# Patient Record
Sex: FEMALE | Race: WHITE | NOT HISPANIC OR LATINO | Employment: OTHER | URBAN - METROPOLITAN AREA
[De-identification: names, ages, dates, MRNs, and addresses within clinical notes are randomized per-mention and may not be internally consistent; named-entity substitution may affect disease eponyms.]

---

## 2018-03-23 ENCOUNTER — TRANSCRIBE ORDERS (OUTPATIENT)
Dept: ADMINISTRATIVE | Facility: HOSPITAL | Age: 58
End: 2018-03-23

## 2018-03-23 DIAGNOSIS — G93.89 PNEUMOCEPHALUS: Primary | ICD-10-CM

## 2018-04-12 ENCOUNTER — HOSPITAL ENCOUNTER (OUTPATIENT)
Dept: MRI IMAGING | Facility: HOSPITAL | Age: 58
Discharge: HOME | End: 2018-04-12
Payer: COMMERCIAL

## 2018-04-12 DIAGNOSIS — G93.89 PNEUMOCEPHALUS: ICD-10-CM

## 2018-04-12 PROCEDURE — A9577 INJ MULTIHANCE: HCPCS | Performed by: RADIOLOGY

## 2018-04-12 PROCEDURE — 72157 MRI CHEST SPINE W/O & W/DYE: CPT

## 2018-04-12 RX ADMIN — GADOBENATE DIMEGLUMINE 13 ML: 529 INJECTION, SOLUTION INTRAVENOUS at 15:47

## 2018-05-08 ENCOUNTER — TRANSCRIBE ORDERS (OUTPATIENT)
Dept: ADMINISTRATIVE | Facility: HOSPITAL | Age: 58
End: 2018-05-08

## 2018-05-08 DIAGNOSIS — G93.89 PNEUMOCEPHALUS: ICD-10-CM

## 2018-05-08 DIAGNOSIS — G96.00 CSF LEAK: Primary | ICD-10-CM

## 2018-05-29 ENCOUNTER — HOSPITAL ENCOUNTER (OUTPATIENT)
Dept: MRI IMAGING | Facility: HOSPITAL | Age: 58
Discharge: HOME | End: 2018-05-29
Payer: COMMERCIAL

## 2018-05-29 DIAGNOSIS — G96.00 CSF LEAK: ICD-10-CM

## 2018-05-29 PROCEDURE — A9585 GADOBUTROL INJECTION: HCPCS | Performed by: RADIOLOGY

## 2018-05-29 PROCEDURE — 70553 MRI BRAIN STEM W/O & W/DYE: CPT

## 2018-05-29 RX ADMIN — GADOBUTROL 6 ML: 604.72 INJECTION INTRAVENOUS at 14:51

## 2019-02-06 ENCOUNTER — APPOINTMENT (OUTPATIENT)
Dept: PHYSICAL THERAPY | Facility: CLINIC | Age: 59
End: 2019-02-06
Payer: COMMERCIAL

## 2019-02-11 ENCOUNTER — APPOINTMENT (OUTPATIENT)
Dept: PHYSICAL THERAPY | Facility: CLINIC | Age: 59
End: 2019-02-11
Payer: COMMERCIAL

## 2019-02-19 ENCOUNTER — EVALUATION (OUTPATIENT)
Dept: PHYSICAL THERAPY | Facility: CLINIC | Age: 59
End: 2019-02-19
Payer: COMMERCIAL

## 2019-02-19 DIAGNOSIS — M54.2 CERVICAL PAIN (NECK): ICD-10-CM

## 2019-02-19 DIAGNOSIS — G96.00 CSF LEAK: Primary | ICD-10-CM

## 2019-02-19 PROCEDURE — 97163 PT EVAL HIGH COMPLEX 45 MIN: CPT | Performed by: PHYSICAL THERAPIST

## 2019-02-19 PROCEDURE — 97110 THERAPEUTIC EXERCISES: CPT | Performed by: PHYSICAL THERAPIST

## 2019-02-19 NOTE — PROGRESS NOTES
PT Evaluation     Today's date: 2019  Patient name: Kevin Murdock  : 1960  MRN: [id number]  Referring provider: Adonay Valero MD  Dx:   Encounter Diagnosis     ICD-10-CM    1  CSF leak G96 0    2  Cervical pain (neck) M54 2                   Assessment  Assessment details: Patient is a 66 y o  female presenting to outpatient PT with complaints of neck/thoracic pain, decreased balance, and decreased strength  Patient describes having surgery in 2018 to address her CSF leak that began after a fall in   She has had worsening residual symptoms of dizziness, balance, and migraines  Patient displays with decreased cervical ROM, decreased BUE/BLE strength, (+) impingement testing (R) shoulder, decreased balance, gait deficits, and functional restrictions  Pathanatomical signs and symptoms are consistent with decreased strength and endurance from CSF surgery and decreased activity  Skilled PT is required to increase ROM, strength, and balance to allow patient to return to desired level of function with all activities  No further referral appears necessary at this time based upon examination results  Thank you very much for your referral    Treatment and documentation performed by Sravanthi OVALLE under the direct supervision of Gael Foss PT, DPT  Impairments: abnormal gait, abnormal muscle tone, abnormal or restricted ROM, abnormal movement, activity intolerance, impaired balance, impaired physical strength, lacks appropriate home exercise program, pain with function, poor posture  and poor body mechanics    Symptom irritability: highUnderstanding of Dx/Px/POC: good   Prognosis: good    Goals  ST  Decrease pain to 6/10 max in 2 weeks  2  Increase Cervical flex, ext, and SB AROM: 10 degrees in 2 weeks  3  Increase BUE/BLE strength by 1/2 MMT grade in 2 weeks  4  Increase Romberg/Sharpened Romberg balance to 20 seconds in 2 weeks  5  I with HEP in 2 weeks    6  Improve FOTO score to 57 in 2 weeks  LT  Decrease pain to 2/10 max in 4 weeks  2  Increase Cervical flex, ext, SB AROM: 15 degrees in 4 weeks  3  Increase BUE/BLE strength to 4+/5 all planes in 4 weeks  4  Increase Romberg/Sharpened Romberg balance to 30 seconds in 4 weeks  5  I with HEP in 4 weeks  6  Improve FOTO score to 61 in 4 weeks  Plan  Patient would benefit from: skilled physical therapy  Planned modality interventions: cryotherapy and thermotherapy: hydrocollator packs  Planned therapy interventions: activity modification, joint mobilization, manual therapy, neuromuscular re-education, body mechanics training, patient education, postural training, strengthening, stretching, therapeutic activities, therapeutic exercise, functional ROM exercises, home exercise program, gait training, balance and motor coordination training  Frequency: 2x week  Duration in visits: 8  Duration in weeks: 4  Plan of Care beginning date: 2019  Plan of Care expiration date: 3/19/2019  Treatment plan discussed with: patient        Subjective Evaluation    History of Present Illness  Mechanism of injury: Patient had a fall with LOC in  causing CSF leak, chronic migraines, and neck pain  She has been out of work since 2016  With her CSF leak she describes symptoms of dizziness, decreased balance, and weakness from inactivity that has gotten worse over the past few months  She had surgery in 2018 and sees a concussion specialist at [facility] who referred her to PT  He has given her restrictions for bending over and lifting over 5-10lbs to stabilize pressure in her head            Recurrent probem    Quality of life: fair    Pain  Current pain ratin  At best pain ratin  At worst pain rating: 10  Location: neck, head, thoracic spine  Quality: dull ache, throbbing and radiating  Relieving factors: ice, medications, relaxation and heat  Aggravating factors: lifting, stair climbing and walking  Progression: worsening    Social Support  Stairs in house: yes   Lives in: [place] [facility]  Lives with: spouse    Employment status: not working  Hand dominance: right    Patient Goals  Patient goals for therapy: return to sport/leisure activities, increased strength, decreased pain, improved balance and increased motion  Patient goal: gardening, exercise, walk dogs        Objective     Postural Observations  Seated posture: fair  Standing posture: fair        Palpation     Additional Palpation Details  TTP C6-C7 SPs, cervical paraspinals, T3-T10 SPs  TTP (R) posterior GH joint line    Active Range of Motion   Cervical/Thoracic Spine       Cervical    Flexion: 40 degrees  with pain  Extension: 49 degrees      Left lateral flexion: 34 degrees      Right lateral flexion: 25 degrees      Left rotation: 67 degrees  Right rotation: 70 degrees             Strength/Myotome Testing     Left Shoulder     Planes of Motion   Flexion: 4   Extension: 4   Abduction: 4   External rotation at 0°: 4   Internal rotation at 0°: 4     Right Shoulder     Planes of Motion   Flexion: 4   Extension: 4+   Abduction: 4   External rotation at 0°: 4+   Internal rotation at 0°: 4     Left Elbow   Flexion: 4+  Extension: 4    Right Elbow   Flexion: 4+  Extension: 4-    Left Hip   Planes of Motion   Flexion: 4  Extension: 4  Abduction: 4+  Adduction: 4+    Right Hip   Planes of Motion   Flexion: 4-  Extension: 4  Abduction: 4+  Adduction: 4+    Left Knee   Flexion: 4  Extension: 4+    Right Knee   Flexion: 4+  Extension: 4+    Left Ankle/Foot   Dorsiflexion: 4+  Plantar flexion: 5    Right Ankle/Foot   Dorsiflexion: 4+  Plantar flexion: 5    Additional Strength Details  Hip ABD/ ADD MMT performed in hooklying  Hip Ext MMT performed in supine    Tests     Right Shoulder   Positive empty can and Hawkin's       Additional Tests Details  Pain and weakness noted    Ambulation     Observational Gait     Additional Observational Gait Details  NBOS, occasional swaying increasing at turns, slight improvement noted in stability with [facility]     Functional Assessment        Single Leg Stance   Left: 1 (max sway) seconds  Right: 2 (max sway) seconds    Comments  Romberg Balance: EO: 30 sec mod sway; EC: 3 sec max sway  Sharpened Romberg Balance: (R) Foot FWD 15 sec mod-max sway; (L) Foot FWD 10 sec mod-max sway             Daily Treatment Diary     DX: CSF leak, cervical pain  EPOC: 3/19/19  Precautions: No bending, no lifting 5-10lbs  CO-MORBIDITES: Back pain, headaches, visual impairment, chronic CSF leak  PERSONAL FACTORS: N/A    Manual           Cervical distraction, SO release          Thoracic PA mobs                                            Exercise Diary  HEP         Recumbent bike          UBE                    Cervical retractions supine          Cervical isometrics                    Scapular retractions 2/19         TB Rows          TB Sh' Ext          TB Triceps          TB (B) ER          DB biceps                    SLR 2/19         Clamshells 2/19         Bridges 2/19                   Standing hip abd          Standing hip flex          Standing hip ext          FWD wt shifts          LAT wt shifts          Mini squats          Romberg          Sharpened Romberg              Modalities

## 2019-02-19 NOTE — LETTER
2019    Apollo Chavez MD  [facility] [address] [facility]    Patient: Nora Skaggs   YOB: 1960   Date of Visit: 2019     Encounter Diagnosis     ICD-10-CM    1  CSF leak G96 0    2  Cervical pain (neck) M54 2        Dear Dr Jayden Puga:    Please review the attached Plan of Care from [facility] recent visit  Please verify that you agree therapy should continue by signing the attached document and sending it back to our office  If you have any questions or concerns, please don't hesitate to call  Sincerely,    [facility], PT      Referring Provider:      I certify that I have read the below Plan of Care and certify the need for these services furnished under this plan of treatment while under my care  Apollo Chavez MD  [address] [facility]: [phone]          PT Evaluation     Today's date: 2019  Patient name: Nora Skaggs  : 1960  MRN: [id number]  Referring provider: Xochilt Gaston MD  Dx:   Encounter Diagnosis     ICD-10-CM    1  CSF leak G96 0    2  Cervical pain (neck) M54 2                   Assessment  Assessment details: Patient is a 66 y o  female presenting to outpatient PT with complaints of neck/thoracic pain, decreased balance, and decreased strength  Patient describes having surgery in 2018 to address her CSF leak that began after a fall in   She has had worsening residual symptoms of dizziness, balance, and migraines  Patient displays with decreased cervical ROM, decreased BUE/BLE strength, (+) impingement testing (R) shoulder, decreased balance, gait deficits, and functional restrictions  Pathanatomical signs and symptoms are consistent with decreased strength and endurance from CSF surgery and decreased activity  Skilled PT is required to increase ROM, strength, and balance to allow patient to return to desired level of function with all activities   No further referral appears necessary at this time based upon examination results  Thank you very much for your referral    Treatment and documentation performed by Kye Pinto SPT under the direct supervision of Ila Rader PT, DPT  Impairments: abnormal gait, abnormal muscle tone, abnormal or restricted ROM, abnormal movement, activity intolerance, impaired balance, impaired physical strength, lacks appropriate home exercise program, pain with function, poor posture  and poor body mechanics    Symptom irritability: highUnderstanding of Dx/Px/POC: good   Prognosis: good    Goals  ST  Decrease pain to 6/10 max in 2 weeks  2  Increase Cervical flex, ext, and SB AROM: 10 degrees in 2 weeks  3  Increase BUE/BLE strength by 1/2 MMT grade in 2 weeks  4  Increase Romberg/Sharpened Romberg balance to 20 seconds in 2 weeks  5  I with HEP in 2 weeks  6  Improve FOTO score to 57 in 2 weeks  LT  Decrease pain to 2/10 max in 4 weeks  2  Increase Cervical flex, ext, SB AROM: 15 degrees in 4 weeks  3  Increase BUE/BLE strength to 4+/5 all planes in 4 weeks  4  Increase Romberg/Sharpened Romberg balance to 30 seconds in 4 weeks  5  I with HEP in 4 weeks  6  Improve FOTO score to 61 in 4 weeks      Plan  Patient would benefit from: skilled physical therapy  Planned modality interventions: cryotherapy and thermotherapy: hydrocollator packs  Planned therapy interventions: activity modification, joint mobilization, manual therapy, neuromuscular re-education, body mechanics training, patient education, postural training, strengthening, stretching, therapeutic activities, therapeutic exercise, functional ROM exercises, home exercise program, gait training, balance and motor coordination training  Frequency: 2x week  Duration in visits: 8  Duration in weeks: 4  Plan of Care beginning date: 2019  Plan of Care expiration date: 3/19/2019  Treatment plan discussed with: patient        Subjective Evaluation    History of Present Illness  Mechanism of injury: Patient had a fall with LOC in  causing CSF leak, chronic migraines, and neck pain  She has been out of work since 2016  With her CSF leak she describes symptoms of dizziness, decreased balance, and weakness from inactivity that has gotten worse over the past few months  She had surgery in 2018 and sees a concussion specialist at [facility] who referred her to PT  He has given her restrictions for bending over and lifting over 5-10lbs to stabilize pressure in her head            Recurrent probem    Quality of life: fair    Pain  Current pain ratin  At best pain ratin  At worst pain rating: 10  Location: neck, head, thoracic spine  Quality: dull ache, throbbing and radiating  Relieving factors: ice, medications, relaxation and heat  Aggravating factors: lifting, stair climbing and walking  Progression: worsening    Social Support  Stairs in house: yes   L[facility] in: [facility]  Lives with: spouse    Employment status: not working  Hand dominance: right    Patient Goals  Patient goals for therapy: return to sport/leisure activities, increased strength, decreased pain, improved balance and increased motion  Patient goal: gardening, exercise, walk dogs        Objective     Postural Observations  Seated posture: fair  Standing posture: fair        Palpation     Additional Palpation Details  TTP C6-C7 SPs, cervical paraspinals, T3-T10 SPs  TTP (R) posterior GH joint line    Active Range of Motion   Cervical/Thoracic Spine       Cervical    Flexion: 40 degrees  with pain  Extension: 49 degrees      Left lateral flexion: 34 degrees      Right lateral flexion: 25 degrees      Left rotation: 67 degrees  Right rotation: 70 degrees             Strength/Myotome Testing     Left Shoulder     Planes of Motion   Flexion: 4   Extension: 4   Abduction: 4   External rotation at 0°:  4   Internal rotation at 0°:  4     Right Shoulder     Planes of Motion Flexion: 4   Extension: 4+   Abduction: 4   External rotation at 0°:  4+   Internal rotation at 0°:  4     Left Elbow   Flexion: 4+  Extension: 4    Right Elbow   Flexion: 4+  Extension: 4-    Left Hip   Planes of Motion   Flexion: 4  Extension: 4  Abduction: 4+  Adduction: 4+    Right Hip   Planes of Motion   Flexion: 4-  Extension: 4  Abduction: 4+  Adduction: 4+    Left Knee   Flexion: 4  Extension: 4+    Right Knee   Flexion: 4+  Extension: 4+    Left Ankle/Foot   Dorsiflexion: 4+  Plantar flexion: 5    Right Ankle/Foot   Dorsiflexion: 4+  Plantar flexion: 5    Additional Strength Details  Hip ABD/ ADD MMT performed in hooklying  Hip Ext MMT performed in supine    Tests     Right Shoulder   Positive empty can and Hawkin's       Additional Tests Details  Pain and weakness noted    Ambulation     Observational Gait     Additional Observational Gait Details  NBOS, occasional swaying increasing at turns, slight improvement noted in stability with [facility]     Functional Assessment        Single Leg Stance   Left: 1 (max sway) seconds  Right: 2 (max sway) seconds    Comments  Romberg Balance: EO: 30 sec mod sway; EC: 3 sec max sway  Sharpened Romberg Balance: (R) Foot FWD 15 sec mod-max sway; (L) Foot FWD 10 sec mod-max sway             Daily Treatment Diary     DX: CSF leak, cervical pain  EPOC: 3/19/19  Precautions: No bending, no lifting 5-10lbs  CO-MORBIDITES: Back pain, headaches, visual impairment, chronic CSF leak  PERSONAL FACTORS: N/A    Manual           Cervical distraction, SO release          Thoracic PA mobs                                            Exercise Diary  HEP         Recumbent bike          UBE                    Cervical retractions supine          Cervical isometrics                    Scapular retractions 2/19         TB Rows          TB Sh' Ext          TB Triceps          TB (B) ER          DB biceps                    SLR 2/19         Clamshells 2/19         Bridges 2/19 Standing hip abd          Standing hip flex          Standing hip ext          FWD wt shifts          LAT wt shifts          Mini squats          Romberg          Sharpened Romberg              Modalities

## 2019-02-21 ENCOUNTER — APPOINTMENT (OUTPATIENT)
Dept: PHYSICAL THERAPY | Facility: CLINIC | Age: 59
End: 2019-02-21
Payer: COMMERCIAL

## 2019-02-21 ENCOUNTER — TRANSCRIBE ORDERS (OUTPATIENT)
Dept: PHYSICAL THERAPY | Facility: CLINIC | Age: 59
End: 2019-02-21

## 2019-02-21 DIAGNOSIS — G96.00 CSF LEAK: Primary | ICD-10-CM

## 2019-02-25 ENCOUNTER — OFFICE VISIT (OUTPATIENT)
Dept: PHYSICAL THERAPY | Facility: CLINIC | Age: 59
End: 2019-02-25
Payer: COMMERCIAL

## 2019-02-25 DIAGNOSIS — G96.00 CSF LEAK: Primary | ICD-10-CM

## 2019-02-25 DIAGNOSIS — M54.2 CERVICAL PAIN (NECK): ICD-10-CM

## 2019-02-25 PROCEDURE — 97140 MANUAL THERAPY 1/> REGIONS: CPT | Performed by: PHYSICAL THERAPIST

## 2019-02-25 PROCEDURE — 97110 THERAPEUTIC EXERCISES: CPT | Performed by: PHYSICAL THERAPIST

## 2019-02-25 NOTE — PROGRESS NOTES
Daily Note     Today's date: 2019  Patient name: Katiana Mcdowell  : 1960  MRN: [id number]  Referring provider: Lelia Skaggs MD  Dx:   Encounter Diagnosis     ICD-10-CM    1  CSF leak G96 0    2  Cervical pain (neck) M54 2                   Subjective: Patient reports being a bit sore after IE  On Wednesday night she started to feel increased radiating pain from her neck into her thoracic spine  She has not been able to do her HEP because of being in so much pain  Objective: See treatment diary below      Assessment: Patient tolerated treatment well with minimal c/o pain  Cervical distraction, suboccipital release, and thoracic mobilizations were performed at beginning of session with patient reports of pain relief  Strength training TE's were focused on scapular stabilization and BUE to continue progressing patient's sitting and standing tolerance with BUE movement  Recommend progressing strength training as tolerated working up to balance training when able  Plan: Continue with current POC  Progress shoulder and BLE strength as tolerated           DX: CSF leak, cervical pain  EPOC: 3/19/19  Precautions: No bending, no lifting 5-10lbs, Gr 1/2 Spine Mobs only  CO-MORBIDITES: Back pain, headaches, visual impairment, chronic CSF leak  PERSONAL FACTORS: N/A    Manual           Cervical distraction, SO release 5'         Thoracic PA mobs 3'   Gr 1/2                                           Exercise Diary  HEP         Recumbent bike          UBE  /2'                  Thoracic ext stretch w/ towel  2x10        Cervical retractions seated   10        Cervical retractions supine  10        Cervical isometrics                    Scapular retractions          TB Rows  OTB 15        TB Sh' Ext  OTB 15        TB Triceps  OTB 15        TB (B) ER          Standing Sh' flex  10        Standing Sh' scaption  10        Standing Sh' abd  10        DB biceps                    SLR  [place] 2/19         Aniyah 2/19                   Standing hip abd          Standing hip flex          Standing hip ext          FWD wt shifts          LAT wt shifts          Mini squats          Romberg          Sharpened Romberg              Modalities

## 2019-02-26 ENCOUNTER — APPOINTMENT (OUTPATIENT)
Dept: PHYSICAL THERAPY | Facility: CLINIC | Age: 59
End: 2019-02-26
Payer: COMMERCIAL

## 2019-02-28 ENCOUNTER — OFFICE VISIT (OUTPATIENT)
Dept: PHYSICAL THERAPY | Facility: CLINIC | Age: 59
End: 2019-02-28
Payer: COMMERCIAL

## 2019-02-28 DIAGNOSIS — G96.00 CSF LEAK: Primary | ICD-10-CM

## 2019-02-28 DIAGNOSIS — M54.2 CERVICAL PAIN (NECK): ICD-10-CM

## 2019-02-28 PROCEDURE — 97140 MANUAL THERAPY 1/> REGIONS: CPT | Performed by: PHYSICAL THERAPIST

## 2019-02-28 PROCEDURE — 97110 THERAPEUTIC EXERCISES: CPT | Performed by: PHYSICAL THERAPIST

## 2019-02-28 NOTE — PROGRESS NOTES
Daily Note     Today's date: 2019  Patient name: Thiago Davis  : 1960  MRN: [id number]  Referring provider: Cami Yancey MD  Dx:   Encounter Diagnosis     ICD-10-CM    1  CSF leak G96 0    2  Cervical pain (neck) M54 2                   Subjective: Patient reports feeling better with decreased pain after LV  She had to drive into the city by herself yesterday which caused her pain to increase again  She has not been able to perform her LE strengthening HEP due to increased pain from her doctor's visit  Objective: See treatment diary below      Assessment: Patient tolerated treatment well with minimal c/o pain  She continues to feel pain relief from cervical and thoracic manual therapy  Progressed well with UE strength training today as patient was able to tolerate more sets and resistance for multiple TE's  Recommend progressing into LE strength training as tolerated and patient feeling better  Plan: Continue with current POC  Progress shoulder and BLE strength as tolerated           DX: CSF leak, cervical pain  EPOC: 3/19/19  Precautions: No bending, no lifting 5-10lbs, Gr 1/2 Spine Mobs only  CO-MORBIDITES: Back pain, headaches, visual impairment, chronic CSF leak  PERSONAL FACTORS: N/A    Manual          Cervical distraction, SO release 5' 5'        Thoracic PA mobs 3'   Gr 1/2 3' Gr 1/2                                          Exercise Diary  HEP        Recumbent bike          UBE  2'/2' 2'/2'                 Thoracic ext stretch w/ towel  2x10 2x10       Cervical retractions seated   10 10       Cervical retractions supine  10        Cervical isometrics                    Scapular retractions          TB Rows  OTB 15 OTB 10x2       TB Sh' Ext  OTB 15 OTB 10x2        TB Triceps  OTB 15 OTB 10x2       TB (B) ER   OTB 10x2       Standing Sh' flex  10 1# 10x       Standing Sh' scaption  10 1# 10x       Standing Sh' abd  10 1# 10x       DB biceps   2# 10x2 SLR 2/19         Clamshells 2/19         Bridges 2/19                   Standing hip abd          Standing hip flex          Standing hip ext          FWD wt shifts          LAT wt shifts          Mini squats          Romberg          Sharpened Romberg              Modalities

## 2019-03-16 ENCOUNTER — OFFICE VISIT (OUTPATIENT)
Dept: URGENT CARE | Facility: CLINIC | Age: 59
End: 2019-03-16
Payer: COMMERCIAL

## 2019-03-16 VITALS
DIASTOLIC BLOOD PRESSURE: 88 MMHG | HEART RATE: 77 BPM | RESPIRATION RATE: 16 BRPM | TEMPERATURE: 98.7 F | WEIGHT: 160 LBS | SYSTOLIC BLOOD PRESSURE: 144 MMHG | OXYGEN SATURATION: 98 % | BODY MASS INDEX: 25.71 KG/M2 | HEIGHT: 66 IN

## 2019-03-16 DIAGNOSIS — B35.3 TINEA PEDIS OF RIGHT FOOT: Primary | ICD-10-CM

## 2019-03-16 PROCEDURE — 99283 EMERGENCY DEPT VISIT LOW MDM: CPT | Performed by: NURSE PRACTITIONER

## 2019-03-16 PROCEDURE — G0382 LEV 3 HOSP TYPE B ED VISIT: HCPCS | Performed by: NURSE PRACTITIONER

## 2019-03-16 RX ORDER — CLOTRIMAZOLE 1 %
CREAM (GRAM) TOPICAL 2 TIMES DAILY
Qty: 30 G | Refills: 0 | Status: SHIPPED | OUTPATIENT
Start: 2019-03-16 | End: 2020-06-16

## 2019-03-16 NOTE — PROGRESS NOTES
Assessment/Plan    Tinea pedis of right foot [B35 3]  1  Tinea pedis of right foot  clotrimazole (LOTRIMIN) 1 % cream         Subjective:     Patient ID: Nicolasa Rapp is a 66 y o  female  Reason For Visit / Chief Complaint  Chief Complaint   Patient presents with    Rash     Noticed dot beginning of week on right foot  Now a [place]  Itchy and burns slightly  This is a 53-year-old female patient who presents to the urgent care today  Patient noticed a circular rash on her foot approximately 1 week ago  Patient has been putting Neosporin on it and has not gotten any better  Patient denies fever, chills, chest pain or shortness of breath  She denies aggravating or alleviating factors  Patient denies recently being in a gym or hot tub  Her medical history has been reviewed  Her allergies have been reviewed  Past Medical History:   Diagnosis Date    Concussion        Past Surgical History:   Procedure Laterality Date    BREAST LUMPECTOMY      LAPAROSCOPY FOR ECTOPIC PREGNANCY      TONSILECTOMY AND ADNOIDECTOMY         No family history on file  Review of Systems   Constitutional: Negative for chills and fever  Respiratory: Negative for shortness of breath  Cardiovascular: Negative for chest pain  Skin: Positive for rash  Objective:    /88   Pulse 77   Temp 98 7 °F (37 1 °C)   Resp 16   Ht 5' 6" (1 676 m)   Wt 72 6 kg (160 lb)   SpO2 98%   BMI 25 82 kg/m²     Physical Exam   Constitutional: Vital signs are normal  She appears well-developed and well-nourished  Cardiovascular: Normal rate, regular rhythm, normal heart sounds and intact distal pulses  Exam reveals no friction rub  No murmur heard  Pulmonary/Chest: Effort normal and breath sounds normal  No stridor  No respiratory distress  She has no wheezes  She has no rales  She exhibits no tenderness  Skin: Skin is warm and dry  Capillary refill takes less than 2 seconds  Rash noted  Nursing note and vitals reviewed

## 2019-03-16 NOTE — PATIENT INSTRUCTIONS
Apply cream to foot twice a day for 2 weeks  You can put the cream on her foot for up to 2 weeks  Follow-up with your doctor for any concerns

## 2019-03-25 ENCOUNTER — CLINICAL SUPPORT (OUTPATIENT)
Dept: GASTROENTEROLOGY | Facility: CLINIC | Age: 59
End: 2019-03-25

## 2019-03-25 VITALS — WEIGHT: 150 LBS | BODY MASS INDEX: 24.11 KG/M2 | HEIGHT: 66 IN

## 2019-03-25 DIAGNOSIS — Z12.11 SCREENING FOR COLON CANCER: Primary | ICD-10-CM

## 2019-03-25 RX ORDER — NARATRIPTAN 2.5 MG/1
2.5 TABLET ORAL ONCE AS NEEDED
COMMUNITY
End: 2020-11-04

## 2019-03-25 RX ORDER — ONDANSETRON HYDROCHLORIDE 8 MG/1
8 TABLET, FILM COATED ORAL EVERY 8 HOURS PRN
COMMUNITY
End: 2022-06-30 | Stop reason: HOSPADM

## 2019-04-08 ENCOUNTER — TELEPHONE (OUTPATIENT)
Dept: GASTROENTEROLOGY | Facility: CLINIC | Age: 59
End: 2019-04-08

## 2019-09-02 ENCOUNTER — APPOINTMENT (EMERGENCY)
Dept: RADIOLOGY | Facility: HOSPITAL | Age: 59
End: 2019-09-02
Payer: COMMERCIAL

## 2019-09-02 ENCOUNTER — APPOINTMENT (EMERGENCY)
Dept: CT IMAGING | Facility: HOSPITAL | Age: 59
End: 2019-09-02
Payer: COMMERCIAL

## 2019-09-02 ENCOUNTER — HOSPITAL ENCOUNTER (EMERGENCY)
Facility: HOSPITAL | Age: 59
Discharge: HOME | End: 2019-09-02
Attending: EMERGENCY MEDICINE
Payer: COMMERCIAL

## 2019-09-02 VITALS
HEART RATE: 84 BPM | HEIGHT: 66 IN | DIASTOLIC BLOOD PRESSURE: 79 MMHG | WEIGHT: 150 LBS | OXYGEN SATURATION: 95 % | RESPIRATION RATE: 20 BRPM | BODY MASS INDEX: 24.11 KG/M2 | SYSTOLIC BLOOD PRESSURE: 142 MMHG | TEMPERATURE: 97.8 F

## 2019-09-02 DIAGNOSIS — S62.109A WRIST FRACTURE, CLOSED: ICD-10-CM
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
## 2019-09-02 DIAGNOSIS — S32.020A COMPRESSION FRACTURE OF L2 LUMBAR VERTEBRA, CLOSED, INITIAL ENCOUNTER (HCC): ICD-10-CM

## 2019-09-02 PROCEDURE — 72131 CT LUMBAR SPINE W/O DYE: CPT

## 2019-09-02 PROCEDURE — 29125 APPL SHORT ARM SPLINT STATIC: CPT | Performed by: EMERGENCY MEDICINE

## 2019-09-02 PROCEDURE — 73110 X-RAY EXAM OF WRIST: CPT

## 2019-09-02 PROCEDURE — 96372 THER/PROPH/DIAG INJ SC/IM: CPT

## 2019-09-02 PROCEDURE — 99284 EMERGENCY DEPT VISIT MOD MDM: CPT | Performed by: EMERGENCY MEDICINE

## 2019-09-02 PROCEDURE — 99284 EMERGENCY DEPT VISIT MOD MDM: CPT

## 2019-09-02 RX ORDER — AMITRIPTYLINE HYDROCHLORIDE 25 MG/1
25 TABLET, FILM COATED ORAL
COMMUNITY
End: 2020-02-06 | Stop reason: ALTCHOICE

## 2019-09-02 RX ORDER — HYDROCODONE BITARTRATE AND ACETAMINOPHEN 5; 325 MG/1; MG/1
1 TABLET ORAL EVERY 4 HOURS PRN
Qty: 16 TABLET | Refills: 0 | Status: SHIPPED | OUTPATIENT
Start: 2019-09-02 | End: 2019-09-12

## 2019-09-02 RX ORDER — KETOROLAC TROMETHAMINE 30 MG/ML
60 INJECTION, SOLUTION INTRAMUSCULAR; INTRAVENOUS ONCE
Status: COMPLETED | OUTPATIENT
Start: 2019-09-02 | End: 2019-09-02

## 2019-09-02 RX ORDER — GINSENG 100 MG
1 CAPSULE ORAL ONCE
Status: COMPLETED | OUTPATIENT
Start: 2019-09-02 | End: 2019-09-02

## 2019-09-02 RX ORDER — LORAZEPAM 1 MG/1
1 TABLET ORAL ONCE
Status: COMPLETED | OUTPATIENT
Start: 2019-09-02 | End: 2019-09-02

## 2019-09-02 RX ADMIN — KETOROLAC TROMETHAMINE 60 MG: 30 INJECTION, SOLUTION INTRAMUSCULAR at 16:47

## 2019-09-02 RX ADMIN — LORAZEPAM 1 MG: 1 TABLET ORAL at 16:48

## 2019-09-02 RX ADMIN — BACITRACIN ZINC 1 SMALL APPLICATION: 500 OINTMENT TOPICAL at 16:48
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
## 2019-09-05 ENCOUNTER — CONSULT (OUTPATIENT)
Dept: OBGYN CLINIC | Facility: CLINIC | Age: 59
End: 2019-09-05
Payer: COMMERCIAL

## 2019-09-05 VITALS
BODY MASS INDEX: 24.11 KG/M2 | HEIGHT: 66 IN | DIASTOLIC BLOOD PRESSURE: 84 MMHG | HEART RATE: 82 BPM | SYSTOLIC BLOOD PRESSURE: 140 MMHG | WEIGHT: 150 LBS

## 2019-09-05 DIAGNOSIS — S52.502A NONDISPLACED FRACTURE OF DISTAL END OF LEFT RADIUS: Primary | ICD-10-CM

## 2019-09-05 DIAGNOSIS — S32.020A CLOSED COMPRESSION FRACTURE OF SECOND LUMBAR VERTEBRA, INITIAL ENCOUNTER: ICD-10-CM

## 2019-09-05 PROCEDURE — 99203 OFFICE O/P NEW LOW 30 MIN: CPT | Performed by: PHYSICIAN ASSISTANT

## 2019-09-05 PROCEDURE — 25600 CLTX DST RDL FX/EPHYS SEP WO: CPT | Performed by: PHYSICIAN ASSISTANT

## 2019-09-05 RX ORDER — CYCLOBENZAPRINE HCL 10 MG
10 TABLET ORAL
Qty: 20 TABLET | Refills: 0 | Status: SHIPPED | OUTPATIENT
Start: 2019-09-05 | End: 2020-01-27 | Stop reason: SDUPTHER

## 2019-09-05 RX ORDER — HYDROCODONE BITARTRATE AND ACETAMINOPHEN 5; 325 MG/1; MG/1
1 TABLET ORAL EVERY 6 HOURS PRN
Qty: 30 TABLET | Refills: 0 | Status: SHIPPED | OUTPATIENT
Start: 2019-09-05 | End: 2020-02-06 | Stop reason: ALTCHOICE
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
## 2019-09-25 ENCOUNTER — OFFICE VISIT (OUTPATIENT)
Dept: OBGYN CLINIC | Facility: CLINIC | Age: 59
End: 2019-09-25
Payer: COMMERCIAL

## 2019-09-25 VITALS
BODY MASS INDEX: 24.11 KG/M2 | DIASTOLIC BLOOD PRESSURE: 78 MMHG | SYSTOLIC BLOOD PRESSURE: 120 MMHG | HEIGHT: 66 IN | WEIGHT: 150 LBS

## 2019-09-25 DIAGNOSIS — S52.502A NONDISPLACED FRACTURE OF DISTAL END OF LEFT RADIUS: Primary | ICD-10-CM

## 2019-09-25 PROCEDURE — 99024 POSTOP FOLLOW-UP VISIT: CPT | Performed by: PHYSICIAN ASSISTANT

## 2019-09-25 PROCEDURE — 29075 APPL CST ELBW FNGR SHORT ARM: CPT | Performed by: PHYSICIAN ASSISTANT
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
## 2019-09-25 NOTE — ASSESSMENT & PLAN NOTE
Findings consistent with left nondisplaced distal radius fracture  Short-arm cast was replaced today  Patient will follow-up as scheduled in 3 weeks, out of cast and x-rays  All questions were answered to patient's satisfaction  Plan discussed with Dr Harsha Carr

## 2019-10-17 ENCOUNTER — OFFICE VISIT (OUTPATIENT)
Dept: OBGYN CLINIC | Facility: CLINIC | Age: 59
End: 2019-10-17

## 2019-10-17 ENCOUNTER — OFFICE VISIT (OUTPATIENT)
Dept: OCCUPATIONAL THERAPY | Facility: CLINIC | Age: 59
End: 2019-10-17
Payer: COMMERCIAL

## 2019-10-17 ENCOUNTER — APPOINTMENT (OUTPATIENT)
Dept: RADIOLOGY | Facility: CLINIC | Age: 59
End: 2019-10-17
Payer: COMMERCIAL

## 2019-10-17 VITALS
DIASTOLIC BLOOD PRESSURE: 84 MMHG | WEIGHT: 162 LBS | SYSTOLIC BLOOD PRESSURE: 142 MMHG | HEIGHT: 66 IN | BODY MASS INDEX: 26.03 KG/M2 | HEART RATE: 80 BPM

## 2019-10-17 DIAGNOSIS — S52.502A NONDISPLACED FRACTURE OF DISTAL END OF LEFT RADIUS: Primary | ICD-10-CM

## 2019-10-17 DIAGNOSIS — S52.502A NONDISPLACED FRACTURE OF DISTAL END OF LEFT RADIUS: ICD-10-CM

## 2019-10-17 DIAGNOSIS — S32.020S CLOSED COMPRESSION FRACTURE OF SECOND LUMBAR VERTEBRA, SEQUELA: ICD-10-CM

## 2019-10-17 PROCEDURE — 72110 X-RAY EXAM L-2 SPINE 4/>VWS: CPT

## 2019-10-17 PROCEDURE — 97760 ORTHOTIC MGMT&TRAING 1ST ENC: CPT | Performed by: OCCUPATIONAL THERAPIST

## 2019-10-17 PROCEDURE — 73110 X-RAY EXAM OF WRIST: CPT

## 2019-10-17 PROCEDURE — 99024 POSTOP FOLLOW-UP VISIT: CPT | Performed by: ORTHOPAEDIC SURGERY

## 2019-10-17 NOTE — PROGRESS NOTES
Orthosis    Diagnosis:   1  Nondisplaced fracture of distal end of left radius       Indication: Fracture    Location: Left  wrist  Supplies: Custom Fit Orthotic and Skin coverage   Orthosis type: Wrist splint  Wearing Schedule: Remove for hygiene only and Remove for HEP  Describe Position: function    Precautions: Fracture    Patient or Caregiver expresses understanding of wearing Schedule and Precautions? Yes  Patient or Caregiver able to don/doff orthotic independently? Yes    Written orders provided to patient?  Yes  Orders Obtained: Written  Orders Obtained from: Dr Christine Cameron     Return for evaluation and treatment Yes
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
## 2019-10-23 ENCOUNTER — EVALUATION (OUTPATIENT)
Dept: OCCUPATIONAL THERAPY | Facility: CLINIC | Age: 59
End: 2019-10-23
Payer: COMMERCIAL

## 2019-10-23 DIAGNOSIS — S52.502A NONDISPLACED FRACTURE OF DISTAL END OF LEFT RADIUS: Primary | ICD-10-CM

## 2019-10-23 PROCEDURE — 97165 OT EVAL LOW COMPLEX 30 MIN: CPT | Performed by: OCCUPATIONAL THERAPIST

## 2019-10-23 PROCEDURE — 97140 MANUAL THERAPY 1/> REGIONS: CPT | Performed by: OCCUPATIONAL THERAPIST
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
## 2019-10-28 ENCOUNTER — OFFICE VISIT (OUTPATIENT)
Dept: OCCUPATIONAL THERAPY | Facility: CLINIC | Age: 59
End: 2019-10-28
Payer: COMMERCIAL

## 2019-10-28 DIAGNOSIS — S52.502A NONDISPLACED FRACTURE OF DISTAL END OF LEFT RADIUS: Primary | ICD-10-CM

## 2019-10-28 PROCEDURE — 97022 WHIRLPOOL THERAPY: CPT | Performed by: OCCUPATIONAL THERAPIST

## 2019-10-28 PROCEDURE — 97110 THERAPEUTIC EXERCISES: CPT | Performed by: OCCUPATIONAL THERAPIST

## 2019-10-28 PROCEDURE — 97140 MANUAL THERAPY 1/> REGIONS: CPT | Performed by: OCCUPATIONAL THERAPIST

## 2019-10-28 NOTE — PROGRESS NOTES
Daily Note     Today's date: 10/28/2019  Patient name: Josseline Falcon  : 1960  MRN: [id number]  Referring provider: Amanda Morgan PA-C  Dx:   Encounter Diagnosis     ICD-10-CM    1  Nondisplaced fracture of distal end of left radius S52 502A                   Subjective: The outside of my wrist is really sore  Objective: See treatment diary below      Assessment: Tolerated treatment well  Patient has moderate soreness ulnar side of wrist   Wrist ROM is improving  Plan: Continue per plan of care  Precautions: Fx 19      Manual  10/23 10/28           Graston L wrist 4m 4m           retrograde 2m 2m           Extrinsic stretch 2m 2m           Intrinsic stretch 2m 2m           Jt   Mob,  A/P glides  2m               Exercise Diary  10/23 10/28           Wrist PROM 1x10 1x10           P/S PROM 1x10 1x10           Wrist curls  #1 3x10           gripping  RPW 2x30           P/s therabar  Red 3x10           Clips w/ rotation  Red 3 sets                                                                                                                                                                                    HEP- wrist ROM, TGEs reviewed                Modalities  10/23 10/28           MHP L wrist 10m 10m

## 2019-10-30 ENCOUNTER — APPOINTMENT (OUTPATIENT)
Dept: OCCUPATIONAL THERAPY | Facility: CLINIC | Age: 59
End: 2019-10-30
Payer: COMMERCIAL

## 2019-11-01 ENCOUNTER — OFFICE VISIT (OUTPATIENT)
Dept: OCCUPATIONAL THERAPY | Facility: CLINIC | Age: 59
End: 2019-11-01
Payer: COMMERCIAL

## 2019-11-01 DIAGNOSIS — S52.502A NONDISPLACED FRACTURE OF DISTAL END OF LEFT RADIUS: Primary | ICD-10-CM

## 2019-11-01 PROCEDURE — 97110 THERAPEUTIC EXERCISES: CPT | Performed by: OCCUPATIONAL THERAPIST

## 2019-11-01 PROCEDURE — 97022 WHIRLPOOL THERAPY: CPT | Performed by: OCCUPATIONAL THERAPIST

## 2019-11-01 PROCEDURE — 97140 MANUAL THERAPY 1/> REGIONS: CPT | Performed by: OCCUPATIONAL THERAPIST
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
## 2019-11-04 ENCOUNTER — APPOINTMENT (OUTPATIENT)
Dept: OCCUPATIONAL THERAPY | Facility: CLINIC | Age: 59
End: 2019-11-04
Payer: COMMERCIAL

## 2019-11-04 ENCOUNTER — APPOINTMENT (OUTPATIENT)
Dept: PHYSICAL THERAPY | Facility: CLINIC | Age: 59
End: 2019-11-04
Payer: COMMERCIAL

## 2019-11-05 ENCOUNTER — TELEPHONE (OUTPATIENT)
Dept: OBGYN CLINIC | Facility: HOSPITAL | Age: 59
End: 2019-11-05

## 2019-11-06 ENCOUNTER — OFFICE VISIT (OUTPATIENT)
Dept: OBGYN CLINIC | Facility: CLINIC | Age: 59
End: 2019-11-06

## 2019-11-06 ENCOUNTER — APPOINTMENT (OUTPATIENT)
Dept: OCCUPATIONAL THERAPY | Facility: CLINIC | Age: 59
End: 2019-11-06
Payer: COMMERCIAL

## 2019-11-06 ENCOUNTER — APPOINTMENT (OUTPATIENT)
Dept: PHYSICAL THERAPY | Facility: CLINIC | Age: 59
End: 2019-11-06
Payer: COMMERCIAL

## 2019-11-06 VITALS
WEIGHT: 160 LBS | SYSTOLIC BLOOD PRESSURE: 138 MMHG | BODY MASS INDEX: 25.71 KG/M2 | DIASTOLIC BLOOD PRESSURE: 84 MMHG | HEIGHT: 66 IN | HEART RATE: 82 BPM

## 2019-11-06 DIAGNOSIS — S52.502A NONDISPLACED FRACTURE OF DISTAL END OF LEFT RADIUS: Primary | ICD-10-CM

## 2019-11-06 PROCEDURE — 99024 POSTOP FOLLOW-UP VISIT: CPT | Performed by: ORTHOPAEDIC SURGERY
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
## 2019-11-11 ENCOUNTER — OFFICE VISIT (OUTPATIENT)
Dept: OCCUPATIONAL THERAPY | Facility: CLINIC | Age: 59
End: 2019-11-11
Payer: COMMERCIAL

## 2019-11-11 ENCOUNTER — EVALUATION (OUTPATIENT)
Dept: PHYSICAL THERAPY | Facility: CLINIC | Age: 59
End: 2019-11-11
Payer: COMMERCIAL

## 2019-11-11 DIAGNOSIS — S32.020D COMPRESSION FRACTURE OF L2 VERTEBRA WITH ROUTINE HEALING, SUBSEQUENT ENCOUNTER: Primary | ICD-10-CM

## 2019-11-11 DIAGNOSIS — S52.502A NONDISPLACED FRACTURE OF DISTAL END OF LEFT RADIUS: Primary | ICD-10-CM

## 2019-11-11 PROCEDURE — 97110 THERAPEUTIC EXERCISES: CPT | Performed by: PHYSICAL THERAPIST

## 2019-11-11 PROCEDURE — 97110 THERAPEUTIC EXERCISES: CPT | Performed by: OCCUPATIONAL THERAPIST

## 2019-11-11 PROCEDURE — 97162 PT EVAL MOD COMPLEX 30 MIN: CPT | Performed by: PHYSICAL THERAPIST

## 2019-11-11 PROCEDURE — 97022 WHIRLPOOL THERAPY: CPT | Performed by: OCCUPATIONAL THERAPIST

## 2019-11-11 PROCEDURE — 97140 MANUAL THERAPY 1/> REGIONS: CPT | Performed by: OCCUPATIONAL THERAPIST

## 2019-11-11 NOTE — PROGRESS NOTES
PT Evaluation     Today's date: 2019  Patient name: Halley Osborne  : 1960  MRN: [id number]  Referring provider: Holger Haas MD  Dx:   Encounter Diagnosis     ICD-10-CM    1  Compression fracture of L2 vertebra with routine healing, subsequent encounter S32 020D         Assessment  Assessment details: Patient is a 66 y o  female presenting to outpatient physical therapy with chief complaints of lower back pain  No further referral appears necessary at this time based upon examination results  Patient describes falling in early September and injuring her L wrist and lower back  Patient displays with abnormal gait, lumbar ROM restrictions, (B) LE weakness, and primary movement diagnosis of lumbar hypomobility resulting in pathanatomical signs and symptoms consistent with L2 compression fracture and functional restrictions  Skilled PT is required to decrease pain and improve strength and ROM to allow patient to return to desired level of function with bending, squatting, and walking  Please contact me if you have any questions  Thank you for the opportunity to share in the care of this patient  Impairments: abnormal gait, abnormal muscle tone, abnormal or restricted ROM, abnormal movement, activity intolerance, impaired physical strength, lacks appropriate home exercise program, pain with function, poor posture  and poor body mechanics    Symptom irritability: highUnderstanding of Dx/Px/POC: good   Prognosis: fair  Prognosis details: Positive prognostic indicators include: motivated to improve  Negative prognostic indicators include multiple comorbidities  Goals  ST  Decrease pain to 6/10 max in 3 weeks  2  Increase Lumbar AROM: moderate to minimal restrictions all planes in 3 weeks  3  Increase (B) LE strength by 1/2 MMT grade in 3 weeks  4  I with HEP in 3 weeks  5  Improve FOTO score to 50 in 3 weeks  LT  Decrease pain to 3/10 max in 6 weeks    2  Increase Lumbar AROM: minimal restrictions all planes in 6 weeks  3  Increase (B) LE strength to 4-4+/5 all planes in 6 weeks  4  I with HEP in 6 weeks  5  Improve FOTO score to 57 in 6 weeks  Plan  Plan details: Prognosis above is given PT services 2x/week tapering to 1x/week over the next 6 weeks and home program adherence  Patient would benefit from: skilled physical therapy  Planned modality interventions: cryotherapy and thermotherapy: hydrocollator packs  Planned therapy interventions: activity modification, joint mobilization, manual therapy, neuromuscular re-education, patient education, postural training, strengthening, stretching, therapeutic exercise, therapeutic activities, functional ROM exercises, home exercise program, gait training and body mechanics training  Plan of Care beginning date: 2019  Plan of Care expiration date: 2019  Treatment plan discussed with: patient        Subjective Evaluation    History of Present Illness  Mechanism of injury: Patient reports in early September she fell and injured her L wrist and lower back  She went to the ER - diagnosed with L wrist fracture as well as L2 compression fracture  At her last visit with Dr General Cortes PT and OT was recommended for the wrist and lower back  Greatest concern: pain and falling  Quality of life: good    Pain  Current pain rating: 3  At best pain rating: 3  At worst pain ratin  Location: Lower Back   Quality: dull ache and sharp    Social Support  Steps to enter house: yes  Stairs in house: yes   Lives in: multiple-level home  Lives with: spouse    Employment status: not working    Diagnostic Tests  X-ray: abnormal  CT scan: abnormal  Patient Goals  Patient goal: Return to bending forward, squatting, walking         Objective     Concurrent Complaints  Positive for history of trauma  Negative for bladder dysfunction, bowel dysfunction, saddle (S4) numbness and history of cancer    Static Posture     Lumbar Spine   Flattened  Palpation     Additional Palpation Details  TTP at L1-L3    Active Range of Motion     Lumbar   Flexion:  with pain Restriction level: minimal  Extension:  with pain Restriction level: moderate  Left lateral flexion:  with pain Restriction level: minimal  Right lateral flexion:  with pain Restriction level: moderate  Left rotation:  with pain Restriction level: moderate  Right rotation:  with pain Restriction level: moderate    Strength/Myotome Testing     Left Hip   Planes of Motion   Flexion: 4    Right Hip   Planes of Motion   Flexion: 4    Left Knee   Flexion: 4+  Extension: 4+    Right Knee   Flexion: 4+  Extension: 4+    Left Ankle/Foot   Dorsiflexion: 4+  Plantar flexion: 4+  Great toe extension: 4+    Right Ankle/Foot   Dorsiflexion: 4+  Plantar flexion: 4+  Great toe extension: 4+    Tests     Lumbar     Left   Negative crossed SLR and passive SLR  Right   Negative crossed SLR and passive SLR  Additional Tests Details  Static Position Testing:   Hooklying position: Decreased pain  PPT with Hooklying position: Decreased pain      Ambulation     Observational Gait   Left stance time and right stance time within functional limits  Decreased walking speed, left step length and right step length     Left foot contact pattern: foot flat  Right foot contact pattern: foot flat  Left arm swing: decreased  Right arm swing: decreased  Base of support: increased        Daily Treatment Diary     DX: Chronic LBP  EPOC:  Precautions: Fall risk, no lifting greater than 20 pounds   CO-MORBIDITES: CSF Leak, (L) Wrist fracture  FUNCTIONAL GOALS: return to bending, squatting, and walking    Manual                                                                 Exercise Diary  HEP         Recumbent Bike                    Seated Flexion Stretch - gentle 11/11         Standing Lumbar extension stretch 11/11         Supine Lower Trunk Rotation 11/11                   Abdominal Bracing 11/11         LFS: Alt UE LFS: (B) UE          LFS: Alt LE          LFS: [place] UE/LE                    Std Hip Flex          Std Hip ABD          Std Hip Ext          HS Curls                    FWD Mini Lunge          LAT Mini Lunge                                  Modalities

## 2019-11-11 NOTE — PROGRESS NOTES
Daily Note     Today's date: 2019  Patient name: Josseline Falcon  : 1960  MRN: [id number]  Referring provider: Amanda Morgan PA-C  Dx:   Encounter Diagnosis     ICD-10-CM    1  Nondisplaced fracture of distal end of left radius S52 502A                   Subjective: The wrist swelled up last week  Objective: See treatment diary below      Assessment: Tolerated treatment well  Patient has moderate soreness ulnar side of wrist   Wrist ROM is improving  Trial of KT tape ulnar wrist and midcarpal for pain relief  Plan: Continue per plan of care  Precautions: Fx 19      Manual  10/23 10/28 11/1 11/11         Graston L wrist 4m 4m 4m 4m         retrograde 2m 2m 2m 2m         Extrinsic stretch 2m 2m 2m 2m         Intrinsic stretch 2m 2m 2m 2m         Jt   Mob,  A/P glides  2m 2m 2m             Exercise Diary  10/23 10/28 11/1 11/11         Wrist PROM 1x10 1x10 1x10 1x10         P/S PROM 1x10 1x10 1x10 1x10         Wrist curls  #1 3x10 #1 3x10 #2 3x10         gripping  RPW 2x30 GPW 2x30 GPW 2x30         P/s therabar  Red 3x10 Red 3x10          Clips w/ rotation  Red 3 sets Green 3sets                                                                                                                                                                                   HEP- wrist ROM, TGEs reviewed                Modalities  10/23 10/28 11/1 11/11         MHP L wrist 10m 10m           Fluido   10m 10m

## 2019-11-13 ENCOUNTER — OFFICE VISIT (OUTPATIENT)
Dept: OCCUPATIONAL THERAPY | Facility: CLINIC | Age: 59
End: 2019-11-13
Payer: COMMERCIAL

## 2019-11-13 ENCOUNTER — OFFICE VISIT (OUTPATIENT)
Dept: PHYSICAL THERAPY | Facility: CLINIC | Age: 59
End: 2019-11-13
Payer: COMMERCIAL

## 2019-11-13 DIAGNOSIS — S52.502A NONDISPLACED FRACTURE OF DISTAL END OF LEFT RADIUS: Primary | ICD-10-CM

## 2019-11-13 DIAGNOSIS — S32.020D COMPRESSION FRACTURE OF L2 VERTEBRA WITH ROUTINE HEALING, SUBSEQUENT ENCOUNTER: Primary | ICD-10-CM

## 2019-11-13 PROCEDURE — 97110 THERAPEUTIC EXERCISES: CPT

## 2019-11-13 PROCEDURE — 97140 MANUAL THERAPY 1/> REGIONS: CPT | Performed by: OCCUPATIONAL THERAPIST

## 2019-11-13 PROCEDURE — 97112 NEUROMUSCULAR REEDUCATION: CPT

## 2019-11-13 PROCEDURE — 97022 WHIRLPOOL THERAPY: CPT | Performed by: OCCUPATIONAL THERAPIST

## 2019-11-13 PROCEDURE — 97110 THERAPEUTIC EXERCISES: CPT | Performed by: OCCUPATIONAL THERAPIST

## 2019-11-13 NOTE — PROGRESS NOTES
Daily Note     Today's date: 2019  Patient name: Kanika Riggs  : 1960  MRN: [id number]  Referring provider: Elizabeth Rees MD  Dx:   Encounter Diagnosis     ICD-10-CM    1  Compression fracture of L2 vertebra with routine healing, subsequent encounter S32 020D        Subjective: Pt reports her low back felt fine directly after PT session, however sx's arose within a few hours requiring her to lie down and use a heating pad at home for sx relief  She has been trying to complete HEP  Objective: See treatment diary below      Assessment: Pt tolerated treatment fair  Pt very hesitant with upper and lower extremity movement  Introduced RB today, pt able tolerate 5 minutes with no resistance, complaints of low back tightness post exercise, resolved with rest  PT session focused on lumbar ROM and core strengthening  Exercise preformed at slower pace throughout session  Patient would benefit from continued PT, progress as able  Plan: Continue per plan of care      Pt 1:1 with PTA JW 9:15-10:00       Daily Treatment Diary     DX: Chronic LBP  EPOC:  Precautions: Fall risk, no lifting greater than 20 pounds   CO-MORBIDITES: CSF Leak, (L) Wrist fracture  FUNCTIONAL GOALS: return to bending, squatting, and walking    Manual                                                                 Exercise Diary  HEP         Recumbent Bike  5' 0R                  Seated Flexion Stretch - gentle  10"x10        Standing Lumbar extension stretch  10"x10        Supine Lower Trunk Rotation  10"x10                  Abdominal Bracing  5"  2x10        LFS: Alt UE  x10         LFS: (B) UE  x10        LFS: Alt LE  x10        LFS: Opp UE/LE  x10                   Std Hip Flex          Std Hip ABD          Std Hip Ext          HS Curls                    FWD Mini Lunge          LAT Mini Lunge                                  Modalities

## 2019-11-18 ENCOUNTER — OFFICE VISIT (OUTPATIENT)
Dept: PHYSICAL THERAPY | Facility: CLINIC | Age: 59
End: 2019-11-18
Payer: COMMERCIAL

## 2019-11-18 ENCOUNTER — OFFICE VISIT (OUTPATIENT)
Dept: OCCUPATIONAL THERAPY | Facility: CLINIC | Age: 59
End: 2019-11-18
Payer: COMMERCIAL

## 2019-11-18 DIAGNOSIS — S52.502A NONDISPLACED FRACTURE OF DISTAL END OF LEFT RADIUS: Primary | ICD-10-CM

## 2019-11-18 DIAGNOSIS — S32.020D COMPRESSION FRACTURE OF L2 VERTEBRA WITH ROUTINE HEALING, SUBSEQUENT ENCOUNTER: Primary | ICD-10-CM

## 2019-11-18 PROCEDURE — 97112 NEUROMUSCULAR REEDUCATION: CPT | Performed by: PHYSICAL THERAPIST

## 2019-11-18 PROCEDURE — 97110 THERAPEUTIC EXERCISES: CPT | Performed by: PHYSICAL THERAPIST

## 2019-11-18 PROCEDURE — 97110 THERAPEUTIC EXERCISES: CPT | Performed by: OCCUPATIONAL THERAPIST

## 2019-11-18 PROCEDURE — 97022 WHIRLPOOL THERAPY: CPT | Performed by: OCCUPATIONAL THERAPIST

## 2019-11-18 PROCEDURE — 97140 MANUAL THERAPY 1/> REGIONS: CPT | Performed by: OCCUPATIONAL THERAPIST

## 2019-11-18 NOTE — PROGRESS NOTES
Daily Note     Today's date: 2019  Patient name: Hadassah Merlin  : 1960  MRN: [id number]  Referring provider: Steven Hernandez PA-C  Dx:   Encounter Diagnosis     ICD-10-CM    1  Nondisplaced fracture of distal end of left radius S52 502A                   Subjective: I have pain on the back side of my wrist       Objective: See treatment diary below      Assessment: Tolerated treatment well  Patient has moderate soreness ulnar side of wrist   Wrist ROM is improving  Her pain is impeding her function   Has relief with KT taping  Plan: Continue per plan of care  Precautions: Fx 19      Manual  10/23 10/28 11/1 11/11 11/13 11/18       Graston L wrist 4m 4m 4m 4m 4m 4m       retrograde 2m 2m 2m 2m 2m 2m       Extrinsic stretch 2m 2m 2m 2m 2m 2m       Intrinsic stretch 2m 2m 2m 2m 2m 2m       Jt   Mob,  A/P glides  2m 2m 2m 2m 2m           Exercise Diary  10/23 10/28 11/1 11/11 11/13 11/18       Wrist PROM 1x10 1x10 1x10 1x10 1x10 1x10       P/S PROM 1x10 1x10 1x10 1x10 1x10 1x10       Wrist curls  #1 3x10 #1 3x10 #2 3x10 #2 3x10 #1 3x10       gripping  RPW 2x30 GPW 2x30 GPW 2x30 GPW 2x30 GPW 2x30       P/s therabar  Red 3x10 Red 3x10  Red   3 x10        Clips w/ rotation  Red 3 sets Green 3sets  Green 3 sets                                                                                                                                                                                 HEP- wrist ROM, TGEs reviewed                Modalities  10/23 10/28 11/1 11/11 11/13 11/18       MHP L wrist 10m 10m           Fluido   10m 10m 10m 10m       cP      6m

## 2019-11-18 NOTE — PROGRESS NOTES
Daily Note     Today's date: 2019  Patient name: Heidi Muñoz  : 1960  MRN: [id number]  Referring provider: Mariel Robles MD  Dx:   Encounter Diagnosis     ICD-10-CM    1  Compression fracture of L2 vertebra with routine healing, subsequent encounter S32 020D        Subjective: Pt reports she had some increased pain following her LV  She reports her discomfort lasted less than 24 hours  Today she is feeling minimal increase in pain  Objective: See treatment diary below      Assessment: Patient had good relief with flexion based stretching  She demonstrated good exercise form  Patient was fatigued with exercise but had no complaints of increased pain post treatment  Plan: Continue per plan of care    Monitor tolerance to progressions    Daily Treatment Diary     DX: Chronic LBP  EPOC: 19  Precautions: Fall risk, no lifting greater than 20 pounds   CO-MORBIDITES: CSF Leak, (L) Wrist fracture  FUNCTIONAL GOALS: return to bending, squatting, and walking    Manual                                                                 Exercise Diary  HEP        Recumbent Bike  5' 0R 5 min                 Seated Flexion Stretch - gentle  10"x10 10"x10  x2       Standing Lumbar extension stretch  10"x10 10x       Supine Lower Trunk Rotation  10"x10 10x                 Abdominal Bracing  5"  2x10 5"x10       LFS: Alt UE  x10  10x ea       LFS: (B) UE  x10 10x        LFS: Alt LE  x10 10x ea       LFS: Opp UE/LE  x10  10x ea                 Std Hip Flex   x10 ea       Std Hip ABD   x10 ea       Std Hip Ext          HS Curls                    FWD Mini Lunge          LAT Mini Lunge                                  Modalities

## 2019-11-20 ENCOUNTER — APPOINTMENT (OUTPATIENT)
Dept: PHYSICAL THERAPY | Facility: CLINIC | Age: 59
End: 2019-11-20
Payer: COMMERCIAL

## 2019-11-20 ENCOUNTER — APPOINTMENT (OUTPATIENT)
Dept: OCCUPATIONAL THERAPY | Facility: CLINIC | Age: 59
End: 2019-11-20
Payer: COMMERCIAL

## 2019-11-22 ENCOUNTER — APPOINTMENT (OUTPATIENT)
Dept: PHYSICAL THERAPY | Facility: CLINIC | Age: 59
End: 2019-11-22
Payer: COMMERCIAL

## 2019-11-22 ENCOUNTER — OFFICE VISIT (OUTPATIENT)
Dept: OCCUPATIONAL THERAPY | Facility: CLINIC | Age: 59
End: 2019-11-22
Payer: COMMERCIAL

## 2019-11-22 ENCOUNTER — OFFICE VISIT (OUTPATIENT)
Dept: OBGYN CLINIC | Facility: CLINIC | Age: 59
End: 2019-11-22
Payer: COMMERCIAL

## 2019-11-22 VITALS
DIASTOLIC BLOOD PRESSURE: 80 MMHG | WEIGHT: 160 LBS | SYSTOLIC BLOOD PRESSURE: 120 MMHG | BODY MASS INDEX: 25.71 KG/M2 | HEIGHT: 66 IN

## 2019-11-22 DIAGNOSIS — S52.502A NONDISPLACED FRACTURE OF DISTAL END OF LEFT RADIUS: Primary | ICD-10-CM

## 2019-11-22 DIAGNOSIS — M77.8 LEFT WRIST TENDONITIS: ICD-10-CM

## 2019-11-22 PROCEDURE — 99024 POSTOP FOLLOW-UP VISIT: CPT | Performed by: ORTHOPAEDIC SURGERY

## 2019-11-22 PROCEDURE — 20550 NJX 1 TENDON SHEATH/LIGAMENT: CPT | Performed by: PHYSICIAN ASSISTANT

## 2019-11-22 PROCEDURE — 97110 THERAPEUTIC EXERCISES: CPT | Performed by: OCCUPATIONAL THERAPIST

## 2019-11-22 PROCEDURE — 97022 WHIRLPOOL THERAPY: CPT | Performed by: OCCUPATIONAL THERAPIST

## 2019-11-22 PROCEDURE — 97140 MANUAL THERAPY 1/> REGIONS: CPT | Performed by: OCCUPATIONAL THERAPIST

## 2019-11-22 RX ORDER — BETAMETHASONE SODIUM PHOSPHATE AND BETAMETHASONE ACETATE 3; 3 MG/ML; MG/ML
3 INJECTION, SUSPENSION INTRA-ARTICULAR; INTRALESIONAL; INTRAMUSCULAR; SOFT TISSUE
Status: COMPLETED | OUTPATIENT
Start: 2019-11-22 | End: 2019-11-22

## 2019-11-22 RX ADMIN — BETAMETHASONE SODIUM PHOSPHATE AND BETAMETHASONE ACETATE 3 MG: 3; 3 INJECTION, SUSPENSION INTRA-ARTICULAR; INTRALESIONAL; INTRAMUSCULAR; SOFT TISSUE at 13:36

## 2019-11-22 NOTE — LETTER
2019    Hilda Agudelo PA-C  [address]  [address] [address]    Patient: Howard Siu   YOB: 1960   Date of Visit: 2019     Encounter Diagnosis     ICD-10-CM    1  Nondisplaced fracture of distal end of left radius S52 502A        Dear Dr Marsha Abraham:    Thank you for your recent referral of Howard Siu  Please review the attached evaluation summary from Linda's recent visit  Please verify that you agree with the plan of care by signing the attached order  If you have any questions or concerns, please do not hesitate to call  I sincerely appreciate the opportunity to share in the care of one of your patients and hope to have another opportunity to work with you in the near future  Sincerely,    Sari Griffin OT      Referring Provider:     I certify that I have read the below Plan of Care and certify the need for these services furnished under this plan of treatment while under my care  Hilda Agudelo PA-C  [address]  [address] [place] [address]        Daily Note / Mikey Mejia    Today's date: 2019  Patient name: Howard Siu  : 1960  MRN: [id number]  Referring provider: Buddy Abernathy PA-C  Dx:   Encounter Diagnosis     ICD-10-CM    1  Nondisplaced fracture of distal end of left radius S52 502A                   Subjective: I m doing well, I just have the pain that stops me when I move it  Objective: See treatment diary below      Assessment: Tolerated treatment well  Patient has moderate soreness ulnar side of wrist   Wrist ROM is improving  and strength is functional Her pain is impedes ADL performance    Has relief with KT taping        Active Range of Motion     Left Elbow   Forearm supination: 70 degrees with pain- () 90 degrees  Forearm pronation: 80 degrees with pain- () 90 degrees    Left Wrist   Wrist flexion: 30 degrees - () 30 degrees  Wrist extension: 55 degrees - (11/22) 70 degrees  Radial deviation: 10 degrees with pain  Ulnar deviation: 20 degrees with pain       strength- L 30lbs, R45lbs        Goals  STG( 8 visits)  1  Compliant with HEP/ween from splint- met  2  Decrease L wrist edema by 1 0cm-met  3  Increase L wrist ROM by 10 degrees- met  LTG( 16 visits)  1  Improve FOTO score to predicted outcome or greater- partially met  2  Increase L wrist ROM to [facility] for functional activities- progress  3  L  strength > 30lbs for lifting and carrying- progress    Plan: Continue per plan of care  F/U with MD later today  Precautions: Fx 9/2/19      Manual  10/23 10/28 11/1 11/11 11/13 11/18 11/22      Graston L wrist 4m 4m 4m 4m 4m 4m 4m      retrograde 2m 2m 2m 2m 2m 2m 2m      Extrinsic stretch 2m 2m 2m 2m 2m 2m 2m      Intrinsic stretch 2m 2m 2m 2m 2m 2m 2m      Jt   Mob,  A/P glides  2m 2m 2m 2m 2m 2m          Exercise Diary  10/23 10/28 11/1 11/11 11/13 11/18 11/22      Wrist PROM 1x10 1x10 1x10 1x10 1x10 1x10 1x10      P/S PROM 1x10 1x10 1x10 1x10 1x10 1x10 1x10      Wrist curls  #1 3x10 #1 3x10 #2 3x10 #2 3x10 #1 3x10 #2  3x10      gripping  RPW 2x30 GPW 2x30 GPW 2x30 GPW 2x30 GPW 2x30 GPW 2x30      P/s therabar  Red 3x10 Red 3x10  Red   3 x10  Red 3x10      Clips w/ rotation  Red 3 sets Green 3sets  Green 3 sets                                                                                                                                                                                 HEP- wrist ROM, TGEs reviewed                Modalities  10/23 10/28 11/1 11/11 11/13 11/18 11/22      MHP L wrist 10m 10m           Fluido   10m 10m 10m 10m 10m      cP      6m

## 2019-11-22 NOTE — PROGRESS NOTES
Daily Note / ReEval    Today's date: 2019  Patient name: Marcus Brown  : 1960  MRN: [id number]  Referring provider: Marcela Schaffer PA-C  Dx:   Encounter Diagnosis     ICD-10-CM    1  Nondisplaced fracture of distal end of left radius S52 502A                   Subjective: I m doing well, I just have the pain that stops me when I move it  Objective: See treatment diary below      Assessment: Tolerated treatment well  Patient has moderate soreness ulnar side of wrist   Wrist ROM is improving  and strength is functional Her pain is impedes ADL performance    Has relief with KT taping  Active Range of Motion     Left Elbow   Forearm supination: 70 degrees with pain- () 90 degrees  Forearm pronation: 80 degrees with pain- () 90 degrees    Left Wrist   Wrist flexion: 30 degrees - () 30 degrees  Wrist extension: 55 degrees - () 70 degrees  Radial deviation: 10 degrees with pain  Ulnar deviation: 20 degrees with pain       strength- L 30lbs, R45lbs        Goals  STG( 8 visits)  1  Compliant with HEP/ween from splint- met  2  Decrease L wrist edema by 1 0cm-met  3  Increase L wrist ROM by 10 degrees- met  LTG( 16 visits)  1  Improve FOTO score to predicted outcome or greater- partially met  2  Increase L wrist ROM to [facility] for functional activities- progress  3  L  strength > 30lbs for lifting and carrying- progress    Plan: Continue per plan of care  F/U with MD later today  Precautions: Fx 19      Manual  10/23 10/28 11/1 11/11 11/13 11/18 11/22      Graston L wrist 4m 4m 4m 4m 4m 4m 4m      retrograde 2m 2m 2m 2m 2m 2m 2m      Extrinsic stretch 2m 2m 2m 2m 2m 2m 2m      Intrinsic stretch 2m 2m 2m 2m 2m 2m 2m      Jt   Mob,  A/P glides  2m 2m 2m 2m 2m 2m          Exercise Diary  10/23 10/28 11/1 11/11 11/13 11/18 11/22      Wrist PROM 1x10 1x10 1x10 1x10 1x10 1x10 1x10      P/S PROM 1x10 1x10 1x10 1x10 1x10 1x10 1x10      Wrist curls  #1 3x10 #1 3x10 #2 3x10 #2 3x10 #1 3x10 #2  3x10      gripping  RPW 2x30 GPW 2x30 GPW 2x30 GPW 2x30 GPW 2x30 GPW 2x30      P/s therabar  Red 3x10 Red 3x10  Red   3 x10  Red 3x10      Clips w/ rotation  Red 3 sets Green 3sets  Green 3 sets                                                                                                                                                                                 HEP- wrist ROM, TGEs reviewed                Modalities  10/23 10/28 11/1 11/11 11/13 11/18 11/22      MHP L wrist 10m 10m           Fluido   10m 10m 10m 10m 10m      cP      6m
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
## 2019-11-25 ENCOUNTER — OFFICE VISIT (OUTPATIENT)
Dept: OCCUPATIONAL THERAPY | Facility: CLINIC | Age: 59
End: 2019-11-25
Payer: COMMERCIAL

## 2019-11-25 ENCOUNTER — OFFICE VISIT (OUTPATIENT)
Dept: PHYSICAL THERAPY | Facility: CLINIC | Age: 59
End: 2019-11-25
Payer: COMMERCIAL

## 2019-11-25 DIAGNOSIS — S32.020D COMPRESSION FRACTURE OF L2 VERTEBRA WITH ROUTINE HEALING, SUBSEQUENT ENCOUNTER: Primary | ICD-10-CM

## 2019-11-25 DIAGNOSIS — S52.502A NONDISPLACED FRACTURE OF DISTAL END OF LEFT RADIUS: Primary | ICD-10-CM

## 2019-11-25 PROCEDURE — 97110 THERAPEUTIC EXERCISES: CPT | Performed by: OCCUPATIONAL THERAPIST

## 2019-11-25 PROCEDURE — 97022 WHIRLPOOL THERAPY: CPT | Performed by: OCCUPATIONAL THERAPIST

## 2019-11-25 PROCEDURE — 97110 THERAPEUTIC EXERCISES: CPT | Performed by: PHYSICAL THERAPIST

## 2019-11-25 PROCEDURE — 97140 MANUAL THERAPY 1/> REGIONS: CPT | Performed by: OCCUPATIONAL THERAPIST

## 2019-11-25 PROCEDURE — 97112 NEUROMUSCULAR REEDUCATION: CPT | Performed by: PHYSICAL THERAPIST

## 2019-11-25 NOTE — PROGRESS NOTES
Daily Note     Today's date: 2019  Patient name: Bill Chu  : 1960  MRN: [id number]  Referring provider: Natalya Chavez PA-C  Dx:   Encounter Diagnosis     ICD-10-CM    1  Nondisplaced fracture of distal end of left radius S52 502A                   Subjective: I had a cortisone injection on Friday, I still have pain  Objective: See treatment diary below      Assessment: Tolerated treatment well  Patient continues with ulnar sided wrist pain, mild relief with cortisone injection  Plan: Continue per plan of care  Precautions: Fx 19      Manual  10/23 10/28 11/1 11/11 11/13 11/18 11/22 11/25     [place]aston L wrist 4m 4m 4m 4m 4m 4m 4m 4m     retrograde 2m 2m 2m 2m 2m 2m 2m 2m     Extrinsic stretch 2m 2m 2m 2m 2m 2m 2m 2m     Intrinsic stretch 2m 2m 2m 2m 2m 2m 2m 2m     Jt   Mob,  A/P glides  2m 2m 2m 2m 2m 2m 2m         Exercise Diary  10/23 10/28 11/1 11/11 11/13 11/18 11/22 11/25     Wrist PROM 1x10 1x10 1x10 1x10 1x10 1x10 1x10 1x10     P/S PROM 1x10 1x10 1x10 1x10 1x10 1x10 1x10 1x10     Wrist curls  #1 3x10 #1 3x10 #2 3x10 #2 3x10 #1 3x10 #2  3x10 #2 3x10     gripping  RPW 2x30 GPW 2x30 GPW 2x30 GPW 2x30 GPW 2x30 GPW 2x30 GPW 2x30     P/s therabar  Red 3x10 Red 3x10  Red   3 x10  Red 3x10 Red 3x10     Clips w/ rotation  Red 3 sets Green 3sets  Green 3 sets   Green 3 sets                                                                                                                                                                              HEP- wrist ROM, TGEs reviewed                Modalities  10/23 10/28 11/1 11/11 11/13 11/18 11/22 11/25     MHP L wrist 10m 10m           Fluido   10m 10m 10m 10m 10m 10m     cP      6m

## 2019-11-25 NOTE — PROGRESS NOTES
Daily Note     Today's date: 2019  Patient name: Red Ruiz  : 1960  MRN: [id number]  Referring provider: Rolo Rosen MD  Dx:   Encounter Diagnosis     ICD-10-CM    1  Compression fracture of L2 vertebra with routine healing, subsequent encounter S32 020D        Subjective: Patient reports good tolerance to her LV  She reports she has had several bad days with her head symptoms  Patient reports she has been able to perform her HEP some days  Objective: See treatment diary below      Assessment: Patient had good exercise recall with program - no cuing required for form  She reported fatigue and slight lower back soreness  She continues to make slow progress with endurance and functional improvement  Plan: Continue per plan of care    Monitor tolerance to treatment    Daily Treatment Diary     DX: Chronic LBP  EPOC: 19  Precautions: Fall risk, no lifting greater than 20 pounds   CO-MORBIDITES: CSF Leak, (L) Wrist fracture  FUNCTIONAL GOALS: return to bending, squatting, and walking    Manual                                                                 Exercise Diary  HEP       Recumbent Bike  5' 0R 5 min 5 min                Seated Flexion Stretch - gentle  10"x10 10"x10  x2 10"x10  x2      Standing Lumbar extension stretch  10"x10 10x 10x      Supine Lower Trunk Rotation  10"x10 10x 10x                 Abdominal Bracing  5"  2x10 5"x10 5"x10      LFS: Alt UE  x10  10x ea 10x ea      LFS: (B) UE  x10 10x  10x      LFS: Alt LE  x10 10x ea 10x ea      LFS: Opp UE/LE  x10  10x ea 10x ea                Std Hip Flex   x10 ea 10x ea      Std Hip ABD   x10 ea 10x ea      Std Hip Ext          HS Curls                    FWD Mini Lunge          LAT Mini Lunge                                  Modalities

## 2019-11-27 ENCOUNTER — OFFICE VISIT (OUTPATIENT)
Dept: PHYSICAL THERAPY | Facility: CLINIC | Age: 59
End: 2019-11-27
Payer: COMMERCIAL

## 2019-11-27 DIAGNOSIS — S32.020D COMPRESSION FRACTURE OF L2 VERTEBRA WITH ROUTINE HEALING, SUBSEQUENT ENCOUNTER: Primary | ICD-10-CM

## 2019-11-27 PROCEDURE — 97110 THERAPEUTIC EXERCISES: CPT

## 2019-11-27 PROCEDURE — 97112 NEUROMUSCULAR REEDUCATION: CPT

## 2019-11-27 NOTE — PROGRESS NOTES
Daily Note     Today's date: 2019  Patient name: Soren Starks  : 1960  MRN: [id number]  Referring provider: Constantine Cheema MD  Dx:   Encounter Diagnosis     ICD-10-CM    1  Compression fracture of L2 vertebra with routine healing, subsequent encounter S32 020D        Subjective: Patient reports good tolerance to her LV  She also reports she has been experiencing migraines and on bed rest the last few days but has still been working on her HEP  Objective: See treatment diary below      Assessment: Pt  No feeling well today- still has mild HA and dizziness residual from migraine the last few days  Session modified- deferred recumbent bike and performed at very slow pace  She had no increase in pain and noted seated lumbar flexion over pball helps to ease her tightness and discomfort in low back  All other TE's tolerated well  She is having study done at hospital in the next few days and will return to therapy when cleared  Plan: Continue per plan of care    Monitor tolerance to treatment    Daily Treatment Diary     DX: Chronic LBP  EPOC: 19  Precautions: Fall risk, no lifting greater than 20 pounds   CO-MORBIDITES: CSF Leak, (L) Wrist fracture  FUNCTIONAL GOALS: return to bending, squatting, and walking    Manual                                                                 Exercise Diary  HEP      Recumbent Bike  5' 0R 5 min 5 min deferred               Seated Flexion Stretch - gentle  10"x10 10"x10  x2 10"x10  x2 10"x10     Standing Lumbar extension stretch  10"x10 10x 10x 10x     Supine Lower Trunk Rotation  10"x10 10x 10x  10x ea               Abdominal Bracing  5"  2x10 5"x10 5"x10 5"x10     LFS: Alt UE  x10  10x ea 10x ea 10 ea     LFS: (B) UE  x10 10x  10x 10     LFS: Alt LE  x10 10x ea 10x ea 10 ea     LFS: Opp UE/LE  x10  10x ea 10x ea 10 ea               Std Hip Flex   x10 ea 10x ea 10 ea     Std Hip ABD   x10 ea 10x ea 10 ea     Std Hip Ext          HS Curls                    FWD Mini Lunge          LAT Mini Lunge                                  Modalities

## 2019-11-29 ENCOUNTER — APPOINTMENT (OUTPATIENT)
Dept: OCCUPATIONAL THERAPY | Facility: CLINIC | Age: 59
End: 2019-11-29
Payer: COMMERCIAL

## 2020-01-02 ENCOUNTER — APPOINTMENT (OUTPATIENT)
Dept: RADIOLOGY | Facility: CLINIC | Age: 60
End: 2020-01-02
Payer: COMMERCIAL

## 2020-01-02 ENCOUNTER — OFFICE VISIT (OUTPATIENT)
Dept: OBGYN CLINIC | Facility: CLINIC | Age: 60
End: 2020-01-02
Payer: COMMERCIAL

## 2020-01-02 VITALS
HEIGHT: 66 IN | HEART RATE: 80 BPM | DIASTOLIC BLOOD PRESSURE: 78 MMHG | WEIGHT: 162 LBS | BODY MASS INDEX: 26.03 KG/M2 | SYSTOLIC BLOOD PRESSURE: 120 MMHG

## 2020-01-02 DIAGNOSIS — M77.8 LEFT WRIST TENDONITIS: ICD-10-CM

## 2020-01-02 DIAGNOSIS — S52.502A NONDISPLACED FRACTURE OF DISTAL END OF LEFT RADIUS: Primary | ICD-10-CM

## 2020-01-02 DIAGNOSIS — S52.502A NONDISPLACED FRACTURE OF DISTAL END OF LEFT RADIUS: ICD-10-CM

## 2020-01-02 DIAGNOSIS — M54.16 RIGHT LUMBAR RADICULITIS: ICD-10-CM

## 2020-01-02 DIAGNOSIS — S32.020S CLOSED COMPRESSION FRACTURE OF SECOND LUMBAR VERTEBRA, SEQUELA: Primary | ICD-10-CM

## 2020-01-02 DIAGNOSIS — S32.020S CLOSED COMPRESSION FRACTURE OF SECOND LUMBAR VERTEBRA, SEQUELA: ICD-10-CM

## 2020-01-02 PROCEDURE — 72110 X-RAY EXAM L-2 SPINE 4/>VWS: CPT

## 2020-01-02 PROCEDURE — 99213 OFFICE O/P EST LOW 20 MIN: CPT | Performed by: PHYSICIAN ASSISTANT

## 2020-01-02 RX ORDER — KETOROLAC TROMETHAMINE 30 MG/ML
INJECTION, SOLUTION INTRAMUSCULAR; INTRAVENOUS
COMMUNITY
Start: 2019-12-06 | End: 2022-04-28 | Stop reason: CLARIF

## 2020-01-02 RX ORDER — NAPROXEN SODIUM 550 MG/1
TABLET ORAL
COMMUNITY
Start: 2019-12-06 | End: 2022-06-30 | Stop reason: HOSPADM

## 2020-01-02 RX ORDER — DIHYDROERGOTAMINE MESYLATE 1 MG/ML
INJECTION, SOLUTION INTRAMUSCULAR; INTRAVENOUS; SUBCUTANEOUS
COMMUNITY
Start: 2019-12-06 | End: 2022-04-28 | Stop reason: CLARIF

## 2020-01-02 RX ORDER — PROMETHAZINE HYDROCHLORIDE 25 MG/1
TABLET ORAL
COMMUNITY
Start: 2019-12-06 | End: 2022-04-28 | Stop reason: CLARIF
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
## 2020-01-03 ENCOUNTER — CONSULT (OUTPATIENT)
Dept: PAIN MEDICINE | Facility: CLINIC | Age: 60
End: 2020-01-03
Payer: COMMERCIAL

## 2020-01-03 VITALS
HEIGHT: 66 IN | SYSTOLIC BLOOD PRESSURE: 140 MMHG | HEART RATE: 78 BPM | BODY MASS INDEX: 26.36 KG/M2 | DIASTOLIC BLOOD PRESSURE: 90 MMHG | WEIGHT: 164 LBS

## 2020-01-03 DIAGNOSIS — M54.16 RIGHT LUMBAR RADICULITIS: ICD-10-CM

## 2020-01-03 PROCEDURE — 99244 OFF/OP CNSLTJ NEW/EST MOD 40: CPT | Performed by: ANESTHESIOLOGY

## 2020-01-03 RX ORDER — MEXILETINE HYDROCHLORIDE 150 MG/1
150 CAPSULE ORAL 3 TIMES DAILY
COMMUNITY
Start: 2019-12-06 | End: 2022-04-28 | Stop reason: CLARIF

## 2020-01-03 RX ORDER — PREDNISONE 10 MG/1
TABLET ORAL
Qty: 21 TABLET | Refills: 0 | Status: SHIPPED | OUTPATIENT
Start: 2020-01-03 | End: 2020-01-27 | Stop reason: SDUPTHER

## 2020-01-03 RX ORDER — AVOBENZONE, HOMOSALATE, OCTISALATE, OCTOCRYLENE 30; 100; 50; 25 MG/ML; MG/ML; MG/ML; MG/ML
1000 SPRAY TOPICAL 2 TIMES DAILY
COMMUNITY
Start: 2019-12-06 | End: 2022-04-28 | Stop reason: CLARIF

## 2020-01-03 RX ORDER — SYRINGE WITH NEEDLE, 1 ML 25GX5/8"
SYRINGE, EMPTY DISPOSABLE MISCELLANEOUS
COMMUNITY
Start: 2019-12-06 | End: 2022-04-28 | Stop reason: CLARIF
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
## 2020-01-09 ENCOUNTER — HOSPITAL ENCOUNTER (OUTPATIENT)
Dept: MRI IMAGING | Facility: HOSPITAL | Age: 60
Discharge: HOME | End: 2020-01-09
Attending: ANESTHESIOLOGY
Payer: COMMERCIAL

## 2020-01-09 ENCOUNTER — TELEPHONE (OUTPATIENT)
Dept: PAIN MEDICINE | Facility: CLINIC | Age: 60
End: 2020-01-09

## 2020-01-09 DIAGNOSIS — M54.16 RIGHT LUMBAR RADICULITIS: ICD-10-CM

## 2020-01-09 PROCEDURE — 72148 MRI LUMBAR SPINE W/O DYE: CPT

## 2020-01-09 NOTE — TELEPHONE ENCOUNTER
S/w Kevin Nascimento in radiology  Unfamiliar with [place]ton in radiology  Confirmed the mri report is complete   April Rater, will make Dr Merlin Saleem aware

## 2020-01-10 ENCOUNTER — EVALUATION (OUTPATIENT)
Dept: PHYSICAL THERAPY | Facility: CLINIC | Age: 60
End: 2020-01-10
Payer: COMMERCIAL

## 2020-01-10 ENCOUNTER — TELEPHONE (OUTPATIENT)
Dept: PAIN MEDICINE | Facility: CLINIC | Age: 60
End: 2020-01-10

## 2020-01-10 ENCOUNTER — EVALUATION (OUTPATIENT)
Dept: OCCUPATIONAL THERAPY | Facility: CLINIC | Age: 60
End: 2020-01-10
Payer: COMMERCIAL

## 2020-01-10 DIAGNOSIS — M25.531 RIGHT WRIST PAIN: Primary | ICD-10-CM

## 2020-01-10 DIAGNOSIS — M54.16 LUMBAR RADICULITIS: Primary | ICD-10-CM

## 2020-01-10 DIAGNOSIS — S32.020G CLOSED COMPRESSION FRACTURE OF L2 LUMBAR VERTEBRA WITH DELAYED HEALING, SUBSEQUENT ENCOUNTER: Primary | ICD-10-CM

## 2020-01-10 PROCEDURE — 97162 PT EVAL MOD COMPLEX 30 MIN: CPT | Performed by: PHYSICAL THERAPIST

## 2020-01-10 PROCEDURE — 97140 MANUAL THERAPY 1/> REGIONS: CPT | Performed by: OCCUPATIONAL THERAPIST

## 2020-01-10 PROCEDURE — 97110 THERAPEUTIC EXERCISES: CPT | Performed by: OCCUPATIONAL THERAPIST

## 2020-01-10 NOTE — PROGRESS NOTES
Daily Note     Today's date: 1/10/2020  Patient name: Jaguar Stevens  : 1960  MRN: [id number]  Referring provider: Moni Jordan PA-C  Dx:   Encounter Diagnosis     ICD-10-CM    1  Right wrist pain M25 531                   Subjective: I came back for you to see how my wrist is doing  Objective: See treatment diary below    Wrist AROM flex- 48degrees  Wrist AROM ext- 58 degrees  L  strength- 40lbs, (R- 55lbs)      Assessment: Tolerated treatment well  Patient returns after 1 month due to hospitalization for previous head injury  She is s/p cortisone injection due to ulnar sided wrist pain  Pain has subsided with some mild soreness at end range and with weightbearing  ROM and functional strength has improved  Plan: Continue per plan of care  to improve function  Precautions: Fx 19      Manual  10/23 10/28 11/1 11/11 11/13 11/18 11/22 11/25 1/10    [place] L wrist 4m 4m 4m 4m 4m 4m 4m 4m 4m    retrograde 2m 2m 2m 2m 2m 2m 2m 2m 2m    Extrinsic stretch 2m 2m 2m 2m 2m 2m 2m 2m 2m    Intrinsic stretch 2m 2m 2m 2m 2m 2m 2m 2m 2m    Jt   Mob,  A/P glides  2m 2m 2m 2m 2m 2m 2m 2m        Exercise Diary  10/23 10/28 11/1 11/11 11/13 11/18 11/22 11/25 1/10    Wrist PROM 1x10 1x10 1x10 1x10 1x10 1x10 1x10 1x10 1x10    P/S PROM 1x10 1x10 1x10 1x10 1x10 1x10 1x10 1x10 1x10    Wrist curls  #1 3x10 #1 3x10 #2 3x10 #2 3x10 #1 3x10 #2  3x10 #2 3x10 #2 3x10    gripping  RPW 2x30 GPW 2x30 GPW 2x30 GPW 2x30 GPW 2x30 GPW 2x30 GPW 2x30 GPW 2x30    P/s therabar  Red 3x10 Red 3x10  Red   3 x10  Red 3x10 Red 3x10 Red 3x10    Clips w/ rotation  Red 3 sets Green 3sets  Green 3 sets   Green 3 sets                                                                                                                                                                              HEP- wrist ROM, TGEs reviewed                Modalities  10/23 10/28 11/1 11/11 11/13 11/18 11/22 11/25 1/10    MHP L wrist 10m 10m Fluido   10m 10m 10m 10m 10m 10m 10m    cP      6m

## 2020-01-10 NOTE — TELEPHONE ENCOUNTER
Patient is requesting a call back from the DR only   She would as like to have are Mri recs faxed over to [facility]

## 2020-01-10 NOTE — TELEPHONE ENCOUNTER
Patient   [phone]  Dr Boss Phlegm    Patient is requesting that her MRI report be faxed over to [facility]  She said that she will call back on Monday

## 2020-01-10 NOTE — TELEPHONE ENCOUNTER
S/w pt, advised of above  Pt stated that she finished her oral steroids Questioned if she can resume nsaids at this time  States she also has an incredible headache because she has a csf leak as well  Advised pt, yes - ok to resume nsaids at this point  Pt questioned if she should continue with PT although she doubts that she will because she is so nervous now and it's very painful  Advised pt, make PT aware of above  Anticipate pt should hold PT until eval w/ Dr Marquis Cabezas  The writer will d/w Dr Jody Castañeda and cb to advise  Provided dr whitmore's contact info  Pt verbalized understanding and appreication

## 2020-01-10 NOTE — PROGRESS NOTES
PT Evaluation     Today's date: 1/10/2020  Patient name: Josh Barrett  : 1960  MRN: [id number]  Referring provider: Ed Adams DO  Dx:   Encounter Diagnosis     ICD-10-CM    1  Lumbar radiculitis M54 16                   Assessment  Assessment details: Patient is a 64 y o  female presenting to outpatient physical therapy with chief complaints of LBP with (R) radicular symptoms  No further referral appears necessary at this time based upon examination results as patient is already seeing Dr Daisy Llanos  Patient describes chronic pain for several months with recent increase in symptoms associated with decreased activity level  Patient displays with abnormal gait, abnormal ROM, no myotomal strength deficits, extension bias with mechanical testing   Primary movement diagnosis of lumbar derangement - extension bias resulting in pathanatomical signs and symptoms consistent with lumbar radiculitis resulting in limitations in her ability to walk longer distances        Please contact me if you have any questions  Thank you for the opportunity to share in the care of this patient       Impairments: abnormal gait, abnormal muscle tone, abnormal or restricted ROM, abnormal movement, activity intolerance, impaired physical strength, lacks appropriate home exercise program, pain with function, poor posture  and poor body mechanics    Symptom irritability: highUnderstanding of Dx/Px/POC: good   Prognosis: fair  Prognosis details: Positive prognostic indicators include: positive attitude toward recovery, positive response to mechanical assessment  Negative prognostic indicators include chronicity of symptoms, health complications  Goals  ST  Decrease pain to 5/10 max in 3 weeks  2  Increase Lumbar AROM: minimal restrictions all planes in 3 weeks  3  Increase (B) LE strength by 1/2 MMT grade in 3 weeks  4  I with HEP in 3 weeks  5  Improve FOTO score to __ in 3 weeks    6  Patient will be able to __ in 3 weeks   LT  Decrease pain to 3/10 max in 6 weeks  2  Increase Lumbar AROM: minimal to no restrictions all planes in 6 weeks  3  Increase (B) LE strength to 4+-5/5 all planes in 6 weeks  4  I with updated HEP in 6 weeks  5  Patient will be able to walk greater than 1 mile in 6 weeks  Plan  Plan details: Follow up with Dr Vianney Michaels - Start PT POC as directed by Dr Vianney Michaels   Prognosis above is given PT services 2x/week tapering to 1x/week over the next 6 weeks and home program adherence  Patient would benefit from: skilled physical therapy  Planned modality interventions: cryotherapy and thermotherapy: hydrocollator packs  Planned therapy interventions: activity modification, joint mobilization, manual therapy, neuromuscular re-education, patient education, postural training, strengthening, stretching, therapeutic exercise, therapeutic activities, functional ROM exercises, home exercise program, gait training and body mechanics training  Plan of Care beginning date: 1/10/2020  Plan of Care expiration date: 2020  Treatment plan discussed with: patient and PTA        Subjective Evaluation    History of Present Illness  Mechanism of injury: Patient reports in early September she fell and injured her L wrist and lower back  She went to the ER - diagnosed with L wrist fracture as well as L2 compression fracture  She received PT treatment with good improvement in ROM and overall activity level however her pain persisted  She was recently hospitalized for IV therapy for intracranial hypertension  She reports since being in the hospital her lumbar pain has increased  She saw Shiv Sharp - x-rays were performed  Patient was referred to Dr Vianney Michaels - MRI was ordered  Has not followed up with Dr Vianney Michaels to date       Greatest concern: pain and falling  Quality of life: good    Pain  Current pain ratin  At best pain rating: 3  At worst pain rating: 10  Location: Lumbar spine - (R) LE - hip to knee  Quality: burning, dull ache, needle-like, radiating and sharp    Social Support  Steps to enter house: yes  Stairs in house: yes   Lives in: multiple-level home  Lives with: spouse    Employment status: not working    Diagnostic Tests  X-ray: abnormal  MRI studies: abnormal  Treatments  Previous treatment: physical therapy  Patient Goals  Patient goal: Increase endurance, strength, balance - walk a mile without increasing back pain        Objective     Concurrent Complaints  Negative for bladder dysfunction, bowel dysfunction, saddle (S4) numbness and history of trauma    Static Posture   General Observations  Symmetrical weight bearing  Lumbar Spine   Flattened  Postural Observations  Seated posture: fair  Standing posture: fair        Palpation     Additional Palpation Details  TTP (R) Lumbar paraspinals     Active Range of Motion     Lumbar   Flexion:  with pain Restriction level: minimal  Extension:  Restriction level: minimal    Additional Active Range of Motion Details  (B) SG: moderate restriction     Strength/Myotome Testing     Left Hip   Planes of Motion   Flexion: 4+    Right Hip   Planes of Motion   Flexion: 4+    Left Knee   Flexion: 4+  Extension: 4+    Right Knee   Flexion: 4+  Extension: 4+    Left Ankle/Foot   Dorsiflexion: 4+  Plantar flexion: 4+  Great toe extension: 4+    Right Ankle/Foot   Dorsiflexion: 4+  Plantar flexion: 4+  Great toe extension: 4+    Tests     Additional Tests Details  Repeated Movement Testing:   Repeated Extension in Standing: Decreased, Better  Sustained Extension in Lying: Decreased, Better      Ambulation     Observational Gait   Left stance time and right stance time within functional limits  Decreased walking speed, left step length and right step length     Left foot contact pattern: foot flat  Right foot contact pattern: foot flat  Left arm swing: decreased  Right arm swing: decreased  Base of support: increased    Functional Assessment        Single Leg Stance Left: 3 (Hip strategy) seconds  Right: 5 (Hip strategy) seconds        Daily Treatment Diary     DX: lumbar radiculitis  EPOC: 2/21/20  Precautions: Fall risk, no lifting greater than 20 pounds   CO-MORBIDITES: CSF Leak, (L) Wrist fracture    Stage: acute on chronic   Primary Movement Diagnosis: lumbar derangement - extension bias  Symptom Irritability Level: high  Stability of Symptoms: stable  Main Functional Goal(s):  Increase endurance, strength, balance - walk a mile without increasing back pain  Current Activity Recommendations:  Greatest Concern: pain and falling  Educational Needs: activity modification and appropriate progressions    Manual                                                                 Exercise Diary  HEP         Prone on Elbows          Standing Lumbar Extension                    Treadmill                     Abdominal Bracing          LFS: Alt LE          LFS: [place] UE/LE                    SLR          S/L Hip ABD          Bridge                     Std Hip Flex          Std Hip ABD          Std Hip Ext          HS Curls                     FWD Mini Lunge                    Mini Squat              Modalities

## 2020-01-10 NOTE — TELEPHONE ENCOUNTER
----- Message from Citlali Villanueva DO sent at 1/9/2020  4:16 PM EST -----  MRI lumbar spine:   Acute compression fracture deformity at L2 with approximately 40-50% height loss and mild buckling of the posterior cortex without an associated hematoma, spinal canal or foraminal stenosis      Disc osteophyte complex at L5-S1, eccentric to the left,  resulting in mild left inferior foraminal encroachment  Findings are stable when compared to the radiographs of lumbar spine from January 2, 2020  At this point her pain is slated to her back I recommend an evaluation with Dr Sushant Alarcon  If she is interested I can put in order

## 2020-01-13 ENCOUNTER — TELEPHONE (OUTPATIENT)
Dept: PAIN MEDICINE | Facility: CLINIC | Age: 60
End: 2020-01-13

## 2020-01-13 NOTE — TELEPHONE ENCOUNTER
Patient has acute compression fracture of L2, [facility] will be able to see this as they use Epic  I was referring her to an orthopedic spine surgeon for consideration of vertebroplasty  If she needs more clarification again I can call her at end of the day after my hours

## 2020-01-13 NOTE — TELEPHONE ENCOUNTER
Patient did stop by the office to fill out her Release of Health Information and verbalized she still wants someone to call her explaining her MRI results  Patient was expressing her frustration and just wants answers      Patient can be reached at [phone] after 11:30am 1-13-20

## 2020-01-13 NOTE — TELEPHONE ENCOUNTER
Pt called and stated she was still confused about the results of her MRI and would like a call back to discuss  Pt would also like to have the report faxed to  [facility]; Dr Darren Corado  Fax:  [phone]      She would also like to swing by today to  a copy for herself            Pt can be reached @ [phone]

## 2020-01-13 NOTE — TELEPHONE ENCOUNTER
S/W pt  Advised pt of the same  Pt stated she has more questions she wants to ask SL and if SL can call her at the end of the day  She has questions about the vertebroplasty and the fracture

## 2020-01-13 NOTE — TELEPHONE ENCOUNTER
Please fax MRI report to:Pt would also like to have the report faxed to  [id number]; Dr Lucy Garcia

## 2020-01-13 NOTE — TELEPHONE ENCOUNTER
----- Message from Delfino Hicks DO sent at 1/13/2020  1:40 PM EST -----  Juany Rodriguez    I spoke with patient's reviewed MRI and treatment plan and recommendations and patient will follow-up at [facility] MRI will be faxed

## 2020-01-13 NOTE — TELEPHONE ENCOUNTER
Patient is requesting a return call from Dr Nawaf Estes  To explain her MRI       Patient does not feel that the clinical nurse she spoke with on Friday could explain correctly and is frustrated and has concerns  Per Mona Please reach out to patient to answer her concerns    Patient is down stairs with Cathleen Iron now       Please advise

## 2020-01-13 NOTE — TELEPHONE ENCOUNTER
Spoke with patient  She will be completing release at [facility] office it will be sent to [facility] office to expedite

## 2020-01-15 NOTE — TELEPHONE ENCOUNTER
Took phone call transferred from phone room  She stated the [facility] doctor will not do the procedure  Pt stated she wants a copy of the MRI results for herself also  Advised her will get the message to Jonny Singleton  Pt is asking who to see next and that see needs to find another doctor and see doesn't know how to explain it to them and she is in pain  Pt stated she drives 2 miles only b/c of her "brain injury" and she choose [facility] b/c she is in [address] [place] [address] for her brain injury  Please advise        Jonny Singleton- please see above

## 2020-01-17 ENCOUNTER — APPOINTMENT (OUTPATIENT)
Dept: OCCUPATIONAL THERAPY | Facility: CLINIC | Age: 60
End: 2020-01-17
Payer: COMMERCIAL

## 2020-01-20 ENCOUNTER — OFFICE VISIT (OUTPATIENT)
Dept: OBGYN CLINIC | Facility: HOSPITAL | Age: 60
End: 2020-01-20
Payer: COMMERCIAL

## 2020-01-20 VITALS
HEIGHT: 66 IN | WEIGHT: 164 LBS | HEART RATE: 76 BPM | DIASTOLIC BLOOD PRESSURE: 84 MMHG | BODY MASS INDEX: 26.36 KG/M2 | SYSTOLIC BLOOD PRESSURE: 132 MMHG

## 2020-01-20 DIAGNOSIS — M51.36 DDD (DEGENERATIVE DISC DISEASE), LUMBAR: Primary | ICD-10-CM

## 2020-01-20 DIAGNOSIS — S32.020D CLOSED COMPRESSION FRACTURE OF L2 LUMBAR VERTEBRA WITH ROUTINE HEALING, SUBSEQUENT ENCOUNTER: ICD-10-CM

## 2020-01-20 PROBLEM — M51.369 DDD (DEGENERATIVE DISC DISEASE), LUMBAR: Status: ACTIVE | Noted: 2020-01-20

## 2020-01-20 PROCEDURE — 99213 OFFICE O/P EST LOW 20 MIN: CPT | Performed by: ORTHOPAEDIC SURGERY

## 2020-01-20 NOTE — PROGRESS NOTES
59 y o female  Here for evaluation low back pain radiates down to her right anterior thigh region  This initially occurred due to a fall while adjusting her   Bicycle seat 4 months ago  She was given a spine brace and her symptoms did improve until recently when her symptoms began to worsen again  A course of prednisone resulted in some improvement of her pain  Pain is worse with any activity and is relieved with rest   She has a history CSF leaks have been treated at [facility]     Review of Systems  Review of systems negative unless otherwise specified in HPI    Past Medical History  Past Medical History:   Diagnosis Date    Concussion     CSF leak        Past Surgical History  Past Surgical History:   Procedure Laterality Date    BREAST LUMPECTOMY      LAPAROSCOPY FOR ECTOPIC PREGNANCY      TONSILECTOMY AND ADNOIDECTOMY         Current Medications  Current Outpatient Medications on File Prior to Visit   Medication Sig Dispense Refill    amitriptyline (ELAVIL) 25 mg tablet Take 25 mg by mouth daily at bedtime      amLODIPine (NORVASC) 10 mg tablet Take 10 mg by mouth daily       B-D 3CC LUER-CLARA SYR 25GX1" 25G X 1" 3 ML MISC use as directed WITH MEDICINE      clonazePAM (KlonoPIN) 0 5 mg tablet Take 0 5 mg by mouth daily before breakfast Indications: takes for dizziness   cyclobenzaprine (FLEXERIL) 10 mg tablet Take 1 tablet (10 mg total) by mouth daily at bedtime as needed for muscle spasms 20 tablet 0    dihydroergotamine (DHE) 1 mg/mL 1 mg IM prn severe headache  May repeat in 2 hours  Max 2 per day, Max 2 days per week      HYDROcodone-acetaminophen (NORCO) 5-325 mg per tablet Take 1 tablet by mouth every 6 (six) hours as needed for painMax Daily Amount: 4 tablets 30 tablet 0    ketorolac (TORADOL) 30 mg/mL injection 30 mg IM prn severe headache  Max 1 day per week      meclizine (ANTIVERT) 25 mg tablet Take 1 tablet by mouth 3 (three) times a day as needed for dizziness   10 tablet 0    mexiletine (MEXITIL) 150 mg capsule Take 150 mg by mouth 3 (three) times a day      naproxen sodium (ANAPROX) 550 mg tablet take 1 tablet by mouth twice a day if needed for headache up to three times a week      naratriptan (AMERGE) 2 5 MG tablet Take 2 5 mg by mouth once as needed for migraine 2 5 mg at onset of headache, may repeat in 4 hours if needed      ondansetron (ZOFRAN) 8 mg tablet Take 8 mg by mouth every 8 (eight) hours as needed for nausea or vomiting      ondansetron (ZOFRAN-ODT) 4 mg disintegrating tablet Take 1 tablet by mouth every 8 (eight) hours as needed for nausea or vomiting  6 tablet 0    predniSONE 10 mg tablet Take according to titration schedule 21 tablet 0    promethazine (PHENERGAN) 25 mg tablet take 1 tablet by mouth twice a day if needed for headache or nausea      RA VITAMIN D-3 25 MCG (1000 UT) tablet Take 1,000 Units by mouth 2 (two) times a day      Saline (AYR SALINE NASAL DROPS) 0 65 % (Soln) SOLN 1 drop into each nostril      theophylline (TIRSO-24) 100 MG 24 hr capsule Take 100 mg by mouth daily      traMADol (ULTRAM) 50 mg tablet Take 50 mg by mouth every 6 (six) hours as needed for moderate pain Indications: for headache, usually takes twice per day   clotrimazole (LOTRIMIN) 1 % cream Apply topically 2 (two) times a day for 14 days (Patient not taking: Reported on 3/25/2019) 30 g 0    polyethylene glycol (COLYTE) 4000 mL solution Take 4,000 mL by mouth once for 1 dose 4000 mL 0     No current facility-administered medications on file prior to visit          Recent Labs [facility])  0   Lab Value Date/Time    HCT 41 0 09/10/2016 1115    HCT 42 0 05/08/2015 1315    HGB 14 0 09/10/2016 1115    HGB 15 3 05/08/2015 1315    WBC 6 39 09/10/2016 1115    WBC 4 66 05/08/2015 1315    INR 0 96 09/10/2016 1115    GLUCOSE 94 05/08/2015 1315         Physical exam  · General: Awake, Alert, Oriented  · Eyes: Pupils equal, round and reactive to light  · Heart: regular rate and rhythm  · Lungs: No audible wheezing  · Abdomen: soft    Lumbar spine:   Nontender to percussion of the lumbar spine  Negative modified straight leg raise bilaterally   5/5 motor strength L5 through S1   Sensation is intact bilaterally  Legs are warm well perfused  Imaging   MRI of the lumbar spine reviewed personally today showing L2 compression fracture with increased   Signal on the STIR sequence  There is also lumbar DDD most significant at the L5-S1 level    Procedure   none    Assessment/Plan:   64 y  o female  With lumbar DDD L5-S1 as well as L2 compression fracture that is  Subacute in nature  Discussed treatment options with the patient including continued bracing for the compression fracture and facet injections and rhizotomies for L4-L5 and L5-S1 by pain management  Patient is agreeable to this and will follow up with pain management    Follow up in 4 weeks

## 2020-01-22 ENCOUNTER — TELEPHONE (OUTPATIENT)
Dept: OBGYN CLINIC | Facility: HOSPITAL | Age: 60
End: 2020-01-22

## 2020-01-22 NOTE — TELEPHONE ENCOUNTER
Dr Walters   #: [phone]         Patient is calling in requesting a call back  Patient stated dr referred patient back to [facility] for an injection  They gave her a follow up date 2/7 to meet with the nurse practitioner to determined if they are still going to be treating her or not  Patient is in a a lot of pain and very frustrated because she feels like she is getting bounced back and forth and is not getting anything resolved   Please advise, thank you

## 2020-01-23 ENCOUNTER — TELEPHONE (OUTPATIENT)
Dept: OBGYN CLINIC | Facility: HOSPITAL | Age: 60
End: 2020-01-23

## 2020-01-24 ENCOUNTER — APPOINTMENT (OUTPATIENT)
Dept: OCCUPATIONAL THERAPY | Facility: CLINIC | Age: 60
End: 2020-01-24
Payer: COMMERCIAL

## 2020-01-25 ENCOUNTER — TELEPHONE (OUTPATIENT)
Dept: OTHER | Facility: OTHER | Age: 60
End: 2020-01-25

## 2020-01-26 NOTE — TELEPHONE ENCOUNTER
PT CALLED BECAUSE THEY WERE LOOKING TO MAKE AN EMERGENCY APPT W/ THE OFFICE FOR Monday MORNING  PT LOOKING TO MAKE APPT BECAUSE THEY ARE EXPERIENCING BACK PAIN  PT WAS OFFERED THE ON-CALL MEDICAL EMERGENCY DR  BUT REFUSED  PT WAS ALSO GIVEN INFO ON THE CLOSET [facility]ARE NOW IN [place] FOR THE NEXT DAY  ENSURED PT DID NOT NEED AN AMBULANCE TO RESIDENCE, PT REFUSED

## 2020-01-27 ENCOUNTER — OFFICE VISIT (OUTPATIENT)
Dept: PAIN MEDICINE | Facility: CLINIC | Age: 60
End: 2020-01-27
Payer: COMMERCIAL

## 2020-01-27 VITALS
DIASTOLIC BLOOD PRESSURE: 82 MMHG | HEART RATE: 83 BPM | SYSTOLIC BLOOD PRESSURE: 138 MMHG | BODY MASS INDEX: 26.68 KG/M2 | WEIGHT: 166 LBS | HEIGHT: 66 IN

## 2020-01-27 DIAGNOSIS — M54.16 RIGHT LUMBAR RADICULITIS: ICD-10-CM

## 2020-01-27 DIAGNOSIS — G89.29 CHRONIC BILATERAL LOW BACK PAIN WITHOUT SCIATICA: Primary | ICD-10-CM

## 2020-01-27 DIAGNOSIS — M54.50 CHRONIC BILATERAL LOW BACK PAIN WITHOUT SCIATICA: Primary | ICD-10-CM

## 2020-01-27 DIAGNOSIS — M47.816 LUMBAR SPONDYLOSIS: ICD-10-CM

## 2020-01-27 DIAGNOSIS — M51.36 DDD (DEGENERATIVE DISC DISEASE), LUMBAR: ICD-10-CM

## 2020-01-27 DIAGNOSIS — S32.020A CLOSED COMPRESSION FRACTURE OF SECOND LUMBAR VERTEBRA, INITIAL ENCOUNTER: ICD-10-CM

## 2020-01-27 PROCEDURE — 99214 OFFICE O/P EST MOD 30 MIN: CPT | Performed by: NURSE PRACTITIONER

## 2020-01-27 RX ORDER — CYCLOBENZAPRINE HCL 10 MG
TABLET ORAL
Qty: 45 TABLET | Refills: 1 | Status: SHIPPED | OUTPATIENT
Start: 2020-01-27 | End: 2020-06-16

## 2020-01-27 RX ORDER — PREDNISONE 10 MG/1
TABLET ORAL
Qty: 36 TABLET | Refills: 0 | Status: SHIPPED | OUTPATIENT
Start: 2020-01-27 | End: 2020-06-16

## 2020-01-27 NOTE — TELEPHONE ENCOUNTER
Message first seen at this time  Reviewed w/Nick Rutherford   An existing pt of Dr Bety Peck, referred to his team

## 2020-01-27 NOTE — PROGRESS NOTES
Assessment:  1  Chronic bilateral low back pain without sciatica    2  DDD (degenerative disc disease), lumbar    3  Lumbar spondylosis    4  Right lumbar radiculitis    5  Closed compression fracture of second lumbar vertebra, [facility]        Plan:  While the patient was in the office today, I did have a thorough conversation with the patient regarding her chronic pain syndrome, symptoms, medication regimen  I did explain to the patient with her current symptoms and exam findings, I do agree with Dr Leroy Yuen that it does appear that the lumbar facet joints and the underlying spondylosis are more likely causing her pain then the L2 vertebral compression fracture as she has minimal to no pain over the compression fracture at self  I explained the patient that for now I agree it is in her best interest to probably hold off any physical therapy and continue to wear the LSO brace as needed for comfort  However, I did discuss with that at this point since the patient's low back pain persists despite time, relative rest, activity modification and therapy  Based on the patient's symptoms examination, I suspect that the pain is being generated by the lumbar facet joints  The facet joints are only one of many possible low-back pain generators  Unfortunately, studies have demonstrated that history and examination alone are unreliable  We will schedule the patient for diagnostic lumbar medial branch blockade using the double block paradigm  If the patient receives significant relief of appropriate duration with lidocaine 2%, we will confirm with Marcaine 0 75%  If the patient demonstrates appropriate response to medial branch blockade we will schedule for radiofrequency ablation to provide long-term relief  Complete risks and benefits including bleeding, infection, tissue reaction, nerve injury and allergic reaction were discussed  The approach was demonstrated using models and literature was provided   Verbal and written consent was obtained  However, since she is very uncomfortable today and I feel there is definitely significant inflammatory component in the meantime as we will not be able to schedule the 1st diagnostic block for at least another week or so, I do feel would be beneficial proceed with a titrating dose of oral prednisone  I discussed with the patient that at this point time since I feel that there is a significant inflammatory component to their pain symptoms, that they would benefit from a titrating dose of oral steroids over the next 7 days  I advised the patient that while on the steroids, they should not take any other oral NSAIDs except for acetaminophen or Tylenol until they have completed the steroid taper  I also advised them that once they have completed the steroid taper, they are to give our office a follow up phone call to let us know how they are doing and if there is any improvement  The patient was agreeable and verbalized an understanding  I also discussed with the patient that I feel there is definitely significant myofascial component and more to restart her on the cyclobenzaprine 10 mg b i d  P r n  For pain and spasms and advised her that she needs to take it relatively consistently for at least a week or 2 before she can make any judgment about whether not is helpful  I advised the patient that if they experience any side effects or issues with the changes in their medication regiment, they should give our office a call to discuss  I also advised the patient not to drive or operate machinery until they see how the changes in the medication regimen affects them  The patient was agreeable and verbalized an understanding  I discussed with the patient that at this point time she can followup with our office on an as-needed basis   I did review the patient that if her pain symptoms should change, worsen, and/or if she would experience any new symptoms as she would like to be evaluated for, she should give our office a call  The patient was agreeable and verbalized an understanding  History of Present Illness: The patient is a 64 y o  female last seen on 1/3/20 who presents for a follow up office visit in regards to chronic and worsening low back pain with occasional right lower extremity radicular symptoms secondary to lumbar degenerative disc disease, lumbar spondylosis, and a new clothes compression fracture of the 2nd vertebrae  The patient currently reports that since her last office visit she has continue with worsening bilateral low back pain with occasional right lower extremity radicular symptoms but that most of the biggest pain issue is in her lumbar spine  Since her last office visit she did have updated imaging which did show what appears to be a new L2 compression fracture, however, she did follow-up with Dr Bess Schultz, who at this point does not feel that her low back pain as a result of the compression fracture because she does not have any pain or discomfort over the L1-L2 area as and all of her pain is more in the lower part of the lumbar spine at L4-L5  She presents today for re-evaluation with the recommendation and considering medial branch blocks to see if she would be an eventual candidate for radiofrequency ablation procedure and see what else can be done as she is having significant pain and is very uncomfortable  She does report that the previous prednisone taper she had a few weeks ago from Dr Shelly Oliveros was the only thing in the long time that seem to provide any kind of relief, however, she feels she may have overdone it because of the relief and is wanting there is anything that can be done at this time as she continues with the back pain and spasms and is just so uncomfortable      I have personally reviewed and/or updated the patient's past medical history, past surgical history, family history, social history, current medications, allergies, and vital signs today  Review of Systems:    Review of Systems   Respiratory: Negative for shortness of breath  Cardiovascular: Negative for chest pain  Gastrointestinal: Negative for constipation, diarrhea, nausea and vomiting  Musculoskeletal: Positive for gait problem  Negative for arthralgias, joint swelling (joint stiffness) and myalgias  Skin: Negative for rash  Neurological: Positive for weakness  Negative for dizziness and seizures  All other systems reviewed and are negative  Past Medical History:   Diagnosis Date    Concussion     CSF leak        Past Surgical History:   Procedure Laterality Date    BREAST LUMPECTOMY      LAPAROSCOPY FOR ECTOPIC PREGNANCY      TONSILECTOMY AND ADNOIDECTOMY         Family History   Problem Relation Age of Onset    Colon cancer Father     Breast cancer Sister        Social History     Occupational History    Not on file   Tobacco Use    Smoking status: Current Every Day Smoker     Packs/day: 1 00     Types: Cigarettes    Smokeless tobacco: Never Used   Substance and Sexual Activity    Alcohol use: Yes     Comment: socially, had glass of wine today    Drug use: Yes     Types: Marijuana     Comment: CBD    Sexual activity: Not on file         Current Outpatient Medications:     amLODIPine (NORVASC) 10 mg tablet, Take 10 mg by mouth daily , Disp: , Rfl:     dihydroergotamine (DHE) 1 mg/mL, 1 mg IM prn severe headache  May repeat in 2 hours  Max 2 per day, Max 2 days per week, Disp: , Rfl:     ketorolac (TORADOL) 30 mg/mL injection, 30 mg IM prn severe headache   Max 1 day per week, Disp: , Rfl:     mexiletine (MEXITIL) 150 mg capsule, Take 150 mg by mouth 3 (three) times a day, Disp: , Rfl:     naproxen sodium (ANAPROX) 550 mg tablet, take 1 tablet by mouth twice a day if needed for headache up to three times a week, Disp: , Rfl:     ondansetron (ZOFRAN) 8 mg tablet, Take 8 mg by mouth every 8 (eight) hours as needed for nausea or vomiting, Disp: , Rfl:     ondansetron (ZOFRAN-ODT) 4 mg disintegrating tablet, Take 1 tablet by mouth every 8 (eight) hours as needed for nausea or vomiting , Disp: 6 tablet, Rfl: 0    promethazine (PHENERGAN) 25 mg tablet, take 1 tablet by mouth twice a day if needed for headache or nausea, Disp: , Rfl:     RA VITAMIN D-3 25 MCG (1000 UT) tablet, Take 1,000 Units by mouth 2 (two) times a day, Disp: , Rfl:     Saline (AYR SALINE NASAL DROPS) 0 65 % (Soln) SOLN, 1 drop into each nostril, Disp: , Rfl:     amitriptyline (ELAVIL) 25 mg tablet, Take 25 mg by mouth daily at bedtime, Disp: , Rfl:     B-D 3CC LUER-CLARA SYR 25GX1" 25G X 1" 3 ML MISC, use as directed WITH MEDICINE, Disp: , Rfl:     clonazePAM (KlonoPIN) 0 5 mg tablet, Take 0 5 mg by mouth daily before breakfast Indications: takes for dizziness  , Disp: , Rfl:     clotrimazole (LOTRIMIN) 1 % cream, Apply topically 2 (two) times a day for 14 days (Patient not taking: Reported on 3/25/2019), Disp: 30 g, Rfl: 0    cyclobenzaprine (FLEXERIL) 10 mg tablet, Take 1 PO BID PRN for spasms  , Disp: 45 tablet, Rfl: 1    HYDROcodone-acetaminophen (NORCO) 5-325 mg per tablet, Take 1 tablet by mouth every 6 (six) hours as needed for painMax Daily Amount: 4 tablets (Patient not taking: Reported on 1/27/2020), Disp: 30 tablet, Rfl: 0    meclizine (ANTIVERT) 25 mg tablet, Take 1 tablet by mouth 3 (three) times a day as needed for dizziness  (Patient not taking: Reported on 1/27/2020), Disp: 10 tablet, Rfl: 0    naratriptan (AMERGE) 2 5 MG tablet, Take 2 5 mg by mouth once as needed for migraine 2 5 mg at onset of headache, may repeat in 4 hours if needed, Disp: , Rfl:     polyethylene glycol (COLYTE) 4000 mL solution, Take 4,000 mL by mouth once for 1 dose, Disp: 4000 mL, Rfl: 0    predniSONE 10 mg tablet, Take 2 PO QID on the first day, then decrease by 1 pill daily until gone   Call with an update when finished , Disp: 36 tablet, Rfl: 0   theophylline (TIRSO-24) 100 MG 24 hr capsule, Take 100 mg by mouth daily, Disp: , Rfl:     traMADol (ULTRAM) 50 mg tablet, Take 50 mg by mouth every 6 (six) hours as needed for moderate pain Indications: for headache, usually takes twice per day , Disp: , Rfl:     Allergies   Allergen Reactions    Shellfish-Derived Products Anaphylaxis     No allergy per patient      Codeine Other (See Comments)     Can not take with current medications    Other reaction(s): Other (See Comments)  GI upset  GI Upset         Physical Exam:    /82 (BP Location: Left arm, Patient Position: Sitting, Cuff Size: Standard)   Pulse 83   Ht 5' 6" (1 676 m)   Wt 75 3 kg (166 lb)   BMI 26 79 kg/m²     Constitutional:normal, well developed, well nourished, alert, in no distress and non-toxic and no overt pain behavior  Eyes:anicteric  HEENT:grossly intact  Neck:supple, symmetric, trachea midline and no masses   Pulmonary:even and unlabored  Cardiovascular:No edema or pitting edema present  Skin:Normal without rashes or lesions and well hydrated  Psychiatric:Mood and affect appropriate  Neurologic:Cranial Nerves II-XII grossly intact  Musculoskeletal:The patient's gait is slightly antalgic, painful, but steady without the use of any assistive devices  Pain was noted over the bilateral L3 through L5 facet joint with moderate to significant pain with bilateral facet joint loading in those areas  Pain was greater with extension versus flexion of the lumbar sacral spine upon exam   Strength and reflexes of the bilateral lower extremities were within normal limits        Imaging  FL spine and pain procedure    (Results Pending)         Orders Placed This Encounter   Procedures    FL spine and pain procedure

## 2020-01-27 NOTE — TELEPHONE ENCOUNTER
Dr Walters aware of this and spoke with Maninder Martins  (manager) in regards to this  Patient needs to see pain jasmin and Chantal will try to get patient an appt sooner

## 2020-01-27 NOTE — PATIENT INSTRUCTIONS
Lumbar Radiofrequency Ablation   WHAT YOU NEED TO KNOW:   Lumbar radiofrequency ablation (RFA) is a procedure used to treat facet joint pain in your lower back  Facet joints are found at the back of each vertebra  A needle electrode is used to send electrical currents to the nerves in your facet joint  The electrical currents create heat that damages the nerve so it cannot send pain signals  DISCHARGE INSTRUCTIONS:   Seek care immediately if:   · You cannot move your leg  · You cannot control your urine or bowel movements  · You have severe pain in your lower back  Contact your healthcare provider if:   · You have leg weakness  · You develop new symptoms  · You have questions or concerns about your condition or care  Medicines:   · Pain medicine  may be given  Ask how to take this medicine safely  · Take your medicine as directed  Contact your healthcare provider if you think your medicine is not helping or if you have side effects  Tell him or her if you are allergic to any medicine  Keep a list of the medicines, vitamins, and herbs you take  Include the amounts, and when and why you take them  Bring the list or the pill bottles to follow-up visits  Carry your medicine list with you in case of an emergency  Follow up with your healthcare provider as directed:  Write down your questions so you remember to ask them during your visits  Activity:  Do not drive a car or operate machinery within 24 hours after your procedure  Ask your healthcare provider about any other activities you should avoid  © 2017 [address] [employer] Information is for End User's use only and may not be sold, redistributed or otherwise used for commercial purposes  All illustrations and images included in CareNotes® are the copyrighted property of [employer]  or Jakob Terrell  The above information is an  only   It is not intended as medical advice for individual conditions or treatments  Talk to your doctor, nurse or pharmacist before following any medical regimen to see if it is safe and effective for you  Prednisone (By mouth)   Prednisone (PRED-ni-sone)  Treats many diseases and conditions, especially problems related to inflammation  This medicine is a corticosteroid  Brand Name(s): Contrast Allergy PreMed Pack, Mattie, predniSONE Intensol   There may be other brand names for this medicine  When This Medicine Should Not Be Used: This medicine is not right for everyone  Do not use if you had an allergic reaction to prednisone or if you are pregnant  How to Use This Medicine:   Liquid, Tablet, Delayed Release Tablet  · Take your medicine as directed  Your dose may need to be changed several times to find what works best for you  · It is best to take this medicine with food or milk  · Swallow the delayed-release tablet whole  Do not crush, break, or chew it  · Measure the oral liquid medicine with a marked measuring spoon, oral syringe, or medicine cup  · Missed dose: Take a dose as soon as you remember  If it is almost time for your next dose, wait until then and take a regular dose  Do not take extra medicine to make up for a missed dose  · Store the medicine in a closed container at room temperature, away from heat, moisture, and direct light  Do not freeze the oral liquid  Drugs and Foods to Avoid:   Ask your doctor or pharmacist before using any other medicine, including over-the-counter medicines, vitamins, and herbal products    · Tell your doctor if you use any of the following:  ¨ Aminoglutethimide, amphotericin B, carbamazepine, cholestyramine, cyclosporine, digoxin, isoniazid, ketoconazole, phenobarbital, phenytoin, or rifampin  ¨ Blood thinner, such as warfarin  ¨ NSAID pain or arthritis medicine, such as aspirin, diclofenac, ibuprofen, naproxen, celecoxib  ¨ Diuretic (water pill)  ¨ Diabetes medicine  ¨ Macrolide antibiotic, such as azithromycin, clarithromycin, erythromycin  ¨ Estrogen, including birth control pills or hormone replacement therapy  · This medicine may interfere with vaccines  Ask your doctor before you get a flu shot or any other vaccines  Warnings While Using This Medicine:   · It is not safe to take this medicine during pregnancy  It could harm an unborn baby  Tell your doctor right away if you become pregnant  · Tell your doctor if you are breastfeeding or if you have kidney problems, heart failure, high blood pressure, a recent heart attack, diabetes, glaucoma, osteoporosis, or thyroid problems  Tell your doctor about any infection you have  Also tell your doctor if you have had mental or emotional problems (such as depression) or stomach or bowel problems (such as an ulcer or diverticulitis)  · This medicine may cause the following problems:  ¨ Mood or behavior changes  ¨ Higher blood pressure, retaining water, changes in salt or potassium levels in your body  ¨ Cataracts or glaucoma (with long-term use)  ¨ Weak bones or osteoporosis (with long-term use)  ¨ Slow growth in children (with long-term use)  ¨ Muscle problems (with high doses, especially if you have myasthenia gravis or similar nerve and muscle problems)  · Do not stop using this medicine suddenly  Your doctor will need to slowly decrease your dose before you stop it completely  · This medicine could cause you to get infections more easily  Tell your doctor right away if you are exposed to chicken pox, measles, or other serious infection  Tell your doctor if you had a serious infection in the past, such as tuberculosis or herpes  · Tell your doctor about any extra stress or anxiety in your life  Your dose might need to be changed for a short time  · Tell any doctor or dentist who treats you that you are using this medicine  This medicine may affect certain medical test results  · Keep all medicine out of the reach of children  Never share your medicine with anyone    Possible Side Effects While Using This Medicine:   Call your doctor right away if you notice any of these side effects:  · Allergic reaction: Itching or hives, swelling in your face or hands, swelling or tingling in your mouth or throat, chest tightness, trouble breathing  · Dark freckles, skin color changes, coldness, weakness, tiredness, nausea, vomiting, weight loss  · Depression, unusual thoughts, feelings, or behaviors, trouble sleeping  · Fever, chills, cough, sore throat, and body aches  · Muscle pain or weakness  · Rapid weight gain, swelling in your hands, ankles, or feet  · Severe stomach pain, nausea, vomiting, or red or black stools  · Skin changes or growths  · Trouble seeing, eye pain, headache  If you notice these less serious side effects, talk with your doctor:   · Increased appetite  · Round, puffy face  · Weight gain around your neck, upper back, breast, face, or waist  If you notice other side effects that you think are caused by this medicine, tell your doctor  Call your doctor for medical advice about side effects  You may report side effects to FDA at 6-363-FDA-2264  © 2017 [address] [employer] Information is for End User's use only and may not be sold, redistributed or otherwise used for commercial purposes  The above information is an  only  It is not intended as medical advice for individual conditions or treatments  Talk to your doctor, nurse or pharmacist before following any medical regimen to see if it is safe and effective for you

## 2020-01-30 ENCOUNTER — TRANSCRIBE ORDERS (OUTPATIENT)
Dept: ADMINISTRATIVE | Facility: HOSPITAL | Age: 60
End: 2020-01-30

## 2020-01-30 DIAGNOSIS — I73.9 PERIPHERAL VASCULAR DISEASE, UNSPECIFIED (HCC): Primary | ICD-10-CM

## 2020-01-31 ENCOUNTER — APPOINTMENT (OUTPATIENT)
Dept: OCCUPATIONAL THERAPY | Facility: CLINIC | Age: 60
End: 2020-01-31
Payer: COMMERCIAL

## 2020-02-06 ENCOUNTER — HOSPITAL ENCOUNTER (OUTPATIENT)
Dept: RADIOLOGY | Facility: CLINIC | Age: 60
Discharge: HOME | End: 2020-02-06
Admitting: ANESTHESIOLOGY
Payer: COMMERCIAL

## 2020-02-06 VITALS
TEMPERATURE: 98.3 F | DIASTOLIC BLOOD PRESSURE: 89 MMHG | OXYGEN SATURATION: 96 % | SYSTOLIC BLOOD PRESSURE: 138 MMHG | HEART RATE: 78 BPM | RESPIRATION RATE: 20 BRPM

## 2020-02-06 DIAGNOSIS — M47.816 LUMBAR SPONDYLOSIS: ICD-10-CM

## 2020-02-06 DIAGNOSIS — G89.29 CHRONIC BILATERAL LOW BACK PAIN WITHOUT SCIATICA: ICD-10-CM

## 2020-02-06 DIAGNOSIS — M54.50 CHRONIC BILATERAL LOW BACK PAIN WITHOUT SCIATICA: ICD-10-CM

## 2020-02-06 PROCEDURE — 64493 INJ PARAVERT F JNT L/S 1 LEV: CPT | Performed by: ANESTHESIOLOGY

## 2020-02-06 PROCEDURE — 64494 INJ PARAVERT F JNT L/S 2 LEV: CPT | Performed by: ANESTHESIOLOGY

## 2020-02-06 RX ORDER — BUPIVACAINE HCL/PF 2.5 MG/ML
10 VIAL (ML) INJECTION ONCE
Status: COMPLETED | OUTPATIENT
Start: 2020-02-06 | End: 2020-02-06

## 2020-02-06 RX ADMIN — BUPIVACAINE HYDROCHLORIDE 6 ML: 2.5 INJECTION, SOLUTION EPIDURAL; INFILTRATION; INTRACAUDAL at 10:40

## 2020-02-06 NOTE — DISCHARGE INSTRUCTIONS

## 2020-02-06 NOTE — H&P
History of Present Illness: The patient is a 64 y o  female who presents with complaints of low back pain  Patient Active Problem List   Diagnosis    Nondisplaced fracture of distal end of left radius    Closed compression fracture of L2 vertebra (HCC)    Left wrist tendonitis    Right lumbar radiculitis    DDD (degenerative disc disease), lumbar    Chronic bilateral low back pain without sciatica    Lumbar spondylosis       Past Medical History:   Diagnosis Date    Concussion     CSF leak        Past Surgical History:   Procedure Laterality Date    BREAST LUMPECTOMY      LAPAROSCOPY FOR ECTOPIC PREGNANCY      TONSILECTOMY AND ADNOIDECTOMY           Current Outpatient Medications:     amitriptyline (ELAVIL) 25 mg tablet, Take 25 mg by mouth daily at bedtime, Disp: , Rfl:     amLODIPine (NORVASC) 10 mg tablet, Take 10 mg by mouth daily , Disp: , Rfl:     B-D 3CC LUER-CLARA SYR 25GX1" 25G X 1" 3 ML MISC, use as directed WITH MEDICINE, Disp: , Rfl:     clonazePAM (KlonoPIN) 0 5 mg tablet, Take 0 5 mg by mouth daily before breakfast Indications: takes for dizziness  , Disp: , Rfl:     clotrimazole (LOTRIMIN) 1 % cream, Apply topically 2 (two) times a day for 14 days (Patient not taking: Reported on 3/25/2019), Disp: 30 g, Rfl: 0    cyclobenzaprine (FLEXERIL) 10 mg tablet, Take 1 PO BID PRN for spasms  , Disp: 45 tablet, Rfl: 1    dihydroergotamine (DHE) 1 mg/mL, 1 mg IM prn severe headache  May repeat in 2 hours  Max 2 per day, Max 2 days per week, Disp: , Rfl:     HYDROcodone-acetaminophen (NORCO) 5-325 mg per tablet, Take 1 tablet by mouth every 6 (six) hours as needed for painMax Daily Amount: 4 tablets (Patient not taking: Reported on 1/27/2020), Disp: 30 tablet, Rfl: 0    ketorolac (TORADOL) 30 mg/mL injection, 30 mg IM prn severe headache  Max 1 day per week, Disp: , Rfl:     meclizine (ANTIVERT) 25 mg tablet, Take 1 tablet by mouth 3 (three) times a day as needed for dizziness   (Patient not taking: Reported on 1/27/2020), Disp: 10 tablet, Rfl: 0    mexiletine (MEXITIL) 150 mg capsule, Take 150 mg by mouth 3 (three) times a day, Disp: , Rfl:     naproxen sodium (ANAPROX) 550 mg tablet, take 1 tablet by mouth twice a day if needed for headache up to three times a week, Disp: , Rfl:     naratriptan (AMERGE) 2 5 MG tablet, Take 2 5 mg by mouth once as needed for migraine 2 5 mg at onset of headache, may repeat in 4 hours if needed, Disp: , Rfl:     ondansetron (ZOFRAN) 8 mg tablet, Take 8 mg by mouth every 8 (eight) hours as needed for nausea or vomiting, Disp: , Rfl:     ondansetron (ZOFRAN-ODT) 4 mg disintegrating tablet, Take 1 tablet by mouth every 8 (eight) hours as needed for nausea or vomiting , Disp: 6 tablet, Rfl: 0    polyethylene glycol (COLYTE) 4000 mL solution, Take 4,000 mL by mouth once for 1 dose, Disp: 4000 mL, Rfl: 0    predniSONE 10 mg tablet, Take 2 PO QID on the first day, then decrease by 1 pill daily until gone   Call with an update when finished , Disp: 36 tablet, Rfl: 0    promethazine (PHENERGAN) 25 mg tablet, take 1 tablet by mouth twice a day if needed for headache or nausea, Disp: , Rfl:     RA VITAMIN D-3 25 MCG (1000 UT) tablet, Take 1,000 Units by mouth 2 (two) times a day, Disp: , Rfl:     Saline (AYR SALINE NASAL DROPS) 0 65 % (Soln) SOLN, 1 drop into each nostril, Disp: , Rfl:     theophylline (TIRSO-24) 100 MG 24 hr capsule, Take 100 mg by mouth daily, Disp: , Rfl:     traMADol (ULTRAM) 50 mg tablet, Take 50 mg by mouth every 6 (six) hours as needed for moderate pain Indications: for headache, usually takes twice per day , Disp: , Rfl:     Current Facility-Administered Medications:     bupivacaine (PF) (MARCAINE) 0 25 % injection 10 mL, 10 mL, Perineural, Once, Eliseo Jimenez, DO    Allergies   Allergen Reactions    Shellfish-Derived Products Anaphylaxis     No allergy per patient      Codeine Other (See Comments)     Can not take with current medications    Other reaction(s): Other (See Comments)  GI upset  GI Upset         Physical Exam:   General: Awake, Alert, Oriented x 3, Mood and affect appropriate  Respiratory: Respirations even and unlabored  Cardiovascular: Peripheral pulses intact; no edema  Musculoskeletal Exam:  Decreased range of motion lumbar spine    ASA Score: II    Patient/Chart Verification  Patient ID Verified: Verbal  ID Band Applied: No  Consents Confirmed: Procedural  H&P( within 30 days) Verified: To be obtained in the Pre-Procedure area  Interval H&P(within 24 hr) Complete (required for Outpatients and Surgery Admit only): To be obtained in the Pre-Procedure area  Allergies Reviewed: Yes  Anticoag/NSAID held?: NA  Currently on antibiotics?: No    Assessment:   1  Chronic bilateral low back pain without sciatica    2   Lumbar spondylosis        Plan: B/L L3-L5 MBB

## 2020-02-14 ENCOUNTER — HOSPITAL ENCOUNTER (OUTPATIENT)
Dept: NON INVASIVE DIAGNOSTICS | Facility: HOSPITAL | Age: 60
Discharge: HOME | End: 2020-02-14
Attending: PODIATRIST
Payer: COMMERCIAL

## 2020-02-14 DIAGNOSIS — I73.9 PERIPHERAL VASCULAR DISEASE, UNSPECIFIED (HCC): ICD-10-CM

## 2020-02-14 PROCEDURE — 93923 UPR/LXTR ART STDY 3+ LVLS: CPT

## 2020-02-14 PROCEDURE — 93922 UPR/L XTREMITY ART 2 LEVELS: CPT | Performed by: INTERNAL MEDICINE

## 2020-02-17 ENCOUNTER — HOSPITAL ENCOUNTER (OUTPATIENT)
Dept: RADIOLOGY | Facility: CLINIC | Age: 60
Discharge: HOME | End: 2020-02-17
Payer: COMMERCIAL

## 2020-02-17 VITALS
DIASTOLIC BLOOD PRESSURE: 89 MMHG | OXYGEN SATURATION: 95 % | RESPIRATION RATE: 18 BRPM | SYSTOLIC BLOOD PRESSURE: 152 MMHG | TEMPERATURE: 98.3 F | HEART RATE: 84 BPM

## 2020-02-17 DIAGNOSIS — M47.816 LUMBAR SPONDYLOSIS: ICD-10-CM

## 2020-02-17 DIAGNOSIS — M54.50 LOW BACK PAIN: ICD-10-CM

## 2020-02-17 PROCEDURE — 64494 INJ PARAVERT F JNT L/S 2 LEV: CPT | Performed by: ANESTHESIOLOGY

## 2020-02-17 PROCEDURE — 64493 INJ PARAVERT F JNT L/S 1 LEV: CPT | Performed by: ANESTHESIOLOGY

## 2020-02-17 RX ORDER — BUPIVACAINE HYDROCHLORIDE 7.5 MG/ML
10 INJECTION, SOLUTION EPIDURAL; RETROBULBAR ONCE
Status: COMPLETED | OUTPATIENT
Start: 2020-02-17 | End: 2020-02-17

## 2020-02-17 RX ADMIN — BUPIVACAINE HYDROCHLORIDE 6 ML: 7.5 INJECTION, SOLUTION EPIDURAL; RETROBULBAR at 11:23

## 2020-02-17 NOTE — DISCHARGE INSTRUCTIONS

## 2020-02-17 NOTE — H&P
History of Present Illness: The patient is a 64 y o  female who presents with complaints of low back pain  Patient Active Problem List   Diagnosis    Nondisplaced fracture of distal end of left radius    Closed compression fracture of L2 vertebra (HCC)    Left wrist tendonitis    Right lumbar radiculitis    DDD (degenerative disc disease), lumbar    Chronic bilateral low back pain without sciatica    Lumbar spondylosis       Past Medical History:   Diagnosis Date    Concussion     CSF leak        Past Surgical History:   Procedure Laterality Date    BREAST LUMPECTOMY      LAPAROSCOPY FOR ECTOPIC PREGNANCY      TONSILECTOMY AND ADNOIDECTOMY           Current Outpatient Medications:     amLODIPine (NORVASC) 10 mg tablet, Take 10 mg by mouth daily , Disp: , Rfl:     B-D 3CC LUER-CLARA SYR 25GX1" 25G X 1" 3 ML MISC, use as directed WITH MEDICINE, Disp: , Rfl:     clonazePAM (KlonoPIN) 0 5 mg tablet, Take 0 5 mg by mouth daily before breakfast Indications: takes for dizziness  , Disp: , Rfl:     clotrimazole (LOTRIMIN) 1 % cream, Apply topically 2 (two) times a day for 14 days (Patient not taking: Reported on 3/25/2019), Disp: 30 g, Rfl: 0    cyclobenzaprine (FLEXERIL) 10 mg tablet, Take 1 PO BID PRN for spasms  , Disp: 45 tablet, Rfl: 1    dihydroergotamine (DHE) 1 mg/mL, 1 mg IM prn severe headache  May repeat in 2 hours  Max 2 per day, Max 2 days per week, Disp: , Rfl:     ketorolac (TORADOL) 30 mg/mL injection, 30 mg IM prn severe headache  Max 1 day per week, Disp: , Rfl:     meclizine (ANTIVERT) 25 mg tablet, Take 1 tablet by mouth 3 (three) times a day as needed for dizziness   (Patient not taking: Reported on 1/27/2020), Disp: 10 tablet, Rfl: 0    mexiletine (MEXITIL) 150 mg capsule, Take 150 mg by mouth 3 (three) times a day, Disp: , Rfl:     naproxen sodium (ANAPROX) 550 mg tablet, take 1 tablet by mouth twice a day if needed for headache up to three times a week, Disp: , Rfl:    naratriptan (AMERGE) 2 5 MG tablet, Take 2 5 mg by mouth once as needed for migraine 2 5 mg at onset of headache, may repeat in 4 hours if needed, Disp: , Rfl:     ondansetron (ZOFRAN) 8 mg tablet, Take 8 mg by mouth every 8 (eight) hours as needed for nausea or vomiting, Disp: , Rfl:     ondansetron (ZOFRAN-ODT) 4 mg disintegrating tablet, Take 1 tablet by mouth every 8 (eight) hours as needed for nausea or vomiting , Disp: 6 tablet, Rfl: 0    predniSONE 10 mg tablet, Take 2 PO QID on the first day, then decrease by 1 pill daily until gone  Call with an update when finished  (Patient not taking: Reported on 2/6/2020), Disp: 36 tablet, Rfl: 0    promethazine (PHENERGAN) 25 mg tablet, take 1 tablet by mouth twice a day if needed for headache or nausea, Disp: , Rfl:     RA VITAMIN D-3 25 MCG (1000 UT) tablet, Take 1,000 Units by mouth 2 (two) times a day, Disp: , Rfl:     Saline (AYR SALINE NASAL DROPS) 0 65 % (Soln) SOLN, 1 drop into each nostril, Disp: , Rfl:     traMADol (ULTRAM) 50 mg tablet, Take 50 mg by mouth every 6 (six) hours as needed for moderate pain Indications: for headache, usually takes twice per day , Disp: , Rfl:     Current Facility-Administered Medications:     bupivacaine (PF) (MARCAINE) 0 75 % injection 10 mL, 10 mL, Other, Once, Eliseo Jimenez, DO    Allergies   Allergen Reactions    Shellfish-Derived Products Anaphylaxis     No allergy per patient      Codeine Other (See Comments)     Can not take with current medications    Other reaction(s): Other (See Comments)  GI upset  GI Upset         Physical Exam:   General: Awake, Alert, Oriented x 3, Mood and affect appropriate  Respiratory: Respirations even and unlabored  Cardiovascular: Peripheral pulses intact; no edema  Musculoskeletal Exam:  Pain with lumbar extension    ASA Score: II         Assessment:   1  Lumbar spondylosis    2   Low back pain        Plan: B/L L3,L4,L5 MBB #2

## 2020-02-20 ENCOUNTER — TELEPHONE (OUTPATIENT)
Dept: RADIOLOGY | Facility: CLINIC | Age: 60
End: 2020-02-20

## 2020-02-24 NOTE — TELEPHONE ENCOUNTER
Pt rtc and scheduled procedures Rt L3, L4, L5 RFA for 3/5/2020, LT side on 3/19/20, with a 4 wk f/u post on 4/16/20  Pt aware of all pre-procedural instructions

## 2020-03-05 ENCOUNTER — TELEPHONE (OUTPATIENT)
Dept: RADIOLOGY | Facility: CLINIC | Age: 60
End: 2020-03-05

## 2020-03-05 ENCOUNTER — HOSPITAL ENCOUNTER (OUTPATIENT)
Dept: RADIOLOGY | Facility: CLINIC | Age: 60
Discharge: HOME | End: 2020-03-05
Attending: ANESTHESIOLOGY
Payer: COMMERCIAL

## 2020-03-05 VITALS
DIASTOLIC BLOOD PRESSURE: 91 MMHG | SYSTOLIC BLOOD PRESSURE: 164 MMHG | HEART RATE: 88 BPM | TEMPERATURE: 98 F | OXYGEN SATURATION: 98 % | RESPIRATION RATE: 18 BRPM

## 2020-03-05 DIAGNOSIS — M47.816 LUMBAR SPONDYLOSIS: Primary | ICD-10-CM

## 2020-03-05 DIAGNOSIS — M54.50 LOW BACK PAIN: ICD-10-CM

## 2020-03-05 DIAGNOSIS — M47.816 LUMBAR SPONDYLOSIS: ICD-10-CM

## 2020-03-05 PROCEDURE — 64636 DESTROY L/S FACET JNT ADDL: CPT | Performed by: ANESTHESIOLOGY

## 2020-03-05 PROCEDURE — 64635 DESTROY LUMB/SAC FACET JNT: CPT | Performed by: ANESTHESIOLOGY

## 2020-03-05 RX ORDER — LIDOCAINE HYDROCHLORIDE 10 MG/ML
5 INJECTION, SOLUTION EPIDURAL; INFILTRATION; INTRACAUDAL; PERINEURAL ONCE
Status: COMPLETED | OUTPATIENT
Start: 2020-03-05 | End: 2020-03-05

## 2020-03-05 RX ORDER — LORAZEPAM 1 MG/1
TABLET ORAL
Qty: 2 TABLET | Refills: 0 | Status: SHIPPED | OUTPATIENT
Start: 2020-03-05 | End: 2020-06-16

## 2020-03-05 RX ADMIN — LIDOCAINE HYDROCHLORIDE 5 ML: 20 INJECTION, SOLUTION EPIDURAL; INFILTRATION; INTRACAUDAL at 13:54

## 2020-03-05 RX ADMIN — LIDOCAINE HYDROCHLORIDE 5 ML: 10 INJECTION, SOLUTION EPIDURAL; INFILTRATION; INTRACAUDAL; PERINEURAL at 13:54

## 2020-03-05 NOTE — TELEPHONE ENCOUNTER
Patient is S/P a Right L3- L5 RFA c/ SL on 3/5/20  Next Nishant Childress is on 3/19/20  Next ovs scheduled for 4/6/20  Please call on 3/6/20 post OPRO   Thanks

## 2020-03-05 NOTE — DISCHARGE INSTRUCTIONS

## 2020-03-05 NOTE — H&P
History of Present Illness: The patient is a 64 y o  female who presents with complaints of low back pain  Patient Active Problem List   Diagnosis    Nondisplaced fracture of distal end of left radius    Closed compression fracture of L2 vertebra (HCC)    Left wrist tendonitis    Right lumbar radiculitis    DDD (degenerative disc disease), lumbar    Chronic bilateral low back pain without sciatica    Lumbar spondylosis       Past Medical History:   Diagnosis Date    Concussion     CSF leak        Past Surgical History:   Procedure Laterality Date    BREAST LUMPECTOMY      LAPAROSCOPY FOR ECTOPIC PREGNANCY      TONSILECTOMY AND ADNOIDECTOMY           Current Outpatient Medications:     amLODIPine (NORVASC) 10 mg tablet, Take 10 mg by mouth daily , Disp: , Rfl:     B-D 3CC LUER-CLARA SYR 25GX1" 25G X 1" 3 ML MISC, use as directed WITH MEDICINE, Disp: , Rfl:     clonazePAM (KlonoPIN) 0 5 mg tablet, Take 0 5 mg by mouth daily before breakfast Indications: takes for dizziness  , Disp: , Rfl:     clotrimazole (LOTRIMIN) 1 % cream, Apply topically 2 (two) times a day for 14 days (Patient not taking: Reported on 3/25/2019), Disp: 30 g, Rfl: 0    cyclobenzaprine (FLEXERIL) 10 mg tablet, Take 1 PO BID PRN for spasms  , Disp: 45 tablet, Rfl: 1    dihydroergotamine (DHE) 1 mg/mL, 1 mg IM prn severe headache  May repeat in 2 hours  Max 2 per day, Max 2 days per week, Disp: , Rfl:     ketorolac (TORADOL) 30 mg/mL injection, 30 mg IM prn severe headache  Max 1 day per week, Disp: , Rfl:     meclizine (ANTIVERT) 25 mg tablet, Take 1 tablet by mouth 3 (three) times a day as needed for dizziness   (Patient not taking: Reported on 1/27/2020), Disp: 10 tablet, Rfl: 0    mexiletine (MEXITIL) 150 mg capsule, Take 150 mg by mouth 3 (three) times a day, Disp: , Rfl:     naproxen sodium (ANAPROX) 550 mg tablet, take 1 tablet by mouth twice a day if needed for headache up to three times a week, Disp: , Rfl:    naratriptan (AMERGE) 2 5 MG tablet, Take 2 5 mg by mouth once as needed for migraine 2 5 mg at onset of headache, may repeat in 4 hours if needed, Disp: , Rfl:     ondansetron (ZOFRAN) 8 mg tablet, Take 8 mg by mouth every 8 (eight) hours as needed for nausea or vomiting, Disp: , Rfl:     ondansetron (ZOFRAN-ODT) 4 mg disintegrating tablet, Take 1 tablet by mouth every 8 (eight) hours as needed for nausea or vomiting , Disp: 6 tablet, Rfl: 0    predniSONE 10 mg tablet, Take 2 PO QID on the first day, then decrease by 1 pill daily until gone  Call with an update when finished  (Patient not taking: Reported on 2/6/2020), Disp: 36 tablet, Rfl: 0    promethazine (PHENERGAN) 25 mg tablet, take 1 tablet by mouth twice a day if needed for headache or nausea, Disp: , Rfl:     RA VITAMIN D-3 25 MCG (1000 UT) tablet, Take 1,000 Units by mouth 2 (two) times a day, Disp: , Rfl:     Saline (AYR SALINE NASAL DROPS) 0 65 % (Soln) SOLN, 1 drop into each nostril, Disp: , Rfl:     traMADol (ULTRAM) 50 mg tablet, Take 50 mg by mouth every 6 (six) hours as needed for moderate pain Indications: for headache, usually takes twice per day , Disp: , Rfl:     Current Facility-Administered Medications:     lidocaine (PF) (XYLOCAINE-MPF) 1 % injection 5 mL, 5 mL, Other, Once, Eliseo Jimenez DO    lidocaine (PF) (XYLOCAINE-MPF) 2 % injection 5 mL, 5 mL, Perineural, Once, Eliseo Jimenez DO    Allergies   Allergen Reactions    Shellfish-Derived Products Anaphylaxis     No allergy per patient      Codeine Other (See Comments)     Can not take with current medications    Other reaction(s): Other (See Comments)  GI upset  GI Upset         Physical Exam:   General: Awake, Alert, Oriented x 3, Mood and affect appropriate  Respiratory: Respirations even and unlabored  Cardiovascular: Peripheral pulses intact; no edema  Musculoskeletal Exam:  Pain with lumbar extension    ASA Score: III         Assessment:   1  Lumbar spondylosis    2   Low back pain        Plan: RT L3,L4,L5 RFA

## 2020-03-06 NOTE — TELEPHONE ENCOUNTER
S/w pt, stated that she does have some soreness s/t procedure however her chronic pain has already improved some  Advised pt, ok to continue w/ rest, ice / heat, medications as prescribed / directed  Allow 24 - 48 hrs for pain s/t procedure to resolve  2-6 weeks for chronic pain relief  L sided procedure is scheduled for 3/19, fu in the office on 4/6  Advised pt to cb if redness, drainage, swelling at the site or fever 100+ present or if a sunburn like sensation in the eare of the procedure presents  Pt verbalized understanding and appreciation

## 2020-04-30 ENCOUNTER — TELEPHONE (OUTPATIENT)
Dept: RADIOLOGY | Facility: CLINIC | Age: 60
End: 2020-04-30

## 2020-05-12 ENCOUNTER — TELEPHONE (OUTPATIENT)
Dept: PAIN MEDICINE | Facility: CLINIC | Age: 60
End: 2020-05-12

## 2020-05-12 ENCOUNTER — TELEPHONE (OUTPATIENT)
Dept: OTHER | Facility: OTHER | Age: 60
End: 2020-05-12

## 2020-05-13 DIAGNOSIS — M54.50 ACUTE BILATERAL LOW BACK PAIN WITHOUT SCIATICA: Primary | ICD-10-CM

## 2020-05-13 RX ORDER — TRAMADOL HYDROCHLORIDE 50 MG/1
50 TABLET ORAL EVERY 8 HOURS PRN
Qty: 20 TABLET | Refills: 0 | Status: SHIPPED | OUTPATIENT
Start: 2020-05-13 | End: 2020-06-16 | Stop reason: SDUPTHER

## 2020-05-18 ENCOUNTER — HOSPITAL ENCOUNTER (OUTPATIENT)
Dept: RADIOLOGY | Facility: CLINIC | Age: 60
Discharge: HOME | End: 2020-05-18
Attending: ANESTHESIOLOGY | Admitting: ANESTHESIOLOGY
Payer: COMMERCIAL

## 2020-05-18 ENCOUNTER — TELEPHONE (OUTPATIENT)
Dept: RADIOLOGY | Facility: CLINIC | Age: 60
End: 2020-05-18

## 2020-05-18 VITALS
TEMPERATURE: 98.6 F | RESPIRATION RATE: 20 BRPM | HEART RATE: 78 BPM | OXYGEN SATURATION: 96 % | DIASTOLIC BLOOD PRESSURE: 98 MMHG | SYSTOLIC BLOOD PRESSURE: 151 MMHG

## 2020-05-18 DIAGNOSIS — M47.816 LUMBAR SPONDYLOSIS: ICD-10-CM

## 2020-05-18 PROCEDURE — 64636 DESTROY L/S FACET JNT ADDL: CPT | Performed by: ANESTHESIOLOGY

## 2020-05-18 PROCEDURE — 64635 DESTROY LUMB/SAC FACET JNT: CPT | Performed by: ANESTHESIOLOGY

## 2020-05-18 RX ORDER — LIDOCAINE HYDROCHLORIDE 10 MG/ML
5 INJECTION, SOLUTION EPIDURAL; INFILTRATION; INTRACAUDAL; PERINEURAL ONCE
Status: COMPLETED | OUTPATIENT
Start: 2020-05-18 | End: 2020-05-18

## 2020-05-18 RX ADMIN — LIDOCAINE HYDROCHLORIDE 4 ML: 20 INJECTION, SOLUTION EPIDURAL; INFILTRATION; INTRACAUDAL at 13:15

## 2020-05-18 RX ADMIN — LIDOCAINE HYDROCHLORIDE 5 ML: 10 INJECTION, SOLUTION EPIDURAL; INFILTRATION; INTRACAUDAL; PERINEURAL at 13:14

## 2020-06-16 ENCOUNTER — OFFICE VISIT (OUTPATIENT)
Dept: PAIN MEDICINE | Facility: CLINIC | Age: 60
End: 2020-06-16
Payer: MEDICARE

## 2020-06-16 VITALS
HEIGHT: 66 IN | BODY MASS INDEX: 27.32 KG/M2 | TEMPERATURE: 98.3 F | HEART RATE: 83 BPM | SYSTOLIC BLOOD PRESSURE: 138 MMHG | WEIGHT: 170 LBS | DIASTOLIC BLOOD PRESSURE: 80 MMHG

## 2020-06-16 DIAGNOSIS — S32.020D CLOSED COMPRESSION FRACTURE OF L2 LUMBAR VERTEBRA WITH ROUTINE HEALING, SUBSEQUENT ENCOUNTER: ICD-10-CM

## 2020-06-16 DIAGNOSIS — M54.50 ACUTE BILATERAL LOW BACK PAIN WITHOUT SCIATICA: ICD-10-CM

## 2020-06-16 DIAGNOSIS — M47.816 LUMBAR SPONDYLOSIS: ICD-10-CM

## 2020-06-16 DIAGNOSIS — G89.29 CHRONIC BILATERAL LOW BACK PAIN WITHOUT SCIATICA: Primary | ICD-10-CM

## 2020-06-16 DIAGNOSIS — M54.50 CHRONIC BILATERAL LOW BACK PAIN WITHOUT SCIATICA: Primary | ICD-10-CM

## 2020-06-16 DIAGNOSIS — M51.36 DDD (DEGENERATIVE DISC DISEASE), LUMBAR: ICD-10-CM

## 2020-06-16 PROCEDURE — 99214 OFFICE O/P EST MOD 30 MIN: CPT | Performed by: NURSE PRACTITIONER

## 2020-06-16 RX ORDER — TRAMADOL HYDROCHLORIDE 50 MG/1
TABLET ORAL
Qty: 30 TABLET | Refills: 0 | Status: SHIPPED | OUTPATIENT
Start: 2020-06-16 | End: 2022-04-28 | Stop reason: CLARIF

## 2020-06-16 RX ORDER — DULOXETIN HYDROCHLORIDE 20 MG/1
CAPSULE, DELAYED RELEASE ORAL
Qty: 60 CAPSULE | Refills: 1 | Status: SHIPPED | OUTPATIENT
Start: 2020-06-16 | End: 2020-08-12 | Stop reason: SDUPTHER

## 2020-07-01 ENCOUNTER — TELEPHONE (OUTPATIENT)
Dept: PAIN MEDICINE | Facility: CLINIC | Age: 60
End: 2020-07-01

## 2020-07-01 NOTE — TELEPHONE ENCOUNTER
S/w pt, she said she misread her Cymbalta instructions, she started with 1 pill at [facility] for one week instead of starting with one QOD for one week then she went up to two at [facility] and started getting very sick  Pt said since she has had diarrhea  RN advised pt to just stay on 1 at [facility] for the next 2-3 weeks and if symptoms resolve, can discuss increasing to 2  Pt will call if symptoms do not improve

## 2020-07-01 NOTE — TELEPHONE ENCOUNTER
Pt called and stated she misread the directions on her cymbalta  She took 1 pill every night for the first week and 2 pills every night for the 2 days and she is now back 1 pill every night but now has stomach issues, started constipation and then diarrhea, however the pain is better with the medication  Please advise,  Could the extra medication cause the stomach issues       Please advise         Pt can be reached at [phone]

## 2020-07-06 ENCOUNTER — OFFICE VISIT (OUTPATIENT)
Dept: URGENT CARE | Facility: CLINIC | Age: 60
End: 2020-07-06
Payer: MEDICARE

## 2020-07-06 VITALS
HEIGHT: 66 IN | WEIGHT: 172 LBS | HEART RATE: 82 BPM | TEMPERATURE: 98 F | DIASTOLIC BLOOD PRESSURE: 78 MMHG | OXYGEN SATURATION: 96 % | SYSTOLIC BLOOD PRESSURE: 142 MMHG | RESPIRATION RATE: 18 BRPM | BODY MASS INDEX: 27.64 KG/M2

## 2020-07-06 DIAGNOSIS — S90.32XA CONTUSION OF LEFT FOOT, INITIAL ENCOUNTER: Primary | ICD-10-CM

## 2020-07-06 DIAGNOSIS — M79.672 LEFT FOOT PAIN: ICD-10-CM

## 2020-07-06 PROCEDURE — 99213 OFFICE O/P EST LOW 20 MIN: CPT | Performed by: PHYSICIAN ASSISTANT

## 2020-07-06 PROCEDURE — G0463 HOSPITAL OUTPT CLINIC VISIT: HCPCS | Performed by: PHYSICIAN ASSISTANT

## 2020-07-06 NOTE — PATIENT INSTRUCTIONS
Foot Contusion   WHAT YOU NEED TO KNOW:   A foot contusion is a bruise to the foot  DISCHARGE INSTRUCTIONS:   Medicines:   · NSAIDs:  These medicines decrease swelling and pain  NSAIDs are available without a doctor's order  Ask your healthcare provider which medicine is right for you  Ask how much to take and when to take it  Take as directed  NSAIDs can cause stomach bleeding and kidney problems if not taken correctly  · Take your medicine as directed  Contact your healthcare provider if you think your medicine is not helping or if you have side effects  Tell him of her if you are allergic to any medicine  Keep a list of the medicines, vitamins, and herbs you take  Include the amounts, and when and why you take them  Bring the list or the pill bottles to follow-up visits  Carry your medicine list with you in case of an emergency  Follow up with your healthcare provider as directed:  Write down your questions so you remember to ask them during your visits  Care for your foot: Follow your treatment plan to help decrease your pain and improve your muscle movement  · Rest:  You will need to rest your foot for 1 to 2 days after your injury  This will help decrease the risk of more damage  · Ice:  Ice helps decrease swelling and pain  Ice may also help prevent tissue damage  Use an ice pack, or put crushed ice in a plastic bag  Cover it with a towel and place it on your foot for 15 to 20 minutes every hour or as directed  · Compression:  Compression (tight hold) provides support and helps decrease swelling and movement so your foot can heal  You may be told to keep your foot wrapped with a tight elastic bandage  Follow instructions about how to apply your bandage  Do not massage your foot  You could cause more damage or pain  · Elevation:  Keep your foot raised above the level of your heart while you are sitting or lying down  This will help decrease or limit swelling   Use pillows, blankets, or rolled towels to elevate your foot comfortably  Exercise your foot:  You may be given gentle exercises to improve your foot movement and help decrease stiffness  Ask when you can return to your normal activities or sports  Prevent another injury:   · Wear equipment to protect yourself when you play sports  · Make sure your shoes fit properly  · Always wear shoes on streets or sidewalks  · Clean spills off the floor right away to avoid slipping or hitting your foot  · Make sure your home is well lit when you get up during the night  This will help you avoid hurting your foot in the dark  Contact your healthcare provider if:   · You have increased swelling on your foot  · You have severe foot pain  · You are not able to move your foot  · You have questions or concerns about your injury or treatment  © 2017 [employer] Information is for End User's use only and may not be sold, redistributed or otherwise used for commercial purposes  All illustrations and images included in CareNotes® are the copyrighted property of [employer]  or [employer]ob [employer]  The above information is an  only  It is not intended as medical advice for individual conditions or treatments  Talk to your doctor, nurse or pharmacist before following any medical regimen to see if it is safe and effective for you

## 2020-07-06 NOTE — PROGRESS NOTES
[facility]        NAME: Boris Cueto is a 64 y o  female  : 1960    MRN: [id number]  DATE: 2020  TIME: 2:44 PM    Assessment and Plan   Contusion of left foot, initial encounter [S90 32XA]  1  Contusion of left foot, initial encounter     2  Left foot pain  XR foot 3+ vw left   xray without fracture  Post op shoe given  Continue ice and elevate  Follow up with orthopedics (already a pt) in one week      Patient Instructions   Patient Instructions   Foot Contusion   WHAT YOU NEED TO KNOW:   A foot contusion is a bruise to the foot  DISCHARGE INSTRUCTIONS:   Medicines:   · NSAIDs:  These medicines decrease swelling and pain  NSAIDs are available without a doctor's order  Ask your healthcare provider which medicine is right for you  Ask how much to take and when to take it  Take as directed  NSAIDs can cause stomach bleeding and kidney problems if not taken correctly  · Take your medicine as directed  Contact your healthcare provider if you think your medicine is not helping or if you have side effects  Tell him of her if you are allergic to any medicine  Keep a list of the medicines, vitamins, and herbs you take  Include the amounts, and when and why you take them  Bring the list or the pill bottles to follow-up visits  Carry your medicine list with you in case of an emergency  Follow up with your healthcare provider as directed:  Write down your questions so you remember to ask them during your visits  Care for your foot: Follow your treatment plan to help decrease your pain and improve your muscle movement  · Rest:  You will need to rest your foot for 1 to 2 days after your injury  This will help decrease the risk of more damage  · Ice:  Ice helps decrease swelling and pain  Ice may also help prevent tissue damage  Use an ice pack, or put crushed ice in a plastic bag   Cover it with a towel and place it on your foot for 15 to 20 minutes every hour or as directed  · Compression:  Compression (tight hold) provides support and helps decrease swelling and movement so your foot can heal  You may be told to keep your foot wrapped with a tight elastic bandage  Follow instructions about how to apply your bandage  Do not massage your foot  You could cause more damage or pain  · Elevation:  Keep your foot raised above the level of your heart while you are sitting or lying down  This will help decrease or limit swelling  Use pillows, blankets, or rolled towels to elevate your foot comfortably  Exercise your foot:  You may be given gentle exercises to improve your foot movement and help decrease stiffness  Ask when you can return to your normal activities or sports  Prevent another injury:   · Wear equipment to protect yourself when you play sports  · Make sure your shoes fit properly  · Always wear shoes on streets or sidewalks  · Clean spills off the floor right away to avoid slipping or hitting your foot  · Make sure your home is well lit when you get up during the night  This will help you avoid hurting your foot in the dark  Contact your healthcare provider if:   · You have increased swelling on your foot  · You have severe foot pain  · You are not able to move your foot  · You have questions or concerns about your injury or treatment  © 2017 [address] [facility] Information is for End User's use only and may not be sold, redistributed or otherwise used for commercial purposes  All illustrations and images included in CareNotes® are the copyrighted property of [employer]  or [employer]ob Terrell  The above information is an  only  It is not intended as medical advice for individual conditions or treatments  Talk to your doctor, nurse or pharmacist before following any medical regimen to see if it is safe and effective for you  Proceed to  ER if symptoms worsen      Chief Complaint     Chief Complaint Patient presents with    Foot Pain     left dog stepped on it and girlfriend hit it with a wagon wheel         History of Present Illness       Patient presents for evaluation of left foot pain for the past week  She states that her dog was startled and ran through the door way and jumped onto her left foot  The dog weighs approximately 70 lb  Patient states that she continues to have pain on the top of her foot and swelling which is worse in the evening  She has been icing and trying to elevate the foot  She denies any numbness or tingling  Review of Systems   Review of Systems   Constitutional: Negative  Musculoskeletal: Positive for arthralgias (left foot)  Skin: Negative  Neurological: Negative  Current Medications       Current Outpatient Medications:     amLODIPine (NORVASC) 10 mg tablet, Take 10 mg by mouth daily , Disp: , Rfl:     B-D 3CC LUER-CLARA SYR 25GX1" 25G X 1" 3 ML MISC, use as directed WITH MEDICINE, Disp: , Rfl:     dihydroergotamine (DHE) 1 mg/mL, 1 mg IM prn severe headache  May repeat in 2 hours  Max 2 per day, Max 2 days per week, Disp: , Rfl:     DULoxetine (CYMBALTA) 20 mg capsule, Take 1 PO HS every other night x 1 week, then 1 PO HS x 2 weeks, then 2 PO HS , Disp: 60 capsule, Rfl: 1    Filter Needles 18G X 1-1/2" MISC, USE 1 SYRINGE FILTER PRF FOR DRAWING UP DHE INJECTION THEN SWITCH TO 25 GAUGE SYRINGE, Disp: , Rfl:     ketorolac (TORADOL) 30 mg/mL injection, 30 mg IM prn severe headache   Max 1 day per week, Disp: , Rfl:     naproxen sodium (ANAPROX) 550 mg tablet, take 1 tablet by mouth twice a day if needed for headache up to three times a week, Disp: , Rfl:     naratriptan (AMERGE) 2 5 MG tablet, Take 2 5 mg by mouth once as needed for migraine 2 5 mg at onset of headache, may repeat in 4 hours if needed, Disp: , Rfl:     ondansetron (ZOFRAN) 8 mg tablet, Take 8 mg by mouth every 8 (eight) hours as needed for nausea or vomiting, Disp: , Rfl:    ondansetron (ZOFRAN-ODT) 4 mg disintegrating tablet, Take 1 tablet by mouth every 8 (eight) hours as needed for nausea or vomiting , Disp: 6 tablet, Rfl: 0    promethazine (PHENERGAN) 25 mg tablet, take 1 tablet by mouth twice a day if needed for headache or nausea, Disp: , Rfl:     RA VITAMIN D-3 25 MCG (1000 UT) tablet, Take 1,000 Units by mouth 2 (two) times a day, Disp: , Rfl:     Saline (AYR SALINE NASAL DROPS) 0 65 % (Soln) SOLN, 1 drop into each nostril, Disp: , Rfl:     traMADol (ULTRAM) 50 mg tablet, Take 1 PO HS , Disp: 30 tablet, Rfl: 0    clonazePAM (KlonoPIN) 0 5 mg tablet, Take 0 5 mg by mouth daily before breakfast Indications: takes for dizziness  , Disp: , Rfl:     mexiletine (MEXITIL) 150 mg capsule, Take 150 mg by mouth 3 (three) times a day, Disp: , Rfl:     Current Allergies     Allergies as of 07/06/2020 - Reviewed 07/06/2020   Allergen Reaction Noted    Shellfish-derived products Anaphylaxis 09/10/2016    Codeine Other (See Comments) 07/08/2016            The following portions of the patient's history were reviewed and updated as appropriate: allergies, current medications, past family history, past medical history, past social history, past surgical history and problem list      Past Medical History:   Diagnosis Date    Concussion     CSF leak        Past Surgical History:   Procedure Laterality Date    BREAST LUMPECTOMY      LAPAROSCOPY FOR ECTOPIC PREGNANCY      TONSILECTOMY AND ADNOIDECTOMY         Family History   Problem Relation Age of Onset    Colon cancer Father     Breast cancer Sister          Medications have been verified  Objective   /78   Pulse 82   Temp 98 °F (36 7 °C)   Resp 18   Ht 5' 6" (1 676 m)   Wt 78 kg (172 lb)   SpO2 96%   BMI 27 76 kg/m²        Physical Exam     Physical Exam   Constitutional: She is oriented to person, place, and time  She appears well-developed  No distress  Cardiovascular: Intact distal pulses  Musculoskeletal:        Left foot: There is tenderness and swelling  There is normal range of motion, no bony tenderness and normal capillary refill  Mild forefoot swelling  Dorsal TTP over soft tissue in area of 1st -3rd metatarsals  Neurological: She is alert and oriented to person, place, and time  She has normal strength  No sensory deficit  Skin: Skin is warm, dry and intact  Vitals reviewed

## 2020-07-26 ENCOUNTER — TELEPHONE (OUTPATIENT)
Dept: OTHER | Facility: OTHER | Age: 60
End: 2020-07-26

## 2020-07-26 NOTE — TELEPHONE ENCOUNTER
Patient called stating she had injured her foot on 7/6, and recently the pain had been getting so bad she can't even walk on it  Please call patient back ASAP to schedule an appointment

## 2020-07-29 ENCOUNTER — OFFICE VISIT (OUTPATIENT)
Dept: OBGYN CLINIC | Facility: CLINIC | Age: 60
End: 2020-07-29
Payer: MEDICARE

## 2020-07-29 ENCOUNTER — APPOINTMENT (OUTPATIENT)
Dept: RADIOLOGY | Facility: CLINIC | Age: 60
End: 2020-07-29
Payer: MEDICARE

## 2020-07-29 VITALS
HEIGHT: 66 IN | SYSTOLIC BLOOD PRESSURE: 120 MMHG | BODY MASS INDEX: 27.64 KG/M2 | DIASTOLIC BLOOD PRESSURE: 80 MMHG | WEIGHT: 172 LBS | TEMPERATURE: 97.5 F

## 2020-07-29 DIAGNOSIS — S90.32XA CONTUSION OF LEFT FOOT, INITIAL ENCOUNTER: Primary | ICD-10-CM

## 2020-07-29 DIAGNOSIS — M79.672 PAIN IN LEFT FOOT: ICD-10-CM

## 2020-07-29 PROCEDURE — 99213 OFFICE O/P EST LOW 20 MIN: CPT | Performed by: ORTHOPAEDIC SURGERY

## 2020-07-29 PROCEDURE — 73630 X-RAY EXAM OF FOOT: CPT

## 2020-07-29 NOTE — PROGRESS NOTES
Assessment:     1  Contusion of left foot, initial encounter        Plan:  The patient was seen and examined by Dr Ezequiel Alvarez and myself  Problem List Items Addressed This Visit        Other    Contusion of left foot - Primary     Findings consistent with left foot contusion  Findings and treatment options were discussed with the patient  New weight-bearing x-rays were reviewed with her  She has no acute fractures  She was given a new postop shoe that fits her correctly  She is to wear that with ambulating  She is to continue ice, elevation and NSAIDs as needed for pain  Discussed that it can take 6 weeks to 3 months for this condition to resolve  Follow-up as needed if symptoms do not improve  All questions were answered to patient's satisfaction  Relevant Orders    XR foot 3+ vw left    Post Op Shoe         Subjective:     Patient ID: Igor Wright is a 64 y o  female  Chief Complaint: This is a 53-year-old white female who injured her left foot about 3 weeks ago  Patient states that her dog got right wrist with fireworks and ran over her left foot  She had immediate pain and swelling  She was seen at urgent care and x-rays revealed no acute fractures  She was given a postop shoe, but she stopped wearing it since it was too small  She has been wearing sneakers mostly and feels no improvement in pain  She continues to have pain with weight-bearing and increased swelling by the end of the day  She denies any prior injury to that foot  Patient intake form was reviewed today  Allergy:  Allergies   Allergen Reactions    Shellfish-Derived Products Anaphylaxis     No allergy per patient      Codeine Other (See Comments)     Can not take with current medications    Other reaction(s):  Other (See Comments)  GI upset  GI Upset       Medications:  all current active meds have been reviewed  Past Medical History:  Past Medical History:   Diagnosis Date    Concussion     CSF leak      Past Surgical History:  Past Surgical History:   Procedure Laterality Date    BREAST LUMPECTOMY      LAPAROSCOPY FOR ECTOPIC PREGNANCY      TONSILECTOMY AND ADNOIDECTOMY       Family History:  Family History   Problem Relation Age of Onset    Colon cancer Father     Breast cancer Sister      Social History:  Social History     Substance and Sexual Activity   Alcohol Use Yes    Comment: socially, had glass of wine today     Social History     Substance and Sexual Activity   Drug Use Yes    Types: Marijuana    Comment: CBD     Social History     Tobacco Use   Smoking Status Former Smoker    Packs/day: 1 00    Types: Cigarettes   Smokeless Tobacco Never Used     Review of Systems   Constitutional: Negative  HENT: Negative  Eyes: Positive for visual disturbance  Respiratory: Negative  Cardiovascular: Negative  Gastrointestinal: Positive for nausea  Endocrine: Negative  Genitourinary: Negative  Musculoskeletal: Positive for arthralgias, back pain and joint swelling  Skin: Negative  Allergic/Immunologic: Negative  Neurological: Positive for headaches  Hematological: Negative  Psychiatric/Behavioral: The patient is nervous/anxious  Objective:  BP Readings from Last 1 Encounters:   07/29/20 120/80      Wt Readings from Last 1 Encounters:   07/29/20 78 kg (172 lb)      BMI:   Estimated body mass index is 27 76 kg/m² as calculated from the following:    Height as of this encounter: 5' 6" (1 676 m)  Weight as of this encounter: 78 kg (172 lb)  BSA:   Estimated body surface area is 1 88 meters squared as calculated from the following:    Height as of this encounter: 5' 6" (1 676 m)  Weight as of this encounter: 78 kg (172 lb)  Physical Exam   Constitutional: She is oriented to person, place, and time  She appears well-developed  HENT:   Head: Normocephalic and atraumatic  Eyes: Conjunctivae and EOM are normal    Neck: Neck supple     Pulmonary/Chest: Effort normal  Neurological: She is alert and oriented to person, place, and time  Skin: Skin is warm  Psychiatric: She has a normal mood and affect  Nursing note and vitals reviewed  Left Ankle Exam     Tenderness   Left ankle tenderness location: Diffusely over dorsal aspect of 2nd, 3rd and 4th metatarsal shaft  Swelling: mild    Range of Motion   The patient has normal left ankle ROM  Muscle Strength   The patient has normal left ankle strength  Other   Erythema: absent  Scars: absent  Sensation: normal  Pulse: present    Comments:  Nontender over Lisfranc  No open wounds            I have personally reviewed pertinent films in PACS and my interpretation is Weight-bearing x-rays of the left foot reveal no acute fractures

## 2020-07-29 NOTE — ASSESSMENT & PLAN NOTE
Findings consistent with left foot contusion  Findings and treatment options were discussed with the patient  New weight-bearing x-rays were reviewed with her  She has no acute fractures  She was given a new postop shoe that fits her correctly  She is to wear that with ambulating  She is to continue ice, elevation and NSAIDs as needed for pain  Discussed that it can take 6 weeks to 3 months for this condition to resolve  Follow-up as needed if symptoms do not improve  All questions were answered to patient's satisfaction

## 2020-08-12 ENCOUNTER — OFFICE VISIT (OUTPATIENT)
Dept: PAIN MEDICINE | Facility: CLINIC | Age: 60
End: 2020-08-12
Payer: MEDICARE

## 2020-08-12 VITALS
DIASTOLIC BLOOD PRESSURE: 78 MMHG | HEIGHT: 66 IN | BODY MASS INDEX: 27.97 KG/M2 | WEIGHT: 174 LBS | SYSTOLIC BLOOD PRESSURE: 122 MMHG | TEMPERATURE: 98.7 F | HEART RATE: 81 BPM

## 2020-08-12 DIAGNOSIS — M51.36 DDD (DEGENERATIVE DISC DISEASE), LUMBAR: ICD-10-CM

## 2020-08-12 DIAGNOSIS — G89.4 CHRONIC PAIN SYNDROME: Primary | ICD-10-CM

## 2020-08-12 DIAGNOSIS — M54.16 LUMBAR RADICULOPATHY: ICD-10-CM

## 2020-08-12 DIAGNOSIS — G89.29 CHRONIC BILATERAL LOW BACK PAIN WITHOUT SCIATICA: ICD-10-CM

## 2020-08-12 DIAGNOSIS — S32.020D CLOSED COMPRESSION FRACTURE OF L2 LUMBAR VERTEBRA WITH ROUTINE HEALING, SUBSEQUENT ENCOUNTER: ICD-10-CM

## 2020-08-12 DIAGNOSIS — M54.50 CHRONIC BILATERAL LOW BACK PAIN WITHOUT SCIATICA: ICD-10-CM

## 2020-08-12 DIAGNOSIS — M47.816 LUMBAR SPONDYLOSIS: ICD-10-CM

## 2020-08-12 PROCEDURE — 99214 OFFICE O/P EST MOD 30 MIN: CPT | Performed by: NURSE PRACTITIONER

## 2020-08-12 RX ORDER — DULOXETIN HYDROCHLORIDE 20 MG/1
CAPSULE, DELAYED RELEASE ORAL
Qty: 60 CAPSULE | Refills: 1
Start: 2020-08-12 | End: 2020-11-04

## 2020-08-12 NOTE — PATIENT INSTRUCTIONS
Cymbalta 20 mg: Take 1 pill every other day around 3 or 4 PM for the next week, then call with an update in 1 week with an update on how to proceed with either staying on the Cymbalta or then switching to Nortriptyline

## 2020-08-12 NOTE — PROGRESS NOTES
Assessment:  1  Chronic pain syndrome    2  Chronic bilateral low back pain without sciatica    3  Lumbar radiculopathy    4  Closed compression fracture of L2 lumbar vertebra with routine healing, subsequent encounter    5  Lumbar spondylosis    6  DDD (degenerative disc disease), lumbar        Plan:  While the patient was in the office today, I did have a thorough conversation with the patient regarding their chronic pain syndrome, symptoms, medication regimen, and treatment plan  At this point time after a very thorough conversation and the patient stating that she was not sure if the nausea and vomiting or diarrhea was definitely from the Cymbalta, we decided to have her go back on the Cymbalta 20 mg every other day and changing her Administration time and take the Cymbalta around 3 or 4 p m  In the afternoon after she eats something to see if that makes a difference  The patient is going to try that for a week and then give our office a phone call to give us an update on how she is doing as to whether not she wants to stay on the Cymbalta or go off the Cymbalta and try low dose of nortriptyline 10 mg  The patient was agreeable and verbalized an understanding  The patient can continue to use the tramadol as needed and did not need a refill today  The patient was agreeable and verbalized an understanding  The patient will follow-up in 12 weeks for medication prescription refill and reevaluation  The patient was advised to contact the office should their symptoms worsen in the interim  The patient was agreeable and verbalized an understanding  History of Present Illness: The patient is a 64 y o  female last seen on 6/16/2020 who presents for a follow up office visit in regards to chronic pain syndrome secondary to lumbar spondylosis with degenerative disc disease, healing compression fracture and radiculopathy    The patient currently reports that since her last office visit there has definitely been overall improvement with the Cymbalta and that initially she misunderstood the directions and was taking the Cymbalta to quickly  However, she was noting worsening nausea, occasional vomiting, and diarrhea and even though she noted significant pain relief from the Cymbalta she stop that a week ago because of the nausea and vomiting and does feel that those symptoms have somewhat improved  However, the patient reports that she does have cyclical nausea and vomiting as a result of her injury and has been using Zofran off and on  The patient reports that she is not sure what to do with regards to the Cymbalta because she feels it is just starting to wear out of her system now as her back pain is still just starting to return and the relief she experience from the Cymbalta was significant  The patient presents today to discuss her medication regimen treatment plan as she reports she has only taken 1 of the p r n  Tramadol since her last office visit  Current pain medications includes:  Tramadol 50 mg 1 p o  Hs p r n  For pain  The patient reports that this regimen is providing 85-90% pain relief  The patient is reporting no side effects from this pain medication regimen  I have personally reviewed and/or updated the patient's past medical history, past surgical history, family history, social history, current medications, allergies, and vital signs today  Review of Systems:    Review of Systems   Respiratory: Negative for shortness of breath  Cardiovascular: Negative for chest pain  Gastrointestinal: Positive for nausea and vomiting  Negative for constipation and diarrhea  Musculoskeletal: Positive for gait problem  Negative for arthralgias, joint swelling and myalgias  Skin: Negative for rash  Neurological: Positive for dizziness and weakness  Negative for seizures  All other systems reviewed and are negative          Past Medical History:   Diagnosis Date    Concussion  CSF leak        Past Surgical History:   Procedure Laterality Date    BREAST LUMPECTOMY      LAPAROSCOPY FOR ECTOPIC PREGNANCY      TONSILECTOMY AND ADNOIDECTOMY         Family History   Problem Relation Age of Onset    Colon cancer Father     Breast cancer Sister        Social History     Occupational History    Not on file   Tobacco Use    Smoking status: Former Smoker     Packs/day: 1 00     Types: Cigarettes    Smokeless tobacco: Never Used   Substance and Sexual Activity    Alcohol use: Yes     Comment: socially, had glass of wine today    Drug use: Yes     Types: Marijuana     Comment: CBD    Sexual activity: Not on file         Current Outpatient Medications:     ondansetron (ZOFRAN) 8 mg tablet, Take 8 mg by mouth every 8 (eight) hours as needed for nausea or vomiting, Disp: , Rfl:     ondansetron (ZOFRAN-ODT) 4 mg disintegrating tablet, Take 1 tablet by mouth every 8 (eight) hours as needed for nausea or vomiting , Disp: 6 tablet, Rfl: 0    RA VITAMIN D-3 25 MCG (1000 UT) tablet, Take 1,000 Units by mouth 2 (two) times a day, Disp: , Rfl:     Saline (AYR SALINE NASAL DROPS) 0 65 % (Soln) SOLN, 1 drop into each nostril, Disp: , Rfl:     traMADol (ULTRAM) 50 mg tablet, Take 1 PO HS , Disp: 30 tablet, Rfl: 0    amLODIPine (NORVASC) 10 mg tablet, Take 10 mg by mouth daily , Disp: , Rfl:     B-D 3CC LUER-CLARA SYR 25GX1" 25G X 1" 3 ML MISC, use as directed WITH MEDICINE, Disp: , Rfl:     clonazePAM (KlonoPIN) 0 5 mg tablet, Take 0 5 mg by mouth daily before breakfast Indications: takes for dizziness  , Disp: , Rfl:     dihydroergotamine (DHE) 1 mg/mL, 1 mg IM prn severe headache  May repeat in 2 hours   Max 2 per day, Max 2 days per week, Disp: , Rfl:     DULoxetine (CYMBALTA) 20 mg capsule, Take 1 PO HS every other day around 3-4 PM with food for the next week, then call with an update , Disp: 60 capsule, Rfl: 1    Filter Needles 18G X 1-1/2" MISC, USE 1 SYRINGE FILTER PRF FOR DRAWING UP DHE INJECTION THEN SWITCH TO 25 GAUGE SYRINGE, Disp: , Rfl:     ketorolac (TORADOL) 30 mg/mL injection, 30 mg IM prn severe headache  Max 1 day per week, Disp: , Rfl:     mexiletine (MEXITIL) 150 mg capsule, Take 150 mg by mouth 3 (three) times a day, Disp: , Rfl:     naproxen sodium (ANAPROX) 550 mg tablet, take 1 tablet by mouth twice a day if needed for headache up to three times a week, Disp: , Rfl:     naratriptan (AMERGE) 2 5 MG tablet, Take 2 5 mg by mouth once as needed for migraine 2 5 mg at onset of headache, may repeat in 4 hours if needed, Disp: , Rfl:     promethazine (PHENERGAN) 25 mg tablet, take 1 tablet by mouth twice a day if needed for headache or nausea, Disp: , Rfl:     Allergies   Allergen Reactions    Shellfish-Derived Products Anaphylaxis     No allergy per patient      Codeine Other (See Comments)     Can not take with current medications    Other reaction(s): Other (See Comments)  GI upset  GI Upset         Physical Exam:    /78 (BP Location: Left arm, Patient Position: Sitting, Cuff Size: Standard)   Pulse 81   Temp 98 7 °F (37 1 °C)   Ht 5' 6" (1 676 m)   Wt 78 9 kg (174 lb)   BMI 28 08 kg/m²     Constitutional:normal, well developed, well nourished, alert, in no distress and non-toxic and no overt pain behavior  and overweight  Eyes:anicteric  HEENT:grossly intact  Neck:supple, symmetric, trachea midline and no masses   Pulmonary:even and unlabored  Cardiovascular:No edema or pitting edema present  Skin:Normal without rashes or lesions and well hydrated  Psychiatric:Mood and affect appropriate  Neurologic:Cranial Nerves II-XII grossly intact  Musculoskeletal:The patient's gait is slightly antalgic, but steady without the use of any assistive devices  Imaging  No orders to display         No orders of the defined types were placed in this encounter

## 2020-09-16 ENCOUNTER — OFFICE VISIT (OUTPATIENT)
Dept: URGENT CARE | Facility: CLINIC | Age: 60
End: 2020-09-16
Payer: MEDICARE

## 2020-09-16 VITALS
RESPIRATION RATE: 18 BRPM | HEIGHT: 66 IN | HEART RATE: 78 BPM | SYSTOLIC BLOOD PRESSURE: 146 MMHG | OXYGEN SATURATION: 96 % | DIASTOLIC BLOOD PRESSURE: 86 MMHG | WEIGHT: 173 LBS | TEMPERATURE: 98.8 F | BODY MASS INDEX: 27.8 KG/M2

## 2020-09-16 DIAGNOSIS — L03.211 CELLULITIS, FACE: Primary | ICD-10-CM

## 2020-09-16 PROCEDURE — 99213 OFFICE O/P EST LOW 20 MIN: CPT | Performed by: PHYSICIAN ASSISTANT

## 2020-09-16 PROCEDURE — G0463 HOSPITAL OUTPT CLINIC VISIT: HCPCS | Performed by: PHYSICIAN ASSISTANT

## 2020-09-16 RX ORDER — CLINDAMYCIN HYDROCHLORIDE 300 MG/1
300 CAPSULE ORAL 3 TIMES DAILY
Qty: 21 CAPSULE | Refills: 0 | Status: SHIPPED | OUTPATIENT
Start: 2020-09-16 | End: 2020-09-23

## 2020-09-16 NOTE — PROGRESS NOTES
NAME: Alejandro Garay is a 64 y o  female  : 1960    MRN: [id number]      Assessment and Plan   Cellulitis, face [L03 211]  1  Cellulitis, face  clindamycin (CLEOCIN) 300 MG capsule     discussed with patient - appears to be an abscess forming but no fluctuance to drain yet  The area is only indurated  Appears to be localized to the skin overlying the chin  Will rx abx but gave strict precautions for ER - fevers, chills, nausea,vomting, spreading redness or induration  She acknowledges and states she will closely monitor  Patient Instructions   Patient Instructions   Clindamycin - take probiotic with it  Warm compresses to the area  If no improvement in 1-2 days f/u with PCP   If anything changes or worsens go to the ER- fevers, chills, spreading redness, worsening swelling or pain     Proceed to ER if symptoms worsen  Chief Complaint     Chief Complaint   Patient presents with    Rash     lesion on right side of day, started 4-5 days ago         History of Present Illness   Patient with hx of CSF leak presents complaining of an infected pimple to the right side of her chin  States a few days ago she had a small pimple to the area which has gotten bigger since first seeing it  She states the area is hard to the touch and painful to touch  States she is having pain with chewing on that side when anything touches that area or stretches the overlying skin  She denies any difficulty speaking or swallowing  Denies fevers but states she felt warm last night  Denies body aches, nausea or vomiting  Denies any eye pain, difficulty moving eyes or any visual changes  Has not tried anything OTC  Review of Systems   Review of Systems   Constitutional: Negative for chills and fever  Gastrointestinal: Negative for abdominal pain, nausea and vomiting  Musculoskeletal: Negative for myalgias     Skin:        Infected pimple right side of chin          Current Medications       Current Outpatient Medications:     amLODIPine (NORVASC) 10 mg tablet, Take 10 mg by mouth daily , Disp: , Rfl:     dihydroergotamine (DHE) 1 mg/mL, 1 mg IM prn severe headache  May repeat in 2 hours  Max 2 per day, Max 2 days per week, Disp: , Rfl:     Filter Needles 18G X 1-1/2" MISC, USE 1 SYRINGE FILTER PRF FOR DRAWING UP DHE INJECTION THEN SWITCH TO 25 GAUGE SYRINGE, Disp: , Rfl:     ketorolac (TORADOL) 30 mg/mL injection, 30 mg IM prn severe headache  Max 1 day per week, Disp: , Rfl:     naproxen sodium (ANAPROX) 550 mg tablet, take 1 tablet by mouth twice a day if needed for headache up to three times a week, Disp: , Rfl:     ondansetron (ZOFRAN) 8 mg tablet, Take 8 mg by mouth every 8 (eight) hours as needed for nausea or vomiting, Disp: , Rfl:     ondansetron (ZOFRAN-ODT) 4 mg disintegrating tablet, Take 1 tablet by mouth every 8 (eight) hours as needed for nausea or vomiting , Disp: 6 tablet, Rfl: 0    promethazine (PHENERGAN) 25 mg tablet, take 1 tablet by mouth twice a day if needed for headache or nausea, Disp: , Rfl:     RA VITAMIN D-3 25 MCG (1000 UT) tablet, Take 1,000 Units by mouth 2 (two) times a day, Disp: , Rfl:     Saline (AYR SALINE NASAL DROPS) 0 65 % (Soln) SOLN, 1 drop into each nostril, Disp: , Rfl:     traMADol (ULTRAM) 50 mg tablet, Take 1 PO HS , Disp: 30 tablet, Rfl: 0    B-D 3CC LUER-CLARA SYR 25GX1" 25G X 1" 3 ML MISC, use as directed WITH MEDICINE, Disp: , Rfl:     clindamycin (CLEOCIN) 300 MG capsule, Take 1 capsule (300 mg total) by mouth 3 (three) times a day for 7 days, Disp: 21 capsule, Rfl: 0    clonazePAM (KlonoPIN) 0 5 mg tablet, Take 0 5 mg by mouth daily before breakfast Indications: takes for dizziness  , Disp: , Rfl:     DULoxetine (CYMBALTA) 20 mg capsule, Take 1 PO HS every other day around 3-4 PM with food for the next week, then call with an update   (Patient not taking: Reported on 9/16/2020), Disp: 60 capsule, Rfl: 1    mexiletine (MEXITIL) 150 mg capsule, Take 150 mg by mouth 3 (three) times a day, Disp: , Rfl:     naratriptan (AMERGE) 2 5 MG tablet, Take 2 5 mg by mouth once as needed for migraine 2 5 mg at onset of headache, may repeat in 4 hours if needed, Disp: , Rfl:     Current Allergies     Allergies as of 09/16/2020 - Reviewed 09/16/2020   Allergen Reaction Noted    Shellfish-derived products Anaphylaxis 09/10/2016    Codeine Other (See Comments) 07/08/2016              Past Medical History:   Diagnosis Date    Concussion     CSF leak        Past Surgical History:   Procedure Laterality Date    BREAST LUMPECTOMY      LAPAROSCOPY FOR ECTOPIC PREGNANCY      TONSILECTOMY AND ADNOIDECTOMY         Family History   Problem Relation Age of Onset    Colon cancer Father     Breast cancer Sister          Medications have been verified  The following portions of the patient's history were reviewed and updated as appropriate: allergies, current medications, past family history, past medical history, past social history, past surgical history and problem list     Objective   /86   Pulse 78   Temp 98 8 °F (37 1 °C)   Resp 18   Ht 5' 6" (1 676 m)   Wt 78 5 kg (173 lb)   SpO2 96%   BMI 27 92 kg/m²      Physical Exam     Physical Exam  Vitals signs and nursing note reviewed  Constitutional:       General: She is not in acute distress  Appearance: Normal appearance  She is not ill-appearing, toxic-appearing or diaphoretic  HENT:      Head:        Comments: 1 cm area of raised, indurated erythema with some surrounding induration about 1 cm around  Area is TTP  No other lesions or erythema  No swelling to the floor of the mouth  Buccal mucosa and gum lines without lesions erythema or edema  No tooth tenderness  Full open and close of mouth with some discomfort to the skin overlying the infected area  No open wounds or abrasions  NTTP at the submandibular or submental area  No TMJ tenderness  No maxillary or zygomatic arch tenderness     Eyes: Extraocular Movements: Extraocular movements intact  Pupils: Pupils are equal, round, and reactive to light  Neck:      Musculoskeletal: Normal range of motion and neck supple  No neck rigidity or muscular tenderness  Lymphadenopathy:      Cervical: No cervical adenopathy  Neurological:      Mental Status: She is alert

## 2020-09-16 NOTE — PATIENT INSTRUCTIONS
Clindamycin - take probiotic with it  Warm compresses to the area  If no improvement in 1-2 days f/u with PCP   If anything changes or worsens go to the ER- fevers, chills, spreading redness, worsening swelling or pain

## 2020-10-09 ENCOUNTER — TRANSCRIBE ORDERS (OUTPATIENT)
Dept: ADMINISTRATIVE | Facility: HOSPITAL | Age: 60
End: 2020-10-09

## 2020-10-09 DIAGNOSIS — G96.00 CSF LEAK: ICD-10-CM

## 2020-10-09 DIAGNOSIS — S09.90XA HEAD TRAUMA IN CHILD: Primary | ICD-10-CM

## 2020-10-09 DIAGNOSIS — S09.90XA ACUTE HEAD TRAUMA, INITIAL ENCOUNTER: ICD-10-CM

## 2020-11-04 ENCOUNTER — OFFICE VISIT (OUTPATIENT)
Dept: PAIN MEDICINE | Facility: CLINIC | Age: 60
End: 2020-11-04
Payer: MEDICARE

## 2020-11-04 VITALS
HEIGHT: 66 IN | DIASTOLIC BLOOD PRESSURE: 86 MMHG | TEMPERATURE: 98.1 F | WEIGHT: 175 LBS | HEART RATE: 75 BPM | SYSTOLIC BLOOD PRESSURE: 132 MMHG | BODY MASS INDEX: 28.12 KG/M2

## 2020-11-04 DIAGNOSIS — M51.36 DDD (DEGENERATIVE DISC DISEASE), LUMBAR: ICD-10-CM

## 2020-11-04 DIAGNOSIS — G89.29 CHRONIC BILATERAL LOW BACK PAIN WITHOUT SCIATICA: ICD-10-CM

## 2020-11-04 DIAGNOSIS — G89.4 CHRONIC PAIN SYNDROME: Primary | ICD-10-CM

## 2020-11-04 DIAGNOSIS — M54.50 CHRONIC BILATERAL LOW BACK PAIN WITHOUT SCIATICA: ICD-10-CM

## 2020-11-04 DIAGNOSIS — S32.020D CLOSED COMPRESSION FRACTURE OF L2 LUMBAR VERTEBRA WITH ROUTINE HEALING, SUBSEQUENT ENCOUNTER: ICD-10-CM

## 2020-11-04 DIAGNOSIS — M47.816 LUMBAR SPONDYLOSIS: ICD-10-CM

## 2020-11-04 DIAGNOSIS — M54.16 RIGHT LUMBAR RADICULITIS: ICD-10-CM

## 2020-11-04 PROCEDURE — 99213 OFFICE O/P EST LOW 20 MIN: CPT | Performed by: NURSE PRACTITIONER

## 2020-11-04 RX ORDER — SPIRONOLACTONE 25 MG/1
TABLET ORAL
COMMUNITY
Start: 2020-10-28 | End: 2022-04-28 | Stop reason: CLARIF

## 2020-11-04 RX ORDER — CLINDAMYCIN AND BENZOYL PEROXIDE 10; 50 MG/G; MG/G
GEL TOPICAL
COMMUNITY
Start: 2020-10-22 | End: 2022-04-28 | Stop reason: CLARIF

## 2020-11-05 ENCOUNTER — TELEPHONE (OUTPATIENT)
Dept: PAIN MEDICINE | Facility: CLINIC | Age: 60
End: 2020-11-05

## 2020-12-14 ENCOUNTER — OFFICE VISIT (OUTPATIENT)
Dept: URGENT CARE | Facility: CLINIC | Age: 60
End: 2020-12-14
Payer: MEDICARE

## 2020-12-14 VITALS — RESPIRATION RATE: 18 BRPM | TEMPERATURE: 98.6 F | OXYGEN SATURATION: 96 % | HEART RATE: 85 BPM

## 2020-12-14 DIAGNOSIS — R05.9 COUGH: Primary | ICD-10-CM

## 2020-12-14 PROCEDURE — 99213 OFFICE O/P EST LOW 20 MIN: CPT | Performed by: PHYSICIAN ASSISTANT

## 2020-12-14 PROCEDURE — U0003 INFECTIOUS AGENT DETECTION BY NUCLEIC ACID (DNA OR RNA); SEVERE ACUTE RESPIRATORY SYNDROME CORONAVIRUS 2 (SARS-COV-2) (CORONAVIRUS DISEASE [COVID-19]), AMPLIFIED PROBE TECHNIQUE, MAKING USE OF HIGH THROUGHPUT TECHNOLOGIES AS DESCRIBED BY CMS-2020-01-R: HCPCS | Performed by: PHYSICIAN ASSISTANT

## 2020-12-14 PROCEDURE — G0463 HOSPITAL OUTPT CLINIC VISIT: HCPCS | Performed by: PHYSICIAN ASSISTANT

## 2020-12-16 LAB — SARS-COV-2 RNA SPEC QL NAA+PROBE: NOT DETECTED

## 2021-02-24 ENCOUNTER — TELEPHONE (OUTPATIENT)
Dept: OTHER | Facility: OTHER | Age: 61
End: 2021-02-24

## 2021-03-04 ENCOUNTER — HOSPITAL ENCOUNTER (OUTPATIENT)
Dept: MRI IMAGING | Facility: HOSPITAL | Age: 61
Discharge: HOME | End: 2021-03-04
Payer: MEDICARE

## 2021-03-04 DIAGNOSIS — G96.00 CSF LEAK: ICD-10-CM

## 2021-03-04 DIAGNOSIS — S09.90XA ACUTE HEAD TRAUMA, INITIAL ENCOUNTER: ICD-10-CM

## 2021-03-04 PROCEDURE — 70553 MRI BRAIN STEM W/O & W/DYE: CPT

## 2021-03-04 PROCEDURE — A9585 GADOBUTROL INJECTION: HCPCS | Performed by: COLON & RECTAL SURGERY

## 2021-03-04 PROCEDURE — G1004 CDSM NDSC: HCPCS

## 2021-03-04 RX ADMIN — GADOBUTROL 7 ML: 604.72 INJECTION INTRAVENOUS at 07:39

## 2021-03-10 ENCOUNTER — OFFICE VISIT (OUTPATIENT)
Dept: PODIATRY | Facility: CLINIC | Age: 61
End: 2021-03-10
Payer: MEDICARE

## 2021-03-10 ENCOUNTER — APPOINTMENT (OUTPATIENT)
Dept: RADIOLOGY | Facility: CLINIC | Age: 61
End: 2021-03-10
Payer: MEDICARE

## 2021-03-10 VITALS
HEART RATE: 76 BPM | HEIGHT: 66 IN | WEIGHT: 171.6 LBS | BODY MASS INDEX: 27.58 KG/M2 | DIASTOLIC BLOOD PRESSURE: 84 MMHG | SYSTOLIC BLOOD PRESSURE: 131 MMHG

## 2021-03-10 DIAGNOSIS — M79.672 LEFT FOOT PAIN: ICD-10-CM

## 2021-03-10 DIAGNOSIS — M84.375A STRESS FRACTURE, LEFT FOOT, INITIAL ENCOUNTER FOR FRACTURE: Primary | ICD-10-CM

## 2021-03-10 DIAGNOSIS — M19.90 INFLAMMATORY ARTHROPATHY: ICD-10-CM

## 2021-03-10 PROCEDURE — 99203 OFFICE O/P NEW LOW 30 MIN: CPT | Performed by: PODIATRIST

## 2021-03-10 PROCEDURE — 73630 X-RAY EXAM OF FOOT: CPT

## 2021-03-10 NOTE — PROGRESS NOTES
PATIENT:  Iain Cooper  1960       ASSESSMENT:     1  Stress fracture, left foot, initial encounter for fracture     2  Inflammatory arthropathy     3  Left foot pain  XR foot 3+ vw left             PLAN:  1  Patient was counseled and educated on the condition and the diagnosis  2  X-ray was obtained and personally reviewed  The radiological findings were discussed with the patient  Also reviewed previous blood work  3  The diagnosis, treatment options and prognosis were discussed with the patient  No obvious fracture on left foot  Possible hairline fracture on 4th met neck from one of the films  Will treat her for possible stress fracture for now  Any inflammatory arthropathy cannot be ruled out  4  Surgical shoe for off-loading  Instructed resting, icing, and elevation  5  Continue NSAIDs  6  RA in 2-3 weeks for re-evaluation  Possible further images depending on the progress  Subjective:       HPI  The patient presents with chief complaint of left foot pain and swelling for about 3 weeks  The symptoms presents around toes left foot with throbbing sensation  Pain presents with walking and standing  She notices swelling around toes  The patient does not recall any injury  She was using NSAIDs and Tramadol for her pain  No associated numbness or paresthesia  The following portions of the patient's history were reviewed and updated as appropriate: allergies, current medications, past family history, past medical history, past social history, past surgical history and problem list   All pertinent labs and images were reviewed        Past Medical History  Past Medical History:   Diagnosis Date    Concussion     CSF leak        Past Surgical History  Past Surgical History:   Procedure Laterality Date    BREAST LUMPECTOMY      LAPAROSCOPY FOR ECTOPIC PREGNANCY      TONSILECTOMY AND ADNOIDECTOMY          Allergies:  Shellfish-derived products and Codeine    Medications:  Current Outpatient Medications   Medication Sig Dispense Refill    amLODIPine (NORVASC) 10 mg tablet Take 10 mg by mouth daily       B-D 3CC LUER-CLARA SYR 25GX1" 25G X 1" 3 ML MISC use as directed WITH MEDICINE      clindamycin-benzoyl peroxide (BENZACLIN) gel APPLY TWICE DAILY TO AFFECTED AREA(S) AS DIRECTED      clonazePAM (KlonoPIN) 0 5 mg tablet Take 0 5 mg by mouth daily before breakfast Indications: takes for dizziness   dihydroergotamine (DHE) 1 mg/mL 1 mg IM prn severe headache  May repeat in 2 hours  Max 2 per day, Max 2 days per week      Filter Needles 18G X 1-1/2" MISC USE 1 SYRINGE FILTER PRF FOR DRAWING UP DHE INJECTION THEN SWITCH TO 25 GAUGE SYRINGE      ketorolac (TORADOL) 30 mg/mL injection 30 mg IM prn severe headache  Max 1 day per week      mexiletine (MEXITIL) 150 mg capsule Take 150 mg by mouth 3 (three) times a day      naproxen sodium (ANAPROX) 550 mg tablet take 1 tablet by mouth twice a day if needed for headache up to three times a week      ondansetron (ZOFRAN) 8 mg tablet Take 8 mg by mouth every 8 (eight) hours as needed for nausea or vomiting      ondansetron (ZOFRAN-ODT) 4 mg disintegrating tablet Take 1 tablet by mouth every 8 (eight) hours as needed for nausea or vomiting  6 tablet 0    promethazine (PHENERGAN) 25 mg tablet take 1 tablet by mouth twice a day if needed for headache or nausea      RA VITAMIN D-3 25 MCG (1000 UT) tablet Take 1,000 Units by mouth 2 (two) times a day      Saline (AYR SALINE NASAL DROPS) 0 65 % (Soln) SOLN 1 drop into each nostril      spironolactone (ALDACTONE) 25 mg tablet       traMADol (ULTRAM) 50 mg tablet Take 1 PO HS  30 tablet 0     No current facility-administered medications for this visit          Social History:  Social History     Socioeconomic History    Marital status: Single     Spouse name: None    Number of children: None    Years of education: None    Highest education level: None Occupational History    None   Social Needs    Financial resource strain: None    Food insecurity     Worry: None     Inability: None    Transportation needs     Medical: None     Non-medical: None   Tobacco Use    Smoking status: Former Smoker     Packs/day: 1 00     Types: Cigarettes    Smokeless tobacco: Never Used   Substance and Sexual Activity    Alcohol use: Yes     Comment: socially, had glass of wine today    Drug use: Yes     Types: Marijuana     Comment: CBD    Sexual activity: None   Lifestyle    Physical activity     Days per week: None     Minutes per session: None    Stress: None   Relationships    Social connections     Talks on phone: None     Gets together: None     Attends Yarsani service: None     Active member of club or organization: None     Attends meetings of clubs or organizations: None     Relationship status: None    Intimate partner violence     Fear of current or ex partner: None     Emotionally abused: None     Physically abused: None     Forced sexual activity: None   Other Topics Concern    None   Social History Narrative    None          Review of Systems   Constitutional: Negative for appetite change, chills and fever  HENT: Negative for sore throat  Respiratory: Negative for cough and shortness of breath  Cardiovascular: Negative for chest pain  Gastrointestinal: Negative for diarrhea, nausea and vomiting  Musculoskeletal: Positive for back pain  Skin: Negative for rash and wound  Allergic/Immunologic: Negative for immunocompromised state  Neurological: Negative for tremors and weakness  Hematological: Negative  Psychiatric/Behavioral: Negative for behavioral problems and confusion  Objective:      /84 (BP Location: Left arm, Patient Position: Sitting, Cuff Size: Standard)   Pulse 76   Ht 5' 6" (1 676 m)   Wt 77 8 kg (171 lb 9 6 oz)   BMI 27 70 kg/m²          Physical Exam  Vitals signs reviewed     Constitutional: General: She is not in acute distress  Appearance: Normal appearance  She is not ill-appearing  HENT:      Head: Normocephalic and atraumatic  Neck:      Musculoskeletal: Neck supple  Cardiovascular:      Rate and Rhythm: Normal rate and regular rhythm  Pulses: Normal pulses  Dorsalis pedis pulses are 2+ on the right side and 2+ on the left side  Posterior tibial pulses are 2+ on the right side and 2+ on the left side  Pulmonary:      Effort: Pulmonary effort is normal  No respiratory distress  Musculoskeletal: Normal range of motion  General: Swelling and tenderness present  Right lower leg: No edema  Left lower leg: No edema  Right foot: No foot drop  Left foot: No foot drop  Comments: Most intense pain around left 4th met head / MPJ  Mild edema noted around left 4th and 3rd MPJ  Diffuse tenderness noted left 3rd MPJ  Skin:     General: Skin is warm  Capillary Refill: Capillary refill takes less than 2 seconds  Coloration: Skin is not cyanotic or mottled  Findings: No abscess, ecchymosis, erythema, petechiae, rash or wound  Rash is not purpuric  Nails: There is no clubbing  Neurological:      General: No focal deficit present  Mental Status: She is alert and oriented to person, place, and time  Cranial Nerves: No cranial nerve deficit  Sensory: No sensory deficit  Motor: No weakness  Coordination: Coordination normal    Psychiatric:         Mood and Affect: Mood normal          Behavior: Behavior normal          Thought Content:  Thought content normal          Judgment: Judgment normal

## 2021-03-29 DIAGNOSIS — Z23 ENCOUNTER FOR IMMUNIZATION: ICD-10-CM

## 2021-03-31 ENCOUNTER — TELEPHONE (OUTPATIENT)
Dept: OTHER | Facility: OTHER | Age: 61
End: 2021-03-31

## 2021-03-31 NOTE — TELEPHONE ENCOUNTER
Patient called to cancel appointment schedule today 3/31/2021  9:45 AM Dr Fiorella Cifuentes DPM in [facility] due to she is having a migraine and blurry vision she does not feel comfortable driving  She will call back to reschedule

## 2021-04-08 ENCOUNTER — IMMUNIZATIONS (OUTPATIENT)
Dept: FAMILY MEDICINE CLINIC | Facility: HOSPITAL | Age: 61
End: 2021-04-08

## 2021-04-08 DIAGNOSIS — Z23 ENCOUNTER FOR IMMUNIZATION: Primary | ICD-10-CM

## 2021-04-08 PROCEDURE — 0001A SARS-COV-2 / COVID-19 MRNA VACCINE (PFIZER-BIONTECH) 30 MCG: CPT

## 2021-04-08 PROCEDURE — 91300 SARS-COV-2 / COVID-19 MRNA VACCINE (PFIZER-BIONTECH) 30 MCG: CPT

## 2021-05-01 ENCOUNTER — IMMUNIZATIONS (OUTPATIENT)
Dept: FAMILY MEDICINE CLINIC | Facility: HOSPITAL | Age: 61
End: 2021-05-01

## 2021-05-01 DIAGNOSIS — Z23 ENCOUNTER FOR IMMUNIZATION: Primary | ICD-10-CM

## 2021-05-01 PROCEDURE — 91300 SARS-COV-2 / COVID-19 MRNA VACCINE (PFIZER-BIONTECH) 30 MCG: CPT

## 2021-05-01 PROCEDURE — 0002A SARS-COV-2 / COVID-19 MRNA VACCINE (PFIZER-BIONTECH) 30 MCG: CPT

## 2021-05-10 ENCOUNTER — OFFICE VISIT (OUTPATIENT)
Dept: URGENT CARE | Facility: CLINIC | Age: 61
End: 2021-05-10
Payer: MEDICARE

## 2021-05-10 VITALS
WEIGHT: 170 LBS | HEART RATE: 66 BPM | DIASTOLIC BLOOD PRESSURE: 68 MMHG | SYSTOLIC BLOOD PRESSURE: 127 MMHG | TEMPERATURE: 99.7 F | BODY MASS INDEX: 25.76 KG/M2 | HEIGHT: 68 IN

## 2021-05-10 DIAGNOSIS — N30.00 ACUTE CYSTITIS WITHOUT HEMATURIA: Primary | ICD-10-CM

## 2021-05-10 PROCEDURE — 99213 OFFICE O/P EST LOW 20 MIN: CPT | Performed by: FAMILY MEDICINE

## 2021-05-10 PROCEDURE — 87086 URINE CULTURE/COLONY COUNT: CPT | Performed by: FAMILY MEDICINE

## 2021-05-10 PROCEDURE — G0463 HOSPITAL OUTPT CLINIC VISIT: HCPCS | Performed by: FAMILY MEDICINE

## 2021-05-10 RX ORDER — SULFAMETHOXAZOLE AND TRIMETHOPRIM 800; 160 MG/1; MG/1
1 TABLET ORAL EVERY 12 HOURS SCHEDULED
Qty: 6 TABLET | Refills: 0 | Status: SHIPPED | OUTPATIENT
Start: 2021-05-10 | End: 2021-05-13

## 2021-05-10 NOTE — PROGRESS NOTES
[email or web address]        NAME: Thiago Davis is a 64 y o  female  : 1960    MRN: [id number]  DATE: May 10, 2021  TIME: 5:15 PM    Assessment and Plan   Acute cystitis without hematuria [N30 00]  1  Acute cystitis without hematuria  sulfamethoxazole-trimethoprim (BACTRIM DS) 800-160 mg per tablet         Patient Instructions       Follow up with PCP in 3-5 days  Proceed to  ER if symptoms worsen  Chief Complaint     Chief Complaint   Patient presents with    Urinary Tract Infection     frequency and burning started last week, lower back pain started last night and its getting worse           History of Present Illness         62-year-old female presenting at this time with urinary frequency and burning which started yesterday  She states that now she is experiencing a pressure sensation of her bladder with radiation of pain into the lower back  Denies any fevers or chills  Denies any nausea vomiting  Review of Systems   Review of Systems   Constitutional: Negative  HENT: Negative  Eyes: Negative  Respiratory: Negative  Cardiovascular: Negative  Gastrointestinal: Negative  Genitourinary: Positive for dysuria, flank pain, frequency and pelvic pain  Negative for hematuria  Musculoskeletal: Positive for arthralgias, back pain and myalgias  Skin: Negative  Allergic/Immunologic: Negative  Neurological: Negative  Hematological: Negative  Psychiatric/Behavioral: Negative  Current Medications       Current Outpatient Medications:     amLODIPine (NORVASC) 10 mg tablet, Take 10 mg by mouth daily , Disp: , Rfl:     B-D 3CC LUER-CLARA SYR 25GX1" 25G X 1" 3 ML MISC, use as directed WITH MEDICINE, Disp: , Rfl:     clindamycin-benzoyl peroxide (BENZACLIN) gel, APPLY TWICE DAILY TO AFFECTED AREA(S) AS DIRECTED, Disp: , Rfl:     clonazePAM (KlonoPIN) 0 5 mg tablet, Take 0 5 mg by mouth daily before breakfast Indications: takes for dizziness    , Disp: , Rfl:   dihydroergotamine (DHE) 1 mg/mL, 1 mg IM prn severe headache  May repeat in 2 hours  Max 2 per day, Max 2 days per week, Disp: , Rfl:     Filter Needles 18G X 1-1/2" MISC, USE 1 SYRINGE FILTER PRF FOR DRAWING UP DHE INJECTION THEN SWITCH TO 25 GAUGE SYRINGE, Disp: , Rfl:     ketorolac (TORADOL) 30 mg/mL injection, 30 mg IM prn severe headache   Max 1 day per week, Disp: , Rfl:     mexiletine (MEXITIL) 150 mg capsule, Take 150 mg by mouth 3 (three) times a day, Disp: , Rfl:     naproxen sodium (ANAPROX) 550 mg tablet, take 1 tablet by mouth twice a day if needed for headache up to three times a week, Disp: , Rfl:     ondansetron (ZOFRAN) 8 mg tablet, Take 8 mg by mouth every 8 (eight) hours as needed for nausea or vomiting, Disp: , Rfl:     ondansetron (ZOFRAN-ODT) 4 mg disintegrating tablet, Take 1 tablet by mouth every 8 (eight) hours as needed for nausea or vomiting , Disp: 6 tablet, Rfl: 0    promethazine (PHENERGAN) 25 mg tablet, take 1 tablet by mouth twice a day if needed for headache or nausea, Disp: , Rfl:     RA VITAMIN D-3 25 MCG (1000 UT) tablet, Take 1,000 Units by mouth 2 (two) times a day, Disp: , Rfl:     Saline (AYR SALINE NASAL DROPS) 0 65 % (Soln) SOLN, 1 drop into each nostril, Disp: , Rfl:     spironolactone (ALDACTONE) 25 mg tablet, , Disp: , Rfl:     sulfamethoxazole-trimethoprim (BACTRIM DS) 800-160 mg per tablet, Take 1 tablet by mouth every 12 (twelve) hours for 3 days, Disp: 6 tablet, Rfl: 0    traMADol (ULTRAM) 50 mg tablet, Take 1 PO HS , Disp: 30 tablet, Rfl: 0    Current Allergies     Allergies as of 05/10/2021 - Reviewed 05/10/2021   Allergen Reaction Noted    Codeine Other (See Comments) 07/08/2016            The following portions of the patient's history were reviewed and updated as appropriate: allergies, current medications, past family history, past medical history, past social history, past surgical history and problem list      Past Medical History:   Diagnosis Date    Concussion     CSF leak        Past Surgical History:   Procedure Laterality Date    BREAST LUMPECTOMY      LAPAROSCOPY FOR ECTOPIC PREGNANCY      TONSILECTOMY AND ADNOIDECTOMY         Family History   Problem Relation Age of Onset    Colon cancer Father     Breast cancer Sister          Medications have been verified  Objective   /68   Pulse 66   Temp 99 7 °F (37 6 °C)   Ht 5' 8" (1 727 m)   Wt 77 1 kg (170 lb)   BMI 25 85 kg/m²   No LMP recorded  Patient is postmenopausal        Physical Exam     Physical Exam  Vitals signs and nursing note reviewed  Constitutional:       Appearance: She is well-developed  HENT:      Head: Normocephalic  Eyes:      Pupils: Pupils are equal, round, and reactive to light  Pulmonary:      Effort: Pulmonary effort is normal    Musculoskeletal: Normal range of motion  Skin:     General: Skin is warm and dry  Neurological:      Mental Status: She is alert and oriented to person, place, and time

## 2021-05-11 LAB — BACTERIA UR CULT: NORMAL

## 2021-06-15 ENCOUNTER — OFFICE VISIT (OUTPATIENT)
Dept: URGENT CARE | Facility: CLINIC | Age: 61
End: 2021-06-15
Payer: MEDICARE

## 2021-06-15 VITALS
HEIGHT: 66 IN | SYSTOLIC BLOOD PRESSURE: 158 MMHG | WEIGHT: 165 LBS | OXYGEN SATURATION: 96 % | RESPIRATION RATE: 16 BRPM | TEMPERATURE: 100.4 F | DIASTOLIC BLOOD PRESSURE: 80 MMHG | BODY MASS INDEX: 26.52 KG/M2 | HEART RATE: 78 BPM

## 2021-06-15 DIAGNOSIS — R10.31 RIGHT LOWER QUADRANT ABDOMINAL PAIN: Primary | ICD-10-CM

## 2021-06-15 DIAGNOSIS — R30.0 DYSURIA: ICD-10-CM

## 2021-06-15 PROCEDURE — 99213 OFFICE O/P EST LOW 20 MIN: CPT | Performed by: FAMILY MEDICINE

## 2021-06-15 PROCEDURE — 87086 URINE CULTURE/COLONY COUNT: CPT | Performed by: FAMILY MEDICINE

## 2021-06-15 PROCEDURE — G0463 HOSPITAL OUTPT CLINIC VISIT: HCPCS | Performed by: FAMILY MEDICINE

## 2021-06-15 RX ORDER — CANDESARTAN 16 MG/1
16 TABLET ORAL DAILY
COMMUNITY
End: 2022-06-30 | Stop reason: HOSPADM

## 2021-06-16 ENCOUNTER — TELEPHONE (OUTPATIENT)
Dept: GASTROENTEROLOGY | Facility: CLINIC | Age: 61
End: 2021-06-16

## 2021-06-16 ENCOUNTER — APPOINTMENT (EMERGENCY)
Dept: ULTRASOUND IMAGING | Facility: HOSPITAL | Age: 61
End: 2021-06-16
Payer: MEDICARE

## 2021-06-16 ENCOUNTER — APPOINTMENT (EMERGENCY)
Dept: CT IMAGING | Facility: HOSPITAL | Age: 61
End: 2021-06-16
Payer: MEDICARE

## 2021-06-16 ENCOUNTER — HOSPITAL ENCOUNTER (EMERGENCY)
Facility: HOSPITAL | Age: 61
Discharge: HOME | End: 2021-06-16
Attending: EMERGENCY MEDICINE
Payer: MEDICARE

## 2021-06-16 VITALS
BODY MASS INDEX: 26.52 KG/M2 | SYSTOLIC BLOOD PRESSURE: 128 MMHG | DIASTOLIC BLOOD PRESSURE: 76 MMHG | HEART RATE: 87 BPM | RESPIRATION RATE: 16 BRPM | HEIGHT: 66 IN | WEIGHT: 165 LBS | TEMPERATURE: 97.7 F | OXYGEN SATURATION: 98 %

## 2021-06-16 DIAGNOSIS — N39.0 UTI (URINARY TRACT INFECTION): ICD-10-CM

## 2021-06-16 DIAGNOSIS — S32.020A COMPRESSION FRACTURE OF L2 (HCC): ICD-10-CM

## 2021-06-16 DIAGNOSIS — R10.9 ABDOMINAL PAIN: Primary | ICD-10-CM

## 2021-06-16 DIAGNOSIS — S32.020A COMPRESSION FRACTURE OF L2 VERTEBRA, INITIAL ENCOUNTER (HCC): ICD-10-CM

## 2021-06-16 DIAGNOSIS — R74.01 TRANSAMINITIS: ICD-10-CM

## 2021-06-16 DIAGNOSIS — R16.2 HEPATOSPLENOMEGALY: ICD-10-CM

## 2021-06-16 DIAGNOSIS — D69.6 THROMBOCYTOPENIA (HCC): ICD-10-CM

## 2021-06-16 LAB
ALBUMIN SERPL BCP-MCNC: 3.8 G/DL (ref 3.5–5)
ALP SERPL-CCNC: 129 U/L (ref 46–116)
ALT SERPL W P-5'-P-CCNC: 57 U/L (ref 12–78)
ANION GAP SERPL CALCULATED.3IONS-SCNC: 11 MMOL/L (ref 4–13)
AST SERPL W P-5'-P-CCNC: 122 U/L (ref 5–45)
BACTERIA UR CULT: NORMAL
BACTERIA UR QL AUTO: ABNORMAL /HPF
BASOPHILS # BLD AUTO: 0.07 THOUSANDS/ΜL (ref 0–0.1)
BASOPHILS NFR BLD AUTO: 1 % (ref 0–1)
BILIRUB SERPL-MCNC: 1.7 MG/DL (ref 0.2–1)
BILIRUB UR QL STRIP: ABNORMAL
BUN SERPL-MCNC: 8 MG/DL (ref 5–25)
CALCIUM SERPL-MCNC: 9.2 MG/DL (ref 8.3–10.1)
CHLORIDE SERPL-SCNC: 101 MMOL/L (ref 100–108)
CLARITY UR: CLEAR
CO2 SERPL-SCNC: 27 MMOL/L (ref 21–32)
COLOR UR: ABNORMAL
CREAT SERPL-MCNC: 0.89 MG/DL (ref 0.6–1.3)
EOSINOPHIL # BLD AUTO: 0.2 THOUSAND/ΜL (ref 0–0.61)
EOSINOPHIL NFR BLD AUTO: 4 % (ref 0–6)
ERYTHROCYTE [DISTWIDTH] IN BLOOD BY AUTOMATED COUNT: 14.5 % (ref 11.6–15.1)
GFR SERPL CREATININE-BSD FRML MDRD: 72 ML/MIN/1.73SQ M
GLUCOSE SERPL-MCNC: 132 MG/DL (ref 65–140)
GLUCOSE UR STRIP-MCNC: NEGATIVE MG/DL
HCT VFR BLD AUTO: 38.8 % (ref 34.8–46.1)
HGB BLD-MCNC: 13.2 G/DL (ref 11.5–15.4)
HGB UR QL STRIP.AUTO: NEGATIVE
HYALINE CASTS #/AREA URNS LPF: ABNORMAL /LPF
IMM GRANULOCYTES # BLD AUTO: 0.01 THOUSAND/UL (ref 0–0.2)
IMM GRANULOCYTES NFR BLD AUTO: 0 % (ref 0–2)
KETONES UR STRIP-MCNC: NEGATIVE MG/DL
LEUKOCYTE ESTERASE UR QL STRIP: NEGATIVE
LIPASE SERPL-CCNC: 59 U/L (ref 73–393)
LYMPHOCYTES # BLD AUTO: 1.34 THOUSANDS/ΜL (ref 0.6–4.47)
LYMPHOCYTES NFR BLD AUTO: 26 % (ref 14–44)
MCH RBC QN AUTO: 38 PG (ref 26.8–34.3)
MCHC RBC AUTO-ENTMCNC: 34 G/DL (ref 31.4–37.4)
MCV RBC AUTO: 112 FL (ref 82–98)
MONOCYTES # BLD AUTO: 0.36 THOUSAND/ΜL (ref 0.17–1.22)
MONOCYTES NFR BLD AUTO: 7 % (ref 4–12)
NEUTROPHILS # BLD AUTO: 3.13 THOUSANDS/ΜL (ref 1.85–7.62)
NEUTS SEG NFR BLD AUTO: 62 % (ref 43–75)
NITRITE UR QL STRIP: POSITIVE
NON-SQ EPI CELLS URNS QL MICRO: ABNORMAL /HPF
NRBC BLD AUTO-RTO: 0 /100 WBCS
PH UR STRIP.AUTO: 6 [PH]
PLATELET # BLD AUTO: 76 THOUSANDS/UL (ref 149–390)
PMV BLD AUTO: 10.2 FL (ref 8.9–12.7)
POTASSIUM SERPL-SCNC: 4.3 MMOL/L (ref 3.5–5.3)
PROT SERPL-MCNC: 8.6 G/DL (ref 6.4–8.2)
PROT UR STRIP-MCNC: ABNORMAL MG/DL
RBC # BLD AUTO: 3.47 MILLION/UL (ref 3.81–5.12)
RBC #/AREA URNS AUTO: ABNORMAL /HPF
SODIUM SERPL-SCNC: 139 MMOL/L (ref 136–145)
SP GR UR STRIP.AUTO: 1.02 (ref 1–1.03)
UROBILINOGEN UR QL STRIP.AUTO: 1 E.U./DL
WBC # BLD AUTO: 5.11 THOUSAND/UL (ref 4.31–10.16)
WBC #/AREA URNS AUTO: ABNORMAL /HPF

## 2021-06-16 PROCEDURE — 83690 ASSAY OF LIPASE: CPT | Performed by: EMERGENCY MEDICINE

## 2021-06-16 PROCEDURE — 36415 COLL VENOUS BLD VENIPUNCTURE: CPT

## 2021-06-16 PROCEDURE — 96374 THER/PROPH/DIAG INJ IV PUSH: CPT

## 2021-06-16 PROCEDURE — 96361 HYDRATE IV INFUSION ADD-ON: CPT

## 2021-06-16 PROCEDURE — 81001 URINALYSIS AUTO W/SCOPE: CPT | Performed by: EMERGENCY MEDICINE

## 2021-06-16 PROCEDURE — G1004 CDSM NDSC: HCPCS

## 2021-06-16 PROCEDURE — 80053 COMPREHEN METABOLIC PANEL: CPT | Performed by: EMERGENCY MEDICINE

## 2021-06-16 PROCEDURE — 85025 COMPLETE CBC W/AUTO DIFF WBC: CPT | Performed by: EMERGENCY MEDICINE

## 2021-06-16 PROCEDURE — 74177 CT ABD & PELVIS W/CONTRAST: CPT

## 2021-06-16 PROCEDURE — 99285 EMERGENCY DEPT VISIT HI MDM: CPT | Performed by: EMERGENCY MEDICINE

## 2021-06-16 PROCEDURE — 76705 ECHO EXAM OF ABDOMEN: CPT

## 2021-06-16 PROCEDURE — 99284 EMERGENCY DEPT VISIT MOD MDM: CPT

## 2021-06-16 RX ORDER — CEPHALEXIN 250 MG/1
500 CAPSULE ORAL ONCE
Status: COMPLETED | OUTPATIENT
Start: 2021-06-16 | End: 2021-06-16

## 2021-06-16 RX ORDER — CEPHALEXIN 250 MG/1
500 CAPSULE ORAL 4 TIMES DAILY
Qty: 40 CAPSULE | Refills: 0 | Status: SHIPPED | OUTPATIENT
Start: 2021-06-16 | End: 2021-06-21

## 2021-06-16 RX ORDER — ONDANSETRON 2 MG/ML
4 INJECTION INTRAMUSCULAR; INTRAVENOUS ONCE
Status: COMPLETED | OUTPATIENT
Start: 2021-06-16 | End: 2021-06-16

## 2021-06-16 RX ADMIN — IOHEXOL 100 ML: 350 INJECTION, SOLUTION INTRAVENOUS at 08:38

## 2021-06-16 RX ADMIN — SODIUM CHLORIDE 500 ML: 0.9 INJECTION, SOLUTION INTRAVENOUS at 08:06

## 2021-06-16 RX ADMIN — CEPHALEXIN 500 MG: 250 CAPSULE ORAL at 10:45

## 2021-06-16 RX ADMIN — ONDANSETRON 4 MG: 2 INJECTION INTRAMUSCULAR; INTRAVENOUS at 08:06

## 2021-06-16 NOTE — ED PROVIDER NOTES
History  Chief Complaint   Patient presents with    Abdominal Problem     Patient states that her stomach keeps getting distended over the past couple months     Patient is a 64year old female with a past medical history significant for chronic CSF leak who presents with abdominal pain x 4-5 months  Patient reports that over the last few months she has had weight gain secondary to diminished mobility from resting frequently from the CSF leak as well as being home a lot with COVID/ not walking much outside and poor diet choices  She states that over the last few months she has been having episodes of abdominal pain, generalized (most pronounced in the lower abdomen), intermittent, bloated feeling, non-radiating, associated with nausea and constipation  Also, over the last few days developed dysuria and urinary frequency  Denies fevers, chills, diarrhea, vaginal discharge, vaginal bleeding, hematuria  Prior to Admission Medications   Prescriptions Last Dose Informant Patient Reported? Taking? B-D 3CC LUER-CLARA SYR 25GX1" 25G X 1" 3 ML MISC  Self Yes No   Sig: use as directed WITH MEDICINE   Filter Needles 18G X 1-1/2" MISC   Yes No   Sig: USE 1 SYRINGE FILTER PRF FOR DRAWING UP DHE INJECTION THEN SWITCH TO 25 GAUGE SYRINGE   RA VITAMIN D-3 25 MCG (1000 UT) tablet  Self Yes No   Sig: Take 1,000 Units by mouth 2 (two) times a day   Saline (AYR SALINE NASAL DROPS) 0 65 % (Soln) SOLN  Self Yes No   Si drop into each nostril   amLODIPine (NORVASC) 10 mg tablet  Self Yes No   Sig: Take 10 mg by mouth daily    candesartan (ATACAND) 16 mg tablet   Yes No   Sig: Take 16 mg by mouth daily   clindamycin-benzoyl peroxide (BENZACLIN) gel   Yes No   Sig: APPLY TWICE DAILY TO AFFECTED AREA(S) AS DIRECTED   clonazePAM (KlonoPIN) 0 5 mg tablet  Self Yes No   Sig: Take 0 5 mg by mouth daily before breakfast Indications: takes for dizziness       dihydroergotamine (DHE) 1 mg/mL  Self Yes No   Si mg IM prn severe headache  May repeat in 2 hours  Max 2 per day, Max 2 days per week   ketorolac (TORADOL) 30 mg/mL injection  Self Yes No   Si mg IM prn severe headache  Max 1 day per week   mexiletine (MEXITIL) 150 mg capsule  Self Yes No   Sig: Take 150 mg by mouth 3 (three) times a day   naproxen sodium (ANAPROX) 550 mg tablet  Self Yes No   Sig: take 1 tablet by mouth twice a day if needed for headache up to three times a week   ondansetron (ZOFRAN) 8 mg tablet  Self Yes No   Sig: Take 8 mg by mouth every 8 (eight) hours as needed for nausea or vomiting   ondansetron (ZOFRAN-ODT) 4 mg disintegrating tablet  Self No No   Sig: Take 1 tablet by mouth every 8 (eight) hours as needed for nausea or vomiting  promethazine (PHENERGAN) 25 mg tablet  Self Yes No   Sig: take 1 tablet by mouth twice a day if needed for headache or nausea   spironolactone (ALDACTONE) 25 mg tablet   Yes No   traMADol (ULTRAM) 50 mg tablet   No No   Sig: Take 1 PO HS  Patient not taking: Reported on 6/15/2021      Facility-Administered Medications: None       Past Medical History:   Diagnosis Date    Concussion     CSF leak        Past Surgical History:   Procedure Laterality Date    ABLATION SOFT TISSUE      BREAST LUMPECTOMY      LAPAROSCOPY FOR ECTOPIC PREGNANCY      SINUS SURGERY      TONSILECTOMY AND ADNOIDECTOMY         Family History   Problem Relation Age of Onset    Colon cancer Father     Breast cancer Sister      I have reviewed and agree with the history as documented  E-Cigarette/Vaping     E-Cigarette/Vaping Substances     Social History     Tobacco Use    Smoking status: Former Smoker     Packs/day: 1 00     Types: Cigarettes    Smokeless tobacco: Never Used   Substance Use Topics    Alcohol use: Yes     Comment: socially, had glass of wine today    Drug use: Not Currently     Comment: CBD       Review of Systems   Constitutional: Negative for chills and fever  HENT: Negative for congestion and rhinorrhea      Eyes: Negative for photophobia and visual disturbance  Respiratory: Negative for cough and shortness of breath  Cardiovascular: Negative for chest pain and palpitations  Gastrointestinal: Positive for abdominal pain, constipation and nausea  Negative for abdominal distention, blood in stool, diarrhea and vomiting  Genitourinary: Positive for dysuria and frequency  Negative for decreased urine volume, flank pain, hematuria, urgency, vaginal bleeding and vaginal discharge  Musculoskeletal: Negative for back pain and neck pain  Skin: Negative for color change and pallor  Neurological: Negative for dizziness, weakness, light-headedness, numbness and headaches  Physical Exam  Physical Exam  Vitals and nursing note reviewed  Constitutional:       General: She is not in acute distress  Appearance: Normal appearance  She is not ill-appearing, toxic-appearing or diaphoretic  HENT:      Head: Normocephalic and atraumatic  Mouth/Throat:      Mouth: Mucous membranes are moist    Eyes:      Conjunctiva/sclera: Conjunctivae normal       Pupils: Pupils are equal, round, and reactive to light  Cardiovascular:      Rate and Rhythm: Normal rate and regular rhythm  Pulses: Normal pulses  Heart sounds: Normal heart sounds  No murmur heard  Pulmonary:      Effort: Pulmonary effort is normal  No respiratory distress  Breath sounds: Normal breath sounds  No stridor  No wheezing, rhonchi or rales  Chest:      Chest wall: No tenderness  Abdominal:      General: Abdomen is protuberant  Bowel sounds are normal  There is no distension  Palpations: Abdomen is soft  Tenderness: There is abdominal tenderness in the right lower quadrant, suprapubic area and left lower quadrant  There is no right CVA tenderness, left CVA tenderness, guarding or rebound  Negative signs include Price's sign, Rovsing's sign, McBurney's sign and psoas sign  Hernia: No hernia is present  Musculoskeletal:      Cervical back: Neck supple  Right lower leg: No edema  Left lower leg: No edema  Skin:     General: Skin is warm and dry  Neurological:      General: No focal deficit present  Mental Status: She is alert and oriented to person, place, and time  Mental status is at baseline     Psychiatric:         Mood and Affect: Mood normal          Behavior: Behavior normal          Vital Signs  ED Triage Vitals   Temperature Pulse Respirations Blood Pressure SpO2   06/16/21 0723 06/16/21 0723 06/16/21 0723 06/16/21 0723 06/16/21 0723   97 7 °F (36 5 °C) 90 18 150/84 96 %      Temp Source Heart Rate Source Patient Position - Orthostatic VS BP Location FiO2 (%)   06/16/21 0723 06/16/21 0930 06/16/21 0723 06/16/21 0723 --   Temporal Monitor Lying Right arm       Pain Score       06/16/21 0930       4           Vitals:    06/16/21 0723 06/16/21 0730 06/16/21 0800 06/16/21 0930   BP: 150/84 141/72 122/81 128/76   Pulse: 90 88 90 87   Patient Position - Orthostatic VS: Lying Lying  Lying         Visual Acuity      ED Medications  Medications   sodium chloride 0 9 % bolus 500 mL (0 mL Intravenous Stopped 6/16/21 0858)   ondansetron (ZOFRAN) injection 4 mg (4 mg Intravenous Given 6/16/21 0806)   iohexol (OMNIPAQUE) 350 MG/ML injection (SINGLE-DOSE) 100 mL (100 mL Intravenous Given 6/16/21 0838)   cephalexin (KEFLEX) capsule 500 mg (500 mg Oral Given 6/16/21 1045)       Diagnostic Studies  Results Reviewed     Procedure Component Value Units Date/Time    CBC and differential [[id number]]  (Abnormal) Collected: 06/16/21 0726    Lab Status: Final result Specimen: Blood from Arm, Right Updated: 06/16/21 0850     WBC 5 11 Thousand/uL      RBC 3 47 Million/uL      Hemoglobin 13 2 g/dL      Hematocrit 38 8 %       fL      MCH 38 0 pg      MCHC 34 0 g/dL      RDW 14 5 %      MPV 10 2 fL      Platelets 76 Thousands/uL      nRBC 0 /100 WBCs      Neutrophils Relative 62 %      Immat GRANS % 0 % Lymphocytes Relative 26 %      Monocytes Relative 7 %      Eosinophils Relative 4 %      Basophils Relative 1 %      Neutrophils Absolute 3 13 Thousands/µL      Immature Grans Absolute 0 01 Thousand/uL      Lymphocytes Absolute 1 34 Thousands/µL      Monocytes Absolute 0 36 Thousand/µL      Eosinophils Absolute 0 20 Thousand/µL      Basophils Absolute 0 07 Thousands/µL     Urine Microscopic [[id number]]  (Abnormal) Collected: 06/16/21 0810    Lab Status: Final result Specimen: Urine, Clean Catch Updated: 06/16/21 0849     RBC, UA None Seen /hpf      WBC, UA 0-1 /hpf      Epithelial Cells Occasional /hpf      Bacteria, UA Moderate /hpf      Hyaline Casts, UA 2-4 /lpf     UA w Reflex to Microscopic w Reflex to Culture [[id number]]  (Abnormal) Collected: 06/16/21 0810    Lab Status: Final result Specimen: Urine, Clean Catch Updated: 06/16/21 0819     Color, UA Orange     Clarity, UA Clear     Specific Gravity, UA 1 025     pH, UA 6 0     Leukocytes, UA Negative     Nitrite, UA Positive     Protein, UA Trace mg/dl      Glucose, UA Negative mg/dl      Ketones, UA Negative mg/dl      Urobilinogen, UA 1 0 E U /dl      Bilirubin, UA Interference- unable to analyze     Blood, UA Negative    Lipase [[id number]]  (Abnormal) Collected: 06/16/21 0726    Lab Status: Final result Specimen: Blood from Arm, Right Updated: 06/16/21 0805     Lipase 59 u/L     Comprehensive metabolic panel [[id number]]  (Abnormal) Collected: 06/16/21 0726    Lab Status: Final result Specimen: Blood from Arm, Right Updated: 06/16/21 0805     Sodium 139 mmol/L      Potassium 4 3 mmol/L      Chloride 101 mmol/L      CO2 27 mmol/L      ANION GAP 11 mmol/L      BUN 8 mg/dL      Creatinine 0 89 mg/dL      Glucose 132 mg/dL      Calcium 9 2 mg/dL       U/L      ALT 57 U/L      Alkaline Phosphatase 129 U/L      Total Protein 8 6 g/dL      Albumin 3 8 g/dL      Total Bilirubin 1 70 mg/dL      eGFR 72 ml/min/1 73sq m     Narrative:      National Kidney Disease Foundation guidelines for Chronic Kidney Disease (CKD):     Stage 1 with normal or high GFR (GFR > 90 mL/min/1 73 square meters)    Stage 2 Mild CKD (GFR = 60-89 mL/min/1 73 square meters)    Stage 3A Moderate CKD (GFR = 45-59 mL/min/1 73 square meters)    Stage 3B Moderate CKD (GFR = 30-44 mL/min/1 73 square meters)    Stage 4 Severe CKD (GFR = 15-29 mL/min/1 73 square meters)    Stage 5 End Stage CKD (GFR <15 mL/min/1 73 square meters)  Note: GFR calculation is accurate only with a steady state creatinine                 US abdomen limited   Final Result by Quintin Greene MD (06/16 1015)      Distended gallbladder  No acute finding seen  Workstation performed: [id number]         CT abdomen pelvis with contrast   Final Result by Leandro Lanier MD (98/60 [id number])      1  Hepatosplenomegaly  The liver appears diffusely heterogeneous in appearance which most likely represents fatty infiltration  A subtle infiltrating process is unable to be excluded  Follow-up nonemergent MRI with contrast recommended  In addition,    there is questioned perihepatic vascular structures suspicious for early recanalization of the umbilical vein  No ascites is seen  2   Moderate compression fracture of the L2 vertebral body  The study was marked in [facility] for immediate notification  Workstation performed: [id number]                    Procedures  Procedures         ED Course  ED Course as of Jun 16 2051   Wed Jun 16, 2021   0821 Nitrite, UA(!): Positive   0900 1  Hepatosplenomegaly  The liver appears diffusely heterogeneous in appearance which most likely represents fatty infiltration  A subtle infiltrating process is unable to be excluded  Follow-up nonemergent MRI with contrast recommended  In addition,   there is questioned perihepatic vascular structures suspicious for early recanalization of the umbilical vein  No ascites is seen    2   Moderate compression fracture of the L2 vertebral body  2575 As patient has mild AST, alk phos, and t bili elevation, will get US gallbladder to rule out cholecystitis  On CT imaging, patient does have hepatosplenomegaly- she was previously aware of hepatomegaly, but not tavianolmegaly      0924 Ultrasound at bedside  1 Discussed clinical course, CT imaging and US imaging with general surgery, Dr Silvino Georges who does not believe that there is concern for acute infection, and recommends outpatient follow up with gastroenterology  [address] [place] [address] gastroenterology  [facility] from [place] in 2019  Patient did have a mild elevation AST/ALT then, but today's labs are slightly worsened      1121 In 2017 platelets were 67, and in 2019 platelets were 815   Platelet Count(!): 76   1130 Spoke with Dr Doretha Herndon, gastroenterology who states that someone from the office will call the patient to help facilitate an outpatient appointment      [id number] Had a lengthy discussion with the patient in regards to her blood work, imaging  Some of these labs are chronic in nature based on my review of medical records, but appear to be slightly worsening  Patient also has a history of hepatomegaly, but now has hepatosplenomegaly  I explained to the patient that she needs to follow up with the primary care doctor as well as Gastroenterology for further reassessment, imaging and to further investigate ongoing chronic processes  Will also be treated for urinary tract infection  Patient also has a compression fracture of her lumbar spine with normal neuro exam   Ordered lumbar brace  L2 compression fracture is old from prior injury- seen on MRI imaging from 1/9/2020                          SBIRT 20yo+      Most Recent Value   SBIRT (23 yo +)   In order to provide better care to our patients, we are screening all of our patients for alcohol and drug use  Would it be okay to ask you these screening questions?   No Filed at: 06/16/2021 2073 MDM  Number of Diagnoses or Management Options  Abdominal pain  Compression fracture of L2 vertebra, initial encounter ([facility] )  Hepatosplenomegaly  Thrombocytopenia (HCC)  Transaminitis  UTI (urinary tract infection)  Diagnosis management comments: Assessment and Plan:   64year old female presenting with abdominal pain, generalized, but more pronounced in the lower abdomen, associated with constipation and nausea, in addition to new symptoms of dysuria/frequency  Abdomen is soft, non-distended, with mild tenderness in the lower abdomen  Differential includes constipation causing bladder spasm versus partial small bowel obstruction versus colitis versus IBS versus UTI  Will check labs to assess for leukocytosis, anemia, electrolyte abnormalities, kidney and liver function, urinalysis to rule out UTI, treat with IVF and zofran  Reassess         Disposition  Final diagnoses:   Abdominal pain   Hepatosplenomegaly   Compression fracture of L2 vertebra, initial encounter ([facility])   Thrombocytopenia (HCC)   Transaminitis   UTI (urinary tract infection)   Compression fracture of L2 ([facility] )     Time reflects when diagnosis was documented in both MDM as applicable and the Disposition within this note     Time User Action Codes Description Comment    6/16/2021  9:59 AM Maryagnes Marine Add [R10 9] Abdominal pain     6/16/2021  9:59 AM Chantale Bedolla [R16 2] Hepatosplenomegaly     6/16/2021 10:00 AM Maryagnes Marine Add [F59 667C] Compression fracture of L2 vertebra, initial encounter ([place] [id number] )     6/16/2021 10:54 AM Maryagnes Marine Add [D69 6] Thrombocytopenia ([place] [address] )     6/16/2021 10:54 AM Maryagnes Marine Add [R74 01] Transaminitis     6/16/2021 11:31 AM Maryagnes Marine Add [N39 0] UTI (urinary tract infection)     6/16/2021  8:51 PM Maryagnes Marine Add [S32 020A] Compression fracture of L2 [facility])       ED Disposition     ED Disposition Condition Date/Time Comment    Discharge Stable Wed Jun 16, 2021 10:54 [facility] Becerra discharge to home/self care  Follow-up Information     Follow up With Specialties Details Why Contact Info Additional [address] [facility] Schedule an appointment as soon as possible for a visit in 1 week for re-evaluation [address] [phone]  [facility] [address] [address] [address], [address] Emergency Department Emergency Medicine Go to  As needed, If symptoms worsen, for re-evaluation [address] [place],[phone]  [address] [facility] Emergency Department, [address] [place], [place] [address]    Jeanie June MD Family Medicine Schedule an appointment as soon as possible for a visit in 3 days for re-evaluation [address] [facility] [address]             Discharge Medication List as of 6/16/2021 11:34 AM      START taking these medications    Details   cephalexin (KEFLEX) 250 mg capsule Take 2 capsules (500 mg total) by mouth 4 (four) times a day for 5 days, Starting Wed 6/16/2021, Until Mon 6/21/2021, Normal         CONTINUE these medications which have NOT CHANGED    Details   amLODIPine (NORVASC) 10 mg tablet Take 10 mg by mouth daily , Historical Med      B-D 3CC LUER-CLARA SYR 25GX1" 25G X 1" 3 ML MISC use as directed WITH MEDICINE, Historical Med      candesartan (ATACAND) 16 mg tablet Take 16 mg by mouth daily, Historical Med      clindamycin-benzoyl peroxide (BENZACLIN) gel APPLY TWICE DAILY TO AFFECTED AREA(S) AS DIRECTED, Historical Med      clonazePAM (KlonoPIN) 0 5 mg tablet Take 0 5 mg by mouth daily before breakfast Indications: takes for dizziness  , Historical Med      dihydroergotamine (DHE) 1 mg/mL 1 mg IM prn severe headache  May repeat in 2 hours   Max 2 per day, Max 2 days per week, Historical Med      Filter Needles 18G X 1-1/2" MISC USE 1 SYRINGE FILTER PRF FOR DRAWING UP DHE INJECTION THEN SWITCH TO 25 GAUGE SYRINGE, Historical Med      ketorolac (TORADOL) 30 mg/mL injection 30 mg IM prn severe headache   Max 1 day per week, Historical Med      mexiletine (MEXITIL) 150 mg capsule Take 150 mg by mouth 3 (three) times a day, Starting Fri 12/6/2019, Historical Med      naproxen sodium (ANAPROX) 550 mg tablet take 1 tablet by mouth twice a day if needed for headache up to three times a week, Historical Med      ondansetron (ZOFRAN) 8 mg tablet Take 8 mg by mouth every 8 (eight) hours as needed for nausea or vomiting, Historical Med      ondansetron (ZOFRAN-ODT) 4 mg disintegrating tablet Take 1 tablet by mouth every 8 (eight) hours as needed for nausea or vomiting , Starting Sat 9/10/2016, Print      promethazine (PHENERGAN) 25 mg tablet take 1 tablet by mouth twice a day if needed for headache or nausea, Historical Med      RA VITAMIN D-3 25 MCG (1000 UT) tablet Take 1,000 Units by mouth 2 (two) times a day, Starting Fri 12/6/2019, Historical Med      Saline (AYR SALINE NASAL DROPS) 0 65 % (Soln) SOLN 1 drop into each nostril, Starting Wed 3/23/2016, Historical Med      spironolactone (ALDACTONE) 25 mg tablet Starting Wed 10/28/2020, Historical Med      traMADol (ULTRAM) 50 mg tablet Take 1 PO HS , Normal           Outpatient Discharge Orders   Lumbar Back Brace       PDMP Review       Value Time User    PDMP Reviewed  Yes 6/16/2020  8:03 AM Zaid Wang, [address] [facility] Provider  Electronically Signed by           Andrés Cloud DO  06/16/21 2051

## 2021-06-16 NOTE — TELEPHONE ENCOUNTER
Received phone call from [address] [place] [address] Emergency Room  Patient in the ER with abdominal pain secondary to UTI although LFTs were increased  CT scan with hepatosplenomegaly  Going to be discharged on antibiotics for UTI but needs GI follow-up  Please reach out to the patient and give her an appointment the next couple weeks with us    Thanks

## 2021-06-16 NOTE — DISCHARGE INSTRUCTIONS
Your CT scan of the abdomen and pelvis today showed:  1  Hepatosplenomegaly  The liver appears diffusely heterogeneous in appearance which most likely represents fatty infiltration  A subtle infiltrating process is unable to be excluded  Follow-up nonemergent MRI with contrast recommended  In addition, there is questioned perihepatic vascular structures suspicious for early recanalization of the umbilical vein  No ascites is seen  2   Moderate compression fracture of the L2 vertebral body  Please follow up with your primary care doctor and gastroenterology for reassessment

## 2021-06-21 ENCOUNTER — OFFICE VISIT (OUTPATIENT)
Dept: GASTROENTEROLOGY | Facility: CLINIC | Age: 61
End: 2021-06-21
Payer: MEDICARE

## 2021-06-21 VITALS
DIASTOLIC BLOOD PRESSURE: 90 MMHG | BODY MASS INDEX: 27.32 KG/M2 | HEIGHT: 66 IN | SYSTOLIC BLOOD PRESSURE: 140 MMHG | WEIGHT: 170 LBS

## 2021-06-21 DIAGNOSIS — R79.89 ABNORMAL LFTS: Primary | ICD-10-CM

## 2021-06-21 DIAGNOSIS — K73.2 CHRONIC ACTIVE HEPATITIS, NOT ELSEWHERE CLASSIFIED (HCC): ICD-10-CM

## 2021-06-21 DIAGNOSIS — Z86.010 PERSONAL HISTORY OF COLONIC POLYPS: ICD-10-CM

## 2021-06-21 DIAGNOSIS — Z11.59 ENCOUNTER FOR SCREENING FOR OTHER VIRAL DISEASES: ICD-10-CM

## 2021-06-21 PROBLEM — Z86.0100 PERSONAL HISTORY OF COLONIC POLYPS: Status: ACTIVE | Noted: 2021-06-21

## 2021-06-21 PROCEDURE — 99204 OFFICE O/P NEW MOD 45 MIN: CPT | Performed by: INTERNAL MEDICINE

## 2021-06-21 NOTE — LETTER
June 21, 2021     Brody Subramanian MD  [address] [phone]    Patient: Emely Cordova   YOB: 1960   Date of Visit: 6/21/2021       Dear Dr Nicholas Curtis: Thank you for referring Ladan Ervin to me for evaluation  Below are my notes for this consultation  If you have questions, please do not hesitate to call me  I look forward to following your patient along with you  Sincerely,        Jena Arias MD        CC: No Recipients  Jena Arias MD  6/21/2021  5:07 PM  Sign when Signing Visit  [id number] - Outpatient Consultation  Emely Cordova 64 y o  female MRN: [id number]  Encounter: [id number]          ASSESSMENT AND PLAN:      1  Abnormal LFTs  Chronic increased LFTs  Fatty liver? Other causes? Malignancy (lymphoma)? 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    Concerned about possible cirrhosis with hepatosplenomegaly  - Hepatic function panel; Future  - Protime-INR; Future  - Hepatitis C antibody; Future  - Hepatitis B surface antigen; Future  - Hepatitis B surface antibody; Future  - Hepatitis A antibody, total; Future  - HCV FIBROSURE; Future  - Fe+TIBC+Levy; Future  - KO w/Reflex; Future  - Antimitochondrial antibody; Future  - Celiac Disease Antibody Profile; Future  - Ceruloplasmin; Future  - Anti-smooth muscle antibody, IgG; Future  - Alpha-1-antitrypsin; Future    -  Will call her after I have a chance to review the blood work and decide what the next step is  Ultimately she may need a liver biopsy if there is any concern/doubt about the diagnosis        2  Personal history of colonic polyps  3  Father with colon cancer  Last colonoscopy April 2019  Due for follow-up 2024    ______________________________________________________________________    HPI:  The patient is seen in the office today referred from the emergency room and PCP secondary to abnormal LFTs    Patient presented to the emergency room with lower quadrant abdominal pain and was diagnosed with the urinary tract infection  She is currently on antibiotics  Part of her workup included blood work which revealed an AST of 122, ALT 57, alk-phos 129, and bilirubin 1 7  An ultrasound and CT scan showed pedis splenomegaly  It was recommended that she follow up with GI  She reports that she has been told that she had increased liver function tests in the past when she was receiving infusion treatment at [facility] for her persistent CSF leak  Reviewing the computer her LFTs were abnormal in 2019  She thinks she was told she had fatty liver in the past   She denies any significant upper abdominal pain  She denies any scleral icterus or dark urine  She has to drink in her younger days but due to her ongoing issues with this chronic pain and CSF leak she has not been drinking consistently  Her weight is relatively stable  She moves her bowels regularly  She denies any GI bleeding  Her last colonoscopy was 2019  REVIEW OF SYSTEMS:    CONSTITUTIONAL: Denies any fever, chills, rigors, and weight loss  Chronic headaches and pain  HEENT: No earache or tinnitus  Denies hearing loss or visual disturbances  CARDIOVASCULAR: No chest pain or palpitations  RESPIRATORY: Denies any cough, hemoptysis, shortness of breath or dyspnea on exertion  GASTROINTESTINAL: As noted in the History of Present Illness  GENITOURINARY: No problems with urination  Denies any hematuria or dysuria  NEUROLOGIC: No dizziness or vertigo, denies headaches  MUSCULOSKELETAL: Denies any muscle or joint pain  SKIN: Denies skin rashes or itching  ENDOCRINE: Denies excessive thirst  Denies intolerance to heat or cold  PSYCHOSOCIAL: Denies depression or anxiety  Denies any recent memory loss         Historical Information   Past Medical History:   Diagnosis Date    Concussion     CSF leak      Past Surgical History:   Procedure Laterality Date    ABLATION SOFT TISSUE      BREAST LUMPECTOMY  LAPAROSCOPY FOR ECTOPIC PREGNANCY      SINUS SURGERY      TONSILECTOMY AND ADNOIDECTOMY       Social History   Social History     Substance and Sexual Activity   Alcohol Use Yes    Comment: socially, had glass of wine today     Social History     Substance and Sexual Activity   Drug Use Not Currently    Comment: CBD     Social History     Tobacco Use   Smoking Status Former Smoker    Packs/day: 1 00    Types: Cigarettes   Smokeless Tobacco Never Used     Family History   Problem Relation Age of Onset    Colon cancer Father     Breast cancer Sister        Meds/Allergies       Current Outpatient Medications:     amLODIPine (NORVASC) 10 mg tablet    B-D 3CC LUER-CLARA SYR 25GX1" 25G X 1" 3 ML MISC    candesartan (ATACAND) 16 mg tablet    cephalexin (KEFLEX) 250 mg capsule    clindamycin-benzoyl peroxide (BENZACLIN) gel    clonazePAM (KlonoPIN) 0 5 mg tablet    dihydroergotamine (DHE) 1 mg/mL    Filter Needles 18G X 1-1/2" MISC    ketorolac (TORADOL) 30 mg/mL injection    mexiletine (MEXITIL) 150 mg capsule    naproxen sodium (ANAPROX) 550 mg tablet    ondansetron (ZOFRAN) 8 mg tablet    ondansetron (ZOFRAN-ODT) 4 mg disintegrating tablet    promethazine (PHENERGAN) 25 mg tablet    RA VITAMIN D-3 25 MCG (1000 UT) tablet    Saline (AYR SALINE NASAL DROPS) 0 65 % (Soln) SOLN    spironolactone (ALDACTONE) 25 mg tablet    traMADol (ULTRAM) 50 mg tablet    Allergies   Allergen Reactions    Codeine Other (See Comments)     Can not take with current medications    Other reaction(s): Other (See Comments)  GI upset  GI Upset             Objective     Blood pressure 140/90, height 5' 6" (1 676 m), weight 77 1 kg (170 lb), not currently breastfeeding  Body mass index is 27 44 kg/m²          PHYSICAL EXAM:      General Appearance:   Alert, cooperative, no distress   HEENT:   Normocephalic, atraumatic, anicteric      Neck:  Supple, symmetrical, trachea midline   Lungs:   Clear to auscultation bilaterally; no rales, rhonchi or wheezing; respirations unlabored    Heart[de-identified]   Regular rate and rhythm; no murmur, rub, or gallop  Abdomen:   Soft, non-tender, non-distended; normal bowel sounds; no masses,  Significant hepatosplenomegaly    Genitalia:   Deferred    Rectal:   Deferred    Extremities:  No cyanosis, clubbing or edema    Pulses:  2+ and symmetric    Skin:  No jaundice, rashes, or lesions    Lymph nodes:  No palpable cervical lymphadenopathy        Lab Results:   No visits with results within 1 Day(s) from this visit     Latest known visit with results is:   Admission on 06/16/2021, Discharged on 06/16/2021   Component Date Value    WBC 06/16/2021 5 11     RBC 06/16/2021 3 47*    Hemoglobin 06/16/2021 13 2     Hematocrit 06/16/2021 38 8     MCV 06/16/2021 112*    MCH 06/16/2021 38 0*    MCHC 06/16/2021 34 0     RDW 06/16/2021 14 5     MPV 06/16/2021 10 2     Platelets 36/69/6091 76*    nRBC 06/16/2021 0     Neutrophils Relative 06/16/2021 62     Immat GRANS % 06/16/2021 0     Lymphocytes Relative 06/16/2021 26     Monocytes Relative 06/16/2021 7     Eosinophils Relative 06/16/2021 4     Basophils Relative 06/16/2021 1     Neutrophils Absolute 06/16/2021 3 13     Immature Grans Absolute 06/16/2021 0 01     Lymphocytes Absolute 06/16/2021 1 34     Monocytes Absolute 06/16/2021 0 36     Eosinophils Absolute 06/16/2021 0 20     Basophils Absolute 06/16/2021 0 07     Sodium 06/16/2021 139     Potassium 06/16/2021 4 3     Chloride 06/16/2021 101     CO2 06/16/2021 27     ANION GAP 06/16/2021 11     BUN 06/16/2021 8     Creatinine 06/16/2021 0 89     Glucose 06/16/2021 132     Calcium 06/16/2021 9 2     AST 06/16/2021 122*    ALT 06/16/2021 57     Alkaline Phosphatase 06/16/2021 129*    Total Protein 06/16/2021 8 6*    Albumin 06/16/2021 3 8     Total Bilirubin 06/16/2021 1 70*    eGFR 06/16/2021 72     Lipase 06/16/2021 59*    Color, UA 06/16/2021 [place]    [place], UA 06/16/2021 Clear     Specific Gravity, UA 06/16/2021 1 025     pH, UA 06/16/2021 6 0     Leukocytes, UA 06/16/2021 Negative     Nitrite, UA 06/16/2021 Positive*    Protein, UA 06/16/2021 Trace*    Glucose, UA 06/16/2021 Negative     Ketones, UA 06/16/2021 Negative     Urobilinogen, UA 06/16/2021 1 0     Bilirubin, UA 06/16/2021 Interference- unable to analyze*    Blood, UA 06/16/2021 Negative     RBC, UA 06/16/2021 None Seen     WBC, UA 06/16/2021 0-1     Epithelial Cells 06/16/2021 Occasional     Bacteria, UA 06/16/2021 Moderate*    Hyaline Casts, UA 06/16/2021 2-4*         Radiology Results:   US abdomen limited    Result Date: 6/16/2021  Narrative: RIGHT UPPER QUADRANT ULTRASOUND INDICATION:    r/o cholecystitis  COMPARISON:  Compared with 6/16/2021 TECHNIQUE:   Real-time ultrasound of the right upper quadrant was performed with a curvilinear transducer with both volumetric sweeps and still imaging techniques  FINDINGS: PANCREAS:  Visualized portions of the pancreas are within normal limits  AORTA AND IVC:  Visualized portions are normal for patient age  LIVER: Size:  Mildly enlarged  The liver measures 23 cm in the midclavicular line  Contour:  Surface contour is smooth  Parenchyma:  Echogenicity and echotexture are within normal limits  No evidence of suspicious mass  Limited imaging of the main portal vein shows it to be patent and hepatopetal   BILIARY: Gallbladder is distended measuring 11 cm  No intrahepatic biliary dilatation  CBD measures 5 mm  No choledocholithiasis  KIDNEY: Right kidney measures 10 2 x 3 2 x 5 7 cm  Within normal limits  ASCITES:   None  Impression: Distended gallbladder  No acute finding seen  Workstation performed: [id number]     CT abdomen pelvis with contrast    Result Date: 6/16/2021  Narrative: CT ABDOMEN AND PELVIS WITH IV CONTRAST INDICATION:   Abdominal pain, acute, nonlocalized abdominal pain  COMPARISON:  None   TECHNIQUE:  CT examination of the abdomen and pelvis was performed  Axial, sagittal, and coronal 2D reformatted images were created from the source data and submitted for interpretation  Radiation dose length product (DLP) for this visit:  567 mGy-cm   This examination, like all CT scans performed in the [facility], was performed utilizing techniques to minimize radiation dose exposure, including the use of iterative reconstruction and automated exposure control  IV Contrast:  100 mL of iohexol (OMNIPAQUE) Enteric Contrast:  Enteric contrast was not administered  FINDINGS: ABDOMEN LOWER CHEST:  Coronary artery calcification is noted  LIVER/BILIARY TREE:  Enlarged liver measuring over 24 cm in length  The liver appears diffusely heterogeneous and low in attenuation suggestive of diffuse fatty change  Cannot exclude a subtle infiltrative process  Dedicated MRI with contrast recommended  GALLBLADDER:  No calcified gallstones  No pericholecystic inflammatory change  SPLEEN:  Mildly enlarged measuring 16 cm length  PANCREAS:  Unremarkable  ADRENAL GLANDS:  Unremarkable  KIDNEYS/URETERS:  Unremarkable  No hydronephrosis  STOMACH AND BOWEL:  There is colonic diverticulosis without evidence of acute diverticulitis  APPENDIX:  No findings to suggest appendicitis  ABDOMINOPELVIC CAVITY:  No ascites  No pneumoperitoneum  No lymphadenopathy  VESSELS:  Intra-abdominal perihepatic vessels with suggestion of early recanalization of the umbilical vein  PELVIS REPRODUCTIVE ORGANS:  Unremarkable for patient's age  URINARY BLADDER:  Decompressed  Limited in evaluation  ABDOMINAL WALL/INGUINAL REGIONS:  Unremarkable  OSSEOUS STRUCTURES:  Moderate compression fracture of L2 vertebral body of indeterminate age but likely old  Impression: 1  Hepatosplenomegaly  The liver appears diffusely heterogeneous in appearance which most likely represents fatty infiltration  A subtle infiltrating process is unable to be excluded    Follow-up nonemergent MRI with contrast recommended  In addition,  there is questioned perihepatic vascular structures suspicious for early recanalization of the umbilical vein  No ascites is seen  2   Moderate compression fracture of the L2 vertebral body  The study was marked in [facility] for immediate notification   Workstation performed: [id number]

## 2021-06-21 NOTE — PROGRESS NOTES
[id number] - Outpatient Consultation  Ant Crawley 64 y o  female MRN: [id number]  Encounter: [id number]          ASSESSMENT AND PLAN:      1  Abnormal LFTs  Chronic increased LFTs  Fatty liver? Other causes? Malignancy (lymphoma)? 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    Concerned about possible cirrhosis with hepatosplenomegaly  - Hepatic function panel; Future  - Protime-INR; Future  - Hepatitis C antibody; Future  - Hepatitis B surface antigen; Future  - Hepatitis B surface antibody; Future  - Hepatitis A antibody, total; Future  - HCV FIBROSURE; Future  - Fe+TIBC+Levy; Future  - KO w/Reflex; Future  - Antimitochondrial antibody; Future  - Celiac Disease Antibody Profile; Future  - Ceruloplasmin; Future  - Anti-smooth muscle antibody, IgG; Future  - Alpha-1-antitrypsin; Future    -  Will call her after I have a chance to review the blood work and decide what the next step is  Ultimately she may need a liver biopsy if there is any concern/doubt about the diagnosis        2  Personal history of colonic polyps  3  Father with colon cancer  Last colonoscopy April 2019  Due for follow-up 2024    ______________________________________________________________________    HPI:  The patient is seen in the office today referred from the emergency room and PCP secondary to abnormal LFTs  Patient presented to the emergency room with lower quadrant abdominal pain and was diagnosed with the urinary tract infection  She is currently on antibiotics  Part of her workup included blood work which revealed an AST of 122, ALT 57, alk-phos 129, and bilirubin 1 7  An ultrasound and CT scan showed pedis splenomegaly  It was recommended that she follow up with GI  She reports that she has been told that she had increased liver function tests in the past when she was receiving infusion treatment at [facility] for her persistent CSF leak  Reviewing the computer her LFTs were abnormal in 2019  She thinks she was told she had fatty liver in the past   She denies any significant upper abdominal pain  She denies any scleral icterus or dark urine  She has to drink in her younger days but due to her ongoing issues with this chronic pain and CSF leak she has not been drinking consistently  Her weight is relatively stable  She moves her bowels regularly  She denies any GI bleeding  Her last colonoscopy was 2019  REVIEW OF SYSTEMS:    CONSTITUTIONAL: Denies any fever, chills, rigors, and weight loss  Chronic headaches and pain  HEENT: No earache or tinnitus  Denies hearing loss or visual disturbances  CARDIOVASCULAR: No chest pain or palpitations  RESPIRATORY: Denies any cough, hemoptysis, shortness of breath or dyspnea on exertion  GASTROINTESTINAL: As noted in the History of Present Illness  GENITOURINARY: No problems with urination  Denies any hematuria or dysuria  NEUROLOGIC: No dizziness or vertigo, denies headaches  MUSCULOSKELETAL: Denies any muscle or joint pain  SKIN: Denies skin rashes or itching  ENDOCRINE: Denies excessive thirst  Denies intolerance to heat or cold  PSYCHOSOCIAL: Denies depression or anxiety  Denies any recent memory loss         Historical Information   Past Medical History:   Diagnosis Date    Concussion     CSF leak      Past Surgical History:   Procedure Laterality Date    ABLATION SOFT TISSUE      BREAST LUMPECTOMY      LAPAROSCOPY FOR ECTOPIC PREGNANCY      SINUS SURGERY      TONSILECTOMY AND ADNOIDECTOMY       Social History   Social History     Substance and Sexual Activity   Alcohol Use Yes    Comment: socially, had glass of wine today     Social History     Substance and Sexual Activity   Drug Use Not Currently    Comment: CBD     Social History     Tobacco Use   Smoking Status Former Smoker    Packs/day: 1 00    Types: Cigarettes   Smokeless Tobacco Never Used     Family History   Problem Relation Age of Onset    Colon cancer Father     Breast cancer Sister        Meds/Allergies       Current Outpatient Medications:     amLODIPine (NORVASC) 10 mg tablet    B-D 3CC LUER-CLARA SYR 25GX1" 25G X 1" 3 ML MISC    candesartan (ATACAND) 16 mg tablet    cephalexin (KEFLEX) 250 mg capsule    clindamycin-benzoyl peroxide (BENZACLIN) gel    clonazePAM (KlonoPIN) 0 5 mg tablet    dihydroergotamine (DHE) 1 mg/mL    Filter Needles 18G X 1-1/2" MISC    ketorolac (TORADOL) 30 mg/mL injection    mexiletine (MEXITIL) 150 mg capsule    naproxen sodium (ANAPROX) 550 mg tablet    ondansetron (ZOFRAN) 8 mg tablet    ondansetron (ZOFRAN-ODT) 4 mg disintegrating tablet    promethazine (PHENERGAN) 25 mg tablet    RA VITAMIN D-3 25 MCG (1000 UT) tablet    Saline (AYR SALINE NASAL DROPS) 0 65 % (Soln) SOLN    spironolactone (ALDACTONE) 25 mg tablet    traMADol (ULTRAM) 50 mg tablet    Allergies   Allergen Reactions    Codeine Other (See Comments)     Can not take with current medications    Other reaction(s): Other (See Comments)  GI upset  GI Upset             Objective     Blood pressure 140/90, height 5' 6" (1 676 m), weight 77 1 kg (170 lb), not currently breastfeeding  Body mass index is 27 44 kg/m²  PHYSICAL EXAM:      General Appearance:   Alert, cooperative, no distress   HEENT:   Normocephalic, atraumatic, anicteric      Neck:  Supple, symmetrical, trachea midline   Lungs:   Clear to auscultation bilaterally; no rales, rhonchi or wheezing; respirations unlabored    Heart[de-identified]   Regular rate and rhythm; no murmur, rub, or gallop  Abdomen:   Soft, non-tender, non-distended; normal bowel sounds; no masses,  Significant hepatosplenomegaly    Genitalia:   Deferred    Rectal:   Deferred    Extremities:  No cyanosis, clubbing or edema    Pulses:  2+ and symmetric    Skin:  No jaundice, rashes, or lesions    Lymph nodes:  No palpable cervical lymphadenopathy        Lab Results:   No visits with results within 1 Day(s) from this visit     Latest known visit with results is:   Admission on 06/16/2021, Discharged on 06/16/2021   Component Date Value    WBC 06/16/2021 5 11     RBC 06/16/2021 3 47*    Hemoglobin 06/16/2021 13 2     Hematocrit 06/16/2021 38 8     MCV 06/16/2021 112*    MCH 06/16/2021 38 0*    MCHC 06/16/2021 34 0     RDW 06/16/2021 14 5     MPV 06/16/2021 10 2     Platelets 24/53/5468 76*    nRBC 06/16/2021 0     Neutrophils Relative 06/16/2021 62     Immat GRANS % 06/16/2021 0     Lymphocytes Relative 06/16/2021 26     Monocytes Relative 06/16/2021 7     Eosinophils Relative 06/16/2021 4     Basophils Relative 06/16/2021 1     Neutrophils Absolute 06/16/2021 3 13     Immature Grans Absolute 06/16/2021 0 01     Lymphocytes Absolute 06/16/2021 1 34     Monocytes Absolute 06/16/2021 0 36     Eosinophils Absolute 06/16/2021 0 20     Basophils Absolute 06/16/2021 0 07     Sodium 06/16/2021 139     Potassium 06/16/2021 4 3     Chloride 06/16/2021 101     CO2 06/16/2021 27     ANION GAP 06/16/2021 11     BUN 06/16/2021 8     Creatinine 06/16/2021 0 89     Glucose 06/16/2021 132     Calcium 06/16/2021 9 2     AST 06/16/2021 122*    ALT 06/16/2021 57     Alkaline Phosphatase 06/16/2021 129*    Total Protein 06/16/2021 8 6*    Albumin 06/16/2021 3 8     Total Bilirubin 06/16/2021 1 70*    eGFR 06/16/2021 72     Lipase 06/16/2021 59*    Color, UA 06/16/2021 Orange     Clarity, UA 06/16/2021 Clear     Specific Gravity, UA 06/16/2021 1 025     pH, UA 06/16/2021 6 0     Leukocytes, UA 06/16/2021 Negative     Nitrite, UA 06/16/2021 Positive*    Protein, UA 06/16/2021 Trace*    Glucose, UA 06/16/2021 Negative     Ketones, UA 06/16/2021 Negative     Urobilinogen, UA 06/16/2021 1 0     Bilirubin, UA 06/16/2021 Interference- unable to analyze*    Blood, UA 06/16/2021 Negative     RBC, UA 06/16/2021 None Seen     WBC, UA 06/16/2021 0-1     Epithelial Cells 06/16/2021 Occasional     Bacteria, UA 06/16/2021 Moderate*  RONNY Lou 06/16/2021 2-4*         Radiology Results:   US abdomen limited    Result Date: 6/16/2021  Narrative: RIGHT UPPER QUADRANT ULTRASOUND INDICATION:    r/o cholecystitis  COMPARISON:  Compared with 6/16/2021 TECHNIQUE:   Real-time ultrasound of the right upper quadrant was performed with a curvilinear transducer with both volumetric sweeps and still imaging techniques  FINDINGS: PANCREAS:  Visualized portions of the pancreas are within normal limits  AORTA AND IVC:  Visualized portions are normal for patient age  LIVER: Size:  Mildly enlarged  The liver measures 23 cm in the midclavicular line  Contour:  Surface contour is smooth  Parenchyma:  Echogenicity and echotexture are within normal limits  No evidence of suspicious mass  Limited imaging of the main portal vein shows it to be patent and hepatopetal   BILIARY: Gallbladder is distended measuring 11 cm  No intrahepatic biliary dilatation  CBD measures 5 mm  No choledocholithiasis  KIDNEY: Right kidney measures 10 2 x 3 2 x 5 7 cm  Within normal limits  ASCITES:   None  Impression: Distended gallbladder  No acute finding seen  Workstation performed: [id number]     CT abdomen pelvis with contrast    Result Date: 6/16/2021  Narrative: CT ABDOMEN AND PELVIS WITH IV CONTRAST INDICATION:   Abdominal pain, acute, nonlocalized abdominal pain  COMPARISON:  None  TECHNIQUE:  CT examination of the abdomen and pelvis was performed  Axial, sagittal, and coronal 2D reformatted images were created from the source data and submitted for interpretation  Radiation dose length product (DLP) for this visit:  567 mGy-cm   This examination, like all CT scans performed in the [facility], was performed utilizing techniques to minimize radiation dose exposure, including the use of iterative reconstruction and automated exposure control  IV Contrast:  100 mL of iohexol (OMNIPAQUE) Enteric Contrast:  Enteric contrast was not administered  FINDINGS: ABDOMEN LOWER CHEST:  Coronary artery calcification is noted  LIVER/BILIARY TREE:  Enlarged liver measuring over 24 cm in length  The liver appears diffusely heterogeneous and low in attenuation suggestive of diffuse fatty change  Cannot exclude a subtle infiltrative process  Dedicated MRI with contrast recommended  GALLBLADDER:  No calcified gallstones  No pericholecystic inflammatory change  SPLEEN:  Mildly enlarged measuring 16 cm length  PANCREAS:  Unremarkable  ADRENAL GLANDS:  Unremarkable  KIDNEYS/URETERS:  Unremarkable  No hydronephrosis  STOMACH AND BOWEL:  There is colonic diverticulosis without evidence of acute diverticulitis  APPENDIX:  No findings to suggest appendicitis  ABDOMINOPELVIC CAVITY:  No ascites  No pneumoperitoneum  No lymphadenopathy  VESSELS:  Intra-abdominal perihepatic vessels with suggestion of early recanalization of the umbilical vein  PELVIS REPRODUCTIVE ORGANS:  Unremarkable for patient's age  URINARY BLADDER:  Decompressed  Limited in evaluation  ABDOMINAL WALL/INGUINAL REGIONS:  Unremarkable  OSSEOUS STRUCTURES:  Moderate compression fracture of L2 vertebral body of indeterminate age but likely old  Impression: 1  Hepatosplenomegaly  The liver appears diffusely heterogeneous in appearance which most likely represents fatty infiltration  A subtle infiltrating process is unable to be excluded  Follow-up nonemergent MRI with contrast recommended  In addition,  there is questioned perihepatic vascular structures suspicious for early recanalization of the umbilical vein  No ascites is seen  2   Moderate compression fracture of the L2 vertebral body  The study was marked in [facility] for immediate notification   Workstation performed: [id number]

## 2021-06-21 NOTE — PATIENT INSTRUCTIONS
Blood work as ordered  Will call with results in 1-2 weeks    Will discuss on the phone the next step in workup

## 2021-06-22 ENCOUNTER — TELEPHONE (OUTPATIENT)
Dept: PHYSICAL THERAPY | Facility: OTHER | Age: 61
End: 2021-06-22

## 2021-06-22 NOTE — TELEPHONE ENCOUNTER
Call placed to the patient per [facility] referral     Patient states she sees Dr [facility] for her back pain and will be contacting his office  Patient appreciative for the call  Referral Closed

## 2021-10-17 ENCOUNTER — OFFICE VISIT (OUTPATIENT)
Dept: URGENT CARE | Facility: CLINIC | Age: 61
End: 2021-10-17
Payer: COMMERCIAL

## 2021-10-17 VITALS
BODY MASS INDEX: 26.18 KG/M2 | RESPIRATION RATE: 16 BRPM | OXYGEN SATURATION: 94 % | HEART RATE: 84 BPM | TEMPERATURE: 98.8 F | WEIGHT: 162.2 LBS

## 2021-10-17 DIAGNOSIS — J01.90 ACUTE SINUSITIS, RECURRENCE NOT SPECIFIED, UNSPECIFIED LOCATION: Primary | ICD-10-CM

## 2021-10-17 DIAGNOSIS — Z11.59 SPECIAL SCREENING EXAMINATION FOR VIRAL DISEASE: ICD-10-CM

## 2021-10-17 PROCEDURE — U0005 INFEC AGEN DETEC AMPLI PROBE: HCPCS | Performed by: PHYSICIAN ASSISTANT

## 2021-10-17 PROCEDURE — G0463 HOSPITAL OUTPT CLINIC VISIT: HCPCS | Performed by: PHYSICIAN ASSISTANT

## 2021-10-17 PROCEDURE — 99213 OFFICE O/P EST LOW 20 MIN: CPT | Performed by: PHYSICIAN ASSISTANT

## 2021-10-17 PROCEDURE — U0003 INFECTIOUS AGENT DETECTION BY NUCLEIC ACID (DNA OR RNA); SEVERE ACUTE RESPIRATORY SYNDROME CORONAVIRUS 2 (SARS-COV-2) (CORONAVIRUS DISEASE [COVID-19]), AMPLIFIED PROBE TECHNIQUE, MAKING USE OF HIGH THROUGHPUT TECHNOLOGIES AS DESCRIBED BY CMS-2020-01-R: HCPCS | Performed by: PHYSICIAN ASSISTANT

## 2021-10-17 RX ORDER — AMOXICILLIN AND CLAVULANATE POTASSIUM 875; 125 MG/1; MG/1
1 TABLET, FILM COATED ORAL EVERY 12 HOURS SCHEDULED
Qty: 14 TABLET | Refills: 0 | Status: SHIPPED | OUTPATIENT
Start: 2021-10-17 | End: 2021-10-24

## 2021-10-17 RX ORDER — GABAPENTIN 300 MG/1
300 CAPSULE ORAL 2 TIMES DAILY
COMMUNITY
Start: 2021-08-04 | End: 2022-04-28 | Stop reason: CLARIF

## 2021-10-18 LAB — SARS-COV-2 RNA RESP QL NAA+PROBE: NEGATIVE

## 2022-04-28 ENCOUNTER — APPOINTMENT (EMERGENCY)
Dept: CT IMAGING | Facility: HOSPITAL | Age: 62
DRG: 432 | End: 2022-04-28
Payer: COMMERCIAL

## 2022-04-28 ENCOUNTER — HOSPITAL ENCOUNTER (INPATIENT)
Facility: HOSPITAL | Age: 62
LOS: 14 days | Discharge: ANOTHER HOSPITAL | DRG: 432 | End: 2022-05-12
Attending: EMERGENCY MEDICINE | Admitting: HOSPITALIST
Payer: COMMERCIAL

## 2022-04-28 ENCOUNTER — APPOINTMENT (EMERGENCY)
Dept: RADIOLOGY | Facility: HOSPITAL | Age: 62
DRG: 432 | End: 2022-04-28
Payer: COMMERCIAL

## 2022-04-28 DIAGNOSIS — T14.8XXA INTRAMUSCULAR HEMATOMA: ICD-10-CM

## 2022-04-28 DIAGNOSIS — J90 PLEURAL EFFUSION ON LEFT: ICD-10-CM

## 2022-04-28 DIAGNOSIS — N17.9 ACUTE KIDNEY FAILURE (HCC): ICD-10-CM

## 2022-04-28 DIAGNOSIS — D64.9 ANEMIA: Primary | ICD-10-CM

## 2022-04-28 DIAGNOSIS — R29.898 LEFT LEG WEAKNESS: ICD-10-CM

## 2022-04-28 DIAGNOSIS — K74.60 CIRRHOSIS OF LIVER WITH ASCITES, UNSPECIFIED HEPATIC CIRRHOSIS TYPE (HCC): ICD-10-CM

## 2022-04-28 DIAGNOSIS — K74.60 LIVER CIRRHOSIS (HCC): ICD-10-CM

## 2022-04-28 DIAGNOSIS — E80.6 HYPERBILIRUBINEMIA: ICD-10-CM

## 2022-04-28 DIAGNOSIS — M25.552 LEFT HIP PAIN: ICD-10-CM

## 2022-04-28 DIAGNOSIS — K92.1 GASTROINTESTINAL HEMORRHAGE WITH MELENA: ICD-10-CM

## 2022-04-28 DIAGNOSIS — R18.8 CIRRHOSIS OF LIVER WITH ASCITES, UNSPECIFIED HEPATIC CIRRHOSIS TYPE (HCC): ICD-10-CM

## 2022-04-28 PROBLEM — K92.2 GI BLEED: Status: ACTIVE | Noted: 2022-04-28

## 2022-04-28 PROBLEM — D62 ACUTE BLOOD LOSS ANEMIA: Status: ACTIVE | Noted: 2022-04-28

## 2022-04-28 PROBLEM — E87.1 HYPONATREMIA: Status: ACTIVE | Noted: 2022-04-28

## 2022-04-28 PROBLEM — D69.6 THROMBOCYTOPENIA (HCC): Status: ACTIVE | Noted: 2022-04-28

## 2022-04-28 LAB
ABO GROUP BLD: NORMAL
ABO GROUP BLD: NORMAL
ALBUMIN SERPL BCP-MCNC: 2.1 G/DL (ref 3.5–5)
ALP SERPL-CCNC: 74 U/L (ref 46–116)
ALT SERPL W P-5'-P-CCNC: 30 U/L (ref 12–78)
ANION GAP SERPL CALCULATED.3IONS-SCNC: 9 MMOL/L (ref 4–13)
APPEARANCE FLD: CLEAR
APTT PPP: 53 SECONDS (ref 23–37)
AST SERPL W P-5'-P-CCNC: 132 U/L (ref 5–45)
BACTERIA UR QL AUTO: NORMAL /HPF
BASOPHILS # BLD AUTO: 0.05 THOUSANDS/ΜL (ref 0–0.1)
BASOPHILS NFR BLD AUTO: 1 % (ref 0–1)
BILIRUB SERPL-MCNC: 5.69 MG/DL (ref 0.2–1)
BILIRUB UR QL STRIP: ABNORMAL
BLD GP AB SCN SERPL QL: NEGATIVE
BUN SERPL-MCNC: 9 MG/DL (ref 5–25)
CALCIUM ALBUM COR SERPL-MCNC: 9.5 MG/DL (ref 8.3–10.1)
CALCIUM SERPL-MCNC: 8 MG/DL (ref 8.3–10.1)
CHLORIDE SERPL-SCNC: 97 MMOL/L (ref 100–108)
CLARITY UR: ABNORMAL
CO2 SERPL-SCNC: 25 MMOL/L (ref 21–32)
COLOR FLD: YELLOW
COLOR UR: ABNORMAL
CREAT SERPL-MCNC: 0.99 MG/DL (ref 0.6–1.3)
EOSINOPHIL # BLD AUTO: 0.01 THOUSAND/ΜL (ref 0–0.61)
EOSINOPHIL NFR BLD AUTO: 0 % (ref 0–6)
ERYTHROCYTE [DISTWIDTH] IN BLOOD BY AUTOMATED COUNT: 16.2 % (ref 11.6–15.1)
FERRITIN SERPL-MCNC: 1238 NG/ML (ref 8–388)
GFR SERPL CREATININE-BSD FRML MDRD: 62 ML/MIN/1.73SQ M
GLUCOSE SERPL-MCNC: 131 MG/DL (ref 65–140)
GLUCOSE UR STRIP-MCNC: NEGATIVE MG/DL
GRAM STN SPEC: NORMAL
HCT VFR BLD AUTO: 16.3 % (ref 34.8–46.1)
HGB BLD-MCNC: 5.5 G/DL (ref 11.5–15.4)
HGB BLD-MCNC: 7 G/DL (ref 11.5–15.4)
HGB UR QL STRIP.AUTO: ABNORMAL
HISTIOCYTES NFR FLD: 53 %
IMM GRANULOCYTES # BLD AUTO: 0.1 THOUSAND/UL (ref 0–0.2)
IMM GRANULOCYTES NFR BLD AUTO: 2 % (ref 0–2)
INR PPP: 2.22 (ref 0.84–1.19)
IRON SATN MFR SERPL: 66 % (ref 15–50)
IRON SERPL-MCNC: 97 UG/DL (ref 50–170)
KETONES UR STRIP-MCNC: ABNORMAL MG/DL
LEUKOCYTE ESTERASE UR QL STRIP: ABNORMAL
LIPASE SERPL-CCNC: 76 U/L (ref 73–393)
LYMPHOCYTES # BLD AUTO: 0.89 THOUSANDS/ΜL (ref 0.6–4.47)
LYMPHOCYTES NFR BLD AUTO: 14 % (ref 14–44)
LYMPHOCYTES NFR BLD AUTO: 9 %
MCH RBC QN AUTO: 44.7 PG (ref 26.8–34.3)
MCHC RBC AUTO-ENTMCNC: 33.7 G/DL (ref 31.4–37.4)
MCV RBC AUTO: 133 FL (ref 82–98)
MONO+MESO NFR FLD MANUAL: 16 %
MONOCYTES # BLD AUTO: 0.36 THOUSAND/ΜL (ref 0.17–1.22)
MONOCYTES NFR BLD AUTO: 6 % (ref 4–12)
MONOCYTES NFR BLD AUTO: 9 %
NEUTROPHILS # BLD AUTO: 4.79 THOUSANDS/ΜL (ref 1.85–7.62)
NEUTS SEG NFR BLD AUTO: 13 %
NEUTS SEG NFR BLD AUTO: 77 % (ref 43–75)
NITRITE UR QL STRIP: POSITIVE
NON-SQ EPI CELLS URNS QL MICRO: NORMAL /HPF
NRBC BLD AUTO-RTO: 0 /100 WBCS
PH UR STRIP.AUTO: 6 [PH]
PLATELET # BLD AUTO: 59 THOUSANDS/UL (ref 149–390)
PMV BLD AUTO: 10.9 FL (ref 8.9–12.7)
POTASSIUM SERPL-SCNC: 4.2 MMOL/L (ref 3.5–5.3)
PROT SERPL-MCNC: 7.5 G/DL (ref 6.4–8.2)
PROT UR STRIP-MCNC: NEGATIVE MG/DL
PROTHROMBIN TIME: 24 SECONDS (ref 11.6–14.5)
RBC # BLD AUTO: 1.23 MILLION/UL (ref 3.81–5.12)
RBC #/AREA URNS AUTO: NORMAL /HPF
RH BLD: POSITIVE
RH BLD: POSITIVE
SITE: NORMAL
SODIUM SERPL-SCNC: 131 MMOL/L (ref 136–145)
SP GR UR STRIP.AUTO: 1.01 (ref 1–1.03)
SPECIMEN EXPIRATION DATE: NORMAL
TIBC SERPL-MCNC: 146 UG/DL (ref 250–450)
TOTAL CELLS COUNTED SPEC: 100
UROBILINOGEN UR QL STRIP.AUTO: 1 E.U./DL
WBC # BLD AUTO: 6.2 THOUSAND/UL (ref 4.31–10.16)
WBC # FLD MANUAL: 87 /UL
WBC #/AREA URNS AUTO: NORMAL /HPF

## 2022-04-28 PROCEDURE — 86920 COMPATIBILITY TEST SPIN: CPT

## 2022-04-28 PROCEDURE — 99285 EMERGENCY DEPT VISIT HI MDM: CPT

## 2022-04-28 PROCEDURE — 86901 BLOOD TYPING SEROLOGIC RH(D): CPT

## 2022-04-28 PROCEDURE — 71045 X-RAY EXAM CHEST 1 VIEW: CPT

## 2022-04-28 PROCEDURE — 86850 RBC ANTIBODY SCREEN: CPT

## 2022-04-28 PROCEDURE — 49082 ABD PARACENTESIS: CPT | Performed by: EMERGENCY MEDICINE

## 2022-04-28 PROCEDURE — 30233N1 TRANSFUSION OF NONAUTOLOGOUS RED BLOOD CELLS INTO PERIPHERAL VEIN, PERCUTANEOUS APPROACH: ICD-10-PCS | Performed by: EMERGENCY MEDICINE

## 2022-04-28 PROCEDURE — 85025 COMPLETE CBC W/AUTO DIFF WBC: CPT

## 2022-04-28 PROCEDURE — 87205 SMEAR GRAM STAIN: CPT

## 2022-04-28 PROCEDURE — 89051 BODY FLUID CELL COUNT: CPT

## 2022-04-28 PROCEDURE — 36430 TRANSFUSION BLD/BLD COMPNT: CPT

## 2022-04-28 PROCEDURE — 94760 N-INVAS EAR/PLS OXIMETRY 1: CPT

## 2022-04-28 PROCEDURE — 85730 THROMBOPLASTIN TIME PARTIAL: CPT

## 2022-04-28 PROCEDURE — 36415 COLL VENOUS BLD VENIPUNCTURE: CPT

## 2022-04-28 PROCEDURE — P9016 RBC LEUKOCYTES REDUCED: HCPCS

## 2022-04-28 PROCEDURE — 83690 ASSAY OF LIPASE: CPT

## 2022-04-28 PROCEDURE — G1004 CDSM NDSC: HCPCS

## 2022-04-28 PROCEDURE — 74177 CT ABD & PELVIS W/CONTRAST: CPT

## 2022-04-28 PROCEDURE — 82728 ASSAY OF FERRITIN: CPT

## 2022-04-28 PROCEDURE — 83540 ASSAY OF IRON: CPT

## 2022-04-28 PROCEDURE — 80053 COMPREHEN METABOLIC PANEL: CPT

## 2022-04-28 PROCEDURE — 86900 BLOOD TYPING SEROLOGIC ABO: CPT

## 2022-04-28 PROCEDURE — 96374 THER/PROPH/DIAG INJ IV PUSH: CPT

## 2022-04-28 PROCEDURE — 96376 TX/PRO/DX INJ SAME DRUG ADON: CPT

## 2022-04-28 PROCEDURE — 80074 ACUTE HEPATITIS PANEL: CPT

## 2022-04-28 PROCEDURE — 81001 URINALYSIS AUTO W/SCOPE: CPT

## 2022-04-28 PROCEDURE — 96375 TX/PRO/DX INJ NEW DRUG ADDON: CPT

## 2022-04-28 PROCEDURE — 83550 IRON BINDING TEST: CPT

## 2022-04-28 PROCEDURE — 87040 BLOOD CULTURE FOR BACTERIA: CPT

## 2022-04-28 PROCEDURE — 0W9G3ZZ DRAINAGE OF PERITONEAL CAVITY, PERCUTANEOUS APPROACH: ICD-10-PCS | Performed by: EMERGENCY MEDICINE

## 2022-04-28 PROCEDURE — C9113 INJ PANTOPRAZOLE SODIUM, VIA: HCPCS

## 2022-04-28 PROCEDURE — 82272 OCCULT BLD FECES 1-3 TESTS: CPT

## 2022-04-28 PROCEDURE — 87070 CULTURE OTHR SPECIMN AEROBIC: CPT

## 2022-04-28 PROCEDURE — 99223 1ST HOSP IP/OBS HIGH 75: CPT | Performed by: HOSPITALIST

## 2022-04-28 PROCEDURE — 89050 BODY FLUID CELL COUNT: CPT

## 2022-04-28 PROCEDURE — 85610 PROTHROMBIN TIME: CPT

## 2022-04-28 PROCEDURE — 85018 HEMOGLOBIN: CPT

## 2022-04-28 RX ORDER — HYDROMORPHONE HCL/PF 1 MG/ML
0.5 SYRINGE (ML) INJECTION ONCE
Status: COMPLETED | OUTPATIENT
Start: 2022-04-28 | End: 2022-04-28

## 2022-04-28 RX ORDER — ONDANSETRON 2 MG/ML
4 INJECTION INTRAMUSCULAR; INTRAVENOUS ONCE
Status: COMPLETED | OUTPATIENT
Start: 2022-04-28 | End: 2022-04-28

## 2022-04-28 RX ORDER — HYDROMORPHONE HCL/PF 1 MG/ML
0.5 SYRINGE (ML) INJECTION
Status: DISCONTINUED | OUTPATIENT
Start: 2022-04-28 | End: 2022-05-12 | Stop reason: HOSPADM

## 2022-04-28 RX ORDER — PANTOPRAZOLE SODIUM 40 MG/1
40 INJECTION, POWDER, FOR SOLUTION INTRAVENOUS EVERY 12 HOURS
Status: DISCONTINUED | OUTPATIENT
Start: 2022-04-28 | End: 2022-05-12 | Stop reason: HOSPADM

## 2022-04-28 RX ORDER — LOSARTAN POTASSIUM 25 MG/1
100 TABLET ORAL DAILY
Status: DISCONTINUED | OUTPATIENT
Start: 2022-04-28 | End: 2022-04-30

## 2022-04-28 RX ORDER — GABAPENTIN 100 MG/1
100 CAPSULE ORAL 3 TIMES DAILY
Status: DISCONTINUED | OUTPATIENT
Start: 2022-04-28 | End: 2022-05-12 | Stop reason: HOSPADM

## 2022-04-28 RX ORDER — OXYCODONE HYDROCHLORIDE 5 MG/1
10 TABLET ORAL EVERY 4 HOURS PRN
Status: DISCONTINUED | OUTPATIENT
Start: 2022-04-28 | End: 2022-05-12 | Stop reason: HOSPADM

## 2022-04-28 RX ORDER — LIDOCAINE HYDROCHLORIDE 20 MG/ML
5 INJECTION, SOLUTION EPIDURAL; INFILTRATION; INTRACAUDAL; PERINEURAL ONCE
Status: COMPLETED | OUTPATIENT
Start: 2022-04-28 | End: 2022-04-28

## 2022-04-28 RX ORDER — OCTREOTIDE ACETATE 100 UG/ML
50 INJECTION, SOLUTION INTRAVENOUS; SUBCUTANEOUS ONCE
Status: COMPLETED | OUTPATIENT
Start: 2022-04-28 | End: 2022-04-28

## 2022-04-28 RX ORDER — ONDANSETRON 2 MG/ML
4 INJECTION INTRAMUSCULAR; INTRAVENOUS EVERY 4 HOURS PRN
Status: DISCONTINUED | OUTPATIENT
Start: 2022-04-28 | End: 2022-05-12 | Stop reason: HOSPADM

## 2022-04-28 RX ORDER — OXYCODONE HYDROCHLORIDE 5 MG/1
5 TABLET ORAL EVERY 4 HOURS PRN
Status: DISCONTINUED | OUTPATIENT
Start: 2022-04-28 | End: 2022-05-12 | Stop reason: HOSPADM

## 2022-04-28 RX ORDER — MAGNESIUM HYDROXIDE/ALUMINUM HYDROXICE/SIMETHICONE 120; 1200; 1200 MG/30ML; MG/30ML; MG/30ML
30 SUSPENSION ORAL EVERY 6 HOURS PRN
Status: DISCONTINUED | OUTPATIENT
Start: 2022-04-28 | End: 2022-05-12 | Stop reason: HOSPADM

## 2022-04-28 RX ORDER — AMOXICILLIN 250 MG
2 CAPSULE ORAL 2 TIMES DAILY PRN
Status: DISCONTINUED | OUTPATIENT
Start: 2022-04-28 | End: 2022-05-12 | Stop reason: HOSPADM

## 2022-04-28 RX ORDER — CEFTRIAXONE 1 G/50ML
1000 INJECTION, SOLUTION INTRAVENOUS DAILY
Status: COMPLETED | OUTPATIENT
Start: 2022-04-29 | End: 2022-05-05

## 2022-04-28 RX ORDER — CEFTRIAXONE 1 G/50ML
1000 INJECTION, SOLUTION INTRAVENOUS ONCE
Status: COMPLETED | OUTPATIENT
Start: 2022-04-28 | End: 2022-04-28

## 2022-04-28 RX ORDER — ACETAMINOPHEN 325 MG/1
650 TABLET ORAL EVERY 4 HOURS PRN
Status: DISCONTINUED | OUTPATIENT
Start: 2022-04-28 | End: 2022-05-12 | Stop reason: HOSPADM

## 2022-04-28 RX ORDER — FUROSEMIDE 10 MG/ML
20 INJECTION INTRAMUSCULAR; INTRAVENOUS DAILY
Status: DISCONTINUED | OUTPATIENT
Start: 2022-04-28 | End: 2022-04-30

## 2022-04-28 RX ORDER — HYDROMORPHONE HCL/PF 1 MG/ML
1 SYRINGE (ML) INJECTION ONCE
Status: COMPLETED | OUTPATIENT
Start: 2022-04-28 | End: 2022-04-28

## 2022-04-28 RX ADMIN — ONDANSETRON 4 MG: 2 INJECTION INTRAMUSCULAR; INTRAVENOUS at 14:04

## 2022-04-28 RX ADMIN — HYDROMORPHONE HYDROCHLORIDE 0.5 MG: 1 INJECTION, SOLUTION INTRAMUSCULAR; INTRAVENOUS; SUBCUTANEOUS at 20:03

## 2022-04-28 RX ADMIN — HYDROMORPHONE HYDROCHLORIDE 0.5 MG: 1 INJECTION, SOLUTION INTRAMUSCULAR; INTRAVENOUS; SUBCUTANEOUS at 21:32

## 2022-04-28 RX ADMIN — FUROSEMIDE 20 MG: 10 INJECTION, SOLUTION INTRAMUSCULAR; INTRAVENOUS at 18:03

## 2022-04-28 RX ADMIN — PANTOPRAZOLE SODIUM 40 MG: 40 INJECTION, POWDER, FOR SOLUTION INTRAVENOUS at 17:26

## 2022-04-28 RX ADMIN — ONDANSETRON 4 MG: 2 INJECTION INTRAMUSCULAR; INTRAVENOUS at 11:47

## 2022-04-28 RX ADMIN — HYDROMORPHONE HYDROCHLORIDE 0.5 MG: 1 INJECTION, SOLUTION INTRAMUSCULAR; INTRAVENOUS; SUBCUTANEOUS at 19:01

## 2022-04-28 RX ADMIN — ONDANSETRON 4 MG: 2 INJECTION INTRAMUSCULAR; INTRAVENOUS at 19:01

## 2022-04-28 RX ADMIN — LIDOCAINE HYDROCHLORIDE 5 ML: 20 INJECTION, SOLUTION EPIDURAL; INFILTRATION; INTRACAUDAL; PERINEURAL at 14:05

## 2022-04-28 RX ADMIN — CEFTRIAXONE 1000 MG: 1 INJECTION, SOLUTION INTRAVENOUS at 14:33

## 2022-04-28 RX ADMIN — IOHEXOL 100 ML: 350 INJECTION, SOLUTION INTRAVENOUS at 13:21

## 2022-04-28 RX ADMIN — HYDROMORPHONE HYDROCHLORIDE 1 MG: 1 INJECTION, SOLUTION INTRAMUSCULAR; INTRAVENOUS; SUBCUTANEOUS at 15:11

## 2022-04-28 RX ADMIN — OCTREOTIDE ACETATE 50 MCG: 100 INJECTION, SOLUTION INTRAVENOUS; SUBCUTANEOUS at 17:25

## 2022-04-28 RX ADMIN — HYDROMORPHONE HYDROCHLORIDE 0.5 MG: 1 INJECTION, SOLUTION INTRAMUSCULAR; INTRAVENOUS; SUBCUTANEOUS at 12:59

## 2022-04-28 RX ADMIN — OCTREOTIDE ACETATE 50 MCG/HR: 500 INJECTION, SOLUTION INTRAVENOUS; SUBCUTANEOUS at 17:29

## 2022-04-28 NOTE — ED PROVIDER NOTES
History  Chief Complaint   Patient presents with    Leg Pain     Pt reports left leg pain since sunday 4/24/22 when her dog jumped on her  Took tylenol #3 prior to arrival today  Patient is a 63 YOF with medical history significant for chronic CSF leak, hepatomegaly, lumbar radiculopathy who presents to the ED today with multiple complaints  She reports that she is having left hip pain that started a few days ago which she says is likely from her dog "jumping" on her  She reports increased difficulty bearing weight on the left hip that began this morning  Additionally she reports that she has increased nausea, abdominal discomfort, and abdominal swelling along with generalized weakness  She states that the abdominal swelling has been getting progressively worse over the last month  She has never previously gotten an abdominal paracentesis  Her partner is present who reports that her abdomen is more swollen than he's previously seen  He also reports that she's had a steadily decreasing appetite and is having more difficulty ambulating around the house than previously  She has had previous UTIs and states she's had increased urinary urgency over the last several days, denies burning sensation or blood in urine  She denies shortness of breath, chest pain, vomiting, diarrhea  She does have a history of alcohol use, reports her last drink of wine was two days ago  Prior to Admission Medications   Prescriptions Last Dose Informant Patient Reported? Taking?    candesartan (ATACAND) 16 mg tablet Past Week at Unknown time Self Yes Yes   Sig: Take 16 mg by mouth daily   naproxen sodium (ANAPROX) 550 mg tablet Past Month at Unknown time Self Yes Yes   Sig: take 1 tablet by mouth twice a day if needed for headache up to three times a week   ondansetron (ZOFRAN) 8 mg tablet 4/28/2022 at Unknown time Self Yes Yes   Sig: Take 8 mg by mouth every 8 (eight) hours as needed for nausea or vomiting Facility-Administered Medications: None       Past Medical History:   Diagnosis Date    Concussion     CSF leak     Liver failure (HCC)     Migraines        Past Surgical History:   Procedure Laterality Date    ABLATION SOFT TISSUE      BREAST LUMPECTOMY      LAPAROSCOPY FOR ECTOPIC PREGNANCY      SINUS SURGERY      TONSILECTOMY AND ADNOIDECTOMY         Family History   Problem Relation Age of Onset    Colon cancer Father     Breast cancer Sister      I have reviewed and agree with the history as documented  E-Cigarette/Vaping    E-Cigarette Use Never User      E-Cigarette/Vaping Substances    Nicotine No     THC No     CBD No     Flavoring No     Other No     Unknown No      Social History     Tobacco Use    Smoking status: Former Smoker     Packs/day: 1 00     Types: Cigarettes    Smokeless tobacco: Never Used   Vaping Use    Vaping Use: Never used   Substance Use Topics    Alcohol use: Yes     Comment: socially, had glass of wine today    Drug use: Not Currently     Comment: CBD       Review of Systems   Constitutional: Positive for activity change (Decreased activity over the last few weeks), appetite change (Reports decreased appetite) and fatigue  Negative for chills and fever  HENT: Negative for sinus pressure and sinus pain  Eyes: Negative for pain and visual disturbance  Respiratory: Positive for cough (chronic dry cough)  Negative for choking, chest tightness and shortness of breath  Cardiovascular: Negative for chest pain, palpitations and leg swelling  Gastrointestinal: Positive for abdominal distention, abdominal pain and nausea  Negative for diarrhea and vomiting  Genitourinary: Positive for frequency and urgency (Reports new onset urgency that began several days ago)  Musculoskeletal: Positive for arthralgias (left hip pain)  Skin: Positive for color change (jaundice)  Neurological: Positive for headaches (Reports chronic headaches)   Negative for dizziness, syncope and light-headedness  Psychiatric/Behavioral:        Reports history of anxiety       Physical Exam  Physical Exam  Vitals and nursing note reviewed  Exam conducted with a chaperone present (Beverly Mueller RN)  Constitutional:       General: She is not in acute distress  Appearance: Normal appearance  She is not diaphoretic  HENT:      Head: Normocephalic and atraumatic  Nose: Nose normal    Eyes:      General: Scleral icterus present  Cardiovascular:      Rate and Rhythm: Regular rhythm  Tachycardia present  Pulses: Normal pulses  Heart sounds: Murmur heard  Pulmonary:      Effort: Pulmonary effort is normal  No respiratory distress  Breath sounds: No wheezing or rales  Abdominal:      General: There is distension  Palpations: There is fluid wave  Tenderness: There is generalized abdominal tenderness  Comments: Diffuse swelling and distension of abdomen   Genitourinary:     Rectum: Guaiac result positive (hemoccult positive)  External hemorrhoid (Non-thrombosed non-bleedy hemorrhoids) present  Musculoskeletal:      Cervical back: Normal range of motion  Legs:       Comments: Diffuse bruising present on left leg including anterior shin and groin area  Full ROM in ankle, dorsal pedal pulse 2+, Full ROM in knee, no tenderness on palpation  Full passive ROM in left hip without significant discomfort  Active ROM limited in left hip due to pain  No significant swelling or discoloration appreciated on lateral side of hip  Skin:     General: Skin is dry  Coloration: Skin is jaundiced  Neurological:      Mental Status: She is alert           Vital Signs  ED Triage Vitals [04/28/22 1031]   Temperature Pulse Respirations Blood Pressure SpO2   98 4 °F (36 9 °C) (!) 112 20 138/75 95 %      Temp Source Heart Rate Source Patient Position - Orthostatic VS BP Location FiO2 (%)   Temporal Monitor Lying Left arm --      Pain Score       10 - Worst Possible Pain           Vitals:    04/28/22 1400 04/28/22 1446 04/28/22 1500 04/28/22 1515   BP: 108/67 116/58 116/61 119/60   Pulse: (!) 114 101 101 (!) 112   Patient Position - Orthostatic VS:             Visual Acuity      ED Medications  Medications   losartan (COZAAR) tablet 100 mg (has no administration in time range)   ondansetron (ZOFRAN) injection 4 mg (has no administration in time range)   senna-docusate sodium (SENOKOT S) 8 6-50 mg per tablet 2 tablet (has no administration in time range)   aluminum-magnesium hydroxide-simethicone (MYLANTA) oral suspension 30 mL (has no administration in time range)   gabapentin (NEURONTIN) capsule 100 mg (has no administration in time range)   acetaminophen (TYLENOL) tablet 650 mg (has no administration in time range)   oxyCODONE (ROXICODONE) IR tablet 5 mg (has no administration in time range)   HYDROmorphone (DILAUDID) injection 0 5 mg (has no administration in time range)   oxyCODONE (ROXICODONE) IR tablet 10 mg (has no administration in time range)   pantoprazole (PROTONIX) injection 40 mg (has no administration in time range)   cefTRIAXone (ROCEPHIN) IVPB (premix in dextrose) 1,000 mg 50 mL (has no administration in time range)   octreotide (SandoSTATIN) injection 50 mcg (has no administration in time range)     Followed by   octreotide (SandoSTATIN) 500 mcg in sodium chloride 0 9 % 250 mL infusion (has no administration in time range)   ondansetron (ZOFRAN) injection 4 mg (4 mg Intravenous Given 4/28/22 1147)   HYDROmorphone (DILAUDID) injection 0 5 mg (0 5 mg Intravenous Given 4/28/22 1259)   iohexol (OMNIPAQUE) 350 MG/ML injection (SINGLE-DOSE) 100 mL (100 mL Intravenous Given 4/28/22 1321)   lidocaine (PF) (XYLOCAINE-MPF) 2 % injection 5 mL (5 mL Infiltration Given 4/28/22 1405)   ondansetron (ZOFRAN) injection 4 mg (4 mg Intravenous Given 4/28/22 1404)   cefTRIAXone (ROCEPHIN) IVPB (premix in dextrose) 1,000 mg 50 mL (0 mg Intravenous Stopped 4/28/22 1450)   HYDROmorphone (DILAUDID) injection 1 mg (1 mg Intravenous Given 4/28/22 1511)       Diagnostic Studies  Results Reviewed     Procedure Component Value Units Date/Time    Synovial fluid, white cell count w/ diff [[id number]] Collected: 04/28/22 1404    Lab Status: Edited Result - FINAL Specimen: Synovial Fluid from Other Updated: 04/28/22 1553     Color, Fluid --     Clarity, Fluid --     WBC, Fluid --    Narrative: All testing cancelled- Wrong order- see Paracentesis specimen results  DR Elian Mcrae in ED is informed and reordered the correct testing  /MEM    Body Fluid Diff [[id number]] Collected: 04/28/22 1404    Lab Status: Final result Specimen: Body Fluid from Paracentesis Updated: 04/28/22 1553     Total Counted 100     Neutrophils % (Fluid) 13 %      Lymphs % (Fluid) 9 %      Mesothelial % (Fluid) 16 %      Histiocyte % (Fluid) 53 %      Monocytes % (Fluid) 9 %     Synovial fluid, RBC count [[id number]] Collected: 04/28/22 1404    Lab Status: Edited Result - FINAL Specimen: Synovial Fluid from Other Updated: 04/28/22 1549     RBC, SYNOVIAL --    Narrative:      Order cancelled per DR Elian Mcrae  Wrong specimen type- rbc couny not needed/ specimen is a Paracentesis fluid    Hepatitis panel, acute [[id number]]     Lab Status: No result Specimen: Blood     Body fluid white cell count with differential [[id number]] Collected: 04/28/22 1404    Lab Status: Final result Specimen: Body Fluid from Paracentesis Updated: 04/28/22 1543     Site Paracentesis     Color, Fluid Yellow     Clarity, Fluid Clear     WBC, Fluid 87 /ul     STAT Gram Stain [[id number]] Collected: 04/28/22 1404    Lab Status: Final result Specimen: Other from Paracentesis Updated: 04/28/22 1535     Gram Stain Result Rare Mononuclear Cells      No No organisms seen      No No polys seen    Blood culture #1 [[id number]] Collected: 04/28/22 1431    Lab Status:  In process Specimen: Blood from Arm, Right Updated: 04/28/22 1437    Blood culture #2 [[id number]] Collected: 04/28/22 1432    Lab Status: In process Specimen: Blood from Hand, Right Updated: 04/28/22 1437    Synovial fluid, Culture and Gram stain [[id number]] Collected: 04/28/22 1404    Lab Status: In process Specimen: Body Fluid from Paracentesis Updated: 04/28/22 1415    Lipase [[id number]]  (Normal) Collected: 04/28/22 1147    Lab Status: Final result Specimen: Blood from Arm, Right Updated: 04/28/22 1300     Lipase 76 u/L     CMP [[id number]]  (Abnormal) Collected: 04/28/22 1147    Lab Status: Final result Specimen: Blood from Arm, Right Updated: 04/28/22 1259     Sodium 131 mmol/L      Potassium 4 2 mmol/L      Chloride 97 mmol/L      CO2 25 mmol/L      ANION GAP 9 mmol/L      BUN 9 mg/dL      Creatinine 0 99 mg/dL      Glucose 131 mg/dL      Calcium 8 0 mg/dL      Corrected Calcium 9 5 mg/dL       U/L      ALT 30 U/L      Alkaline Phosphatase 74 U/L      Total Protein 7 5 g/dL      Albumin 2 1 g/dL      Total Bilirubin 5 69 mg/dL      eGFR 62 ml/min/1 73sq m     Narrative:      Slightly icteric, results may be affected   Verified by repeat analysis  National Kid[employer] Foundation guidelines for Chronic Kidney Disease (CKD):     Stage 1 with normal or high GFR (GFR > 90 mL/min/1 73 square meters)    Stage 2 Mild CKD (GFR = 60-89 mL/min/1 73 square meters)    Stage 3A Moderate CKD (GFR = 45-59 mL/min/1 73 square meters)    Stage 3B Moderate CKD (GFR = 30-44 mL/min/1 73 square meters)    Stage 4 Severe CKD (GFR = 15-29 mL/min/1 73 square meters)    Stage 5 End Stage CKD (GFR <15 mL/min/1 73 square meters)  Note: GFR calculation is accurate only with a steady state creatinine    Protime-INR [[id number]]  (Abnormal) Collected: 04/28/22 1147    Lab Status: Final result Specimen: Blood from Arm, Right Updated: 04/28/22 1219     Protime 24 0 seconds      INR 2 22    APTT [[id number]]  (Abnormal) Collected: 04/28/22 1147    Lab Status: Final result Specimen: Blood from Arm, Right Updated: 04/28/22 1219 PTT 53 seconds     CBC and differential [[id number]]  (Abnormal) Collected: 04/28/22 1147    Lab Status: Final result Specimen: Blood from Arm, Right Updated: 04/28/22 1217     WBC 6 20 Thousand/uL      RBC 1 23 Million/uL      Hemoglobin 5 5 g/dL      Hematocrit 16 3 %       fL      MCH 44 7 pg      MCHC 33 7 g/dL      RDW 16 2 %      MPV 10 9 fL      Platelets 59 Thousands/uL      nRBC 0 /100 WBCs      Neutrophils Relative 77 %      Immat GRANS % 2 %      Lymphocytes Relative 14 %      Monocytes Relative 6 %      Eosinophils Relative 0 %      Basophils Relative 1 %      Neutrophils Absolute 4 79 Thousands/µL      Immature Grans Absolute 0 10 Thousand/uL      Lymphocytes Absolute 0 89 Thousands/µL      Monocytes Absolute 0 36 Thousand/µL      Eosinophils Absolute 0 01 Thousand/µL      Basophils Absolute 0 05 Thousands/µL     UA (URINE) with reflex to Scope [[id number]]     Lab Status: No result Specimen: Urine     POCT pregnancy, urine [[id number]]     Lab Status: No result                  CT abdomen pelvis with contrast   Final Result by Steffanie Jenkins MD (04/28 9830)      New large ascites   Distended gallbladder   Cirrhotic liver  Splenomegaly with recanalized umbilical vein, correlate with hypertension      A faint hypodense area seen in image 24 series 2 measuring 1 7 cm at the junction of the segment 8 and 4, indeterminate May be perfusion related or focal lesion  Suggest nonemergent evaluation with the MRI      Mild thickening of the ascending colon may be due to colitis, also seen on the previous study      Moderate left effusion   The study was marked in EPIC for immediate notification  Workstation performed: [id number]         XR chest 1 view portable   Final Result by Larita Duane, MD (04/28 8461)      Mild hazy density of the left lower hemithorax may be secondary to developing airspace disease versus overlying soft tissue                    Workstation performed: [id number] Procedures  Procedures         ED Course  ED Course as of 04/28/22 1555   u Apr 28, 2022   1145 RN reports patients O2 saturation was 85% on RA with good pleth, nasal cannula applied  On reassessment with NC patient sats 93-94%, conversational, denies SOB, lung sounds clear  Patient currently laying on left side  1403 Diagnostic abdominal paracentesis performed at bedside by Dr Rajeev Fuentes  Patient reporting increased nausea, will give additional 4mg zofran   1503 Patient reports her left hip pain "is becoming unbearable again " Reports some relief earlier with previous 0 5mg dilaudid  Will give 1mg IV dilaudid                               SBIRT 22yo+      Most Recent Value   SBIRT (22 yo +)    In order to provide better care to our patients, we are screening all of our patients for alcohol and drug use  Would it be okay to ask you these screening questions? Yes Filed at: 04/28/2022 1038   Initial Alcohol Screen: US AUDIT-C     1  How often do you have a drink containing alcohol? 3 Filed at: 04/28/2022 1038   2  How many drinks containing alcohol do you have on a typical day you are drinking? 1 Filed at: 04/28/2022 1038   3a  Male UNDER 65: How often do you have five or more drinks on one occasion? 0 Filed at: 04/28/2022 1038   3b  FEMALE Any Age, or MALE 65+: How often do you have 4 or more drinks on one occassion? 0 Filed at: 04/28/2022 1038   Audit-C Score 4 Filed at: 04/28/2022 1038   ADOLPH: How many times in the past year have you    Used an illegal drug or used a prescription medication for non-medical reasons?  Never Filed at: 04/28/2022 1038                    MDM  Number of Diagnoses or Management Options  Anemia: new and requires workup  Hyperbilirubinemia: new and requires workup  Left hip pain: new and requires workup  Liver cirrhosis ([address] ): new and requires workup  Pleural effusion on left: new and requires workup  Diagnosis management comments: Hgb low at 5 5 in ED, hemoccult positive  Patient agreed to transfusion, 2 units pRBCs in ED  Elevated bilirubin at 5 69, CT Abdomen and Pelvis showed liver cirrhosis and ascites  Diagnostic paracentesis in ED to rule-out SBP, IV rocephin given in ED as precaution  Patient had no complaints of SOB in ED but CXR and CT showed mild left pleural effusion  Admitted to inpatient  Amount and/or Complexity of Data Reviewed  Clinical lab tests: ordered and reviewed  Tests in the radiology section of CPT®: ordered and reviewed  Discuss the patient with other providers: yes    Patient Progress  Patient progress: stable      Disposition  Final diagnoses:   Anemia   Liver cirrhosis ([facility] [address] )   Left hip pain   Pleural effusion on left   Hyperbilirubinemia     Time reflects when diagnosis was documented in both MDM as applicable and the Disposition within this note     Time User Action Codes Description Comment    4/28/2022  2:31 PM [place] Devika Add [D64 9] Anemia     4/28/2022  2:33 PM [place] Devika Add [K74 60] [facility] )     4/28/2022  2:34 PM Whit Devika Add [M25 552] Left hip pain     4/28/2022  2:39 PM Whit Devika Add [J90] Pleural effusion on left     4/28/2022  3:05 PM Whit Devika Add [E80 6] Hyperbilirubinemia     4/28/2022  3:43 PM Beransleye Carp Add [K92 1] Gastrointestinal hemorrhage with melena     4/28/2022  3:43 PM Art Carp Add [K74 60,  R18 8] Cirrhosis of liver with ascites, unspecified hepatic cirrhosis type [facility])       ED Disposition     ED Disposition Condition Date/Time Comment    Admit Stable Thu Apr 28, 2022  2:31 PM Case was discussed with Dr Marie Restrepo and the patient's admission status was agreed to be Admission Status: inpatient status to the service of Dr Marie Restrepo           Follow-up Information    None         Current Discharge Medication List      CONTINUE these medications which have NOT CHANGED    Details   candesartan (ATACAND) 16 mg tablet Take 16 mg by mouth daily      naproxen sodium (ANAPROX) 550 mg tablet take 1 tablet by mouth twice a day if needed for headache up to three times a week      ondansetron (ZOFRAN) 8 mg tablet Take 8 mg by mouth every 8 (eight) hours as needed for nausea or vomiting             No discharge procedures on file      PDMP Review       Value Time User    PDMP Reviewed  Yes 4/28/2022  2:33 PM Laurita Freeman PA-C          ED Provider  Electronically Signed by           Gris Allan PA-C  04/28/22 5837

## 2022-04-28 NOTE — ASSESSMENT & PLAN NOTE
Presents to ED with acute left hip pain and abdominal pain/distension   Hgb 5 5/Hct 16 3 - Type and screen ordered   CT scan abdomen/pelvis:  New large ascites, distended gallbladder, cirrhotic liver, splenomegaly with recanalized lysed umbilical vein, faint hypodense area 1 7 cm of junction of segment 8 and for a liver, thickening of the ascending colon possibly due to colitis also seen on previous study, moderate left pleural effusion   Iron panel ordered    LFTs - , normal ALT, bili 5 6 on   PTT/INR - PT 24, INR 2 22   Occult blood stool positive in ED     PPI IV Q12hr    New large ascites and history of splenomegaly - octreotide and ceftriaxone   Hold anticoagulation   Hold NSAIDS (patient is chronic NSAID user at home due to migraine) until hemostasis achieved   Continue to monitor H/H with serial labs   Up out of bed with assistance   Stool records at bedside/ intake and output     NPO at midnight; likely EGD/colonoscopy tomorrow   GI consult; recommendations appreciated    Lab Results   Component Value Date    HGB 5 5 (LL) 04/28/2022    HGB 13 2 06/16/2021

## 2022-04-28 NOTE — PLAN OF CARE
Problem: HEMATOLOGIC - ADULT  Goal: Maintains hematologic stability  Description: INTERVENTIONS  - Assess for signs and symptoms of bleeding or hemorrhage  - Monitor labs  - Administer supportive blood products/factors as ordered and appropriate  Outcome: Progressing     Problem: Potential for Falls  Goal: Patient will remain free of falls  Description: INTERVENTIONS:  - Educate patient/family on patient safety including physical limitations  - Instruct patient to call for assistance with activity   - Consult OT/PT to assist with strengthening/mobility   - Keep Call bell within reach  - Keep bed low and locked with side rails adjusted as appropriate  - Keep care items and personal belongings within reach  - Initiate and maintain comfort rounds  - Make Fall Risk Sign visible to staff  - Offer Toileting every 2 Hours, in advance of need  - Initiate/Maintain alarm  - Obtain necessary fall risk management equipment:   - Apply yellow socks and bracelet for high fall risk patients  - Consider moving patient to room near nurses station  Outcome: Progressing

## 2022-04-28 NOTE — ASSESSMENT & PLAN NOTE
Presents to ED with left hip pain, abdominal pain/distension with nausea and vomiting  Bilirubin elevated to 5 69  CT abdomen pelvis showing - distended gallbladder, large new ascites, hepatomegaly

## 2022-04-28 NOTE — ED PROCEDURE NOTE
PROCEDURE  General Procedure    Date/Time: 4/28/2022 2:12 PM  Performed by: Foster Shaw DO  Authorized by: Foster Shaw DO     Patient location:  ED  Consent:     Consent obtained:  Written    Consent given by:  Patient    Risks discussed:  Bleeding, infection, pain and nerve damage (damage to surrounding structures)    Alternatives discussed:  No treatment  Indications:     Indications:  R/o SBP  Pre-procedure details:     Skin preparation:  ChloraPrep    Preparation: Patient was prepped and draped in the usual sterile fashion    Anesthesia (see MAR for exact dosages): Anesthesia method:  Local infiltration    Local anesthetic:  Lidocaine 2% w/o epi  Procedure Detail:     Procedure note (site, laterality, method, findings):  Fluid pocket found in the RLQ with US, site marked, area cleansed with chloraprep, lidocaine injected (aspirating as I injected) for procedure anesthesia, then hands washed, sterile gloves applied, area cleansed again with chloraprep and a 3/12 in 18 gauge spinal needle was used to collect 40mL of yellow ascitic fluid  Needle removed, bandage applied  Post-procedure details:     Patient tolerance of procedure:   Tolerated well, no immediate complications         Foster Shaw DO  04/28/22 1516

## 2022-04-28 NOTE — ASSESSMENT & PLAN NOTE
Presents to ED with acute hip pain , abdominal pain, abdominal distension   CBC showing: Hgb 5 5/Hct16 3,  - macrocytic anemia   o Ddx: B12 deficiency, folate deficiency, drug induced    Iron panel ordered   TSH ordered   B12 ordered    Occult heme + stool in ED    Blood consent obtained in ED   Transfusion initialed -   o Transfused to keep Hgb >7  o  No history of transfusions    Trend CBC q 6 hrs    Lab Results   Component Value Date    HGB 5 5 (LL) 04/28/2022    HGB 13 2 06/16/2021

## 2022-04-28 NOTE — ASSESSMENT & PLAN NOTE
Reason for presentation   Pain started a few days ago after dog jumping on her  Difficulty with ambulation and weight on left side  CT abdomen and pelvis - osseous structures noted: No acute compression collapse of the vertebra, Chronic compression of the L2 vertebra seen  Likely etiology is nerve compression due to ascites and referred radicular pain    Pain control  · P r n  Oxycodone, p r n   Dilaudid for breakthrough, trial of gabapentin  PT/OT  Case management for possible placement/discharge needs

## 2022-04-28 NOTE — H&P
[place]  H&P- Ramiro Gilmore 1960, 63 y o  female MRN: [id number]  Unit/Bed#: -01 Encounter: [id number]  Primary Care Provider: Marilu Maynard MD   Date and time admitted to hospital: 4/28/2022 10:36 AM    * Acute blood loss anemia  Assessment & Plan  Presents to ED with acute hip pain , abdominal pain, abdominal distension   CBC showing: Hgb 5 5/Hct16 3,  - macrocytic anemia   o Ddx: B12 deficiency, folate deficiency, drug induced    Iron panel ordered   TSH ordered   B12 ordered    Occult heme + stool in ED    Blood consent obtained in ED   Transfusion initialed -   o Transfused to keep Hgb >7  o  No history of transfusions    Trend CBC q 6 hrs    Lab Results   Component Value Date    HGB 5 5 (LL) 04/28/2022    HGB 13 2 06/16/2021         GI bleed  Assessment & Plan  Presents to ED with acute left hip pain and abdominal pain/distension   Hgb 5 5/Hct 16 3 - Type and screen ordered   CT scan abdomen/pelvis:  New large ascites, distended gallbladder, cirrhotic liver, splenomegaly with recanalized lysed umbilical vein, faint hypodense area 1 7 cm of junction of segment 8 and for a liver, thickening of the ascending colon possibly due to colitis also seen on previous study, moderate left pleural effusion   Iron panel ordered    LFTs - , normal ALT, bili 5 6 on   PTT/INR - PT 24, INR 2 22   Occult blood stool positive in ED     PPI IV Q12hr    New large ascites and history of splenomegaly - octreotide and ceftriaxone   Hold anticoagulation   Hold NSAIDS (patient is chronic NSAID user at home due to migraine) until hemostasis achieved   Continue to monitor H/H with serial labs   Up out of bed with assistance   Stool records at bedside/ intake and output     NPO at midnight; likely EGD/colonoscopy tomorrow   GI consult; recommendations appreciated    Lab Results   Component Value Date    HGB 5 5 (LL) 04/28/2022    HGB 13 2 06/16/2021 Cirrhosis of liver with ascites [facility])  Assessment & Plan  Patient presents with acute hip pain abdominal pain abdominal distension  · Endorses history of fatty liver disease, drinks wine occasionally, never been tested for hepatitis  · Found to have significant ascites on exam   · CT abd/pelvis - new large ascites, cirrhotic liver, hypodense area measuring 1 7 cm at junction of segment 4 and 8 in liver, distended gallbladder, splenomegaly with recanalized umbilical vein  · Diagnostic paracentesis done ED - concern over SBP  · Pending - body fluid white cell count with differential, Gram stain, red blood cell count  · Blood cultures pending  · Therapeutic and diagnostic thoracentesis by IR ordered  · Ceftriaxone started in ED - continue  · Lasix 20 mg IV daily    Hyperbilirubinemia  Assessment & Plan  Presents to ED with left hip pain, abdominal pain/distension with nausea and vomiting  Bilirubin elevated to 5 69  CT abdomen pelvis showing - distended gallbladder, large new ascites, hepatomegaly       Thrombocytopenia (HCC)  Assessment & Plan  On CBC platelets 59  Likely in setting of severe liver disease and splenomegaly      Hyponatremia  Assessment & Plan  Presents to ED with acute hip pain    Sodium on admission 131   Hypervolemic on initial exam    Potential etiology cirrhosis     Free water restriction (1-1 5L)    Paracentesis for ascites completed   Monitor BMP     Lab Results   Component Value Date    SODIUM 131 (L) 04/28/2022    SODIUM 139 06/16/2021    SODIUM 140 09/10/2016         Pleural effusion  Assessment & Plan  Moderate left effusion noted on CT  Currently on 2 L oxygen  Respiratory protocol  Incentive spirometer  Will diurese due to ascites and pleural effusion    Left hip pain  Assessment & Plan  Reason for presentation   Pain started a few days ago after dog jumping on her  Difficulty with ambulation and weight on left side  CT abdomen and pelvis - osseous structures noted: No acute compression collapse of the vertebra, Chronic compression of the L2 vertebra seen  Likely etiology is nerve compression due to ascites and referred radicular pain    Pain control  · P r n  Oxycodone, p r n  Dilaudid for breakthrough, trial of gabapentin  PT/OT  Case management for possible placement/discharge needs    VTE Prophylaxis: Pharmacologic VTE Prophylaxis contraindicated due to GI bleed, anemia required transfusion  / sequential compression device and foot pump applied   Code Status:  Level 1  POLST: There is no POLST form on file for this patient (pre-hospital)  Discussion with family:  Discussed with patient's son and daughter at bedside    Anticipated Length of Stay:  Patient will be admitted on an Inpatient basis with an anticipated length of stay of  > 2 midnights  Justification for Hospital Stay:  Blood transfusion, evaluation of cirrhosis and ascites, GI bleed    Total Time for Visit, including Counseling / Coordination of Care: 60 minutes  Greater than 50% of this total time spent on direct patient counseling and coordination of care  Chief Complaint:   Left hip pain and abdominal pain    History of Present Illness:    Heidi Muñoz is a 63 y o  female with history of chronic CSF leak, hepatomegaly, lumbar radiculopathy, chronic pain, migraines who presents with left hip pain starting a few days ago after dog jumping on her  Reports difficulty with ambulation and bearing weight on left hip starting this morning  Also has increased nausea/abdominal pain and abdominal swelling with generalized weakness  Abdominal swelling has gotten progressively worse over the last month  Also reports decreased appetite  Also reports increased urinary urgency over the last several days denies any dysuria or blood in urine  Denies shortness of breath, chest pain, diarrhea, vomiting  Does have history of alcohol abuse  Reports last drink of wine was 2 days ago    Endorses chronic NSAID use and recent darker than usual stools  While in ED became slightly hypoxic at 85% on room air, nasal cannula applied, no setting high 90s  Patient had diagnostic abdominal paracentesis performed by Dr Jose Antonio Reese and fluid sent for analysis  Also started on ceftriaxone due to concern for SBP  Patient denies any fevers/chills  On exam patient is acutely nauseous with associated vomiting  She has jaundice and is uncomfortable  In endorses severe left hip pain and mild diffuse abdominal pain  Review of Systems:    Review of Systems   Constitutional: Positive for fatigue  Negative for activity change, chills and fever  HENT: Negative for congestion, rhinorrhea and sore throat  Eyes: Negative for visual disturbance  Respiratory: Positive for shortness of breath  Negative for cough and chest tightness  Cardiovascular: Negative for chest pain and palpitations  Gastrointestinal: Positive for abdominal distention, abdominal pain, blood in stool, nausea and vomiting  Negative for constipation and diarrhea  Genitourinary: Positive for urgency  Negative for difficulty urinating, dysuria and frequency  Musculoskeletal: Positive for arthralgias (Left hip) and gait problem  Negative for myalgias  Skin: Positive for color change  Negative for rash  Neurological: Positive for headaches  Negative for seizures, syncope and weakness  Hematological: Bruises/bleeds easily (Reported by daughter)  Psychiatric/Behavioral: The patient is nervous/anxious  All other systems reviewed and are negative        Past Medical and Surgical History:     Past Medical History:   Diagnosis Date    Concussion     CSF leak     Liver failure (HCC)     Migraines        Past Surgical History:   Procedure Laterality Date    ABLATION SOFT TISSUE      BREAST LUMPECTOMY      LAPAROSCOPY FOR ECTOPIC PREGNANCY      SINUS SURGERY      TONSILECTOMY AND ADNOIDECTOMY         Meds/Allergies:    Prior to Admission medications Medication Sig Start Date End Date Taking? Authorizing Provider   candesartan (ATACAND) 16 mg tablet Take 16 mg by mouth daily   Yes Historical Provider, MD   naproxen sodium (ANAPROX) 550 mg tablet take 1 tablet by mouth twice a day if needed for headache up to three times a week 12/6/19  Yes Historical Provider, MD   ondansetron (ZOFRAN) 8 mg tablet Take 8 mg by mouth every 8 (eight) hours as needed for nausea or vomiting   Yes Historical Provider, MD   ondansetron (ZOFRAN-ODT) 4 mg disintegrating tablet Take 1 tablet by mouth every 8 (eight) hours as needed for nausea or vomiting  9/10/16  Yes Yariel Soto DO   amLODIPine (NORVASC) 10 mg tablet Take 10 mg by mouth daily   Patient not taking: Reported on 10/17/2021    Historical Provider, MD SUMMERS 3CC LUER-CLARA SYR 25GX1" 25G X 1" 3 ML MISC use as directed WITH MEDICINE 12/6/19   Historical Provider, MD   clindamycin-benzoyl peroxide (BENZACLIN) gel APPLY TWICE DAILY TO AFFECTED AREA(S) AS DIRECTED  Patient not taking: Reported on 10/17/2021 10/22/20   Historical Provider, MD   clonazePAM (KlonoPIN) 0 5 mg tablet Take 0 5 mg by mouth daily before breakfast Indications: takes for dizziness  Patient not taking: Reported on 10/17/2021    Historical Provider, MD   dihydroergotamine (DHE) 1 mg/mL 1 mg IM prn severe headache  May repeat in 2 hours  Max 2 per day, Max 2 days per week  Patient not taking: Reported on 4/28/2022 12/6/19   Historical Provider, MD   Filter Needles 18G X 1-1/2" MISC USE 1 SYRINGE FILTER PRF FOR DRAWING UP DHE INJECTION THEN SWITCH TO 25 GAUGE SYRINGE  Patient not taking: Reported on 4/28/2022 3/10/20   Historical Provider, MD   gabapentin (NEURONTIN) 300 mg capsule Take 300 mg by mouth 2 (two) times a day  Patient not taking: Reported on 4/28/2022 8/4/21   Historical Provider, MD   ketorolac (TORADOL) 30 mg/mL injection 30 mg IM prn severe headache   Max 1 day per week  Patient not taking: Reported on 10/17/2021 12/6/19   Historical Provider, MD   mexiletine (MEXITIL) 150 mg capsule Take 150 mg by mouth 3 (three) times a day  Patient not taking: Reported on 10/17/2021 12/6/19   Historical Provider, MD   promethazine (PHENERGAN) 25 mg tablet take 1 tablet by mouth twice a day if needed for headache or nausea  Patient not taking: Reported on 4/28/2022 12/6/19   Historical Provider, MD WRIGHT VITAMIN D-3 25 MCG (1000 UT) tablet Take 1,000 Units by mouth 2 (two) times a day  Patient not taking: Reported on 4/28/2022 12/6/19   Historical Provider, MD   Saline (AYR SALINE NASAL DROPS) 0 65 % (Soln) SOLN 1 drop into each nostril  Patient not taking: Reported on 4/28/2022  3/23/16   Historical Provider, MD   spironolactone (ALDACTONE) 25 mg tablet  10/28/20   Historical Provider, MD   traMADol (ULTRAM) 50 mg tablet Take 1 PO HS  Patient not taking: Reported on 10/17/2021 6/16/20   TRAV Mclaughlin     I have reviewed home medications with patient personally  Allergies: Allergies   Allergen Reactions    Codeine Other (See Comments)     Can not take with current medications    Other reaction(s):  Other (See Comments)  GI upset  GI Upset         Social History:     Marital Status: Single   Patient Pre-hospital Living Situation: Home  Patient Pre-hospital Level of Mobility: No restriction   Patient Pre-hospital Diet Restrictions: No restriction   Substance Use History:   Social History     Substance and Sexual Activity   Alcohol Use Yes    Comment: socially, had glass of wine today     Social History     Tobacco Use   Smoking Status Former Smoker    Packs/day: 1 00    Types: Cigarettes   Smokeless Tobacco Never Used     Social History     Substance and Sexual Activity   Drug Use Not Currently    Comment: CBD       Family History:    Family History   Problem Relation Age of Onset    Colon cancer Father     Breast cancer Sister        Physical Exam:     Vitals:   Blood Pressure: 141/79 (04/28/22 1711)  Pulse: 92 (04/28/22 1711)  Temperature: 98 1 °F (36 7 °C) (04/28/22 1711)  Temp Source: Temporal (04/28/22 1500)  Respirations: 20 (04/28/22 1711)  Height: 5' 6" (167 6 cm) (04/28/22 1031)  Weight - Scale: 68 kg (150 lb) (04/28/22 1031)  SpO2: 93 % (04/28/22 1711)    Physical Exam  Vitals and nursing note reviewed  Constitutional:       Appearance: Normal appearance  She is ill-appearing  HENT:      Head: Normocephalic and atraumatic  Nose: No congestion  Mouth/Throat:      Mouth: Mucous membranes are moist    Eyes:      General: Scleral icterus present  Conjunctiva/sclera: Conjunctivae normal    Cardiovascular:      Rate and Rhythm: Regular rhythm  Tachycardia present  Pulses: Normal pulses  Heart sounds: No murmur heard  Pulmonary:      Effort: No respiratory distress  Breath sounds: Normal breath sounds  Comments: On 2L NC  Abdominal:      General: Bowel sounds are normal       Palpations: Abdomen is soft  Tenderness: There is abdominal tenderness (Mostly in left upper and lower quadrant, but felt throughout )  Musculoskeletal:         General: Normal range of motion  Left hip: No deformity or bony tenderness  Right lower leg: No edema  Left lower leg: No edema  Skin:     General: Skin is warm and dry  Coloration: Skin is jaundiced  Neurological:      Mental Status: She is alert and oriented to person, place, and time  Psychiatric:         Mood and Affect: Mood is anxious  Additional Data:     Lab Results: I have personally reviewed pertinent reports        Results from last 7 days   Lab Units 04/28/22  1147   WBC Thousand/uL 6 20   HEMOGLOBIN g/dL 5 5*   HEMATOCRIT % 16 3*   PLATELETS Thousands/uL 59*   NEUTROS PCT % 77*   LYMPHS PCT % 14   MONOS PCT % 6   EOS PCT % 0     Results from last 7 days   Lab Units 04/28/22  1147   SODIUM mmol/L 131*   POTASSIUM mmol/L 4 2   CHLORIDE mmol/L 97*   CO2 mmol/L 25   BUN mg/dL 9   CREATININE mg/dL 0 99   ANION GAP mmol/L 9   CALCIUM mg/dL 8 0*   ALBUMIN g/dL 2 1*   TOTAL BILIRUBIN mg/dL 5 69*   ALK PHOS U/L 74   ALT U/L 30   AST U/L 132*   GLUCOSE RANDOM mg/dL 131     Results from last 7 days   Lab Units 04/28/22  1147   INR  2 22*                   Imaging: I have personally reviewed pertinent reports  CT abdomen pelvis with contrast   Final Result by Mayi Flanagan MD (04/28 4038)      New large ascites   Distended gallbladder   Cirrhotic liver  Splenomegaly with recanalized umbilical vein, correlate with hypertension      A faint hypodense area seen in image 24 series 2 measuring 1 7 cm at the junction of the segment 8 and 4, indeterminate May be perfusion related or focal lesion  Suggest nonemergent evaluation with the MRI      Mild thickening of the ascending colon may be due to colitis, also seen on the previous study      Moderate left effusion   The study was marked in EPIC for immediate notification  Workstation performed: [id number]         XR chest 1 view portable   Final Result by Lizbeth Salmon MD (04/28 6292)      Mild hazy density of the left lower hemithorax may be secondary to developing airspace disease versus overlying soft tissue  Workstation performed: [id number]         IR INPATIENT Paracentesis    (Results Pending)         Allscripts / Epic Records Reviewed: Yes     ** Please Note: This note has been constructed using a voice recognition system   **

## 2022-04-28 NOTE — RESPIRATORY THERAPY NOTE
RT Protocol Note  Rogelio Hoyos 63 y o  female MRN: [id number]  Unit/Bed#: -01 Encounter: [id number]    Assessment    Principal Problem:    Acute blood loss anemia  Active Problems:    Cirrhosis of liver with ascites (HCC)    Left hip pain    Thrombocytopenia (HCC)    Hyponatremia    Hyperbilirubinemia    GI bleed    Pleural effusion      Home Pulmonary Medications:  Patient states she does not take any respiratory medications at home  Past Medical History:   Diagnosis Date    Concussion     CSF leak     Liver failure (HCC)     Migraines      Social History     Socioeconomic History    Marital status: Single     Spouse name: None    Number of children: None    Years of education: None    Highest education level: None   Occupational History    None   Tobacco Use    Smoking status: Former Smoker     Packs/day: 1 00     Types: Cigarettes    Smokeless tobacco: Never Used   Vaping Use    Vaping Use: Never used   Substance and Sexual Activity    Alcohol use: Yes     Comment: socially, had glass of wine today    Drug use: Not Currently     Comment: CBD    Sexual activity: None   Other Topics Concern    None   Social History Narrative    None     Social Determinants of Health     Financial Resource Strain: Not on file   Food Insecurity: Not on file   Transportation Needs: Not on file   Physical Activity: Not on file   Stress: Not on file   Social Connections: Not on file   Intimate Partner Violence: Not on file   Housing Stability: Not on file       Subjective         Objective    Physical Exam:   Assessment Type: Assess only  General Appearance: Awake  Chest Assessment: Chest expansion symmetrical  Bilateral Breath Sounds: Clear,Diminished    Vitals:  Blood pressure 141/79, pulse 92, temperature 98 1 °F (36 7 °C), resp  rate 20, height 5' 6" (1 676 m), weight 68 kg (150 lb), SpO2 99 %, not currently breastfeeding            Imaging and other studies: I have personally reviewed pertinent reports              Plan     Mild distress pathway

## 2022-04-28 NOTE — ASSESSMENT & PLAN NOTE
Patient presents with acute hip pain abdominal pain abdominal distension  · Endorses history of fatty liver disease, drinks wine occasionally, never been tested for hepatitis  · Found to have significant ascites on exam   · CT abd/pelvis - new large ascites, cirrhotic liver, hypodense area measuring 1 7 cm at junction of segment 4 and 8 in liver, distended gallbladder, splenomegaly with recanalized umbilical vein  · Diagnostic paracentesis done ED - concern over SBP  · Pending - body fluid white cell count with differential, Gram stain, red blood cell count  · Blood cultures pending  · Therapeutic and diagnostic thoracentesis by IR ordered  · Ceftriaxone started in ED - continue  · Lasix 20 mg IV daily

## 2022-04-28 NOTE — ASSESSMENT & PLAN NOTE
Presents to ED with acute hip pain    Sodium on admission 131   Hypervolemic on initial exam    Potential etiology cirrhosis     Free water restriction (1-1 5L)    Paracentesis for ascites completed   Monitor BMP     Lab Results   Component Value Date    SODIUM 131 (L) 04/28/2022    SODIUM 139 06/16/2021    SODIUM 140 09/10/2016

## 2022-04-28 NOTE — ASSESSMENT & PLAN NOTE
Moderate left effusion noted on CT  Currently on 2 L oxygen  Respiratory protocol  Incentive spirometer  Will diurese due to ascites and pleural effusion

## 2022-04-29 ENCOUNTER — APPOINTMENT (INPATIENT)
Dept: CT IMAGING | Facility: HOSPITAL | Age: 62
DRG: 432 | End: 2022-04-29
Payer: COMMERCIAL

## 2022-04-29 ENCOUNTER — APPOINTMENT (INPATIENT)
Dept: RADIOLOGY | Facility: HOSPITAL | Age: 62
DRG: 432 | End: 2022-04-29
Payer: COMMERCIAL

## 2022-04-29 ENCOUNTER — APPOINTMENT (INPATIENT)
Dept: INTERVENTIONAL RADIOLOGY/VASCULAR | Facility: HOSPITAL | Age: 62
DRG: 432 | End: 2022-04-29
Payer: COMMERCIAL

## 2022-04-29 ENCOUNTER — APPOINTMENT (INPATIENT)
Dept: NON INVASIVE DIAGNOSTICS | Facility: HOSPITAL | Age: 62
DRG: 432 | End: 2022-04-29
Payer: COMMERCIAL

## 2022-04-29 PROBLEM — J90 PLEURAL EFFUSION: Status: RESOLVED | Noted: 2022-04-28 | Resolved: 2022-04-29

## 2022-04-29 LAB
ABO GROUP BLD BPU: NORMAL
ABO GROUP BLD BPU: NORMAL
ALBUMIN FLD-MCNC: 0.8 G/DL
ALBUMIN SERPL BCP-MCNC: 2.2 G/DL (ref 3.5–5)
ALP SERPL-CCNC: 72 U/L (ref 46–116)
ALT SERPL W P-5'-P-CCNC: 32 U/L (ref 12–78)
ANION GAP SERPL CALCULATED.3IONS-SCNC: 10 MMOL/L (ref 4–13)
AORTIC ROOT: 3 CM
AORTIC VALVE MEAN VELOCITY: 14.6 M/S
APICAL FOUR CHAMBER EJECTION FRACTION: 78 %
APPEARANCE FLD: NORMAL
AST SERPL W P-5'-P-CCNC: 119 U/L (ref 5–45)
AV AREA BY CONTINUOUS VTI: 2.1 CM2
AV AREA PEAK VELOCITY: 2.2 CM2
AV LVOT MEAN GRADIENT: 4 MMHG
AV LVOT PEAK GRADIENT: 9 MMHG
AV MEAN GRADIENT: 10 MMHG
AV PEAK GRADIENT: 19 MMHG
AV VALVE AREA: 2.13 CM2
AV VELOCITY RATIO: 0.7
BILIRUB SERPL-MCNC: 9.7 MG/DL (ref 0.2–1)
BPU ID: NORMAL
BPU ID: NORMAL
BUN SERPL-MCNC: 12 MG/DL (ref 5–25)
CALCIUM ALBUM COR SERPL-MCNC: 9.4 MG/DL (ref 8.3–10.1)
CALCIUM SERPL-MCNC: 8 MG/DL (ref 8.3–10.1)
CARDIAC TROPONIN I PNL SERPL HS: 14 NG/L
CHLORIDE SERPL-SCNC: 96 MMOL/L (ref 100–108)
CO2 SERPL-SCNC: 26 MMOL/L (ref 21–32)
COLOR FLD: YELLOW
CREAT SERPL-MCNC: 0.96 MG/DL (ref 0.6–1.3)
CROSSMATCH: NORMAL
CROSSMATCH: NORMAL
DOP CALC AO PEAK VEL: 2.15 M/S
DOP CALC AO VTI: 41.26 CM
DOP CALC LVOT AREA: 3.14 CM2
DOP CALC LVOT DIAMETER: 2 CM
DOP CALC LVOT PEAK VEL VTI: 28.01 CM
DOP CALC LVOT PEAK VEL: 1.5 M/S
DOP CALC LVOT STROKE INDEX: 50.3 ML/M2
DOP CALC LVOT STROKE VOLUME: 87.95 CM3
E WAVE DECELERATION TIME: 102 MS
FRACTIONAL SHORTENING: 49 % (ref 28–44)
GFR SERPL CREATININE-BSD FRML MDRD: 64 ML/MIN/1.73SQ M
GLUCOSE FLD-MCNC: 178 MG/DL
GLUCOSE SERPL-MCNC: 147 MG/DL (ref 65–140)
GLUCOSE SERPL-MCNC: 184 MG/DL (ref 65–140)
GLUCOSE SERPL-MCNC: 186 MG/DL (ref 65–140)
HCT VFR BLD AUTO: 22.4 % (ref 34.8–46.1)
HCT VFR BLD AUTO: 22.6 % (ref 34.8–46.1)
HGB BLD-MCNC: 6.7 G/DL (ref 11.5–15.4)
HGB BLD-MCNC: 7.1 G/DL (ref 11.5–15.4)
HGB BLD-MCNC: 7.3 G/DL (ref 11.5–15.4)
HGB BLD-MCNC: 8.2 G/DL (ref 11.5–15.4)
HISTIOCYTES NFR FLD: 64 %
INTERVENTRICULAR SEPTUM IN DIASTOLE (PARASTERNAL SHORT AXIS VIEW): 1.1 CM
INTERVENTRICULAR SEPTUM: 1.1 CM (ref 0.51–0.95)
LAAS-AP2: 22.3 CM2
LAAS-AP4: 19 CM2
LDH FLD L TO P-CCNC: 71 U/L
LEFT ATRIUM AREA SYSTOLE SINGLE PLANE A4C: 18 CM2
LEFT ATRIUM SIZE: 3.4 CM
LEFT INTERNAL DIMENSION IN SYSTOLE: 2.5 CM (ref 2.49–3.76)
LEFT VENTRICULAR INTERNAL DIMENSION IN DIASTOLE: 4.9 CM (ref 4.06–6.04)
LEFT VENTRICULAR POSTERIOR WALL IN END DIASTOLE: 1 CM (ref 0.5–0.94)
LEFT VENTRICULAR STROKE VOLUME: 93 ML
LVSV (TEICH): 93 ML
LYMPHOCYTES NFR BLD AUTO: 9 %
MCH RBC QN AUTO: 36.3 PG (ref 26.8–34.3)
MCHC RBC AUTO-ENTMCNC: 32.3 G/DL (ref 31.4–37.4)
MCV RBC AUTO: 112 FL (ref 82–98)
MONO+MESO NFR FLD MANUAL: 19 %
MV E'TISSUE VEL-SEP: 9 CM/S
MV PEAK A VEL: 0.92 M/S
MV PEAK E VEL: 78 CM/S
MV STENOSIS PRESSURE HALF TIME: 30 MS
MV VALVE AREA P 1/2 METHOD: 7.33 CM2
NEUTS SEG NFR BLD AUTO: 8 %
PLATELET # BLD AUTO: 60 THOUSANDS/UL (ref 149–390)
PMV BLD AUTO: 10.9 FL (ref 8.9–12.7)
POTASSIUM SERPL-SCNC: 4.1 MMOL/L (ref 3.5–5.3)
PROT FLD-MCNC: 2.2 G/DL
PROT SERPL-MCNC: 7.9 G/DL (ref 6.4–8.2)
RBC # BLD AUTO: 2.01 MILLION/UL (ref 3.81–5.12)
RIGHT ATRIUM AREA SYSTOLE A4C: 9.6 CM2
RIGHT VENTRICLE ID DIMENSION: 3.7 CM
SITE: NORMAL
SL CV LEFT ATRIUM LENGTH A2C: 5.9 CM
SL CV LV EF: 70
SL CV PED ECHO LEFT VENTRICLE DIASTOLIC VOLUME (MOD BIPLANE) 2D: 115 ML
SL CV PED ECHO LEFT VENTRICLE SYSTOLIC VOLUME (MOD BIPLANE) 2D: 22 ML
SODIUM SERPL-SCNC: 132 MMOL/L (ref 136–145)
TOTAL CELLS COUNTED SPEC: 100
TRICUSPID ANNULAR PLANE SYSTOLIC EXCURSION: 2.4 CM
UNIT DISPENSE STATUS: NORMAL
UNIT DISPENSE STATUS: NORMAL
UNIT PRODUCT CODE: NORMAL
UNIT PRODUCT CODE: NORMAL
UNIT PRODUCT VOLUME: 300 ML
UNIT PRODUCT VOLUME: 350 ML
UNIT RH: NORMAL
UNIT RH: NORMAL
WBC # BLD AUTO: 7.37 THOUSAND/UL (ref 4.31–10.16)
WBC # FLD MANUAL: 77 /UL
Z-SCORE OF INTERVENTRICULAR SEPTUM IN END DIASTOLE: 3.24
Z-SCORE OF LEFT VENTRICULAR DIMENSION IN END DIASTOLE: -0.1
Z-SCORE OF LEFT VENTRICULAR DIMENSION IN END SYSTOLE: -1.59
Z-SCORE OF LEFT VENTRICULAR POSTERIOR WALL IN END DIASTOLE: 2.47

## 2022-04-29 PROCEDURE — 83615 LACTATE (LD) (LDH) ENZYME: CPT

## 2022-04-29 PROCEDURE — 49083 ABD PARACENTESIS W/IMAGING: CPT

## 2022-04-29 PROCEDURE — 88305 TISSUE EXAM BY PATHOLOGIST: CPT | Performed by: PATHOLOGY

## 2022-04-29 PROCEDURE — 84157 ASSAY OF PROTEIN OTHER: CPT | Performed by: RADIOLOGY

## 2022-04-29 PROCEDURE — 88112 CYTOPATH CELL ENHANCE TECH: CPT | Performed by: PATHOLOGY

## 2022-04-29 PROCEDURE — C1729 CATH, DRAINAGE: HCPCS

## 2022-04-29 PROCEDURE — 85027 COMPLETE CBC AUTOMATED: CPT

## 2022-04-29 PROCEDURE — 82945 GLUCOSE OTHER FLUID: CPT

## 2022-04-29 PROCEDURE — 87205 SMEAR GRAM STAIN: CPT | Performed by: RADIOLOGY

## 2022-04-29 PROCEDURE — 85018 HEMOGLOBIN: CPT

## 2022-04-29 PROCEDURE — 89051 BODY FLUID CELL COUNT: CPT | Performed by: RADIOLOGY

## 2022-04-29 PROCEDURE — 87070 CULTURE OTHR SPECIMN AEROBIC: CPT | Performed by: RADIOLOGY

## 2022-04-29 PROCEDURE — 93306 TTE W/DOPPLER COMPLETE: CPT | Performed by: INTERNAL MEDICINE

## 2022-04-29 PROCEDURE — 82042 OTHER SOURCE ALBUMIN QUAN EA: CPT | Performed by: RADIOLOGY

## 2022-04-29 PROCEDURE — 0W9G3ZZ DRAINAGE OF PERITONEAL CAVITY, PERCUTANEOUS APPROACH: ICD-10-PCS | Performed by: RADIOLOGY

## 2022-04-29 PROCEDURE — G1004 CDSM NDSC: HCPCS

## 2022-04-29 PROCEDURE — 84484 ASSAY OF TROPONIN QUANT: CPT | Performed by: PHYSICIAN ASSISTANT

## 2022-04-29 PROCEDURE — 93005 ELECTROCARDIOGRAM TRACING: CPT

## 2022-04-29 PROCEDURE — 93306 TTE W/DOPPLER COMPLETE: CPT

## 2022-04-29 PROCEDURE — 30233N1 TRANSFUSION OF NONAUTOLOGOUS RED BLOOD CELLS INTO PERIPHERAL VEIN, PERCUTANEOUS APPROACH: ICD-10-PCS | Performed by: INTERNAL MEDICINE

## 2022-04-29 PROCEDURE — 49083 ABD PARACENTESIS W/IMAGING: CPT | Performed by: RADIOLOGY

## 2022-04-29 PROCEDURE — 70498 CT ANGIOGRAPHY NECK: CPT

## 2022-04-29 PROCEDURE — 99221 1ST HOSP IP/OBS SF/LOW 40: CPT | Performed by: REGISTERED NURSE

## 2022-04-29 PROCEDURE — 85014 HEMATOCRIT: CPT | Performed by: PHYSICIAN ASSISTANT

## 2022-04-29 PROCEDURE — 73552 X-RAY EXAM OF FEMUR 2/>: CPT

## 2022-04-29 PROCEDURE — 70496 CT ANGIOGRAPHY HEAD: CPT

## 2022-04-29 PROCEDURE — 73502 X-RAY EXAM HIP UNI 2-3 VIEWS: CPT

## 2022-04-29 PROCEDURE — C9113 INJ PANTOPRAZOLE SODIUM, VIA: HCPCS

## 2022-04-29 PROCEDURE — 82948 REAGENT STRIP/BLOOD GLUCOSE: CPT

## 2022-04-29 PROCEDURE — 85018 HEMOGLOBIN: CPT | Performed by: PHYSICIAN ASSISTANT

## 2022-04-29 PROCEDURE — 80053 COMPREHEN METABOLIC PANEL: CPT

## 2022-04-29 PROCEDURE — 99223 1ST HOSP IP/OBS HIGH 75: CPT | Performed by: PHYSICIAN ASSISTANT

## 2022-04-29 PROCEDURE — 99232 SBSQ HOSP IP/OBS MODERATE 35: CPT | Performed by: PHYSICIAN ASSISTANT

## 2022-04-29 PROCEDURE — P9016 RBC LEUKOCYTES REDUCED: HCPCS

## 2022-04-29 RX ORDER — LIDOCAINE WITH 8.4% SOD BICARB 0.9%(10ML)
SYRINGE (ML) INJECTION CODE/TRAUMA/SEDATION MEDICATION
Status: COMPLETED | OUTPATIENT
Start: 2022-04-29 | End: 2022-04-29

## 2022-04-29 RX ORDER — SPIRONOLACTONE 25 MG/1
50 TABLET ORAL DAILY
Status: DISCONTINUED | OUTPATIENT
Start: 2022-04-29 | End: 2022-04-30

## 2022-04-29 RX ORDER — HYDROMORPHONE HCL/PF 1 MG/ML
1 SYRINGE (ML) INJECTION ONCE
Status: COMPLETED | OUTPATIENT
Start: 2022-04-29 | End: 2022-04-29

## 2022-04-29 RX ADMIN — ONDANSETRON 4 MG: 2 INJECTION INTRAMUSCULAR; INTRAVENOUS at 16:23

## 2022-04-29 RX ADMIN — PANTOPRAZOLE SODIUM 40 MG: 40 INJECTION, POWDER, FOR SOLUTION INTRAVENOUS at 03:46

## 2022-04-29 RX ADMIN — GABAPENTIN 100 MG: 100 CAPSULE ORAL at 10:42

## 2022-04-29 RX ADMIN — FUROSEMIDE 20 MG: 10 INJECTION, SOLUTION INTRAMUSCULAR; INTRAVENOUS at 10:43

## 2022-04-29 RX ADMIN — IOHEXOL 85 ML: 350 INJECTION, SOLUTION INTRAVENOUS at 05:32

## 2022-04-29 RX ADMIN — PANTOPRAZOLE SODIUM 40 MG: 40 INJECTION, POWDER, FOR SOLUTION INTRAVENOUS at 16:23

## 2022-04-29 RX ADMIN — LOSARTAN POTASSIUM 100 MG: 25 TABLET, FILM COATED ORAL at 10:42

## 2022-04-29 RX ADMIN — ONDANSETRON 4 MG: 2 INJECTION INTRAMUSCULAR; INTRAVENOUS at 11:58

## 2022-04-29 RX ADMIN — OCTREOTIDE ACETATE 50 MCG/HR: 500 INJECTION, SOLUTION INTRAVENOUS; SUBCUTANEOUS at 16:35

## 2022-04-29 RX ADMIN — OCTREOTIDE ACETATE 50 MCG/HR: 500 INJECTION, SOLUTION INTRAVENOUS; SUBCUTANEOUS at 03:47

## 2022-04-29 RX ADMIN — GABAPENTIN 100 MG: 100 CAPSULE ORAL at 16:23

## 2022-04-29 RX ADMIN — CEFTRIAXONE 1000 MG: 1 INJECTION, SOLUTION INTRAVENOUS at 10:41

## 2022-04-29 RX ADMIN — HYDROMORPHONE HYDROCHLORIDE 1 MG: 1 INJECTION, SOLUTION INTRAMUSCULAR; INTRAVENOUS; SUBCUTANEOUS at 09:14

## 2022-04-29 RX ADMIN — ONDANSETRON 4 MG: 2 INJECTION INTRAMUSCULAR; INTRAVENOUS at 08:15

## 2022-04-29 RX ADMIN — ONDANSETRON 4 MG: 2 INJECTION INTRAMUSCULAR; INTRAVENOUS at 22:32

## 2022-04-29 RX ADMIN — OXYCODONE HYDROCHLORIDE 5 MG: 5 TABLET ORAL at 22:05

## 2022-04-29 RX ADMIN — OXYCODONE HYDROCHLORIDE 5 MG: 5 TABLET ORAL at 16:22

## 2022-04-29 RX ADMIN — SPIRONOLACTONE 50 MG: 25 TABLET ORAL at 11:59

## 2022-04-29 RX ADMIN — HYDROMORPHONE HYDROCHLORIDE 0.5 MG: 1 INJECTION, SOLUTION INTRAMUSCULAR; INTRAVENOUS; SUBCUTANEOUS at 07:09

## 2022-04-29 RX ADMIN — HYDROMORPHONE HYDROCHLORIDE 0.5 MG: 1 INJECTION, SOLUTION INTRAMUSCULAR; INTRAVENOUS; SUBCUTANEOUS at 22:27

## 2022-04-29 RX ADMIN — HYDROMORPHONE HYDROCHLORIDE 0.5 MG: 1 INJECTION, SOLUTION INTRAMUSCULAR; INTRAVENOUS; SUBCUTANEOUS at 00:31

## 2022-04-29 RX ADMIN — HYDROMORPHONE HYDROCHLORIDE 0.5 MG: 1 INJECTION, SOLUTION INTRAMUSCULAR; INTRAVENOUS; SUBCUTANEOUS at 11:57

## 2022-04-29 RX ADMIN — GABAPENTIN 100 MG: 100 CAPSULE ORAL at 22:05

## 2022-04-29 RX ADMIN — HYDROMORPHONE HYDROCHLORIDE 0.5 MG: 1 INJECTION, SOLUTION INTRAMUSCULAR; INTRAVENOUS; SUBCUTANEOUS at 03:46

## 2022-04-29 RX ADMIN — Medication 10 ML: at 09:40

## 2022-04-29 RX ADMIN — HYDROMORPHONE HYDROCHLORIDE 0.5 MG: 1 INJECTION, SOLUTION INTRAMUSCULAR; INTRAVENOUS; SUBCUTANEOUS at 08:15

## 2022-04-29 NOTE — QUICK NOTE
Progress Note - Triage Assessment   [facility] 63 y o  female MRN: [id number]    Time Called: 8570  Date Called: 04/29/22  Room#: 228  Time Evaluated: 4483  Person requesting evaluation: Carmencita Mcallister PA-C    Contacted by Mike Schmidt with Internal Medicine as patient had developed worsening left lower extremity weakness approximately 10 minutes  Per the patient, she has been having left lower extremity weakness since her admission, however, earlier this evening she was able to get up and use the commode without any problems  She had sudden onset LLE weakness with inability to bend at the knee and stated that she had sensation changes but was unable to further qualify  Remainder of neuro exam benign  NIH of 3 and stroke alert was called  Gerardo Meneses spoke with neurology who recommended CTH/CTA but patient would not be tPA candidate  Patient had improvement in LLE weakness upon arrival to CT  CTs pending, place neuro consult, stroke pathway, q 1 hour neuro checks x 4 hours  Triage Assessment:     Patient appropriate to be admitted to med-surg level of care      If any questions or concerns please call the critical care team

## 2022-04-29 NOTE — ASSESSMENT & PLAN NOTE
· Presents to ED with left hip pain, abdominal pain/distension with nausea and vomiting  · Bilirubin elevated to 5 69 - increased to 9 70  · CT abdomen pelvis showing - distended gallbladder, large new ascites, hepatomegaly   · Gastroenterology following

## 2022-04-29 NOTE — CONSULTS
Consultation - Neurology   Flako Araujo 63 y o  female MRN: [id number]  Unit/Bed#: -01 Encounter: [id number]    Assessment/Plan   Left hip pain  Assessment & Plan  The patient is a 53-year-old female with past medical history of migraines, chronic pain, chronic CSF leak, hepatomegaly, and lumbar radiculopathy  As stated below per record review the patient has been having left hip pain for a few days after her dog jumped on her, the patient has been having difficulty ambulating and bearing weight on the left  The patient presents to the hospital 04/28/2022, with increased nausea, abdominal swelling and generalized weakness  Patient was seen by the medical team and was noted to have a hemoglobin of 5 5 and was positive Hemoccult stool test, she was transfused goal of keeping hemoglobin greater than 7  Secondary to concern of GI bleed a CT of the abdomen was completed which revealed a new large ascites, distended gallbladder, cirrhotic liver, splenomegaly with recanalized lysed umbilical vein, faint hyperdense area 1 7 cm of junction of segment of liver, and thickening of the ascending colon possibly due to colitis  GI was consulted in regards to this and most likely will have to undergo an EGD and colonoscopy  She was noted to an INR 2 22H, elevated bilirubin and AST  She also underwent a diagnostic paracentesis in the ED for concern for SBP, as well as a therapeutic paracentesis was ordered and the patient was placed on Rocephin  Patient was also noted to have a thrombocytopenia with platelets of 52Z  Pleural effusion receiving diuresis, and positive nitrates  It was initially felt by the medicine team that her left hip pain and difficulty ambulating was secondary to a possible nerve compression due to her ascites and referred radicular pain -she was placed on pain medications  CT of lumbar spine is pending        This a naila  at 0457, it was reported the patient developed worsening left lower extremity weakness, she was having left lower extremity weakness this admission, however, earlier she was able to used the commode with no problems  She had a sudden onset of LLE weakness and inability to bend at the knee and had a sensation but did not clarify  NIH was reported to be a 3  Stroke alert was activated  It was reported the patient had improvement of her left lower extremity weakness prior to arrival  Ct of head was completed - revealed stable cerebral atrophy and chronic small vessel disease, improved prominence of the extra-axial space along the cerebral convexities bilaterally left greater than right consistent with chronic bilateral subdural hygromas  No acute intracranial abnormality  CTA of the head and neck was completed - which showed mild atherosclerotic disease of the internal carotid arteries bilaterally, without evidence of flow-limiting stenosis  No evidence of carotid artery or vertebral artery dissection  Mild atherosclerotic changes of the supraclinoid internal carotid arteries bilaterally, without evidence of flow-limiting stenosis  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[place]  [place] otherwise intact  No evidence of large vessel central occlusive disease involving the [place]  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, consistent with developmental venous anomaly  The patient is not a TPA candidate  Antiplatelets held secondary to GI bleed, statin held secondary to cirrhosis with ascites  Neurology was asked to see and further evaluate  Left lower extremity pain, weakness, and numbness  · Suspect less likely a central process - stroke or seizure, CT of head of the head with improved prominence of cerebral convexities bilaterally (left greater than right), consistent with bilateral subdural hygromas     CTA with no lvo, 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, small vascular malformation right cerebellar hemisphere, consistent with developmental venous abnormality  These findings do not appear to correlate with her clinical findings  · Rule out lumbar etiology, causing her symptoms  · Her symptoms may be localized to her hip, lower leg region- with her ascites she is undergoing a paracentesis  -  Agree with stroke pathway for now, MRI of brain is pending when medically apprioprate, the patient cannot be on antiplatelet medication at this time or statin  CT of head with any new focal neurological symptoms   -  CT of lumbar spine pending, await results, MRI of lumbar spine  -  EMG as out patient if no contraindications and symptoms persist  -  Consider imaging of the left hip region/thigh region per medical team  -  Check CK  -  Therapies  -  PMR  -  Neurological checks, notify neurology with any changes in examination  -  Continue to treat underlying metabolic and infectious derangements as per the medicine team   -  Out patient follow up with neurosurgery team in regards to 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      Recommendations for outpatient neurological follow up have yet to be determined  History of Present Illness     Reason for Consult / Principal Problem:  Left leg weakness    HPI: Dontrell Israel is a 63 y o   female with past medical hx of migraines/chronic pain, chronic CSF leak, hepatomegaly, and lumbar radiculopathy  Per record review, the patient presented to [facility] with left hip pain that started a few days ago after her dog jumped on her  It was reported the patient had difficulty with ambulating and bearing weight on the left hip, starting on the morning of admission (4/28/2022)  She was also noted have increased nausea and abdominal swelling with generalized weakness, the abdominal swelling has been getting progressive worse over the last month    She was reported to have increased urinary frequency over the last few days as well  It was reported while in the Ed she became hypoxic, she also is on ceftriaxone with a concern for SBP (per record review)  The patient was seen by the medical team, he CBC revealed a Hemoglobin of 5 5, she was noted to he have heme occult stool noted in the ED, she was transfused to Keep hemoglobin greater than 7  Secondary to concern for GI bleed the patient underwent imaging with CT - which revealed new large ascites, distended gallbladder, cirrhotic liver, splenomegaly with recanalized lysed umbilical vein, faint hypodense area 1 7 cm of junction of segment 8 and for a liver, thickening of the ascending colon possibly due to colitis also seen on previous study, moderate left pleural effusion  She was noted to have an INR of 2 22H, AST was 132, bilirubin was 5 6  GI was consulted, the patient is most likely to undergo an EGD and colonoscopy  The patient underwent and diagnostic paracentesis in the ED with concern for SBP  Therapeutic paracentesis was ordered  The patient is on rocephin  The patient was noted to have a thrombocytopenia with a platelet of 78Z  She was also noted to have a pleural effusion and receiving diuresis  It was felt by medicine team on initial encounter that her left hip pain, and difficulty with ambulation, was secondary to a nerve compression due to her ascites and referred radicular pain  The patient was placed on pain medications  No acute compression collapse of the vertebra was noted on imaging  Dedicated lumbar spine is pending  This a m  at 0457, it was reported the patient developed worsening left lower extremity weakness, she was having left lower extremity weakness this admission, however, earlier she was able to used the commode with no problems  She had a sudden onset of LLE weakness and inability to bend at the knee and had a sensation but did not clarify    NIH was reported to be a 3  Stroke alert was activated  It was reported the patient had improvement of her left lower extremity weakness prior to arrival      Ct of head was completed - revealed stable cerebral atrophy and chronic small vessel disease, improved prominence of the extra-axial space along the cerebral convexities bilaterally left greater than right consistent with chronic bilateral subdural hygromas  No acute intracranial abnormality  CTA of the head and neck was completed - which showed mild atherosclerotic disease of the internal carotid arteries bilaterally, without evidence of flow-limiting stenosis  No evidence of carotid artery or vertebral artery dissection  Mild atherosclerotic changes of the supraclinoid internal carotid arteries bilaterally, without evidence of flow-limiting stenosis    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[place]  [place] of [place] otherwise intact  No evidence of large vessel central occlusive disease involving the [place] of [place]  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, consistent with developmental venous anomaly  The patient is not a TPA candidate  Antiplatelets held secondary to GI bleed, statin held secondary to cirrhosis with ascites  Neurology was asked to see and further evaluate  VS and labs reviewed  UA + for nitrates     Examined prior to testing  Daughter at bedside  The patient does have a hx of headaches and prior CSF leaks in the past, 2014  She reports that she followed with [facility], and had multiple test completed in the past   This has improved  The patient reports that, on Sunday, she was with her dog, her dog jumped on her, since that time she has noticed left hip pain - pain in her left thigh region to her knee, she was also having difficulty with walking/numbness    She also reported that she had weakness on the the right lower extremity, no back pain or radicular features, saddle anesthesia reported  She reports that this has progressively worsened over the last few day, she also has been feeling generally weak and unwell, progressive abdominal distention  The patient reports yesterday, the pain in her hip on the left and thigh, became worse, her weakness became worse and numbness  She reports this is why she came to the hospital    She reports last night, after she was done with the commode, she all of sudden noted her left leg to go almost limp, with worsening weakness "as if I could not control the limb"  She continued to have hip pain and thigh pain  She reports pain in her hip and groin region, as well as pain in her thigh to the top of her knee, she also reports numbness on the outer portion of her left leg, but does go to the midline portion to her lower leg  She can now hardly lift this off the bed  No reported lower back pain radiating, no reported problems with her speech, vision, no confusion, no headache, no facial droop, problems with swallowing, no other neurological complaints at this time  CT myelogram was completed in 2021 - please see report in Epic  Inpatient consult to Neurology  Consult performed by: Romeo Willard PA-C  Consult ordered by: Emmaline Boast, PA-C        Review of Systems   Constitutional: Positive for appetite change and fatigue  HENT: Negative  Eyes: Negative  Respiratory: Positive for shortness of breath  Cardiovascular: Negative  Gastrointestinal: Positive for abdominal distention, abdominal pain, blood in stool and nausea  Endocrine: Negative  Genitourinary: Positive for difficulty urinating  Musculoskeletal: Positive for arthralgias, gait problem and myalgias  Negative for back pain, joint swelling, neck pain and neck stiffness  Skin: Positive for color change  Neurological: Positive for weakness and numbness   Negative for dizziness, tremors, seizures, syncope, facial asymmetry, speech difficulty, light-headedness and headaches  Left hip, thigh and knee pain  Left hip, thigh, leg numbness/weakness   Hematological: Negative  Psychiatric/Behavioral: Negative        Historical Information   Past Medical History:   Diagnosis Date    Concussion     CSF leak     Liver failure ([facility] [address] )     Migraines      Past Surgical History:   Procedure Laterality Date    ABLATION SOFT TISSUE      BREAST LUMPECTOMY      LAPAROSCOPY FOR ECTOPIC PREGNANCY      SINUS SURGERY      TONSILECTOMY AND ADNOIDECTOMY       Social History   Social History     Substance and Sexual Activity   Alcohol Use Yes    Comment: socially, had glass of wine today     Social History     Substance and Sexual Activity   Drug Use Not Currently    Comment: CBD     E-Cigarette/Vaping    E-Cigarette Use Never User      E-Cigarette/Vaping Substances    Nicotine No     THC No     CBD No     Flavoring No     Other No     Unknown No      Social History     Tobacco Use   Smoking Status Former Smoker    Packs/day: 1 00    Types: Cigarettes   Smokeless Tobacco Never Used     Family History:   Family History   Problem Relation Age of Onset    Colon cancer Father     Breast cancer Sister      Meds/Allergies   all current active meds have been reviewed, current meds:   Current Facility-Administered Medications   Medication Dose Route Frequency    acetaminophen (TYLENOL) tablet 650 mg  650 mg Oral Q4H PRN    aluminum-magnesium hydroxide-simethicone (MYLANTA) oral suspension 30 mL  30 mL Oral Q6H PRN    cefTRIAXone (ROCEPHIN) IVPB (premix in dextrose) 1,000 mg 50 mL  1,000 mg Intravenous Daily    furosemide (LASIX) injection 20 mg  20 mg Intravenous Daily    gabapentin (NEURONTIN) capsule 100 mg  100 mg Oral TID    HYDROmorphone (DILAUDID) injection 0 5 mg  0 5 mg Intravenous Q1H PRN    lidocaine 1% buffered   Infiltration Code/Trauma/Sedation Med    losartan (COZAAR) tablet 100 mg 100 mg Oral Daily    octreotide (SandoSTATIN) 500 mcg in sodium chloride 0 9 % 250 mL infusion  50 mcg/hr Intravenous Continuous    ondansetron (ZOFRAN) injection 4 mg  4 mg Intravenous Q4H PRN    oxyCODONE (ROXICODONE) IR tablet 10 mg  10 mg Oral Q4H PRN    oxyCODONE (ROXICODONE) IR tablet 5 mg  5 mg Oral Q4H PRN    pantoprazole (PROTONIX) injection 40 mg  40 mg Intravenous Q12H    senna-docusate sodium (SENOKOT S) 8 6-50 mg per tablet 2 tablet  2 tablet Oral BID PRN    and PTA meds:   Prior to Admission Medications   Prescriptions Last Dose Informant Patient Reported? Taking? candesartan (ATACAND) 16 mg tablet Past Week at Unknown time Self Yes Yes   Sig: Take 16 mg by mouth daily   naproxen sodium (ANAPROX) 550 mg tablet Past Month at Unknown time Self Yes Yes   Sig: take 1 tablet by mouth twice a day if needed for headache up to three times a week   ondansetron (ZOFRAN) 8 mg tablet 4/28/2022 at Unknown time Self Yes Yes   Sig: Take 8 mg by mouth every 8 (eight) hours as needed for nausea or vomiting      Facility-Administered Medications: None     Allergies   Allergen Reactions    Codeine Other (See Comments)     Can not take with current medications    Other reaction(s): Other (See Comments)  GI upset  GI Upset       Objective   Vitals:Blood pressure 128/72, pulse 101, temperature 97 9 °F (36 6 °C), resp  rate 12, height 5' 6" (1 676 m), weight 68 kg (150 lb), SpO2 91 %, not currently breastfeeding  ,Body mass index is 24 21 kg/m²  Intake/Output Summary (Last 24 hours) at 4/29/2022 1000  Last data filed at 4/28/2022 2141  Gross per 24 hour   Intake 480 ml   Output --   Net 480 ml     Invasive Devices: Invasive Devices  Report    Peripheral Intravenous Line            Peripheral IV 04/28/22 Right Antecubital <1 day    Peripheral IV 04/28/22 Right Hand <1 day          Drain            External Urinary Catheter 1 day              Physical Exam  Vitals reviewed     Constitutional:       Appearance: She is not ill-appearing  Comments: Appears uncomfortable and in pain, ill appearing  HENT:      Head: Normocephalic and atraumatic  Comments: No tongue bite noted     Mouth/Throat:      Mouth: Mucous membranes are dry  Eyes:      General: Scleral icterus present  Extraocular Movements: Extraocular movements intact and EOM normal       Pupils: Pupils are equal, round, and reactive to light  Comments: Icterus noted   Cardiovascular:      Rate and Rhythm: Normal rate  Pulmonary:      Effort: No respiratory distress  Abdominal:      General: There is distension  Tenderness: There is abdominal tenderness  Musculoskeletal:         General: Tenderness present  Cervical back: Neck supple  Comments: Pain reported in thigh region to touch on the left  No reported hip pain on the left to palpitation or manipulation  No inverting of the foot on the left  She is weaker with decreased rom on the left lower compared to the right  Skin:     General: Skin is dry  Coloration: Skin is jaundiced  Findings: Bruising present  Neurological:      Mental Status: She is alert and oriented to person, place, and time  Deep Tendon Reflexes:      Reflex Scores:       Tricep reflexes are 2+ on the right side and 2+ on the left side  Bicep reflexes are 2+ on the right side and 2+ on the left side  Brachioradialis reflexes are 2+ on the right side  Patellar reflexes are 1+ on the right side and 0 on the left side  Achilles reflexes are 0 on the right side and 0 on the left side  Psychiatric:         Speech: Speech normal        Neurologic Exam     Mental Status   Oriented to person, place, and time  Follows 2 step commands  Attention: normal  Concentration: normal    Speech: speech is normal (No aphasia or dysarthria  )  Level of consciousness: alert  Able to name object  Able to read  Able to repeat  Able to write  Normal comprehension     She is able to follow simple and complex commands, she is able to tell me the month, day, year, president  She is able to do simple and complex calculations  She is able to read and repeat, recognize objects  Cranial Nerves     CN II   Visual fields full to confrontation  CN III, IV, VI   Pupils are equal, round, and reactive to light  Extraocular motions are normal    Nystagmus: none   Diplopia: none  Ophthalmoparesis: none    CN V   Facial sensation intact  CN VII   Facial expression full, symmetric  CN VIII   CN VIII normal      CN IX, X   CN IX normal    CN X normal      CN XI   CN XI normal      CN XII   CN XII normal    CN 2-12 with no lateralizing features noted  Motor Exam 5/5 in the uppers, nl tone and bulk  No tremor or cogwheel rigidity noted  Right lower 5/5, nl tone and bulk, no increased tone noted  Left lower - pain with movement in to the left upper, no referred pain to the back - lumbar spine region  She has full power of dorsi and plantar flexion on the left, knee flexion extension 1-2/5, hip flexion extension 1-2/5  Sensation in the uppers intact to all modalities, non focal      In the right lower intact  Left lower - decreased to vibratory sense slight on the left lower compared to the right, decreased pp - on the left upper entire thigh, entire lower leg, some sparred pp in the foot  Cold sensation to touch decreased in the lowers on the left compared to the right  No exact dermatomal distrubution  Sensory Exam   Please see motor        Gait, Coordination, and Reflexes     Gait  Gait: (Not tested at this time)    Tremor   Resting tremor: absent  Intention tremor: absent    Reflexes   Right brachioradialis: 2+  Right biceps: 2+  Left biceps: 2+  Right triceps: 2+  Left triceps: 2+  Right patellar: 1+  Left patellar: 0  Right achilles: 0  Left achilles: 0  Right plantar: normal  Left plantar: equivocal  Right ankle clonus: absent  Left pendular knee jerk: absentNo features of myelopathy on my examination  No increased tone, clonus, no brisk reflexes noted  Lab Results:     Recent Results (from the past 24 hour(s))   CBC and differential    Collection Time: 04/28/22 11:47 AM   Result Value Ref Range    WBC 6 20 4  31 - 10 16 Thousand/uL    RBC 1 23 (L) 3 81 - 5 12 Million/uL    Hemoglobin 5 5 (LL) 11 5 - 15 4 g/dL    Hematocrit 16 3 (L) 34 8 - 46 1 %     (H) 82 - 98 fL    MCH 44 7 (H) 26 8 - 34 3 pg    MCHC 33 7 31 4 - 37 4 g/dL    RDW 16 2 (H) 11 6 - 15 1 %    MPV 10 9 8 9 - 12 7 fL    Platelets 59 (L) 776 - 390 Thousands/uL    nRBC 0 /100 WBCs    Neutrophils Relative 77 (H) 43 - 75 %    Immat GRANS % 2 0 - 2 %    Lymphocytes Relative 14 14 - 44 %    Monocytes Relative 6 4 - 12 %    Eosinophils Relative 0 0 - 6 %    Basophils Relative 1 0 - 1 %    Neutrophils Absolute 4 79 1 85 - 7 62 Thousands/µL    Immature Grans Absolute 0 10 0 00 - 0 20 Thousand/uL    Lymphocytes Absolute 0 89 0 60 - 4 47 Thousands/µL    Monocytes Absolute 0 36 0 17 - 1 22 Thousand/µL    Eosinophils Absolute 0 01 0 00 - 0 61 Thousand/µL    Basophils Absolute 0 05 0 00 - 0 10 Thousands/µL   CMP    Collection Time: 04/28/22 11:47 AM   Result Value Ref Range    Sodium 131 (L) 136 - 145 mmol/L    Potassium 4 2 3 5 - 5 3 mmol/L    Chloride 97 (L) 100 - 108 mmol/L    CO2 25 21 - 32 mmol/L    ANION GAP 9 4 - 13 mmol/L    BUN 9 5 - 25 mg/dL    Creatinine 0 99 0 60 - 1 30 mg/dL    Glucose 131 65 - 140 mg/dL    Calcium 8 0 (L) 8 3 - 10 1 mg/dL    Corrected Calcium 9 5 8 3 - 10 1 mg/dL     (H) 5 - 45 U/L    ALT 30 12 - 78 U/L    Alkaline Phosphatase 74 46 - 116 U/L    Total Protein 7 5 6 4 - 8 2 g/dL    Albumin 2 1 (L) 3 5 - 5 0 g/dL    Total Bilirubin 5 69 (H) 0 20 - 1 00 mg/dL    eGFR 62 ml/min/1 73sq m   Lipase    Collection Time: 04/28/22 11:47 AM   Result Value Ref Range    Lipase 76 73 - 393 u/L   Protime-INR    Collection Time: 04/28/22 11:47 AM   Result Value Ref Range Protime 24 0 (H) 11 6 - 14 5 seconds    INR 2 22 (H) 0 84 - 1 19   APTT    Collection Time: 04/28/22 11:47 AM   Result Value Ref Range    PTT 53 (H) 23 - 37 seconds   Iron Saturation %    Collection Time: 04/28/22 11:47 AM   Result Value Ref Range    Iron Saturation 66 (H) 15 - 50 %    TIBC 146 (L) 250 - 450 ug/dL    Iron 97 50 - 170 ug/dL   Ferritin    Collection Time: 04/28/22 11:47 AM   Result Value Ref Range    Ferritin 1,238 (H) 8 - 388 ng/mL   Type and screen    Collection Time: 04/28/22 12:30 PM   Result Value Ref Range    ABO Grouping A     Rh Factor Positive     Antibody Screen Negative     Specimen Expiration Date [id number]    ABORh Recheck - Contact Blood Bank Prior to Collection    Collection Time: 04/28/22 12:42 PM   Result Value Ref Range    ABO Grouping A     Rh Factor Positive    Synovial fluid, white cell count w/ diff    Collection Time: 04/28/22  2:04 PM   Result Value Ref Range    Color, Fluid      Clarity, Fluid      WBC, Fluid     Synovial fluid, Culture and Gram stain    Collection Time: 04/28/22  2:04 PM    Specimen: Paracentesis; Body Fluid   Result Value Ref Range    Gram Stain Result 1+ Polys     Gram Stain Result No bacteria seen    Synovial fluid, RBC count    Collection Time: 04/28/22  2:04 PM   Result Value Ref Range    RBC, SYNOVIAL     STAT Gram Stain    Collection Time: 04/28/22  2:04 PM    Specimen: Paracentesis;  Other   Result Value Ref Range    Gram Stain Result Rare Mononuclear Cells     Gram Stain Result No No organisms seen     Gram Stain Result No No polys seen    Body fluid white cell count with differential    Collection Time: 04/28/22  2:04 PM   Result Value Ref Range    Site Paracentesis     Color, Fluid Yellow Clear, Colorless,Yellow    Clarity, Fluid Clear Clear    WBC, Fluid 87 /ul   Body Fluid Diff    Collection Time: 04/28/22  2:04 PM   Result Value Ref Range    Total Counted 100     Neutrophils % (Fluid) 13 %    Lymphs % (Fluid) 9 %    Mesothelial % (Fluid) 16 % Histiocyte % (Fluid) 53 %    Monocytes % (Fluid) 9 %   Blood culture #1    Collection Time: 04/28/22  2:31 PM    Specimen: Arm, Right; Blood   Result Value Ref Range    Blood Culture Received in Microbiology Lab  Culture in Progress  Blood culture #2    Collection Time: 04/28/22  2:32 PM    Specimen: Hand, Right; Blood   Result Value Ref Range    Blood Culture Received in Microbiology Lab  Culture in Progress      UA (URINE) with reflex to Scope    Collection Time: 04/28/22  6:20 PM   Result Value Ref Range    Color, UA Miriam     Clarity, UA Slightly Cloudy     Specific [place], UA 1 010 1 003 - 1 030    pH, UA 6 0 4 5, 5 0, 5 5, 6 0, 6 5, 7 0, 7 5, 8 0    Leukocytes, UA Trace (A) Negative    Nitrite, UA Positive (A) Negative    Protein, UA Negative Negative mg/dl    Glucose, UA Negative Negative mg/dl    Ketones, UA Trace (A) Negative mg/dl    Urobilinogen, UA 1 0 0 2, 1 0 E U /dl E U /dl    Bilirubin, UA Interference- unable to analyze (A) Negative    Blood, UA Trace-lysed (A) Negative   Urine Microscopic    Collection Time: 04/28/22  6:20 PM   Result Value Ref Range    RBC, UA 1-2 None Seen, 0-1, 1-2, 2-4, 0-5 /hpf    WBC, UA 2-4 None Seen, 0-1, 1-2, 0-5, 2-4 /hpf    Epithelial Cells Occasional None Seen, Occasional /hpf    Bacteria, UA Occasional None Seen, Occasional /hpf   Serial Hemoglobin Q6hrs    Collection Time: 04/28/22  6:25 PM   Result Value Ref Range    Hemoglobin 7 0 (L) 11 5 - 15 4 g/dL   Serial Hemoglobin Q6hrs    Collection Time: 04/29/22 12:39 AM   Result Value Ref Range    Hemoglobin 8 2 (L) 11 5 - 15 4 g/dL   CBC (With Platelets)    Collection Time: 04/29/22  4:57 AM   Result Value Ref Range    WBC 7 37 4 31 - 10 16 Thousand/uL    RBC 2 01 (L) 3 81 - 5 12 Million/uL    Hemoglobin 7 3 (L) 11 5 - 15 4 g/dL    Hematocrit 22 6 (L) 34 8 - 46 1 %     (H) 82 - 98 fL    MCH 36 3 (H) 26 8 - 34 3 pg    MCHC 32 3 31 4 - 37 4 g/dL    Platelets 60 (L) 265 - 390 Thousands/uL    MPV 10 9 8 9 - 12 7 fL Comprehensive metabolic panel    Collection Time: 04/29/22  4:57 AM   Result Value Ref Range    Sodium 132 (L) 136 - 145 mmol/L    Potassium 4 1 3 5 - 5 3 mmol/L    Chloride 96 (L) 100 - 108 mmol/L    CO2 26 21 - 32 mmol/L    ANION GAP 10 4 - 13 mmol/L    BUN 12 5 - 25 mg/dL    Creatinine 0 96 0 60 - 1 30 mg/dL    Glucose 184 (H) 65 - 140 mg/dL    Calcium 8 0 (L) 8 3 - 10 1 mg/dL    Corrected Calcium 9 4 8 3 - 10 1 mg/dL     (H) 5 - 45 U/L    ALT 32 12 - 78 U/L    Alkaline Phosphatase 72 46 - 116 U/L    Total Protein 7 9 6 4 - 8 2 g/dL    Albumin 2 2 (L) 3 5 - 5 0 g/dL    Total Bilirubin 9 70 (H) 0 20 - 1 00 mg/dL    eGFR 64 ml/min/1 73sq m   Fingerstick Glucose (POCT)    Collection Time: 04/29/22  5:03 AM   Result Value Ref Range    POC Glucose 186 (H) 65 - 140 mg/dl   Prepare Leukoreduced RBC: 2 Units    Collection Time: 04/29/22  5:49 AM   Result Value Ref Range    Unit Product Code L3950R92     Unit Number [id number]     Unit ABO A     Unit [facility] POS     Crossmatch Compatible     Unit Dispense Status Presumed Trans     Unit Product Volume 300 ml    Unit Product Code S2179B16     Unit Number [id number]     Unit ABO A     Unit RH POS     Crossmatch Compatible     Unit Dispense Status Presumed Trans     Unit Product Volume 350 ml     Procedure: XR chest 1 view portable    Result Date: 4/28/2022  Narrative: CHEST INDICATION:   low pulse ox  COMPARISON:  Chest radiograph dated 12/17/2013  EXAM PERFORMED/VIEWS:  XR CHEST PORTABLE FINDINGS: Cardiomediastinal silhouette appears unremarkable  Mild hazy density of the left lower hemithorax may be secondary to developing airspace disease versus overlying soft tissue  No pneumothorax or pleural effusion  Osseous structures appear within normal limits for patient age  Impression: Mild hazy density of the left lower hemithorax may be secondary to developing airspace disease versus overlying soft tissue   Workstation performed: [id number]     Procedure: CT stroke alert brain    Result Date: 4/29/2022  Narrative: CT BRAIN - STROKE ALERT PROTOCOL INDICATION:   LUE weakness and numbness  No additional history was afforded in Epic at time of dictation  The patient's entire past medical history is obtained from [address] [employer] technologist notes only, via copy and paste  COMPARISON:  MR brain with and without contrast March 4, 2021 and noncontrast CT brain August 5, 2016 TECHNIQUE:  CT examination of the brain was performed  In addition to axial images, coronal reformatted images were created and submitted for interpretation  Radiation dose length product (DLP) for this visit:  774 57 mGy-cm   This examination, like all CT scans performed in the [facility], was performed utilizing techniques to minimize radiation dose exposure, including the use of iterative  reconstruction and automated exposure control  IMAGE QUALITY:  Diagnostic  FINDINGS: PARENCHYMA: Decreased attenuation is noted in periventricular and subcortical white matter demonstrating an appearance that is statistically most likely to represent mild microangiopathic change  No CT signs of acute infarction  No intracranial mass, mass effect or midline shift  No acute parenchymal hemorrhage  Calcification in the right cerebellar hemisphere, slightly more pronounced on the current study, likely the sequela of prior trauma or infection versus calcification within a small vascular malformation such as cavernous angioma or developmental venous anomaly  Mild atherosclerotic changes of the cavernous and supraclinoid internal carotid arteries bilaterally  VENTRICLES AND EXTRA-AXIAL SPACES:  Enlargement of ventricles and extra-axial CSF spaces consistent with cerebral and cerebellar atrophy  There is prominence of the extra-axial space along the cerebral convexities bilaterally, left greater than right  This has improved from the prior study   VISUALIZED ORBITS AND PARANASAL SINUSES:  Orbits appear normal  Postoperative changes with bilateral medial antrectomy defects, middle turbinectomies, excision of the infundibula of the ostiomeatal units bilaterally and partial bilateral ethmoidectomies  Mild scattered sinus mucosal thickening is noted  No fluid levels are seen  CALVARIUM AND EXTRACRANIAL SOFT TISSUES:   Normal      Impression: 1  Stable cerebral atrophy with chronic small vessel ischemic white matter disease  2   Improved prominence of the extra-axial space along the cerebral convexities bilaterally, left greater than right, consistent with chronic bilateral subdural hygromas  3   No acute intracranial abnormality  If there is continued concern for acute cerebral ischemia, consider follow-up MRI of the brain with diffusion-weighted sequencing  The above findings were sent via [address] to the on-call stroke neurologist Dr Tea Guan at [address] [place] [address],  [address] AM on April 29, 2022  Workstation performed: [id number]     Procedure: CT abdomen pelvis with contrast    Result Date: 4/28/2022  Narrative: CT ABDOMEN AND PELVIS WITH IV CONTRAST INDICATION:   Poss ascites  Left hip pain  COMPARISON:  June 16, 2021 TECHNIQUE:  CT examination of the abdomen and pelvis was performed  Axial, sagittal, and coronal 2D reformatted images were created from the source data and submitted for interpretation  Radiation dose length product (DLP) for this visit:  480 mGy-cm   This examination, like all CT scans performed in the [facility], was performed utilizing techniques to minimize radiation dose exposure, including the use of iterative reconstruction and automated exposure control  IV Contrast:  100 mL of iohexol (OMNIPAQUE) Enteric Contrast:  Enteric contrast was not administered   FINDINGS: ABDOMEN LOWER CHEST: Moderate left effusion seen LIVER/BILIARY TREE:  Cirrhotic liver seen A rounded the relatively low-attenuation area seen within the liver, at the junction of the segment 8 and 4, measuring 1 7 cm GALLBLADDER: Gallbladder is distended SPLEEN:  The spleen is enlarged, measuring PANCREAS:  Pancreas appears unremarkable ADRENAL GLANDS:  Unremarkable  KIDNEYS/URETERS:  Unremarkable  No hydronephrosis  STOMACH AND BOWEL:  Diverticulosis seen Mild thickening of the ascending colon seen APPENDIX:  No findings to suggest appendicitis  ABDOMINOPELVIC CAVITY:  No ascites  No pneumoperitoneum  No lymphadenopathy  Large amount of ascites seen VESSELS:  Celiac trunk is patent SMA is patent LIZETH is patent Portal vein is patent PELVIS REPRODUCTIVE ORGANS:  Uterus appears unremarkable URINARY BLADDER:  Bladder wall thickening seen There is mild perivesical infiltration ABDOMINAL WALL/INGUINAL REGIONS:  Unremarkable  OSSEOUS STRUCTURES:  No acute compression collapse of the vertebra Chronic compression of the L2 vertebra seen     Impression: New large ascites Distended gallbladder Cirrhotic liver  Splenomegaly with recanalized umbilical vein, correlate with hypertension A faint hypodense area seen in image 24 series 2 measuring 1 7 cm at the junction of the segment 8 and 4, indeterminate May be perfusion related or focal lesion  Suggest nonemergent evaluation with the MRI Mild thickening of the ascending colon may be due to colitis, also seen on the previous study Moderate left effusion The study was marked in EPIC for immediate notification  Workstation performed: [id number]     Procedure: CTA stroke alert (head/neck)    Result Date: 4/29/2022  Narrative: CTA NECK AND BRAIN WITH CONTRAST INDICATION: LLE numbness and weakness Contacted by Anjelica Cullen with Internal Medicine as patient had developed worsening left lower extremity weakness approximately 10 minutes  Per the patient, she has been having left lower extremity weakness since her admission, however, earlier this evening she was able to get up and use the commode without any problems   She had sudden onset LLE weakness with inability to bend at the knee and stated that she had sensation changes but was unable to further qualify  Remainder of neuro exam benign  NIH  of 3 and stroke alert was called  Nicky Casiano spoke with neurology who recommended CTH/CTA but patient would not be tPA candidate  Patient had improvement in LLE weakness upon arrival to CT  CTs pending, place neuro consult, stroke pathway, q 1 hour neuro checks x 4 hours  The patient's entire past medical history was obtained directly from either the attending emergency room physicians notes and/or the resident/physician's assistant notes in [address] [place] [address]  The information was copied and pasted directly from Epic  COMPARISON:   MR brain with contrast March 4, 2021 and noncontrast CT brain August 5, 2016 TECHNIQUE:   Post contrast imaging was performed after administration of iodinated contrast through the neck and brain  Post contrast axial 0 625 mm images timed to opacify the arterial system  3D rendering was performed on an independent workstation  MIP reconstructions performed  Coronal reconstructions were performed of the noncontrast portion of the brain  Radiation dose length product (DLP) for this visit:  357 63 mGy-cm   This examination, like all CT scans performed in the [facility], was performed utilizing techniques to minimize radiation dose exposure, including the use of iterative  reconstruction and automated exposure control  IV Contrast:  85 mL of iohexol (OMNIPAQUE)  IMAGE QUALITY:   Somewhat limited due to phase of contrast administration  There is significant venous contamination  FINDINGS: CERVICAL VASCULATURE AORTIC ARCH AND GREAT VESSELS:  Mild atherosclerotic disease of the arch, proximal great vessels and visualized subclavian vessels  Bovine configuration of the aortic arch No significant stenosis  RIGHT VERTEBRAL ARTERY CERVICAL SEGMENT:  Normal origin  There is tortuosity and kinking of the proximal vessel, just beyond the origin  The vessel is normal in caliber throughout the neck  LEFT VERTEBRAL ARTERY CERVICAL SEGMENT:  Normal origin  There is tortuosity and kinking of the proximal vessel, just beyond the origin  The vessel is normal in caliber throughout the neck  RIGHT EXTRACRANIAL CAROTID SEGMENT:  Mild atherosclerotic disease of the distal common carotid artery and proximal cervical internal carotid artery without significant stenosis compared to the more distal ICA  The right common carotid artery and internal carotid artery are aberrant and retropharyngeal   No flow-limiting stenosis  LEFT EXTRACRANIAL CAROTID SEGMENT:  Normal caliber common carotid artery  Normal bifurcation and cervical internal carotid artery  No stenosis or dissection  The left common carotid artery and internal carotid artery are aberrant and retropharyngeal   No flow-limiting stenosis  NASCET criteria was used to determine the degree of internal carotid artery diameter stenosis  INTRACRANIAL VASCULATURE INTERNAL CAROTID ARTERIES:  Normal enhancement of the intracranial portions of the internal carotid arteries  Mild atherosclerotic changes of the distal cavernous and supraclinoid internal carotid arteries bilaterally  No flow-limiting stenosis  Normal ophthalmic artery origins  Normal ICA terminus  ANTERIOR CIRCULATION:  Symmetric A1 segments and anterior cerebral arteries with normal enhancement  There is triplication of the distal anterior cerebral arteries  Normal anterior communicating artery  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 versus a 3 mm aneurysm (series 2, image 77; series 304, image 8)  MIDDLE CEREBRAL ARTERY CIRCULATION:  M1 segment and middle cerebral artery branches demonstrate normal enhancement bilaterally  DISTAL VERTEBRAL ARTERIES:  Normal distal vertebral arteries  Posterior inferior cerebellar artery origins are normal  Normal vertebral basilar junction  BASILAR ARTERY:  Basilar artery is normal in caliber  Normal superior cerebellar arteries  POSTERIOR CEREBRAL ARTERIES: Both posterior cerebral arteries arises from the basilar tip  The P1 segment of the left posterior cerebral artery is hypoplastic but patent  The posterior cerebral arteries are otherwise normal    Normal posterior communicating arteries  Suggestion of a small vascular malformation in the right cerebral hemisphere, consistent with developmental venous anomaly  VENOUS STRUCTURES:  Normal  NON VASCULAR ANATOMY BONY STRUCTURES:  Straightening and reversal of the cervical lordotic curvature  No acute fracture or osseous destructive lesions  No traumatic subluxation  Disc space narrowing with degenerative endplate changes diffusely throughout the cervical spine, most pronounced C4-C5 and C5-C6  SOFT TISSUES OF THE NECK: The thyroid gland is heterogeneous in CT density  At least 2 nodules are seen in the left lobe, largest measuring approximately 1 7 cm in the anterior lower pole (series 2, image 194)  These were described on an outside thyroid ultrasound dated July 27, 2021  These appear similar  These were deemed benign-appearing on the outside ultrasound  THORACIC INLET:  Incompletely visualized large left pleural effusion, previously noted on a CT scan of the abdomen and pelvis April 28, 2022  Impression: 1  Mild atherosclerotic disease of the internal carotid arteries bilaterally, without evidence of flow-limiting stenosis  No evidence of carotid artery or vertebral artery dissection  2   Mild atherosclerotic changes of the supraclinoid internal carotid arteries bilaterally, without evidence of flow-limiting stenosis  3   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"[employer] [place] otherwise intact  No evidence of large vessel central occlusive disease involving the "[place] [place]  4   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  Follow-up neurosurgical evaluation is recommended    Consider dedicated cerebral angiography versus repeat CTA of the intracranial circulation only  5   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, consistent with developmental venous anomaly  If there is continued concern for acute cerebral ischemia, recommend follow-up MRI of the brain with diffusion-weighted sequencing  The above findings were sent via [address] to the on-call stroke neurologist Dr Saima Basurto at [address] [place] [address],  [address] AM on April 29, 2022  Workstation performed: [id number]     Imaging Studies: I have personally reviewed pertinent reports  EKG, Pathology, and Other Studies: I have personally reviewed pertinent reports  Code Status: Level 1 - Full Code    Counseling / Coordination of Care  Reviewed case with neurology attending, history and physical examination, labs and imaging completed, plan of care as per attending physician  Please see attestation for further details

## 2022-04-29 NOTE — QUICK NOTE
Discussed CT stroke alert findings with on-call neurology  Recommended initiating stroke pathway with telemetry, MRI brain, and neurology consult  Anti-platelets held secondary to acute GI bleed and statin held secondary to cirrhosis with ascites  Stroke pathway orders placed

## 2022-04-29 NOTE — ASSESSMENT & PLAN NOTE
Presents to ED with acute left hip pain and abdominal pain/distension   Hgb 5 5/Hct 16 3 - Type and screen ordered   CT scan abdomen/pelvis:  New large ascites, distended gallbladder, cirrhotic liver, splenomegaly with recanalized lysed umbilical vein, faint hypodense area 1 7 cm of junction of segment 8 and for a liver, thickening of the ascending colon possibly due to colitis also seen on previous study, moderate left pleural effusion   Iron panel ordered    LFTs - , normal ALT, bili 5 6 on   PTT/INR - PT 24, INR 2 22   Occult blood stool positive in ED     PPI IV Q12hr    New large ascites and history of splenomegaly - octreotide and ceftriaxone   Hold anticoagulation   Hold NSAIDS (patient is chronic NSAID user at home due to migraine)    Continue to monitor H/H with serial labs q 6 hours   Up out of bed with assistance   Stool records at bedside/ intake and output     Currently NPO - further diet advancement per Gastroenterology pending timing of scope(s)   GI consulted - await recommendations    Lab Results   Component Value Date    HGB 7 3 (L) 04/29/2022    HGB 8 2 (L) 04/29/2022

## 2022-04-29 NOTE — OCCUPATIONAL THERAPY NOTE
Occupational Therapy Cx Note     Patient Name: Heidi Muñoz  DWXRC'O Date: 4/29/2022  Problem List  Principal Problem:    Acute blood loss anemia  Active Problems:    Cirrhosis of liver with ascites (HCC)    Left hip pain    Thrombocytopenia (HCC)    Hyponatremia    Hyperbilirubinemia    GI bleed    Pleural effusion              04/29/22 0910   OT Last Visit   OT Visit Date 04/29/22   Note Type   Note type Cancelled Session   Cancel Reasons Medical status   Additional Comments OT orders received, chart reviewed  Spoke with RN who reports pt is not appropriate for therapy today due to uncontrollable pain and multiple procedures scheduled for the day  Will follow-up as able       Carolyn Fox OTR/L

## 2022-04-29 NOTE — QUICK NOTE
Paged at 5:03am    Responded at 5:03am    Patient had LUE weakness and pain  Admitted for unexplained anemia and blood loss and hemoglobin of 5 5 and a positive hem-occult stool test there is concern for GI bleed    Onset 15 mins prior to the stroke alert was paged  NIH = 4    CT head negative    CTA head & neck no LVO    Patient not a candidate for TPA given the current concern for GI bleed

## 2022-04-29 NOTE — ASSESSMENT & PLAN NOTE
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 in the setting of significant ascites, noted on CT AP and MRI L spine confirming this with no cord pathology noted  - Neurologic exam with 2+/5 strength left quadriceps ( I am told this is improved since paracentesis), knee extension 2/5, hip extension 4+/5, knee flexion 3+/5 and intact dorsi and plantarflexion  Strength in RLE preserved  She has sensory reduction anterior thigh and medial anterior calf with otherwise intact LT,temperature, and no clonus or babinski noted  - Suspect further generalized weakness due to significant anemia Hgb 5 5 now s/p transfusion 7 2   -  EMG as out patient if no contraindications and symptoms persist  -  Check CK  -  Therapies  -  PMR  -  Neurological checks, notify neurology with any changes in examination  -  Continue to treat underlying metabolic and infectious derangements as per the medicine team   -  Out patient follow up with neurosurgery team in regards to 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

## 2022-04-29 NOTE — PROGRESS NOTES
[place]     Progress Note - Halley Osborne 1960, 63 y o  female MRN: [id number]  Unit/Bed#: -[facility] Encounter: [id number]  Primary Care Provider: Dora Dejesus MD   Date and time admitted to hospital: 4/28/2022 10:36 AM    * Acute blood loss anemia  Assessment & Plan  Presents to ED with acute hip pain , abdominal pain, abdominal distension   CBC showing: Hgb 5 5/Hct16 3,  - macrocytic anemia    Occult heme + stool in ED    Blood consent obtained in ED   Transfusion initiated - S/P 2 units PRBCs  o Transfused to keep Hgb >7  o  No history of transfusions    Trend CBC q 6 hrs   Gastroenterology consulted - currently NPO  No overt signs of bleeding   Continue Protonix IV b i d      Lab Results   Component Value Date    HGB 7 3 (L) 04/29/2022    HGB 8 2 (L) 04/29/2022         GI bleed  Assessment & Plan  Presents to ED with acute left hip pain and abdominal pain/distension   Hgb 5 5/Hct 16 3 - Type and screen ordered   CT scan abdomen/pelvis:  New large ascites, distended gallbladder, cirrhotic liver, splenomegaly with recanalized lysed umbilical vein, faint hypodense area 1 7 cm of junction of segment 8 and for a liver, thickening of the ascending colon possibly due to colitis also seen on previous study, moderate left pleural effusion   Iron panel ordered    LFTs - , normal ALT, bili 5 6 on   PTT/INR - PT 24, INR 2 22   Occult blood stool positive in ED     PPI IV Q12hr    New large ascites and history of splenomegaly - octreotide and ceftriaxone   Hold anticoagulation   Hold NSAIDS (patient is chronic NSAID user at home due to migraine)    Continue to monitor H/H with serial labs q 6 hours   Up out of bed with assistance   Stool records at bedside/ intake and output     Currently NPO - further diet advancement per Gastroenterology pending timing of scope(s)   GI consulted - await recommendations    Lab Results   Component Value Date HGB 7 3 (L) 04/29/2022    HGB 8 2 (L) 04/29/2022         Hyperbilirubinemia  Assessment & Plan  · Presents to ED with left hip pain, abdominal pain/distension with nausea and vomiting  · Bilirubin elevated to 5 69 - increased to 9 70  · CT abdomen pelvis showing - distended gallbladder, large new ascites, hepatomegaly   · Gastroenterology following      Hyponatremia  Assessment & Plan  · Sodium on admission 131  · Likely in setting of cirrhosis  · 132 this morning   Free water restriction (1-1 5L)    Continue Lasix/spironolactone per Gastroenterology   Monitor BMP     Lab Results   Component Value Date    SODIUM 132 (L) 04/29/2022    SODIUM 131 (L) 04/28/2022    SODIUM 139 06/16/2021         Thrombocytopenia (HCC)  Assessment & Plan  · Likely in setting of severe liver disease and splenomegaly  · Stable at 60 K      Left hip pain  Assessment & Plan  · Reason for presentation - pain started a few days ago after dog jumping on her - difficulty with ambulation and weight on left side  · CT abdomen and pelvis - osseous structures noted: No acute compression collapse of the vertebra, Chronic compression of the L2 vertebra seen  · Stroke alert called over night due to left lower extremity weakness  CT head and CTA head/neck demonstrating chronic changes  · Discussed with Neurology - given extensive history of possible CSF-venous leak, chronic migraines will continue workup with MRI brain  No indication for anti-platelet therapy especially with ongoing anemia/concern for gastrointestinal bleed  · CT recon lumbar spine: "Chronic, mild degenerative changes and chronic moderate L2 compression fracture  No new disc herniation or high-grade stenosis compared to prior studies  Asymmetric left iliopsoas muscle enlargement of unclear etiology, new compared to prior studies  No focal mass or fluid collection  No evidence of discitis on CT    MR of the lumbar spine and pelvis is recommended for further evaluation "  · Will obtain MRI lumbar spine/pelvis  · Discussed with Orthopedics - will also obtain XR left hip/femur  · PT/OT consult  · Continue pain control    Cirrhosis of liver with ascites [facility])  Assessment & Plan  Patient presents with acute hip pain abdominal pain abdominal distension  · Endorses history of fatty liver disease, drinks wine occasionally, never been tested for hepatitis  · Found to have significant ascites on exam   · CT abd/pelvis - new large ascites, cirrhotic liver, hypodense area measuring 1 7 cm at junction of segment 4 and 8 in liver, distended gallbladder, splenomegaly with recanalized umbilical vein  · Underwent paracentesis 4/29 with IR - approximately 1 8 L removed  Follow-up fluid studies  · GI consulted - appreciate recommendations  · Started on lasix/spironolactone    VTE Pharmacologic Prophylaxis: VTE Score: 1 Low Risk (Score 0-2) - Encourage Ambulation  Patient Centered Rounds: I performed bedside rounds with nursing staff today  Discussions with Specialists or Other Care Team Provider: CM, GI AP, Neuro AP, Ortho AP    Education and Discussions with Family / Patient: Updated  (daughter and significant other) at bedside  Time Spent for Care: 45 minutes  More than 50% of total time spent on counseling and coordination of care as described above  Current Length of Stay: 1 day(s)  Current Patient Status: Inpatient   Certification Statement: The patient will continue to require additional inpatient hospital stay due to MRI brain/lumbar spine/pelvis, ongoing Gastroenterology/Neurology recommendations  Discharge Plan: Anticipate discharge in >72 hrs to discharge location to be determined pending rehab evaluations  Code Status: Level 1 - Full Code    Subjective:   Patient reports ongoing severe left hip/thigh pain  She describes this as straight to the bone"  Denies chest pain/palpitations, shortness of breath  Admits to persistent nausea and occasional dry heaves    Feels that her abdominal discomfort/distention has improved after paracentesis  Reports decreased sensation to the lateral aspect of her thigh  This all largely appears unchanged since admission  She had noted these changes after her dog had jumped onto her lap a couple of days ago  Objective:     Vitals:   Temp (24hrs), Av 8 °F (36 6 °C), Min:97 4 °F (36 3 °C), Max:98 8 °F (37 1 °C)    Temp:  [97 4 °F (36 3 °C)-98 8 °F (37 1 °C)] 97 9 °F (36 6 °C)  HR:  [] 90  Resp:  [12-22] 19  BP: (108-141)/(56-81) 126/73  SpO2:  [90 %-99 %] 90 %  Body mass index is 24 21 kg/m²  Input and Output Summary (last 24 hours): Intake/Output Summary (Last 24 hours) at 2022 1254  Last data filed at 2022 1007  Gross per 24 hour   Intake 480 ml   Output 1800 ml   Net -1320 ml       Physical Exam:   Physical Exam  Vitals and nursing note reviewed  Constitutional:       Appearance: Normal appearance  Interventions: Nasal cannula in place  Comments: No acute distress   HENT:      Head: Normocephalic  Eyes:      General: No scleral icterus  Extraocular Movements: Extraocular movements intact  Conjunctiva/sclera: Conjunctivae normal    Cardiovascular:      Rate and Rhythm: Normal rate and regular rhythm  Heart sounds: S1 normal and S2 normal    Pulmonary:      Effort: Pulmonary effort is normal       Breath sounds: Normal breath sounds  No wheezing, rhonchi or rales  Abdominal:      General: Bowel sounds are normal  There is distension  Palpations: Abdomen is soft  Tenderness: There is no abdominal tenderness  There is no guarding or rebound  Musculoskeletal:         General: No swelling, tenderness or deformity  Cervical back: Normal range of motion  Comments: Range of motion of left hip limited due to pain  Area of ecchymosis medial left thigh, soft and nontender to palpation  Compartments soft    Decreased sensation to lateral aspect of left thigh when compared to medial   No edema   Skin:     General: Skin is warm and dry  Findings: Ecchymosis present  Neurological:      Mental Status: She is alert and oriented to person, place, and time  Psychiatric:         Mood and Affect: Mood normal          Speech: Speech normal          Behavior: Behavior normal           Additional Data:     Labs:  Results from last 7 days   Lab Units 04/29/22  0457 04/28/22  1825 04/28/22  1147   WBC Thousand/uL 7 37  --  6 20   HEMOGLOBIN g/dL 7 3*   < > 5 5*   HEMATOCRIT % 22 6*  --  16 3*   PLATELETS Thousands/uL 60*  --  59*   NEUTROS PCT %  --   --  77*   LYMPHS PCT %  --   --  14   MONOS PCT %  --   --  6   EOS PCT %  --   --  0    < > = values in this interval not displayed       Results from last 7 days   Lab Units 04/29/22  0457   SODIUM mmol/L 132*   POTASSIUM mmol/L 4 1   CHLORIDE mmol/L 96*   CO2 mmol/L 26   BUN mg/dL 12   CREATININE mg/dL 0 96   ANION GAP mmol/L 10   CALCIUM mg/dL 8 0*   ALBUMIN g/dL 2 2*   TOTAL BILIRUBIN mg/dL 9 70*   ALK PHOS U/L 72   ALT U/L 32   AST U/L 119*   GLUCOSE RANDOM mg/dL 184*     Results from last 7 days   Lab Units 04/28/22  1147   INR  2 22*     Results from last 7 days   Lab Units 04/29/22  0503   POC GLUCOSE mg/dl 186*               Lines/Drains:  Invasive Devices  Report    Peripheral Intravenous Line            Peripheral IV 04/28/22 Right Antecubital 1 day    Peripheral IV 04/28/22 Right Hand <1 day          Drain            External Urinary Catheter 1 day                  Telemetry:  Telemetry Orders (From admission, onward)             48 Hour Telemetry Monitoring  Continuous x 48 hours        References:    Telemetry Guidelines   Question:  Reason for 48 Hour Telemetry  Answer:  Acute CVA (<24 hrs old, hemispheric strokes, selected brainstem strokes, cardiac arrhythmias)                 Telemetry Reviewed: Normal Sinus Rhythm  Indication for Continued Telemetry Use: Acute CVA             Imaging: Reviewed radiology reports from this admission including: abdominal/pelvic CT and xray(s)    Recent Cultures (last 7 days):   Results from last 7 days   Lab Units 04/28/22  1432 04/28/22  1431 04/28/22  1404   BLOOD CULTURE  Received in Microbiology Lab  Culture in Progress  Received in Microbiology Lab  Culture in Progress  --    GRAM STAIN RESULT   --   --  1+ Polys  No bacteria seen  Rare Mononuclear Cells  No No organisms seen  No No polys seen   BODY FLUID CULTURE, STERILE   --   --  No growth       Last 24 Hours Medication List:   Current Facility-Administered Medications   Medication Dose Route Frequency Provider Last Rate    acetaminophen  650 mg Oral Q4H PRN Sanna Shlomo, PA-C      aluminum-magnesium hydroxide-simethicone  30 mL Oral Q6H PRN Sanna Shlomo, PA-C      cefTRIAXone  1,000 mg Intravenous Daily Sanna Shlomo, PA-C 1,000 mg (04/29/22 1041)    furosemide  20 mg Intravenous Daily [place] Shlomo, PA-C      gabapentin  100 mg Oral TID Sanna Shlomo, PA-C      HYDROmorphone  0 5 mg Intravenous Q1H PRN [place] Shlomo, PA-C      losartan  100 mg Oral Daily [place] Shlomo, PA-C      octreotide (SandoSTATIN) in 0 9 % sodium chloride infusion 250 mL  50 mcg/hr Intravenous Continuous Sanna Shlomo, PA-C 50 mcg/hr (04/29/22 0347)    ondansetron  4 mg Intravenous Q4H PRN Sanna Shlomo, PA-C      oxyCODONE  10 mg Oral Q4H PRN Sanna Shlomo, PA-C      oxyCODONE  5 mg Oral Q4H PRN [place] Shlomo, PA-C      pantoprazole  40 mg Intravenous Q12H [place] Shlomo, PA-C      senna-docusate sodium  2 tablet Oral BID PRN [place] Shlomo, PA-C      spironolactone  50 mg Oral Daily Ova Beth, TRAV          Today, Patient Was Seen By: Dontrell Morales PA-C    **Please Note: This note may have been constructed using a voice recognition system  **

## 2022-04-29 NOTE — ASSESSMENT & PLAN NOTE
Patient presents with acute hip pain abdominal pain abdominal distension  · Endorses history of fatty liver disease, drinks wine occasionally, never been tested for hepatitis  · Found to have significant ascites on exam   · CT abd/pelvis - new large ascites, cirrhotic liver, hypodense area measuring 1 7 cm at junction of segment 4 and 8 in liver, distended gallbladder, splenomegaly with recanalized umbilical vein  · Underwent paracentesis 4/29 with IR - approximately 1 8 L removed    Follow-up fluid studies  · GI consulted - appreciate recommendations  · Started on lasix/spironolactone

## 2022-04-29 NOTE — UTILIZATION REVIEW
Initial Clinical Review    Admission: Date/Time/Statement:   Admission Orders (From admission, onward)     Ordered        04/28/22 1440  Inpatient Admission  Once                      Orders Placed This Encounter   Procedures    Inpatient Admission     Standing Status:   Standing     Number of Occurrences:   1     Order Specific Question:   Level of Care     Answer:   Med Surg [16]     Order Specific Question:   Estimated length of stay     Answer:   More than 2 Midnights     Order Specific Question:   Certification     Answer:   I certify that inpatient services are medically necessary for this patient for a duration of greater than two midnights  See H&P and MD Progress Notes for additional information about the patient's course of treatment  ED Arrival Information     Expected Arrival Acuity    - 4/28/2022 10:31 Urgent         Means of arrival Escorted by Service Admission type    Ambulance [facility]) Hospitalist Urgent         Arrival complaint            Chief Complaint   Patient presents with    Leg Pain     Pt reports left leg pain since sunday 4/24/22 when her dog jumped on her  Took tylenol #3 prior to arrival today  Initial Presentation: 63 y o  female presented to ED from home as inpatient admission for acute blood loss anemia  According to patient her dog jumped on her nad has increased difficulty bearing weight on the left hip increased nausea, abdominal discomfort, and abdominal swelling along with generalized weakness  She states that the abdominal swelling has been getting progressively worse over the last month medical history significant for chronic CSF leak, hepatomegaly, lumbar radiculopathy,chronic pain, migraines, cirrhosis of the liver hyperbilirubinemia  On exam scleral icterus, tachycardia, (+) murmur abdominal distension fluid wave generalized tenderness with diffuse swelling  Guaiac (+) External hemorrhoid (Non-thrombosed non-bleedy hemorrhoids) present   Diffuse bruising present on left leg including anterior shin and groin area  Full ROM in ankle, dorsal pedal pulse 2+, Full ROM in knee, no tenderness on palpation  Full passive ROM in left hip without significant discomfort  Active ROM limited in left hip due to pain  No significant swelling or discoloration appreciated on lateral side of hip and skin jaundice  At the bedside  Fluid pocket found in the RLQ with US, site marked, area cleansed with chloraprep, lidocaine injected (aspirating as I injected) for procedure anesthesia, then hands washed, sterile gloves applied, area cleansed again with chloraprep and a 3/12 in 18 gauge spinal needle was used to collect 40mL of yellow ascitic fluid H/H 5 5/16 3 blood transfusion started  Plan Blood transfusion  HGB> 7, IV PPI octreotide and ceftriaxone o2 as needed and supportive care  Date:  04-29-22   sudden onset LLE weakness with inability to bend at the knee and stated that she had sensation changes but was unable to further qualify  Remainder of neuro exam benign  NIH of 3 and stroke alert was called tPA can given due to GI bleed  S/P 2 units PRBCs continue to monitor H/H  (+) confusion  Will start IV lasix /spironolactone to maximize diuretic therapy     C/o on going severe pain on hip/thigh Range of motion of left hip limited due to pain  Area of ecchymosis medial left thigh, soft and nontender to palpation  Compartments soft  Decreased sensation to lateral aspect of left thigh when compared to medial   No edema  Continues in IV octreotide continuous infusion  MELD 27 Patient requiring IV     IR consulted Diagnostic paracentesis with removal of 1800ml clear yellow fluid removed          Intake/Output Summary (Last 24 hours) at 4/29/2022 1254  Last data filed at 4/29/2022 1007      Gross per 24 hour   Intake 480 ml   Output 1800 ml   Net -1320 ml             ED Triage Vitals [04/28/22 1031]   Temperature Pulse Respirations Blood Pressure SpO2   98 4 °F (36 9 °C) (!) 112 20 138/75 95 %      Temp Source Heart Rate Source Patient Position - Orthostatic VS BP Location FiO2 (%)   Temporal Monitor Lying Left arm --      Pain Score       10 - Worst Possible Pain          Wt Readings from Last 1 Encounters:   04/29/22 68 kg (150 lb)     Additional Vital Signs:     /29/22 10:00:39 -- 87 21 138/73 -- 94 % -- --   04/29/22 0830 -- 87 -- 128/72 -- -- -- --   04/29/22 07:51:24 97 9 °F (36 6 °C) 101 12 128/72 91 91 % -- --   04/29/22 0503 -- -- -- -- -- 93 % None (Room air) --   04/28/22 22:30:38 97 8 °F (36 6 °C) 98 -- 128/72 91 95 % -- --   04/28/22 1932 97 5 °F (36 4 °C) -- 22 114/56 -- -- -- --   04/28/22 1928 97 6 °F (36 4 °C) 104 22 115/57 -- -- -- --   04/28/22 1818 -- -- -- -- -- 99 % None (Room air) --   04/28/22 17:11:47 98 1 °F (36 7 °C) 92 20 141/79 100 93 % -- --   04/28/22 17:09:54 -- 92 -- 141/79 100 93 % -- --   04/28/22 16:50:23 97 8 °F (36 6 °C) 94 -- 140/80 100 92 % -- --   04/28/22 16:49:31 -- 97 -- -- -- 94 % -- --   04/28/22 16:05:23 98 8 °F (37 1 °C) 98 -- 140/81 101 92 % -- --   04/28/22 1515 -- 112 Abnormal  18 119/60 84 96 % -- --   04/28/22 1500 97 4 °F (36 3 °C) Abnormal  101 18 116/61 83 97 % -- --   04/28/22 1446 97 5 °F (36 4 °C) 101 18 116/58 -- -- -- --   04/28/22 1400 -- 114 Abnormal  18 108/67 83 94 % -- --   04/28/22 1330 -- 109 Abnormal  18 116/58 83 93 % None (Room air) --   04/28/22 1300 -- 102 18 134/72 97 93 % -- --   04/28/22 1230 -- 105 18 127/58 83 98 % -- --   04/28/22 1200 -- 100 18 117/65 85 91 % --        Pertinent Labs/Diagnostic Test Results:   XR hip/pelv 2-3 vws left if performed    (04/29 1237)      No acute osseous abnormality  XR femur 2 vw left    (04/29 1237)      No acute osseous abnormality  CT recon only lumbar spine    (04/29 0917)      Chronic, mild degenerative changes and chronic moderate L2 compression fracture  No new disc herniation or high-grade stenosis compared to prior studies        Asymmetric left iliopsoas muscle enlargement of unclear etiology, new compared to prior studies  No focal mass or fluid collection  No evidence of discitis on CT  MR of the lumbar spine and pelvis is recommended for further evaluation  Workstation performed: [id number]         CTA stroke alert (head/neck)    (04/29 0626)   1  Mild atherosclerotic disease of the internal carotid arteries bilaterally, without evidence of flow-limiting stenosis  No evidence of carotid artery or vertebral artery dissection  2   Mild atherosclerotic changes of the supraclinoid internal carotid arteries bilaterally, without evidence of flow-limiting stenosis  3   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[place]  [place] otherwise intact  No evidence of large vessel central occlusive disease involving the [place]  4   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  Follow-up neurosurgical evaluation is recommended  Consider dedicated cerebral angiography versus repeat CTA of the intracranial circulation only  5   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, consistent with developmental venous anomaly  If there is continued concern for acute cerebral ischemia, recommend follow-up MRI of the brain with diffusion-weighted sequencing  CT stroke alert brain    (04/29 0553)   1  Stable cerebral atrophy with chronic small vessel ischemic white matter disease  2   Improved prominence of the extra-axial space along the cerebral convexities bilaterally, left greater than right, consistent with chronic bilateral subdural hygromas  3   No acute intracranial abnormality  If there is continued concern for acute cerebral ischemia, consider follow-up MRI of the brain with diffusion-weighted sequencing  CT abdomen pelvis with contrast    (04/28 1406)   New large ascites   Distended gallbladder   Cirrhotic liver     Splenomegaly with recanalized umbilical vein, correlate with hypertension      A faint hypodense area seen in image 24 series 2 measuring 1 7 cm at the junction of the segment 8 and 4, indeterminate May be perfusion related or focal lesion  Suggest nonemergent evaluation with the MRI      Mild thickening of the ascending colon may be due to colitis, also seen on the previous study      Moderate left effusion      XR chest 1 view portable    (04/28 1505)      Mild hazy density of the left lower hemithorax may be secondary to developing airspace disease versus overlying soft tissue        IR INPATIENT Paracentesis    (Results Pending)   MRI inpatient order    (Results Pending)         Results from last 7 days   Lab Units 04/29/22  1425 04/29/22  0457 04/29/22  0039 04/28/22  1825 04/28/22  1147   WBC Thousand/uL  --  7 37  --   --  6 20   HEMOGLOBIN g/dL 7 1* 7 3* 8 2* 7 0* 5 5*   HEMATOCRIT % 22 4* 22 6*  --   --  16 3*   PLATELETS Thousands/uL  --  60*  --   --  59*   NEUTROS ABS Thousands/µL  --   --   --   --  4 79         Results from last 7 days   Lab Units 04/29/22  0457 04/28/22  1147   SODIUM mmol/L 132* 131*   POTASSIUM mmol/L 4 1 4 2   CHLORIDE mmol/L 96* 97*   CO2 mmol/L 26 25   ANION GAP mmol/L 10 9   BUN mg/dL 12 9   CREATININE mg/dL 0 96 0 99   EGFR ml/min/1 73sq m 64 62   CALCIUM mg/dL 8 0* 8 0*     Results from last 7 days   Lab Units 04/29/22  0457 04/28/22  1147   AST U/L 119* 132*   ALT U/L 32 30   ALK PHOS U/L 72 74   TOTAL PROTEIN g/dL 7 9 7 5   ALBUMIN g/dL 2 2* 2 1*   TOTAL BILIRUBIN mg/dL 9 70* 5 69*     Results from last 7 days   Lab Units 04/29/22  0503   POC GLUCOSE mg/dl 186*     Results from last 7 days   Lab Units 04/29/22  0457 04/28/22  1147   GLUCOSE RANDOM mg/dL 184* 131       Results from last 7 days   Lab Units 04/28/22  1147   PROTIME seconds 24 0*   INR  2 22*   PTT seconds 53*     Results from last 7 days   Lab Units 04/28/22  1147   FERRITIN ng/mL 1,238*         Results from last 7 days   Lab Units 04/28/22  1147   LIPASE u/L 76     Results from last 7 days   Lab Units 04/28/22  1820   CLARITY UA  Slightly Cloudy   COLOR UA  Miriam   SPEC GRAV UA  1 010   PH UA  6 0   GLUCOSE UA mg/dl Negative   KETONES UA mg/dl Trace*   BLOOD UA  Trace-lysed*   PROTEIN UA mg/dl Negative   NITRITE UA  Positive*   BILIRUBIN UA  Interference- unable to analyze*   UROBILINOGEN UA E U /dl 1 0   LEUKOCYTES UA  Trace*   WBC UA /hpf 2-4   RBC UA /hpf 1-2   BACTERIA UA /hpf Occasional   EPITHELIAL CELLS WET PREP /hpf Occasional       Results from last 7 days   Lab Units 04/29/22  1012 04/28/22  1432 04/28/22  1431 04/28/22  1404   BLOOD CULTURE   --  Received in Microbiology Lab  Culture in Progress  Received in Microbiology Lab  Culture in Progress    --    GRAM STAIN RESULT  Rare Polys  No bacteria seen  --   --  1+ Polys  No bacteria seen  Rare Mononuclear Cells  No No organisms seen  No No polys seen   BODY FLUID CULTURE, STERILE   --   --   --  No growth     Results from last 7 days   Lab Units 04/29/22  1012 04/28/22  1404   TOTAL COUNTED  100 100   WBC FLUID /ul 77 87           ED Treatment:   Medication Administration from 04/28/2022 1031 to 04/28/2022 1552       Date/Time Order Dose Route Action     04/28/2022 1147 ondansetron (ZOFRAN) injection 4 mg 4 mg Intravenous Given     04/28/2022 1259 HYDROmorphone (DILAUDID) injection 0 5 mg 0 5 mg Intravenous Given     04/28/2022 1321 iohexol (OMNIPAQUE) 350 MG/ML injection (SINGLE-DOSE) 100 mL 100 mL Intravenous Given     04/28/2022 1405 lidocaine (PF) (XYLOCAINE-MPF) 2 % injection 5 mL 5 mL Infiltration Given     04/28/2022 1404 ondansetron (ZOFRAN) injection 4 mg 4 mg Intravenous Given     04/28/2022 1433 cefTRIAXone (ROCEPHIN) IVPB (premix in dextrose) 1,000 mg 50 mL 1,000 mg Intravenous New Bag     04/28/2022 1511 HYDROmorphone (DILAUDID) injection 1 mg 1 mg Intravenous Given        Past Medical History:   Diagnosis Date    Concussion     CSF leak     Liver failure (HCC)  Migraines      Present on Admission:  **None**      Admitting Diagnosis: Hyperbilirubinemia [E80 6]  Leg pain [M79 606]  Liver cirrhosis (HCC) [K74 60]  Anemia [D64 9]  Left hip pain [M25 552]  Pleural effusion on left [J90]  Gastrointestinal hemorrhage with melena [K92 1]  Cirrhosis of liver with ascites, unspecified hepatic cirrhosis type ([facility] [address] ) [K74 60, R18 8]  Age/Sex: 63 y o  female  Admission Orders:  Scheduled Medications:  cefTRIAXone, 1,000 mg, Intravenous, Daily  furosemide, 20 mg, Intravenous, Daily  gabapentin, 100 mg, Oral, TID  losartan, 100 mg, Oral, Daily  pantoprazole, 40 mg, Intravenous, Q12H  spironolactone, 50 mg, Oral, Daily      Continuous IV Infusions:  octreotide (SandoSTATIN) in 0 9 % sodium chloride infusion 250 mL, 50 mcg/hr, Intravenous, Continuous      PRN Meds:  acetaminophen, 650 mg, Oral, Q4H PRN  aluminum-magnesium hydroxide-simethicone, 30 mL, Oral, Q6H PRN  HYDROmorphone, 0 5 mg, Intravenous, Q1H PRN     X 8   ondansetron, 4 mg, Intravenous, Q4H PRN   X 3   oxyCODONE, 10 mg, Oral, Q4H PRN  oxyCODONE, 5 mg, Oral, Q4H PRN  senna-docusate sodium, 2 tablet, Oral, BID PRN        IP CONSULT TO CASE MANAGEMENT  IP CONSULT TO GASTROENTEROLOGY  IP CONSULT TO NEUROLOGY   H/H q 6 hrs  Telemetry  Continuous pulse oximetry  SCD  PT/OT/ Speech   Neuro & VS q 1 hr x 4  q 2 hr x 8 than q 4 hr x 72 hrs    Network Utilization Review Department  ATTENTION: Please call with any questions or concerns to [phone] and carefully listen to the prompts so that you are directed to the right person  All voicemails are confidential   Maximus Yadav all requests for admission clinical reviews, approved or denied determinations and any other requests to dedicated fax number below belonging to the campus where the patient is receiving treatment   List of dedicated fax numbers for the Facilities:  [address] [place] [address] DENIALS (Administrative/Medical Necessity) [phone]   [address] (Maternity/NICU/Pediatrics) 261 [facility] [facility] 40 125 [facility]  [phone]   Harjinder Metz 50 [address] [facility] [place] [id number] [place] [address] Naomi Tamie Garcia 148 [address] [facility] [address] [facility] [phone]

## 2022-04-29 NOTE — PHYSICAL THERAPY NOTE
PT cancel     04/29/22 0845   PT Last Visit   PT Visit Date 04/29/22   Note Type   Note type Evaluation   Cancel Reasons   (RN requrest, tests/pain etc)   Additional Comments will monitor status   Redshawn Soliz, PT

## 2022-04-29 NOTE — CONSULTS
Consultation - GI   Jason Liao 63 y o  female MRN: [id number]  Unit/Bed#: -01 Encounter: [id number]      Assessment/Plan     1  Cirrhosis decompensated with ascites  New diagnosis of cirrhosis  Significant abdominal swelling increasing over the past month  CT scan did show new large ascites, distended gallbladder, cirrhotic liver, splenomegaly with recanalized lysed umbilical vein as well as ascending colon thickening possibly due to colitis  Patient  was in the hospital in [facility] July 2021 and noted to have cirrhotic liver however workup was not complete due to having a follow-up with with neurology for other concerns  Meld score 26  DF 56 3  Consider steroids- will discuss with attending  Thrombocytopenia with platelets of 60  T bili 5 6 on admission however 9 7 today  She does have noticeable jaundice as well as scleral icterus  PT 24/ INR 2 22    Paracentesis performed with 1800 cc of fluid removed- fluid negative for SBP  -keep NPO  -complete cessation of alcohol  -hold NSAIDs  -will need EGD to assess for varices  however given patient is currently on stroke protocol will wait until workup is complete   -will start Aldactone 50 and Lasix 20 mg- follow kidney function and will increase  -HCV FibroSure   - should follow-up with Dr Justin Best upon discharge    2  Anemia  CBC with a hemoglobin of 5 5, -microcytic anemia  Possible vitamin B12 deficiency, folate deficiency, drug induced   -iron panel, TSH, B12 pending  -heme-positive stool in the ED  -monitor H&H and transfuse for hemoglobin less than 7  -b i d    PPI  -hold NSAIDs      Inpatient consult to gastroenterology  Consult performed by: TRAV Perdomo  Consult ordered by: Alex King PA-C          Physician Requesting Consult: Ad Ryan MD  Reason for Consult / Principal Problem:  Cirrhosis, GI bleed    HPI: Jason Liao is a 63y o  year old female with a past medical history of C acid after leak, hepatomegaly, lumbar radiculopathy presented to the emergency department with multiple complaints  Reports of left hip pain after dog jumping on her few days ago  Also reports increased difficulty bearing weight  She additionally reported increased nausea and abdominal discomfort as well as abdominal swelling and generalized weakness  This abdominal swelling actually began about a year or so ago however been progressively worsening over the past month  She has previously been diagnosed with hepatic steatosis however last ultrasound 06/2021 was negative for cirrhosis  No prior paracentesis  She has also had a decreased appetite and weight loss recently  Also more difficulty ambulating around the house  Denies any shortness of breath, chest pain, vomiting or diarrhea  She does have a history of drinking about 2 glasses of wine per day  Hemoglobin also noted to be 5 5  Macrocytic anemia  Review of Systems   Constitutional: Positive for activity change, appetite change and fatigue  Negative for fever  Respiratory: Positive for shortness of breath  Negative for cough and wheezing  Cardiovascular: Positive for leg swelling  Gastrointestinal: Positive for abdominal distention, abdominal pain, nausea and vomiting  Negative for blood in stool, constipation and diarrhea  Musculoskeletal: Positive for back pain, gait problem and joint swelling  Skin: Positive for color change  Neurological: Positive for weakness and headaches  Negative for dizziness, seizures, syncope and speech difficulty  Psychiatric/Behavioral: Positive for confusion  Negative for hallucinations           Historical Information   Past Medical History:   Diagnosis Date    Concussion     CSF leak     Liver failure (HCC)     Migraines      Past Surgical History:   Procedure Laterality Date    ABLATION SOFT TISSUE      BREAST LUMPECTOMY      LAPAROSCOPY FOR ECTOPIC PREGNANCY      SINUS SURGERY      TONSILECTOMY AND ADNOIDECTOMY Social History   Social History     Substance and Sexual Activity   Alcohol Use Yes    Comment: socially, had glass of wine today     Social History     Substance and Sexual Activity   Drug Use Not Currently    Comment: CBD     Social History     Tobacco Use   Smoking Status Former Smoker    Packs/day: 1 00    Types: Cigarettes   Smokeless Tobacco Never Used     Family History   Problem Relation Age of Onset    Colon cancer Father     Breast cancer Sister        Meds/Allergies   Current Facility-Administered Medications   Medication Dose Route Frequency    acetaminophen (TYLENOL) tablet 650 mg  650 mg Oral Q4H PRN    aluminum-magnesium hydroxide-simethicone (MYLANTA) oral suspension 30 mL  30 mL Oral Q6H PRN    cefTRIAXone (ROCEPHIN) IVPB (premix in dextrose) 1,000 mg 50 mL  1,000 mg Intravenous Daily    furosemide (LASIX) injection 20 mg  20 mg Intravenous Daily    gabapentin (NEURONTIN) capsule 100 mg  100 mg Oral TID    HYDROmorphone (DILAUDID) injection 0 5 mg  0 5 mg Intravenous Q1H PRN    losartan (COZAAR) tablet 100 mg  100 mg Oral Daily    octreotide (SandoSTATIN) 500 mcg in sodium chloride 0 9 % 250 mL infusion  50 mcg/hr Intravenous Continuous    ondansetron (ZOFRAN) injection 4 mg  4 mg Intravenous Q4H PRN    oxyCODONE (ROXICODONE) IR tablet 10 mg  10 mg Oral Q4H PRN    oxyCODONE (ROXICODONE) IR tablet 5 mg  5 mg Oral Q4H PRN    pantoprazole (PROTONIX) injection 40 mg  40 mg Intravenous Q12H    senna-docusate sodium (SENOKOT S) 8 6-50 mg per tablet 2 tablet  2 tablet Oral BID PRN     Medications Prior to Admission   Medication    candesartan (ATACAND) 16 mg tablet    naproxen sodium (ANAPROX) 550 mg tablet    ondansetron (ZOFRAN) 8 mg tablet       Allergies   Allergen Reactions    Codeine Other (See Comments)     Can not take with current medications    Other reaction(s):  Other (See Comments)  GI upset  GI Upset             Physical Exam   Constitutional: Is oriented to person, place, and time  Appears well-developed and well-nourished  HENT:  Within normal limits  Head: Normocephalic and atraumatic  Eyes: Pupils are equal, round, and reactive to light  Scleral icterus  Neck: Normal range of motion  Neck supple  Cardiovascular: Normal rate and regular rhythm  Pulmonary/Chest: Effort normal and breath sounds normal    Abdominal: Soft  Bowel sounds are normal   Softly distended  Musculoskeletal: Normal range of motion  Extremities:  No edema  Neurological: Is alert and oriented to person, place, and time  Skin: Skin is warm and dry  Psychiatric: Has a normal mood and affect         Lab Results:   Admission on 04/28/2022   Component Date Value    WBC 04/28/2022 6 20     RBC 04/28/2022 1 23*    Hemoglobin 04/28/2022 5 5*    Hematocrit 04/28/2022 16 3*    MCV 04/28/2022 133*    MCH 04/28/2022 44 7*    MCHC 04/28/2022 33 7     RDW 04/28/2022 16 2*    MPV 04/28/2022 10 9     Platelets 54/31/1555 59*    nRBC 04/28/2022 0     Neutrophils Relative 04/28/2022 77*    Immat GRANS % 04/28/2022 2     Lymphocytes Relative 04/28/2022 14     Monocytes Relative 04/28/2022 6     Eosinophils Relative 04/28/2022 0     Basophils Relative 04/28/2022 1     Neutrophils Absolute 04/28/2022 4 79     Immature Grans Absolute 04/28/2022 0 10     Lymphocytes Absolute 04/28/2022 0 89     Monocytes Absolute 04/28/2022 0 36     Eosinophils Absolute 04/28/2022 0 01     Basophils Absolute 04/28/2022 0 05     Sodium 04/28/2022 131*    Potassium 04/28/2022 4 2     Chloride 04/28/2022 97*    CO2 04/28/2022 25     ANION GAP 04/28/2022 9     BUN 04/28/2022 9     Creatinine 04/28/2022 0 99     Glucose 04/28/2022 131     Calcium 04/28/2022 8 0*    Corrected Calcium 04/28/2022 9 5     AST 04/28/2022 132*    ALT 04/28/2022 30     Alkaline Phosphatase 04/28/2022 74     Total Protein 04/28/2022 7 5     Albumin 04/28/2022 2 1*    Total Bilirubin 04/28/2022 5 69*    eGFR 04/28/2022 62  Lipase 04/28/2022 76     Protime 04/28/2022 24 0*    INR 04/28/2022 2 22*    PTT 04/28/2022 53*    Color, UA 04/28/2022 Miriam     Clarity, UA 04/28/2022 Slightly Cloudy     Specific Gravity, UA 04/28/2022 1 010     pH, UA 04/28/2022 6 0     Leukocytes, UA 04/28/2022 Trace*    Nitrite, UA 04/28/2022 Positive*    Protein, UA 04/28/2022 Negative     Glucose, UA 04/28/2022 Negative     Ketones, UA 04/28/2022 Trace*    Urobilinogen, UA 04/28/2022 1 0     Bilirubin, UA 04/28/2022 Interference- unable to analyze*    Blood, UA 04/28/2022 Trace-lysed*    ABO Grouping 04/28/2022 A     Rh Factor 04/28/2022 Positive     Antibody Screen 04/28/2022 Negative     Specimen Expiration Date 04/28/2022 [id number]     Unit Product Code 04/29/2022 [id number]     Unit Number 04/29/2022 [id number]     Unit ABO 04/29/2022 A     Unit [facility] 04/29/2022 POS     Crossmatch 04/29/2022 Compatible     Unit Dispense Status 04/29/2022 Presumed Trans     Unit Product Volume 04/29/2022 300     Unit Product Code 04/29/2022 [id number]     Unit Number 04/29/2022 [id number]     Unit ABO 04/29/2022 A     Unit [facility] 04/29/2022 POS     Crossmatch 04/29/2022 Compatible     Unit Dispense Status 04/29/2022 Presumed Trans     Unit Product Volume 04/29/2022 350     ABO Grouping 04/28/2022 A     Rh Factor 04/28/2022 Positive     Color, Fluid 04/28/2022      Clarity, Fluid 04/28/2022      WBC, Fluid 04/28/2022      Gram Stain Result 04/28/2022 1+ Polys     Gram Stain Result 04/28/2022 No bacteria seen     RBC, SYNOVIAL 04/28/2022      Gram Stain Result 04/28/2022 Rare Mononuclear Cells     Gram Stain Result 04/28/2022 No No organisms seen     Gram Stain Result 04/28/2022 No No polys seen     Blood Culture 04/28/2022 Received in Microbiology Lab  Culture in Progress   Blood Culture 04/28/2022 Received in Microbiology Lab  Culture in Progress       Site 04/28/2022 Paracentesis     Color, Fluid 04/28/2022 Yellow     Clarity, Fluid 04/28/2022 Clear     WBC, Fluid 04/28/2022 87     Total Counted 04/28/2022 100     Neutrophils % (Fluid) 04/28/2022 13     Lymphs % (Fluid) 04/28/2022 9     Mesothelial % (Fluid) 04/28/2022 16     Histiocyte % (Fluid) 04/28/2022 53     Monocytes % (Fluid) 04/28/2022 9     Hemoglobin 04/28/2022 7 0*    Iron Saturation 04/28/2022 66*    TIBC 04/28/2022 146*    Iron 04/28/2022 97     Ferritin 04/28/2022 1,238*    RBC, UA 04/28/2022 1-2     WBC, UA 04/28/2022 2-4     Epithelial Cells 04/28/2022 Occasional     Bacteria, UA 04/28/2022 Occasional     Hemoglobin 04/29/2022 8 2*    WBC 04/29/2022 7 37     RBC 04/29/2022 2 01*    Hemoglobin 04/29/2022 7 3*    Hematocrit 04/29/2022 22 6*    MCV 04/29/2022 112*    MCH 04/29/2022 36 3*    MCHC 04/29/2022 32 3     Platelets 51/85/1070 60*    MPV 04/29/2022 10 9     Sodium 04/29/2022 132*    Potassium 04/29/2022 4 1     Chloride 04/29/2022 96*    CO2 04/29/2022 26     ANION GAP 04/29/2022 10     BUN 04/29/2022 12     Creatinine 04/29/2022 0 96     Glucose 04/29/2022 184*    Calcium 04/29/2022 8 0*    Corrected Calcium 04/29/2022 9 4     AST 04/29/2022 119*    ALT 04/29/2022 32     Alkaline Phosphatase 04/29/2022 72     Total Protein 04/29/2022 7 9     Albumin 04/29/2022 2 2*    Total Bilirubin 04/29/2022 9 70*    eGFR 04/29/2022 64     POC Glucose 04/29/2022 186*    AV area peak jackson 04/29/2022 2 2     LA size 04/29/2022 3 4     Aortic valve mean veloci* 04/29/2022 14 60     LVPWd 04/29/2022 1 00     Left Atrium Area-systoli* 04/29/2022 22 3     Left Atrium Area-systoli* 04/29/2022 19     MV E' Tissue Velocity Se* 04/29/2022 9     Tricuspid annular plane * 04/29/2022 2 40     IVSd 04/29/2022 5 20     LV DIASTOLIC VOLUME (MOD* 82/08/6096 115     LEFT VENTRICLE SYSTOLIC * 97/50/7164 22     Left ventricular stroke * 04/29/2022 93 00     A4C EF 04/29/2022 78     LA length (A2C) 04/29/2022 5 90     LVIDd 04/29/2022 4 90     IVS 04/29/2022 1 1     LVIDS 04/29/2022 2 50     FS 04/29/2022 49     Ao root 04/29/2022 3 00     RVID d 04/29/2022 3 7     LVOT mn grad 04/29/2022 4 0     AV area by cont VTI 04/29/2022 2 1     AV mean gradient 04/29/2022 10     AV LVOT peak gradient 04/29/2022 9     MV valve area p 1/2 meth* 04/29/2022 7 33     E wave deceleration time 04/29/2022 102     LVOT diameter 04/29/2022 2 0     LVOT peak donte 04/29/2022 1 5     LVOT peak VTI 04/29/2022 28 01     Ao peak donte retrograde 04/29/2022 2 15     Ao VTI 04/29/2022 41 26     LVOT stroke volume 04/29/2022 87 95     AV peak gradient 04/29/2022 19     MV Peak E Donte 04/29/2022 78     MV Peak A Donte 04/29/2022 0 92     ANANYA A4C 04/29/2022 18     RAA A4C 04/29/2022 9 6     MV stenosis pressure 1/2* 04/29/2022 30     LVOT SI 04/29/2022 50 30     LVSV, 2D 04/29/2022 93     LVOT area 04/29/2022 3 14     AV Velocity Ratio 04/29/2022 0 70     AV valve area 04/29/2022 2 13     ZLVPWD 04/29/2022 2 47     ZLVIDS 04/29/2022 -1 59     ZLVIDD 04/29/2022 -0 10     ZIVSD 04/29/2022 3 24      Imaging Studies: I have personally reviewed pertinent reports  EKG, Pathology, and Other Studies: I have personally reviewed pertinent reports

## 2022-04-29 NOTE — ASSESSMENT & PLAN NOTE
Presents to ED with acute hip pain , abdominal pain, abdominal distension   CBC showing: Hgb 5 5/Hct16 3,  - macrocytic anemia    Occult heme + stool in ED    Blood consent obtained in ED   Transfusion initiated - S/P 2 units PRBCs  o Transfused to keep Hgb >7  o  No history of transfusions    Trend CBC q 6 hrs   Gastroenterology consulted - currently NPO  No overt signs of bleeding   Continue Protonix IV b i d      Lab Results   Component Value Date    HGB 7 3 (L) 04/29/2022    HGB 8 2 (L) 04/29/2022

## 2022-04-29 NOTE — QUICK NOTE
Received sepsis time zero for , Resp 21, tbili of 9 7 and platelet of 40M  Patient with newly diagnosed cirrhosis which would account for elevated tbili and thrombocytopenia  Tachycardia/tachypnea in relation to pain, now improved on recent vital sign check at 10:00 AM following pain control  No change in management at this time

## 2022-04-29 NOTE — ASSESSMENT & PLAN NOTE
· Sodium on admission 131  · Likely in setting of cirrhosis  · 132 this morning   Free water restriction (1-1 5L)    Continue Lasix/spironolactone per Gastroenterology   Monitor BMP     Lab Results   Component Value Date    SODIUM 132 (L) 04/29/2022    SODIUM 131 (L) 04/28/2022    SODIUM 139 06/16/2021

## 2022-04-29 NOTE — ASSESSMENT & PLAN NOTE
· Reason for presentation - pain started a few days ago after dog jumping on her - difficulty with ambulation and weight on left side  · CT abdomen and pelvis - osseous structures noted: No acute compression collapse of the vertebra, Chronic compression of the L2 vertebra seen  · Stroke alert called over night due to left lower extremity weakness  CT head and CTA head/neck demonstrating chronic changes  · Discussed with Neurology - given extensive history of possible CSF-venous leak, chronic migraines will continue workup with MRI brain  No indication for anti-platelet therapy especially with ongoing anemia/concern for gastrointestinal bleed  · CT recon lumbar spine: "Chronic, mild degenerative changes and chronic moderate L2 compression fracture  No new disc herniation or high-grade stenosis compared to prior studies  Asymmetric left iliopsoas muscle enlargement of unclear etiology, new compared to prior studies  No focal mass or fluid collection  No evidence of discitis on CT    MR of the lumbar spine and pelvis is recommended for further evaluation "  · Will obtain MRI lumbar spine/pelvis  · Discussed with Orthopedics - will also obtain XR left hip/femur  · PT/OT consult  · Continue pain control

## 2022-04-29 NOTE — SEDATION DOCUMENTATION
Diagnostic paracentesis with removal of 1800ml clear yellow fluid removed  Specimens sent to lab  Band aid to site, patient tolerated well and transferred back to room  Report and care assumed by primary RN

## 2022-04-29 NOTE — CASE MANAGEMENT
Case Management Progress Note    Patient name Constantin Hernandez  Location /-40 MRN [id number]  : 1960 Date 2022       LOS (days): 1  Geometric Mean LOS (GMLOS) (days): 2 70  Days to GMLOS:1 7        OBJECTIVE:        Current admission status: Inpatient  [facility]   [facility] [place] [address] [place] [address] - [phone] [place] [address],[address] [facility] [address]  Phone: [phone] Fax: [phone]    Primary Care Provider: Christopher Lizama MD    Primary Insurance: [facility]  Secondary Insurance:     PROGRESS NOTE:    Patient off floor and having tests all day  Did not meet with patient    Per MD in rounds patient will be here over the weekend

## 2022-04-29 NOTE — DISCHARGE INSTRUCTIONS
Abdominal Paracentesis     WHAT YOU NEED TO KNOW:   Abdominal paracentesis is a procedure to remove abnormal fluid buildup in your abdomen  Fluid builds up because of liver problems, such as swelling and scarring  Heart failure, kidney disease, a mass, or problems with your pancreas may also cause fluid buildup  DISCHARGE INSTRUCTIONS:     Follow up with your healthcare provider as directed: Write down your questions so you remember to ask them during your visits  Wound care: Remove dressing after 24 hours  Leave glue in place  Return to your normal activities    Contact Interventional Radiology at [phone] [place] PATIENTS: Contact Interventional Radiology at [phone]) Raynard Gilford PATIENTS: Contact Interventional Radiology at [phone]) if:  · You have a fever and your wound is red and swollen  · You have yellow, green, or bad-smelling discharge coming from your wound  · You have pain or swelling in your abdomen  · You have an upset stomach or you vomit  · You have sudden, sharp pain in your abdomen  · You urinate very little or not at all  · You feel confused and more tired than usual    · Your arm or leg feels warm, tender, and painful  It may look swollen and red  · You suddenly feel lightheaded and have trouble breathing

## 2022-04-30 ENCOUNTER — APPOINTMENT (INPATIENT)
Dept: MRI IMAGING | Facility: HOSPITAL | Age: 62
DRG: 432 | End: 2022-04-30
Payer: COMMERCIAL

## 2022-04-30 ENCOUNTER — APPOINTMENT (INPATIENT)
Dept: CT IMAGING | Facility: HOSPITAL | Age: 62
DRG: 432 | End: 2022-04-30
Payer: COMMERCIAL

## 2022-04-30 LAB
ABO GROUP BLD BPU: NORMAL
ALBUMIN SERPL BCP-MCNC: 2 G/DL (ref 3.5–5)
ALP SERPL-CCNC: 55 U/L (ref 46–116)
ALT SERPL W P-5'-P-CCNC: 26 U/L (ref 12–78)
ANION GAP SERPL CALCULATED.3IONS-SCNC: 5 MMOL/L (ref 4–13)
AST SERPL W P-5'-P-CCNC: 124 U/L (ref 5–45)
BILIRUB SERPL-MCNC: 7 MG/DL (ref 0.2–1)
BPU ID: NORMAL
BUN SERPL-MCNC: 14 MG/DL (ref 5–25)
CALCIUM ALBUM COR SERPL-MCNC: 9.3 MG/DL (ref 8.3–10.1)
CALCIUM SERPL-MCNC: 7.7 MG/DL (ref 8.3–10.1)
CHLORIDE SERPL-SCNC: 98 MMOL/L (ref 100–108)
CO2 SERPL-SCNC: 29 MMOL/L (ref 21–32)
CREAT SERPL-MCNC: 1.31 MG/DL (ref 0.6–1.3)
CROSSMATCH: NORMAL
GFR SERPL CREATININE-BSD FRML MDRD: 44 ML/MIN/1.73SQ M
GLUCOSE SERPL-MCNC: 114 MG/DL (ref 65–140)
HGB BLD-MCNC: 7.2 G/DL (ref 11.5–15.4)
HGB BLD-MCNC: 7.2 G/DL (ref 11.5–15.4)
HGB BLD-MCNC: 7.5 G/DL (ref 11.5–15.4)
HGB BLD-MCNC: 7.5 G/DL (ref 11.5–15.4)
POTASSIUM SERPL-SCNC: 3.9 MMOL/L (ref 3.5–5.3)
PROT SERPL-MCNC: 6.9 G/DL (ref 6.4–8.2)
SODIUM SERPL-SCNC: 132 MMOL/L (ref 136–145)
UNIT DISPENSE STATUS: NORMAL
UNIT PRODUCT CODE: NORMAL
UNIT PRODUCT VOLUME: 350 ML
UNIT RH: NORMAL

## 2022-04-30 PROCEDURE — 82977 ASSAY OF GGT: CPT | Performed by: REGISTERED NURSE

## 2022-04-30 PROCEDURE — 99232 SBSQ HOSP IP/OBS MODERATE 35: CPT | Performed by: INTERNAL MEDICINE

## 2022-04-30 PROCEDURE — C9113 INJ PANTOPRAZOLE SODIUM, VIA: HCPCS

## 2022-04-30 PROCEDURE — G1004 CDSM NDSC: HCPCS

## 2022-04-30 PROCEDURE — 72148 MRI LUMBAR SPINE W/O DYE: CPT

## 2022-04-30 PROCEDURE — 84460 ALANINE AMINO (ALT) (SGPT): CPT | Performed by: REGISTERED NURSE

## 2022-04-30 PROCEDURE — 83883 ASSAY NEPHELOMETRY NOT SPEC: CPT | Performed by: REGISTERED NURSE

## 2022-04-30 PROCEDURE — 85018 HEMOGLOBIN: CPT | Performed by: PHYSICIAN ASSISTANT

## 2022-04-30 PROCEDURE — 99233 SBSQ HOSP IP/OBS HIGH 50: CPT | Performed by: PSYCHIATRY & NEUROLOGY

## 2022-04-30 PROCEDURE — 74176 CT ABD & PELVIS W/O CONTRAST: CPT

## 2022-04-30 PROCEDURE — 99232 SBSQ HOSP IP/OBS MODERATE 35: CPT | Performed by: HOSPITALIST

## 2022-04-30 PROCEDURE — 82172 ASSAY OF APOLIPOPROTEIN: CPT | Performed by: REGISTERED NURSE

## 2022-04-30 PROCEDURE — 99221 1ST HOSP IP/OBS SF/LOW 40: CPT | Performed by: SURGERY

## 2022-04-30 PROCEDURE — 80053 COMPREHEN METABOLIC PANEL: CPT | Performed by: PHYSICIAN ASSISTANT

## 2022-04-30 PROCEDURE — 82247 BILIRUBIN TOTAL: CPT | Performed by: REGISTERED NURSE

## 2022-04-30 PROCEDURE — 83010 ASSAY OF HAPTOGLOBIN QUANT: CPT | Performed by: REGISTERED NURSE

## 2022-04-30 PROCEDURE — 73700 CT LOWER EXTREMITY W/O DYE: CPT

## 2022-04-30 RX ORDER — LORAZEPAM 2 MG/ML
1 INJECTION INTRAMUSCULAR ONCE AS NEEDED
Status: COMPLETED | OUTPATIENT
Start: 2022-04-30 | End: 2022-04-30

## 2022-04-30 RX ORDER — HYDROMORPHONE HCL/PF 1 MG/ML
1 SYRINGE (ML) INJECTION ONCE AS NEEDED
Status: DISCONTINUED | OUTPATIENT
Start: 2022-05-01 | End: 2022-05-12 | Stop reason: HOSPADM

## 2022-04-30 RX ORDER — LACTULOSE 20 G/30ML
20 SOLUTION ORAL 2 TIMES DAILY
Status: DISCONTINUED | OUTPATIENT
Start: 2022-04-30 | End: 2022-05-03

## 2022-04-30 RX ADMIN — OCTREOTIDE ACETATE 50 MCG/HR: 500 INJECTION, SOLUTION INTRAVENOUS; SUBCUTANEOUS at 03:59

## 2022-04-30 RX ADMIN — OCTREOTIDE ACETATE 50 MCG/HR: 500 INJECTION, SOLUTION INTRAVENOUS; SUBCUTANEOUS at 21:14

## 2022-04-30 RX ADMIN — GABAPENTIN 100 MG: 100 CAPSULE ORAL at 21:10

## 2022-04-30 RX ADMIN — ONDANSETRON 4 MG: 2 INJECTION INTRAMUSCULAR; INTRAVENOUS at 08:40

## 2022-04-30 RX ADMIN — HYDROMORPHONE HYDROCHLORIDE 0.5 MG: 1 INJECTION, SOLUTION INTRAMUSCULAR; INTRAVENOUS; SUBCUTANEOUS at 10:55

## 2022-04-30 RX ADMIN — PANTOPRAZOLE SODIUM 40 MG: 40 INJECTION, POWDER, FOR SOLUTION INTRAVENOUS at 03:59

## 2022-04-30 RX ADMIN — HYDROMORPHONE HYDROCHLORIDE 0.5 MG: 1 INJECTION, SOLUTION INTRAMUSCULAR; INTRAVENOUS; SUBCUTANEOUS at 04:02

## 2022-04-30 RX ADMIN — OXYCODONE HYDROCHLORIDE 10 MG: 5 TABLET ORAL at 21:10

## 2022-04-30 RX ADMIN — HYDROMORPHONE HYDROCHLORIDE 0.5 MG: 1 INJECTION, SOLUTION INTRAMUSCULAR; INTRAVENOUS; SUBCUTANEOUS at 08:33

## 2022-04-30 RX ADMIN — CEFTRIAXONE 1000 MG: 1 INJECTION, SOLUTION INTRAVENOUS at 08:39

## 2022-04-30 RX ADMIN — PHYTONADIONE 10 MG: 10 INJECTION, EMULSION INTRAMUSCULAR; INTRAVENOUS; SUBCUTANEOUS at 17:10

## 2022-04-30 RX ADMIN — PANTOPRAZOLE SODIUM 40 MG: 40 INJECTION, POWDER, FOR SOLUTION INTRAVENOUS at 17:17

## 2022-04-30 RX ADMIN — LORAZEPAM 1 MG: 2 INJECTION INTRAMUSCULAR; INTRAVENOUS at 10:16

## 2022-04-30 RX ADMIN — HYDROMORPHONE HYDROCHLORIDE 0.5 MG: 1 INJECTION, SOLUTION INTRAMUSCULAR; INTRAVENOUS; SUBCUTANEOUS at 22:04

## 2022-04-30 RX ADMIN — GABAPENTIN 100 MG: 100 CAPSULE ORAL at 17:17

## 2022-04-30 RX ADMIN — LACTULOSE 20 G: 10 SOLUTION ORAL at 17:23

## 2022-04-30 RX ADMIN — LACTULOSE 20 G: 10 SOLUTION ORAL at 12:56

## 2022-04-30 RX ADMIN — GABAPENTIN 100 MG: 100 CAPSULE ORAL at 08:39

## 2022-04-30 NOTE — PROGRESS NOTES
Progress note - Gastroenterology   Tatiannawale Garcia 63 y o  female MRN: [id number]  Unit/Bed#: -01 Encounter: [id number]    ASSESSMENT and PLAN    1  Cirrhosis decompensated with ascites    72-year-old female, recently diagnosed cirrhosis in the past year, recently decompensated over the past 6 weeks with confusion, thrombocytopenia, coagulopathy, ascites  Denies any GI bleeding  MELD at admission 27  Likely secondary to alcohol abuse in past along with fatty liver  CT reviewed, consistent with portal hypertension  Paracentesis performed with 1800 cc of fluid removed- fluid negative for SBP  - okay to do clears at this point, fluid restriction, 2 g sodium diet if hemoglobin remains stable  - discussed importance of strict alcohol cessation moving forward, patient is agreeable to this plan  - will need EGD to assess for varices  however given patient is currently on stroke protocol will wait until Neuro workup is complete  - continue Aldactone 50 and Lasix 20 mg- follow kidney function and will increase  - should follow-up with Dr Sheryl Jamse upon discharge    2  Hepatic encephalopathy    - Positive for asterixis  According to the son, her mental status has been slower over the past few weeks, and definitely with some memory issues and irregular sleep cycles  - would add some lactulose given asterixis and waxing confusion as well as constipation     3  Anemia  CBC with a hemoglobin of 5 5, -microcytic anemia  Possible vitamin B12 deficiency, folate deficiency, drug induced   -iron panel, TSH, B12 pending  -heme-positive stool in the ED  -monitor H&H and transfuse for hemoglobin less than 7  -b i d  PPI  -hold NSAIDs    4  History of alcohol use  Strict alcohol cessation emphasized to patient  Chief Complaint   Patient presents with    Leg Pain     Pt reports left leg pain since sunday 4/24/22 when her dog jumped on her  Took tylenol #3 prior to arrival today           SUBJECTIVE/HPI Poor appetite  Denies any nausea vomiting, abdominal pain today  A lot of left hip and thigh pain  /61   Pulse 100   Temp 99 °F (37 2 °C)   Resp 18   Ht 5' 6" (1 676 m)   Wt 68 kg (150 lb)   SpO2 93%   BMI 24 21 kg/m²     PHYSICALEXAM  General appearance: alert, appears stated age and cooperative, jaundice  Eyes: KHANG, EOMI, scleral icterus   Head: Normocephalic, without obvious abnormality, atraumatic  Lungs: clear to auscultation bilaterally, no c/w/r  Heart: regular rate and rhythm, S1, S2 normal, no murmur, click, rub or gallop  Abdomen: soft, non-tender, distended with ascites; bowel sounds normal; no masses,  no organomegaly  Extremities: extremities with inner thigh ecchymotic area about 10 cm, decreased mobility and tenderness on palpation   No LE edema  Neurologic: Grossly normal, AAOx3, positive asterixis    Lab Results   Component Value Date    GLUCOSE 94 05/08/2015    CALCIUM 7 7 (L) 04/30/2022     05/08/2015    K 3 9 04/30/2022    CO2 29 04/30/2022    CL 98 (L) 04/30/2022    BUN 14 04/30/2022    CREATININE 1 31 (H) 04/30/2022     Lab Results   Component Value Date    WBC 7 37 04/29/2022    HGB 7 2 (L) 04/30/2022    HCT 22 4 (L) 04/29/2022     (H) 04/29/2022    PLT 60 (L) 04/29/2022     Lab Results   Component Value Date    ALT 26 04/30/2022     (H) 04/30/2022    ALKPHOS 55 04/30/2022    BILITOT 0 2 05/08/2015     No results found for: AMYLASE  Lab Results   Component Value Date    LIPASE 76 04/28/2022     Lab Results   Component Value Date    IRON 97 04/28/2022    TIBC 146 (L) 04/28/2022    FERRITIN 1,238 (H) 04/28/2022     Lab Results   Component Value Date    INR 2 22 (H) 04/28/2022

## 2022-04-30 NOTE — ASSESSMENT & PLAN NOTE
· Reason for presentation - pain started a few days ago after dog jumping on her - difficulty with ambulation and weight on left side  · CT abdomen and pelvis - osseous structures noted: No acute compression collapse of the vertebra, Chronic compression of the L2 vertebra seen  · Stroke alert called over night due to left lower extremity weakness  CT head and CTA head/neck demonstrating chronic changes  · Discussed with Neurology - given extensive history of possible CSF-venous leak, chronic migraines will continue workup with MRI brain  No indication for anti-platelet therapy especially with ongoing anemia/concern for gastrointestinal bleed  · CT recon lumbar spine: "Chronic, mild degenerative changes and chronic moderate L2 compression fracture  No new disc herniation or high-grade stenosis compared to prior studies  Asymmetric left iliopsoas muscle enlargement of unclear etiology, new compared to prior studies  No focal mass or fluid collection  No evidence of discitis on CT  MR of the lumbar spine and pelvis is recommended for further evaluation "  · Will obtain MRI lumbar - concern for Intramuscular hematoma, consult placed to surgery  · Neurovascular checks ordered  Thus far intact pulses     ·   XR left hip/femur unremarkable  · PT/OT consult  · Continue pain control

## 2022-04-30 NOTE — QUICK NOTE
Patient noted on imaging to have L retroperitoneal hematoma  Surgery has been consulted  We will provide Vit K IV to address coagulopathy  Son informed of findings  CTA is recommended by surgery, however patient with rising Cr above her baseline  Diuretics/aldactone etc were not given in the AM and now held  Concern that contrast may worsen her renal function  Hemoglobin has increased since the last check  We will recheck tonight  If significant decrease in hemoglobin or change in exam, consider CTA at that time  Cont with neurovascular checks

## 2022-04-30 NOTE — CONSULTS
Consultation -General Surgery  Josseline Falcon 63 y o  female MRN: [id number]  Unit/Bed#: -01 Encounter: [id number]    ASSESSMENT:  64 yo female with newly diagnosed cirrhosis with ascites s/p paracentesis, acute blood loss anemia s/p 2 units pRBC and left hip/leg pain since 4/24/22 after 45 lb dog jumped on her while she was sleeping  CT LLE today revealed a stable size, large left retroperitoneal hematoma in the iliopsoas muscle  VSS  Hgb 7 5 from 7 2  Plt:60    Last INR 2 2 (4/28)    4/30/22 CT: Mild diffuse anasarca has increased  A hyperdense left retroperitoneal hematoma centered in the iliopsoas is overall similar to most recent study, the intrapelvic component measuring 8 0 x 4 7 x 14 8 cm (previously 7 9 x 4 9 x 14 1 cm)  Plan:  -Recommend CTA to rule out active extravasation  Creatinine currently elevated 1 31 from 0 98  Discussed with medicine team, will hold off on CTA for now and will continue to monitor hgb and exam to preserve kidney function    -Recommend vitamin K for elevated INR, consider FFP transfusion  -Trend INR to below 1 5   -Patient should keep left leg elevated and wrapped with ace bandage for compression    -Ice pack to left leg   -Monitor hgb  -Continue neurovascular checks  -Continue holding AC  -Pain control PRN  Discussed with Dr Diaz and FAVIOLA attending      Reason for Consult / Principal Problem:    HPI: Josseline Falcon is a 63y o  year old female with pmhx cirrhosis, migraines, chronic CSF leak, and lumbar radiculopathy who presents with Acute blood loss anemia  Patient reports developing lateral left hip pain after her 45 lb new puppy jumped on her while she was laying down  The pain originally was isolated to her left hip and left gluteus area and has persisted over the week  She developed some other abdominal symptoms such as nausea and distention and overall feeling of fatigue which prompted her presentation to the ED   Since admission she states her left leg pain seems to be worsening with occasional shooting pain down her leg on the medial aspect  She reports to leg feels heavy and "like a log" when she tries to move it  Yesterday when trying to ambulate to the commode she had severe weakness and inability to bend her left knee, this lasted approximately 10 minutes (per chart review)  Weakness has improved since but patient still reports difficulty picking her leg off the bed  Patient denies numbness of tingling or paralysis of leg  Review of Systems   Constitutional: Positive for fatigue  Gastrointestinal: Positive for abdominal distention  Musculoskeletal: Positive for myalgias (left leg)  Skin: Positive for color change (left leg bruise)  Neurological: Positive for weakness (left leg)         Historical Information   Past Medical History:   Diagnosis Date    Concussion     CSF leak     Liver failure (HCC)     Migraines      Past Surgical History:   Procedure Laterality Date    ABLATION SOFT TISSUE      BREAST LUMPECTOMY      IR PARACENTESIS  4/29/2022    LAPAROSCOPY FOR ECTOPIC PREGNANCY      SINUS SURGERY      TONSILECTOMY AND ADNOIDECTOMY       Social History   Social History     Substance and Sexual Activity   Alcohol Use Yes    Comment: socially, had glass of wine today     Social History     Substance and Sexual Activity   Drug Use Not Currently    Comment: CBD     Social History     Tobacco Use   Smoking Status Former Smoker    Packs/day: 1 00    Types: Cigarettes   Smokeless Tobacco Never Used     Family History   Problem Relation Age of Onset    Colon cancer Father     Breast cancer Sister        Meds/Allergies     Medications Prior to Admission   Medication    candesartan (ATACAND) 16 mg tablet    naproxen sodium (ANAPROX) 550 mg tablet    ondansetron (ZOFRAN) 8 mg tablet     Current Facility-Administered Medications   Medication Dose Route Frequency    acetaminophen (TYLENOL) tablet 650 mg  650 mg Oral Q4H PRN    aluminum-magnesium hydroxide-simethicone (MYLANTA) oral suspension 30 mL  30 mL Oral Q6H PRN    cefTRIAXone (ROCEPHIN) IVPB (premix in dextrose) 1,000 mg 50 mL  1,000 mg Intravenous Daily    furosemide (LASIX) injection 20 mg  20 mg Intravenous Daily    gabapentin (NEURONTIN) capsule 100 mg  100 mg Oral TID    HYDROmorphone (DILAUDID) injection 0 5 mg  0 5 mg Intravenous Q1H PRN    [START ON 5/1/2022] HYDROmorphone (DILAUDID) injection 1 mg  1 mg Intravenous Once PRN    influenza vaccine, recombinant, quadrivalent (FLUBLOK) IM injection 0 5 mL  0 5 mL Intramuscular Prior to discharge    lactulose oral solution 20 g  20 g Oral BID    losartan (COZAAR) tablet 100 mg  100 mg Oral Daily    octreotide (SandoSTATIN) 500 mcg in sodium chloride 0 9 % 250 mL infusion  50 mcg/hr Intravenous Continuous    ondansetron (ZOFRAN) injection 4 mg  4 mg Intravenous Q4H PRN    oxyCODONE (ROXICODONE) IR tablet 10 mg  10 mg Oral Q4H PRN    oxyCODONE (ROXICODONE) IR tablet 5 mg  5 mg Oral Q4H PRN    pantoprazole (PROTONIX) injection 40 mg  40 mg Intravenous Q12H    senna-docusate sodium (SENOKOT S) 8 6-50 mg per tablet 2 tablet  2 tablet Oral BID PRN    spironolactone (ALDACTONE) tablet 50 mg  50 mg Oral Daily       Allergies   Allergen Reactions    Codeine Other (See Comments)     Can not take with current medications    Other reaction(s): Other (See Comments)  GI upset  GI Upset         Objective     Blood pressure 106/62, pulse 89, temperature 99 °F (37 2 °C), resp  rate 18, height 5' 6" (1 676 m), weight 68 kg (150 lb), SpO2 93 %, not currently breastfeeding  Intake/Output Summary (Last 24 hours) at 4/30/2022 1354  Last data filed at 4/29/2022 2144  Gross per 24 hour   Intake --   Output 400 ml   Net -400 ml       PHYSICAL EXAM  Physical Exam  Vitals reviewed  Constitutional:       General: She is not in acute distress  Appearance: She is not ill-appearing or toxic-appearing     Cardiovascular:      Rate and Rhythm: Normal rate  Pulmonary:      Effort: Pulmonary effort is normal  No respiratory distress  Musculoskeletal:      Right lower leg: No edema  Left lower leg: No edema  Comments: Mild tenderness over left hip    Skin:     General: Skin is warm and dry  Findings: Bruising and ecchymosis present  Comments: Medial aspect of left thigh with a moderate sized eccymotic area approximately 20x16 cm, soft and minimally tender  Smaller eccymotic area over medial aspect of left knee, approximately 10x7 cm   Neurological:      Mental Status: She is alert  Sensory: Sensation is intact  Comments: Left hip flexion 2/5 strength  Left knee flexion and extension 3/5 strength   Left toe flexion and extension 5/5 strength    Psychiatric:         Mood and Affect: Mood normal          Behavior: Behavior normal        Lab Results:   CBC:   Lab Results   Component Value Date    HGB 7 5 (L) 04/30/2022   , CMP:   Lab Results   Component Value Date    SODIUM 132 (L) 04/30/2022    K 3 9 04/30/2022    CL 98 (L) 04/30/2022    CO2 29 04/30/2022    BUN 14 04/30/2022    CREATININE 1 31 (H) 04/30/2022    CALCIUM 7 7 (L) 04/30/2022     (H) 04/30/2022    ALT 26 04/30/2022    ALKPHOS 55 04/30/2022    EGFR 44 04/30/2022   , Coagulation: No results found for: PT, INR, APTT  Imaging: I have personally reviewed pertinent reports  EKG, Pathology, and Other Studies: I have personally reviewed pertinent reports  Counseling / Coordination of Care  Total time spent today  20 minutes  Greater than 50% of total time was spent with the patient and / or family counseling and / or coordination of care           ARNALDO De León  4/30/2022 1:54 PM

## 2022-04-30 NOTE — ASSESSMENT & PLAN NOTE
Presents to ED with acute hip pain , abdominal pain, abdominal distension   CBC showing: Hgb 5 5/Hct16 3,  - macrocytic anemia    Occult heme + stool in ED    Blood consent obtained in ED   Transfusion initiated - S/P 2 units PRBCs  o Transfused to keep Hgb >7  o  No history of transfusions    Trend CBC q 6 hrs   Gastroenterology consulted - eventual scope pending brain MRI  No overt signs of bleeding   Continue Protonix IV b i d      Lab Results   Component Value Date    HGB 7 2 (L) 04/30/2022    HGB 7 5 (L) 04/30/2022

## 2022-04-30 NOTE — PLAN OF CARE
Problem: Potential for Falls  Goal: Patient will remain free of falls  Description: INTERVENTIONS:  - Educate patient/family on patient safety including physical limitations  - Instruct patient to call for assistance with activity   - Consult OT/PT to assist with strengthening/mobility   - Keep Call bell within reach  - Keep bed low and locked with side rails adjusted as appropriate  - Keep care items and personal belongings within reach  - Initiate and maintain comfort rounds  - Make Fall Risk Sign visible to staff  - Offer Toileting every 2 Hours, in advance of need  - Initiate/Maintain bed alarm  - Obtain necessary fall risk management equipment: non slip socks  - Apply yellow socks and bracelet for high fall risk patients  - Consider moving patient to room near nurses station  Outcome: Progressing     Problem: HEMATOLOGIC - ADULT  Goal: Maintains hematologic stability  Description: INTERVENTIONS  - Assess for signs and symptoms of bleeding or hemorrhage  - Monitor labs  - Administer supportive blood products/factors as ordered and appropriate  Outcome: Progressing     Problem: Prexisting or High Potential for Compromised Skin Integrity  Goal: Skin integrity is maintained or improved  Description: INTERVENTIONS:  - Identify patients at risk for skin breakdown  - Assess and monitor skin integrity  - Assess and monitor nutrition and hydration status  - Monitor labs   - Assess for incontinence   - Turn and reposition patient  - Assist with mobility/ambulation  - Relieve pressure over bony prominences  - Avoid friction and shearing  - Provide appropriate hygiene as needed including keeping skin clean and dry  - Evaluate need for skin moisturizer/barrier cream  - Collaborate with interdisciplinary team   - Patient/family teaching  - Consider wound care consult   Outcome: Progressing     Problem: MOBILITY - ADULT  Goal: Maintain or return to baseline ADL function  Description: INTERVENTIONS:  -  Assess patient's ability to carry out ADLs; assess patient's baseline for ADL function and identify physical deficits which impact ability to perform ADLs (bathing, care of mouth/teeth, toileting, grooming, dressing, etc )  - Assess/evaluate cause of self-care deficits   - Assess range of motion  - Assess patient's mobility; develop plan if impaired  - Assess patient's need for assistive devices and provide as appropriate  - Encourage maximum independence but intervene and supervise when necessary  - Involve family in performance of ADLs  - Assess for home care needs following discharge   - Consider OT consult to assist with ADL evaluation and planning for discharge  - Provide patient education as appropriate  Outcome: Progressing  Goal: Maintains/Returns to pre admission functional level  Description: INTERVENTIONS:  - Perform BMAT or MOVE assessment daily    - Set and communicate daily mobility goal to care team and patient/family/caregiver  - Collaborate with rehabilitation services on mobility goals if consulted  - Perform Range of Motion 5 times a day  - Reposition patient every 2 hours  - Dangle patient 3 times a day  - Stand patient 3 times a day  - Ambulate patient 3 times a day  - Out of bed to chair 3 times a day   - Out of bed for meals 3 times a day  - Out of bed for toileting  - Record patient progress and toleration of activity level   Outcome: Progressing     Problem: Neurological Deficit  Goal: Neurological status is stable or improving  Description: Interventions:  - Monitor and assess patient's level of consciousness, motor function, sensory function, and level of assistance needed for ADLs  - Monitor and report changes from baseline  Collaborate with interdisciplinary team to initiate plan and implement interventions as ordered  - Provide and maintain a safe environment  - Consider seizure precautions  - Consider fall precautions  - Consider aspiration precautions    - Consider bleeding precautions  Outcome: Progressing     Problem: Activity Intolerance/Impaired Mobility  Goal: Mobility/activity is maintained at optimum level for patient  Description: Interventions:  - Assess and monitor patient  barriers to mobility and need for assistive/adaptive devices  - Assess patient's emotional response to limitations  - Collaborate with interdisciplinary team and initiate plans and interventions as ordered  - Encourage independent activity per ability   - Maintain proper body alignment  - Perform active/passive rom as tolerated/ordered  - Plan activities to conserve energy   - Turn patient as appropriate  Outcome: Progressing     Problem: Communication Impairment  Goal: Ability to express needs and understand communication  Description: Assess patient's communication skills and ability to understand information  Patient will demonstrate use of effective communication techniques, alternative methods of communication and understanding even if not able to speak  - Encourage communication and provide alternate methods of communication as needed  - Collaborate with case management/ for discharge needs  - Include patient/family/caregiver in decisions related to communication  Outcome: Progressing     Problem: Potential for Aspiration  Goal: Non-ventilated patient's risk of aspiration is minimized  Description: Assess and monitor vital signs, respiratory status, and labs (WBC)  Monitor for signs of aspiration (tachypnea, cough, rales, wheezing, cyanosis, fever)  - Assess and monitor patient's ability to swallow  - Place patient up in chair to eat if possible  - HOB up at 90 degrees to eat if unable to get patient up into chair   - Supervise patient during oral intake  - Instruct patient/ family to take small bites  - Instruct patient/ family to take small single sips when taking liquids    - Follow patient-specific strategies generated by speech pathologist   Outcome: Progressing  Goal: Ventilated patient's risk of aspiration is minimized  Description: Assess and monitor vital signs, respiratory status, airway cuff pressure, and labs (WBC)  Monitor for signs of aspiration (tachypnea, cough, rales, wheezing, cyanosis, fever)  - Elevate head of bed 30 degrees if patient has tube feeding   - Monitor tube feeding  Outcome: Progressing     Problem: Nutrition  Goal: Nutrition/Hydration status is improving  Description: Monitor and assess patient's nutrition/hydration status for malnutrition (ex- brittle hair, bruises, dry skin, pale skin and conjunctiva, muscle wasting, smooth red tongue, and disorientation)  Collaborate with interdisciplinary team and initiate plan and interventions as ordered  Monitor patient's weight and dietary intake as ordered or per policy  Utilize nutrition screening tool and intervene per policy  Determine patient's food preferences and provide high-protein, high-caloric foods as appropriate  - Assist patient with eating   - Allow adequate time for meals   - Encourage patient to take dietary supplement as ordered  - Collaborate with clinical nutritionist   - Include patient/family/caregiver in decisions related to nutrition    Outcome: Progressing

## 2022-04-30 NOTE — PROGRESS NOTES
[place]  Progress Note - January Johnson 1960, 63 y o  female MRN: [id number]  Unit/Bed#: -[facility] Encounter: [id number]  Primary Care Provider: Suhail Sorto MD   Date and time admitted to hospital: 4/28/2022 10:36 AM    GI bleed  Assessment & Plan  Presents to ED with acute left hip pain and abdominal pain/distension   Hgb 5 5/Hct 16 3 - Type and screen ordered   CT scan abdomen/pelvis:  New large ascites, distended gallbladder, cirrhotic liver, splenomegaly with recanalized lysed umbilical vein, faint hypodense area 1 7 cm of junction of segment 8 and for a liver, thickening of the ascending colon possibly due to colitis also seen on previous study, moderate left pleural effusion   Iron panel ordered    LFTs - , normal ALT, bili 5 6 on   PTT/INR - PT 24, INR 2 22   Occult blood stool positive in ED     PPI IV Q12hr    New large ascites and history of splenomegaly - octreotide and ceftriaxone   Hold anticoagulation   Hold NSAIDS (patient is chronic NSAID user at home due to migraine)    Continue to monitor H/H with serial labs q 6 hours   Up out of bed with assistance   Stool records at bedside/ intake and output     EGD planned after stroke johnson complete   GI consulted - await recommendations    Lab Results   Component Value Date    HGB 7 2 (L) 04/30/2022    HGB 7 5 (L) 04/30/2022         Hyperbilirubinemia  Assessment & Plan  · Presents to ED with left hip pain, abdominal pain/distension with nausea and vomiting  · Bilirubin elevated to 5 69 - increased to 9 70  · CT abdomen pelvis showing - distended gallbladder, large new ascites, hepatomegaly   · Gastroenterology following      Hyponatremia  Assessment & Plan  · Sodium on admission 131  · Likely in setting of cirrhosis  · 132 this morning   Free water restriction (1-1 5L)    Continue Lasix/spironolactone per Gastroenterology   Monitor BMP     Lab Results   Component Value Date    SODIUM 132 (L) 04/30/2022    SODIUM 132 (L) 04/29/2022    SODIUM 131 (L) 04/28/2022         Thrombocytopenia (HCC)  Assessment & Plan  · Likely in setting of severe liver disease and splenomegaly  · Stable at 60 K      Left hip pain  Assessment & Plan  · Reason for presentation - pain started a few days ago after dog jumping on her - difficulty with ambulation and weight on left side  · CT abdomen and pelvis - osseous structures noted: No acute compression collapse of the vertebra, Chronic compression of the L2 vertebra seen  · Stroke alert called over night due to left lower extremity weakness  CT head and CTA head/neck demonstrating chronic changes  · Discussed with Neurology - given extensive history of possible CSF-venous leak, chronic migraines will continue workup with MRI brain  No indication for anti-platelet therapy especially with ongoing anemia/concern for gastrointestinal bleed  · CT recon lumbar spine: "Chronic, mild degenerative changes and chronic moderate L2 compression fracture  No new disc herniation or high-grade stenosis compared to prior studies  Asymmetric left iliopsoas muscle enlargement of unclear etiology, new compared to prior studies  No focal mass or fluid collection  No evidence of discitis on CT  MR of the lumbar spine and pelvis is recommended for further evaluation "  · Will obtain MRI lumbar - concern for Intramuscular hematoma, consult placed to surgery  · Neurovascular checks ordered  Thus far intact pulses     ·   XR left hip/femur unremarkable  · PT/OT consult  · Continue pain control    Cirrhosis of liver with ascites [facility])  Assessment & Plan  Patient presents with acute hip pain abdominal pain abdominal distension  · Endorses history of fatty liver disease, drinks wine occasionally, never been tested for hepatitis  · Found to have significant ascites on exam   · CT abd/pelvis - new large ascites, cirrhotic liver, hypodense area measuring 1 7 cm at junction of segment 4 and 8 in liver, distended gallbladder, splenomegaly with recanalized umbilical vein  · Underwent paracentesis  with IR - approximately 1 8 L removed  Follow-up fluid studies  · GI consulted - appreciate recommendations  · Started on lasix/spironolactone    * Acute blood loss anemia  Assessment & Plan  Presents to ED with acute hip pain , abdominal pain, abdominal distension   CBC showing: Hgb 5 5/Hct16 3,  - macrocytic anemia    Occult heme + stool in ED    Blood consent obtained in ED   Transfusion initiated - S/P 2 units PRBCs  o Transfused to keep Hgb >7  o  No history of transfusions    Trend CBC q 6 hrs   Gastroenterology consulted - eventual scope pending brain MRI  No overt signs of bleeding   Continue Protonix IV b i d  Lab Results   Component Value Date    HGB 7 2 (L) 2022    HGB 7 5 (L) 2022            VTE  Prophylaxis:   Pharmacologic: in place    Patient Centered Rounds: I have performed bedside rounds with nursing staff today  Discussions with Specialists or Other Care Team Provider: case management    Education and Discussions with Family / Patient: dw son      Current Length of Stay: 2 day(s)    Current Patient Status: Inpatient        Code Status: Level 1 - Full Code      Subjective:   Pt seen  Reports L hip pain  Denies bloody stool  Denies abd pain fever   Denies nausea    Patient is seen and examined at bedside  All other ROS are negative  Objective:     Vitals:   Temp (24hrs), Av 6 °F (37 °C), Min:98 4 °F (36 9 °C), Max:99 °F (37 2 °C)    Temp:  [98 4 °F (36 9 °C)-99 °F (37 2 °C)] 99 °F (37 2 °C)  HR:  [] 89  Resp:  [18-20] 18  BP: ()/(50-69) 106/62  SpO2:  [90 %-95 %] 93 %  Body mass index is 24 21 kg/m²  Input and Output Summary (last 24 hours):        Intake/Output Summary (Last 24 hours) at 2022 1337  Last data filed at 2022 2144  Gross per 24 hour   Intake --   Output 400 ml   Net -400 ml       Physical Exam:       GEN: No acute distress, comfortable, jaundiced  HEEENT: No JVD, PERRLA, icteric  RESP: Lungs clear to auscultation bilaterally  CV: RRR, +s1/s2   ABD: SOFT NON TENDER, POSITIVE BOWEL SOUNDS, NO DISTENTION  PSYCH: CALM  NEURO: A X O X 3, NO FOCAL DEFICITS  SKIN: NO RASH  EXTREM: NO EDEMA  LE pulses intact  Additional Data:     Labs:    Results from last 7 days   Lab Units 04/30/22  0806 04/29/22  2046 04/29/22  1425 04/29/22  0457 04/29/22  0457 04/28/22  1825 04/28/22  1147   WBC Thousand/uL  --   --   --   --  7 37  --  6 20   HEMOGLOBIN g/dL 7 2*   < > 7 1*   < > 7 3*   < > 5 5*   HEMATOCRIT %  --   --  22 4*   < > 22 6*  --  16 3*   PLATELETS Thousands/uL  --   --   --   --  60*  --  59*   NEUTROS PCT %  --   --   --   --   --   --  77*   LYMPHS PCT %  --   --   --   --   --   --  14   MONOS PCT %  --   --   --   --   --   --  6   EOS PCT %  --   --   --   --   --   --  0    < > = values in this interval not displayed  Results from last 7 days   Lab Units 04/30/22  0441   SODIUM mmol/L 132*   POTASSIUM mmol/L 3 9   CHLORIDE mmol/L 98*   CO2 mmol/L 29   BUN mg/dL 14   CREATININE mg/dL 1 31*   ANION GAP mmol/L 5   CALCIUM mg/dL 7 7*   ALBUMIN g/dL 2 0*   TOTAL BILIRUBIN mg/dL 7 00*   ALK PHOS U/L 55   ALT U/L 26   AST U/L 124*   GLUCOSE RANDOM mg/dL 114     Results from last 7 days   Lab Units 04/28/22  1147   INR  2 22*     Results from last 7 days   Lab Units 04/29/22  1549 04/29/22  0503   POC GLUCOSE mg/dl 147* 186*                   * I Have Reviewed All Lab Data Listed Above  Imaging:     Results for orders placed during the hospital encounter of 04/28/22    XR chest 1 view portable    Narrative  CHEST    INDICATION:   low pulse ox  COMPARISON:  Chest radiograph dated 12/17/2013  EXAM PERFORMED/VIEWS:  XR CHEST PORTABLE      FINDINGS:    Cardiomediastinal silhouette appears unremarkable      Mild hazy density of the left lower hemithorax may be secondary to developing airspace disease versus overlying soft tissue  No pneumothorax or pleural effusion  Osseous structures appear within normal limits for patient age  Impression  Mild hazy density of the left lower hemithorax may be secondary to developing airspace disease versus overlying soft tissue  Workstation performed: [id number]    No results found for this or any previous visit  *I have reviewed all imaging reports listed above      Recent Cultures (last 7 days):     Results from last 7 days   Lab Units 04/29/22  1012 04/28/22  1432 04/28/22  1431 04/28/22  1404   BLOOD CULTURE   --  No Growth at 24 hrs   No Growth at 24 hrs   --    GRAM STAIN RESULT  Rare Polys  No bacteria seen  --   --  1+ Polys  No bacteria seen  Rare Mononuclear Cells  No No organisms seen  No No polys seen   BODY FLUID CULTURE, STERILE  No growth  --   --  No growth       Last 24 Hours Medication List:   Current Facility-Administered Medications   Medication Dose Route Frequency Provider Last Rate    acetaminophen  650 mg Oral Q4H PRN ReJER Jung-C      aluminum-magnesium hydroxide-simethicone  30 mL Oral Q6H PRN Laurita Freeman PA-C      cefTRIAXone  1,000 mg Intravenous Daily JER Hernandez-C 1,000 mg (04/30/22 0839)    furosemide  20 mg Intravenous Daily Laurita Freeman PA-C      gabapentin  100 mg Oral TID JER Hernandez-C      HYDROmorphone  0 5 mg Intravenous Q1H PRN JER Hernandez-C      influenza vaccine  0 5 mL Intramuscular Prior to discharge Hilton Hudson MD      lactulose  20 g Oral BID Glady Ahumada, MD      losartan  100 mg Oral Daily JER Hernandez-C      octreotide (SandoSTATIN) in 0 9 % sodium chloride infusion 250 mL  50 mcg/hr Intravenous Continuous Laurita Freeman PA-C 50 mcg/hr (04/30/22 3860)    ondansetron  4 mg Intravenous Q4H PRN Recarolynn rFeeman PA-C      oxyCODONE  10 mg Oral Q4H PRN Reida Pete PA-C      oxyCODONE  5 mg Oral Q4H PRN Recarolynn Freeman PA-C      pantoprazole  40 mg Intravenous Q12H Sushila Silver PA-C      senna-docusate sodium  2 tablet Oral BID PRN Sushila Silver PA-C      spironolactone  50 mg Oral Daily TRAV Hanley          Today, Patient Was Seen By: Evelin Moraes MD    ** Please Note: Dictation voice to text software may have been used in the creation of this document   **

## 2022-04-30 NOTE — ASSESSMENT & PLAN NOTE
· Sodium on admission 131  · Likely in setting of cirrhosis  · 132 this morning   Free water restriction (1-1 5L)    Continue Lasix/spironolactone per Gastroenterology   Monitor BMP     Lab Results   Component Value Date    SODIUM 132 (L) 04/30/2022    SODIUM 132 (L) 04/29/2022    SODIUM 131 (L) 04/28/2022

## 2022-04-30 NOTE — ASSESSMENT & PLAN NOTE
Presents to ED with acute left hip pain and abdominal pain/distension   Hgb 5 5/Hct 16 3 - Type and screen ordered   CT scan abdomen/pelvis:  New large ascites, distended gallbladder, cirrhotic liver, splenomegaly with recanalized lysed umbilical vein, faint hypodense area 1 7 cm of junction of segment 8 and for a liver, thickening of the ascending colon possibly due to colitis also seen on previous study, moderate left pleural effusion   Iron panel ordered    LFTs - , normal ALT, bili 5 6 on   PTT/INR - PT 24, INR 2 22   Occult blood stool positive in ED     PPI IV Q12hr    New large ascites and history of splenomegaly - octreotide and ceftriaxone   Hold anticoagulation   Hold NSAIDS (patient is chronic NSAID user at home due to migraine)    Continue to monitor H/H with serial labs q 6 hours   Up out of bed with assistance   Stool records at bedside/ intake and output     EGD planned after stroke johnson complete   GI consulted - await recommendations    Lab Results   Component Value Date    HGB 7 2 (L) 04/30/2022    HGB 7 5 (L) 04/30/2022

## 2022-05-01 ENCOUNTER — APPOINTMENT (INPATIENT)
Dept: RADIOLOGY | Facility: HOSPITAL | Age: 62
DRG: 432 | End: 2022-05-01
Payer: COMMERCIAL

## 2022-05-01 ENCOUNTER — APPOINTMENT (INPATIENT)
Dept: MRI IMAGING | Facility: HOSPITAL | Age: 62
DRG: 432 | End: 2022-05-01
Payer: COMMERCIAL

## 2022-05-01 PROBLEM — E80.6 HYPERBILIRUBINEMIA: Status: RESOLVED | Noted: 2022-04-28 | Resolved: 2022-05-01

## 2022-05-01 PROBLEM — N17.9 ACUTE KIDNEY INJURY (HCC): Status: ACTIVE | Noted: 2022-05-01

## 2022-05-01 PROBLEM — R29.898 WEAKNESS OF LEFT LOWER EXTREMITY: Status: ACTIVE | Noted: 2022-04-28

## 2022-05-01 LAB
ALBUMIN SERPL BCP-MCNC: 1.8 G/DL (ref 3.5–5)
ALP SERPL-CCNC: 55 U/L (ref 46–116)
ALT SERPL W P-5'-P-CCNC: 27 U/L (ref 12–78)
ANION GAP SERPL CALCULATED.3IONS-SCNC: 4 MMOL/L (ref 4–13)
AST SERPL W P-5'-P-CCNC: 126 U/L (ref 5–45)
BACTERIA SPEC BFLD CULT: NO GROWTH
BASOPHILS # BLD AUTO: 0.07 THOUSANDS/ΜL (ref 0–0.1)
BASOPHILS NFR BLD AUTO: 1 % (ref 0–1)
BILIRUB DIRECT SERPL-MCNC: 5.65 MG/DL (ref 0–0.2)
BILIRUB SERPL-MCNC: 9 MG/DL (ref 0.2–1)
BUN SERPL-MCNC: 17 MG/DL (ref 5–25)
CALCIUM ALBUM COR SERPL-MCNC: 9.5 MG/DL (ref 8.3–10.1)
CALCIUM SERPL-MCNC: 7.7 MG/DL (ref 8.3–10.1)
CHLORIDE SERPL-SCNC: 99 MMOL/L (ref 100–108)
CO2 SERPL-SCNC: 28 MMOL/L (ref 21–32)
CREAT SERPL-MCNC: 1.34 MG/DL (ref 0.6–1.3)
EOSINOPHIL # BLD AUTO: 0.1 THOUSAND/ΜL (ref 0–0.61)
EOSINOPHIL NFR BLD AUTO: 1 % (ref 0–6)
ERYTHROCYTE [DISTWIDTH] IN BLOOD BY AUTOMATED COUNT: 28.2 % (ref 11.6–15.1)
GFR SERPL CREATININE-BSD FRML MDRD: 43 ML/MIN/1.73SQ M
GLUCOSE SERPL-MCNC: 133 MG/DL (ref 65–140)
GLUCOSE SERPL-MCNC: 134 MG/DL (ref 65–140)
GRAM STN SPEC: NORMAL
GRAM STN SPEC: NORMAL
HCT VFR BLD AUTO: 21.2 % (ref 34.8–46.1)
HGB BLD-MCNC: 6.8 G/DL (ref 11.5–15.4)
HGB BLD-MCNC: 7 G/DL (ref 11.5–15.4)
HGB BLD-MCNC: 7.3 G/DL (ref 11.5–15.4)
IMM GRANULOCYTES # BLD AUTO: 0.11 THOUSAND/UL (ref 0–0.2)
IMM GRANULOCYTES NFR BLD AUTO: 2 % (ref 0–2)
INR PPP: 2.12 (ref 0.84–1.19)
LYMPHOCYTES # BLD AUTO: 1.46 THOUSANDS/ΜL (ref 0.6–4.47)
LYMPHOCYTES NFR BLD AUTO: 20 % (ref 14–44)
MCH RBC QN AUTO: 36.8 PG (ref 26.8–34.3)
MCHC RBC AUTO-ENTMCNC: 33 G/DL (ref 31.4–37.4)
MCV RBC AUTO: 112 FL (ref 82–98)
MONOCYTES # BLD AUTO: 0.65 THOUSAND/ΜL (ref 0.17–1.22)
MONOCYTES NFR BLD AUTO: 9 % (ref 4–12)
NEUTROPHILS # BLD AUTO: 4.97 THOUSANDS/ΜL (ref 1.85–7.62)
NEUTS SEG NFR BLD AUTO: 67 % (ref 43–75)
NRBC BLD AUTO-RTO: 0 /100 WBCS
PLATELET # BLD AUTO: 59 THOUSANDS/UL (ref 149–390)
PMV BLD AUTO: 10 FL (ref 8.9–12.7)
POTASSIUM SERPL-SCNC: 3.9 MMOL/L (ref 3.5–5.3)
PROT SERPL-MCNC: 6.8 G/DL (ref 6.4–8.2)
PROTHROMBIN TIME: 23.3 SECONDS (ref 11.6–14.5)
RBC # BLD AUTO: 1.9 MILLION/UL (ref 3.81–5.12)
SODIUM 24H UR-SCNC: 7 MOL/L
SODIUM SERPL-SCNC: 131 MMOL/L (ref 136–145)
WBC # BLD AUTO: 7.36 THOUSAND/UL (ref 4.31–10.16)

## 2022-05-01 PROCEDURE — 82248 BILIRUBIN DIRECT: CPT | Performed by: INTERNAL MEDICINE

## 2022-05-01 PROCEDURE — 30233K1 TRANSFUSION OF NONAUTOLOGOUS FROZEN PLASMA INTO PERIPHERAL VEIN, PERCUTANEOUS APPROACH: ICD-10-PCS | Performed by: HOSPITALIST

## 2022-05-01 PROCEDURE — 99223 1ST HOSP IP/OBS HIGH 75: CPT | Performed by: INTERNAL MEDICINE

## 2022-05-01 PROCEDURE — 85610 PROTHROMBIN TIME: CPT | Performed by: HOSPITALIST

## 2022-05-01 PROCEDURE — G1004 CDSM NDSC: HCPCS

## 2022-05-01 PROCEDURE — 99233 SBSQ HOSP IP/OBS HIGH 50: CPT | Performed by: HOSPITALIST

## 2022-05-01 PROCEDURE — 70551 MRI BRAIN STEM W/O DYE: CPT

## 2022-05-01 PROCEDURE — 99232 SBSQ HOSP IP/OBS MODERATE 35: CPT | Performed by: INTERNAL MEDICINE

## 2022-05-01 PROCEDURE — 71045 X-RAY EXAM CHEST 1 VIEW: CPT

## 2022-05-01 PROCEDURE — 85018 HEMOGLOBIN: CPT | Performed by: PHYSICIAN ASSISTANT

## 2022-05-01 PROCEDURE — C9113 INJ PANTOPRAZOLE SODIUM, VIA: HCPCS

## 2022-05-01 PROCEDURE — 80053 COMPREHEN METABOLIC PANEL: CPT | Performed by: HOSPITALIST

## 2022-05-01 PROCEDURE — P9017 PLASMA 1 DONOR FRZ W/IN 8 HR: HCPCS

## 2022-05-01 PROCEDURE — 85025 COMPLETE CBC W/AUTO DIFF WBC: CPT | Performed by: HOSPITALIST

## 2022-05-01 PROCEDURE — 99232 SBSQ HOSP IP/OBS MODERATE 35: CPT | Performed by: PHYSICIAN ASSISTANT

## 2022-05-01 PROCEDURE — P9016 RBC LEUKOCYTES REDUCED: HCPCS

## 2022-05-01 PROCEDURE — 82948 REAGENT STRIP/BLOOD GLUCOSE: CPT

## 2022-05-01 PROCEDURE — 84300 ASSAY OF URINE SODIUM: CPT | Performed by: INTERNAL MEDICINE

## 2022-05-01 RX ORDER — LORAZEPAM 2 MG/ML
1 INJECTION INTRAMUSCULAR ONCE
Status: DISCONTINUED | OUTPATIENT
Start: 2022-05-01 | End: 2022-05-12 | Stop reason: HOSPADM

## 2022-05-01 RX ORDER — ALBUMIN (HUMAN) 12.5 G/50ML
25 SOLUTION INTRAVENOUS EVERY 12 HOURS
Status: COMPLETED | OUTPATIENT
Start: 2022-05-01 | End: 2022-05-02

## 2022-05-01 RX ORDER — MIDODRINE HYDROCHLORIDE 5 MG/1
5 TABLET ORAL
Status: DISCONTINUED | OUTPATIENT
Start: 2022-05-01 | End: 2022-05-04

## 2022-05-01 RX ADMIN — OXYCODONE HYDROCHLORIDE 5 MG: 5 TABLET ORAL at 09:09

## 2022-05-01 RX ADMIN — HYDROMORPHONE HYDROCHLORIDE 0.5 MG: 1 INJECTION, SOLUTION INTRAMUSCULAR; INTRAVENOUS; SUBCUTANEOUS at 07:11

## 2022-05-01 RX ADMIN — PHYTONADIONE 10 MG: 10 INJECTION, EMULSION INTRAMUSCULAR; INTRAVENOUS; SUBCUTANEOUS at 10:40

## 2022-05-01 RX ADMIN — ALBUMIN (HUMAN) 25 G: 0.25 INJECTION, SOLUTION INTRAVENOUS at 21:10

## 2022-05-01 RX ADMIN — PANTOPRAZOLE SODIUM 40 MG: 40 INJECTION, POWDER, FOR SOLUTION INTRAVENOUS at 03:42

## 2022-05-01 RX ADMIN — HYDROMORPHONE HYDROCHLORIDE 0.5 MG: 1 INJECTION, SOLUTION INTRAMUSCULAR; INTRAVENOUS; SUBCUTANEOUS at 15:22

## 2022-05-01 RX ADMIN — OXYCODONE HYDROCHLORIDE 10 MG: 5 TABLET ORAL at 16:18

## 2022-05-01 RX ADMIN — ALBUMIN (HUMAN) 25 G: 0.25 INJECTION, SOLUTION INTRAVENOUS at 10:30

## 2022-05-01 RX ADMIN — GABAPENTIN 100 MG: 100 CAPSULE ORAL at 09:09

## 2022-05-01 RX ADMIN — OXYCODONE HYDROCHLORIDE 10 MG: 5 TABLET ORAL at 22:18

## 2022-05-01 RX ADMIN — CEFTRIAXONE 1000 MG: 1 INJECTION, SOLUTION INTRAVENOUS at 09:11

## 2022-05-01 RX ADMIN — MIDODRINE HYDROCHLORIDE 5 MG: 5 TABLET ORAL at 16:17

## 2022-05-01 RX ADMIN — GABAPENTIN 100 MG: 100 CAPSULE ORAL at 21:10

## 2022-05-01 RX ADMIN — OCTREOTIDE ACETATE 50 MCG/HR: 500 INJECTION, SOLUTION INTRAVENOUS; SUBCUTANEOUS at 22:20

## 2022-05-01 RX ADMIN — MIDODRINE HYDROCHLORIDE 5 MG: 5 TABLET ORAL at 10:40

## 2022-05-01 RX ADMIN — PANTOPRAZOLE SODIUM 40 MG: 40 INJECTION, POWDER, FOR SOLUTION INTRAVENOUS at 16:17

## 2022-05-01 RX ADMIN — GABAPENTIN 100 MG: 100 CAPSULE ORAL at 16:17

## 2022-05-01 RX ADMIN — HYDROMORPHONE HYDROCHLORIDE 0.5 MG: 1 INJECTION, SOLUTION INTRAMUSCULAR; INTRAVENOUS; SUBCUTANEOUS at 10:40

## 2022-05-01 NOTE — PROGRESS NOTES
Progress Note - General Surgery   Antoinette Salinas 63 y o  female MRN: [id number]  Unit/Bed#: -01 Encounter: [id number]    Assessment/Plan:  53-year-old female with decompensated liver cirrhosis with retroperitoneal bleed/hematoma and ABLA    Retroperitoneal bleed/hematoma of left iliopsoas muscle  -stable size on recent imaging  -patient continues with pain and bruising, left thigh is soft  -HGB and VS remain stable, no signs of active bleeding  -continue correction of INR, platelets as needed  -hold anticoagulation   -pain control as needed  -consider CTA to r/o active bleeding if patient decompensates, currently on hold due to elevated creatinine    ABLA  -2/2 hematoma, thrombocytopenia and elevated INR  -patient also with heme positive stools on this admission  -continue to monitor H&H, vital signs  -follow INR, platelets  -transfuse as needed  -recommend FFP to keep INR below 1 5    Decompensated Liver Cirrhosis  -with thrombocytopenia and elevated INR contributing to retroperitoneal bleed  -abdominal ascites s/p paracentesis 4/29, abdomen soft today  -Aldactone and  Lasix on hold due to elevated renal indices  -alcohol cessation recommended  -plan per GI    Elevated renal indices  -creatinine 1 34 today  -remains on fluid restrictions, diuretics on hold  -continue to monitor    Subjective/Objective   Chief Complaint:  Pain    Subjective:  Patient still with left hip pain  Better range of motion today  No acute events overnight  Mild shortness of breath now on O2  Denies any nausea, fevers, or chills  Objective:     Blood pressure 119/63, pulse 98, temperature 98 8 °F (37 1 °C), resp  rate 17, height 5' 6" (1 676 m), weight 68 kg (150 lb), SpO2 90 %, not currently breastfeeding  ,Body mass index is 24 21 kg/m²      No intake or output data in the 24 hours ending 05/01/22 0733    Invasive Devices  Report    Peripheral Intravenous Line            Peripheral IV 04/30/22 Right Hand <1 day          Drain External Urinary Catheter 3 days                Physical Exam:   General appearance: alert and oriented, in no acute distress  Head: Normocephalic, without obvious abnormality, atraumatic, there is scleral icterus, mucous membranes dry  Neck: no JVD and supple, symmetrical, trachea midline  Lungs: clear to auscultation, no wheezes or rales  Heart:   Regular rate and rhythm, S1-S2 normal, no murmur  Abdomen:   Flat, soft, mild fullness, tenderness over left lateral abdomen, active bowel sounds  Extremities:  Left thigh with ecchymosis, remain soft  Decreased range of motion left lower extremity secondary to pain  N/V intact  Skin: Warm, dry  Nursing notes and vital signs reviewed      Lab, Imaging and other studies:  I have personally reviewed pertinent lab results    , CBC:   Lab Results   Component Value Date    WBC 7 36 05/01/2022    HGB 7 0 (L) 05/01/2022    HCT 21 2 (L) 05/01/2022     (H) 05/01/2022    PLT 59 (L) 05/01/2022    MCH 36 8 (H) 05/01/2022    MCHC 33 0 05/01/2022    RDW 28 2 (H) 05/01/2022    MPV 10 0 05/01/2022    NRBC 0 05/01/2022   , CMP:   Lab Results   Component Value Date    SODIUM 131 (L) 05/01/2022    K 3 9 05/01/2022    CL 99 (L) 05/01/2022    CO2 28 05/01/2022    BUN 17 05/01/2022    CREATININE 1 34 (H) 05/01/2022    CALCIUM 7 7 (L) 05/01/2022     (H) 05/01/2022    ALT 27 05/01/2022    ALKPHOS 55 05/01/2022    EGFR 43 05/01/2022   , Coagulation:   Lab Results   Component Value Date    INR 2 12 (H) 05/01/2022     VTE Pharmacologic Prophylaxis: Reason for no pharmacologic prophylaxis retroperitoneal bleed  VTE Mechanical Prophylaxis: sequential compression device     Tej Rivas PA-C

## 2022-05-01 NOTE — PROGRESS NOTES
[place]  Progress Note - Heidi Muñoz 1960, 63 y o  female MRN: [id number]  Unit/Bed#: -[facility] Encounter: [id number]  Primary Care Provider: Vlad Gutierrez MD   Date and time admitted to hospital: 4/28/2022 10:36 AM    Acute kidney injury [facility])  Assessment & Plan  Multifactorial, related to decompensated cirrhosis, contrast exposure, low blood pressures  Hold diuretics  R/o HRS  Albumin planned, discussed with nephrology  Midodrine initiated for blood pressure support   Octreotide already infusing    GI bleed  Assessment & Plan  Presents to ED with acute left hip pain and abdominal pain/distension   Hgb 5 5/Hct 16 3    CT scan abdomen/pelvis:  New large ascites, distended gallbladder, cirrhotic liver, splenomegaly with recanalized lysed umbilical vein, faint hypodense area 1 7 cm of junction of segment 8 and for a liver, thickening of the ascending colon possibly due to colitis also seen on previous study, moderate left pleural effusion   Cont transfusions   For EGD if CVA workup negative   LFTs - , normal ALT, bili 5 6 on   PTT/INR - PT 24, INR 2 22   Occult blood stool positive in ED     PPI IV Q12hr    New large ascites and history of splenomegaly - octreotide and ceftriaxone   Hold anticoagulation   Hold NSAIDS (patient is chronic NSAID user at home due to migraine)    Continue to monitor H/H with serial labs q 6 hours   Up out of bed with assistance   Stool records at bedside/ intake and output     EGD planned after stroke johnson complete   GI consulted - await recommendations    Lab Results   Component Value Date    HGB 7 0 (L) 05/01/2022    HGB 7 2 (L) 04/30/2022         Hyponatremia  Assessment & Plan  · Sodium on admission 131  · Likely in setting of cirrhosis  · stable   Free water restriction (1-1 5L)    hold Lasix/spironolactone    Monitor BMP     Lab Results   Component Value Date    SODIUM 131 (L) 05/01/2022    SODIUM 132 (L) 04/30/2022 SODIUM 132 (L) 04/29/2022         Thrombocytopenia (HCC)  Assessment & Plan  · Likely in setting of severe liver disease and splenomegaly  · Stable  Will not transfuse unless <50k in the setting of bleeding  Left hip pain  Assessment & Plan  · Reason for presentation - pain started a few days ago after dog jumping on her - difficulty with ambulation and weight on left side  · CT abdomen and pelvis - osseous structures noted: No acute compression collapse of the vertebra, Chronic compression of the L2 vertebra seen  · Stroke alert called over night due to left lower extremity weakness  CT head and CTA head/neck demonstrating chronic changes  · Discussed with Neurology - given extensive history of possible CSF-venous leak, chronic migraines will continue workup with MRI brain  No indication for anti-platelet therapy especially with ongoing anemia/concern for gastrointestinal bleed  · CT recon lumbar spine: "Chronic, mild degenerative changes and chronic moderate L2 compression fracture  No new disc herniation or high-grade stenosis compared to prior studies  Asymmetric left iliopsoas muscle enlargement of unclear etiology, new compared to prior studies  No focal mass or fluid collection  No evidence of discitis on CT  MR of the lumbar spine and pelvis is recommended for further evaluation "  ·  MRI lumbar - concern for Intramuscular hematoma, consult placed to surgery  · Neurovascular checks ordered  Pulses remain intact  Pain appears better/mobility improved  · We will provide FFP 2 units today  CTA deferred d/t relatively stable hemoglobin  Provide additional vitamin K today    ·   XR left hip/femur unremarkable  · PT/OT consult  · Continue pain control    Cirrhosis of liver with ascites [facility])  Assessment & Plan  Patient presents with acute hip pain abdominal pain abdominal distension  · Endorses history of fatty liver disease, drinks wine occasionally, never been tested for hepatitis  · Found to have significant ascites on exam   · CT abd/pelvis - new large ascites, cirrhotic liver, hypodense area measuring 1 7 cm at junction of segment 4 and 8 in liver, distended gallbladder, splenomegaly with recanalized umbilical vein  · Underwent paracentesis  with IR - approximately 1 8 L removed  Follow-up fluid studies  · GI consulted - appreciate recommendations  · Now with decompensated cirrhosis  ·  lasix/spironolactone held d/t ALEJANDRO    Hyperbilirubinemia-resolved as of 2022  Assessment & Plan  · Presents to ED with left hip pain, abdominal pain/distension with nausea and vomiting  · Bilirubin elevated to 5 69 - increased to 9 70  · CT abdomen pelvis showing - distended gallbladder, large new ascites, hepatomegaly   · Gastroenterology following           VTE  Prophylaxis:   Pharmacologic: in place    Patient Centered Rounds: I have performed bedside rounds with nursing staff today  Discussions with Specialists or Other Care Team Provider: case management    Education and Discussions with Family / Patient: significant other bedside      Current Length of Stay: 3 day(s)    Current Patient Status: Inpatient        Code Status: Level 1 - Full Code      Subjective:   Pt seen  Says leg is actually a bit better - states she moved it  No fevers  No abd pain  Reports dark urine    Patient is seen and examined at bedside  All other ROS are negative  Objective:     Vitals:   Temp (24hrs), Av 9 °F (37 2 °C), Min:98 7 °F (37 1 °C), Max:99 1 °F (37 3 °C)    Temp:  [98 7 °F (37 1 °C)-99 1 °F (37 3 °C)] 98 8 °F (37 1 °C)  HR:  [89-98] 98  Resp:  [17] 17  BP: ()/(56-63) 119/63  SpO2:  [86 %-95 %] 90 %  Body mass index is 24 21 kg/m²       Input and Output Summary (last 24 hours):     No intake or output data in the 24 hours ending 22 0935    Physical Exam:        GEN: No acute distress, comfortable on o2, jaundiced  HEEENT: No JVD, PERRLA, cleral icterus  RESP: Lungs clear to auscultation bilaterally  CV: RRR, +s1/s2   ABD: SOFT NON TENDER, POSITIVE BOWEL SOUNDS, NO DISTENTION  Ascites  PSYCH: CALM  NEURO: A X O X 3, NO FOCAL DEFICITS  SKIN: NO RASH  Bruising noted  EXTREM: NO EDEMA  LLE weakness  Additional Data:     Labs:    Results from last 7 days   Lab Units 05/01/22  0223   WBC Thousand/uL 7 36   HEMOGLOBIN g/dL 7 0*   HEMATOCRIT % 21 2*   PLATELETS Thousands/uL 59*   NEUTROS PCT % 67   LYMPHS PCT % 20   MONOS PCT % 9   EOS PCT % 1     Results from last 7 days   Lab Units 05/01/22  0216   SODIUM mmol/L 131*   POTASSIUM mmol/L 3 9   CHLORIDE mmol/L 99*   CO2 mmol/L 28   BUN mg/dL 17   CREATININE mg/dL 1 34*   ANION GAP mmol/L 4   CALCIUM mg/dL 7 7*   ALBUMIN g/dL 1 8*   TOTAL BILIRUBIN mg/dL 9 00*   ALK PHOS U/L 55   ALT U/L 27   AST U/L 126*   GLUCOSE RANDOM mg/dL 133     Results from last 7 days   Lab Units 05/01/22  0216   INR  2 12*     Results from last 7 days   Lab Units 05/01/22  0718 04/29/22  1549 04/29/22  0503   POC GLUCOSE mg/dl 134 147* 186*                   * I Have Reviewed All Lab Data Listed Above  Imaging:     Results for orders placed during the hospital encounter of 04/28/22    XR chest 1 view portable    Narrative  CHEST    INDICATION:   low pulse ox  COMPARISON:  Chest radiograph dated 12/17/2013  EXAM PERFORMED/VIEWS:  XR CHEST PORTABLE      FINDINGS:    Cardiomediastinal silhouette appears unremarkable  Mild hazy density of the left lower hemithorax may be secondary to developing airspace disease versus overlying soft tissue  No pneumothorax or pleural effusion  Osseous structures appear within normal limits for patient age  Impression  Mild hazy density of the left lower hemithorax may be secondary to developing airspace disease versus overlying soft tissue  Workstation performed: [id number]    No results found for this or any previous visit        *I have reviewed all imaging reports listed above      Recent Cultures (last 7 days):     Results from last 7 days   Lab Units 04/29/22  1012 04/28/22  1432 04/28/22  1431 04/28/22  1404   BLOOD CULTURE   --  No Growth at 48 hrs  No Growth at 48 hrs   --    GRAM STAIN RESULT  Rare Polys  No bacteria seen  --   --  1+ Polys  No bacteria seen  Rare Mononuclear Cells  No No organisms seen  No No polys seen   BODY FLUID CULTURE, STERILE  No growth  --   --  No growth       Last 24 Hours Medication List:   Current Facility-Administered Medications   Medication Dose Route Frequency Provider Last Rate    acetaminophen  650 mg Oral Q4H PRN Yo Child, PA-C      albumin human  25 g Intravenous Q12H TRAV Conteh      aluminum-magnesium hydroxide-simethicone  30 mL Oral Q6H PRN Yo Child, PA-C      cefTRIAXone  1,000 mg Intravenous Daily Yo Child, PA-C 1,000 mg (05/01/22 0911)    gabapentin  100 mg Oral TID Yo Child, PA-C      HYDROmorphone  0 5 mg Intravenous Q1H PRN Yo Child, PA-C      HYDROmorphone  1 mg Intravenous Once PRN Don Luevano MD      influenza vaccine  0 5 mL Intramuscular Prior to discharge Don Luevano MD      lactulose  20 g Oral BID Amparo Kapoor MD      midodrine  5 mg Oral TID AC TRAV Clarke      octreotide (SandoSTATIN) in 0 9 % sodium chloride infusion 250 mL  50 mcg/hr Intravenous Continuous Yo Child, PA-C 50 mcg/hr (04/30/22 2114)    ondansetron  4 mg Intravenous Q4H PRN Yo Child, PA-C      oxyCODONE  10 mg Oral Q4H PRN Yo Child, PA-C      oxyCODONE  5 mg Oral Q4H PRN Yo Child, PA-C      pantoprazole  40 mg Intravenous Q12H Yo Child, PA-C      phytonadione  10 mg Intravenous Once Don Luevano MD      senna-docusate sodium  2 tablet Oral BID PRN Yo Child, PA-C          Today, Patient Was Seen By: Don Luevano MD    ** Please Note: Dictation voice to text software may have been used in the creation of this document   **

## 2022-05-01 NOTE — ASSESSMENT & PLAN NOTE
Multifactorial, related to decompensated cirrhosis, contrast exposure, low blood pressures  Hold diuretics  R/o HRS  Albumin planned, discussed with nephrology  Midodrine initiated for blood pressure support   Octreotide already infusing

## 2022-05-01 NOTE — ASSESSMENT & PLAN NOTE
· Likely in setting of severe liver disease and splenomegaly  · Stable  Will not transfuse unless <50k in the setting of bleeding

## 2022-05-01 NOTE — ASSESSMENT & PLAN NOTE
· Sodium on admission 131  · Likely in setting of cirrhosis  · stable   Free water restriction (1-1 5L)    hold Lasix/spironolactone    Monitor BMP     Lab Results   Component Value Date    SODIUM 131 (L) 05/01/2022    SODIUM 132 (L) 04/30/2022    SODIUM 132 (L) 04/29/2022

## 2022-05-01 NOTE — CONSULTS
[address] [place] [address] 63 y o  female MRN: [id number]  Unit/Bed#: -01 Encounter: [id number]    ASSESSMENT and PLAN:  Acute kidney injury:  · 4/28:  Creatinine 0 99 on admission, hemoglobin 5 5  · 4/29:  Creatinine unchanged   · Contrast CT on 4/28 and 4/29  · IV Lasix given on 04/28 and 4/29  · Spirolactone 50 mg and Losartan 100 mg given on 4/29 then placed on hold  · 4/30: Creatinine 1 31   · 5/1:  Creatinine 1 34   · Baseline creatinine unclear:  As of June 2021 creatinine 0 9, July 2021 creatinine 0 7  · Urinalysis:  Negative protein, 1-2 RBCs trace ketones, trace leukocytes, 2-4 WBCs, occasional bacteria  · CT of the abdomen and pelvis:  Kidneys unremarkable, no hydronephrosis  · Chronic, long-term use of NSAIDs for headache; cut back on usage last year  · Plan:  · Continue octreotide  · Albumin 25 g q 12 hours x4 doses after which evaluate for continuance  · Hold diuretics at this time  Will give a course of albumin and then consider restarting diuretics  · Blood pressure borderline  Will start midodrine 5 mg t i d  To improve renal perfusion  · Monitor renal function  · Avoid nephrotoxic agents  · No NSAIDs    Anemia/thrombocytopenia:  · Hemoglobin 5 5 on admission  No prior lab since June 2021  · Reported left hip pain after her dog jumped on her    Imaging reveals retroperitoneal bleed/left iliopsoas muscle hematoma  · Iron studies:  Iron saturation 66%, ferritin 1238  · Thrombocytopenia in the setting of severe liver disease  · Status post transfusions, vitamin K ;hemoglobin currently 7 0  · Management per primary team    GI bleed:    · Stool positive for occult blood  · GI following    Decompensated Cirrhosis of the liver with ascites:  · History of fatty liver disease  · Status post paracentesis 1800 mL removed  · Alcohol intake unclear  · CT scan of the abdomen and pelvis:  New large ascites, distended gallbladder, cirrhotic liver, splenomegaly  · On lactulose, octreotide    Moderate left pleural effusion:  · Noted on CT  · Requiring nasal cannula oxygen    Chronic subdural hematomas, chronic migraine headaches, history of temporal arteritis:  · Left lower extremity weakness  Stroke alert:  Neurology following  · Imaging stable    HISTORY OF PRESENT ILLNESS:  Requesting Physician: Sang Hill MD  Reason for Consult:  Acute kidney injury    Ramiro Gilmore is a 63 y o  female a past medical history of fatty liver disease, chronic migraine headache, chronic subdural hematoma, chronic CSF leak, lumbar radiculopathy, anxiety, temporal arteritis who was admitted to [facility] after presenting with persistent left hip pain after her dog jumped on her  Hemoglobin 5 5 on admission with imaging revealing iliopsoas muscle hematoma  Also noted decompensated liver cirrhosis with ascites status post paracentesis for 1800 mL fluid  Initial creatinine 0 99 which was near prior levels as of June/July 2021  Received contrast studies with increase in creatinine  Blood pressure marginal but overall acceptable  Significant left lower extremity weakness therefore stroke alert called with imaging negative for new findings  The patient reports chronic headaches  She had a biopsy last year related to temporal arteritis and since then has reported chronic right sided head pain with sensitivity  She had chronic use of NSAIDs but last year she decreased usage and only uses NSAIDs intermittently for headache  She believes the last time she took NSAIDs was last October  She reports UTIs in the past year and sometimes having the feeling of difficulty emptying her bladder but at this time feels that she is asymptomatic  I spoke with her and her  at length regarding history and current events  A renal consultation is requested today for assistance in the management of acute kidney injury      PAST MEDICAL HISTORY:  Past Medical History:   Diagnosis Date    Concussion     CSF leak  Liver failure ([facility] [address] )     Migraines        PAST SURGICAL HISTORY:  Past Surgical History:   Procedure Laterality Date    ABLATION SOFT TISSUE      BREAST LUMPECTOMY      IR PARACENTESIS  4/29/2022    LAPAROSCOPY FOR ECTOPIC PREGNANCY      SINUS SURGERY      TONSILECTOMY AND ADNOIDECTOMY         ALLERGIES:  Allergies   Allergen Reactions    Codeine Other (See Comments)     Can not take with current medications    Other reaction(s):  Other (See Comments)  GI upset  GI Upset         SOCIAL HISTORY:  Social History     Substance and Sexual Activity   Alcohol Use Yes    Comment: socially, had glass of wine today     Social History     Substance and Sexual Activity   Drug Use Not Currently    Comment: CBD     Social History     Tobacco Use   Smoking Status Former Smoker    Packs/day: 1 00    Types: Cigarettes   Smokeless Tobacco Never Used       FAMILY HISTORY:  Family History   Problem Relation Age of Onset    Colon cancer Father     Breast cancer Sister        MEDICATIONS:    Current Facility-Administered Medications:     acetaminophen (TYLENOL) tablet 650 mg, 650 mg, Oral, Q4H PRN, Sherryle Pillow, PA-C    aluminum-magnesium hydroxide-simethicone (MYLANTA) oral suspension 30 mL, 30 mL, Oral, Q6H PRN, Sherryle Pillow, PA-MELCHOR    cefTRIAXone (ROCEPHIN) IVPB (premix in dextrose) 1,000 mg 50 mL, 1,000 mg, Intravenous, Daily, Sherryle Pillow, PA-C, Last Rate: 100 mL/hr at 04/30/22 0839, 1,000 mg at 04/30/22 0839    gabapentin (NEURONTIN) capsule 100 mg, 100 mg, Oral, TID, Sherryle Pillow, PA-C, 100 mg at 04/30/22 2110    HYDROmorphone (DILAUDID) injection 0 5 mg, 0 5 mg, Intravenous, Q1H PRN, Sherryle Pillow, PA-MELCHOR, 0 5 mg at 05/01/22 0711    HYDROmorphone (DILAUDID) injection 1 mg, 1 mg, Intravenous, Once PRN, Shayna Saenz MD    influenza vaccine, recombinant, quadrivalent (FLUBLOK) IM injection 0 5 mL, 0 5 mL, Intramuscular, Prior to discharge, Shayna Saenz MD    lactulose oral solution 20 g, 20 g, Oral, BID, Meg Hamilton MD, 20 g at 04/30/22 1723    [COMPLETED] octreotide (SandoSTATIN) injection 50 mcg, 50 mcg, Intravenous, Once, 50 mcg at 04/28/22 1725 **FOLLOWED BY** octreotide (SandoSTATIN) 500 mcg in sodium chloride 0 9 % 250 mL infusion, 50 mcg/hr, Intravenous, Continuous, Keely Harding PA-C, Last Rate: 25 mL/hr at 04/30/22 2114, 50 mcg/hr at 04/30/22 2114    ondansetron [facility]) injection 4 mg, 4 mg, Intravenous, Q4H PRN, Keely Harding PA-C, 4 mg at 04/30/22 0840    oxyCODONE (ROXICODONE) IR tablet 10 mg, 10 mg, Oral, Q4H PRN, Keely Harding, PA-C, 10 mg at 04/30/22 2110    oxyCODONE (ROXICODONE) IR tablet 5 mg, 5 mg, Oral, Q4H PRN, Keely Harding, PA-C, 5 mg at 04/29/22 2205    pantoprazole (PROTONIX) injection 40 mg, 40 mg, Intravenous, Q12H, Keely Harding PA-C, 40 mg at 05/01/22 0342    senna-docusate sodium (SENOKOT S) 8 6-50 mg per tablet 2 tablet, 2 tablet, Oral, BID PRN, Keely Harding, PA-C    REVIEW OF SYSTEMS:  Constitutional:  Positive for fatigue  No fevers or chills  HENT: Negative for postnasal drip  Eyes: Negative for visual disturbance  Respiratory:  No cough:  No unusual shortness of breath at rest able to lay flat  Requiring nasal cannula oxygen  Cardiovascular: Negative for chest pain, palpitations and leg swelling  Gastrointestinal:  No abdominal pain  No nausea vomiting  Uncomfortable due to fluid feels better since paracentesis  Genitourinary: No dysuria, hematuria  Musculoskeletal:  Positive for arthralgia myalgia  Skin: Negative for rash  Neurological: Negative for focal weakness  Headaches, right-sided head pain  Hematological:  Positive for easy bruising  Psychiatric/Behavioral: Negative for confusion    All the systems were reviewed and were negative except as documented on the HPI      PHYSICAL EXAM:  Current Weight: Weight - Scale: 68 kg (150 lb)  First Weight: Weight - Scale: 68 kg (150 lb)  Vitals:    04/30/22 1253 04/30/22 1451 04/30/22 2232 05/01/22 0711   BP: 106/62 95/56 109/58 119/63   Pulse: 89 94 95 98   Resp:   17    Temp:  99 1 °F (37 3 °C) 98 7 °F (37 1 °C) 98 8 °F (37 1 °C)   TempSrc:       SpO2: 93% 95% (!) 86% 90%   Weight:       Height:         No intake or output data in the 24 hours ending 05/01/22 0804  Physical Exam  Vitals reviewed  Constitutional:       General: She is not in acute distress  Appearance: She is well-developed  She is ill-appearing  She is not diaphoretic  HENT:      Head: Normocephalic and atraumatic  Nose: Nose normal  No congestion  Mouth/Throat:      Mouth: Mucous membranes are dry  Eyes:      General: Scleral icterus present  Right eye: No discharge  Left eye: No discharge  Extraocular Movements: Extraocular movements intact  Conjunctiva/sclera: Conjunctivae normal    Neck:      Thyroid: No thyromegaly  Vascular: No carotid bruit or JVD  Trachea: No tracheal deviation  Cardiovascular:      Rate and Rhythm: Normal rate and regular rhythm  Heart sounds: No murmur heard  No friction rub  No gallop  Pulmonary:      Effort: Pulmonary effort is normal  No respiratory distress  Breath sounds: No stridor  Examination of the left-lower field reveals decreased breath sounds  Decreased breath sounds present  No wheezing, rhonchi or rales  Abdominal:      General: Bowel sounds are normal  There is distension  Palpations: Abdomen is soft  Tenderness: There is no abdominal tenderness  There is no guarding  Musculoskeletal:         General: Tenderness and signs of injury (left thigh) present  Normal range of motion  Cervical back: Normal range of motion and neck supple  No rigidity or tenderness  Right lower leg: No edema  Left lower leg: No edema  Skin:     General: Skin is warm and dry  Capillary Refill: Capillary refill takes less than 2 seconds  Coloration: Skin is jaundiced  Skin is not pale        Findings: Bruising present  No erythema or rash  Neurological:      General: No focal deficit present  Mental Status: She is alert and oriented to person, place, and time  Psychiatric:         Mood and Affect: Mood normal          Behavior: Behavior normal          Thought Content: Thought content normal          Judgment: Judgment normal            Invasive Devices:      Lab Results:   Results from last 7 days   Lab Units 05/01/22 0223 05/01/22 0216 04/30/22 2038 04/30/22  1432 04/30/22  0806 04/30/22  0441 04/29/22 2046 04/29/22  1425 04/29/22  0457 04/29/22  0457 04/28/22  1825 04/28/22  1147   WBC Thousand/uL 7 36  --   --   --   --   --   --   --   --  7 37  --  6 20   HEMOGLOBIN g/dL 7 0*  --  7 2* 7 5*   < >  --    < > 7 1*   < > 7 3*   < > 5 5*   HEMATOCRIT % 21 2*  --   --   --   --   --   --  22 4*  --  22 6*  --  16 3*   PLATELETS Thousands/uL 59*  --   --   --   --   --   --   --   --  60*  --  59*   POTASSIUM mmol/L  --  3 9  --   --   --  3 9  --   --   --  4 1  --  4 2   CHLORIDE mmol/L  --  99*  --   --   --  98*  --   --   --  96*  --  97*   CO2 mmol/L  --  28  --   --   --  29  --   --   --  26  --  25   BUN mg/dL  --  17  --   --   --  14  --   --   --  12  --  9   CREATININE mg/dL  --  1 34*  --   --   --  1 31*  --   --   --  0 96  --  0 99   CALCIUM mg/dL  --  7 7*  --   --   --  7 7*  --   --   --  8 0*  --  8 0*   ALK PHOS U/L  --  55  --   --   --  55  --   --   --  72  --  74   ALT U/L  --  27  --   --   --  26  --   --   --  32  --  30   AST U/L  --  126*  --   --   --  124*  --   --   --  119*  --  132*    < > = values in this interval not displayed       Other Studies:

## 2022-05-01 NOTE — PLAN OF CARE
Problem: HEMATOLOGIC - ADULT  Goal: Maintains hematologic stability  Description: INTERVENTIONS  - Assess for signs and symptoms of bleeding or hemorrhage  - Monitor labs  - Administer supportive blood products/factors as ordered and appropriate  5/1/2022 0624 by Perla Girard RN  Outcome: Progressing  5/1/2022 0624 by Perla Girard RN  Outcome: Progressing     Problem: MOBILITY - ADULT  Goal: Maintain or return to baseline ADL function  Description: INTERVENTIONS:  -  Assess patient's ability to carry out ADLs; assess patient's baseline for ADL function and identify physical deficits which impact ability to perform ADLs (bathing, care of mouth/teeth, toileting, grooming, dressing, etc )  - Assess/evaluate cause of self-care deficits   - Assess range of motion  - Assess patient's mobility; develop plan if impaired  - Assess patient's need for assistive devices and provide as appropriate  - Encourage maximum independence but intervene and supervise when necessary  - Involve family in performance of ADLs  - Assess for home care needs following discharge   - Consider OT consult to assist with ADL evaluation and planning for discharge  - Provide patient education as appropriate  5/1/2022 0624 by Perla Girard RN  Outcome: Progressing  5/1/2022 0624 by Perla Girard RN  Outcome: Progressing  Goal: Maintains/Returns to pre admission functional level  Description: INTERVENTIONS:  - Perform BMAT or MOVE assessment daily    - Set and communicate daily mobility goal to care team and patient/family/caregiver  - Collaborate with rehabilitation services on mobility goals if consulted  - Perform Range of Motion 5 times a day  - Reposition patient every 2 hours    - Dangle patient 3 times a day  - Stand patient 3 times a day  - Ambulate patient 3 times a day  - Out of bed to chair 3 times a day   - Out of bed for meals 3 times a day  - Out of bed for toileting  - Record patient progress and toleration of activity level 5/1/2022 0624 by Remigio Vincent RN  Outcome: Progressing  5/1/2022 0624 by Remigio Vincent RN  Outcome: Progressing

## 2022-05-01 NOTE — ASSESSMENT & PLAN NOTE
· Reason for presentation - pain started a few days ago after dog jumping on her - difficulty with ambulation and weight on left side  · CT abdomen and pelvis - osseous structures noted: No acute compression collapse of the vertebra, Chronic compression of the L2 vertebra seen  · Stroke alert called over night due to left lower extremity weakness  CT head and CTA head/neck demonstrating chronic changes  · Discussed with Neurology - given extensive history of possible CSF-venous leak, chronic migraines will continue workup with MRI brain  No indication for anti-platelet therapy especially with ongoing anemia/concern for gastrointestinal bleed  · CT recon lumbar spine: "Chronic, mild degenerative changes and chronic moderate L2 compression fracture  No new disc herniation or high-grade stenosis compared to prior studies  Asymmetric left iliopsoas muscle enlargement of unclear etiology, new compared to prior studies  No focal mass or fluid collection  No evidence of discitis on CT  MR of the lumbar spine and pelvis is recommended for further evaluation "  ·  MRI lumbar - concern for Intramuscular hematoma, consult placed to surgery  · Neurovascular checks ordered  Pulses remain intact  Pain appears better/mobility improved  · We will provide FFP 2 units today  CTA deferred d/t relatively stable hemoglobin  Provide additional vitamin K today    ·   XR left hip/femur unremarkable  · PT/OT consult  · Continue pain control

## 2022-05-01 NOTE — PROGRESS NOTES
Progress note - Gastroenterology   Howard Siu 63 y o  female MRN: [id number]  Unit/Bed#: -01 Encounter: [id number]    ASSESSMENT and PLAN    1  Cirrhosis decompensated with ascites     65-year-old female, recently diagnosed cirrhosis in the past year, recently decompensated over the past 6 weeks with confusion, thrombocytopenia, coagulopathy, ascites  Denies any GI bleeding  MELD at admission 27  Likely secondary to alcohol abuse in past along with fatty liver      CT reviewed, consistent with portal hypertension      Paracentesis performed with 1800 cc of fluid removed- fluid negative for SBP     - okay to do clears at this point, fluid restriction, 2 g sodium diet if hemoglobin remains stable  - discussed importance of strict alcohol cessation moving forward, patient is agreeable to this plan  - will need EGD to assess for varices   however given patient is currently on stroke protocol will wait until Neuro workup is complete and can follow up as outpatient unless overt GIB noted  - HELD Aldactone 50 and Lasix 20 mg due to ALEJANDRO poss ATN, follow kidney function  Renal on board   - should follow-up with Dr Karishma De Los Santos upon discharge     2  Hepatic encephalopathy    Likely worsening by the retroperitoneal hematoma  Continues to have asterixis  According to the son, her mental status has been slower over the past few weeks, and definitely with some memory issues and irregular sleep cycles  - Added lactulose 4/30 - given asterixis and waxing confusion as well as constipation  Titrate to 3 soft bowel movements a day      3  Anemia    Significant anemia in setting of coagulopathy and thrombocytopenia  CT reviewed  8cm retroperitoneal hematoma noted  No overt GI bleeding  No evidence of iron def on labs  - Surgery on board  Correct coagulopathy    - monitor H&H and transfuse for hemoglobin less than 7  - b i d   PPI for now - although no overt GIB  - hold NSAIDs  - Eventual scope as outpatient      4  History of alcohol use  Strict alcohol cessation emphasized to patient  Chief Complaint   Patient presents with    Leg Pain     Pt reports left leg pain since sunday 4/24/22 when her dog jumped on her  Took tylenol #3 prior to arrival today  SUBJECTIVE/HPI   Leg pain  CT showing large hematoma  Pt states her dog jumped on her which started this weakness and pain  /63   Pulse 98   Temp 98 8 °F (37 1 °C)   Resp 17   Ht 5' 6" (1 676 m)   Wt 68 kg (150 lb)   SpO2 90%   BMI 24 21 kg/m²     PHYSICALEXAM  General appearance: alert, appears stated age and cooperative, + jaundice  Eyes: KHANG, EOMI, + scleral icterus   Head: Normocephalic, without obvious abnormality, atraumatic  Lungs: clear to auscultation bilaterally, no c/w/r  Heart: regular rate and rhythm, S1, S2 normal, no murmur, click, rub or gallop  Abdomen: soft, non-tender, distended with ascites; bowel sounds normal; no masses,  no organomegaly  Extremities: extremities with inner thigh ecchymotic area about 10 cm, decreased mobility and tenderness on palpation   No LE edema  Neurologic: Grossly normal, AAOx3, positive asterixis    Lab Results   Component Value Date    GLUCOSE 94 05/08/2015    CALCIUM 7 7 (L) 05/01/2022     05/08/2015    K 3 9 05/01/2022    CO2 28 05/01/2022    CL 99 (L) 05/01/2022    BUN 17 05/01/2022    CREATININE 1 34 (H) 05/01/2022     Lab Results   Component Value Date    WBC 7 36 05/01/2022    HGB 6 8 (LL) 05/01/2022    HCT 21 2 (L) 05/01/2022     (H) 05/01/2022    PLT 59 (L) 05/01/2022     Lab Results   Component Value Date    ALT 27 05/01/2022     (H) 05/01/2022    ALKPHOS 55 05/01/2022    BILITOT 0 2 05/08/2015     No results found for: AMYLASE  Lab Results   Component Value Date    LIPASE 76 04/28/2022     Lab Results   Component Value Date    IRON 97 04/28/2022    TIBC 146 (L) 04/28/2022    FERRITIN 1,238 (H) 04/28/2022     Lab Results   Component Value Date    INR 2 12 (H) 05/01/2022

## 2022-05-01 NOTE — ASSESSMENT & PLAN NOTE
Patient presents with acute hip pain abdominal pain abdominal distension  · Endorses history of fatty liver disease, drinks wine occasionally, never been tested for hepatitis  · Found to have significant ascites on exam   · CT abd/pelvis - new large ascites, cirrhotic liver, hypodense area measuring 1 7 cm at junction of segment 4 and 8 in liver, distended gallbladder, splenomegaly with recanalized umbilical vein  · Underwent paracentesis 4/29 with IR - approximately 1 8 L removed    Follow-up fluid studies  · GI consulted - appreciate recommendations  · Now with decompensated cirrhosis  ·  lasix/spironolactone held d/t ALEJANDRO

## 2022-05-01 NOTE — PROGRESS NOTES
Progress Note - Neurology   Antoinette Salinas 63 y o  female MRN: [id number]  Unit/Bed#: -01 Encounter: [id number]      Assessment/Plan     Weakness of left lower extremity  Assessment & Plan  The patient is a 79-year-old female with past medical history of migraines, chronic pain, ( chronic CSF leak s/p blood patch out of network with CT H here with chronic subdural hematomas no acute blood- I compared this to prior MRI and this is certainly improved from CT H 2016 ), alcohol abuse hepatomegaly, and lumbar DDD, with left leg weakness slowly progressive over the last few months acutely worsening days prior patient presenting to the hospital where could not ambulate  Pt also reports abdominal distention intermittent over the last few months    - 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 in the setting of significant ascites, noted on CT AP and MRI L spine confirming this with no cord pathology noted  - Neurologic exam with 2+/5 strength left quadriceps ( I am told this is improved since paracentesis), knee extension 2/5, hip extension 4+/5, knee flexion 3+/5 and intact dorsi and plantarflexion  Strength in RLE preserved  She has sensory reduction anterior thigh and medial anterior calf with otherwise intact LT,temperature, and no clonus or babinski noted    - Suspect further generalized weakness due to significant anemia Hgb 5 5 now s/p transfusion 7 2   -  EMG as out patient if no contraindications and symptoms persist  -  Check CK  -  Therapies  -  PMR  -  Neurological checks, notify neurology with any changes in examination  -  Continue to treat underlying metabolic and infectious derangements as per the medicine team   -  Out patient follow up with neurosurgery team in regards to 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  * Acute blood loss anemia  Assessment & Plan  Defer to GI colonoscopy endoscopy planned        Pending clinical course patient can follow up with [facility] attending physician  Subjective:   Pt seen and examined in follow up  Significant other at bedside  She reports her LLE feels stronger after paracentesis  Denies new symptoms  Does report that symptoms have been on going gradually progressive for months and significantly worse prior to coming in  Denies other focal extremity weakness however does report generalized weakness and positional lightheadedness  She is concerned she may have a CSF leak again- discussed with patient CT H with no new findings but will follow up MRI  States she has cut down on drinking and drinks 2-3 small drinks some days not every day diluted with water  Denies known personal hx of CA but reports fmhx of colon CA    MR L spine done without contrast (recommended w/w/o contrast) likely ended early as patient could not lay still due to muscle spasms, with fluid collection in left iliacus musculature likely intramuscular hematoma  Multilevel DDD noted with mild foraminal narrowing L4-5 left and bilateral L5-S1  ROS:  Negative except as noted above, and positive abdominal pain  Paresis see above    Vitals: Blood pressure 119/63, pulse 98, temperature 98 8 °F (37 1 °C), resp  rate 17, height 5' 6" (1 676 m), weight 68 kg (150 lb), SpO2 90 %, not currently breastfeeding  ,Body mass index is 24 21 kg/m²  Physical Exam: Gen: NAD  HEENT: NCAT, EOMI  Resp: Symmetric chest rise bl  CVS: RRR  Abdomen: distended  Ext: no edema    AO X person place month and year and no aphasia or dysarthria noted  CN 2-12 grossly intact  Motor: 5/5 ext bl UE And RLE   LLE with 2+/5 hip flexion , 2/5 knee extension 4+/5 hip extension and knee flexion 3+/5, no clonus  Reflexes: no babinski  Sensation: intact to LT/temp except LLE diminished significantly with only pressure sensation noted left anterior thigh and anterior and medial calf  Cerebellar: No dysmetria b/l FNF  Gait: deferred due to fall risk        Lab, Imaging and other studies: I have personally reviewed pertinent reports  Counseling / Coordination of Care  Total time spent today 35 minutes  Greater than 50% of total time was spent with the patient and / or family counseling and / or coordination of care   A description of the counseling / coordination of care: as detailed above in A/P on day of follow up 4/30/22 this note is completed afterward     I discussed above with primary team attending physician

## 2022-05-01 NOTE — ASSESSMENT & PLAN NOTE
Presents to ED with acute left hip pain and abdominal pain/distension   Hgb 5 5/Hct 16 3    CT scan abdomen/pelvis:  New large ascites, distended gallbladder, cirrhotic liver, splenomegaly with recanalized lysed umbilical vein, faint hypodense area 1 7 cm of junction of segment 8 and for a liver, thickening of the ascending colon possibly due to colitis also seen on previous study, moderate left pleural effusion   Cont transfusions   For EGD if CVA workup negative   LFTs - , normal ALT, bili 5 6 on   PTT/INR - PT 24, INR 2 22   Occult blood stool positive in ED     PPI IV Q12hr    New large ascites and history of splenomegaly - octreotide and ceftriaxone   Hold anticoagulation   Hold NSAIDS (patient is chronic NSAID user at home due to migraine)    Continue to monitor H/H with serial labs q 6 hours   Up out of bed with assistance   Stool records at bedside/ intake and output     EGD planned after stroke johnson complete   GI consulted - await recommendations    Lab Results   Component Value Date    HGB 7 0 (L) 05/01/2022    HGB 7 2 (L) 04/30/2022

## 2022-05-02 PROBLEM — K92.2 GI BLEED: Status: RESOLVED | Noted: 2022-04-28 | Resolved: 2022-05-02

## 2022-05-02 LAB
ABO GROUP BLD BPU: NORMAL
ALBUMIN SERPL BCP-MCNC: 2.4 G/DL (ref 3.5–5)
ALP SERPL-CCNC: 46 U/L (ref 46–116)
ALT SERPL W P-5'-P-CCNC: 29 U/L (ref 12–78)
ANION GAP SERPL CALCULATED.3IONS-SCNC: 7 MMOL/L (ref 4–13)
AST SERPL W P-5'-P-CCNC: 123 U/L (ref 5–45)
ATRIAL RATE: 92 BPM
BACTERIA SPEC BFLD CULT: NO GROWTH
BASOPHILS # BLD AUTO: 0.06 THOUSANDS/ΜL (ref 0–0.1)
BASOPHILS NFR BLD AUTO: 1 % (ref 0–1)
BILIRUB SERPL-MCNC: 17.2 MG/DL (ref 0.2–1)
BPU ID: NORMAL
BUN SERPL-MCNC: 13 MG/DL (ref 5–25)
CALCIUM ALBUM COR SERPL-MCNC: 9.2 MG/DL (ref 8.3–10.1)
CALCIUM SERPL-MCNC: 7.9 MG/DL (ref 8.3–10.1)
CHLORIDE SERPL-SCNC: 98 MMOL/L (ref 100–108)
CO2 SERPL-SCNC: 27 MMOL/L (ref 21–32)
CREAT SERPL-MCNC: 0.98 MG/DL (ref 0.6–1.3)
CROSSMATCH: NORMAL
EOSINOPHIL # BLD AUTO: 0.08 THOUSAND/ΜL (ref 0–0.61)
EOSINOPHIL NFR BLD AUTO: 1 % (ref 0–6)
ERYTHROCYTE [DISTWIDTH] IN BLOOD BY AUTOMATED COUNT: 26.9 % (ref 11.6–15.1)
GFR SERPL CREATININE-BSD FRML MDRD: 63 ML/MIN/1.73SQ M
GLUCOSE SERPL-MCNC: 111 MG/DL (ref 65–140)
GRAM STN SPEC: NORMAL
GRAM STN SPEC: NORMAL
HAV IGM SER QL: NORMAL
HBV CORE IGM SER QL: NORMAL
HBV SURFACE AG SER QL: NORMAL
HCT VFR BLD AUTO: 24.2 % (ref 34.8–46.1)
HCV AB SER QL: NORMAL
HGB BLD-MCNC: 7.5 G/DL (ref 11.5–15.4)
HGB BLD-MCNC: 7.9 G/DL (ref 11.5–15.4)
IMM GRANULOCYTES # BLD AUTO: 0.06 THOUSAND/UL (ref 0–0.2)
IMM GRANULOCYTES NFR BLD AUTO: 1 % (ref 0–2)
INR PPP: 2.05 (ref 0.84–1.19)
LYMPHOCYTES # BLD AUTO: 1.27 THOUSANDS/ΜL (ref 0.6–4.47)
LYMPHOCYTES NFR BLD AUTO: 19 % (ref 14–44)
MCH RBC QN AUTO: 35.7 PG (ref 26.8–34.3)
MCHC RBC AUTO-ENTMCNC: 32.6 G/DL (ref 31.4–37.4)
MCV RBC AUTO: 110 FL (ref 82–98)
MONOCYTES # BLD AUTO: 0.73 THOUSAND/ΜL (ref 0.17–1.22)
MONOCYTES NFR BLD AUTO: 11 % (ref 4–12)
NEUTROPHILS # BLD AUTO: 4.47 THOUSANDS/ΜL (ref 1.85–7.62)
NEUTS SEG NFR BLD AUTO: 67 % (ref 43–75)
NRBC BLD AUTO-RTO: 0 /100 WBCS
P AXIS: 74 DEGREES
PLATELET # BLD AUTO: 50 THOUSANDS/UL (ref 149–390)
PMV BLD AUTO: 10.3 FL (ref 8.9–12.7)
POTASSIUM SERPL-SCNC: 3.6 MMOL/L (ref 3.5–5.3)
PR INTERVAL: 154 MS
PROT SERPL-MCNC: 6.8 G/DL (ref 6.4–8.2)
PROTHROMBIN TIME: 22.6 SECONDS (ref 11.6–14.5)
QRS AXIS: 70 DEGREES
QRSD INTERVAL: 100 MS
QT INTERVAL: 408 MS
QTC INTERVAL: 504 MS
RBC # BLD AUTO: 2.21 MILLION/UL (ref 3.81–5.12)
SODIUM SERPL-SCNC: 132 MMOL/L (ref 136–145)
T WAVE AXIS: 54 DEGREES
UNIT DISPENSE STATUS: NORMAL
UNIT PRODUCT CODE: NORMAL
UNIT PRODUCT VOLUME: 186 ML
UNIT PRODUCT VOLUME: 186 ML
UNIT PRODUCT VOLUME: 350 ML
UNIT RH: NORMAL
VENTRICULAR RATE: 92 BPM
WBC # BLD AUTO: 6.67 THOUSAND/UL (ref 4.31–10.16)

## 2022-05-02 PROCEDURE — 99232 SBSQ HOSP IP/OBS MODERATE 35: CPT | Performed by: SURGERY

## 2022-05-02 PROCEDURE — 85018 HEMOGLOBIN: CPT | Performed by: STUDENT IN AN ORGANIZED HEALTH CARE EDUCATION/TRAINING PROGRAM

## 2022-05-02 PROCEDURE — 99233 SBSQ HOSP IP/OBS HIGH 50: CPT | Performed by: STUDENT IN AN ORGANIZED HEALTH CARE EDUCATION/TRAINING PROGRAM

## 2022-05-02 PROCEDURE — 99357 PR PROLONGED SERV,INPATIENT,EA ADD 30 MIN: CPT | Performed by: STUDENT IN AN ORGANIZED HEALTH CARE EDUCATION/TRAINING PROGRAM

## 2022-05-02 PROCEDURE — 80053 COMPREHEN METABOLIC PANEL: CPT | Performed by: PHYSICIAN ASSISTANT

## 2022-05-02 PROCEDURE — 85610 PROTHROMBIN TIME: CPT | Performed by: PHYSICIAN ASSISTANT

## 2022-05-02 PROCEDURE — 99232 SBSQ HOSP IP/OBS MODERATE 35: CPT | Performed by: INTERNAL MEDICINE

## 2022-05-02 PROCEDURE — 93010 ELECTROCARDIOGRAM REPORT: CPT | Performed by: INTERNAL MEDICINE

## 2022-05-02 PROCEDURE — 85025 COMPLETE CBC W/AUTO DIFF WBC: CPT | Performed by: PHYSICIAN ASSISTANT

## 2022-05-02 PROCEDURE — C9113 INJ PANTOPRAZOLE SODIUM, VIA: HCPCS

## 2022-05-02 RX ORDER — POTASSIUM CHLORIDE 20 MEQ/1
40 TABLET, EXTENDED RELEASE ORAL ONCE
Status: COMPLETED | OUTPATIENT
Start: 2022-05-02 | End: 2022-05-02

## 2022-05-02 RX ADMIN — OCTREOTIDE ACETATE 50 MCG/HR: 500 INJECTION, SOLUTION INTRAVENOUS; SUBCUTANEOUS at 09:16

## 2022-05-02 RX ADMIN — MIDODRINE HYDROCHLORIDE 5 MG: 5 TABLET ORAL at 15:28

## 2022-05-02 RX ADMIN — GABAPENTIN 100 MG: 100 CAPSULE ORAL at 15:28

## 2022-05-02 RX ADMIN — CEFTRIAXONE 1000 MG: 1 INJECTION, SOLUTION INTRAVENOUS at 08:08

## 2022-05-02 RX ADMIN — OXYCODONE HYDROCHLORIDE 10 MG: 5 TABLET ORAL at 08:03

## 2022-05-02 RX ADMIN — OCTREOTIDE ACETATE 50 MCG/HR: 500 INJECTION, SOLUTION INTRAVENOUS; SUBCUTANEOUS at 18:49

## 2022-05-02 RX ADMIN — PANTOPRAZOLE SODIUM 40 MG: 40 INJECTION, POWDER, FOR SOLUTION INTRAVENOUS at 04:45

## 2022-05-02 RX ADMIN — LACTULOSE 20 G: 10 SOLUTION ORAL at 14:43

## 2022-05-02 RX ADMIN — GABAPENTIN 100 MG: 100 CAPSULE ORAL at 20:50

## 2022-05-02 RX ADMIN — POTASSIUM CHLORIDE 40 MEQ: 1500 TABLET, EXTENDED RELEASE ORAL at 08:07

## 2022-05-02 RX ADMIN — PANTOPRAZOLE SODIUM 40 MG: 40 INJECTION, POWDER, FOR SOLUTION INTRAVENOUS at 15:28

## 2022-05-02 RX ADMIN — MIDODRINE HYDROCHLORIDE 5 MG: 5 TABLET ORAL at 08:07

## 2022-05-02 RX ADMIN — MIDODRINE HYDROCHLORIDE 5 MG: 5 TABLET ORAL at 11:54

## 2022-05-02 RX ADMIN — ONDANSETRON 4 MG: 2 INJECTION INTRAMUSCULAR; INTRAVENOUS at 15:34

## 2022-05-02 RX ADMIN — GABAPENTIN 100 MG: 100 CAPSULE ORAL at 08:07

## 2022-05-02 RX ADMIN — ALBUMIN (HUMAN) 25 G: 0.25 INJECTION, SOLUTION INTRAVENOUS at 20:50

## 2022-05-02 RX ADMIN — OXYCODONE HYDROCHLORIDE 10 MG: 5 TABLET ORAL at 11:58

## 2022-05-02 RX ADMIN — ALBUMIN (HUMAN) 25 G: 0.25 INJECTION, SOLUTION INTRAVENOUS at 09:31

## 2022-05-02 NOTE — ASSESSMENT & PLAN NOTE
· Reason for presentation - pain started a few days ago after dog jumping on her - difficulty with ambulation and weight on left side  · CT abdomen and pelvis - osseous structures noted: No acute compression collapse of the vertebra, Chronic compression of the L2 vertebra seen  · Stroke alert called over night 4/30 due to left lower extremity weakness  CT head and CTA head/neck demonstrating chronic changes  · Discussed with Neurology - MRI brain negative  No indication for anti-platelet therapy especially with ongoing anemia/concern for gastrointestinal bleed  · CT recon lumbar spine: "Chronic, mild degenerative changes and chronic moderate L2 compression fracture  No new disc herniation or high-grade stenosis compared to prior studies  Asymmetric left iliopsoas muscle enlargement of unclear etiology, new compared to prior studies  No focal mass or fluid collection  No evidence of discitis on CT  MR of the lumbar spine and pelvis is recommended for further evaluation "  ·  MRI lumbar - concern for Intramuscular hematoma, consult placed to surgery  · Neurovascular checks ordered  Pulses remain intact  Pain appears better/mobility improved  · S/p FFP 2 units 5/1  CTA deferred d/t relatively stable hemoglobin  Provide additional vitamin K today    ·   XR left hip/femur unremarkable  · PT/OT consult  · Continue pain control

## 2022-05-02 NOTE — PROGRESS NOTES
Progress note - Gastroenterology   Leilani Curiel 63 y o  female MRN: [id number]  Unit/Bed#: -01 Encounter: [id number]    ASSESSMENT and PLAN  1  Cirrhosis decompensated with ascites  65-year-old female with recently diagnosed cirrhosis in past year - decompensated over the past 6 weeks with confusion, thrombocytopenia, coagulopathy, ascites  Likely secondary to alcohol abuse along with fatty liver  Admits to drinking 2 glasses of wine daily prior to admission   No active GI bleeding     MELD at admission 27, 28 today  Paracentesis performed with 1800 cc of fluid removed- fluid negative for SBP  Bilirubin increased to 17 today-may be due to hemolysis and large retroperitoneal bleed however direct bilirubin is elevated at 5 65     - discussed importance of strict alcohol cessation moving forward, patient is agreeable to this plan  - will need EGD to assess for varices  however given patient is currently on stroke protocol will wait until Neuro workup is complete and can follow up as outpatient unless overt GIB noted  - continue to hold Aldactone 50 and Lasix 20 mg due to ALEJANDRO ,poss ATN,    Creatinine has normalized today  Renal on board   - should follow-up with Dr Jordan Solorzano upon discharge     2  Retroperitoneal hemorrhage  3  Anemia  Significant anemia in setting of coagulopathy and thrombocytopenia  CT reviewed -  8cm left retroperitoneal hematoma centered in ileus psoas muscle    No overt GI bleeding    No evidence of iron def on labs  Hemoglobin 7 9 this a m , platelet count 50, INR 2 0  She received 1 unit PRBCs and 2 units FFP yesterday     - Surgery on board  - monitor H&H and transfuse for hemoglobin less than 7  - continue pantoprazole 40 mg IV b i d  although no overt GIB  - hold NSAIDs  - Eventual scope as outpatient  4  Hepatic encephalopathy  Likely worsened by the retroperitoneal hematoma  Continues to have asterixis     According to the son, her mental status has been slower over the past few weeks, and definitely with some memory issues and irregular sleep cycles  -continue on lactulose b i d      5  History of alcohol use  Strict alcohol cessation emphasized to patient  Chief Complaint   Patient presents with    Leg Pain     Pt reports left leg pain since sunday 4/24/22 when her dog jumped on her  Took tylenol #3 prior to arrival today  SUBJECTIVE/HPI   Awake and conversing appropriately  Complains of left thigh and leg discomfort  Reports 3 soft bowel movements yesterday    /59 (BP Location: Left arm)   Pulse 95   Temp 98 4 °F (36 9 °C) (Oral)   Resp 16   Ht 5' 6" (1 676 m)   Wt 73 5 kg (162 lb 0 6 oz)   SpO2 90%   BMI 26 15 kg/m²     PHYSICALEXAM  General appearance: alert and conversing appropriately this a m , oriented x3  Eyes:   Icteric  Head: Normocephalic, without obvious abnormality, atraumatic  Lungs:  Decreased bilaterally but clear to auscultation bilaterally  Heart: regular rate and rhythm, S1, S2 normal, no murmur, click, rub or gallop  Abdomen:  Distended but soft, nontender with palpation, large hematoma left flank-marked today  Extremities:  Large ecchymosis noted at left inner and posterior thigh extending to knee  Neurologic: + asterixis    Lab Results   Component Value Date    GLUCOSE 94 05/08/2015    CALCIUM 7 9 (L) 05/02/2022     05/08/2015    K 3 6 05/02/2022    CO2 27 05/02/2022    CL 98 (L) 05/02/2022    BUN 13 05/02/2022    CREATININE 0 98 05/02/2022     Lab Results   Component Value Date    WBC 6 67 05/02/2022    HGB 7 9 (L) 05/02/2022    HCT 24 2 (L) 05/02/2022     (H) 05/02/2022    PLT 50 (L) 05/02/2022     Lab Results   Component Value Date    ALT 29 05/02/2022     (H) 05/02/2022    ALKPHOS 46 05/02/2022    BILITOT 0 2 05/08/2015     No results found for: AMYLASE  Lab Results   Component Value Date    LIPASE 76 04/28/2022     Lab Results   Component Value Date    IRON 97 04/28/2022    TIBC 146 (L) 04/28/2022 FERRITIN 1,238 (H) 04/28/2022     Lab Results   Component Value Date    INR 2 05 (H) 05/02/2022

## 2022-05-02 NOTE — PROGRESS NOTES
[place]  Progress Note - Heidi Muñoz 1960, 63 y o  female MRN: [id number]  Unit/Bed#: -01 Encounter: [id number]  Primary Care Provider: Vlad Gutierrez MD   Date and time admitted to hospital: 4/28/2022 10:36 AM    * Acute blood loss anemia  Assessment & Plan  Presented with acute hip pain , abdominal pain, abdominal distension   CBC showing: Hgb 5 5/Hct16 3,  - macrocytic anemia    Occult heme + stool   Blood consent obtained   Transfusion S/P 2 units PRBCs  o Transfused to keep Hgb >7  o  No history of transfusions    Trend CBC q 8 hrs   Gastroenterology consulted - Abraham need outpt f/u for EGD and most likely will need liver transplant    Hb stablized-> has significant ecchymosis in the left back and left LE   MRI brain negative for stroke   Continue Protonix IV b i d      Lab Results   Component Value Date    HGB 7 3 (L) 05/01/2022    HGB 6 8 (LL) 05/01/2022         Acute kidney injury [facility])  Assessment & Plan  Multifactorial, related to decompensated cirrhosis, contrast exposure, low blood pressures  Hold diuretics-> may possibly initiate tomorrow  R/o HRS  Albumin 25 x4,   Nephrology following  Midodrine initiated for blood pressure support   Octreotide gtt      GI bleed  Assessment & Plan  Presents to ED with acute left hip pain and abdominal pain/distension   Hgb 5 5/Hct 16 3    CT scan abdomen/pelvis:  New large ascites, distended gallbladder, cirrhotic liver, splenomegaly with recanalized lysed umbilical vein, faint hypodense area 1 7 cm of junction of segment 8 and for a liver, thickening of the ascending colon possibly due to colitis also seen on previous study, moderate left pleural effusion   Cont transfusions   LFTs - , normal ALT, D  bili 5 6-> T Bili increased to 17 2    PTT/INR - PT 24, INR 2 22  · Occult blood stool positive in ED   Octreotide gtt   New large ascites and history of splenomegaly - octreotide and ceftriaxone   Hold anticoagulation   Hold NSAIDS (patient is chronic NSAID user at home due to migraine)    Continue to monitor H/H with serial labs q 8 hours   Up out of bed with assistance   Stool records at bedside/ intake and output   EGD outpt   GI following    Lab Results   Component Value Date    HGB 7 9 (L) 05/02/2022    HGB 7 3 (L) 05/01/2022         Cirrhosis of liver with ascites [facility])  Assessment & Plan  Patient presents with acute hip pain abdominal pain abdominal distension  · Endorses history of fatty liver disease, drinks wine occasionally, never been tested for hepatitis  · Found to have significant ascites on exam   · CT abd/pelvis - new large ascites, cirrhotic liver, hypodense area measuring 1 7 cm at junction of segment 4 and 8 in liver, distended gallbladder, splenomegaly with recanalized umbilical vein  · Underwent paracentesis 4/29 with IR - approximately 1 8 L removed  Follow-up fluid studies  · GI consulted - appreciate recommendations  · Now with decompensated cirrhosis  ·  lasix/spironolactone held d/t ALEJANDRO    Pleural effusion  Assessment & Plan  Moderate left effusion noted on CT  Currently on 2 L oxygen  Respiratory protocol  Incentive spirometer  Will diurese due to ascites and pleural effusion    Hyperbilirubinemia  Assessment & Plan    · Bilirubin elevated to 5 69 ->9 70->17 2  · CT abdomen pelvis showing - distended gallbladder, large new ascites, hepatomegaly   · Gastroenterology following      Hyponatremia  Assessment & Plan  · Sodium on admission 131  · Likely in setting of cirrhosis  · stable   Free water restriction (1-1 5L)    hold Lasix/spironolactone    Monitor BMP     Lab Results   Component Value Date    SODIUM 132 (L) 05/02/2022    SODIUM 131 (L) 05/01/2022    SODIUM 132 (L) 04/30/2022         Thrombocytopenia (HCC)  Assessment & Plan  · Likely in setting of severe liver disease and splenomegaly  · Stable   Will not transfuse unless <50k in the setting of bleeding  Weakness of left lower extremity  Assessment & Plan  · Reason for presentation - pain started a few days ago after dog jumping on her - difficulty with ambulation and weight on left side  · CT abdomen and pelvis - osseous structures noted: No acute compression collapse of the vertebra, Chronic compression of the L2 vertebra seen  · Stroke alert called over night 4/30 due to left lower extremity weakness  CT head and CTA head/neck demonstrating chronic changes  · Discussed with Neurology - MRI brain negative  No indication for anti-platelet therapy especially with ongoing anemia/concern for gastrointestinal bleed  · CT recon lumbar spine: "Chronic, mild degenerative changes and chronic moderate L2 compression fracture  No new disc herniation or high-grade stenosis compared to prior studies  Asymmetric left iliopsoas muscle enlargement of unclear etiology, new compared to prior studies  No focal mass or fluid collection  No evidence of discitis on CT  MR of the lumbar spine and pelvis is recommended for further evaluation "  ·  MRI lumbar - concern for Intramuscular hematoma, consult placed to surgery  · Neurovascular checks ordered  Pulses remain intact  Pain appears better/mobility improved  · S/p FFP 2 units 5/1  CTA deferred d/t relatively stable hemoglobin  Provide additional vitamin K today  ·   XR left hip/femur unremarkable  · PT/OT consult  · Continue pain control      VTE Pharmacologic Prophylaxis: VTE Score: 1 Moderate Risk (Score 3-4) - Pharmacological DVT Prophylaxis Contraindicated  Sequential Compression Devices Ordered  Patient Centered Rounds: I performed bedside rounds with nursing staff today  Discussions with Specialists or Other Care Team Provider: Surgery, GI, Nephro, CM, PT, OT    Education and Discussions with Family / Patient: Updated  (daughter) at bedside  Time Spent for Care: 45 minutes   More than 50% of total time spent on counseling and coordination of care as described above  Current Length of Stay: 4 day(s)  Current Patient Status: Inpatient   Certification Statement: The patient will continue to require additional inpatient hospital stay due to cirrhosis  Discharge Plan: Anticipate discharge in 48 hrs to discharge location to be determined pending rehab evaluations  Code Status: Level 1 - Full Code    Subjective:   Tori Gilmore was seen and examined at bedside  No acute events overnight  At bedside patient looks in pain, ill, jaundiced, at bedside with me is nephrology and GI  Discussed plan of care  All questions and concerns were answered and addressed  She describes not sleeping well however the daughter stated that she did sleep well  States that her left leg is causing her severe pain  All other symptoms on review of systems negative  Discussed plan of care  All questions and concerns were answered and adressed    Objective:     Vitals:   Temp (24hrs), Av °F (37 2 °C), Min:98 1 °F (36 7 °C), Max:99 6 °F (37 6 °C)    Temp:  [98 1 °F (36 7 °C)-99 6 °F (37 6 °C)] 98 1 °F (36 7 °C)  HR:  [77-95] 78  Resp:  [10-23] 18  BP: ()/(55-72) 104/58  SpO2:  [90 %-97 %] 96 %  Body mass index is 26 15 kg/m²  Input and Output Summary (last 24 hours): Intake/Output Summary (Last 24 hours) at 2022 1152  Last data filed at 2022 0600  Gross per 24 hour   Intake 1755 58 ml   Output 610 ml   Net 1145 58 ml       Physical Exam:   Physical Exam  Vitals and nursing note reviewed  Constitutional:       Appearance: She is ill-appearing  HENT:      Head: Normocephalic and atraumatic  Eyes:      General: Scleral icterus present  Cardiovascular:      Rate and Rhythm: Normal rate and regular rhythm  Pulses: Normal pulses  Heart sounds: Normal heart sounds  Pulmonary:      Effort: Pulmonary effort is normal       Breath sounds: Normal breath sounds  Abdominal:      General: Abdomen is flat  There is distension  Palpations: Abdomen is soft  Tenderness: There is no abdominal tenderness  Comments: hypoactive   Musculoskeletal:      Right lower leg: No edema  Left lower leg: No edema  Skin:     Coloration: Skin is jaundiced  Comments: Significant ecchymosis located in the left lower lumbar area that extends to the left flank and paravertebral area, also has left lower extremity ecchymoses that extends from upper thigh to lower extremity   Neurological:      General: No focal deficit present  Mental Status: She is alert and oriented to person, place, and time            Additional Data:     Labs:  Results from last 7 days   Lab Units 22  0445   WBC Thousand/uL 6 67   HEMOGLOBIN g/dL 7 9*   HEMATOCRIT % 24 2*   PLATELETS Thousands/uL 50*   NEUTROS PCT % 67   LYMPHS PCT % 19   MONOS PCT % 11   EOS PCT % 1     Results from last 7 days   Lab Units 22  0445   SODIUM mmol/L 132*   POTASSIUM mmol/L 3 6   CHLORIDE mmol/L 98*   CO2 mmol/L 27   BUN mg/dL 13   CREATININE mg/dL 0 98   ANION GAP mmol/L 7   CALCIUM mg/dL 7 9*   ALBUMIN g/dL 2 4*   TOTAL BILIRUBIN mg/dL 17 20*   ALK PHOS U/L 46   ALT U/L 29   AST U/L 123*   GLUCOSE RANDOM mg/dL 111     Results from last 7 days   Lab Units 22  0445   INR  2 05*     Results from last 7 days   Lab Units 22  0718 22  1549 22  0503   POC GLUCOSE mg/dl 134 147* 186*               Lines/Drains:  Invasive Devices  Report    Peripheral Intravenous Line            Peripheral IV 22 Right Hand 1 day    Peripheral IV 22 Right Forearm <1 day          Drain            External Urinary Catheter 4 days                  Telemetry:  Telemetry Orders (From admission, onward)             48 Hour Telemetry Monitoring  Continuous x 48 hours           References:    Telemetry Guidelines   Question:  Reason for 48 Hour Telemetry  Answer:  Acute CVA (<24 hrs old, hemispheric strokes, selected brainstem strokes, cardiac arrhythmias) Imaging: Reviewed radiology reports from this admission including: chest xray, abdominal/pelvic CT, CT head, MRI brain, MRI spine, xray(s) and procedure reports    Recent Cultures (last 7 days):   Results from last 7 days   Lab Units 04/29/22  1012 04/28/22  1432 04/28/22  1431 04/28/22  1404   BLOOD CULTURE   --  No Growth at 72 hrs   No Growth at 72 hrs   --    GRAM STAIN RESULT  Rare Polys  No bacteria seen  --   --  1+ Polys  No bacteria seen  Rare Mononuclear Cells  No No organisms seen  No No polys seen   BODY FLUID CULTURE, STERILE  No growth  --   --  No growth       Last 24 Hours Medication List:   Current Facility-Administered Medications   Medication Dose Route Frequency Provider Last Rate    acetaminophen  650 mg Oral Q4H PRN Douglas Nest, PA-C      albumin human  25 g Intravenous Q12H TRAV Conteh      aluminum-magnesium hydroxide-simethicone  30 mL Oral Q6H PRN Douglas Nest, PA-C      cefTRIAXone  1,000 mg Intravenous Daily Douglas Nest, PA-C 1,000 mg (05/02/22 0808)    gabapentin  100 mg Oral TID Douglas Nest, PA-C      HYDROmorphone  0 5 mg Intravenous Q1H PRN Douglas Nest, PA-C      HYDROmorphone  1 mg Intravenous Once PRN Sanjay Alexandre MD      influenza vaccine  0 5 mL Intramuscular Prior to discharge Sanjay Alexandre MD      lactulose  20 g Oral BID Exie Given, MD      LORazepam  1 mg Intravenous Once Sanjay Alexandre MD      midodrine  5 mg Oral TID AC TRAV Conteh      octreotide (SandoSTATIN) in 0 9 % sodium chloride infusion 250 mL  50 mcg/hr Intravenous Continuous Douglas Nest, PA-C 50 mcg/hr (05/02/22 0916)    ondansetron  4 mg Intravenous Q4H PRN Douglas Nest, PA-C      oxyCODONE  10 mg Oral Q4H PRN Douglas Nest, PA-C      oxyCODONE  5 mg Oral Q4H PRN Douglas Nest, PA-C      pantoprazole  40 mg Intravenous Q12H Douglas Nest, PA-C      senna-docusate sodium  2 tablet Oral BID PRN Douglas Nest, PA-C Today, Patient Was Seen By: Brittany Dexter MD    **Please Note: This note may have been constructed using a voice recognition system  **

## 2022-05-02 NOTE — PROGRESS NOTES
NEPHROLOGY PROGRESS NOTE   Hui Obregon 63 y o  female MRN: [id number]  Unit/Bed#: -01 Encounter: [id number]  Reason for Consult: ALEJANDRO    67 y/o female with cirrhosis, ETOH use, chronic migraine, chronic subdural hematoma with chronic CSF leak, anxiety, temporal arteritis, lumbar radiculopathy admitted with severe anemia w/hgb 5 5, retroperitoneal bleed/L iliopsoas muscle hematoma and decompensated cirrhosis with ascites, thrombocytopenia, coagulopathy and encephalopathy  Nephrology consulted for management of ALEJANDRO in setting of diuresis and IV contrast     ASSESSMENT/PLAN:  ALEJANDRO:  Suspect prerenal due to hemodynamic changes in setting of diuretics, decompensated cirrhosis, loss of autoregulatory function with ARB as well as contrast associated nephropathy   -Admission creatinine 0 99 with interval increase to 1 34 with diuresis and contrast   Today's creatinine improved and at baseline at 0 98  -Peak creatinine 1 34 on 5/1/2022   -baseline creatinine unclear but appears 0 7-0 9 since June 2021  -workup: UA with negative protein, trace ketones, trace leukocytes, 1-2 RBCs, 2-4 wbc's, occasional bacteria   -Imaging:  CT demonstrates no hydronephrosis  -chronic long-term NSAID use, recently decreased over the past year  -nonoliguric  -Plan:   Renal function improved and at recent baseline   Clinically, no acute indication for renal replacement therapy (dialysis)   Hold diuretics for now  Consider restarting diuretics after albumin course   Continue midodrine, octreotide and albumin 25 g x 4   Strict I/Os, daily weights   Avoid nephrotoxins, NSAIDs, IV contrast if possible   Avoid hypotension  Maintain MAP >65   Trend BMP   Adjust meds to appropriate GFR   Optimize hemodynamic status to avoid delay in renal recovery   Will d/w Dr Rodri Diego    Hypertension/Volume status:  -BP normotensive today  -clinically, examines euvolemic  -moderate left pleural effusion on CT imaging    Remains on [address] medications: candesartan 16 mg daily  -Current medications:  None  -continue to hold ARB  -continue midodrine  -Optimize hemodynamic status to avoid delay in renal recovery  -recommend hold parameters on antihypertensive's for SBP <130 mmHg  -Avoid hypotension or fluctuations in blood pressure  Maintain MAP >65  -continue to trend    Electrolytes:  -K+ 3 6, repleted with 40 mEq  -continue to monitor    Hyponatremia:  -serum sodium stable at 131-132  -previous baseline 139  -continue 1 5L fluid restriction  -continue him on    Acid/base:  -stable  -continue to monitor    Decompensated cirrhosis with ascites, coagulopathy and thrombocytopenia:  -hx fatty liver disease  -CT abdomen/pelvis:  Large ascites, distended gallbladder, cirrhotic liver, splenomegaly   -s/p paracentesis for 1 8L  Gram stain negative  -T bili 17 2, D bili 5 6  -confusion improved  -on octreotide, albumin, midodrine  -on lactulose but refusing  -diuretics remain on hold  -abstain from alcohol  -eventual EGD to rule out varices  -GI following  Management per GI and primary medical service    ABLA:  -Hgb 7 9 and stable  Improving  Goal >10  -hemoglobin 5 5 on admission, s/p 3 unit pRBC  -+RPB/L iliopsoas muscle hematoma  -GI following  For eventual EGD (inpt vs outpt) when cleared from neurology standpoint   -Iron studies:  Iron saturation 66%, ferritin 1238  -continue to trend  -Transfuse for Hgb <7 0  -management per primary medical service    Thrombocytopenia:  -in the setting severe liver disease  -platelets decreasing at 50K today  -Hematology consult per primary medical service  -continue to monitor  -management per primary medical service    Left pleural effusion:  -moderate left pleural effusion on CT imaging and CXR  -remains on 4L NCO2  -continue to monitor    Consider thoracentesis when thrombocytopenia stable if increasingly symptomatic      SUBJECTIVE:  Patient awake, alert, continues to complain left flank and left lower extremity proceed with tenderness  left lower extremity weakness improved  A edema creatinine in these improved to 0 98  Adequate urine output  Bilirubin continues to increase  VSS    OBJECTIVE:  Current Weight: Weight - Scale: 73 5 kg (162 lb 0 6 oz)  Vitals:    05/01/22 2100 05/01/22 2241 05/02/22 0500 05/02/22 0709   BP: 108/56 137/64 122/72 109/59   BP Location:  Left arm Left arm Left arm   Pulse: 80 83 79 95   Resp: 12 14 15 16   Temp:  99 2 °F (37 3 °C) 99 4 °F (37 4 °C) 98 4 °F (36 9 °C)   TempSrc:  Oral Oral Oral   SpO2: 94% 92% 92% 90%   Weight:       Height:           Intake/Output Summary (Last 24 hours) at 5/2/2022 0856  Last data filed at 5/2/2022 0600  Gross per 24 hour   Intake 1755 58 ml   Output 610 ml   Net 1145 58 ml     General:  Awake, alert, appears comfortable and in no acute distress  Nontoxic  Skin:  No rash, warm, good skin turgor   Generalized jaundice  Eyes:  PERRL, EOMI, +sclerae icteric   no periorbital edema   ENT:  Moist mucous membranes  Neck:  Trachea midline, symmetric   + JVD  Chest:  Clear to auscultation bilaterally without wheezes, crackles or rhonchi  CVS:  Regular rate and rhythm without murmur, gallop or rub  S1 and S2 identified and normal   No S3, S4    Abdomen:  Soft, nontender, nondistended without masses  Normal bowel sounds x 4 quadrants  No bruit  Atelectasis left flank, generally soft  Extremities:  Warm, pink, motor and sensory intact and well perfused  No cyanosis, pallor  No BLE edema  +asterixis  Soft ecchymosis left proximal medial thigh  Neuro:  Awake, alert, oriented x3  Grossly intact  Psych:  Appropriate affect  Mentating appropriately    Normal mental status exam    Medications:    Current Facility-Administered Medications:     acetaminophen (TYLENOL) tablet 650 mg, 650 mg, Oral, Q4H PRN, Cassia Olivera PA-C    albumin human (FLEXBUMIN) 25 % injection 25 g, 25 g, Intravenous, Q12H, TRAV Chin, 25 g at 05/01/22 2110   aluminum-magnesium hydroxide-simethicone (MYLANTA) oral suspension 30 mL, 30 mL, Oral, Q6H PRN, Kaiser Gomez PA-C    cefTRIAXone (ROCEPHIN) IVPB (premix in dextrose) 1,000 mg 50 mL, 1,000 mg, Intravenous, Daily, Kaiser Gomez PA-C, Last Rate: 100 mL/hr at 05/02/22 0808, 1,000 mg at 05/02/22 0808    gabapentin (NEURONTIN) capsule 100 mg, 100 mg, Oral, TID, Kaiser Gomez PA-C, 100 mg at 05/02/22 4614    HYDROmorphone (DILAUDID) injection 0 5 mg, 0 5 mg, Intravenous, Q1H PRN, Kaiser Gomez PA-C, 0 5 mg at 05/01/22 1522    HYDROmorphone (DILAUDID) injection 1 mg, 1 mg, Intravenous, Once PRN, Cayla Velazquez MD    influenza vaccine, recombinant, quadrivalent (FLUBLOK) IM injection 0 5 mL, 0 5 mL, Intramuscular, Prior to discharge, Cayla Velazquez MD    lactulose oral solution 20 g, 20 g, Oral, BID, Ying Lopez MD, 20 g at 04/30/22 1723    LORazepam (ATIVAN) injection 1 mg, 1 mg, Intravenous, Once, Cayla Velazquez MD    midodrine (PROAMATINE) tablet 5 mg, 5 mg, Oral, TID AC, Radha Paulino, TRAV, 5 mg at 05/02/22 0807    [COMPLETED] octreotide (SandoSTATIN) injection 50 mcg, 50 mcg, Intravenous, Once, 50 mcg at 04/28/22 1725 **FOLLOWED BY** octreotide (SandoSTATIN) 500 mcg in sodium chloride 0 9 % 250 mL infusion, 50 mcg/hr, Intravenous, Continuous, Kaiser Gomez PA-C, Last Rate: 25 mL/hr at 05/01/22 2220, 50 mcg/hr at 05/01/22 2220    ondansetron (ZOFRAN) injection 4 mg, 4 mg, Intravenous, Q4H PRN, Kaiser Gomez PA-C, 4 mg at 04/30/22 0840    oxyCODONE (ROXICODONE) IR tablet 10 mg, 10 mg, Oral, Q4H PRN, Kaiser Gomez PA-C, 10 mg at 05/02/22 2104    oxyCODONE (ROXICODONE) IR tablet 5 mg, 5 mg, Oral, Q4H PRN, Kaiser Gomez PA-C, 5 mg at 05/01/22 9236    pantoprazole (PROTONIX) injection 40 mg, 40 mg, Intravenous, Q12H, Kaiser Gomez PA-C, 40 mg at 05/02/22 0445    senna-docusate sodium (SENOKOT S) 8 6-50 mg per tablet 2 tablet, 2 tablet, Oral, BID PRN, Kaiser Gomez ARNALDO    Laboratory Results:  Results from last 7 days   Lab Units 05/02/22 0445 05/01/22 2116 05/01/22  0803 05/01/22  0223 05/01/22 0216 04/30/22 2038 04/30/22  1432 04/30/22  0806 04/30/22  0441 04/30/22  0241 04/29/22 2046 04/29/22  1425 04/29/22  0457 04/29/22  0457 04/28/22  1825 04/28/22  1147   WBC Thousand/uL 6 67  --   --  7 36  --   --   --   --   --   --   --   --   --  7 37  --  6 20   HEMOGLOBIN g/dL 7 9* 7 3* 6 8* 7 0*  --  7 2* 7 5* 7 2*  --    < >   < > 7 1*   < > 7 3*   < > 5 5*   HEMATOCRIT % 24 2*  --   --  21 2*  --   --   --   --   --   --   --  22 4*  --  22 6*  --  16 3*   PLATELETS Thousands/uL 50*  --   --  59*  --   --   --   --   --   --   --   --   --  60*  --  59*   POTASSIUM mmol/L 3 6  --   --   --  3 9  --   --   --  3 9  --   --   --   --  4 1  --  4 2   CHLORIDE mmol/L 98*  --   --   --  99*  --   --   --  98*  --   --   --   --  96*  --  97*   CO2 mmol/L 27  --   --   --  28  --   --   --  29  --   --   --   --  26  --  25   BUN mg/dL 13  --   --   --  17  --   --   --  14  --   --   --   --  12  --  9   CREATININE mg/dL 0 98  --   --   --  1 34*  --   --   --  1 31*  --   --   --   --  0 96  --  0 99   CALCIUM mg/dL 7 9*  --   --   --  7 7*  --   --   --  7 7*  --   --   --   --  8 0*  --  8 0*    < > = values in this interval not displayed  Imaging studies:  Brain MRI 5/1/2022:  Imaging study reviewed  No evidence of acute infarct, mass or intracranial hemorrhage    I have personally reviewed the blood work as stated above and in my note  I have personally reviewed brain MRI imaging studies  I have personally reviewed internal Medicine, co-consultants and previous nephrology notes

## 2022-05-02 NOTE — PROGRESS NOTES
Progress Note - General Surgery   Jaja Acosta 63 y o  female MRN: [id number]  Unit/Bed#: -01 Encounter: [id number]    Assessment/Plan:    65-year-old female with decompensated liver cirrhosis with retroperitoneal bleed/hematoma and ABLA    Retroperitoneal bleed/hematoma of left iliopsoas muscle  -stable size on recent imaging  -patient continues with pain and bruising, left thigh is soft  -HGB and VS remain stable, no signs of active bleeding  -cont h/h q6hrs  -continue correction of INR, FFP today as well    -hold anticoagulation   -pain control as needed  -consider CTA to r/o active bleeding if patient decompensates, currently on hold due to elevated creatinine    ABLA  -2/2 hematoma, thrombocytopenia and elevated INR  -patient also with heme positive stools on this admission  -continue to monitor H&H, vital signs  -follow INR, platelets  -transfuse as needed  -recommend FFP to keep INR below 1 5    Decompensated Liver Cirrhosis  -with thrombocytopenia and elevated INR contributing to retroperitoneal bleed  -abdominal ascites s/p paracentesis 4/29, abdomen soft today  -Aldactone and  Lasix on hold due to elevated renal indices    -alcohol cessation recommended  -plan per GI    Elevated renal indices  -creatinine 0 98 today  -remains on fluid restrictions, diuretics on hold  -continue to monitor    Subjective/Objective   Chief Complaint:  Pain    Subjective:  Patient still with left hip pain  Still with weakness in left leg  Objective:     Blood pressure 109/59, pulse 95, temperature 98 4 °F (36 9 °C), temperature source Oral, resp  rate 16, height 5' 6" (1 676 m), weight 73 5 kg (162 lb 0 6 oz), SpO2 90 %, not currently breastfeeding  ,Body mass index is 26 15 kg/m²        Intake/Output Summary (Last 24 hours) at 5/2/2022 0837  Last data filed at 5/2/2022 0600  Gross per 24 hour   Intake 1755 58 ml   Output 610 ml   Net 1145 58 ml       Invasive Devices  Report    Peripheral Intravenous Line Peripheral IV 04/30/22 Right Hand 1 day    Peripheral IV 05/01/22 Right Forearm <1 day          Drain            External Urinary Catheter 4 days                Physical Exam:     General appearance: alert and oriented, in no acute distress  Head: Normocephalic, without obvious abnormality, atraumatic, scleral icterus mmm  Neck: no JVD and supple, symmetrical, trachea midline  Lungs: clear to auscultation, no wheezes or rales  Heart:   Regular rate, regular rhythm, S1-S2 normal, no murmur  Abdomen:   Flat, soft, mild fullness, tenderness over left lateral abdomen,+bs  Extremities:  Left thigh with ecchymosis,remains  soft  Decreased range of motion left lower extremity secondary to pain  N/V intactExtremities:   No edema, redness or tenderness in the calves or thighs  Skin: Warm, jaundiced appearance  Nursing notes and vital signs reviewed          Lab, Imaging and other studies:  I have personally reviewed pertinent lab results    , CBC:   Lab Results   Component Value Date    WBC 6 67 05/02/2022    HGB 7 9 (L) 05/02/2022    HCT 24 2 (L) 05/02/2022     (H) 05/02/2022    PLT 50 (L) 05/02/2022    MCH 35 7 (H) 05/02/2022    MCHC 32 6 05/02/2022    RDW 26 9 (H) 05/02/2022    MPV 10 3 05/02/2022    NRBC 0 05/02/2022   , CMP:   Lab Results   Component Value Date    SODIUM 132 (L) 05/02/2022    K 3 6 05/02/2022    CL 98 (L) 05/02/2022    CO2 27 05/02/2022    BUN 13 05/02/2022    CREATININE 0 98 05/02/2022    CALCIUM 7 9 (L) 05/02/2022     (H) 05/02/2022    ALT 29 05/02/2022    ALKPHOS 46 05/02/2022    EGFR 63 05/02/2022   , Coagulation:   Lab Results   Component Value Date    INR 2 05 (H) 05/02/2022     VTE Pharmacologic Prophylaxis: Reason for no pharmacologic prophylaxis retroperitoneal bleed  VTE Mechanical Prophylaxis: sequential compression device     Ladan Haji PA-C

## 2022-05-02 NOTE — ASSESSMENT & PLAN NOTE
· Sodium on admission 131  · Likely in setting of cirrhosis  · stable   Free water restriction (1-1 5L)    hold Lasix/spironolactone    Monitor BMP     Lab Results   Component Value Date    SODIUM 132 (L) 05/02/2022    SODIUM 131 (L) 05/01/2022    SODIUM 132 (L) 04/30/2022

## 2022-05-02 NOTE — ASSESSMENT & PLAN NOTE
Presents to ED with acute left hip pain and abdominal pain/distension   Hgb 5 5/Hct 16 3    CT scan abdomen/pelvis:  New large ascites, distended gallbladder, cirrhotic liver, splenomegaly with recanalized lysed umbilical vein, faint hypodense area 1 7 cm of junction of segment 8 and for a liver, thickening of the ascending colon possibly due to colitis also seen on previous study, moderate left pleural effusion   Cont transfusions   LFTs - , normal ALT, D  bili 5 6-> T Bili increased to 17 2    PTT/INR - PT 24, INR 2 22  · Occult blood stool positive in ED   Octreotide gtt   New large ascites and history of splenomegaly - octreotide and ceftriaxone   Hold anticoagulation   Hold NSAIDS (patient is chronic NSAID user at home due to migraine)    Continue to monitor H/H with serial labs q 8 hours   Up out of bed with assistance   Stool records at bedside/ intake and output   EGD outpt   GI following    Lab Results   Component Value Date    HGB 7 9 (L) 05/02/2022    HGB 7 3 (L) 05/01/2022

## 2022-05-02 NOTE — ASSESSMENT & PLAN NOTE
Multifactorial, related to decompensated cirrhosis, contrast exposure, low blood pressures  Hold diuretics-> may possibly initiate tomorrow  R/o HRS  Albumin 25 x4,   Nephrology following  Midodrine initiated for blood pressure support   Octreotide gtt

## 2022-05-02 NOTE — ASSESSMENT & PLAN NOTE
Presented with acute hip pain , abdominal pain, abdominal distension   CBC showing: Hgb 5 5/Hct16 3,  - macrocytic anemia    Occult heme + stool   Blood consent obtained   Transfusion S/P 2 units PRBCs  o Transfused to keep Hgb >7  o  No history of transfusions    Trend CBC q 8 hrs   Gastroenterology consulted - Abraham need outpt f/u for EGD and most likely will need liver transplant    Hb stablized-> has significant ecchymosis in the left back and left LE   MRI brain negative for stroke   Continue Protonix IV b i d      Lab Results   Component Value Date    HGB 7 3 (L) 05/01/2022    HGB 6 8 (LL) 05/01/2022

## 2022-05-02 NOTE — ASSESSMENT & PLAN NOTE
· Bilirubin elevated to 5 69 ->9 70->17 2  · CT abdomen pelvis showing - distended gallbladder, large new ascites, hepatomegaly   · Gastroenterology following

## 2022-05-03 LAB
ALBUMIN SERPL BCP-MCNC: 2.6 G/DL (ref 3.5–5)
ALP SERPL-CCNC: 43 U/L (ref 46–116)
ALT SERPL W P-5'-P-CCNC: 29 U/L (ref 12–78)
AMMONIA PLAS-SCNC: 30 UMOL/L (ref 11–35)
ANION GAP SERPL CALCULATED.3IONS-SCNC: 4 MMOL/L (ref 4–13)
AST SERPL W P-5'-P-CCNC: 124 U/L (ref 5–45)
BASOPHILS # BLD AUTO: 0.05 THOUSANDS/ΜL (ref 0–0.1)
BASOPHILS NFR BLD AUTO: 1 % (ref 0–1)
BILIRUB DIRECT SERPL-MCNC: 11.64 MG/DL (ref 0–0.2)
BILIRUB SERPL-MCNC: 20.2 MG/DL (ref 0.2–1)
BUN SERPL-MCNC: 12 MG/DL (ref 5–25)
CALCIUM SERPL-MCNC: 8.2 MG/DL (ref 8.3–10.1)
CHLORIDE SERPL-SCNC: 100 MMOL/L (ref 100–108)
CO2 SERPL-SCNC: 29 MMOL/L (ref 21–32)
CREAT SERPL-MCNC: 0.9 MG/DL (ref 0.6–1.3)
EOSINOPHIL # BLD AUTO: 0.08 THOUSAND/ΜL (ref 0–0.61)
EOSINOPHIL NFR BLD AUTO: 1 % (ref 0–6)
ERYTHROCYTE [DISTWIDTH] IN BLOOD BY AUTOMATED COUNT: 26.7 % (ref 11.6–15.1)
GFR SERPL CREATININE-BSD FRML MDRD: 70 ML/MIN/1.73SQ M
GLUCOSE SERPL-MCNC: 121 MG/DL (ref 65–140)
HCT VFR BLD AUTO: 23.5 % (ref 34.8–46.1)
HGB BLD-MCNC: 7.8 G/DL (ref 11.5–15.4)
HGB BLD-MCNC: 7.8 G/DL (ref 11.5–15.4)
IMM GRANULOCYTES # BLD AUTO: 0.08 THOUSAND/UL (ref 0–0.2)
IMM GRANULOCYTES NFR BLD AUTO: 1 % (ref 0–2)
INR PPP: 2.38 (ref 0.84–1.19)
LYMPHOCYTES # BLD AUTO: 0.83 THOUSANDS/ΜL (ref 0.6–4.47)
LYMPHOCYTES NFR BLD AUTO: 13 % (ref 14–44)
MAGNESIUM SERPL-MCNC: 2 MG/DL (ref 1.6–2.6)
MCH RBC QN AUTO: 35.9 PG (ref 26.8–34.3)
MCHC RBC AUTO-ENTMCNC: 33.2 G/DL (ref 31.4–37.4)
MCV RBC AUTO: 108 FL (ref 82–98)
MONOCYTES # BLD AUTO: 0.77 THOUSAND/ΜL (ref 0.17–1.22)
MONOCYTES NFR BLD AUTO: 12 % (ref 4–12)
NEUTROPHILS # BLD AUTO: 4.6 THOUSANDS/ΜL (ref 1.85–7.62)
NEUTS SEG NFR BLD AUTO: 72 % (ref 43–75)
NRBC BLD AUTO-RTO: 0 /100 WBCS
PLATELET # BLD AUTO: 49 THOUSANDS/UL (ref 149–390)
PMV BLD AUTO: 10.9 FL (ref 8.9–12.7)
POTASSIUM SERPL-SCNC: 3.8 MMOL/L (ref 3.5–5.3)
PROT SERPL-MCNC: 6.6 G/DL (ref 6.4–8.2)
PROTHROMBIN TIME: 25.4 SECONDS (ref 11.6–14.5)
RBC # BLD AUTO: 2.17 MILLION/UL (ref 3.81–5.12)
SODIUM SERPL-SCNC: 133 MMOL/L (ref 136–145)
WBC # BLD AUTO: 6.41 THOUSAND/UL (ref 4.31–10.16)

## 2022-05-03 PROCEDURE — 85025 COMPLETE CBC W/AUTO DIFF WBC: CPT | Performed by: STUDENT IN AN ORGANIZED HEALTH CARE EDUCATION/TRAINING PROGRAM

## 2022-05-03 PROCEDURE — 97530 THERAPEUTIC ACTIVITIES: CPT

## 2022-05-03 PROCEDURE — 83735 ASSAY OF MAGNESIUM: CPT | Performed by: STUDENT IN AN ORGANIZED HEALTH CARE EDUCATION/TRAINING PROGRAM

## 2022-05-03 PROCEDURE — C9113 INJ PANTOPRAZOLE SODIUM, VIA: HCPCS | Performed by: NURSE PRACTITIONER

## 2022-05-03 PROCEDURE — 97535 SELF CARE MNGMENT TRAINING: CPT

## 2022-05-03 PROCEDURE — 80076 HEPATIC FUNCTION PANEL: CPT | Performed by: STUDENT IN AN ORGANIZED HEALTH CARE EDUCATION/TRAINING PROGRAM

## 2022-05-03 PROCEDURE — 97163 PT EVAL HIGH COMPLEX 45 MIN: CPT

## 2022-05-03 PROCEDURE — 99357 PR PROLONGED SERV,INPATIENT,EA ADD 30 MIN: CPT | Performed by: STUDENT IN AN ORGANIZED HEALTH CARE EDUCATION/TRAINING PROGRAM

## 2022-05-03 PROCEDURE — 99233 SBSQ HOSP IP/OBS HIGH 50: CPT | Performed by: STUDENT IN AN ORGANIZED HEALTH CARE EDUCATION/TRAINING PROGRAM

## 2022-05-03 PROCEDURE — 85610 PROTHROMBIN TIME: CPT | Performed by: STUDENT IN AN ORGANIZED HEALTH CARE EDUCATION/TRAINING PROGRAM

## 2022-05-03 PROCEDURE — 99232 SBSQ HOSP IP/OBS MODERATE 35: CPT | Performed by: INTERNAL MEDICINE

## 2022-05-03 PROCEDURE — 85018 HEMOGLOBIN: CPT | Performed by: STUDENT IN AN ORGANIZED HEALTH CARE EDUCATION/TRAINING PROGRAM

## 2022-05-03 PROCEDURE — 80048 BASIC METABOLIC PNL TOTAL CA: CPT | Performed by: STUDENT IN AN ORGANIZED HEALTH CARE EDUCATION/TRAINING PROGRAM

## 2022-05-03 PROCEDURE — 82140 ASSAY OF AMMONIA: CPT | Performed by: STUDENT IN AN ORGANIZED HEALTH CARE EDUCATION/TRAINING PROGRAM

## 2022-05-03 PROCEDURE — 99232 SBSQ HOSP IP/OBS MODERATE 35: CPT | Performed by: SURGERY

## 2022-05-03 PROCEDURE — 97167 OT EVAL HIGH COMPLEX 60 MIN: CPT

## 2022-05-03 RX ORDER — POTASSIUM CHLORIDE 20 MEQ/1
40 TABLET, EXTENDED RELEASE ORAL ONCE
Status: COMPLETED | OUTPATIENT
Start: 2022-05-03 | End: 2022-05-03

## 2022-05-03 RX ORDER — SPIRONOLACTONE 25 MG/1
25 TABLET ORAL DAILY
Status: DISCONTINUED | OUTPATIENT
Start: 2022-05-03 | End: 2022-05-10

## 2022-05-03 RX ORDER — LACTULOSE 20 G/30ML
30 SOLUTION ORAL 3 TIMES DAILY
Status: DISCONTINUED | OUTPATIENT
Start: 2022-05-03 | End: 2022-05-05

## 2022-05-03 RX ADMIN — OCTREOTIDE ACETATE 50 MCG/HR: 100 INJECTION, SOLUTION INTRAVENOUS; SUBCUTANEOUS at 02:43

## 2022-05-03 RX ADMIN — ONDANSETRON 4 MG: 2 INJECTION INTRAMUSCULAR; INTRAVENOUS at 00:02

## 2022-05-03 RX ADMIN — GABAPENTIN 100 MG: 100 CAPSULE ORAL at 20:21

## 2022-05-03 RX ADMIN — LACTULOSE 30 G: 20 SOLUTION ORAL at 16:41

## 2022-05-03 RX ADMIN — CEFTRIAXONE 1000 MG: 1 INJECTION, SOLUTION INTRAVENOUS at 08:50

## 2022-05-03 RX ADMIN — SPIRONOLACTONE 25 MG: 25 TABLET ORAL at 11:57

## 2022-05-03 RX ADMIN — PANTOPRAZOLE SODIUM 40 MG: 40 INJECTION, POWDER, FOR SOLUTION INTRAVENOUS at 16:40

## 2022-05-03 RX ADMIN — LACTULOSE 20 G: 10 SOLUTION ORAL at 08:50

## 2022-05-03 RX ADMIN — MIDODRINE HYDROCHLORIDE 5 MG: 5 TABLET ORAL at 06:12

## 2022-05-03 RX ADMIN — MIDODRINE HYDROCHLORIDE 5 MG: 5 TABLET ORAL at 11:57

## 2022-05-03 RX ADMIN — OXYCODONE HYDROCHLORIDE 5 MG: 5 TABLET ORAL at 00:02

## 2022-05-03 RX ADMIN — GABAPENTIN 100 MG: 100 CAPSULE ORAL at 16:41

## 2022-05-03 RX ADMIN — OXYCODONE HYDROCHLORIDE 10 MG: 5 TABLET ORAL at 06:12

## 2022-05-03 RX ADMIN — LACTULOSE 20 G: 10 SOLUTION ORAL at 00:01

## 2022-05-03 RX ADMIN — POTASSIUM CHLORIDE 40 MEQ: 1500 TABLET, EXTENDED RELEASE ORAL at 08:50

## 2022-05-03 RX ADMIN — GABAPENTIN 100 MG: 100 CAPSULE ORAL at 08:50

## 2022-05-03 RX ADMIN — LACTULOSE 30 G: 20 SOLUTION ORAL at 11:55

## 2022-05-03 RX ADMIN — LACTULOSE 30 G: 20 SOLUTION ORAL at 20:21

## 2022-05-03 RX ADMIN — OXYCODONE HYDROCHLORIDE 10 MG: 5 TABLET ORAL at 10:15

## 2022-05-03 NOTE — OCCUPATIONAL THERAPY NOTE
Occupational Therapy Evaluation ([id number]) & Treatment ([id number])     Patient Name: Antoinette Salinas  LNCXG'O Date: 5/3/2022  Problem List  Principal Problem:    Cirrhosis of liver with ascites ([facility] [address] )  Active Problems:    Weakness of left lower extremity    Acute blood loss anemia    Thrombocytopenia (HCC)    Hyponatremia    Hyperbilirubinemia    Pleural effusion    Acute kidney injury [facility])    Past Medical History  Past Medical History:   Diagnosis Date    Concussion     CSF leak     Hyperbilirubinemia 4/28/2022    Liver failure ([facility] [address] )     Migraines      Past Surgical History  Past Surgical History:   Procedure Laterality Date    ABLATION SOFT TISSUE      BREAST LUMPECTOMY      IR PARACENTESIS  4/29/2022    LAPAROSCOPY FOR ECTOPIC PREGNANCY      SINUS SURGERY      TONSILECTOMY AND ADNOIDECTOMY               05/03/22 1519   OT Last Visit   OT Visit Date 05/03/22   Note Type   Note type Evaluation   Restrictions/Precautions   Weight Bearing Precautions Per Order Yes   LLE Weight Bearing Per Order WBAT   Other precautions Fall risk; pain; cognition; O2 (3L)   Pain Assessment   Pain Assessment Tool Johnson-Baker FACES   Johnson-Baker FACES Pain Rating 4   Pain Location/Orientation Orientation: Left; Location: Leg   Hospital Pain Intervention(s) Repositioned   Home Living   Type of [address] [place] [address] One level;Stairs to enter without rails  (3STE)   [address] at baseline for [facility]   Prior Function   Level of [place] Independent with ADLs and functional mobility   Lives With [place] Help From Family   ADL Assistance Independent   IADLs Independent   Comments Pt poor historian, lethargic and falling asleep when questioning PLOF   Lifestyle   Reciprocal Relationships Pt states she has 2 dogs and 2 cats   Psychosocial   Psychosocial (WDL) X   Patient Behaviors/Mood Cooperative; Sad   Subjective   Subjective "This sucks" Pt jaundiced   ADL   Eating Assistance 5 Supervision/Setup   Grooming Assistance 3  Moderate Assistance   UB Bathing Assistance 3  Moderate Assistance   LB Bathing Assistance 2  Maximal Assistance   UB Dressing Assistance 3  Moderate Assistance   LB Dressing Assistance 2  Maximal [address]  1  Total Assistance   Bed Mobility   Rolling R 4  Minimal assistance   Additional items Assist x 1;Bedrails; Increased time required;LE management;Verbal cues   Rolling L 4  Minimal assistance   Additional items Assist x 1;Bedrails; Increased time required;Verbal cues;LE management   Supine to Sit 3  Moderate assistance   Additional items Assist x 1;LE management  (Pt impulsively sat up once assistance for LE was completed  Pt sat EOB approx  20 min with close supervision and use of bedrail)   Sit to Supine 3  Moderate assistance   Additional items Assist x 1;LE management; Increased time required;Verbal cues   Transfers   Sit to Stand Unable to assess   Additional items   (Attempted sit > stand with Ax2  Unsuccessful)   Balance   Static Sitting Fair   Dynamic Sitting Fair   Static Standing Zero   Activity Tolerance   Activity Tolerance Patient limited by pain; Patient limited by fatigue   Medical Staff Made Aware PT Estela   Nurse Made Aware RN Isacc   RUE Assessment   RUE Assessment WFL   LUE Assessment   LUE Assessment WFL   Cognition   Overall Cognitive Status Impaired   Arousal/Participation Responsive;Arousable   Attention Attends with cues to redirect   Orientation Level Oriented to person;Oriented to place   Memory Decreased recall of precautions;Decreased recall of recent events   Following Commands Follows one step commands with increased time or repetition   Comments Family present for session and states that altered mental status/confusion is recent as of 5/2/22   Assessment   Limitation Decreased ADL status; Decreased UE strength;Decreased cognition;Decreased endurance;Decreased self-care trans;Decreased high-level ADLs   Prognosis Fair Assessment Pt is a 63 y o  female seen for OT evaluation at [facility], admitted 4/28/2022 w/ leg left pain  Pt also with Cirrhosis of liver with ascites ([facility] [address] )  OT completed extensive review of pt's medical and social history  Comorbidities affecting pt's functional performance at time of assessment include: weakness of LLE, acute blood loss anemia, thrombocytopenia, hyponatremia, hyperbilirubinemia, pleural effusion, ALEJANDRO  Personal factors affecting pt at time of IE include:steps to enter environment  Pt with active OT orders and OOB/ambulate orders  Prior to admission, pt was living with her  in a [facility] with 3STE  Pt poor historian was I w/  ADLS and IADLS,  required use of SPC PTA  Upon evaluation: Pt requires Mod Ax1 for bed mobility, RAFAL for functional mobility/transfers, Mod for UB ADLs and Max-Total A for LB ADLS 2* the following deficits impacting occupational performance: weakness, decreased ROM, decreased strength, decreased tolerance, impaired memory, impaired problem solving, decreased safety awareness and increased pain  Pt to benefit from continued skilled OT tx while in the hospital to address deficits as defined above and maximize level of functional independence w ADL's and functional mobility  Occupational Performance areas to address include: eating, grooming, bathing/shower, toilet hygiene, dressing, health maintenance, functional mobility and clothing management  Based on findings, pt is of high complexity  The patient's raw score on the AM-PAC Daily Activity inpatient short form is 13, standardized score is 32 03, less than 39 4  Patients at this level are likely to benefit from DC to post-acute rehabilitation services, which DOES coincide with CURRENT above OT recommendations  Please refer to therapist recommendation for discharge planning given other factors that may influence destination  At this time, OT recommendations at time of discharge are STR     Goals   Patient Goals Pt wants to go home   Plan   Treatment Interventions ADL retraining;Functional transfer training; Endurance training;UE strengthening/ROM; Cognitive reorientation;Patient/family training;Equipment evaluation/education; Compensatory technique education;Continued evaluation; Activityengagement; Energy conservation   Goal Expiration Date 05/13/22   OT Treatment Day 0   OT Frequency 3-5x/wk   Additional Treatment Session   Start Time 1501   End Time 1519   Treatment Assessment Attempted sit > stand with significant coaxing and encouragement  Pt unable to clear EOB despite Ax2  Pt expressed need to change brief  Pt required Mod Ax1 for sit > supine for LE management and verbal cues  Pt incontinent of bowels and required Min Ax1 for rolling R<>L with use of bed rails  Pt dependent for melissa-care  Pt left supine in bed with call bell in reach, SCD's on, needs met, RN informed  OT recommendations for D/C are STR  Recommendation   OT Discharge Recommendation Post acute rehabilitation services   AM-PAC Daily Activity Inpatient   Lower Body Dressing 2   Bathing 2   Toileting 1   Upper Body Dressing 2   Grooming 2   Eating 4   Daily Activity Raw Score 13   Daily Activity Standardized Score (Calc for Raw Score >=11) 32 03   AM-PAC Applied Cognition Inpatient   Following a Speech/Presentation 3   Understanding Ordinary Conversation 3   Taking Medications 3   Remembering Where Things Are Placed or Put Away 2   Remembering List of 4-5 Errands 1   Taking Care of Complicated Tasks 1   Applied Cognition Raw Score 13   Applied Cognition Standardized Score 30 46     Pt will achieve the following goals within 10 days  *Pt will complete grooming with set-up      *Pt will complete UB bathing and dressing with S     *Pt will complete LB bathing and dressing with Min A     * Pt will complete toileting w/ Min A w/ G hygiene/thoroughness using DME PRN    *Pt will complete bed mobility with independence, with bed flat and no side rail to prep for purposeful tasks     *Pt will independently sit on EOB for 15 minutes for increased safety with seated activity tolerance during ADL tasks      Olga Mcdaniel, OTR/L

## 2022-05-03 NOTE — ASSESSMENT & PLAN NOTE
Presents to ED with acute left hip pain and abdominal pain/distension   Hgb 5 5/Hct 16 3    CT scan abdomen/pelvis:  New large ascites, distended gallbladder, cirrhotic liver, splenomegaly with recanalized lysed umbilical vein, faint hypodense area 1 7 cm of junction of segment 8 and for a liver, thickening of the ascending colon possibly due to colitis also seen on previous study, moderate left pleural effusion   S/p 4U PRBC's and 2U FFP   LFTs - , normal ALT, D  bili 5 6-> T Bili increased to 17 2    PTT/INR - PT 24, INR 2 22  · Occult blood stool positive in ED   Octreotide gtt dc'd   New large ascites and history of splenomegaly - octreotide dc'd continue ceftriaxone   Hold anticoagulation   Hold NSAIDS (patient is chronic NSAID user at home due to migraine)    Continue to monitor H/H with serial labs q 8 hours   Up out of bed with assistance   Stool records at bedside/ intake and output   EGD outpt   GI following    Lab Results   Component Value Date    HGB 7 8 (L) 05/03/2022    HGB 7 8 (L) 05/03/2022

## 2022-05-03 NOTE — ASSESSMENT & PLAN NOTE
· Bilirubin elevated to 5 69 ->9 70->17 2->20  · D Bili 5-> 11  · CT abdomen pelvis showing - distended gallbladder, large new ascites, hepatomegaly   · Gastroenterology following

## 2022-05-03 NOTE — ASSESSMENT & PLAN NOTE
Patient presents with acute hip pain abdominal pain abdominal distension  · Endorses history of fatty liver disease, drinks wine occasionally, never been tested for hepatitis  · Found to have significant ascites on exam   · CT abd/pelvis - new large ascites, cirrhotic liver, hypodense area measuring 1 7 cm at junction of segment 4 and 8 in liver, distended gallbladder, splenomegaly with recanalized umbilical vein  · Underwent paracentesis 4/29 with IR - approximately 1 8 L removed  · GI following  · MELD: 30 with 27-32% estimated mortality  · Continue ceftriaxone for SBP prophylaxis  · Increased lactulose to 30 tid  · Re-initiated aldactone today  · Continue to trend T Bili and D bili-> if continuing to trend upwards may consider initiating steroids  · Importance of strict alcohol cessation discussion was done   · Now with decompensated cirrhosis  · lasix held d/t ALEJANDRO

## 2022-05-03 NOTE — ASSESSMENT & PLAN NOTE
· Reason for presentation - pain started a few days ago after dog jumping on her - difficulty with ambulation and weight on left side  · CT abdomen and pelvis - osseous structures noted: No acute compression collapse of the vertebra, Chronic compression of the L2 vertebra seen  · Stroke alert called over night 4/30 due to left lower extremity weakness  CT head and CTA head/neck demonstrating chronic changes  · Discussed with Neurology - MRI brain negative  No indication for anti-platelet therapy especially with ongoing anemia/concern for gastrointestinal bleed  · CT recon lumbar spine: "Chronic, mild degenerative changes and chronic moderate L2 compression fracture  No new disc herniation or high-grade stenosis compared to prior studies  Asymmetric left iliopsoas muscle enlargement of unclear etiology, new compared to prior studies  No focal mass or fluid collection  No evidence of discitis on CT   · MRI lumbar - concern for Intramuscular hematoma, consult placed to surgery recommending if there's evidence of further bleeding IR consult for embolization, however her hb is now stable, continue to monitor  · Neurovascular checks ordered  Pulses remain intact   Pain appears better/mobility improved  · S/p FFP 2 units, 4U PRBCs and vitamin K  · XR left hip/femur unremarkable  · PT/OT consult  · Continue pain control

## 2022-05-03 NOTE — PLAN OF CARE
Problem: PHYSICAL THERAPY ADULT  Goal: Performs mobility at highest level of function for planned discharge setting  See evaluation for individualized goals  Description: Treatment/Interventions: ADL retraining,Functional transfer training,LE strengthening/ROM,Elevations,Therapeutic exercise,Endurance training,Cognitive reorientation,Patient/family training,Equipment eval/education,Bed mobility,Gait training,Spoke to nursing,Spoke to case management,OT  Equipment Recommended: Jennifer Orta       See flowsheet documentation for full assessment, interventions and recommendations  Outcome: Progressing  Note: Prognosis: Fair  Problem List: Decreased strength,Decreased endurance,Impaired balance,Decreased mobility,Decreased coordination,Decreased skin integrity,Decreased cognition,Decreased safety awareness,Pain  Assessment: Pt presents with painful L le due to retroperitoneal bleed  Signif weakenss and impaired mobility with L le pain  Unabl eto stand wiht maxA or ambulate at this time  Can benetif from skilled PT services ti regain safe funcitona gabriela  REcommend in-pt rehab at d/c to maximize functional recovery  Will follow see goals  Barriers to Discharge:  (medical status)        PT Discharge Recommendation: Post acute rehabilitation services          See flowsheet documentation for full assessment

## 2022-05-03 NOTE — PLAN OF CARE
Problem: OCCUPATIONAL THERAPY ADULT  Goal: Performs self-care activities at highest level of function for planned discharge setting  See evaluation for individualized goals  Description: Treatment Interventions: ADL retraining,Functional transfer training,Endurance training,UE strengthening/ROM,Cognitive reorientation,Patient/family training,Equipment evaluation/education,Compensatory technique education,Continued evaluation,Activityengagement,Energy conservation          See flowsheet documentation for full assessment, interventions and recommendations  Note: Limitation: Decreased ADL status,Decreased UE strength,Decreased cognition,Decreased endurance,Decreased self-care trans,Decreased high-level ADLs  Prognosis: Fair  Assessment: Pt is a 63 y o  female seen for OT evaluation at [address] [place] [address], admitted 4/28/2022 w/ leg left pain  Pt also with Cirrhosis of liver with ascites ([facility] [address] )  OT completed extensive review of pt's medical and social history  Comorbidities affecting pt's functional performance at time of assessment include: weakness of LLE, acute blood loss anemia, thrombocytopenia, hyponatremia, hyperbilirubinemia, pleural effusion, ALEJANDRO  Personal factors affecting pt at time of IE include:steps to enter environment  Pt with active OT orders and OOB/ambulate orders  Prior to admission, pt was living with her  in a [facility] with 3STE  Pt poor historian was I w/  ADLS and IADLS,  required use of SPC PTA  Upon evaluation: Pt requires Mod Ax1 for bed mobility, RAFAL for functional mobility/transfers, Mod for UB ADLs and Max-Total A for LB ADLS 2* the following deficits impacting occupational performance: weakness, decreased ROM, decreased strength, decreased tolerance, impaired memory, impaired problem solving, decreased safety awareness and increased pain   Pt to benefit from continued skilled OT tx while in the hospital to address deficits as defined above and maximize level of functional independence w ADL's and functional mobility  Occupational Performance areas to address include: eating, grooming, bathing/shower, toilet hygiene, dressing, health maintenance, functional mobility and clothing management  Based on findings, pt is of high complexity  The patient's raw score on the AM-PAC Daily Activity inpatient short form is 13, standardized score is 32 03, less than 39 4  Patients at this level are likely to benefit from DC to post-acute rehabilitation services, which DOES coincide with CURRENT above OT recommendations  Please refer to therapist recommendation for discharge planning given other factors that may influence destination  At this time, OT recommendations at time of discharge are STR       OT Discharge Recommendation: Post acute rehabilitation services

## 2022-05-03 NOTE — PHYSICAL THERAPY NOTE
PT eval+tx   05/03/22 1520   PT Last Visit   PT Visit Date 05/03/22   Note Type   Note type Evaluation  (+tx)   Pain Assessment   Pain Assessment Tool Johnson-Baker FACES   Johnson-Baker FACES Pain Rating 4   Pain Location/Orientation Orientation: Left; Location: Leg   Hospital Pain Intervention(s) Repositioned   Restrictions/Precautions   Weight Bearing Precautions Per Order Yes   LLE Weight Bearing Per Order WBAT   Home Living   Type of [address] One level;Stairs to enter without rails   Prior Function   Level of [place] Independent with ADLs and functional mobility   Lives With Spouse   Receives Help From Family   ADL Assistance Independent   IADLs Independent   Falls in the last 6 months 1 to 4   Vocational Retired   [employer]   Additional Pertinent History Pt adm with cirrhosis of liver and ascites; PMH+ anemia, ALEJANDRO, pleural effusion, L distan radius fx, Cytitis, colonic polyps, L2 vert fx   Family/Caregiver Present Yes   Cognition   Overall Cognitive Status Impaired   Arousal/Participation Responsive   Orientation Level Oriented to person   Memory Decreased recall of precautions;Decreased recall of recent events;Decreased short term memory   Following Commands Follows one step commands with increased time or repetition   Subjective   Subjective Pt states having BMs   RUE Assessment   RUE Assessment WFL   LUE Assessment   LUE Assessment WFL   RLE Assessment   RLE Assessment WFL   LLE Assessment   LLE Assessment WFL  (contusions, hip pain aarom wfl strength 2-/5)   Coordination   Movements are Fluid and Coordinated 0   Coordination and Movement Description bradykinetic dejon Lle   Proprioception   RLE Proprioception Grossly intact   LLE Proprioception Grossly Intact   Bed Mobility   Rolling R 4  Minimal assistance   Additional items Assist x 1;Bedrails; Increased time required;Verbal cues;LE management   Rolling L 4  Minimal assistance   Additional items Assist x 1;Bedrails;HOB elevated; Increased time required;Verbal cues;LE management   Supine to Sit 3  Moderate assistance   Additional items Assist x 1; Increased time required;Verbal cues;LE management; Bedrails   Sit to Supine 3  Moderate assistance   Additional items Assist x 1; Increased time required;Verbal cues;LE management; Bedrails   Transfers   Sit to Stand 2  Maximal assistance   Additional items Assist x 2;Armrests; Increased time required;Verbal cues  (attempted unable to stand fully erect)   Stand to Sit 4  Minimal assistance   Additional items Assist x 2; Increased time required;Verbal cues   Stand pivot Unable to assess   Balance   Static Sitting Fair   Dynamic Sitting Fair   Static Standing Zero   Endurance Deficit   Endurance Deficit Yes   Endurance Deficit Description tires quickly   Activity Tolerance   Activity Tolerance Patient limited by fatigue   Medical Staff Made Aware OT Radha   Nurse Made Aware RN Isacc   Assessment   Prognosis Fair   Problem List Decreased strength;Decreased endurance; Impaired balance;Decreased mobility; Decreased coordination;Decreased skin integrity; Decreased cognition;Decreased safety awareness;Pain   Assessment Pt presents with painful L le due to retroperitoneal bleed  Signif weakenss and impaired mobility with L le pain  Unabl eto stand wiht maxA or ambulate at this time  Can benetif from skilled PT services ti regain safe funcitona mboiltyi  REcommend in-pt rehab at d/c to maximize functional recovery  Will follow see goals  Barriers to Discharge   (medical status)   Goals   Patient Goals get better   STG Expiration Date 05/16/22   Short Term Goal #1 1) safe ind transfers with rw 2) improve strength Lle 1/2 grade 3) safe ind amb with rw 25'level 4) improve balance to good   Plan   Treatment/Interventions ADL retraining;Functional transfer training;LE strengthening/ROM; Elevations; Therapeutic exercise; Endurance training;Cognitive reorientation;Patient/family training;Equipment eval/education; Bed mobility;Gait training;Spoke to nursing;Spoke to case management;OT   PT Frequency 3-5x/wk   Recommendation   PT Discharge Recommendation Post acute rehabilitation services   Equipment Recommended [facility] Recommended Wheeled walker   AM-PAC Basic Mobility Inpatient   Turning in Bed Without Bedrails 2   Lying on Back to Sitting on Edge of Flat Bed 2   Moving Bed to Chair 1   Standing Up From Chair 1   Walk in Room 1   Climb 3-5 Stairs 1   Basic Mobility Inpatient Raw Score 8   Turning Head Towards Sound 3   Follow Simple Instructions 2   Low Function Basic Mobility Raw Score 13   Low Function Basic Mobility Standardized Score 20 14   Highest Level Of Mobility   JH-HLM Goal 3: Sit at edge of bed   JH-HLM Highest Level of Mobility 3: Sit at edge of bed   JH-HLM Goal Achieved Yes   Modified Bartholomew Scale   Modified Bartholomew Scale 4   Additional Treatment Session   Start Time 1510   End Time 1520   Treatment Assessment Pt practiced rolling side<>side for pericleanse due to BM in brief  Rolls wiht min Ax1, rails and Bles flexed  Repositioned with sheet lift of 2  All needs in reach   Con't PT   Dar Shelia, PT

## 2022-05-03 NOTE — PROGRESS NOTES
Progress note - Gastroenterology   Antoinette Salinas 63 y o  female MRN: [id number]  Unit/Bed#: -01 Encounter: [id number]    ASSESSMENT and PLAN  1  Cirrhosis decompensated with ascites  82-year-old female with recently diagnosed ETOH and RG cirrhosis in past year - decompensated over the past 6 weeks with encephalopathy, thrombocytopenia, coagulopathy, ascites, ALEJANDRO   No active GI bleeding     MELD - 29 today  Paracentesis performed with 1800 cc of fluid removed- fluid negative for SBP  Diuretics were on hold due to ALEJANDRO which has improved  Bilirubin continues to increase-20 today, direct bilirubin 11 6  Platelets 49, INR increased to 2 38 this a m      -continue to trend LFT, platelet count and PT/INR  - will need EGD  as outpatient to assess for varices  unless overt GIB noted  -will check with Nephrology regarding restarting low-dose spironolactone  -octreotide drip per Nephrology  - should follow-up with Wilbur Lanier upon discharge  - discussed importance of strict alcohol cessation moving forward    2  Retroperitoneal hemorrhage  3  Anemia  Significant anemia in setting of coagulopathy and thrombocytopenia  CT reviewed -  8cm left retroperitoneal hematoma centered in ileus psoas muscle    No overt GI bleeding    No evidence of iron def on labs  Hemoglobin 7 8 today  No clinical signs of increasing hematoma in abdomen, flank area or left leg     - Surgery on board     - monitor H&H and transfuse for hemoglobin less than 7  - continue pantoprazole 40 mg IV b i d  although no overt GIB  - hold NSAIDs  - Eventual scope as outpatient  4 ETOH use  Patient admits to drinking 1-2 glasses a wine every evening prior to admission  Reinforced need for complete alcohol cessation    Chief Complaint   Patient presents with    Leg Pain     Pt reports left leg pain since sunday 4/24/22 when her dog jumped on her  Took tylenol #3 prior to arrival today           SUBJECTIVE/HPI   Remains oriented and conversing appropriately but seems slower this a m  Reports 2 soft bowel movements yesterday and 1 this a m  [place] Ammonia level within normal limits  Abdomen distended but soft  Still with left flank discomfort but less discomfort in left leg  Total bilirubin increased to 20 this a m , direct bilirubin 11 6, INR 2 38, platelet count 49    /60   Pulse 86   Temp 98 5 °F (36 9 °C)   Resp (!) 27   Ht 5' 6" (1 676 m)   Wt 73 5 kg (162 lb 0 6 oz)   SpO2 91%   BMI 26 15 kg/m²     PHYSICALEXAM  General appearance:  Patient seems slower to respond today but remains oriented x3  Appears very jaundiced  Eyes: Icteric  Head: Normocephalic, without obvious abnormality, atraumatic  Lungs: clear to auscultation bilaterally  Heart: regular rate and rhythm, S1, S2 normal, no murmur, click, rub or gallop  Abdomen: soft, distended, nontender with palpation  No increase in left flank hematoma  Extremities:  No lower extremity edema    Hematoma at left thigh stable, area is soft  Neurologic: + asterixis    Lab Results   Component Value Date    GLUCOSE 94 05/08/2015    CALCIUM 8 2 (L) 05/03/2022     05/08/2015    K 3 8 05/03/2022    CO2 29 05/03/2022     05/03/2022    BUN 12 05/03/2022    CREATININE 0 90 05/03/2022     Lab Results   Component Value Date    WBC 6 41 05/03/2022    HGB 7 8 (L) 05/03/2022    HCT 23 5 (L) 05/03/2022     (H) 05/03/2022    PLT 49 (LL) 05/03/2022     Lab Results   Component Value Date    ALT 29 05/03/2022     (H) 05/03/2022    ALKPHOS 43 (L) 05/03/2022    BILITOT 0 2 05/08/2015     No results found for: AMYLASE  Lab Results   Component Value Date    LIPASE 76 04/28/2022     Lab Results   Component Value Date    IRON 97 04/28/2022    TIBC 146 (L) 04/28/2022    FERRITIN 1,238 (H) 04/28/2022     Lab Results   Component Value Date    INR 2 38 (H) 05/03/2022

## 2022-05-03 NOTE — PROGRESS NOTES
[place]  Progress Note - Howard Siu 1960, 63 y o  female MRN: [id number]  Unit/Bed#: -01 Encounter: [id number]  Primary Care Provider: Maude Clark MD   Date and time admitted to hospital: 4/28/2022 10:36 AM    * Cirrhosis of liver with ascites [facility])  Assessment & Plan  Patient presents with acute hip pain abdominal pain abdominal distension  · Endorses history of fatty liver disease, drinks wine occasionally, never been tested for hepatitis  · Found to have significant ascites on exam   · CT abd/pelvis - new large ascites, cirrhotic liver, hypodense area measuring 1 7 cm at junction of segment 4 and 8 in liver, distended gallbladder, splenomegaly with recanalized umbilical vein  · Underwent paracentesis 4/29 with IR - approximately 1 8 L removed  · GI following  · MELD: 30 with 27-32% estimated mortality  · Continue ceftriaxone for SBP prophylaxis  · Increased lactulose to 30 tid  · Re-initiated aldactone today  · Continue to trend T Bili and D bili-> if continuing to trend upwards may consider initiating steroids  · Importance of strict alcohol cessation discussion was done   · Now with decompensated cirrhosis  · lasix held d/t ALEJANDRO    Hyperbilirubinemia  Assessment & Plan    · Bilirubin elevated to 5 69 ->9 70->17 2->20  · D Bili 5-> 11  · CT abdomen pelvis showing - distended gallbladder, large new ascites, hepatomegaly   · Gastroenterology following      Acute blood loss anemia  Assessment & Plan  Presented with acute hip pain , abdominal pain, abdominal distension   CBC showing: Hgb 5 5/Hct16 3,  - macrocytic anemia    Occult heme + stool   Blood consent obtained   Transfusion S/P 2 units PRBCs  o Transfused to keep Hgb >7  o  No history of transfusions    Trend CBC q 8 hrs   Hb stablized-> has significant ecchymosis in the left back and left LE   MRI brain negative for stroke   Continue Protonix IV b i d   · Abraham need outpt f/u for screening EGD for esophageal varices    Lab Results   Component Value Date    HGB 7 8 (L) 05/03/2022    HGB 7 8 (L) 05/03/2022         GI bleed-resolved as of 5/2/2022  Assessment & Plan  Presents to ED with acute left hip pain and abdominal pain/distension   Hgb 5 5/Hct 16 3    CT scan abdomen/pelvis:  New large ascites, distended gallbladder, cirrhotic liver, splenomegaly with recanalized lysed umbilical vein, faint hypodense area 1 7 cm of junction of segment 8 and for a liver, thickening of the ascending colon possibly due to colitis also seen on previous study, moderate left pleural effusion   S/p 4U PRBC's and 2U FFP   LFTs - , normal ALT, D  bili 5 6-> T Bili increased to 17 2    PTT/INR - PT 24, INR 2 22  · Occult blood stool positive in ED   Octreotide gtt dc'd   New large ascites and history of splenomegaly - octreotide dc'd continue ceftriaxone   Hold anticoagulation   Hold NSAIDS (patient is chronic NSAID user at home due to migraine)    Continue to monitor H/H with serial labs q 8 hours   Up out of bed with assistance   Stool records at bedside/ intake and output   EGD outpt   GI following    Lab Results   Component Value Date    HGB 7 8 (L) 05/03/2022    HGB 7 8 (L) 05/03/2022         Acute kidney injury ([address] )  Assessment & Plan  Multifactorial, related to decompensated cirrhosis, contrast exposure, low blood pressures  Re-initiated aldactone  R/o HRS  Albumin 25 x4,   Nephrology following  Continue Midodrine  Octreotide gtt dc'd      Hyponatremia  Assessment & Plan  · Sodium on admission 131  · Likely in setting of cirrhosis  · stable   Free water restriction (1-1 5L)    hold Lasix   spironolactone re-initiated   Monitor BMP     Lab Results   Component Value Date    SODIUM 133 (L) 05/03/2022    SODIUM 132 (L) 05/02/2022    SODIUM 131 (L) 05/01/2022         Pleural effusion  Assessment & Plan  Moderate left effusion noted on CT  Currently on 2 L oxygen  Respiratory protocol  Incentive spirometer  S/p diuresis due to ascites and pleural effusion    Thrombocytopenia (HCC)  Assessment & Plan  · Likely in setting of severe liver disease and splenomegaly  · Downward trending  · transfuse <50k in the setting of bleeding or < 20k if no evidence of bleeding      Weakness of left lower extremity  Assessment & Plan  · Reason for presentation - pain started a few days ago after dog jumping on her - difficulty with ambulation and weight on left side  · CT abdomen and pelvis - osseous structures noted: No acute compression collapse of the vertebra, Chronic compression of the L2 vertebra seen  · Stroke alert called over night 4/30 due to left lower extremity weakness  CT head and CTA head/neck demonstrating chronic changes  · Discussed with Neurology - MRI brain negative  No indication for anti-platelet therapy especially with ongoing anemia/concern for gastrointestinal bleed  · CT recon lumbar spine: "Chronic, mild degenerative changes and chronic moderate L2 compression fracture  No new disc herniation or high-grade stenosis compared to prior studies  Asymmetric left iliopsoas muscle enlargement of unclear etiology, new compared to prior studies  No focal mass or fluid collection  No evidence of discitis on CT   · MRI lumbar - concern for Intramuscular hematoma, consult placed to surgery recommending if there's evidence of further bleeding IR consult for embolization, however her hb is now stable, continue to monitor  · Neurovascular checks ordered  Pulses remain intact  Pain appears better/mobility improved  · S/p FFP 2 units, 4U PRBCs and vitamin K  · XR left hip/femur unremarkable  · PT/OT consult  · Continue pain control      VTE Pharmacologic Prophylaxis: VTE Score: 1 Low Risk (Score 0-2) - Encourage Ambulation  Patient Centered Rounds: I performed bedside rounds with nursing staff today    Discussions with Specialists or Other Care Team Provider: GI, Surgery, Nephrology, CM, PT, OT    Education and Discussions with Family / Patient: Updated  (daughter) at bedside  Time Spent for Care: 60 minutes  More than 50% of total time spent on counseling and coordination of care as described above  Current Length of Stay: 5 day(s)  Current Patient Status: Inpatient   Certification Statement: The patient will continue to require additional inpatient hospital stay due to cirrhosis  Discharge Plan: Anticipate discharge in >72 hrs to discharge location to be determined pending rehab evaluations  Code Status: Level 1 - Full Code    Subjective:   Tahir Souza was seen and examined at bedside  No acute events overnight  During encounter patient was very tired and was continuously drowsy and could not hold a conversation  Denies any acute symptoms on review of systems  Discussed plan of care  All questions and concerns were answered and addressed  Objective:     Vitals:   Temp (24hrs), Av 2 °F (36 8 °C), Min:97 4 °F (36 3 °C), Max:98 9 °F (37 2 °C)    Temp:  [97 4 °F (36 3 °C)-98 9 °F (37 2 °C)] 97 4 °F (36 3 °C)  HR:  [80-89] 86  Resp:  [12-27] 20  BP: (115-137)/(57-70) 137/70  SpO2:  [88 %-96 %] 94 %  Body mass index is 26 15 kg/m²  Input and Output Summary (last 24 hours): Intake/Output Summary (Last 24 hours) at 5/3/2022 1507  Last data filed at 5/3/2022 0000  Gross per 24 hour   Intake 324 58 ml   Output 200 ml   Net 124 58 ml       Physical Exam:   Physical Exam  Vitals and nursing note reviewed  Constitutional:       Comments: sleepy   Eyes:      General: Scleral icterus present  Cardiovascular:      Rate and Rhythm: Normal rate and regular rhythm  Pulses: Normal pulses  Heart sounds: Normal heart sounds  Pulmonary:      Effort: Pulmonary effort is normal       Breath sounds: Normal breath sounds  Abdominal:      General: Abdomen is flat  Bowel sounds are normal       Palpations: Abdomen is soft  Musculoskeletal:      Right lower leg: No edema  Left lower leg: No edema  Skin:     Coloration: Skin is jaundiced  Comments: Ecchymosis in the back has not crossed outlined area nor on LE   Neurological:      General: No focal deficit present  Mental Status: She is oriented to person, place, and time  Additional Data:     Labs:  Results from last 7 days   Lab Units 22  0306   WBC Thousand/uL 6 41   HEMOGLOBIN g/dL 7 8*   HEMATOCRIT % 23 5*   PLATELETS Thousands/uL 49*   NEUTROS PCT % 72   LYMPHS PCT % 13*   MONOS PCT % 12   EOS PCT % 1     Results from last 7 days   Lab Units 22  0306   SODIUM mmol/L 133*   POTASSIUM mmol/L 3 8   CHLORIDE mmol/L 100   CO2 mmol/L 29   BUN mg/dL 12   CREATININE mg/dL 0 90   ANION GAP mmol/L 4   CALCIUM mg/dL 8 2*   ALBUMIN g/dL 2 6*   TOTAL BILIRUBIN mg/dL 20 20*   ALK PHOS U/L 43*   ALT U/L 29   AST U/L 124*   GLUCOSE RANDOM mg/dL 121     Results from last 7 days   Lab Units 22  0306   INR  2 38*     Results from last 7 days   Lab Units 22  0718 22  1549 22  0503   POC GLUCOSE mg/dl 134 147* 186*               Lines/Drains:  Invasive Devices  Report    Peripheral Intravenous Line            Peripheral IV 22 Right Hand 2 days    Peripheral IV 22 Right Forearm 2 days          Drain            External Urinary Catheter 5 days                  Telemetry:  Telemetry Orders (From admission, onward)             48 Hour Telemetry Monitoring  Continuous x 48 hours           References:    Telemetry Guidelines   Question:  Reason for 48 Hour Telemetry  Answer:  Acute CVA (<24 hrs old, hemispheric strokes, selected brainstem strokes, cardiac arrhythmias)                              Imaging: Reviewed radiology reports from this admission including: chest xray and MRI brain    Recent Cultures (last 7 days):   Results from last 7 days   Lab Units 22  1012 22  1432 22  1431 22  1404   BLOOD CULTURE   --  No Growth After 4 Days   No Growth After 4 Days  --    GRAM STAIN RESULT  Rare Polys  No bacteria seen  --   --  1+ Polys  No bacteria seen  Rare Mononuclear Cells  No No organisms seen  No No polys seen   BODY FLUID CULTURE, STERILE  No growth  --   --  No growth       Last 24 Hours Medication List:   Current Facility-Administered Medications   Medication Dose Route Frequency Provider Last Rate    acetaminophen  650 mg Oral Q4H PRN TRAV Deras      aluminum-magnesium hydroxide-simethicone  30 mL Oral Q6H PRN TRAV Deras      cefTRIAXone  1,000 mg Intravenous Daily TRAV Deras 1,000 mg (05/03/22 0850)    gabapentin  100 mg Oral TID TRAV Deras      HYDROmorphone  0 5 mg Intravenous Q1H PRN TRAV Bunch      HYDROmorphone  1 mg Intravenous Once PRN TRAV Bunch      influenza vaccine  0 5 mL Intramuscular Prior to discharge TRAV Deras      lactulose  30 g Oral TID TRAV Mason      LORazepam  1 mg Intravenous Once TRAV Deras      midodrine  5 mg Oral TID AC TRAV Hamm      ondansetron  4 mg Intravenous Q4H PRN TRAV Deras      oxyCODONE  10 mg Oral Q4H PRN TRAV Deras      oxyCODONE  5 mg Oral Q4H PRN TRAV Deras      pantoprazole  40 mg Intravenous Q12H TRAV Hamm      senna-docusate sodium  2 tablet Oral BID PRN TRAV Deras      spironolactone  25 mg Oral Daily TRAV Mason          Today, Patient Was Seen By: Adriana Garces MD    **Please Note: This note may have been constructed using a voice recognition system  **

## 2022-05-03 NOTE — ASSESSMENT & PLAN NOTE
Moderate left effusion noted on CT  Currently on 2 L oxygen  Respiratory protocol  Incentive spirometer  S/p diuresis due to ascites and pleural effusion

## 2022-05-03 NOTE — PROGRESS NOTES
Progress Note - General Surgery   Halley Osborne 63 y o  female MRN: [id number]  Unit/Bed#: -01 Encounter: [id number]    Assessment/ Plan:  53-year-old female with decompensated liver cirrhosis with retroperitoneal bleed hematoma with acute blood loss anemia    Retroperitoneal bleed, hematoma of left iliopsoas muscle  - continue to trend hemoglobin  - stable on recent imaging  - no signs of active bleeding  - continue correction of INR and platelets as needed  - hold anticoagulation  - consider CTA to rule out active bleeding if patient decompensates    Anemia, acute blood loss  - secondary to hematoma, thrombocytopenia and supratherapeutic INR  - heme-positive stool  - continue to monitor  - follow INR, platelets  - transfuse as needed  - FFP to reverse INR  - hemoglobin 7 8    Decompensated liver cirrhosis  - s/p paracentesis by IR on 04/29  - Aldactone and Lasix on hold due to elevated renal indices  - alcohol cessation room recommended  - GI following    Elevated creatinine  - continue to monitor  -avoid nephrotoxins  - diuretics on hold    Subjective/Objective   Chief Complaint: pain     Subjective:  No acute events overnight, pain stable    Objective:     Blood pressure 123/60, pulse 86, temperature 98 5 °F (36 9 °C), resp  rate (!) 27, height 5' 6" (1 676 m), weight 73 5 kg (162 lb 0 6 oz), SpO2 91 %, not currently breastfeeding  ,Body mass index is 26 15 kg/m²        Intake/Output Summary (Last 24 hours) at 5/3/2022 0903  Last data filed at 5/3/2022 0000  Gross per 24 hour   Intake 720 ml   Output 350 ml   Net 370 ml       Invasive Devices  Report    Peripheral Intravenous Line            Peripheral IV 04/30/22 Right Hand 2 days    Peripheral IV 05/01/22 Right Forearm 1 day          Drain            External Urinary Catheter 5 days                Physical Exam: /60   Pulse 86   Temp 98 5 °F (36 9 °C)   Resp (!) 27   Ht 5' 6" (1 676 m)   Wt 73 5 kg (162 lb 0 6 oz)   SpO2 91%   BMI 26 15 kg/m² General appearance: alert and oriented, in no acute distress  Lungs: clear to auscultation bilaterally  Heart: regular rate and rhythm, S1, S2 normal, no murmur, click, rub or gallop  Abdomen: Soft, mild fullness, hypoactive bowel sounds, ecchymosis left flank to back  Extremities: extremities normal, warm and well-perfused; no cyanosis, clubbing, or edema    Lab, Imaging and other studies:  CBC:   Lab Results   Component Value Date    WBC 6 41 05/03/2022    HGB 7 8 (L) 05/03/2022    HCT 23 5 (L) 05/03/2022     (H) 05/03/2022    PLT 49 (LL) 05/03/2022    MCH 35 9 (H) 05/03/2022    MCHC 33 2 05/03/2022    RDW 26 7 (H) 05/03/2022    MPV 10 9 05/03/2022    NRBC 0 05/03/2022   , CMP:   Lab Results   Component Value Date    SODIUM 133 (L) 05/03/2022    K 3 8 05/03/2022     05/03/2022    CO2 29 05/03/2022    BUN 12 05/03/2022    CREATININE 0 90 05/03/2022    CALCIUM 8 2 (L) 05/03/2022     (H) 05/03/2022    ALT 29 05/03/2022    ALKPHOS 43 (L) 05/03/2022    EGFR 70 05/03/2022     VTE Pharmacologic Prophylaxis: Sequential compression device (Venodyne)   VTE Mechanical Prophylaxis: sequential compression device

## 2022-05-03 NOTE — ASSESSMENT & PLAN NOTE
Presented with acute hip pain , abdominal pain, abdominal distension   CBC showing: Hgb 5 5/Hct16 3,  - macrocytic anemia    Occult heme + stool   Blood consent obtained   Transfusion S/P 2 units PRBCs  o Transfused to keep Hgb >7  o  No history of transfusions    Trend CBC q 8 hrs   Hb stablized-> has significant ecchymosis in the left back and left LE   MRI brain negative for stroke   Continue Protonix IV b i d   · Abraham need outpt f/u for screening EGD for esophageal varices    Lab Results   Component Value Date    HGB 7 8 (L) 05/03/2022    HGB 7 8 (L) 05/03/2022

## 2022-05-03 NOTE — PROGRESS NOTES
NEPHROLOGY PROGRESS NOTE   Flako Araujo 63 y o  female MRN: [id number]  Unit/Bed#: -01 Encounter: [id number]  Reason for Consult: ALEJANDRO     67 y/o female with cirrhosis, ETOH use, chronic migraine, chronic subdural hematoma with chronic CSF leak, anxiety, temporal arteritis, lumbar radiculopathy admitted with severe anemia w/hgb 5 5, retroperitoneal bleed/L iliopsoas muscle hematoma and decompensated cirrhosis with ascites, thrombocytopenia, coagulopathy and encephalopathy  Nephrology consulted for management of ALEJANDRO in setting of diuresis and IV contrast      ASSESSMENT/PLAN:  ALEJANDRO:  Suspect prerenal due to hemodynamic changes in setting of diuretics, decompensated cirrhosis, loss of autoregulatory function with ARB as well as contrast associated nephropathy   -Admission creatinine 0 99 with interval increase to 1 34 with diuresis and contrast   Today's creatinine remains at baseline at 0 9  -Peak creatinine 1 34 on 5/1/2022   -baseline creatinine unclear but appears 0 7-0 9 since June 2021  -workup: UA with negative protein, trace ketones, trace leukocytes, 1-2 RBCs, 2-4 wbc's, occasional bacteria   -Imaging:  CT demonstrates no hydronephrosis  -chronic long-term NSAID use, recently decreased over the past year  -nonoliguric  -Plan:  · Renal function stable and at baseline  · Clinically, no acute indication for renal replacement therapy (dialysis)  · May restart diuretics  D/w GI  · Strict I/Os, daily weights  · Avoid nephrotoxins, NSAIDs, IV contrast if possible  · Avoid hypotension  Maintain MAP >65  · Trend BMP  · Adjust meds to appropriate GFR  · Optimize hemodynamic status to avoid delay in renal recovery  · Will d/w Dr Mendoza Law     Hypertension/Volume status:  -BP normotensive and improved  S/p albumin x 4  -clinically, examines euvolemic  -moderate left pleural effusion on CT imaging    Remains on 4L NCO2  -Home medications: candesartan 16 mg daily  -Current medications:  None  -continue to hold ARB  -on midodrine  If BP remains stable today can consider decreasing/discontinuing  -Optimize hemodynamic status to avoid delay in renal recovery  -recommend hold parameters on antihypertensive's for SBP <130 mmHg  -Avoid hypotension or fluctuations in blood pressure  Maintain MAP >65  -continue to trend     Electrolytes:  -K+ 3 8, repleted with 40 mEq  -continue to monitor     Hyponatremia:  -multifactorial due to poor solute intake and liver disease  -serum sodium stable at 133  -previous baseline 139  -continue 1 5L fluid restriction  -continue him on     Acid/base:  -stable  -continue to monitor     Decompensated cirrhosis with ascites, coagulopathy and thrombocytopenia:  -hx fatty liver disease  -CT abdomen/pelvis:  Large ascites, distended gallbladder, cirrhotic liver, splenomegaly   -s/p paracentesis for 1 8L  Gram stain negative  -T bili 20 2, D bili 11 6 and increasing  -INR 2 38  Hgb stable  -confusion improved  -on octreotide and midodrine  S/p albumin x 4  -on lactulose  -plan to restart spironolactone today per GI  -abstain from alcohol  -eventual EGD as outpatient to rule out varices  -GI following  Management per GI and primary medical service     ABLA:  -Hgb 7 8 and stable  Improving  Goal >10  -hemoglobin 5 5 on admission, s/p 2 unit pRBC  -+RPB/L iliopsoas muscle hematoma  -INR 2 38  -GI following  For eventual EGD as outpt when cleared from neurology standpoint   -Iron studies:  Iron saturation 66%, ferritin 1238  -continue to trend  -Transfuse for Hgb <7 0  -management per primary medical service     Thrombocytopenia:  -in the setting of severe liver disease  -platelets decreasing at 49K today  -Hematology consult per primary medical service  -continue to monitor  -management per primary medical service     Left pleural effusion:  -moderate left pleural effusion on CT imaging and CXR  -remains on 4L NCO2  -continue to monitor    Consider thoracentesis when thrombocytopenia stable if increasingly symptomatic       SUBJECTIVE:  Patient awake, alert without complaints  Remains on octreotide  Albumin doses complete  Creatinine stable at 0 90  Total and direct bilirubin increasing  VSS    OBJECTIVE:  Current Weight: Weight - Scale: 73 5 kg (162 lb 0 6 oz)  Vitals:    05/02/22 1545 05/02/22 2100 05/03/22 0607 05/03/22 0708   BP: 115/57 124/67 124/60 123/60   BP Location:       Pulse: 81 80 89 86   Resp: 12 (!) 27     Temp: 98 °F (36 7 °C) 98 9 °F (37 2 °C)  98 5 °F (36 9 °C)   TempSrc: Oral Oral     SpO2: 94% 96% (!) 88% 91%   Weight:       Height:           Intake/Output Summary (Last 24 hours) at 5/3/2022 1000  Last data filed at 5/3/2022 0000  Gross per 24 hour   Intake 670 ml   Output 350 ml   Net 320 ml     General:  Awake, alert, appears comfortable and in no acute distress  Nontoxic  Skin:  No rash, warm, good skin turgor  +jaundice  Eyes:  PERRL, EOMI, sclerae icteric   no periorbital edema   ENT:  Moist mucous membranes  Neck:  Trachea midline, symmetric  No JVD  Chest:  Clear to auscultation bilaterally without wheezes, crackles or rhonchi  Decreased breath sounds left base  CVS:  Regular rate and rhythm without murmur, gallop or rub  S1 and S2 identified and normal   No S3, S4    Abdomen:  Soft, nontender without masses  Normal bowel sounds x 4 quadrants  No bruit  Mild increase in abdominal girth, soft  Extremities:  Warm, pink, motor and sensory intact and well perfused  No cyanosis, pallor  No BLE edema  Neuro:  Awake, alert, oriented x3  Grossly intact  Psych:  Appropriate affect  Mentating appropriately    Normal mental status exam      Medications:    Current Facility-Administered Medications:     acetaminophen (TYLENOL) tablet 650 mg, 650 mg, Oral, Q4H PRN, TRAV Mast    aluminum-magnesium hydroxide-simethicone (MYLANTA) oral suspension 30 mL, 30 mL, Oral, Q6H PRN, TRAV Mast    cefTRIAXone (ROCEPHIN) IVPB (premix in dextrose) 1,000 mg 50 mL, 1,000 mg, Intravenous, Daily, TRAV Lynne, Last Rate: 100 mL/hr at 05/03/22 0850, 1,000 mg at 05/03/22 0850    gabapentin (NEURONTIN) capsule 100 mg, 100 mg, Oral, TID, TRAV Lynne, 100 mg at 05/03/22 0850    HYDROmorphone (DILAUDID) injection 0 5 mg, 0 5 mg, Intravenous, Q1H PRN, TRAV Lynne, 0 5 mg at 05/01/22 1522    HYDROmorphone (DILAUDID) injection 1 mg, 1 mg, Intravenous, Once PRN, Maximiano Mask, CRNP    influenza vaccine, recombinant, quadrivalent (FLUBLOK) IM injection 0 5 mL, 0 5 mL, Intramuscular, Prior to discharge, Maximiano Mask, CRNP    lactulose oral solution 20 g, 20 g, Oral, BID, TRAV Lynne, 20 g at 05/03/22 0850    LORazepam (ATIVAN) injection 1 mg, 1 mg, Intravenous, Once, Maximiano Mask, CRNP    midodrine (PROAMATINE) tablet 5 mg, 5 mg, Oral, TID AC, TRAV Lynne, 5 mg at 05/03/22 0612    octreotide (SandoSTATIN) 500 mcg in sodium chloride 0 9 % 250 mL infusion, 50 mcg/hr, Intravenous, Continuous, TRAV Lynne, Last Rate: 25 mL/hr at 05/03/22 0243, 50 mcg/hr at 05/03/22 0243    ondansetron (ZOFRAN) injection 4 mg, 4 mg, Intravenous, Q4H PRN, TRAV Lynne, 4 mg at 05/03/22 0002    oxyCODONE (ROXICODONE) IR tablet 10 mg, 10 mg, Oral, Q4H PRN, TRAV Lynne, 10 mg at 05/03/22 3567    oxyCODONE (ROXICODONE) IR tablet 5 mg, 5 mg, Oral, Q4H PRN, TRAV Lynne, 5 mg at 05/03/22 0002    pantoprazole (PROTONIX) injection 40 mg, 40 mg, Intravenous, Q12H, Maximiano Mask, CRNP, 40 mg at 05/02/22 1528    senna-docusate sodium (SENOKOT S) 8 6-50 mg per tablet 2 tablet, 2 tablet, Oral, BID PRN, Maximiano Mask, CRNP    Laboratory Results:  Results from last 7 days   Lab Units 05/03/22  0306 05/03/22  0056 05/02/22  1528 05/02/22  0445 05/01/22  2116 05/01/22  0803 05/01/22  3632 05/01/22  0216 04/30/22  2038 04/30/22  1160 04/30/22  0441 04/29/22  2046 04/29/22  1425 04/29/22  0457 04/29/22  0457 04/28/22  1825 04/28/22  1147   WBC Thousand/uL 6 41  --   --  6 67  --   --  7 36  --   --   --   --   --   --   --  7 37  --  6 20   HEMOGLOBIN g/dL 7 8* 7 8* 7 5* 7 9* 7 3* 6 8* 7 0*  --    < >   < >  --    < > 7 1*   < > 7 3*   < > 5 5*   HEMATOCRIT % 23 5*  --   --  24 2*  --   --  21 2*  --   --   --   --   --  22 4*  --  22 6*  --  16 3*   PLATELETS Thousands/uL 49*  --   --  50*  --   --  59*  --   --   --   --   --   --   --  60*  --  59*   POTASSIUM mmol/L 3 8  --   --  3 6  --   --   --  3 9  --   --  3 9  --   --   --  4 1  --  4 2   CHLORIDE mmol/L 100  --   --  98*  --   --   --  99*  --   --  98*  --   --   --  96*  --  97*   CO2 mmol/L 29  --   --  27  --   --   --  28  --   --  29  --   --   --  26  --  25   BUN mg/dL 12  --   --  13  --   --   --  17  --   --  14  --   --   --  12  --  9   CREATININE mg/dL 0 90  --   --  0 98  --   --   --  1 34*  --   --  1 31*  --   --   --  0 96  --  0 99   CALCIUM mg/dL 8 2*  --   --  7 9*  --   --   --  7 7*  --   --  7 7*  --   --   --  8 0*  --  8 0*   MAGNESIUM mg/dL 2 0  --   --   --   --   --   --   --   --   --   --   --   --   --   --   --   --     < > = values in this interval not displayed  I have personally reviewed the blood work as stated above and in my note  I have personally reviewed internal Medicine, co-consultants and previous nephrology notes

## 2022-05-03 NOTE — ASSESSMENT & PLAN NOTE
· Likely in setting of severe liver disease and splenomegaly  · Downward trending  · transfuse <50k in the setting of bleeding or < 20k if no evidence of bleeding

## 2022-05-03 NOTE — ASSESSMENT & PLAN NOTE
· Sodium on admission 131  · Likely in setting of cirrhosis  · stable   Free water restriction (1-1 5L)    hold Lasix   spironolactone re-initiated   Monitor BMP     Lab Results   Component Value Date    SODIUM 133 (L) 05/03/2022    SODIUM 132 (L) 05/02/2022    SODIUM 131 (L) 05/01/2022

## 2022-05-03 NOTE — PLAN OF CARE
Problem: Potential for Falls  Goal: Patient will remain free of falls  Description: INTERVENTIONS:  - Educate patient/family on patient safety including physical limitations  - Instruct patient to call for assistance with activity   - Consult OT/PT to assist with strengthening/mobility   - Keep Call bell within reach  - Keep bed low and locked with side rails adjusted as appropriate  - Keep care items and personal belongings within reach  - Initiate and maintain comfort rounds  - Make Fall Risk Sign visible to staff  - Offer Toileting every 2 Hours, in advance of need  - Initiate/Maintain bed alarm  - Obtain necessary fall risk management equipment: non slip socks  - Apply yellow socks and bracelet for high fall risk patients  - Consider moving patient to room near nurses station  Outcome: Progressing     Problem: HEMATOLOGIC - ADULT  Goal: Maintains hematologic stability  Description: INTERVENTIONS  - Assess for signs and symptoms of bleeding or hemorrhage  - Monitor labs  - Administer supportive blood products/factors as ordered and appropriate  Outcome: Progressing     Problem: Prexisting or High Potential for Compromised Skin Integrity  Goal: Skin integrity is maintained or improved  Description: INTERVENTIONS:  - Identify patients at risk for skin breakdown  - Assess and monitor skin integrity  - Assess and monitor nutrition and hydration status  - Monitor labs   - Assess for incontinence   - Turn and reposition patient  - Assist with mobility/ambulation  - Relieve pressure over bony prominences  - Avoid friction and shearing  - Provide appropriate hygiene as needed including keeping skin clean and dry  - Evaluate need for skin moisturizer/barrier cream  - Collaborate with interdisciplinary team   - Patient/family teaching  - Consider wound care consult   Outcome: Progressing     Problem: MOBILITY - ADULT  Goal: Maintain or return to baseline ADL function  Description: INTERVENTIONS:  -  Assess patient's ability to carry out ADLs; assess patient's baseline for ADL function and identify physical deficits which impact ability to perform ADLs (bathing, care of mouth/teeth, toileting, grooming, dressing, etc )  - Assess/evaluate cause of self-care deficits   - Assess range of motion  - Assess patient's mobility; develop plan if impaired  - Assess patient's need for assistive devices and provide as appropriate  - Encourage maximum independence but intervene and supervise when necessary  - Involve family in performance of ADLs  - Assess for home care needs following discharge   - Consider OT consult to assist with ADL evaluation and planning for discharge  - Provide patient education as appropriate  Outcome: Progressing  Goal: Maintains/Returns to pre admission functional level  Description: INTERVENTIONS:  - Perform BMAT or MOVE assessment daily    - Set and communicate daily mobility goal to care team and patient/family/caregiver  - Collaborate with rehabilitation services on mobility goals if consulted  - Perform Range of Motion 5 times a day  - Reposition patient every 2 hours  - Dangle patient 3 times a day  - Stand patient 3 times a day  - Ambulate patient 3 times a day  - Out of bed to chair 3 times a day   - Out of bed for meals 3 times a day  - Out of bed for toileting  - Record patient progress and toleration of activity level   Outcome: Progressing     Problem: Neurological Deficit  Goal: Neurological status is stable or improving  Description: Interventions:  - Monitor and assess patient's level of consciousness, motor function, sensory function, and level of assistance needed for ADLs  - Monitor and report changes from baseline  Collaborate with interdisciplinary team to initiate plan and implement interventions as ordered  - Provide and maintain a safe environment  - Consider seizure precautions  - Consider fall precautions  - Consider aspiration precautions    - Consider bleeding precautions  Outcome: Progressing     Problem: Activity Intolerance/Impaired Mobility  Goal: Mobility/activity is maintained at optimum level for patient  Description: Interventions:  - Assess and monitor patient  barriers to mobility and need for assistive/adaptive devices  - Assess patient's emotional response to limitations  - Collaborate with interdisciplinary team and initiate plans and interventions as ordered  - Encourage independent activity per ability   - Maintain proper body alignment  - Perform active/passive rom as tolerated/ordered  - Plan activities to conserve energy   - Turn patient as appropriate  Outcome: Progressing     Problem: Communication Impairment  Goal: Ability to express needs and understand communication  Description: Assess patient's communication skills and ability to understand information  Patient will demonstrate use of effective communication techniques, alternative methods of communication and understanding even if not able to speak  - Encourage communication and provide alternate methods of communication as needed  - Collaborate with case management/ for discharge needs  - Include patient/family/caregiver in decisions related to communication  Outcome: Progressing     Problem: Potential for Aspiration  Goal: Non-ventilated patient's risk of aspiration is minimized  Description: Assess and monitor vital signs, respiratory status, and labs (WBC)  Monitor for signs of aspiration (tachypnea, cough, rales, wheezing, cyanosis, fever)  - Assess and monitor patient's ability to swallow  - Place patient up in chair to eat if possible  - HOB up at 90 degrees to eat if unable to get patient up into chair   - Supervise patient during oral intake  - Instruct patient/ family to take small bites  - Instruct patient/ family to take small single sips when taking liquids    - Follow patient-specific strategies generated by speech pathologist   Outcome: Progressing  Goal: Ventilated patient's risk of aspiration is minimized  Description: Assess and monitor vital signs, respiratory status, airway cuff pressure, and labs (WBC)  Monitor for signs of aspiration (tachypnea, cough, rales, wheezing, cyanosis, fever)  - Elevate head of bed 30 degrees if patient has tube feeding   - Monitor tube feeding  Outcome: Progressing     Problem: Nutrition  Goal: Nutrition/Hydration status is improving  Description: Monitor and assess patient's nutrition/hydration status for malnutrition (ex- brittle hair, bruises, dry skin, pale skin and conjunctiva, muscle wasting, smooth red tongue, and disorientation)  Collaborate with interdisciplinary team and initiate plan and interventions as ordered  Monitor patient's weight and dietary intake as ordered or per policy  Utilize nutrition screening tool and intervene per policy  Determine patient's food preferences and provide high-protein, high-caloric foods as appropriate  - Assist patient with eating   - Allow adequate time for meals   - Encourage patient to take dietary supplement as ordered  - Collaborate with clinical nutritionist   - Include patient/family/caregiver in decisions related to nutrition    Outcome: Progressing     Problem: PAIN - ADULT  Goal: Verbalizes/displays adequate comfort level or baseline comfort level  Description: Interventions:  - Encourage patient to monitor pain and request assistance  - Assess pain using appropriate pain scale  - Administer analgesics based on type and severity of pain and evaluate response  - Implement non-pharmacological measures as appropriate and evaluate response  - Consider cultural and social influences on pain and pain management  - Notify physician/advanced practitioner if interventions unsuccessful or patient reports new pain  Outcome: Progressing     Problem: INFECTION - ADULT  Goal: Absence or prevention of progression during hospitalization  Description: INTERVENTIONS:  - Assess and monitor for signs and symptoms of infection  - Monitor lab/diagnostic results  - Monitor all insertion sites, i e  indwelling lines, tubes, and drains  - Monitor endotracheal if appropriate and nasal secretions for changes in amount and color  - Lawrence appropriate cooling/warming therapies per order  - Administer medications as ordered  - Instruct and encourage patient and family to use good hand hygiene technique  - Identify and instruct in appropriate isolation precautions for identified infection/condition  Outcome: Progressing     Problem: SAFETY ADULT  Goal: Patient will remain free of falls  Description: INTERVENTIONS:  - Educate patient/family on patient safety including physical limitations  - Instruct patient to call for assistance with activity   - Consult OT/PT to assist with strengthening/mobility   - Keep Call bell within reach  - Keep bed low and locked with side rails adjusted as appropriate  - Keep care items and personal belongings within reach  - Initiate and maintain comfort rounds  - Make Fall Risk Sign visible to staff  - Offer Toileting every 2 Hours, in advance of need  - Initiate/Maintain bed alarm  - Obtain necessary fall risk management equipment: non slip socks  - Apply yellow socks and bracelet for high fall risk patients  - Consider moving patient to room near nurses station  Outcome: Progressing     Problem: DISCHARGE PLANNING  Goal: Discharge to home or other facility with appropriate resources  Description: INTERVENTIONS:  - Identify barriers to discharge w/patient and caregiver  - Arrange for needed discharge resources and transportation as appropriate  - Identify discharge learning needs (meds, wound care, etc )  - Arrange for interpretive services to assist at discharge as needed  - Refer to Case Management Department for coordinating discharge planning if the patient needs post-hospital services based on physician/advanced practitioner order or complex needs related to functional status, cognitive ability, or social support system  Outcome: Progressing     Problem: Knowledge Deficit  Goal: Patient/family/caregiver demonstrates understanding of disease process, treatment plan, medications, and discharge instructions  Description: Complete learning assessment and assess knowledge base  Interventions:  - Provide teaching at level of understanding  - Provide teaching via preferred learning methods  Outcome: Progressing     Problem: Nutrition/Hydration-ADULT  Goal: Nutrient/Hydration intake appropriate for improving, restoring or maintaining nutritional needs  Description: Monitor and assess patient's nutrition/hydration status for malnutrition  Collaborate with interdisciplinary team and initiate plan and interventions as ordered  Monitor patient's weight and dietary intake as ordered or per policy  Utilize nutrition screening tool and intervene as necessary  Determine patient's food preferences and provide high-protein, high-caloric foods as appropriate       INTERVENTIONS:  - Monitor oral intake, urinary output, labs, and treatment plans  - Assess nutrition and hydration status and recommend course of action  - Evaluate amount of meals eaten  - Assist patient with eating if necessary   - Allow adequate time for meals  - Recommend/ encourage appropriate diets, oral nutritional supplements, and vitamin/mineral supplements  - Order, calculate, and assess calorie counts as needed  - Recommend, monitor, and adjust tube feedings and TPN/PPN based on assessed needs  - Assess need for intravenous fluids  - Provide specific nutrition/hydration education as appropriate  - Include patient/family/caregiver in decisions related to nutrition  Outcome: Progressing     Problem: NEUROSENSORY - ADULT  Goal: Achieves stable or improved neurological status  Description: INTERVENTIONS  - Monitor and report changes in neurological status  - Monitor vital signs such as temperature, blood pressure, glucose, and any other labs ordered   - Initiate measures to prevent increased intracranial pressure  - Monitor for seizure activity and implement precautions if appropriate      Outcome: Progressing     Problem: RESPIRATORY - ADULT  Goal: Achieves optimal ventilation and oxygenation  Description: INTERVENTIONS:  - Assess for changes in respiratory status  - Assess for changes in mentation and behavior  - Position to facilitate oxygenation and minimize respiratory effort  - Oxygen administered by appropriate delivery if ordered  - Initiate smoking cessation education as indicated  - Encourage broncho-pulmonary hygiene including cough, deep breathe, Incentive Spirometry  - Assess the need for suctioning and aspirate as needed  - Assess and instruct to report SOB or any respiratory difficulty  - Respiratory Therapy support as indicated  Outcome: Progressing     Problem: GASTROINTESTINAL - ADULT  Goal: Minimal or absence of nausea and/or vomiting  Description: INTERVENTIONS:  - Administer IV fluids if ordered to ensure adequate hydration  - Maintain NPO status until nausea and vomiting are resolved  - Nasogastric tube if ordered  - Administer ordered antiemetic medications as needed  - Provide nonpharmacologic comfort measures as appropriate  - Advance diet as tolerated, if ordered  - Consider nutrition services referral to assist patient with adequate nutrition and appropriate food choices  Outcome: Progressing  Goal: Maintains or returns to baseline bowel function  Description: INTERVENTIONS:  - Assess bowel function  - Encourage oral fluids to ensure adequate hydration  - Administer IV fluids if ordered to ensure adequate hydration  - Administer ordered medications as needed  - Encourage mobilization and activity  - Consider nutritional services referral to assist patient with adequate nutrition and appropriate food choices  Outcome: Progressing  Goal: Maintains adequate nutritional intake  Description: INTERVENTIONS:  - Monitor percentage of each meal consumed  - Identify factors contributing to decreased intake, treat as appropriate  - Assist with meals as needed  - Monitor I&O, weight, and lab values if indicated  - Obtain nutrition services referral as needed  Outcome: Progressing     Problem: GENITOURINARY - ADULT  Goal: Maintains or returns to baseline urinary function  Description: INTERVENTIONS:  - Assess urinary function  - Encourage oral fluids to ensure adequate hydration if ordered  - Administer IV fluids as ordered to ensure adequate hydration  - Administer ordered medications as needed  - Offer frequent toileting  - Follow urinary retention protocol if ordered  Outcome: Progressing     Problem: SKIN/TISSUE INTEGRITY - ADULT  Goal: Skin Integrity remains intact(Skin Breakdown Prevention)  Description: Assess:  -Perform Brigido assessment every x  -Clean and moisturize skin every x  -Inspect skin when repositioning, toileting, and assisting with ADLS  -Assess under medical devices such as x every x  -Assess extremities for adequate circulation and sensation     Bed Management:  -Have minimal linens on bed & keep smooth, unwrinkled  -Change linens as needed when moist or perspiring  -Avoid sitting or lying in one position for more than x hours while in bed  -Keep HOB at [facility]     Toileting:  -Offer bedside commode  -Assess for incontinence every x  -Use incontinent care products after each incontinent episode such as x    Activity:  -Mobilize patient x times a day  -Encourage activity and walks on unit  -Encourage or provide ROM exercises   -Turn and reposition patient every x Hours  -Use appropriate equipment to lift or move patient in bed  -Instruct/ Assist with weight shifting every x when out of bed in chair  -Consider limitation of chair time x hour intervals    Skin Care:  -Avoid use of baby powder, tape, friction and shearing, hot water or constrictive clothing  -Relieve pressure over bony prominences using x  -Do not massage red bony areas    Next Steps:  -Teach patient strategies to minimize risks such as x   -Consider consults to  interdisciplinary teams such as x  Outcome: Progressing  Goal: Incision(s), wounds(s) or drain site(s) healing without S/S of infection  Description: INTERVENTIONS  - Assess and document dressing, incision, wound bed, drain sites and surrounding tissue  - Provide patient and family education  - Perform skin care/dressing changes every x  Outcome: Progressing     Problem: MUSCULOSKELETAL - ADULT  Goal: Maintain or return mobility to safest level of function  Description: INTERVENTIONS:  - Assess patient's ability to carry out ADLs; assess patient's baseline for ADL function and identify physical deficits which impact ability to perform ADLs (bathing, care of mouth/teeth, toileting, grooming, dressing, etc )  - Assess/evaluate cause of self-care deficits   - Assess range of motion  - Assess patient's mobility  - Assess patient's need for assistive devices and provide as appropriate  - Encourage maximum independence but intervene and supervise when necessary  - Involve family in performance of ADLs  - Assess for home care needs following discharge   - Consider OT consult to assist with ADL evaluation and planning for discharge  - Provide patient education as appropriate  Outcome: Progressing

## 2022-05-03 NOTE — ASSESSMENT & PLAN NOTE
Multifactorial, related to decompensated cirrhosis, contrast exposure, low blood pressures  Re-initiated aldactone  R/o HRS  Albumin 25 x4,   Nephrology following  Continue Midodrine  Octreotide gtt dc'd

## 2022-05-04 LAB
ALBUMIN SERPL BCP-MCNC: 2.5 G/DL (ref 3.5–5)
ALP SERPL-CCNC: 50 U/L (ref 46–116)
ALT SERPL W P-5'-P-CCNC: 38 U/L (ref 12–78)
AMMONIA PLAS-SCNC: <10 UMOL/L (ref 11–35)
ANION GAP SERPL CALCULATED.3IONS-SCNC: 6 MMOL/L (ref 4–13)
AST SERPL W P-5'-P-CCNC: 152 U/L (ref 5–45)
BACTERIA BLD CULT: NORMAL
BACTERIA BLD CULT: NORMAL
BASOPHILS # BLD AUTO: 0.05 THOUSANDS/ΜL (ref 0–0.1)
BASOPHILS NFR BLD AUTO: 1 % (ref 0–1)
BILIRUB DIRECT SERPL-MCNC: 15.1 MG/DL (ref 0–0.2)
BILIRUB SERPL-MCNC: 25.8 MG/DL (ref 0.2–1)
BUN SERPL-MCNC: 12 MG/DL (ref 5–25)
CALCIUM ALBUM COR SERPL-MCNC: 9.6 MG/DL (ref 8.3–10.1)
CALCIUM SERPL-MCNC: 8.4 MG/DL (ref 8.3–10.1)
CHLORIDE SERPL-SCNC: 101 MMOL/L (ref 100–108)
CO2 SERPL-SCNC: 28 MMOL/L (ref 21–32)
CREAT SERPL-MCNC: 0.86 MG/DL (ref 0.6–1.3)
EOSINOPHIL # BLD AUTO: 0.09 THOUSAND/ΜL (ref 0–0.61)
EOSINOPHIL NFR BLD AUTO: 1 % (ref 0–6)
ERYTHROCYTE [DISTWIDTH] IN BLOOD BY AUTOMATED COUNT: 26.6 % (ref 11.6–15.1)
GFR SERPL CREATININE-BSD FRML MDRD: 74 ML/MIN/1.73SQ M
GLUCOSE SERPL-MCNC: 113 MG/DL (ref 65–140)
HCT VFR BLD AUTO: 25.5 % (ref 34.8–46.1)
HGB BLD-MCNC: 8.4 G/DL (ref 11.5–15.4)
IMM GRANULOCYTES # BLD AUTO: 0.09 THOUSAND/UL (ref 0–0.2)
IMM GRANULOCYTES NFR BLD AUTO: 1 % (ref 0–2)
INR PPP: 2.2 (ref 0.84–1.19)
LYMPHOCYTES # BLD AUTO: 0.93 THOUSANDS/ΜL (ref 0.6–4.47)
LYMPHOCYTES NFR BLD AUTO: 13 % (ref 14–44)
MAGNESIUM SERPL-MCNC: 2.1 MG/DL (ref 1.6–2.6)
MCH RBC QN AUTO: 36.4 PG (ref 26.8–34.3)
MCHC RBC AUTO-ENTMCNC: 32.9 G/DL (ref 31.4–37.4)
MCV RBC AUTO: 110 FL (ref 82–98)
MONOCYTES # BLD AUTO: 0.76 THOUSAND/ΜL (ref 0.17–1.22)
MONOCYTES NFR BLD AUTO: 11 % (ref 4–12)
NEUTROPHILS # BLD AUTO: 5.04 THOUSANDS/ΜL (ref 1.85–7.62)
NEUTS SEG NFR BLD AUTO: 73 % (ref 43–75)
NRBC BLD AUTO-RTO: 0 /100 WBCS
PLATELET # BLD AUTO: 54 THOUSANDS/UL (ref 149–390)
PMV BLD AUTO: 10.3 FL (ref 8.9–12.7)
POTASSIUM SERPL-SCNC: 4.2 MMOL/L (ref 3.5–5.3)
PROT SERPL-MCNC: 7.1 G/DL (ref 6.4–8.2)
PROTHROMBIN TIME: 23.9 SECONDS (ref 11.6–14.5)
RBC # BLD AUTO: 2.31 MILLION/UL (ref 3.81–5.12)
SODIUM SERPL-SCNC: 135 MMOL/L (ref 136–145)
WBC # BLD AUTO: 6.96 THOUSAND/UL (ref 4.31–10.16)

## 2022-05-04 PROCEDURE — 82140 ASSAY OF AMMONIA: CPT | Performed by: STUDENT IN AN ORGANIZED HEALTH CARE EDUCATION/TRAINING PROGRAM

## 2022-05-04 PROCEDURE — 85610 PROTHROMBIN TIME: CPT | Performed by: INTERNAL MEDICINE

## 2022-05-04 PROCEDURE — 83735 ASSAY OF MAGNESIUM: CPT | Performed by: STUDENT IN AN ORGANIZED HEALTH CARE EDUCATION/TRAINING PROGRAM

## 2022-05-04 PROCEDURE — 99232 SBSQ HOSP IP/OBS MODERATE 35: CPT | Performed by: INTERNAL MEDICINE

## 2022-05-04 PROCEDURE — 99233 SBSQ HOSP IP/OBS HIGH 50: CPT | Performed by: STUDENT IN AN ORGANIZED HEALTH CARE EDUCATION/TRAINING PROGRAM

## 2022-05-04 PROCEDURE — 80053 COMPREHEN METABOLIC PANEL: CPT | Performed by: INTERNAL MEDICINE

## 2022-05-04 PROCEDURE — 85025 COMPLETE CBC W/AUTO DIFF WBC: CPT | Performed by: INTERNAL MEDICINE

## 2022-05-04 PROCEDURE — 82248 BILIRUBIN DIRECT: CPT | Performed by: INTERNAL MEDICINE

## 2022-05-04 PROCEDURE — C9113 INJ PANTOPRAZOLE SODIUM, VIA: HCPCS | Performed by: NURSE PRACTITIONER

## 2022-05-04 RX ORDER — PREDNISOLONE SODIUM PHOSPHATE 15 MG/5ML
40 SOLUTION ORAL DAILY
Status: DISCONTINUED | OUTPATIENT
Start: 2022-05-04 | End: 2022-05-12 | Stop reason: HOSPADM

## 2022-05-04 RX ADMIN — LACTULOSE 30 G: 20 SOLUTION ORAL at 08:16

## 2022-05-04 RX ADMIN — GABAPENTIN 100 MG: 100 CAPSULE ORAL at 08:15

## 2022-05-04 RX ADMIN — GABAPENTIN 100 MG: 100 CAPSULE ORAL at 18:06

## 2022-05-04 RX ADMIN — PANTOPRAZOLE SODIUM 40 MG: 40 INJECTION, POWDER, FOR SOLUTION INTRAVENOUS at 04:01

## 2022-05-04 RX ADMIN — MIDODRINE HYDROCHLORIDE 5 MG: 5 TABLET ORAL at 05:24

## 2022-05-04 RX ADMIN — PREDNISOLONE SODIUM PHOSPHATE 40 MG: 15 SOLUTION ORAL at 14:15

## 2022-05-04 RX ADMIN — LACTULOSE 30 G: 20 SOLUTION ORAL at 20:53

## 2022-05-04 RX ADMIN — CEFTRIAXONE 1000 MG: 1 INJECTION, SOLUTION INTRAVENOUS at 08:17

## 2022-05-04 RX ADMIN — SPIRONOLACTONE 25 MG: 25 TABLET ORAL at 08:15

## 2022-05-04 RX ADMIN — OXYCODONE HYDROCHLORIDE 5 MG: 5 TABLET ORAL at 14:22

## 2022-05-04 RX ADMIN — PANTOPRAZOLE SODIUM 40 MG: 40 INJECTION, POWDER, FOR SOLUTION INTRAVENOUS at 16:32

## 2022-05-04 RX ADMIN — OXYCODONE HYDROCHLORIDE 5 MG: 5 TABLET ORAL at 08:16

## 2022-05-04 RX ADMIN — OXYCODONE HYDROCHLORIDE 10 MG: 5 TABLET ORAL at 20:53

## 2022-05-04 RX ADMIN — GABAPENTIN 100 MG: 100 CAPSULE ORAL at 20:53

## 2022-05-04 RX ADMIN — HYDROMORPHONE HYDROCHLORIDE 0.5 MG: 1 INJECTION, SOLUTION INTRAMUSCULAR; INTRAVENOUS; SUBCUTANEOUS at 09:43

## 2022-05-04 RX ADMIN — ONDANSETRON 4 MG: 2 INJECTION INTRAMUSCULAR; INTRAVENOUS at 08:16

## 2022-05-04 RX ADMIN — LACTULOSE 30 G: 20 SOLUTION ORAL at 18:06

## 2022-05-04 NOTE — CASE MANAGEMENT
Case Management Discharge Planning Note    Patient name Jason Liao  Location /-43 MRN [id number]  : 1960 Date 2022       Current Admission Date: 2022  Current Admission Diagnosis:Cirrhosis of liver with ascites [facility])   Patient Active Problem List    Diagnosis Date Noted    Acute kidney injury ([facility] ) 2022    Cirrhosis of liver with ascites ([facility] ) 2022    Weakness of left lower extremity 2022    Acute blood loss anemia 2022    Thrombocytopenia ([facility] ) 2022    Hyponatremia 2022    Hyperbilirubinemia 2022    Pleural effusion 2022    Abnormal LFTs 2021    Personal history of colonic polyps 2021    Right lower quadrant abdominal pain 06/15/2021    Acute cystitis without hematuria 05/10/2021    Chronic pain syndrome 2020    Contusion of left foot 2020    Chronic bilateral low back pain without sciatica 2020    Lumbar spondylosis 2020    DDD (degenerative disc disease), lumbar 2020    Right lumbar radiculitis 2020    Left wrist tendonitis 2019    Nondisplaced fracture of distal end of left radius 2019    Closed compression fracture of L2 vertebra ([facility] ) 2019      LOS (days): 6  Geometric Mean LOS (GMLOS) (days): 2 70  Days to GMLOS:-3 1     OBJECTIVE:  Risk of Unplanned Readmission Score: 10     Current admission status: Inpatient   Preferred Pharmacy:   [facility] [address] [place] [address],[facility] Floor [address] [facility] [address]  Phone: [phone] Fax: [phone]    Primary Care Provider: Aubrey Pruitt MD    Primary Insurance: [facility] [employer] [employer]  Secondary Insurance:     DISCHARGE DETAILS:  Additional Comments: Met with Pt and Pt's dtr at bedside re: SNF recommendation  Discussed area SNF facilities  Pt is requesting to stay in [facility] area   CM notified Pt and Pt's dtr of limited facilities that accept Pt's [facility] insurance  Pt and Pt's dtr expressed understanding  List of SNF facilities given to Pt's dtr to review with Pt and Pt's SO  Await determination  Will need to obtain insurance auth for SNF prior to discharge  CM to follow

## 2022-05-04 NOTE — PLAN OF CARE
Problem: Potential for Falls  Goal: Patient will remain free of falls  Description: INTERVENTIONS:  - Educate patient/family on patient safety including physical limitations  - Instruct patient to call for assistance with activity   - Consult OT/PT to assist with strengthening/mobility   - Keep Call bell within reach  - Keep bed low and locked with side rails adjusted as appropriate  - Keep care items and personal belongings within reach  - Initiate and maintain comfort rounds  - Make Fall Risk Sign visible to staff  - Offer Toileting every 2 Hours, in advance of need  - Initiate/Maintain bed alarm  - Obtain necessary fall risk management equipment: non slip socks  - Apply yellow socks and bracelet for high fall risk patients  - Consider moving patient to room near nurses station  Outcome: Progressing     Problem: HEMATOLOGIC - ADULT  Goal: Maintains hematologic stability  Description: INTERVENTIONS  - Assess for signs and symptoms of bleeding or hemorrhage  - Monitor labs  - Administer supportive blood products/factors as ordered and appropriate  Outcome: Progressing     Problem: Prexisting or High Potential for Compromised Skin Integrity  Goal: Skin integrity is maintained or improved  Description: INTERVENTIONS:  - Identify patients at risk for skin breakdown  - Assess and monitor skin integrity  - Assess and monitor nutrition and hydration status  - Monitor labs   - Assess for incontinence   - Turn and reposition patient  - Assist with mobility/ambulation  - Relieve pressure over bony prominences  - Avoid friction and shearing  - Provide appropriate hygiene as needed including keeping skin clean and dry  - Evaluate need for skin moisturizer/barrier cream  - Collaborate with interdisciplinary team   - Patient/family teaching  - Consider wound care consult   Outcome: Progressing     Problem: MOBILITY - ADULT  Goal: Maintain or return to baseline ADL function  Description: INTERVENTIONS:  -  Assess patient's ability to carry out ADLs; assess patient's baseline for ADL function and identify physical deficits which impact ability to perform ADLs (bathing, care of mouth/teeth, toileting, grooming, dressing, etc )  - Assess/evaluate cause of self-care deficits   - Assess range of motion  - Assess patient's mobility; develop plan if impaired  - Assess patient's need for assistive devices and provide as appropriate  - Encourage maximum independence but intervene and supervise when necessary  - Involve family in performance of ADLs  - Assess for home care needs following discharge   - Consider OT consult to assist with ADL evaluation and planning for discharge  - Provide patient education as appropriate  Outcome: Progressing  Goal: Maintains/Returns to pre admission functional level  Description: INTERVENTIONS:  - Perform BMAT or MOVE assessment daily    - Set and communicate daily mobility goal to care team and patient/family/caregiver  - Collaborate with rehabilitation services on mobility goals if consulted  - Perform Range of Motion 5 times a day  - Reposition patient every 2 hours  - Dangle patient 3 times a day  - Stand patient 3 times a day  - Ambulate patient 3 times a day  - Out of bed to chair 3 times a day   - Out of bed for meals 3 times a day  - Out of bed for toileting  - Record patient progress and toleration of activity level   Outcome: Progressing     Problem: Neurological Deficit  Goal: Neurological status is stable or improving  Description: Interventions:  - Monitor and assess patient's level of consciousness, motor function, sensory function, and level of assistance needed for ADLs  - Monitor and report changes from baseline  Collaborate with interdisciplinary team to initiate plan and implement interventions as ordered  - Provide and maintain a safe environment  - Consider seizure precautions  - Consider fall precautions  - Consider aspiration precautions    - Consider bleeding precautions  Outcome: Progressing     Problem: Activity Intolerance/Impaired Mobility  Goal: Mobility/activity is maintained at optimum level for patient  Description: Interventions:  - Assess and monitor patient  barriers to mobility and need for assistive/adaptive devices  - Assess patient's emotional response to limitations  - Collaborate with interdisciplinary team and initiate plans and interventions as ordered  - Encourage independent activity per ability   - Maintain proper body alignment  - Perform active/passive rom as tolerated/ordered  - Plan activities to conserve energy   - Turn patient as appropriate  Outcome: Progressing     Problem: Communication Impairment  Goal: Ability to express needs and understand communication  Description: Assess patient's communication skills and ability to understand information  Patient will demonstrate use of effective communication techniques, alternative methods of communication and understanding even if not able to speak  - Encourage communication and provide alternate methods of communication as needed  - Collaborate with case management/ for discharge needs  - Include patient/family/caregiver in decisions related to communication  Outcome: Progressing     Problem: Potential for Aspiration  Goal: Non-ventilated patient's risk of aspiration is minimized  Description: Assess and monitor vital signs, respiratory status, and labs (WBC)  Monitor for signs of aspiration (tachypnea, cough, rales, wheezing, cyanosis, fever)  - Assess and monitor patient's ability to swallow  - Place patient up in chair to eat if possible  - HOB up at 90 degrees to eat if unable to get patient up into chair   - Supervise patient during oral intake  - Instruct patient/ family to take small bites  - Instruct patient/ family to take small single sips when taking liquids    - Follow patient-specific strategies generated by speech pathologist   Outcome: Progressing  Goal: Ventilated patient's risk of aspiration is minimized  Description: Assess and monitor vital signs, respiratory status, airway cuff pressure, and labs (WBC)  Monitor for signs of aspiration (tachypnea, cough, rales, wheezing, cyanosis, fever)  - Elevate head of bed 30 degrees if patient has tube feeding   - Monitor tube feeding  Outcome: Progressing     Problem: Nutrition  Goal: Nutrition/Hydration status is improving  Description: Monitor and assess patient's nutrition/hydration status for malnutrition (ex- brittle hair, bruises, dry skin, pale skin and conjunctiva, muscle wasting, smooth red tongue, and disorientation)  Collaborate with interdisciplinary team and initiate plan and interventions as ordered  Monitor patient's weight and dietary intake as ordered or per policy  Utilize nutrition screening tool and intervene per policy  Determine patient's food preferences and provide high-protein, high-caloric foods as appropriate  - Assist patient with eating   - Allow adequate time for meals   - Encourage patient to take dietary supplement as ordered  - Collaborate with clinical nutritionist   - Include patient/family/caregiver in decisions related to nutrition    Outcome: Progressing     Problem: PAIN - ADULT  Goal: Verbalizes/displays adequate comfort level or baseline comfort level  Description: Interventions:  - Encourage patient to monitor pain and request assistance  - Assess pain using appropriate pain scale  - Administer analgesics based on type and severity of pain and evaluate response  - Implement non-pharmacological measures as appropriate and evaluate response  - Consider cultural and social influences on pain and pain management  - Notify physician/advanced practitioner if interventions unsuccessful or patient reports new pain  Outcome: Progressing     Problem: INFECTION - ADULT  Goal: Absence or prevention of progression during hospitalization  Description: INTERVENTIONS:  - Assess and monitor for signs and symptoms of infection  - Monitor lab/diagnostic results  - Monitor all insertion sites, i e  indwelling lines, tubes, and drains  - Monitor endotracheal if appropriate and nasal secretions for changes in amount and color  - [place] appropriate cooling/warming therapies per order  - Administer medications as ordered  - Instruct and encourage patient and family to use good hand hygiene technique  - Identify and instruct in appropriate isolation precautions for identified infection/condition  Outcome: Progressing     Problem: SAFETY ADULT  Goal: Patient will remain free of falls  Description: INTERVENTIONS:  - Educate patient/family on patient safety including physical limitations  - Instruct patient to call for assistance with activity   - Consult OT/PT to assist with strengthening/mobility   - Keep Call bell within reach  - Keep bed low and locked with side rails adjusted as appropriate  - Keep care items and personal belongings within reach  - Initiate and maintain comfort rounds  - Make Fall Risk Sign visible to staff  - Offer Toileting every 2 Hours, in advance of need  - Initiate/Maintain bed alarm  - Obtain necessary fall risk management equipment: non slip socks  - Apply yellow socks and bracelet for high fall risk patients  - Consider moving patient to room near nurses station  Outcome: Progressing     Problem: DISCHARGE PLANNING  Goal: Discharge to home or other facility with appropriate resources  Description: INTERVENTIONS:  - Identify barriers to discharge w/patient and caregiver  - Arrange for needed discharge resources and transportation as appropriate  - Identify discharge learning needs (meds, wound care, etc )  - Arrange for interpretive services to assist at discharge as needed  - Refer to Case Management Department for coordinating discharge planning if the patient needs post-hospital services based on physician/advanced practitioner order or complex needs related to functional status, cognitive ability, or social support system  Outcome: Progressing     Problem: Knowledge Deficit  Goal: Patient/family/caregiver demonstrates understanding of disease process, treatment plan, medications, and discharge instructions  Description: Complete learning assessment and assess knowledge base  Interventions:  - Provide teaching at level of understanding  - Provide teaching via preferred learning methods  Outcome: Progressing     Problem: Nutrition/Hydration-ADULT  Goal: Nutrient/Hydration intake appropriate for improving, restoring or maintaining nutritional needs  Description: Monitor and assess patient's nutrition/hydration status for malnutrition  Collaborate with interdisciplinary team and initiate plan and interventions as ordered  Monitor patient's weight and dietary intake as ordered or per policy  Utilize nutrition screening tool and intervene as necessary  Determine patient's food preferences and provide high-protein, high-caloric foods as appropriate       INTERVENTIONS:  - Monitor oral intake, urinary output, labs, and treatment plans  - Assess nutrition and hydration status and recommend course of action  - Evaluate amount of meals eaten  - Assist patient with eating if necessary   - Allow adequate time for meals  - Recommend/ encourage appropriate diets, oral nutritional supplements, and vitamin/mineral supplements  - Order, calculate, and assess calorie counts as needed  - Recommend, monitor, and adjust tube feedings and TPN/PPN based on assessed needs  - Assess need for intravenous fluids  - Provide specific nutrition/hydration education as appropriate  - Include patient/family/caregiver in decisions related to nutrition  Outcome: Progressing     Problem: NEUROSENSORY - ADULT  Goal: Achieves stable or improved neurological status  Description: INTERVENTIONS  - Monitor and report changes in neurological status  - Monitor vital signs such as temperature, blood pressure, glucose, and any other labs ordered   - Initiate measures to prevent increased intracranial pressure  - Monitor for seizure activity and implement precautions if appropriate      Outcome: Progressing     Problem: RESPIRATORY - ADULT  Goal: Achieves optimal ventilation and oxygenation  Description: INTERVENTIONS:  - Assess for changes in respiratory status  - Assess for changes in mentation and behavior  - Position to facilitate oxygenation and minimize respiratory effort  - Oxygen administered by appropriate delivery if ordered  - Initiate smoking cessation education as indicated  - Encourage broncho-pulmonary hygiene including cough, deep breathe, Incentive Spirometry  - Assess the need for suctioning and aspirate as needed  - Assess and instruct to report SOB or any respiratory difficulty  - Respiratory Therapy support as indicated  Outcome: Progressing     Problem: GASTROINTESTINAL - ADULT  Goal: Minimal or absence of nausea and/or vomiting  Description: INTERVENTIONS:  - Administer IV fluids if ordered to ensure adequate hydration  - Maintain NPO status until nausea and vomiting are resolved  - Nasogastric tube if ordered  - Administer ordered antiemetic medications as needed  - Provide nonpharmacologic comfort measures as appropriate  - Advance diet as tolerated, if ordered  - Consider nutrition services referral to assist patient with adequate nutrition and appropriate food choices  Outcome: Progressing  Goal: Maintains or returns to baseline bowel function  Description: INTERVENTIONS:  - Assess bowel function  - Encourage oral fluids to ensure adequate hydration  - Administer IV fluids if ordered to ensure adequate hydration  - Administer ordered medications as needed  - Encourage mobilization and activity  - Consider nutritional services referral to assist patient with adequate nutrition and appropriate food choices  Outcome: Progressing  Goal: Maintains adequate nutritional intake  Description: INTERVENTIONS:  - Monitor percentage of each meal consumed  - Identify factors contributing to decreased intake, treat as appropriate  - Assist with meals as needed  - Monitor I&O, weight, and lab values if indicated  - Obtain nutrition services referral as needed  Outcome: Progressing     Problem: GENITOURINARY - ADULT  Goal: Maintains or returns to baseline urinary function  Description: INTERVENTIONS:  - Assess urinary function  - Encourage oral fluids to ensure adequate hydration if ordered  - Administer IV fluids as ordered to ensure adequate hydration  - Administer ordered medications as needed  - Offer frequent toileting  - Follow urinary retention protocol if ordered  Outcome: Progressing      Problem: MUSCULOSKELETAL - ADULT  Goal: Maintain or return mobility to safest level of function  Description: INTERVENTIONS:  - Assess patient's ability to carry out ADLs; assess patient's baseline for ADL function and identify physical deficits which impact ability to perform ADLs (bathing, care of mouth/teeth, toileting, grooming, dressing, etc )  - Assess/evaluate cause of self-care deficits   - Assess range of motion  - Assess patient's mobility  - Assess patient's need for assistive devices and provide as appropriate  - Encourage maximum independence but intervene and supervise when necessary  - Involve family in performance of ADLs  - Assess for home care needs following discharge   - Consider OT consult to assist with ADL evaluation and planning for discharge  - Provide patient education as appropriate  Outcome: Progressing

## 2022-05-04 NOTE — PROGRESS NOTES
Progress note - Gastroenterology   January Johnson 63 y o  female MRN: [id number]  Unit/Bed#: -01 Encounter: [id number]    ASSESSMENT and PLAN  1  Cirrhosis decompensated with ascites  61-year-old female with recently diagnosed ETOH and RG cirrhosis in past year - decompensated over the past 6 weeks with encephalopathy, thrombocytopenia, coagulopathy, ascites, ALEJANDRO   No active GI bleeding     MELD - 29 today  Paracentesis performed with 1800 cc of fluid removed- fluid negative for SBP  Diuretics were on hold due to ALEJANDRO which has improved  Bilirubin continues to increase-20 today, direct bilirubin 11 6  Likely multifactorial with cirrhosis and hematoma resorption  -consider steroids for acute hepatitis  -continue to trend LFT, platelet count and PT/INR  - will need EGD  as outpatient to assess for varices  unless overt GIB noted  -continue spironolactone 25 mg daily  -Continue lactulose t i d   - should follow-up with Becca Britt upon discharge  - discussed importance of strict alcohol cessation moving forward    2  Retroperitoneal hemorrhage  3  Anemia  Significant anemia in setting of coagulopathy and thrombocytopenia    CT reviewed -  8cm left retroperitoneal hematoma centered in ileus psoas muscle    No overt GI bleeding    No evidence of iron def on labs  Hemoglobin 8 4  today  No clinical signs of increasing hematoma in abdomen, flank area or left leg     - Surgery on board     - monitor H&H and transfuse for hemoglobin less than 7  - continue pantoprazole 40 mg IV b i d  although no overt GIB  - hold NSAIDs  - Eventual scope as outpatient      4  ETOH use  Patient admits to drinking 1-2 glasses a wine every evening prior to admission  Reinforced need for complete alcohol cessation      Chief Complaint   Patient presents with    Leg Pain     Pt reports left leg pain since sunday 4/24/22 when her dog jumped on her  Took tylenol #3 prior to arrival today           SUBJECTIVE/HPI    Patient lethargic after receiving IV Dilaudid for left leg pain  Her daughter reports that she was more awake and conversing this morning  Lactulose increased yesterday-had 5-6 loose stools yesterday and 3 so far today  Appetite is poor  Total bilirubin continues to increase -25 8 today, AST increased to 152  Direct bilirubin increased to 15 1  INR stable at 2 2      /71   Pulse 79   Temp 98 3 °F (36 8 °C)   Resp 18   Ht 5' 6" (1 676 m)   Wt 73 5 kg (162 lb 0 6 oz)   SpO2 94%   BMI 26 15 kg/m²     PHYSICALEXAM  General appearance:  Lethargic after receiving pain medication, NAD  Eyes:   Icteric and jaundiced  Head: Normocephalic, without obvious abnormality, atraumatic  Lungs: clear to auscultation bilaterally  Heart: regular rate and rhythm, S1, S2 normal, no murmur, click, rub or gallop  Abdomen: soft, distended and nontender  Extremities: extremities normal, atraumatic, no cyanosis or edema  Neurologic: + asterixis    Lab Results   Component Value Date    GLUCOSE 94 05/08/2015    CALCIUM 8 4 05/04/2022     05/08/2015    K 4 2 05/04/2022    CO2 28 05/04/2022     05/04/2022    BUN 12 05/04/2022    CREATININE 0 86 05/04/2022     Lab Results   Component Value Date    WBC 6 96 05/04/2022    HGB 8 4 (L) 05/04/2022    HCT 25 5 (L) 05/04/2022     (H) 05/04/2022    PLT 54 (L) 05/04/2022     Lab Results   Component Value Date    ALT 38 05/04/2022     (H) 05/04/2022    ALKPHOS 50 05/04/2022    BILITOT 0 2 05/08/2015     No results found for: AMYLASE  Lab Results   Component Value Date    LIPASE 76 04/28/2022     Lab Results   Component Value Date    IRON 97 04/28/2022    TIBC 146 (L) 04/28/2022    FERRITIN 1,238 (H) 04/28/2022     Lab Results   Component Value Date    INR 2 20 (H) 05/04/2022

## 2022-05-04 NOTE — ASSESSMENT & PLAN NOTE
Presented with acute hip pain , abdominal pain, abdominal distension   CBC showing: Hgb 5 5/Hct16 3,  - macrocytic anemia    Occult heme + stool   Blood consent obtained   Transfusion S/P 4 units PRBCs  o Transfused to keep Hgb >7  o  No history of transfusions    Trend CBC   Hb stablized-> has significant ecchymosis in the left back and left LE   MRI brain negative for stroke   Continue Protonix IV b i d   · Abraham need outpt f/u for screening EGD for esophageal varices    Lab Results   Component Value Date    HGB 8 4 (L) 05/04/2022    HGB 7 8 (L) 05/03/2022

## 2022-05-04 NOTE — PROGRESS NOTES
[place]  Progress Note - Flako Araujo 1960, 63 y o  female MRN: [id number]  Unit/Bed#: -01 Encounter: [id number]  Primary Care Provider: Nicola Cruz MD   Date and time admitted to hospital: 4/28/2022 10:36 AM    * Cirrhosis of liver with ascites [facility])  Assessment & Plan  Patient presents with acute hip pain abdominal pain abdominal distension  · Endorses history of fatty liver disease, drinks wine occasionally, never been tested for hepatitis  · Found to have significant ascites on exam   · CT abd/pelvis - new large ascites, cirrhotic liver, hypodense area measuring 1 7 cm at junction of segment 4 and 8 in liver, distended gallbladder, splenomegaly with recanalized umbilical vein  · Underwent paracentesis 4/29 with IR - approximately 1 8 L removed  · GI following  · MELD: 30 with 27-32% estimated mortality  · Continue ceftriaxone for SBP prophylaxis  · lactulose to 30 tid  · Re-initiated aldactone 5/3 dc'd midodrine 5/4  · Continue to trend T Bili and D bili-> continuing to trend upwards  · Importance of strict alcohol cessation discussion was done   · Now with decompensated cirrhosis  · lasix held d/t ALEJANDRO    Hyperbilirubinemia  Assessment & Plan    · Bilirubin elevated to 5 69 ->9 70->17 2->20->25  · D Bili 5-> 11->15  · CT abdomen pelvis showing - distended gallbladder, large new ascites, hepatomegaly   · Gastroenterology following      Acute blood loss anemia  Assessment & Plan  Presented with acute hip pain , abdominal pain, abdominal distension   CBC showing: Hgb 5 5/Hct16 3,  - macrocytic anemia    Occult heme + stool   Blood consent obtained   Transfusion S/P 4 units PRBCs  o Transfused to keep Hgb >7  o  No history of transfusions    Trend CBC   Hb stablized-> has significant ecchymosis in the left back and left LE   MRI brain negative for stroke   Continue Protonix IV b i d   · Abraham need outpt f/u for screening EGD for esophageal varices    Lab Results   Component Value Date    HGB 8 4 (L) 05/04/2022    HGB 7 8 (L) 05/03/2022         GI bleed-resolved as of 5/2/2022  Assessment & Plan  Presents to ED with acute left hip pain and abdominal pain/distension   Hgb 5 5/Hct 16 3    CT scan abdomen/pelvis:  New large ascites, distended gallbladder, cirrhotic liver, splenomegaly with recanalized lysed umbilical vein, faint hypodense area 1 7 cm of junction of segment 8 and for a liver, thickening of the ascending colon possibly due to colitis also seen on previous study, moderate left pleural effusion   S/p 4U PRBC's and 2U FFP   LFTs - , normal ALT, D  bili 5 6-> T Bili increased to 17 2    PTT/INR - PT 24, INR 2 22  · Occult blood stool positive in ED   Octreotide gtt dc'd   New large ascites and history of splenomegaly - octreotide dc'd continue ceftriaxone   Hold anticoagulation   Hold NSAIDS (patient is chronic NSAID user at home due to migraine)    Continue to monitor H/H with serial labs q 8 hours   Up out of bed with assistance   Stool records at bedside/ intake and output   EGD outpt   GI following    Lab Results   Component Value Date    HGB 8 4 (L) 05/04/2022    HGB 7 8 (L) 05/03/2022         Acute kidney injury ([address] )  Assessment & Plan  Multifactorial, related to decompensated cirrhosis, contrast exposure, low blood pressures  Re-initiated aldactone  R/o HRS  S/p Albumin 25 x4,   Nephrology following  Dc'd Midodrine  Octreotide gtt dc'd  Continue to hold ARB      Hyponatremia  Assessment & Plan  · Sodium on admission 131  · Likely in setting of cirrhosis  · stable   Free water restriction (1-1 5L)    hold Lasix   spironolactone re-initiated   improving   Monitor BMP     Lab Results   Component Value Date    SODIUM 135 (L) 05/04/2022    SODIUM 133 (L) 05/03/2022    SODIUM 132 (L) 05/02/2022         Pleural effusion  Assessment & Plan  Moderate left effusion noted on CT  Currently on 2 L oxygen  Respiratory protocol  Incentive spirometer  S/p diuresis due to ascites and pleural effusion    Thrombocytopenia (HCC)  Assessment & Plan  · Likely in setting of severe liver disease and splenomegaly  · improving  · transfuse <50k in the setting of bleeding or < 20k if no evidence of bleeding      Weakness of left lower extremity  Assessment & Plan  · Reason for presentation - pain started a few days ago after dog jumping on her - difficulty with ambulation and weight on left side  · CT abdomen and pelvis - osseous structures noted: No acute compression collapse of the vertebra, Chronic compression of the L2 vertebra seen  · Stroke alert called over night 4/30 due to left lower extremity weakness  CT head and CTA head/neck demonstrating chronic changes  · Discussed with Neurology - MRI brain negative  No indication for anti-platelet therapy especially with ongoing anemia/concern for gastrointestinal bleed  · CT recon lumbar spine: "Chronic, mild degenerative changes and chronic moderate L2 compression fracture  No new disc herniation or high-grade stenosis compared to prior studies  Asymmetric left iliopsoas muscle enlargement of unclear etiology, new compared to prior studies  No focal mass or fluid collection  No evidence of discitis on CT   · MRI lumbar - concern for Intramuscular hematoma, consult placed to surgery recommending if there's evidence of further bleeding IR consult for embolization, however her hb is now stable, continue to monitor  · Neurovascular checks ordered  Pulses remain intact  Pain appears better/mobility improved  · S/p FFP 2 units, 4U PRBCs and vitamin K  · XR left hip/femur unremarkable  · PT/OT consult  · Continue pain control      VTE Pharmacologic Prophylaxis: VTE Score: 1 Low Risk (Score 0-2) - Encourage Ambulation  Patient Centered Rounds: I performed bedside rounds with nursing staff today    Discussions with Specialists or Other Care Team Provider: Nephro, GI, CM, PT, OT    Education and Discussions with Family / Patient: Updated  (daughter) at bedside  Time Spent for Care: 30 minutes  More than 50% of total time spent on counseling and coordination of care as described above  Current Length of Stay: 6 day(s)  Current Patient Status: Inpatient   Certification Statement: The patient will continue to require additional inpatient hospital stay due to cirrhosis  Discharge Plan: Anticipate discharge in 48-72 hrs to discharge location to be determined pending rehab evaluations  Code Status: Level 1 - Full Code    Subjective:   Lawanda Campbell was seen and examined at bedside  No acute events overnight  Patient is more alert however still ill appearing  Discussed lab results and plan of care with daughter and the patient  Emphasized moving and getting out of bed and sitting in the chair  Daughter endorses decreased appetite which is most likely due to her illness  Recommended to continue encouraging her to get up out of bed and to eat  Patient states that she is tired  Discussed plan of care  All questions and concerns were answered and addressed  Objective:     Vitals:   Temp (24hrs), Av 8 °F (36 6 °C), Min:97 4 °F (36 3 °C), Max:98 3 °F (36 8 °C)    Temp:  [97 4 °F (36 3 °C)-98 3 °F (36 8 °C)] 98 3 °F (36 8 °C)  HR:  [79-86] 79  Resp:  [18] 18  BP: (122-137)/(59-71) 135/71  SpO2:  [93 %-94 %] 94 %  Body mass index is 26 15 kg/m²  Input and Output Summary (last 24 hours): Intake/Output Summary (Last 24 hours) at 2022 1058  Last data filed at 5/3/2022 2001  Gross per 24 hour   Intake 480 ml   Output --   Net 480 ml       Physical Exam:   Physical Exam  Vitals and nursing note reviewed  Constitutional:       Appearance: She is ill-appearing  Comments: More alert and responsive    HENT:      Head: Normocephalic and atraumatic  Eyes:      General: Scleral icterus present  Cardiovascular:      Rate and Rhythm: Normal rate and regular rhythm  Pulses: Normal pulses  Heart sounds: Murmur heard  Pulmonary:      Effort: Pulmonary effort is normal       Breath sounds: Normal breath sounds  Abdominal:      General: Abdomen is flat  Bowel sounds are normal       Palpations: Abdomen is soft  Musculoskeletal:      Right lower leg: No edema  Left lower leg: No edema  Skin:     Coloration: Skin is jaundiced  Comments: Ecchymosis on back and left lower extremity improving   Neurological:      General: No focal deficit present  Mental Status: She is oriented to person, place, and time  Additional Data:     Labs:  Results from last 7 days   Lab Units 05/04/22  0536   WBC Thousand/uL 6 96   HEMOGLOBIN g/dL 8 4*   HEMATOCRIT % 25 5*   PLATELETS Thousands/uL 54*   NEUTROS PCT % 73   LYMPHS PCT % 13*   MONOS PCT % 11   EOS PCT % 1     Results from last 7 days   Lab Units 05/04/22  0536   SODIUM mmol/L 135*   POTASSIUM mmol/L 4 2   CHLORIDE mmol/L 101   CO2 mmol/L 28   BUN mg/dL 12   CREATININE mg/dL 0 86   ANION GAP mmol/L 6   CALCIUM mg/dL 8 4   ALBUMIN g/dL 2 5*   TOTAL BILIRUBIN mg/dL 25 80*   ALK PHOS U/L 50   ALT U/L 38   AST U/L 152*   GLUCOSE RANDOM mg/dL 113     Results from last 7 days   Lab Units 05/04/22  0536   INR  2 20*     Results from last 7 days   Lab Units 05/01/22  0718 04/29/22  1549 04/29/22  0503   POC GLUCOSE mg/dl 134 147* 186*               Lines/Drains:  Invasive Devices  Report    Peripheral Intravenous Line            Peripheral IV 05/01/22 Right Forearm 2 days                      Imaging: No pertinent imaging reviewed  Recent Cultures (last 7 days):   Results from last 7 days   Lab Units 04/29/22  1012 04/28/22  1432 04/28/22  1431 04/28/22  1404   BLOOD CULTURE   --  No Growth After 5 Days  No Growth After 5 Days    --    GRAM STAIN RESULT  Rare Polys  No bacteria seen  --   --  1+ Polys  No bacteria seen  Rare Mononuclear Cells  No No organisms seen  No No polys seen   BODY FLUID CULTURE, STERILE  No growth  --   -- No growth       Last 24 Hours Medication List:   Current Facility-Administered Medications   Medication Dose Route Frequency Provider Last Rate    acetaminophen  650 mg Oral Q4H PRN Lala Inks, CRNP      aluminum-magnesium hydroxide-simethicone  30 mL Oral Q6H PRN Lala Inks, CRNP      cefTRIAXone  1,000 mg Intravenous Daily Lala Inks, CRNP 1,000 mg (05/04/22 0817)    gabapentin  100 mg Oral TID Lala Inks, CRNP      HYDROmorphone  0 5 mg Intravenous Q1H PRN Lala Inks, CRNP      HYDROmorphone  1 mg Intravenous Once PRN Lala Inks, CRNP      influenza vaccine  0 5 mL Intramuscular Prior to discharge Lala Inks, CRNP      lactulose  30 g Oral TID Floy Listen, CRNP      LORazepam  1 mg Intravenous Once [facility], CRNP      ondansetron  4 mg Intravenous Q4H PRN Lala Inks, CRNP      oxyCODONE  10 mg Oral Q4H PRN Lala Inks, CRNP      oxyCODONE  5 mg Oral Q4H PRN Lala Inks, CRNP      pantoprazole  40 mg Intravenous Q12H [facility], CRNP      senna-docusate sodium  2 tablet Oral BID PRN Lala Inks, CRNP      spironolactone  25 mg Oral Daily Floy Listen, CRNP          Today, Patient Was Seen By: Vero Burton MD    **Please Note: This note may have been constructed using a voice recognition system  **

## 2022-05-04 NOTE — ASSESSMENT & PLAN NOTE
Multifactorial, related to decompensated cirrhosis, contrast exposure, low blood pressures  Re-initiated aldactone  R/o HRS  S/p Albumin 25 x4,   Nephrology following  Dc'd Midodrine  Octreotide gtt dc'd  Continue to hold ARB

## 2022-05-04 NOTE — ASSESSMENT & PLAN NOTE
· Likely in setting of severe liver disease and splenomegaly  · improving  · transfuse <50k in the setting of bleeding or < 20k if no evidence of bleeding

## 2022-05-04 NOTE — PLAN OF CARE
Problem: HEMATOLOGIC - ADULT  Goal: Maintains hematologic stability  Description: INTERVENTIONS  - Assess for signs and symptoms of bleeding or hemorrhage  - Monitor labs  - Administer supportive blood products/factors as ordered and appropriate  Outcome: Progressing     Problem: RESPIRATORY - ADULT  Goal: Achieves optimal ventilation and oxygenation  Description: INTERVENTIONS:  - Assess for changes in respiratory status  - Assess for changes in mentation and behavior  - Position to facilitate oxygenation and minimize respiratory effort  - Oxygen administered by appropriate delivery if ordered  - Initiate smoking cessation education as indicated  - Encourage broncho-pulmonary hygiene including cough, deep breathe, Incentive Spirometry  - Assess the need for suctioning and aspirate as needed  - Assess and instruct to report SOB or any respiratory difficulty  - Respiratory Therapy support as indicated  Outcome: Progressing     Problem: GASTROINTESTINAL - ADULT  Goal: Minimal or absence of nausea and/or vomiting  Description: INTERVENTIONS:  - Administer IV fluids if ordered to ensure adequate hydration  - Maintain NPO status until nausea and vomiting are resolved  - Nasogastric tube if ordered  - Administer ordered antiemetic medications as needed  - Provide nonpharmacologic comfort measures as appropriate  - Advance diet as tolerated, if ordered  - Consider nutrition services referral to assist patient with adequate nutrition and appropriate food choices  Outcome: Progressing  Goal: Maintains or returns to baseline bowel function  Description: INTERVENTIONS:  - Assess bowel function  - Encourage oral fluids to ensure adequate hydration  - Administer IV fluids if ordered to ensure adequate hydration  - Administer ordered medications as needed  - Encourage mobilization and activity  - Consider nutritional services referral to assist patient with adequate nutrition and appropriate food choices  Outcome: Progressing  Goal: Maintains adequate nutritional intake  Description: INTERVENTIONS:  - Monitor percentage of each meal consumed  - Identify factors contributing to decreased intake, treat as appropriate  - Assist with meals as needed  - Monitor I&O, weight, and lab values if indicated  - Obtain nutrition services referral as needed  Outcome: Progressing     Problem: PAIN - ADULT  Goal: Verbalizes/displays adequate comfort level or baseline comfort level  Description: Interventions:  - Encourage patient to monitor pain and request assistance  - Assess pain using appropriate pain scale  - Administer analgesics based on type and severity of pain and evaluate response  - Implement non-pharmacological measures as appropriate and evaluate response  - Consider cultural and social influences on pain and pain management  - Notify physician/advanced practitioner if interventions unsuccessful or patient reports new pain  Outcome: Progressing

## 2022-05-04 NOTE — PROGRESS NOTES
NEPHROLOGY PROGRESS NOTE   Yu Sic 63 y o  female MRN: [id number]  Unit/Bed#: -01 Encounter: [id number]  Reason for Consult: ALEJANDRO     67 y/o female with cirrhosis, ETOH use, chronic migraine, chronic subdural hematoma with chronic CSF leak, anxiety, temporal arteritis, lumbar radiculopathy admitted with severe anemia w/hgb 5 5, retroperitoneal bleed/L iliopsoas muscle hematoma and decompensated cirrhosis with ascites, thrombocytopenia, coagulopathy and encephalopathy   Nephrology consulted for management of ALEJANDRO in setting of diuresis and IV contrast      ASSESSMENT/PLAN:  ALEJANDRO:  Suspect prerenal due to hemodynamic changes in setting of diuretics, decompensated cirrhosis, loss of autoregulatory function with ARB as well as contrast associated nephropathy   -Admission creatinine 0 99 with interval increase to 1 34 with diuresis and contrast   Today's creatinine remains at baseline at 0 9  -Peak creatinine 1 34 on 5/1/2022   -baseline creatinine unclear but appears 0 7-0 9 since June 2021  -workup: UA with negative protein, trace ketones, trace leukocytes, 1-2 RBCs, 2-4 wbc's, occasional bacteria   -Imaging:  CT demonstrates no hydronephrosis  -chronic long-term NSAID use, recently decreased over the past year  -nonoliguric  -Plan:  · Renal function stable and at baseline  · Clinically, no acute indication for renal replacement therapy (dialysis)  · May restart diuretics  D/w GI  · Strict I/Os, daily weights  · Avoid nephrotoxins, NSAIDs, IV contrast if possible  · Avoid hypotension   Maintain MAP >65  · Trend BMP  · Adjust meds to appropriate GFR  · Optimize hemodynamic status to avoid delay in renal recovery  · Will d/w Dr Almaguer     Hypertension/Volume status:  -BP normotensive and improved    S/p albumin x 4  -clinically, examines euvolemic  -moderate left pleural effusion on CT imaging   Remains on 4L NCO2  -Home medications: candesartan 16 mg daily  -Current medications:  None  -continue to hold ARB  -will discontinue midodrine  -Optimize hemodynamic status to avoid delay in renal recovery  -recommend hold parameters on antihypertensive's for SBP <130 mmHg  -Avoid hypotension or fluctuations in blood pressure   Maintain MAP >65  -continue to trend    Hyponatremia:  -improved  -multifactorial due to poor solute intake and liver disease  -serum sodium stable at 135  -previous baseline 139  -continue 1 5L fluid restriction  -continue to monitor     Decompensated cirrhosis with ascites, coagulopathy and thrombocytopenia:  -hx fatty liver disease  -CT abdomen/pelvis:  Large ascites, distended gallbladder, cirrhotic liver, splenomegaly   -s/p paracentesis for 1 8L   Gram stain negative  -hyperbilirubinemia continues to worsen with T bili 25 8, D bili 15 1  -INR 2 2  Hgb stable  -confusion improved  -off octreotide since 5/3  S/p albumin x 4  Consider d/cing midodrine given SBP 130s  -on lactulose  -spironolactone restarted 5/3  -abstain from alcohol  -eventual EGD as outpatient to rule out varices  -GI following   Management per GI and primary medical service     ABLA:  -Hgb 7 8 and stable   Improving   Goal >10  -hemoglobin 5 5 on admission, s/p 2 unit pRBC  -+RPB/L iliopsoas muscle hematoma  -INR 2 38  -GI following   For eventual EGD as outpt when cleared from neurology standpoint   -Iron studies:  Iron saturation 66%, ferritin 1238  -continue to trend  -Transfuse for Hgb <7 0  -management per primary medical service     Thrombocytopenia:  -in the setting of severe liver disease  -platelets 54K today and stable  -continue to monitor  -management per primary medical service     Left pleural effusion:  -moderate left pleural effusion on CT imaging and CXR  -remains on 4L NCO2  -continue to monitor   Consider thoracentesis when thrombocytopenia stable if increasingly symptomatic     SUBJECTIVE:  Patient awake, alert without new complaints    Creatinine remains stable and remains at baseline after initiating spironolactone  Nonoliguric but no documentation of UO  Mild asterixis persists  Hyperbilirubinemia persists  BP stable  VSS    OBJECTIVE:  Current Weight: Weight - Scale: 73 5 kg (162 lb 0 6 oz)  Vitals:    05/03/22 1421 05/03/22 1811 05/03/22 2235 05/04/22 0658   BP: 137/70  136/70 135/71   BP Location:       Pulse: 86  86 79   Resp:    18   Temp: (!) 97 4 °F (36 3 °C)  97 8 °F (36 6 °C) 98 3 °F (36 8 °C)   TempSrc:       SpO2: 94% 94% 93% 94%   Weight:       Height:           Intake/Output Summary (Last 24 hours) at 5/4/2022 3151  Last data filed at 5/3/2022 2001  Gross per 24 hour   Intake 480 ml   Output --   Net 480 ml     General:  Awake, alert, appears comfortable and in no acute distress  Nontoxic  Skin:  No rash, warm, good skin turgor   Jaundice  Eyes:  PERRL, EOMI, sclerae icteric   no periorbital edema   ENT:  Moist mucous membranes  Neck:  Trachea midline, symmetric  No JVD  Chest:  Clear to auscultation bilaterally without wheezes, crackles or rhonchi  CVS:  Regular rate and rhythm without murmur, gallop or rub  S1 and S2 identified and normal   No S3, S4    Abdomen:  Soft, nontender, nondistended without masses  Normal bowel sounds x 4 quadrants  No bruit  Extremities:  Warm, pink, motor and sensory intact and well perfused  No cyanosis, pallor  No BLE edema  Neuro:  Awake, alert, oriented x3  Grossly intact  Psych:  Appropriate affect  Mentating appropriately    Normal mental status exam      Medications:    Current Facility-Administered Medications:     acetaminophen (TYLENOL) tablet 650 mg, 650 mg, Oral, Q4H PRN, TRAV Pastor    aluminum-magnesium hydroxide-simethicone (MYLANTA) oral suspension 30 mL, 30 mL, Oral, Q6H PRN, TRAV Pastor    cefTRIAXone (ROCEPHIN) IVPB (premix in dextrose) 1,000 mg 50 mL, 1,000 mg, Intravenous, Daily, TRAV Pastor, Last Rate: 100 mL/hr at 05/04/22 0817, 1,000 mg at 05/04/22 0817    gabapentin (NEURONTIN) capsule 100 mg, 100 mg, Oral, TID, TRAV Farmer, 100 mg at 05/04/22 0815    HYDROmorphone (DILAUDID) injection 0 5 mg, 0 5 mg, Intravenous, Q1H PRN, TRAV Farmer, 0 5 mg at 05/01/22 1522    HYDROmorphone (DILAUDID) injection 1 mg, 1 mg, Intravenous, Once PRN, TRAV Levine    influenza vaccine, recombinant, quadrivalent (FLUBLOK) IM injection 0 5 mL, 0 5 mL, Intramuscular, Prior to discharge, TRAV Levine    lactulose oral solution 30 g, 30 g, Oral, TID, TRAV Guthrie, 30 g at 05/04/22 0816    LORazepam (ATIVAN) injection 1 mg, 1 mg, Intravenous, Once, TRAV Levine    midodrine (PROAMATINE) tablet 5 mg, 5 mg, Oral, TID AC, TRAV Farmer, 5 mg at 05/04/22 0524    ondansetron (ZOFRAN) injection 4 mg, 4 mg, Intravenous, Q4H PRN, TRAV Farmer, 4 mg at 05/04/22 0816    oxyCODONE (ROXICODONE) IR tablet 10 mg, 10 mg, Oral, Q4H PRN, TRAV Farmer, 10 mg at 05/03/22 1015    oxyCODONE (ROXICODONE) IR tablet 5 mg, 5 mg, Oral, Q4H PRN, TRAV Farmer, 5 mg at 05/04/22 0816    pantoprazole (PROTONIX) injection 40 mg, 40 mg, Intravenous, Q12H, TRAV Levine, 40 mg at 05/04/22 0401    senna-docusate sodium (SENOKOT S) 8 6-50 mg per tablet 2 tablet, 2 tablet, Oral, BID PRN, TRAV Levine    spironolactone (ALDACTONE) tablet 25 mg, 25 mg, Oral, Daily, TRAV Guthrie, 25 mg at 05/04/22 0815    Laboratory Results:  Results from last 7 days   Lab Units 05/04/22  0536 05/03/22  0306 05/03/22  0056 05/02/22  1528 05/02/22  0445 05/01/22  2116 05/01/22  0803 05/01/22  0223 05/01/22  0223 05/01/22  0216 04/30/22  2038 04/30/22  0806 04/30/22  0441 04/29/22  2046 04/29/22  1425 04/29/22  0457 04/29/22  0457 04/28/22  1825 04/28/22  1147   WBC Thousand/uL 6 96 6 41  --   --  6 67  --   --   --  7 36  --   --   --   --   --   --   --  7 37  --  6 20   HEMOGLOBIN g/dL 8 4* 7 8* 7 8* 7 5* 7  9* 7 3* 6 8*   < > 7 0*  --    < >   < >  --    < > 7 1*   < > 7 3*   < > 5 5*   HEMATOCRIT % 25 5* 23 5*  --   --  24 2*  --   --   --  21 2*  --   --   --   --   --  22 4*  --  22 6*  --  16 3*   PLATELETS Thousands/uL 54* 49*  --   --  50*  --   --   --  59*  --   --   --   --   --   --   --  60*  --  59*   POTASSIUM mmol/L 4 2 3 8  --   --  3 6  --   --   --   --  3 9  --   --  3 9  --   --   --  4 1  --  4 2   CHLORIDE mmol/L 101 100  --   --  98*  --   --   --   --  99*  --   --  98*  --   --   --  96*  --  97*   CO2 mmol/L 28 29  --   --  27  --   --   --   --  28  --   --  29  --   --   --  26  --  25   BUN mg/dL 12 12  --   --  13  --   --   --   --  17  --   --  14  --   --   --  12  --  9   CREATININE mg/dL 0 86 0 90  --   --  0 98  --   --   --   --  1 34*  --   --  1 31*  --   --   --  0 96  --  0 99   CALCIUM mg/dL 8 4 8 2*  --   --  7 9*  --   --   --   --  7 7*  --   --  7 7*  --   --   --  8 0*  --  8 0*   MAGNESIUM mg/dL 2 1 2 0  --   --   --   --   --   --   --   --   --   --   --   --   --   --   --   --   --     < > = values in this interval not displayed  I have personally reviewed the blood work as stated above and in my note  I have personally reviewed internal Medicine, co-consultants and previous nephrology notes

## 2022-05-04 NOTE — ASSESSMENT & PLAN NOTE
Patient presents with acute hip pain abdominal pain abdominal distension  · Endorses history of fatty liver disease, drinks wine occasionally, never been tested for hepatitis  · Found to have significant ascites on exam   · CT abd/pelvis - new large ascites, cirrhotic liver, hypodense area measuring 1 7 cm at junction of segment 4 and 8 in liver, distended gallbladder, splenomegaly with recanalized umbilical vein  · Underwent paracentesis 4/29 with IR - approximately 1 8 L removed  · GI following  · MELD: 30 with 27-32% estimated mortality  · Continue ceftriaxone for SBP prophylaxis  · lactulose to 30 tid  · Re-initiated aldactone 5/3 dc'd midodrine 5/4  · Continue to trend T Bili and D bili-> continuing to trend upwards  · Importance of strict alcohol cessation discussion was done   · Now with decompensated cirrhosis  · lasix held d/t ALEJANDRO

## 2022-05-04 NOTE — ASSESSMENT & PLAN NOTE
Presents to ED with acute left hip pain and abdominal pain/distension   Hgb 5 5/Hct 16 3    CT scan abdomen/pelvis:  New large ascites, distended gallbladder, cirrhotic liver, splenomegaly with recanalized lysed umbilical vein, faint hypodense area 1 7 cm of junction of segment 8 and for a liver, thickening of the ascending colon possibly due to colitis also seen on previous study, moderate left pleural effusion   S/p 4U PRBC's and 2U FFP   LFTs - , normal ALT, D  bili 5 6-> T Bili increased to 17 2    PTT/INR - PT 24, INR 2 22  · Occult blood stool positive in ED   Octreotide gtt dc'd   New large ascites and history of splenomegaly - octreotide dc'd continue ceftriaxone   Hold anticoagulation   Hold NSAIDS (patient is chronic NSAID user at home due to migraine)    Continue to monitor H/H with serial labs q 8 hours   Up out of bed with assistance   Stool records at bedside/ intake and output   EGD outpt   GI following    Lab Results   Component Value Date    HGB 8 4 (L) 05/04/2022    HGB 7 8 (L) 05/03/2022

## 2022-05-04 NOTE — ASSESSMENT & PLAN NOTE
· Bilirubin elevated to 5 69 ->9 70->17 2->20->25  · D Bili 5-> 11->15  · CT abdomen pelvis showing - distended gallbladder, large new ascites, hepatomegaly   · Gastroenterology following

## 2022-05-04 NOTE — CASE MANAGEMENT
Case Management Assessment & Discharge Planning Note    Patient name Howard Siu  Location /-25 MRN [id number]  : 1960 Date 2022       Current Admission Date: 2022  Current Admission Diagnosis:Cirrhosis of liver with ascites [facility])   Patient Active Problem List    Diagnosis Date Noted    Acute kidney injury ([facility] ) 2022    Cirrhosis of liver with ascites ([facility] ) 2022    Weakness of left lower extremity 2022    Acute blood loss anemia 2022    [facility]ocyt[facility] 20[facility]    Hyponatremia 2022    Hyperbilirubinemia 2022    Pleural effusion 2022    Abnormal LFTs 2021    Personal history of colonic polyps 2021    Right lower quadrant abdominal pain 06/15/2021    Acute cystitis without hematuria 05/10/2021    Chronic pain syndrome 2020    Contusion of left foot 2020    Chronic bilateral low back pain without sciatica 2020    Lumbar spondylosis 2020    DDD (degenerative disc disease), lumbar 2020    Right lumbar radiculitis 2020    Left wrist tendonitis 2019    Nondisplaced fracture of distal end of left radius 2019    Closed compression fracture of L2 vertebra ([facility] ) 2019      LOS (days): 6  Geometric Mean LOS (GMLOS) (days): 2 70  Days to GMLOS:-3 1     OBJECTIVE: LATE ENTRY: INITIAL ASSESSMENT COMPLETE   BY   Risk of Unplanned Readmission Score: 10     Current admission status: Inpatient    Preferred Pharmacy:   [id number] [address] [place] [id number]  Phone: [phone] Fax: [phone]    Primary Care Provider: Maude Clark MD    Primary Insurance: [facility]  Secondary Insurance:     ASSESSMENT:  Makayla Lorenzo Proxies    There are no active Health Care Proxies on file         Advance Directives  Does patient have a Health Care POA?: No  Was patient offered paperwork?: Yes (Declined)  Does patient currently have a Health Care decision maker?: Yes, please see Health Care Proxy section  Does patient have Advance Directives?: No  Was patient offered paperwork?: Yes  Primary Contact: Vonnie Jack: DARIUS         Readmission Root Cause  30 Day Readmission: No    Patient Information  Admitted from[de-identified] Home  Mental Status: Alert  During Assessment patient was accompanied by: Son  Assessment information provided by[de-identified] Patient,Son  Primary Caregiver: Self  Support Systems: Spouse/significant Deana 88 of Residence: [address] [place] [address] do you live in?: [address] [place] [address] entry access options  Select all that apply : Stairs  Number of steps to enter home  : 1  Do the steps have railings?: Yes  Type of Current Residence: [address] [place] [address]  In the last 12 months, was there a time when you were not able to pay the mortgage or rent on time?: No  In the last 12 months, how many places have you lived?: 1  In the last 12 months, was there a time when you did not have a steady place to sleep or slept in a shelter (including now)?: No  Homeless/housing insecurity resource given?: N/A  Living Arrangements: Lives w/ Spouse/significant other  Is patient a ?: No    Activities of Daily Living Prior to Admission  Functional Status: Independent  Completes ADLs independently?: Yes  Ambulates independently?: Yes  Does patient use assisted devices?: Yes  Assisted Devices (DME) used: Straight Cane  Does patient currently own DME?: Yes  What DME does the patient currently own?: Straight Cane  Does patient have a history of Outpatient Therapy (PT/OT)?: Yes  Does the patient have a history of Short-Term Rehab?: No  Does patient have a history of HHC?: No  Does patient currently have [facility]?: No    Patient Information Continued  Income Source: SSI/SSD  Does patient have prescription coverage?: No  Within the past 12 months, you worried that your food would run out before you got the money to buy more : Never true  Within the past 12 months, the food you bought just didnt last and you didnt have money to get more : Never true  Food insecurity resource given?: N/A  Does patient receive dialysis treatments?: No  Does patient have a history of substance abuse?: No  Does patient have a history of Mental Health Diagnosis?: No    Means of Transportation  Means of Transport to Appts[de-identified] Drives Self  In the past 12 months, has lack of transportation kept you from medical appointments or from getting medications?: No  In the past 12 months, has lack of transportation kept you from meetings, work, or from getting things needed for daily living?: No  Was application for public transport provided?: N/A    DISCHARGE DETAILS:    Additional Comments: Pt lives with SO(Carrington) in 1sh   Independent with adls and uses cane sometimes

## 2022-05-04 NOTE — ASSESSMENT & PLAN NOTE
· Sodium on admission 131  · Likely in setting of cirrhosis  · stable   Free water restriction (1-1 5L)    hold Lasix   spironolactone re-initiated   improving   Monitor BMP     Lab Results   Component Value Date    SODIUM 135 (L) 05/04/2022    SODIUM 133 (L) 05/03/2022    SODIUM 132 (L) 05/02/2022

## 2022-05-05 ENCOUNTER — TELEPHONE (OUTPATIENT)
Dept: NEPHROLOGY | Facility: CLINIC | Age: 62
End: 2022-05-05

## 2022-05-05 LAB
A2 MACROGLOB SERPL-MCNC: 159 MG/DL (ref 110–276)
ALBUMIN SERPL BCP-MCNC: 2.2 G/DL (ref 3.5–5)
ALP SERPL-CCNC: 50 U/L (ref 46–116)
ALT SERPL W P-5'-P-CCNC: 28 IU/L (ref 0–40)
ALT SERPL W P-5'-P-CCNC: 39 U/L (ref 12–78)
ANION GAP SERPL CALCULATED.3IONS-SCNC: 6 MMOL/L (ref 4–13)
APO A-I SERPL-MCNC: 39 MG/DL (ref 116–209)
AST SERPL W P-5'-P-CCNC: 151 U/L (ref 5–45)
BILIRUB DIRECT SERPL-MCNC: 14.43 MG/DL (ref 0–0.2)
BILIRUB SERPL-MCNC: 26.5 MG/DL (ref 0.2–1)
BILIRUB SERPL-MCNC: 5.9 MG/DL (ref 0–1.2)
BUN SERPL-MCNC: 16 MG/DL (ref 5–25)
CALCIUM SERPL-MCNC: 8.5 MG/DL (ref 8.3–10.1)
CHLORIDE SERPL-SCNC: 101 MMOL/L (ref 100–108)
CO2 SERPL-SCNC: 27 MMOL/L (ref 21–32)
COMMENT: ABNORMAL
CREAT SERPL-MCNC: 0.89 MG/DL (ref 0.6–1.3)
ERYTHROCYTE [DISTWIDTH] IN BLOOD BY AUTOMATED COUNT: 26.7 % (ref 11.6–15.1)
FIBROSIS SCORING:: ABNORMAL
FIBROSIS STAGE SERPL QL: ABNORMAL
GFR SERPL CREATININE-BSD FRML MDRD: 71 ML/MIN/1.73SQ M
GGT SERPL-CCNC: 81 IU/L (ref 0–60)
GLUCOSE SERPL-MCNC: 144 MG/DL (ref 65–140)
HAPTOGLOB SERPL-MCNC: <10 MG/DL (ref 33–346)
HCT VFR BLD AUTO: 23.5 % (ref 34.8–46.1)
HGB BLD-MCNC: 7.6 G/DL (ref 11.5–15.4)
INTERPRETATIONS: ABNORMAL
LIVER FIBR SCORE SERPL CALC.FIBROSURE: 0.97 (ref 0–0.21)
MAGNESIUM SERPL-MCNC: 2.1 MG/DL (ref 1.6–2.6)
MCH RBC QN AUTO: 35.7 PG (ref 26.8–34.3)
MCHC RBC AUTO-ENTMCNC: 32.3 G/DL (ref 31.4–37.4)
MCV RBC AUTO: 110 FL (ref 82–98)
NECROINFLAMM ACTIVITY SCORING:: ABNORMAL
NECROINFLAMMATORY ACT GRADE SERPL QL: ABNORMAL
NECROINFLAMMATORY ACT SCORE SERPL: 0.3 (ref 0–0.17)
PLATELET # BLD AUTO: 58 THOUSANDS/UL (ref 149–390)
PMV BLD AUTO: 10.9 FL (ref 8.9–12.7)
POTASSIUM SERPL-SCNC: 5.1 MMOL/L (ref 3.5–5.3)
PROT SERPL-MCNC: 7 G/DL (ref 6.4–8.2)
RBC # BLD AUTO: 2.13 MILLION/UL (ref 3.81–5.12)
SERVICE CMNT-IMP: ABNORMAL
SODIUM SERPL-SCNC: 134 MMOL/L (ref 136–145)
WBC # BLD AUTO: 7.5 THOUSAND/UL (ref 4.31–10.16)

## 2022-05-05 PROCEDURE — 97530 THERAPEUTIC ACTIVITIES: CPT

## 2022-05-05 PROCEDURE — 80048 BASIC METABOLIC PNL TOTAL CA: CPT | Performed by: STUDENT IN AN ORGANIZED HEALTH CARE EDUCATION/TRAINING PROGRAM

## 2022-05-05 PROCEDURE — 83735 ASSAY OF MAGNESIUM: CPT | Performed by: STUDENT IN AN ORGANIZED HEALTH CARE EDUCATION/TRAINING PROGRAM

## 2022-05-05 PROCEDURE — 85027 COMPLETE CBC AUTOMATED: CPT | Performed by: STUDENT IN AN ORGANIZED HEALTH CARE EDUCATION/TRAINING PROGRAM

## 2022-05-05 PROCEDURE — 97535 SELF CARE MNGMENT TRAINING: CPT

## 2022-05-05 PROCEDURE — C9113 INJ PANTOPRAZOLE SODIUM, VIA: HCPCS | Performed by: NURSE PRACTITIONER

## 2022-05-05 PROCEDURE — 80076 HEPATIC FUNCTION PANEL: CPT | Performed by: STUDENT IN AN ORGANIZED HEALTH CARE EDUCATION/TRAINING PROGRAM

## 2022-05-05 PROCEDURE — 99232 SBSQ HOSP IP/OBS MODERATE 35: CPT | Performed by: INTERNAL MEDICINE

## 2022-05-05 RX ORDER — LACTULOSE 20 G/30ML
30 SOLUTION ORAL 2 TIMES DAILY
Status: DISCONTINUED | OUTPATIENT
Start: 2022-05-05 | End: 2022-05-08

## 2022-05-05 RX ADMIN — SPIRONOLACTONE 25 MG: 25 TABLET ORAL at 08:26

## 2022-05-05 RX ADMIN — ONDANSETRON 4 MG: 2 INJECTION INTRAMUSCULAR; INTRAVENOUS at 17:21

## 2022-05-05 RX ADMIN — LACTULOSE 30 G: 20 SOLUTION ORAL at 17:20

## 2022-05-05 RX ADMIN — PANTOPRAZOLE SODIUM 40 MG: 40 INJECTION, POWDER, FOR SOLUTION INTRAVENOUS at 04:30

## 2022-05-05 RX ADMIN — PREDNISOLONE SODIUM PHOSPHATE 40 MG: 15 SOLUTION ORAL at 08:28

## 2022-05-05 RX ADMIN — OXYCODONE HYDROCHLORIDE 5 MG: 5 TABLET ORAL at 21:54

## 2022-05-05 RX ADMIN — OXYCODONE HYDROCHLORIDE 5 MG: 5 TABLET ORAL at 08:27

## 2022-05-05 RX ADMIN — CEFTRIAXONE 1000 MG: 1 INJECTION, SOLUTION INTRAVENOUS at 08:29

## 2022-05-05 RX ADMIN — GABAPENTIN 100 MG: 100 CAPSULE ORAL at 08:26

## 2022-05-05 RX ADMIN — LACTULOSE 30 G: 20 SOLUTION ORAL at 08:26

## 2022-05-05 RX ADMIN — OXYCODONE HYDROCHLORIDE 5 MG: 5 TABLET ORAL at 17:21

## 2022-05-05 RX ADMIN — PANTOPRAZOLE SODIUM 40 MG: 40 INJECTION, POWDER, FOR SOLUTION INTRAVENOUS at 17:21

## 2022-05-05 RX ADMIN — ONDANSETRON 4 MG: 2 INJECTION INTRAMUSCULAR; INTRAVENOUS at 08:39

## 2022-05-05 RX ADMIN — OXYCODONE HYDROCHLORIDE 5 MG: 5 TABLET ORAL at 12:43

## 2022-05-05 RX ADMIN — GABAPENTIN 100 MG: 100 CAPSULE ORAL at 17:21

## 2022-05-05 RX ADMIN — GABAPENTIN 100 MG: 100 CAPSULE ORAL at 21:54

## 2022-05-05 NOTE — PROGRESS NOTES
Progress note - [facility] [employer]   Seng Luna 63 y o  female MRN: [id number]  Unit/Bed#: -01 Encounter: [id number]    ASSESSMENT and PLAN    1  Cirrhosis decompensated with ascites  61F with recently diagnosed ETOH and RG, decompensated over the past 6 weeks with encephalopathy, thrombocytopenia, coagulopathy, ascites, ALEJANDRO  1800 cc paracentesis negative for SBP      Diuretics were on hold due to ALEJANDRO which has improved, appreciate renal input  Increasing bilirubin likely multifactorial from hematoma resorption and alcoholic hepatitis  Prednisolone started 05/04/2022  Bilirubin increased 5/5 but to a lesser degree than prior days  Follow bilirubin and INR daily  Will establish care with hepatology after discharge with Dr Allen Robertson    2  Hepatic encephalopathy  Improved with lactulose t i d , had excessive stools 05/04/2022, will reduce to twice daily     3 Retroperitoneal hemorrhage  4  Anemia  Significant anemia in setting of coagulopathy and thrombocytopenia  8cm left retroperitoneal hematoma centered in ileus psoas muscle   Appreciate surgical input  No overt GI bleeding, but will need EGD to assess for varices after she recovers from this hospitalization  Adenoma on colonoscopy April 2019, 5 recall     5  ETOH use  Patient admits to drinking 1-2 glasses a wine every evening prior to admission  Reinforced need for complete alcohol cessation    Chief Complaint   Patient presents with    Leg Pain     Pt reports left leg pain since sunday 4/24/22 when her dog jumped on her  Took tylenol #3 prior to arrival today  SUBJECTIVE/HPI   Daughter at bedside  More alert today  Appetite improving  Numerous loose stools throughout the day yesterday with no melena or rectal bleeding      /74   Pulse 79   Temp 97 8 °F (36 6 °C)   Resp 18   Ht 5' 6" (1 676 m)   Wt 73 5 kg (162 lb 0 6 oz)   SpO2 90%   BMI 26 15 kg/m²     PHYSICALEXAM  General appearance: alert, appears stated age and cooperative  Eyes: PERLLA, EOMI, icterus  Head: Normocephalic, without obvious abnormality, atraumatic  Lungs: clear to auscultation bilaterally  Heart: regular rate and rhythm, S1, S2 normal, no murmur, click, rub or gallop  Abdomen: soft, non-tender; bowel sounds normal; no masses,  no organomegaly  Extremities: extremities normal, atraumatic, no cyanosis or edema  Neurologic: Grossly normal, no asterixis, oriented x3    Lab Results   Component Value Date    GLUCOSE 94 05/08/2015    CALCIUM 8 5 05/05/2022     05/08/2015    K 5 1 05/05/2022    CO2 27 05/05/2022     05/05/2022    BUN 16 05/05/2022    CREATININE 0 89 05/05/2022     Lab Results   Component Value Date    WBC 7 50 05/05/2022    HGB 7 6 (L) 05/05/2022    HCT 23 5 (L) 05/05/2022     (H) 05/05/2022    PLT 58 (L) 05/05/2022     Lab Results   Component Value Date    ALT 39 05/05/2022     (H) 05/05/2022    GGT 81 (H) 04/30/2022    ALKPHOS 50 05/05/2022    BILITOT 0 2 05/08/2015     No results found for: AMYLASE  Lab Results   Component Value Date    LIPASE 76 04/28/2022     Lab Results   Component Value Date    IRON 97 04/28/2022    TIBC 146 (L) 04/28/2022    FERRITIN 1,238 (H) 04/28/2022     Lab Results   Component Value Date    INR 2 20 (H) 05/04/2022

## 2022-05-05 NOTE — TELEPHONE ENCOUNTER
----- Message from Shilpa Yadav PA-C sent at 5/5/2022  7:52 AM EDT -----  Pt seen for ALEJANDRO  Remains inpatient at [address]  Please schedule for hospital f/u in office in 3-4 weeks with repeat CMP and CBC  Thanks

## 2022-05-05 NOTE — NURSING NOTE
Pt requested pain medication for 8/10 pain  This RN pulled 10mg Oxycodone  Pills were given to pt in paper cup  Pills were removed from cup by the patient and one pill was dropped  Unable to find the pill, another 5mg oxycodone was pulled to complete the 10 mg dose   Pt administered 10mg Oxycodone total      Regino Ann RN

## 2022-05-05 NOTE — PLAN OF CARE
Problem: OCCUPATIONAL THERAPY ADULT  Goal: Performs self-care activities at highest level of function for planned discharge setting  See evaluation for individualized goals  Description: Treatment Interventions: ADL retraining,Functional transfer training,Endurance training,Patient/family training,Compensatory technique education,Activityengagement          See flowsheet documentation for full assessment, interventions and recommendations  Outcome: Progressing  Note: Limitation: Decreased ADL status,Decreased UE strength,Decreased cognition,Decreased endurance,Decreased self-care trans,Decreased high-level ADLs  Prognosis: Fair  Assessment: Pt seen for OT tx session with focus on functional balance, functional mobility, ADL status, and transfer safety  Patient agreeable to OT treatment session  Pt received supine in bed  Performed bed mobility with mod Ax 2  Performed transfers with max Ax 2  Pt with significant difficulty mobilizing LLE  Noted LLE knee buckling with transfers  Required close arm hold support to maintain safety  Required total A for toileting task  Pt anxious t/o session  Benefited from moral support t/o  Patient continues to be functioning below baseline level, occupational performance remains limited secondary to factors listed above, and pt at increased risk for falls and injury  The patient's raw score on the AM-PAC Daily Activity inpatient short form is 16, standardized score is 35 96, less than 39 4  Patients at this level are likely to benefit from DC to post-acute rehabilitation services  Please refer to the recommendation of the Occupational Therapist for safe DC planning  From OT standpoint, recommendation at time of d/c would be Short Term Rehab  Patient to benefit from continued Occupational Therapy treatment while in the hospital to address deficits as defined above and maximize level of functional independence with ADLs and functional mobility   Pt left with call bell in reach, tray table in reach, needs met, bed alarm activated       OT Discharge Recommendation: Post acute rehabilitation services

## 2022-05-05 NOTE — PROGRESS NOTES
[place]  Progress Note - Antoinette Salinas 1960, 63 y o  female MRN: [id number]  Unit/Bed#: -01 Encounter: [id number]  Primary Care Provider: Brendan Louis MD   Date and time admitted to hospital: 4/28/2022 10:36 AM    * Cirrhosis of liver with ascites [facility])  Assessment & Plan  Patient presents with acute hip pain abdominal pain abdominal distension  · Endorses history of fatty liver disease, drinks wine occasionally, never been tested for hepatitis  · Found to have significant ascites on exam   · CT abd/pelvis - new large ascites, cirrhotic liver, hypodense area measuring 1 7 cm at junction of segment 4 and 8 in liver, distended gallbladder, splenomegaly with recanalized umbilical vein  · Underwent paracentesis 4/29 with IR - approximately 1 8 L removed  · GI following  · MELD: 30 with 27-32% estimated mortality  · Continue ceftriaxone for SBP prophylaxis  · lactulose to 30 tid  · Re-initiated aldactone 5/3 dc'd midodrine 5/4  · Continue to trend T Bili and D bili-> continuing to trend upwards-started on prednisolone for alcoholic hepatitis    · Importance of strict alcohol cessation discussion was done   · Now with decompensated cirrhosis  · lasix held d/t ALEJANDRO    Acute kidney injury [facility])  Assessment & Plan  Multifactorial, related to decompensated cirrhosis, contrast exposure, low blood pressures  Re-initiated aldactone  R/o HRS  S/p Albumin 25 x4,   Nephrology following  Dc'd Midodrine  Octreotide gtt dc'd  Continue to hold ARB      Pleural effusion  Assessment & Plan  Moderate left effusion noted on CT  Currently on 2 L oxygen  Respiratory protocol  Incentive spirometer  S/p diuresis due to ascites and pleural effusion    Hyperbilirubinemia  Assessment & Plan    · Bilirubin elevated to 5 69 ->9 70->17 2->20->25  · D Bili 5-> 11->15-->14 4  · CT abdomen pelvis showing - distended gallbladder, large new ascites, hepatomegaly   · Gastroenterology following  · Concern for alcoholic hepatitis B and started on oral prednisolone by gastroenterology yesterday  Hyponatremia  Assessment & Plan  · Sodium on admission 131  · Likely in setting of cirrhosis  · stable   Free water restriction (1-1 5L)    spironolactone re-initiated   improving   Monitor BMP     Lab Results   Component Value Date    SODIUM 134 (L) 05/05/2022    SODIUM 135 (L) 05/04/2022    SODIUM 133 (L) 05/03/2022         Thrombocytopenia (HCC)  Assessment & Plan  · Likely in setting of severe liver disease and splenomegaly  · improving  · transfuse <50k in the setting of bleeding or < 20k if no evidence of bleeding      Acute blood loss anemia  Assessment & Plan  Presented with acute hip pain , abdominal pain, abdominal distension   CBC showing: Hgb 5 5/Hct16 3,  - macrocytic anemia    Occult heme + stool   Blood consent obtained   Transfusion S/P 4 units PRBCs  o Transfused to keep Hgb >7  o  No history of transfusions    Trend CBC   Hb stablized-> has significant ecchymosis in the left back and left LE   MRI brain negative for stroke   Continue Protonix IV b i d   · Abraham need outpt f/u for screening EGD for esophageal varices    Lab Results   Component Value Date    HGB 7 6 (L) 05/05/2022    HGB 8 4 (L) 05/04/2022   Transfuse for hemoglobin less than 7 5      Weakness of left lower extremity  Assessment & Plan  · Reason for presentation - pain started a few days ago after dog jumping on her - difficulty with ambulation and weight on left side  · CT abdomen and pelvis - osseous structures noted: No acute compression collapse of the vertebra, Chronic compression of the L2 vertebra seen  · Stroke alert called over night 4/30 due to left lower extremity weakness  CT head and CTA head/neck demonstrating chronic changes  · Discussed with Neurology - MRI brain negative    No indication for anti-platelet therapy especially with ongoing anemia/concern for gastrointestinal bleed  · CT recon lumbar spine: "Chronic, mild degenerative changes and chronic moderate L2 compression fracture  No new disc herniation or high-grade stenosis compared to prior studies  Asymmetric left iliopsoas muscle enlargement of unclear etiology, new compared to prior studies  No focal mass or fluid collection  No evidence of discitis on CT   · MRI lumbar - concern for Intramuscular hematoma, consult placed to surgery recommending if there's evidence of further bleeding IR consult for embolization, however her hb is now stable, continue to monitor  · Neurovascular checks ordered  Pulses remain intact  Pain appears better/mobility improved  · S/p FFP 2 units, 4U PRBCs and vitamin K  · XR left hip/femur unremarkable  · PT/OT consult-recommending short-term rehab  · Continue pain control        VTE Pharmacologic Prophylaxis: VTE Score: 1 Moderate Risk (Score 3-4) - Pharmacological DVT Prophylaxis Contraindicated  Sequential Compression Devices Ordered  Patient Centered Rounds: I performed bedside rounds with nursing staff today  Discussions with Specialists or Other Care Team Provider:  Discussed with nursing and case management    Education and Discussions with Family / Patient: Updated  (daughter) at bedside  Time Spent for Care: 45 minutes  More than 50% of total time spent on counseling and coordination of care as described above  Current Length of Stay: 7 day(s)  Current Patient Status: Inpatient   Certification Statement: The patient will continue to require additional inpatient hospital stay due to Ongoing workup of alcoholic cirrhosis  Discharge Plan: Anticipate discharge in 48-72 hrs to rehab facility  Code Status: Level 1 - Full Code    Subjective:   Patient seen and examined  Mentation has improved  Complaining of 6-8 episodes of loose bowel movements on lactulose  Denies any worsening shortness of breath or abdominal distension      Objective:     Vitals:   Temp (24hrs), Av 9 °F (36 6 °C), Min:97 8 °F (36 6 °C), Max:97 9 °F (36 6 °C)    Temp:  [97 8 °F (36 6 °C)-97 9 °F (36 6 °C)] 97 8 °F (36 6 °C)  HR:  [77-79] 79  BP: (117-133)/(66-74) 133/74  SpO2:  [90 %-94 %] 90 %  Body mass index is 26 15 kg/m²  Input and Output Summary (last 24 hours): Intake/Output Summary (Last 24 hours) at 5/5/2022 1228  Last data filed at 5/5/2022 1110  Gross per 24 hour   Intake 480 ml   Output --   Net 480 ml       Physical Exam:   Physical Exam  Constitutional:       General: She is not in acute distress  Appearance: She is ill-appearing  HENT:      Head: Normocephalic  Nose: Nose normal       Mouth/Throat:      Mouth: Mucous membranes are moist    Eyes:      Pupils: Pupils are equal, round, and reactive to light  Cardiovascular:      Rate and Rhythm: Normal rate and regular rhythm  Pulses: Normal pulses  Pulmonary:      Comments: Good respiratory effort with diminished breath sounds bilaterally  Abdominal:      General: There is distension  Tenderness: There is no abdominal tenderness  Comments: Ascites without tenderness  Musculoskeletal:      Cervical back: Neck supple  Right lower leg: Edema present  Left lower leg: Edema present  Skin:     Coloration: Skin is jaundiced and pale  Comments: Extensive bruising left thigh and left lower extremity   Neurological:      General: No focal deficit present  Mental Status: She is alert and oriented to person, place, and time  Mental status is at baseline  Psychiatric:         Mood and Affect: Mood normal           Additional Data:     Labs:  Results from last 7 days   Lab Units 05/05/22  0517 05/04/22  0536 05/04/22  0536   WBC Thousand/uL 7 50   < > 6 96   HEMOGLOBIN g/dL 7 6*   < > 8 4*   HEMATOCRIT % 23 5*   < > 25 5*   PLATELETS Thousands/uL 58*   < > 54*   NEUTROS PCT %  --   --  73   LYMPHS PCT %  --   --  13*   MONOS PCT %  --   --  11   EOS PCT %  --   --  1    < > = values in this interval not displayed  Results from last 7 days   Lab Units 05/05/22  0517   SODIUM mmol/L 134*   POTASSIUM mmol/L 5 1   CHLORIDE mmol/L 101   CO2 mmol/L 27   BUN mg/dL 16   CREATININE mg/dL 0 89   ANION GAP mmol/L 6   CALCIUM mg/dL 8 5   ALBUMIN g/dL 2 2*   TOTAL BILIRUBIN mg/dL 26 50*   ALK PHOS U/L 50   ALT U/L 39   AST U/L 151*   GLUCOSE RANDOM mg/dL 144*     Results from last 7 days   Lab Units 05/04/22  0536   INR  2 20*     Results from last 7 days   Lab Units 05/01/22  0718 04/29/22  1549 04/29/22  0503   POC GLUCOSE mg/dl 134 147* 186*               Lines/Drains:  Invasive Devices  Report    Peripheral Intravenous Line            Peripheral IV 05/05/22 Distal;Right;Ventral (anterior) Forearm <1 day                      Imaging: No pertinent imaging reviewed  Recent Cultures (last 7 days):   Results from last 7 days   Lab Units 04/29/22  1012 04/28/22  1432 04/28/22  1431 04/28/22  1404   BLOOD CULTURE   --  No Growth After 5 Days  No Growth After 5 Days    --    GRAM STAIN RESULT  Rare Polys  No bacteria seen  --   --  1+ Polys  No bacteria seen  Rare Mononuclear Cells  No No organisms seen  No No polys seen   BODY FLUID CULTURE, STERILE  No growth  --   --  No growth       Last 24 Hours Medication List:   Current Facility-Administered Medications   Medication Dose Route Frequency Provider Last Rate    acetaminophen  650 mg Oral Q4H PRN Sierra Purple, CRNP      aluminum-magnesium hydroxide-simethicone  30 mL Oral Q6H PRN Sierra Purple, CRNP      gabapentin  100 mg Oral TID Sierra Purple, CRNP      HYDROmorphone  0 5 mg Intravenous Q1H PRN Jason Pedersen, CRNP      HYDROmorphone  1 mg Intravenous Once PRN TRAV Kumar      influenza vaccine  0 5 mL Intramuscular Prior to discharge Sierra Purple, CRNP      lactulose  30 g Oral BID Brenton Johnson DO      LORazepam  1 mg Intravenous Once Sierra Purple, CRNP      ondansetron  4 mg Intravenous Q4H PRN Sierra Purple, CRNP      oxyCODONE  10 mg Oral Q4H PRN Fan Arboleda, TRAV      oxyCODONE  5 mg Oral Q4H PRN Delano Verdugo [facility], CRNP      pantoprazole  40 mg Intravenous Q12H [facility] [address]      prednisoLONE  40 mg Oral Daily [place], [place]      senna-docusate sodium  2 tablet Oral BID PRN Fan Arboleda, CRNP      spironolactone  25 mg Oral Daily TRAV Benitez          Today, Patient Was Seen By: Brendan Salgado MD    **Please Note: This note may have been constructed using a voice recognition system  **

## 2022-05-05 NOTE — PHYSICAL THERAPY NOTE
PT tx   05/05/22 1045   PT Last Visit   PT Visit Date 05/05/22   Note Type   Note Type Treatment   Pain Assessment   Pain Assessment Tool Johnson-Baker FACES   Johnson-Baker FACES Pain Rating 4   Pain Location/Orientation Orientation: Left; Location: Leg   Restrictions/Precautions   Weight Bearing Precautions Per Order Yes   LLE Weight Bearing Per Order WBAT   Other Precautions Pain; Fall Risk;O2   General   Chart Reviewed Yes   Additional Pertinent History labs still elevated,   Family/Caregiver Present Yes   Cognition   Overall Cognitive Status Impaired   Arousal/Participation Alert   Attention Within functional limits   Orientation Level Oriented to person;Oriented to place   Memory Decreased recall of precautions;Decreased recall of recent events;Decreased short term memory   Following Commands Follows one step commands with increased time or repetition   Comments anxious   Subjective   Subjective agrees to mobilize asks to use commode   Bed Mobility   Rolling L 3  Moderate assistance   Additional items Assist x 2   Supine to Sit 3  Moderate assistance   Additional items Assist x 2; Increased time required;Verbal cues;LE management   Sit to Supine 3  Moderate assistance   Additional items Assist x 2; Increased time required;Verbal cues;LE management   Transfers   Sit to Stand 2  Maximal assistance   Additional items Assist x 2;Armrests; Increased time required;Verbal cues  (L knee blocked)   Stand to Sit 2  Maximal assistance   Additional items Assist x 2;Armrests; Increased time required; Impulsive;Verbal cues   Stand pivot 2  Maximal assistance   Additional items Assist x 2;Armrests; Increased time required;Verbal cues  (attempted rw unable, armhold assist used)   Ambulation/Elevation   Gait pattern L Knee Alejandro; Improper Weight shift;Narrow RODOLFO; Forward Flexion; Shuffling; Inconsistent hardeep; Short stride; Step to   Gait Assistance 2  Maximal assist   Additional items Assist x 2;Verbal cues; Tactile cues   Assistive Device None  (armhold assist L knee blocked)   Distance 5'x2   Balance   Static Sitting Fair +   Dynamic Sitting Fair -   Static Standing Poor   Dynamic Standing Poor   Ambulatory Poor   Endurance Deficit   Endurance Deficit Yes   Endurance Deficit Description tires quickly   Activity Tolerance   Activity Tolerance Patient tolerated treatment well   Medical Staff Made Aware OT Cristine   Nurse Made Aware [facility]   Assessment   Prognosis Good   Problem List Decreased strength;Decreased range of motion;Decreased endurance; Impaired balance;Decreased mobility; Decreased coordination;Decreased cognition;Pain;Decreased skin integrity   Assessment Pt able to transfer to commode with assist x2   L knee buckled and requires blocked for transfers  Con't to recommend in-pt rehab  Barriers to Discharge Inaccessible home environment;Decreased caregiver support   Goals   Patient Goals get better    Plan   Treatment/Interventions ADL retraining;Functional transfer training;LE strengthening/ROM; Elevations; Therapeutic exercise; Endurance training;Cognitive reorientation;Patient/family training;Equipment eval/education; Bed mobility;Gait training;Spoke to MD;Spoke to nursing;Spoke to case management;OT   Progress Slow progress, medical status limitations   PT Frequency 3-5x/wk   Recommendation   PT Discharge Recommendation Post acute rehabilitation services   Equipment Recommended [facility] [facility] Recommended Wheeled walker   AM-PAC Basic Mobility Inpatient   Turning in Bed Without Bedrails 2   Lying on Back to Sitting on Edge of Flat Bed 2   Moving Bed to Chair 2   Standing Up From Chair 2   Walk in Room 2   Climb 3-5 Stairs 2   Basic Mobility Inpatient Raw Score 12   Basic Mobility Standardized Score 32 23   Turning Head Towards Sound 4   Follow Simple Instructions 3   Low Function Basic Mobility Raw Score 19   Low Function Basic Mobility Standardized Score 31 06   Highest Level Of Mobility   [facility] Goal 4: Move to chair/commode [facility] Highest Level of Mobility 4: Move to chair/commode   [facility] Goal Achieved Yes   Virginia Mccoy, PT

## 2022-05-05 NOTE — ASSESSMENT & PLAN NOTE
· Reason for presentation - pain started a few days ago after dog jumping on her - difficulty with ambulation and weight on left side  · CT abdomen and pelvis - osseous structures noted: No acute compression collapse of the vertebra, Chronic compression of the L2 vertebra seen  · Stroke alert called over night 4/30 due to left lower extremity weakness  CT head and CTA head/neck demonstrating chronic changes  · Discussed with Neurology - MRI brain negative  No indication for anti-platelet therapy especially with ongoing anemia/concern for gastrointestinal bleed  · CT recon lumbar spine: "Chronic, mild degenerative changes and chronic moderate L2 compression fracture  No new disc herniation or high-grade stenosis compared to prior studies  Asymmetric left iliopsoas muscle enlargement of unclear etiology, new compared to prior studies  No focal mass or fluid collection  No evidence of discitis on CT   · MRI lumbar - concern for Intramuscular hematoma, consult placed to surgery recommending if there's evidence of further bleeding IR consult for embolization, however her hb is now stable, continue to monitor  · Neurovascular checks ordered  Pulses remain intact   Pain appears better/mobility improved  · S/p FFP 2 units, 4U PRBCs and vitamin K  · XR left hip/femur unremarkable  · PT/OT consult-recommending short-term rehab  · Continue pain control

## 2022-05-05 NOTE — OCCUPATIONAL THERAPY NOTE
Occupational Therapy Tx Note     Patient Name: Lucio Graham  OZRVC'D Date: 5/5/2022  Problem List  Principal Problem:    Cirrhosis of liver with ascites ([facility] [address] )  Active Problems:    Weakness of left lower extremity    Acute blood loss anemia    Thrombocytopenia (HCC)    Hyponatremia    Hyperbilirubinemia    Pleural effusion    [facility] ([facility] )            05/05/22 1040   OT Last Visit   OT Visit Date 05/05/22   Note Type   Note Type Treatment   Restrictions/Precautions   Weight Bearing Precautions Per Order Yes   LLE Weight Bearing Per Order WBAT   Other Precautions O2;Fall Risk;Pain   Pain Assessment   Pain Assessment Tool Johnson-Baker FACES   Johnson-Baker FACES Pain Rating 4   Pain Location/Orientation Orientation: Left; Location: Leg   Patient's Stated Pain Goal No pain   Hospital Pain Intervention(s) Repositioned; Rest   ADL   Grooming Assistance 5  Supervision/Setup   Grooming Deficit Setup   Toileting Assistance  1  Total Assistance   Toileting Deficit Perineal hygiene; Bedside commode;Verbal cueing  (Required max A x 2 for standing )   Bed Mobility   Supine to Sit 3  Moderate assistance   Additional items Assist x 2;Verbal cues   Sit to Supine 3  Moderate assistance   Additional items Assist x 2;Verbal cues   Transfers   Sit to Stand 2  Maximal assistance   Additional items Assist x 2;Verbal cues   Stand to Sit 2  Maximal assistance   Additional items Assist x 2;Verbal cues   Stand pivot 2  Maximal assistance   Additional items Assist x 2;Verbal cues  (+ close armhold assistance)   Cognition   Overall Cognitive Status Impaired   Arousal/Participation Alert   Attention Within functional limits   Orientation Level Oriented to person;Oriented to place   Memory Decreased recall of precautions   Following Commands Follows one step commands with increased time or repetition   Comments Pt anxious t/o   Requried moral support t/o   Assessment   Assessment Pt seen for OT tx session with focus on functional balance, functional mobility, ADL status, and transfer safety  Patient agreeable to OT treatment session  Pt received supine in bed  Performed bed mobility with mod Ax 2  Performed transfers with max Ax 2  Pt with significant difficulty mobilizing LLE  Noted LLE knee buckling with transfers  Required close arm hold support to maintain safety  Required total A for toileting task  Pt anxious t/o session  Benefited from moral support t/o  Patient continues to be functioning below baseline level, occupational performance remains limited secondary to factors listed above, and pt at increased risk for falls and injury  The patient's raw score on the AM-PAC Daily Activity inpatient short form is 16, standardized score is 35 96, less than 39 4  Patients at this level are likely to benefit from DC to post-acute rehabilitation services  Please refer to the recommendation of the Occupational Therapist for safe DC planning  From OT standpoint, recommendation at time of d/c would be Short Term Rehab  Patient to benefit from continued Occupational Therapy treatment while in the hospital to address deficits as defined above and maximize level of functional independence with ADLs and functional mobility  Pt left with call bell in reach, tray table in reach, needs met, bed alarm activated  Plan   Treatment Interventions ADL retraining;Functional transfer training; Endurance training;Patient/family training; Compensatory technique education; Activityengagement   Goal Expiration Date 05/13/22   OT Treatment Day 1   OT Frequency 3-5x/wk   Recommendation   OT Discharge Recommendation Post acute rehabilitation services   AM-[facility] Daily Activity Inpatient   Lower Body Dressing 2   Bathing 2   Toileting 1   Upper Body Dressing 3   Grooming 4   Eating 4   Daily Activity Raw Score 16   Daily Activity Standardized Score (Calc for Raw Score >=11) 35 96   AM-[facility] Applied Cognition Inpatient   Following a Speech/Presentation 3   Understanding Ordinary Conversation 3   Taking Medications 2   Remembering Where Things Are Placed or Put Away 3   Remembering List of 4-5 Errands 2   Taking Care of Complicated Tasks 2   Applied Cognition Raw Score 15   Applied Cognition Standardized Score 33 54           Damien Treviño, OTR/L

## 2022-05-05 NOTE — ASSESSMENT & PLAN NOTE
Presented with acute hip pain , abdominal pain, abdominal distension   CBC showing: Hgb 5 5/Hct16 3,  - macrocytic anemia    Occult heme + stool   Blood consent obtained   Transfusion S/P 4 units PRBCs  o Transfused to keep Hgb >7  o  No history of transfusions    Trend CBC   Hb stablized-> has significant ecchymosis in the left back and left LE   MRI brain negative for stroke   Continue Protonix IV b i d   · Abraham need outpt f/u for screening EGD for esophageal varices    Lab Results   Component Value Date    HGB 7 6 (L) 05/05/2022    HGB 8 4 (L) 05/04/2022   Transfuse for hemoglobin less than 7 5

## 2022-05-05 NOTE — PLAN OF CARE
Problem: Potential for Falls  Goal: Patient will remain free of falls  Description: INTERVENTIONS:  - Educate patient/family on patient safety including physical limitations  - Instruct patient to call for assistance with activity   - Consult OT/PT to assist with strengthening/mobility   - Keep Call bell within reach  - Keep bed low and locked with side rails adjusted as appropriate  - Keep care items and personal belongings within reach  - Initiate and maintain comfort rounds  - Make Fall Risk Sign visible to staff  - Offer Toileting every 2 Hours, in advance of need  - Initiate/Maintain bed alarm  - Obtain necessary fall risk management equipment: non slip socks  - Apply yellow socks and bracelet for high fall risk patients  - Consider moving patient to room near nurses station  Outcome: Progressing     Problem: HEMATOLOGIC - ADULT  Goal: Maintains hematologic stability  Description: INTERVENTIONS  - Assess for signs and symptoms of bleeding or hemorrhage  - Monitor labs  - Administer supportive blood products/factors as ordered and appropriate  Outcome: Progressing     Problem: NEUROSENSORY - ADULT  Goal: Achieves stable or improved neurological status  Description: INTERVENTIONS  - Monitor and report changes in neurological status  - Monitor vital signs such as temperature, blood pressure, glucose, and any other labs ordered   - Initiate measures to prevent increased intracranial pressure  - Monitor for seizure activity and implement precautions if appropriate      Outcome: Progressing     Problem: RESPIRATORY - ADULT  Goal: Achieves optimal ventilation and oxygenation  Description: INTERVENTIONS:  - Assess for changes in respiratory status  - Assess for changes in mentation and behavior  - Position to facilitate oxygenation and minimize respiratory effort  - Oxygen administered by appropriate delivery if ordered  - Initiate smoking cessation education as indicated  - Encourage broncho-pulmonary hygiene including cough, deep breathe, Incentive Spirometry  - Assess the need for suctioning and aspirate as needed  - Assess and instruct to report SOB or any respiratory difficulty  - Respiratory Therapy support as indicated  Outcome: Progressing     Problem: GASTROINTESTINAL - ADULT  Goal: Minimal or absence of nausea and/or vomiting  Description: INTERVENTIONS:  - Administer IV fluids if ordered to ensure adequate hydration  - Maintain NPO status until nausea and vomiting are resolved  - Nasogastric tube if ordered  - Administer ordered antiemetic medications as needed  - Provide nonpharmacologic comfort measures as appropriate  - Advance diet as tolerated, if ordered  - Consider nutrition services referral to assist patient with adequate nutrition and appropriate food choices  Outcome: Progressing  Goal: Maintains or returns to baseline bowel function  Description: INTERVENTIONS:  - Assess bowel function  - Encourage oral fluids to ensure adequate hydration  - Administer IV fluids if ordered to ensure adequate hydration  - Administer ordered medications as needed  - Encourage mobilization and activity  - Consider nutritional services referral to assist patient with adequate nutrition and appropriate food choices  Outcome: Progressing  Goal: Maintains adequate nutritional intake  Description: INTERVENTIONS:  - Monitor percentage of each meal consumed  - Identify factors contributing to decreased intake, treat as appropriate  - Assist with meals as needed  - Monitor I&O, weight, and lab values if indicated  - Obtain nutrition services referral as needed  Outcome: Progressing

## 2022-05-05 NOTE — ASSESSMENT & PLAN NOTE
Patient presents with acute hip pain abdominal pain abdominal distension  · Endorses history of fatty liver disease, drinks wine occasionally, never been tested for hepatitis  · Found to have significant ascites on exam   · CT abd/pelvis - new large ascites, cirrhotic liver, hypodense area measuring 1 7 cm at junction of segment 4 and 8 in liver, distended gallbladder, splenomegaly with recanalized umbilical vein  · Underwent paracentesis 4/29 with IR - approximately 1 8 L removed  · GI following  · MELD: 30 with 27-32% estimated mortality  · Continue ceftriaxone for SBP prophylaxis  · lactulose to 30 tid  · Re-initiated aldactone 5/3 dc'd midodrine 5/4  · Continue to trend T Bili and D bili-> continuing to trend upwards-started on prednisolone for alcoholic hepatitis    · Importance of strict alcohol cessation discussion was done   · Now with decompensated cirrhosis  · lasix held d/t ALEJANDRO

## 2022-05-05 NOTE — PLAN OF CARE
Problem: HEMATOLOGIC - ADULT  Goal: Maintains hematologic stability  Description: INTERVENTIONS  - Assess for signs and symptoms of bleeding or hemorrhage  - Monitor labs  - Administer supportive blood products/factors as ordered and appropriate  Outcome: Progressing     Problem: RESPIRATORY - ADULT  Goal: Achieves optimal ventilation and oxygenation  Description: INTERVENTIONS:  - Assess for changes in respiratory status  - Assess for changes in mentation and behavior  - Position to facilitate oxygenation and minimize respiratory effort  - Oxygen administered by appropriate delivery if ordered  - Initiate smoking cessation education as indicated  - Encourage broncho-pulmonary hygiene including cough, deep breathe, Incentive Spirometry  - Assess the need for suctioning and aspirate as needed  - Assess and instruct to report SOB or any respiratory difficulty  - Respiratory Therapy support as indicated  Outcome: Progressing     Problem: NEUROSENSORY - ADULT  Goal: Achieves stable or improved neurological status  Description: INTERVENTIONS  - Monitor and report changes in neurological status  - Monitor vital signs such as temperature, blood pressure, glucose, and any other labs ordered   - Initiate measures to prevent increased intracranial pressure  - Monitor for seizure activity and implement precautions if appropriate      Outcome: Progressing     Problem: GASTROINTESTINAL - ADULT  Goal: Minimal or absence of nausea and/or vomiting  Description: INTERVENTIONS:  - Administer IV fluids if ordered to ensure adequate hydration  - Maintain NPO status until nausea and vomiting are resolved  - Nasogastric tube if ordered  - Administer ordered antiemetic medications as needed  - Provide nonpharmacologic comfort measures as appropriate  - Advance diet as tolerated, if ordered  - Consider nutrition services referral to assist patient with adequate nutrition and appropriate food choices  Outcome: Progressing  Goal: Maintains or returns to baseline bowel function  Description: INTERVENTIONS:  - Assess bowel function  - Encourage oral fluids to ensure adequate hydration  - Administer IV fluids if ordered to ensure adequate hydration  - Administer ordered medications as needed  - Encourage mobilization and activity  - Consider nutritional services referral to assist patient with adequate nutrition and appropriate food choices  Outcome: Progressing  Goal: Maintains adequate nutritional intake  Description: INTERVENTIONS:  - Monitor percentage of each meal consumed  - Identify factors contributing to decreased intake, treat as appropriate  - Assist with meals as needed  - Monitor I&O, weight, and lab values if indicated  - Obtain nutrition services referral as needed  Outcome: Progressing

## 2022-05-05 NOTE — TREATMENT PLAN
Reason for Consult: ALEJANDRO     67 y/o female with cirrhosis, ETOH use, chronic migraine, chronic subdural hematoma with chronic CSF leak, anxiety, temporal arteritis, lumbar radiculopathy admitted with severe anemia w/hgb 5 5, retroperitoneal bleed/L iliopsoas muscle hematoma and decompensated cirrhosis with ascites, thrombocytopenia, coagulopathy and encephalopathy   Nephrology consulted for management of ALEJANDRO in setting of diuresis and IV contrast      ASSESSMENT/PLAN:  ALEJANDRO:  Suspect prerenal due to hemodynamic changes in setting of diuretics, decompensated cirrhosis, loss of autoregulatory function with ARB as well as contrast associated nephropathy   -Admission creatinine 0 99 with interval increase to 1 34 with diuresis and contrast   Today's creatinine remains at baseline at 0 89  -Peak creatinine 1 34 on 5/1/2022   -baseline creatinine unclear but appears 0 7-0 9 since June 2021  -workup: UA with negative protein, trace ketones, trace leukocytes, 1-2 RBCs, 2-4 wbc's, occasional bacteria   -Imaging:  CT demonstrates no hydronephrosis  -chronic long-term NSAID use, recently decreased over the past year  -nonoliguric  -Plan:  · Renal function remains stable and at baseline  · Strict I/Os, daily weights  · Avoid nephrotoxins, NSAIDs, IV contrast if possible  · Avoid hypotension   Maintain MAP >65  · Trend BMP  · Adjust meds to appropriate GFR  · Optimize hemodynamic status to avoid delay in renal recovery  · Patient stable from a nephrology standpoint  Continue management per GI and primary medical service  Will see on a p r n  Basis during hospitalization  Please call with any further concerns  Will plan outpatient follow-up  Message sent to office

## 2022-05-05 NOTE — PLAN OF CARE
Problem: PHYSICAL THERAPY ADULT  Goal: Performs mobility at highest level of function for planned discharge setting  See evaluation for individualized goals  Description: Treatment/Interventions: ADL retraining,Functional transfer training,LE strengthening/ROM,Elevations,Therapeutic exercise,Endurance training,Cognitive reorientation,Patient/family training,Equipment eval/education,Bed mobility,Gait training,Spoke to nursing,Spoke to case management,OT  Equipment Recommended: Regina Guerrier       See flowsheet documentation for full assessment, interventions and recommendations  Outcome: Progressing  Note: Prognosis: Good  Problem List: Decreased strength,Decreased range of motion,Decreased endurance,Impaired balance,Decreased mobility,Decreased coordination,Decreased cognition,Pain,Decreased skin integrity  [facility]   L knee buckled and requires blocked for transfers  Con't to recommend in-pt rehab  Barriers to Discharge: Inaccessible home environment,Decreased caregiver support        PT Discharge Recommendation: Post acute rehabilitation services          See flowsheet documentation for full assessment

## 2022-05-05 NOTE — ASSESSMENT & PLAN NOTE
· Bilirubin elevated to 5 69 ->9 70->17 2->20->25  · D Bili 5-> 11->15-->14 4  · CT abdomen pelvis showing - distended gallbladder, large new ascites, hepatomegaly   · Gastroenterology following  · Concern for alcoholic hepatitis B and started on oral prednisolone by gastroenterology yesterday

## 2022-05-05 NOTE — PROGRESS NOTES
Were unable to draw blood after 3 attempts by 3 different staff  Blood would not come through  Encouraged pt to hydrate herself  Made Dr Leona Jones aware  The next staff will retry

## 2022-05-05 NOTE — ASSESSMENT & PLAN NOTE
· Sodium on admission 131  · Likely in setting of cirrhosis  · stable   Free water restriction (1-1 5L)    spironolactone re-initiated   improving   Monitor BMP     Lab Results   Component Value Date    SODIUM 134 (L) 05/05/2022    SODIUM 135 (L) 05/04/2022    SODIUM 133 (L) 05/03/2022

## 2022-05-06 ENCOUNTER — TELEPHONE (OUTPATIENT)
Dept: NEPHROLOGY | Facility: CLINIC | Age: 62
End: 2022-05-06

## 2022-05-06 LAB
ALBUMIN SERPL BCP-MCNC: 2.1 G/DL (ref 3.5–5)
ALP SERPL-CCNC: 53 U/L (ref 46–116)
ALT SERPL W P-5'-P-CCNC: 42 U/L (ref 12–78)
ANION GAP SERPL CALCULATED.3IONS-SCNC: 8 MMOL/L (ref 4–13)
ANISOCYTOSIS BLD QL SMEAR: PRESENT
AST SERPL W P-5'-P-CCNC: 125 U/L (ref 5–45)
BASOPHILS # BLD MANUAL: 0 THOUSAND/UL (ref 0–0.1)
BASOPHILS NFR MAR MANUAL: 0 % (ref 0–1)
BILIRUB DIRECT SERPL-MCNC: 15.7 MG/DL (ref 0–0.2)
BILIRUB SERPL-MCNC: 25.3 MG/DL (ref 0.2–1)
BUN SERPL-MCNC: 20 MG/DL (ref 5–25)
BURR CELLS BLD QL SMEAR: PRESENT
CALCIUM ALBUM COR SERPL-MCNC: 10 MG/DL (ref 8.3–10.1)
CALCIUM SERPL-MCNC: 8.5 MG/DL (ref 8.3–10.1)
CHLORIDE SERPL-SCNC: 102 MMOL/L (ref 100–108)
CO2 SERPL-SCNC: 25 MMOL/L (ref 21–32)
CREAT SERPL-MCNC: 1.02 MG/DL (ref 0.6–1.3)
CRP SERPL QL: 48.1 MG/L
DACRYOCYTES BLD QL SMEAR: PRESENT
EOSINOPHIL # BLD MANUAL: 0.08 THOUSAND/UL (ref 0–0.4)
EOSINOPHIL NFR BLD MANUAL: 1 % (ref 0–6)
GFR SERPL CREATININE-BSD FRML MDRD: 60 ML/MIN/1.73SQ M
GLUCOSE SERPL-MCNC: 140 MG/DL (ref 65–140)
HCT VFR BLD AUTO: 23.9 % (ref 34.8–46.1)
HGB BLD-MCNC: 8.1 G/DL (ref 11.5–15.4)
INR PPP: 2.31 (ref 0.84–1.19)
LYMPHOCYTES # BLD AUTO: 0.55 THOUSAND/UL (ref 0.6–4.47)
LYMPHOCYTES # BLD AUTO: 7 % (ref 14–44)
MCH RBC QN AUTO: 36.8 PG (ref 26.8–34.3)
MCHC RBC AUTO-ENTMCNC: 33.9 G/DL (ref 31.4–37.4)
MCV RBC AUTO: 109 FL (ref 82–98)
MONOCYTES # BLD AUTO: 0.4 THOUSAND/UL (ref 0–1.22)
MONOCYTES NFR BLD: 5 % (ref 4–12)
NEUTROPHILS # BLD MANUAL: 6.87 THOUSAND/UL (ref 1.85–7.62)
NEUTS BAND NFR BLD MANUAL: 3 % (ref 0–8)
NEUTS SEG NFR BLD AUTO: 84 % (ref 43–75)
OVALOCYTES BLD QL SMEAR: PRESENT
PLATELET # BLD AUTO: 62 THOUSANDS/UL (ref 149–390)
PLATELET BLD QL SMEAR: ABNORMAL
PMV BLD AUTO: 10.5 FL (ref 8.9–12.7)
POTASSIUM SERPL-SCNC: 4.2 MMOL/L (ref 3.5–5.3)
PROT SERPL-MCNC: 6.9 G/DL (ref 6.4–8.2)
PROTHROMBIN TIME: 24.8 SECONDS (ref 11.6–14.5)
RBC # BLD AUTO: 2.2 MILLION/UL (ref 3.81–5.12)
RBC MORPH BLD: PRESENT
SODIUM SERPL-SCNC: 135 MMOL/L (ref 136–145)
WBC # BLD AUTO: 7.9 THOUSAND/UL (ref 4.31–10.16)

## 2022-05-06 PROCEDURE — 85027 COMPLETE CBC AUTOMATED: CPT | Performed by: INTERNAL MEDICINE

## 2022-05-06 PROCEDURE — 99231 SBSQ HOSP IP/OBS SF/LOW 25: CPT | Performed by: INTERNAL MEDICINE

## 2022-05-06 PROCEDURE — 80053 COMPREHEN METABOLIC PANEL: CPT | Performed by: INTERNAL MEDICINE

## 2022-05-06 PROCEDURE — 86140 C-REACTIVE PROTEIN: CPT | Performed by: INTERNAL MEDICINE

## 2022-05-06 PROCEDURE — C9113 INJ PANTOPRAZOLE SODIUM, VIA: HCPCS | Performed by: NURSE PRACTITIONER

## 2022-05-06 PROCEDURE — 82248 BILIRUBIN DIRECT: CPT | Performed by: INTERNAL MEDICINE

## 2022-05-06 PROCEDURE — 99232 SBSQ HOSP IP/OBS MODERATE 35: CPT | Performed by: INTERNAL MEDICINE

## 2022-05-06 PROCEDURE — 85610 PROTHROMBIN TIME: CPT | Performed by: INTERNAL MEDICINE

## 2022-05-06 PROCEDURE — 85007 BL SMEAR W/DIFF WBC COUNT: CPT | Performed by: INTERNAL MEDICINE

## 2022-05-06 RX ORDER — LANOLIN ALCOHOL/MO/W.PET/CERES
3 CREAM (GRAM) TOPICAL
Status: DISCONTINUED | OUTPATIENT
Start: 2022-05-06 | End: 2022-05-12 | Stop reason: HOSPADM

## 2022-05-06 RX ORDER — ECHINACEA PURPUREA EXTRACT 125 MG
1 TABLET ORAL
Status: DISCONTINUED | OUTPATIENT
Start: 2022-05-06 | End: 2022-05-12 | Stop reason: HOSPADM

## 2022-05-06 RX ADMIN — SPIRONOLACTONE 25 MG: 25 TABLET ORAL at 08:16

## 2022-05-06 RX ADMIN — PANTOPRAZOLE SODIUM 40 MG: 40 INJECTION, POWDER, FOR SOLUTION INTRAVENOUS at 15:30

## 2022-05-06 RX ADMIN — OXYCODONE HYDROCHLORIDE 5 MG: 5 TABLET ORAL at 15:30

## 2022-05-06 RX ADMIN — OXYCODONE HYDROCHLORIDE 5 MG: 5 TABLET ORAL at 23:20

## 2022-05-06 RX ADMIN — OXYCODONE HYDROCHLORIDE 5 MG: 5 TABLET ORAL at 05:51

## 2022-05-06 RX ADMIN — OXYCODONE HYDROCHLORIDE 5 MG: 5 TABLET ORAL at 10:39

## 2022-05-06 RX ADMIN — GABAPENTIN 100 MG: 100 CAPSULE ORAL at 23:20

## 2022-05-06 RX ADMIN — GABAPENTIN 100 MG: 100 CAPSULE ORAL at 08:16

## 2022-05-06 RX ADMIN — PREDNISOLONE SODIUM PHOSPHATE 40 MG: 15 SOLUTION ORAL at 08:21

## 2022-05-06 RX ADMIN — Medication 3 MG: at 23:20

## 2022-05-06 RX ADMIN — LACTULOSE 30 G: 20 SOLUTION ORAL at 08:16

## 2022-05-06 RX ADMIN — ACETAMINOPHEN 650 MG: 325 TABLET ORAL at 17:57

## 2022-05-06 RX ADMIN — PANTOPRAZOLE SODIUM 40 MG: 40 INJECTION, POWDER, FOR SOLUTION INTRAVENOUS at 04:30

## 2022-05-06 RX ADMIN — ONDANSETRON 4 MG: 2 INJECTION INTRAMUSCULAR; INTRAVENOUS at 23:20

## 2022-05-06 RX ADMIN — ONDANSETRON 4 MG: 2 INJECTION INTRAMUSCULAR; INTRAVENOUS at 05:51

## 2022-05-06 RX ADMIN — GABAPENTIN 100 MG: 100 CAPSULE ORAL at 15:30

## 2022-05-06 RX ADMIN — LACTULOSE 30 G: 20 SOLUTION ORAL at 17:57

## 2022-05-06 NOTE — PLAN OF CARE
Problem: Potential for Falls  Goal: Patient will remain free of falls  Description: INTERVENTIONS:  - Educate patient/family on patient safety including physical limitations  - Instruct patient to call for assistance with activity   - Consult OT/PT to assist with strengthening/mobility   - Keep Call bell within reach  - Keep bed low and locked with side rails adjusted as appropriate  - Keep care items and personal belongings within reach  - Initiate and maintain comfort rounds  - Make Fall Risk Sign visible to staff  - Offer Toileting every 2 Hours, in advance of need  - Initiate/Maintain bed alarm  - Obtain necessary fall risk management equipment: non slip socks  - Apply yellow socks and bracelet for high fall risk patients  - Consider moving patient to room near nurses station  Outcome: Progressing     Problem: HEMATOLOGIC - ADULT  Goal: Maintains hematologic stability  Description: INTERVENTIONS  - Assess for signs and symptoms of bleeding or hemorrhage  - Monitor labs  - Administer supportive blood products/factors as ordered and appropriate  Outcome: Progressing     Problem: Prexisting or High Potential for Compromised Skin Integrity  Goal: Skin integrity is maintained or improved  Description: INTERVENTIONS:  - Identify patients at risk for skin breakdown  - Assess and monitor skin integrity  - Assess and monitor nutrition and hydration status  - Monitor labs   - Assess for incontinence   - Turn and reposition patient  - Assist with mobility/ambulation  - Relieve pressure over bony prominences  - Avoid friction and shearing  - Provide appropriate hygiene as needed including keeping skin clean and dry  - Evaluate need for skin moisturizer/barrier cream  - Collaborate with interdisciplinary team   - Patient/family teaching  - Consider wound care consult   Outcome: Progressing     Problem: MOBILITY - ADULT  Goal: Maintain or return to baseline ADL function  Description: INTERVENTIONS:  -  Assess patient's ability to carry out ADLs; assess patient's baseline for ADL function and identify physical deficits which impact ability to perform ADLs (bathing, care of mouth/teeth, toileting, grooming, dressing, etc )  - Assess/evaluate cause of self-care deficits   - Assess range of motion  - Assess patient's mobility; develop plan if impaired  - Assess patient's need for assistive devices and provide as appropriate  - Encourage maximum independence but intervene and supervise when necessary  - Involve family in performance of ADLs  - Assess for home care needs following discharge   - Consider OT consult to assist with ADL evaluation and planning for discharge  - Provide patient education as appropriate  Outcome: Progressing  Goal: Maintains/Returns to pre admission functional level  Description: INTERVENTIONS:  - Perform BMAT or MOVE assessment daily    - Set and communicate daily mobility goal to care team and patient/family/caregiver  - Collaborate with rehabilitation services on mobility goals if consulted  - Perform Range of Motion 5 times a day  - Reposition patient every 2 hours  - Dangle patient 3 times a day  - Stand patient 3 times a day  - Ambulate patient 3 times a day  - Out of bed to chair 3 times a day   - Out of bed for meals 3 times a day  - Out of bed for toileting  - Record patient progress and toleration of activity level   Outcome: Progressing     Problem: Neurological Deficit  Goal: Neurological status is stable or improving  Description: Interventions:  - Monitor and assess patient's level of consciousness, motor function, sensory function, and level of assistance needed for ADLs  - Monitor and report changes from baseline  Collaborate with interdisciplinary team to initiate plan and implement interventions as ordered  - Provide and maintain a safe environment  - Consider seizure precautions  - Consider fall precautions  - Consider aspiration precautions    - Consider bleeding precautions  Outcome: Progressing     Problem: Activity Intolerance/Impaired Mobility  Goal: Mobility/activity is maintained at optimum level for patient  Description: Interventions:  - Assess and monitor patient  barriers to mobility and need for assistive/adaptive devices  - Assess patient's emotional response to limitations  - Collaborate with interdisciplinary team and initiate plans and interventions as ordered  - Encourage independent activity per ability   - Maintain proper body alignment  - Perform active/passive rom as tolerated/ordered  - Plan activities to conserve energy   - Turn patient as appropriate  Outcome: Progressing     Problem: Communication Impairment  Goal: Ability to express needs and understand communication  Description: Assess patient's communication skills and ability to understand information  Patient will demonstrate use of effective communication techniques, alternative methods of communication and understanding even if not able to speak  - Encourage communication and provide alternate methods of communication as needed  - Collaborate with case management/ for discharge needs  - Include patient/family/caregiver in decisions related to communication  Outcome: Progressing     Problem: Potential for Aspiration  Goal: Non-ventilated patient's risk of aspiration is minimized  Description: Assess and monitor vital signs, respiratory status, and labs (WBC)  Monitor for signs of aspiration (tachypnea, cough, rales, wheezing, cyanosis, fever)  - Assess and monitor patient's ability to swallow  - Place patient up in chair to eat if possible  - HOB up at 90 degrees to eat if unable to get patient up into chair   - Supervise patient during oral intake  - Instruct patient/ family to take small bites  - Instruct patient/ family to take small single sips when taking liquids    - Follow patient-specific strategies generated by speech pathologist   Outcome: Progressing  Goal: Ventilated patient's risk of aspiration is minimized  Description: Assess and monitor vital signs, respiratory status, airway cuff pressure, and labs (WBC)  Monitor for signs of aspiration (tachypnea, cough, rales, wheezing, cyanosis, fever)  - Elevate head of bed 30 degrees if patient has tube feeding   - Monitor tube feeding  Outcome: Progressing     Problem: Nutrition  Goal: Nutrition/Hydration status is improving  Description: Monitor and assess patient's nutrition/hydration status for malnutrition (ex- brittle hair, bruises, dry skin, pale skin and conjunctiva, muscle wasting, smooth red tongue, and disorientation)  Collaborate with interdisciplinary team and initiate plan and interventions as ordered  Monitor patient's weight and dietary intake as ordered or per policy  Utilize nutrition screening tool and intervene per policy  Determine patient's food preferences and provide high-protein, high-caloric foods as appropriate  - Assist patient with eating   - Allow adequate time for meals   - Encourage patient to take dietary supplement as ordered  - Collaborate with clinical nutritionist   - Include patient/family/caregiver in decisions related to nutrition    Outcome: Progressing     Problem: PAIN - ADULT  Goal: Verbalizes/displays adequate comfort level or baseline comfort level  Description: Interventions:  - Encourage patient to monitor pain and request assistance  - Assess pain using appropriate pain scale  - Administer analgesics based on type and severity of pain and evaluate response  - Implement non-pharmacological measures as appropriate and evaluate response  - Consider cultural and social influences on pain and pain management  - Notify physician/advanced practitioner if interventions unsuccessful or patient reports new pain  Outcome: Progressing     Problem: INFECTION - ADULT  Goal: Absence or prevention of progression during hospitalization  Description: INTERVENTIONS:  - Assess and monitor for signs and symptoms of infection  - Monitor lab/diagnostic results  - Monitor all insertion sites, i e  indwelling lines, tubes, and drains  - Monitor endotracheal if appropriate and nasal secretions for changes in amount and color  - [place] appropriate cooling/warming therapies per order  - Administer medications as ordered  - Instruct and encourage patient and family to use good hand hygiene technique  - Identify and instruct in appropriate isolation precautions for identified infection/condition  Outcome: Progressing     Problem: SAFETY ADULT  Goal: Patient will remain free of falls  Description: INTERVENTIONS:  - Educate patient/family on patient safety including physical limitations  - Instruct patient to call for assistance with activity   - Consult OT/PT to assist with strengthening/mobility   - Keep Call bell within reach  - Keep bed low and locked with side rails adjusted as appropriate  - Keep care items and personal belongings within reach  - Initiate and maintain comfort rounds  - Make Fall Risk Sign visible to staff  - Offer Toileting every 2 Hours, in advance of need  - Initiate/Maintain bed alarm  - Obtain necessary fall risk management equipment: non slip socks  - Apply yellow socks and bracelet for high fall risk patients  - Consider moving patient to room near nurses station  Outcome: Progressing     Problem: DISCHARGE PLANNING  Goal: Discharge to home or other facility with appropriate resources  Description: INTERVENTIONS:  - Identify barriers to discharge w/patient and caregiver  - Arrange for needed discharge resources and transportation as appropriate  - Identify discharge learning needs (meds, wound care, etc )  - Arrange for interpretive services to assist at discharge as needed  - Refer to Case Management Department for coordinating discharge planning if the patient needs post-hospital services based on physician/advanced practitioner order or complex needs related to functional status, cognitive ability, or social support system  Outcome: Progressing     Problem: Knowledge Deficit  Goal: Patient/family/caregiver demonstrates understanding of disease process, treatment plan, medications, and discharge instructions  Description: Complete learning assessment and assess knowledge base  Interventions:  - Provide teaching at level of understanding  - Provide teaching via preferred learning methods  Outcome: Progressing     Problem: Nutrition/Hydration-ADULT  Goal: Nutrient/Hydration intake appropriate for improving, restoring or maintaining nutritional needs  Description: Monitor and assess patient's nutrition/hydration status for malnutrition  Collaborate with interdisciplinary team and initiate plan and interventions as ordered  Monitor patient's weight and dietary intake as ordered or per policy  Utilize nutrition screening tool and intervene as necessary  Determine patient's food preferences and provide high-protein, high-caloric foods as appropriate       INTERVENTIONS:  - Monitor oral intake, urinary output, labs, and treatment plans  - Assess nutrition and hydration status and recommend course of action  - Evaluate amount of meals eaten  - Assist patient with eating if necessary   - Allow adequate time for meals  - Recommend/ encourage appropriate diets, oral nutritional supplements, and vitamin/mineral supplements  - Order, calculate, and assess calorie counts as needed  - Recommend, monitor, and adjust tube feedings and TPN/PPN based on assessed needs  - Assess need for intravenous fluids  - Provide specific nutrition/hydration education as appropriate  - Include patient/family/caregiver in decisions related to nutrition  Outcome: Progressing     Problem: NEUROSENSORY - ADULT  Goal: Achieves stable or improved neurological status  Description: INTERVENTIONS  - Monitor and report changes in neurological status  - Monitor vital signs such as temperature, blood pressure, glucose, and any other labs ordered   - Initiate measures to prevent increased intracranial pressure  - Monitor for seizure activity and implement precautions if appropriate      Outcome: Progressing     Problem: RESPIRATORY - ADULT  Goal: Achieves optimal ventilation and oxygenation  Description: INTERVENTIONS:  - Assess for changes in respiratory status  - Assess for changes in mentation and behavior  - Position to facilitate oxygenation and minimize respiratory effort  - Oxygen administered by appropriate delivery if ordered  - Initiate smoking cessation education as indicated  - Encourage broncho-pulmonary hygiene including cough, deep breathe, Incentive Spirometry  - Assess the need for suctioning and aspirate as needed  - Assess and instruct to report SOB or any respiratory difficulty  - Respiratory Therapy support as indicated  Outcome: Progressing     Problem: GASTROINTESTINAL - ADULT  Goal: Minimal or absence of nausea and/or vomiting  Description: INTERVENTIONS:  - Administer IV fluids if ordered to ensure adequate hydration  - Maintain NPO status until nausea and vomiting are resolved  - Nasogastric tube if ordered  - Administer ordered antiemetic medications as needed  - Provide nonpharmacologic comfort measures as appropriate  - Advance diet as tolerated, if ordered  - Consider nutrition services referral to assist patient with adequate nutrition and appropriate food choices  Outcome: Progressing  Goal: Maintains or returns to baseline bowel function  Description: INTERVENTIONS:  - Assess bowel function  - Encourage oral fluids to ensure adequate hydration  - Administer IV fluids if ordered to ensure adequate hydration  - Administer ordered medications as needed  - Encourage mobilization and activity  - Consider nutritional services referral to assist patient with adequate nutrition and appropriate food choices  Outcome: Progressing  Goal: Maintains adequate nutritional intake  Description: INTERVENTIONS:  - Monitor percentage of each meal consumed  - Identify factors contributing to decreased intake, treat as appropriate  - Assist with meals as needed  - Monitor I&O, weight, and lab values if indicated  - Obtain nutrition services referral as needed  Outcome: Progressing     Problem: GENITOURINARY - ADULT  Goal: Maintains or returns to baseline urinary function  Description: INTERVENTIONS:  - Assess urinary function  - Encourage oral fluids to ensure adequate hydration if ordered  - Administer IV fluids as ordered to ensure adequate hydration  - Administer ordered medications as needed  - Offer frequent toileting  - Follow urinary retention protocol if ordered  Outcome: Progressing     Problem: SKIN/TISSUE INTEGRITY - ADULT  Goal: Skin Integrity remains intact(Skin Breakdown Prevention)  Description: Assess:  -Perform Brigido assessment  -Clean and moisturize skin  -Inspect skin when repositioning, toileting, and assisting with ADLS  -Assess under medical devices  -Assess extremities for adequate circulation and sensation     Bed Management:  -Have minimal linens on bed & keep smooth, unwrinkled  -Change linens as needed when moist or perspiring  -Avoid sitting or lying in one position for more than 2 hours while in bed  -Keep HOB at 30 degrees     Toileting:  -Offer bedside commode  -Assess for incontinence  -Use incontinent care products after each incontinent episode    Activity:  -Mobilize patient 3 times a day  -Encourage activity and walks on unit  -Encourage or provide ROM exercises   -Turn and reposition patient every 2 Hours  -Use appropriate equipment to lift or move patient in bed  -Instruct/ Assist with weight shifting when out of bed in chair  -Consider limitation of chair time 2 hour intervals    Skin Care:  -Avoid use of baby powder, tape, friction and shearing, hot water or constrictive clothing  -Relieve pressure over bony prominences  -Do not massage red bony areas    Next Steps:  -Teach patient strategies to minimize risks    -Consider consults to  interdisciplinary teams  Outcome: Progressing  Goal: Incision(s), wounds(s) or drain site(s) healing without S/S of infection  Description: INTERVENTIONS  - Assess and document dressing, incision, wound bed, drain sites and surrounding tissue  - Provide patient and family education  - Perform skin care/dressing changes  Outcome: Progressing     Problem: MUSCULOSKELETAL - ADULT  Goal: Maintain or return mobility to safest level of function  Description: INTERVENTIONS:  - Assess patient's ability to carry out ADLs; assess patient's baseline for ADL function and identify physical deficits which impact ability to perform ADLs (bathing, care of mouth/teeth, toileting, grooming, dressing, etc )  - Assess/evaluate cause of self-care deficits   - Assess range of motion  - Assess patient's mobility  - Assess patient's need for assistive devices and provide as appropriate  - Encourage maximum independence but intervene and supervise when necessary  - Involve family in performance of ADLs  - Assess for home care needs following discharge   - Consider OT consult to assist with ADL evaluation and planning for discharge  - Provide patient education as appropriate  Outcome: Progressing

## 2022-05-06 NOTE — ASSESSMENT & PLAN NOTE
· Bilirubin elevated to 5 69 ->9 70->17 2->20->25  · D Bili 5-> 11->15-->14 4-->15 7  · CT abdomen pelvis showing - distended gallbladder, large new ascites, hepatomegaly   · Gastroenterology following  · Concern for alcoholic hepatitis B and started on oral prednisolone by gastroenterology yesterday

## 2022-05-06 NOTE — PROGRESS NOTES
Progress note - [facility]   Howard Siu 63 y o  female MRN: [id number]  Unit/Bed#: -01 Encounter: [id number]    ASSESSMENT and PLAN    1  Cirrhosis decompensated with ascites  61F with recently diagnosed ETOH and RG, decompensated over the past 6 weeks with encephalopathy, thrombocytopenia, coagulopathy, ascites, ALEJANDRO  1800 cc paracentesis negative for SBP      Diuretics were on hold due to ALEJANDRO which has improved, appreciate renal input  Increasing bilirubin likely multifactorial from hematoma resorption and alcoholic hepatitis  Prednisolone started 05/04/2022  Bilirubin increased  Follow bilirubin and INR daily  Prognosis is guarded  Will establish care with hepatology after discharge with Dr Nguyen Wallace     2  Hepatic encephalopathy  Improved with lactulose t i d , had excessive stools 05/04/2022, will reduce to twice daily     3 Retroperitoneal hemorrhage  4  Anemia  Significant anemia in setting of coagulopathy and thrombocytopenia  8cm left retroperitoneal hematoma centered in ileus psoas muscle   Appreciate surgical input  No overt GI bleeding, but will need EGD to assess for varices after she recovers from this hospitalization  Adenoma on colonoscopy April 2019, 5 recall     5  ETOH use  Patient admits to drinking 1-2 glasses a wine every evening prior to admission  Reinforced need for complete alcohol cessation       Chief Complaint   Patient presents with    Leg Pain     Pt reports left leg pain since sunday 4/24/22 when her dog jumped on her  Took tylenol #3 prior to arrival today  SUBJECTIVE/HPI   More awake and alert  Her daughter is in the room and says she is better today    Eating small amounts    /65   Pulse 77   Temp 97 7 °F (36 5 °C)   Resp 18   Ht 5' 6" (1 676 m)   Wt 73 5 kg (162 lb 0 6 oz)   SpO2 94%   BMI 26 15 kg/m²     PHYSICALEXAM  General appearance: alert, appears stated age and cooperative  Eyes: Harlon Prude, grossly icteric   Head: Normocephalic, without obvious abnormality, atraumatic  Abdomen: soft, distended fluid wave non-tender; bowel sounds normal; no masses,    Extremities: extremities normal, atraumatic, no cyanosis or edema  Neurologic: Grossly normal    Lab Results   Component Value Date    GLUCOSE 94 05/08/2015    CALCIUM 8 5 05/06/2022     05/08/2015    K 4 2 05/06/2022    CO2 25 05/06/2022     05/06/2022    BUN 20 05/06/2022    CREATININE 1 02 05/06/2022     Lab Results   Component Value Date    WBC 7 90 05/06/2022    HGB 8 1 (L) 05/06/2022    HCT 23 9 (L) 05/06/2022     (H) 05/06/2022    PLT 62 (L) 05/06/2022     Lab Results   Component Value Date    ALT 42 05/06/2022     (H) 05/06/2022    GGT 81 (H) 04/30/2022    ALKPHOS 53 05/06/2022    BILITOT 0 2 05/08/2015     No results found for: AMYLASE  Lab Results   Component Value Date    LIPASE 76 04/28/2022     Lab Results   Component Value Date    IRON 97 04/28/2022    TIBC 146 (L) 04/28/2022    FERRITIN 1,238 (H) 04/28/2022     Lab Results   Component Value Date    INR 2 31 (H) 05/06/2022

## 2022-05-06 NOTE — TELEPHONE ENCOUNTER
----- Message from Blade Lew PA-C sent at 5/5/2022  7:52 AM EDT -----  Pt seen for ALEJANDRO  Remains inpatient at [address]  Please schedule for hospital f/u in office in 3-4 weeks with repeat CMP and CBC  Thanks

## 2022-05-06 NOTE — ASSESSMENT & PLAN NOTE
· Sodium on admission 131  · Likely in setting of cirrhosis  · stable   Free water restriction (1-1 5L)    spironolactone re-initiated   improving   Monitor BMP     Lab Results   Component Value Date    SODIUM 135 (L) 05/06/2022    SODIUM 134 (L) 05/05/2022    SODIUM 135 (L) 05/04/2022

## 2022-05-06 NOTE — PROGRESS NOTES
[place]  Progress Note - Josseline Falcon 1960, 63 y o  female MRN: [id number]  Unit/Bed#: -01 Encounter: [id number]  Primary Care Provider: Jil Dudley MD   Date and time admitted to hospital: 4/28/2022 10:36 AM    * Cirrhosis of liver with ascites [facility])  Assessment & Plan  Patient presents with acute hip pain abdominal pain abdominal distension  · Endorses history of fatty liver disease, drinks wine occasionally, never been tested for hepatitis  · Found to have significant ascites on exam   · CT abd/pelvis - new large ascites, cirrhotic liver, hypodense area measuring 1 7 cm at junction of segment 4 and 8 in liver, distended gallbladder, splenomegaly with recanalized umbilical vein  · Underwent paracentesis 4/29 with IR - approximately 1 8 L removed  · GI following  · MELD: 30 with 27-32% estimated mortality  · Continue ceftriaxone for SBP prophylaxis  · lactulose to 30 bid  · Re-initiated aldactone 5/3 dc'd midodrine 5/4  · Continue to trend T Bili and D bili-> continuing to trend upwards-started on prednisolone for alcoholic hepatitis    · Importance of strict alcohol cessation discussion was done   ·     Acute kidney injury [facility])  Assessment & Plan  Multifactorial, related to decompensated cirrhosis, contrast exposure, low blood pressures  Re-initiated aldactone  R/o HRS  S/p Albumin 25 x4,   Nephrology following  Dc'd Midodrine  Octreotide gtt dc'd  Continue to hold ARB      Pleural effusion  Assessment & Plan  Moderate left effusion noted on CT  Currently on 2 L oxygen  Respiratory protocol  Incentive spirometer  S/p diuresis due to ascites and pleural effusion    Hyperbilirubinemia  Assessment & Plan    · Bilirubin elevated to 5 69 ->9 70->17 2->20->25  · D Bili 5-> 11->15-->14 4-->15 7  · CT abdomen pelvis showing - distended gallbladder, large new ascites, hepatomegaly   · Gastroenterology following  · Concern for alcoholic hepatitis B and started on oral prednisolone by gastroenterology yesterday  Hyponatremia  Assessment & Plan  · Sodium on admission 131  · Likely in setting of cirrhosis  · stable   Free water restriction (1-1 5L)    spironolactone re-initiated   improving   Monitor BMP     Lab Results   Component Value Date    SODIUM 135 (L) 05/06/2022    SODIUM 134 (L) 05/05/2022    SODIUM 135 (L) 05/04/2022         Thrombocytopenia (HCC)  Assessment & Plan  · Likely in setting of severe liver disease and splenomegaly  · improving  · transfuse <50k in the setting of bleeding or < 20k if no evidence of bleeding      Acute blood loss anemia  Assessment & Plan  Presented with acute hip pain , abdominal pain, abdominal distension   CBC showing: Hgb 5 5/Hct16 3,  - macrocytic anemia    Occult heme + stool   Blood consent obtained   Transfusion S/P 4 units PRBCs  o Transfused to keep Hgb >7  o  No history of transfusions    Trend CBC   Hb stablized-> has significant ecchymosis in the left back and left LE   MRI brain negative for stroke   Continue Protonix IV b i d   · Abraham need outpt f/u for screening EGD for esophageal varices    Lab Results   Component Value Date    HGB 8 1 (L) 05/06/2022    HGB 7 6 (L) 05/05/2022   Transfuse for hemoglobin less than 7 5      Weakness of left lower extremity  Assessment & Plan  · Reason for presentation - pain started a few days ago after dog jumping on her - difficulty with ambulation and weight on left side  · CT abdomen and pelvis - osseous structures noted: No acute compression collapse of the vertebra, Chronic compression of the L2 vertebra seen  · Stroke alert called over night 4/30 due to left lower extremity weakness  CT head and CTA head/neck demonstrating chronic changes  · Discussed with Neurology - MRI brain negative    No indication for anti-platelet therapy especially with ongoing anemia/concern for gastrointestinal bleed  · CT recon lumbar spine: "Chronic, mild degenerative changes and chronic moderate L2 compression fracture  No new disc herniation or high-grade stenosis compared to prior studies  Asymmetric left iliopsoas muscle enlargement of unclear etiology, new compared to prior studies  No focal mass or fluid collection  No evidence of discitis on CT   · MRI lumbar - concern for Intramuscular hematoma, consult placed to surgery recommending if there's evidence of further bleeding IR consult for embolization, however her hb is now stable, continue to monitor  · Neurovascular checks ordered  Pulses remain intact  Pain appears better/mobility improved  · S/p FFP 2 units, 4U PRBCs and vitamin K  · XR left hip/femur unremarkable  · PT/OT consult-recommending short-term rehab  · Continue pain control        VTE Pharmacologic Prophylaxis: VTE Score: 1 Moderate Risk (Score 3-4) - Pharmacological DVT Prophylaxis Contraindicated  Sequential Compression Devices Ordered  Patient Centered Rounds: I performed bedside rounds with nursing staff today  Discussions with Specialists or Other Care Team Provider:  Discussed with nursing    Education and Discussions with Family / Patient: Updated  (daughter) at bedside  Time Spent for Care: 30 minutes  More than 50% of total time spent on counseling and coordination of care as described above  Current Length of Stay: 8 day(s)  Current Patient Status: Inpatient   Certification Statement: The patient will continue to require additional inpatient hospital stay due to Currently on steroids for worsening LFTs  Discharge Plan: Anticipate discharge in 48-72 hrs to rehab facility  Code Status: Level 1 - Full Code    Subjective:   Patient seen and examined at bedside  Complained of feeling diet secondary to not getting much sleep last night  Denies any worsening abdominal discomfort  Oral intake has improved      Objective:     Vitals:   Temp (24hrs), Av 7 °F (36 5 °C), Min:97 7 °F (36 5 °C), Max:97 8 °F (36 6 °C)    Temp:  [97 7 °F (36 5 °C)-97 8 °F (36 6 °C)] 97 7 °F (36 5 °C)  HR:  [77-81] 77  Resp:  [18] 18  BP: (118-126)/(62-66) 124/65  SpO2:  [89 %-94 %] 94 %  Body mass index is 26 15 kg/m²  Input and Output Summary (last 24 hours): Intake/Output Summary (Last 24 hours) at 5/6/2022 1246  Last data filed at 5/6/2022 0850  Gross per 24 hour   Intake 120 ml   Output 120 ml   Net 0 ml       Physical Exam:   Physical Exam  Constitutional:       Appearance: She is ill-appearing  HENT:      Head: Normocephalic  Nose: Nose normal       Mouth/Throat:      Mouth: Mucous membranes are moist    Eyes:      Pupils: Pupils are equal, round, and reactive to light  Cardiovascular:      Rate and Rhythm: Normal rate and regular rhythm  Pulmonary:      Effort: Pulmonary effort is normal       Comments: Diminished breath sounds at bases  Abdominal:      General: Bowel sounds are normal  There is distension  Palpations: Abdomen is soft  There is no mass  Tenderness: There is no abdominal tenderness  There is no guarding  Musculoskeletal:      Cervical back: Neck supple  Skin:     Coloration: Skin is jaundiced and pale  Neurological:      General: No focal deficit present  Mental Status: She is alert and oriented to person, place, and time  Mental status is at baseline  Psychiatric:         Mood and Affect: Mood normal          Additional Data:     Labs:  Results from last 7 days   Lab Units 05/06/22  0525 05/05/22  0517 05/04/22  0536   WBC Thousand/uL 7 90   < > 6 96   HEMOGLOBIN g/dL 8 1*   < > 8 4*   HEMATOCRIT % 23 9*   < > 25 5*   PLATELETS Thousands/uL 62*   < > 54*   BANDS PCT % 3  --   --    NEUTROS PCT %  --   --  73   LYMPHS PCT %  --   --  13*   LYMPHO PCT % 7*  --   --    MONOS PCT %  --   --  11   MONO PCT % 5  --   --    EOS PCT % 1  --  1    < > = values in this interval not displayed       Results from last 7 days   Lab Units 05/06/22  0525   SODIUM mmol/L 135*   POTASSIUM mmol/L 4 2   CHLORIDE mmol/L 102 CO2 mmol/L 25   BUN mg/dL 20   CREATININE mg/dL 1 02   ANION GAP mmol/L 8   CALCIUM mg/dL 8 5   ALBUMIN g/dL 2 1*   TOTAL BILIRUBIN mg/dL 25 30*   ALK PHOS U/L 53   ALT U/L 42   AST U/L 125*   GLUCOSE RANDOM mg/dL 140     Results from last 7 days   Lab Units 05/06/22  0525   INR  2 31*     Results from last 7 days   Lab Units 05/01/22  0718 04/29/22  1549   POC GLUCOSE mg/dl 134 147*               Lines/Drains:  Invasive Devices  Report    Peripheral Intravenous Line            Peripheral IV 05/05/22 Distal;Right;Ventral (anterior) Forearm 1 day          Drain            External Urinary Catheter <1 day                      Imaging: No pertinent imaging reviewed      Recent Cultures (last 7 days):         Last 24 Hours Medication List:   Current Facility-Administered Medications   Medication Dose Route Frequency Provider Last Rate    acetaminophen  650 mg Oral Q4H PRN TRAV Lveine      aluminum-magnesium hydroxide-simethicone  30 mL Oral Q6H PRN TRAV Levine      gabapentin  100 mg Oral TID TRAV Levine      HYDROmorphone  0 5 mg Intravenous Q1H PRN [facility] TRAV Pedersen      HYDROmorphone  1 mg Intravenous Once PRN [place] TRAV Mendoza      influenza vaccine  0 5 mL Intramuscular Prior to discharge TRAV Levine      lactulose  30 g Oral BID Bentley Pichardo DO      LORazepam  1 mg Intravenous Once TRAV Levine      melatonin  3 mg Oral HS Bryce Joseph MD      ondansetron  4 mg Intravenous Q4H PRN TRAV Levine      oxyCODONE  10 mg Oral Q4H PRN TRAV Levine      oxyCODONE  5 mg Oral Q4H PRN TRAV Levine      pantoprazole  40 mg Intravenous Q12H [place], TRAV      prednisoLONE  40 mg Oral Daily Bentley Pichardo DO      senna-docusate sodium  2 tablet Oral BID PRN TRAV Levine      sodium chloride  1 spray Each Nare Q1H PRN Bryce Joseph MD      spironolactone  25 mg Oral Daily Lucendia Deborah TRAV Castro          Today, Patient Was Seen By: Caty Scruggs MD    **Please Note: This note may have been constructed using a voice recognition system  **

## 2022-05-06 NOTE — ASSESSMENT & PLAN NOTE
Patient presents with acute hip pain abdominal pain abdominal distension  · Endorses history of fatty liver disease, drinks wine occasionally, never been tested for hepatitis  · Found to have significant ascites on exam   · CT abd/pelvis - new large ascites, cirrhotic liver, hypodense area measuring 1 7 cm at junction of segment 4 and 8 in liver, distended gallbladder, splenomegaly with recanalized umbilical vein  · Underwent paracentesis 4/29 with IR - approximately 1 8 L removed  · GI following  · MELD: 30 with 27-32% estimated mortality  · Continue ceftriaxone for SBP prophylaxis  · lactulose to 30 bid  · Re-initiated aldactone 5/3 dc'd midodrine 5/4  · Continue to trend T Bili and D bili-> continuing to trend upwards-started on prednisolone for alcoholic hepatitis    · Importance of strict alcohol cessation discussion was done   ·

## 2022-05-06 NOTE — ASSESSMENT & PLAN NOTE
Presented with acute hip pain , abdominal pain, abdominal distension   CBC showing: Hgb 5 5/Hct16 3,  - macrocytic anemia    Occult heme + stool   Blood consent obtained   Transfusion S/P 4 units PRBCs  o Transfused to keep Hgb >7  o  No history of transfusions    Trend CBC   Hb stablized-> has significant ecchymosis in the left back and left LE   MRI brain negative for stroke   Continue Protonix IV b i d   · Abraham need outpt f/u for screening EGD for esophageal varices    Lab Results   Component Value Date    HGB 8 1 (L) 05/06/2022    HGB 7 6 (L) 05/05/2022   Transfuse for hemoglobin less than 7 5

## 2022-05-06 NOTE — PLAN OF CARE
Problem: Potential for Falls  Goal: Patient will remain free of falls  Description: INTERVENTIONS:  - Educate patient/family on patient safety including physical limitations  - Instruct patient to call for assistance with activity   - Consult OT/PT to assist with strengthening/mobility   - Keep Call bell within reach  - Keep bed low and locked with side rails adjusted as appropriate  - Keep care items and personal belongings within reach  - Initiate and maintain comfort rounds  - Make Fall Risk Sign visible to staff  - Offer Toileting every 2 Hours, in advance of need  - Initiate/Maintain bed alarm  - Obtain necessary fall risk management equipment: non slip socks  - Apply yellow socks and bracelet for high fall risk patients  - Consider moving patient to room near nurses station  Outcome: Progressing     Problem: HEMATOLOGIC - ADULT  Goal: Maintains hematologic stability  Description: INTERVENTIONS  - Assess for signs and symptoms of bleeding or hemorrhage  - Monitor labs  - Administer supportive blood products/factors as ordered and appropriate  Outcome: Progressing     Problem: NEUROSENSORY - ADULT  Goal: Achieves stable or improved neurological status  Description: INTERVENTIONS  - Monitor and report changes in neurological status  - Monitor vital signs such as temperature, blood pressure, glucose, and any other labs ordered   - Initiate measures to prevent increased intracranial pressure  - Monitor for seizure activity and implement precautions if appropriate      Outcome: Progressing     Problem: NEUROSENSORY - ADULT  Goal: Achieves stable or improved neurological status  Description: INTERVENTIONS  - Monitor and report changes in neurological status  - Monitor vital signs such as temperature, blood pressure, glucose, and any other labs ordered   - Initiate measures to prevent increased intracranial pressure  - Monitor for seizure activity and implement precautions if appropriate      Outcome: Progressing Problem: RESPIRATORY - ADULT  Goal: Achieves optimal ventilation and oxygenation  Description: INTERVENTIONS:  - Assess for changes in respiratory status  - Assess for changes in mentation and behavior  - Position to facilitate oxygenation and minimize respiratory effort  - Oxygen administered by appropriate delivery if ordered  - Initiate smoking cessation education as indicated  - Encourage broncho-pulmonary hygiene including cough, deep breathe, Incentive Spirometry  - Assess the need for suctioning and aspirate as needed  - Assess and instruct to report SOB or any respiratory difficulty  - Respiratory Therapy support as indicated  Outcome: Progressing

## 2022-05-07 LAB
ALBUMIN SERPL BCP-MCNC: 2.2 G/DL (ref 3.5–5)
ALP SERPL-CCNC: 64 U/L (ref 46–116)
ALT SERPL W P-5'-P-CCNC: 53 U/L (ref 12–78)
ANION GAP SERPL CALCULATED.3IONS-SCNC: 10 MMOL/L (ref 4–13)
AST SERPL W P-5'-P-CCNC: 141 U/L (ref 5–45)
BILIRUB SERPL-MCNC: 28 MG/DL (ref 0.2–1)
BUN SERPL-MCNC: 23 MG/DL (ref 5–25)
CALCIUM ALBUM COR SERPL-MCNC: 9.9 MG/DL (ref 8.3–10.1)
CALCIUM SERPL-MCNC: 8.5 MG/DL (ref 8.3–10.1)
CHLORIDE SERPL-SCNC: 99 MMOL/L (ref 100–108)
CO2 SERPL-SCNC: 25 MMOL/L (ref 21–32)
CREAT SERPL-MCNC: 1.02 MG/DL (ref 0.6–1.3)
GFR SERPL CREATININE-BSD FRML MDRD: 60 ML/MIN/1.73SQ M
GLUCOSE SERPL-MCNC: 91 MG/DL (ref 65–140)
HCT VFR BLD AUTO: 23.4 % (ref 34.8–46.1)
HGB BLD-MCNC: 7.8 G/DL (ref 11.5–15.4)
MCH RBC QN AUTO: 37.1 PG (ref 26.8–34.3)
MCHC RBC AUTO-ENTMCNC: 33.3 G/DL (ref 31.4–37.4)
MCV RBC AUTO: 111 FL (ref 82–98)
NRBC BLD AUTO-RTO: 0 /100 WBCS
PLATELET # BLD AUTO: 80 THOUSANDS/UL (ref 149–390)
PMV BLD AUTO: 11.5 FL (ref 8.9–12.7)
POTASSIUM SERPL-SCNC: 4.1 MMOL/L (ref 3.5–5.3)
PROT SERPL-MCNC: 7.4 G/DL (ref 6.4–8.2)
RBC # BLD AUTO: 2.1 MILLION/UL (ref 3.81–5.12)
SODIUM SERPL-SCNC: 134 MMOL/L (ref 136–145)
WBC # BLD AUTO: 10.42 THOUSAND/UL (ref 4.31–10.16)

## 2022-05-07 PROCEDURE — 86038 ANTINUCLEAR ANTIBODIES: CPT | Performed by: INTERNAL MEDICINE

## 2022-05-07 PROCEDURE — 99232 SBSQ HOSP IP/OBS MODERATE 35: CPT | Performed by: INTERNAL MEDICINE

## 2022-05-07 PROCEDURE — 85027 COMPLETE CBC AUTOMATED: CPT | Performed by: INTERNAL MEDICINE

## 2022-05-07 PROCEDURE — 80053 COMPREHEN METABOLIC PANEL: CPT | Performed by: INTERNAL MEDICINE

## 2022-05-07 PROCEDURE — C9113 INJ PANTOPRAZOLE SODIUM, VIA: HCPCS | Performed by: NURSE PRACTITIONER

## 2022-05-07 RX ADMIN — LACTULOSE 30 G: 20 SOLUTION ORAL at 08:11

## 2022-05-07 RX ADMIN — PANTOPRAZOLE SODIUM 40 MG: 40 INJECTION, POWDER, FOR SOLUTION INTRAVENOUS at 15:43

## 2022-05-07 RX ADMIN — PANTOPRAZOLE SODIUM 40 MG: 40 INJECTION, POWDER, FOR SOLUTION INTRAVENOUS at 04:56

## 2022-05-07 RX ADMIN — GABAPENTIN 100 MG: 100 CAPSULE ORAL at 08:11

## 2022-05-07 RX ADMIN — LACTULOSE 30 G: 20 SOLUTION ORAL at 17:31

## 2022-05-07 RX ADMIN — Medication 3 MG: at 22:13

## 2022-05-07 RX ADMIN — GABAPENTIN 100 MG: 100 CAPSULE ORAL at 22:13

## 2022-05-07 RX ADMIN — SPIRONOLACTONE 25 MG: 25 TABLET ORAL at 08:11

## 2022-05-07 RX ADMIN — ONDANSETRON 4 MG: 2 INJECTION INTRAMUSCULAR; INTRAVENOUS at 22:26

## 2022-05-07 RX ADMIN — GABAPENTIN 100 MG: 100 CAPSULE ORAL at 15:43

## 2022-05-07 RX ADMIN — OXYCODONE HYDROCHLORIDE 10 MG: 5 TABLET ORAL at 22:12

## 2022-05-07 RX ADMIN — OXYCODONE HYDROCHLORIDE 5 MG: 5 TABLET ORAL at 13:53

## 2022-05-07 RX ADMIN — PREDNISOLONE SODIUM PHOSPHATE 40 MG: 15 SOLUTION ORAL at 08:13

## 2022-05-07 RX ADMIN — OXYCODONE HYDROCHLORIDE 5 MG: 5 TABLET ORAL at 09:33

## 2022-05-07 NOTE — ASSESSMENT & PLAN NOTE
· Bilirubin elevated to 5 69 ->9 70->17 2->20->25-->28  · D Bili 5-> 11->15-->14 4-->15 7  · CT abdomen pelvis showing - distended gallbladder, large new ascites, hepatomegaly   · Gastroenterology following  · Hyperbilirubinemia multifactorial in the setting of resorption of thigh/ retroperitoneal hematoma and alcoholic hepatitis  · Concern for alcoholic hepatitis  and started on oral prednisolone by gastroenterology 5/5  · Continue to follow LFTs and INR closely

## 2022-05-07 NOTE — ASSESSMENT & PLAN NOTE
Patient presents with acute hip pain abdominal pain abdominal distension  · Endorses history of fatty liver disease, drinks wine occasionally, never been tested for hepatitis  · Found to have significant ascites on exam    Paracentesis negative for SBP  · CT abd/pelvis - new large ascites, cirrhotic liver, hypodense area measuring 1 7 cm at junction of segment 4 and 8 in liver, distended gallbladder, splenomegaly with recanalized umbilical vein  · Underwent paracentesis 4/29 with IR - approximately 1 8 L removed  · GI following  · MELD: 30 with 27-32% estimated mortality  · Continue ceftriaxone for SBP prophylaxis  · lactulose   Titrate for 3 3-4 soft stools daily  · Re-initiated aldactone 5/3 /  · Continue to trend LFTs and INR daily > continuing to trend upwards-started on prednisolone for alcoholic hepatitis    · Importance of strict alcohol cessation discussion was done   ·

## 2022-05-07 NOTE — ASSESSMENT & PLAN NOTE
· Sodium on admission 131  · Likely in setting of cirrhosis  · stable   Free water restriction (1-1 5L)    spironolactone re-initiated   improving   Monitor BMP     Lab Results   Component Value Date    SODIUM 134 (L) 05/07/2022    SODIUM 135 (L) 05/06/2022    SODIUM 134 (L) 05/05/2022

## 2022-05-07 NOTE — PROGRESS NOTES
Progress note - [facility]   Josh Barrett 63 y o  female MRN: [id number]  Unit/Bed#: -01 Encounter: [id number]    ASSESSMENT and PLAN    1  Cirrhosis decompensated with ascites  61F with recently diagnosed ETOH hepatitis and RG, decompensated over the past 6 weeks with encephalopathy, thrombocytopenia, coagulopathy, ascites, ALEJANDRO  1800 cc paracentesis negative for SBP      Diuretics were on hold due to ALEJANDRO which has improved, appreciate renal input  Increasing bilirubin likely multifactorial from hematoma resorption and alcoholic hepatitis  Liliane Adair started 05/04/2022     Bilirubin increased but INR is essentially sideways over 5 consecutive measurements  Prognosis remains guarded  I had a long discussion with her and her sons regarding transplant  If she worsens she would like to be transferred in considered for emergent transplant  They understand this is not a guarantee of transplant  They wish to think about different options including [facility] and [facility] If she stabilizes and improves  Will establish care with hepatology after discharge with Dr José Cruz     2  Hepatic encephalopathy  Improved with lactulose t i d , had excessive stools 05/04/2022, will reduce to twice daily     3 Retroperitoneal hemorrhage  4  Anemia  Significant anemia in setting of coagulopathy and thrombocytopenia  8cm left retroperitoneal hematoma centered in ileus psoas muscle   Appreciate surgical input  No overt GI bleeding, but will need EGD to assess for varices after she recovers from this hospitalization  Edith Hemphill on colonoscopy April 2019, 5 recall     5  ETOH use  Patient admits to drinking 1-2 glasses a wine every evening prior to admission  Reinforced need for complete alcohol cessation       Chief Complaint   Patient presents with    Leg Pain     Pt reports left leg pain since sunday 4/24/22 when her dog jumped on her  Took tylenol #3 prior to arrival today  SUBJECTIVE/HPI   Awake somewhat sleepy her sons are in attendance  They said she was completely clear this morning when she woke up and appears to be so now  Denies any GI symptoms      /69   Pulse 75   Temp 97 7 °F (36 5 °C)   Resp 16   Ht 5' 6" (1 676 m)   Wt 73 5 kg (162 lb 0 6 oz)   SpO2 93%   BMI 26 15 kg/m²     PHYSICALEXAM  General appearance: alert, appears stated age and cooperative  Eyes: Aletta Kem, extremely icteric   Head: Normocephalic, without obvious abnormality, atraumatic  Abdomen:  Distended slightly firm fluid wave not tender  Neurologic: Grossly normal    Lab Results   Component Value Date    GLUCOSE 94 05/08/2015    CALCIUM 8 5 05/07/2022     05/08/2015    K 4 1 05/07/2022    CO2 25 05/07/2022    CL 99 (L) 05/07/2022    BUN 23 05/07/2022    CREATININE 1 02 05/07/2022     Lab Results   Component Value Date    WBC 10 42 (H) 05/07/2022    HGB 7 8 (L) 05/07/2022    HCT 23 4 (L) 05/07/2022     (H) 05/07/2022    PLT 80 (L) 05/07/2022     Lab Results   Component Value Date    ALT 53 05/07/2022     (H) 05/07/2022    GGT 81 (H) 04/30/2022    ALKPHOS 64 05/07/2022    BILITOT 0 2 05/08/2015     No results found for: AMYLASE  Lab Results   Component Value Date    LIPASE 76 04/28/2022     Lab Results   Component Value Date    IRON 97 04/28/2022    TIBC 146 (L) 04/28/2022    FERRITIN 1,238 (H) 04/28/2022     Lab Results   Component Value Date    INR 2 31 (H) 05/06/2022

## 2022-05-07 NOTE — ASSESSMENT & PLAN NOTE
Presented with acute hip pain , abdominal pain, abdominal distension   CBC showing: Hgb 5 5/Hct16 3,  - macrocytic anemia    Occult heme + stool   Blood consent obtained   Transfusion S/P 4 units PRBCs  o Transfused to keep Hgb >7  o  No history of transfusions    Trend CBC   Hb stablized-> has significant ecchymosis in the left back and left LE   MRI brain negative for stroke   Continue Protonix IV b i d   · Abraham need outpt f/u for screening EGD for esophageal varices    Lab Results   Component Value Date    HGB 7 8 (L) 05/07/2022    HGB 8 1 (L) 05/06/2022   Transfuse for hemoglobin less than 7 5

## 2022-05-07 NOTE — PLAN OF CARE
Problem: Potential for Falls  Goal: Patient will remain free of falls  Description: INTERVENTIONS:  - Educate patient/family on patient safety including physical limitations  - Instruct patient to call for assistance with activity   - Consult OT/PT to assist with strengthening/mobility   - Keep Call bell within reach  - Keep bed low and locked with side rails adjusted as appropriate  - Keep care items and personal belongings within reach  - Initiate and maintain comfort rounds  - Make Fall Risk Sign visible to staff  - Offer Toileting every 2 Hours, in advance of need  - Initiate/Maintain bed alarm  - Obtain necessary fall risk management equipment: non slip socks  - Apply yellow socks and bracelet for high fall risk patients  - Consider moving patient to room near nurses station  Outcome: Progressing     Problem: HEMATOLOGIC - ADULT  Goal: Maintains hematologic stability  Description: INTERVENTIONS  - Assess for signs and symptoms of bleeding or hemorrhage  - Monitor labs  - Administer supportive blood products/factors as ordered and appropriate  Outcome: Progressing     Problem: NEUROSENSORY - ADULT  Goal: Achieves stable or improved neurological status  Description: INTERVENTIONS  - Monitor and report changes in neurological status  - Monitor vital signs such as temperature, blood pressure, glucose, and any other labs ordered   - Initiate measures to prevent increased intracranial pressure  - Monitor for seizure activity and implement precautions if appropriate      Outcome: Progressing     Problem: RESPIRATORY - ADULT  Goal: Achieves optimal ventilation and oxygenation  Description: INTERVENTIONS:  - Assess for changes in respiratory status  - Assess for changes in mentation and behavior  - Position to facilitate oxygenation and minimize respiratory effort  - Oxygen administered by appropriate delivery if ordered  - Initiate smoking cessation education as indicated  - Encourage broncho-pulmonary hygiene including cough, deep breathe, Incentive Spirometry  - Assess the need for suctioning and aspirate as needed  - Assess and instruct to report SOB or any respiratory difficulty  - Respiratory Therapy support as indicated  Outcome: Progressing     Problem: GASTROINTESTINAL - ADULT  Goal: Minimal or absence of nausea and/or vomiting  Description: INTERVENTIONS:  - Administer IV fluids if ordered to ensure adequate hydration  - Maintain NPO status until nausea and vomiting are resolved  - Nasogastric tube if ordered  - Administer ordered antiemetic medications as needed  - Provide nonpharmacologic comfort measures as appropriate  - Advance diet as tolerated, if ordered  - Consider nutrition services referral to assist patient with adequate nutrition and appropriate food choices  Outcome: Progressing  Goal: Maintains or returns to baseline bowel function  Description: INTERVENTIONS:  - Assess bowel function  - Encourage oral fluids to ensure adequate hydration  - Administer IV fluids if ordered to ensure adequate hydration  - Administer ordered medications as needed  - Encourage mobilization and activity  - Consider nutritional services referral to assist patient with adequate nutrition and appropriate food choices  Outcome: Progressing  Goal: Maintains adequate nutritional intake  Description: INTERVENTIONS:  - Monitor percentage of each meal consumed  - Identify factors contributing to decreased intake, treat as appropriate  - Assist with meals as needed  - Monitor I&O, weight, and lab values if indicated  - Obtain nutrition services referral as needed  Outcome: Progressing     Problem: GENITOURINARY - ADULT  Goal: Maintains or returns to baseline urinary function  Description: INTERVENTIONS:  - Assess urinary function  - Encourage oral fluids to ensure adequate hydration if ordered  - Administer IV fluids as ordered to ensure adequate hydration  - Administer ordered medications as needed  - Offer frequent toileting  - Follow urinary retention protocol if ordered  Outcome: Progressing

## 2022-05-07 NOTE — ASSESSMENT & PLAN NOTE
Multifactorial, related to decompensated cirrhosis, contrast exposure, low blood pressures  Re-initiated aldactone    Nephrology following  Dc'd Midodrine  Octreotide gtt dc'd  Continue to hold ARB  Renal functions have improved

## 2022-05-07 NOTE — PROGRESS NOTES
[place]  Progress Note - Flako Araujo 1960, 63 y o  female MRN: [id number]  Unit/Bed#: -01 Encounter: [id number]  Primary Care Provider: Nicola Cruz MD   Date and time admitted to hospital: 4/28/2022 10:36 AM    * Decompensated cirrhosis  Assessment & Plan  Patient presents with acute hip pain abdominal pain abdominal distension  · Endorses history of fatty liver disease, drinks wine occasionally, never been tested for hepatitis  · Found to have significant ascites on exam    Paracentesis negative for SBP  · CT abd/pelvis - new large ascites, cirrhotic liver, hypodense area measuring 1 7 cm at junction of segment 4 and 8 in liver, distended gallbladder, splenomegaly with recanalized umbilical vein  · Underwent paracentesis 4/29 with IR - approximately 1 8 L removed  · GI following  · MELD: 30 with 27-32% estimated mortality  · Continue ceftriaxone for SBP prophylaxis  · lactulose   Titrate for 3 3-4 soft stools daily  · Re-initiated aldactone 5/3 /  · Continue to trend LFTs and INR daily > continuing to trend upwards-started on prednisolone for alcoholic hepatitis    · Importance of strict alcohol cessation discussion was done   ·     Acute kidney injury [facility])  Assessment & Plan  Multifactorial, related to decompensated cirrhosis, contrast exposure, low blood pressures  Re-initiated aldactone    Nephrology following  Dc'd Midodrine  Octreotide gtt dc'd  Continue to hold ARB  Renal functions have improved      Pleural effusion  Assessment & Plan  Moderate left effusion noted on CT  Currently on 2 L oxygen  Respiratory protocol  Incentive spirometer  S/p diuresis due to ascites and pleural effusion    Hyperbilirubinemia  Assessment & Plan    · Bilirubin elevated to 5 69 ->9 70->17 2->20->25-->28  · D Bili 5-> 11->15-->14 4-->15 7  · CT abdomen pelvis showing - distended gallbladder, large new ascites, hepatomegaly   · Gastroenterology following  · Hyperbilirubinemia multifactorial in the setting of resorption of thigh/ retroperitoneal hematoma and alcoholic hepatitis  · Concern for alcoholic hepatitis  and started on oral prednisolone by gastroenterology 5/5  · Continue to follow LFTs and INR closely  Hyponatremia  Assessment & Plan  · Sodium on admission 131  · Likely in setting of cirrhosis  · stable   Free water restriction (1-1 5L)    spironolactone re-initiated   improving   Monitor BMP     Lab Results   Component Value Date    SODIUM 134 (L) 05/07/2022    SODIUM 135 (L) 05/06/2022    SODIUM 134 (L) 05/05/2022         Thrombocytopenia (HCC)  Assessment & Plan  · Likely in setting of severe liver disease and splenomegaly  · improving  · transfuse <50k in the setting of bleeding or < 20k if no evidence of bleeding      Acute blood loss anemia  Assessment & Plan  Presented with acute hip pain , abdominal pain, abdominal distension   CBC showing: Hgb 5 5/Hct16 3,  - macrocytic anemia    Occult heme + stool   Blood consent obtained   Transfusion S/P 4 units PRBCs  o Transfused to keep Hgb >7  o  No history of transfusions    Trend CBC   Hb stablized-> has significant ecchymosis in the left back and left LE   MRI brain negative for stroke   Continue Protonix IV b i d   · Abraham need outpt f/u for screening EGD for esophageal varices    Lab Results   Component Value Date    HGB 7 8 (L) 05/07/2022    HGB 8 1 (L) 05/06/2022   Transfuse for hemoglobin less than 7 5      Weakness of left lower extremity  Assessment & Plan  · Reason for presentation - pain started a few days ago after dog jumping on her - difficulty with ambulation and weight on left side  · CT abdomen and pelvis - osseous structures noted: No acute compression collapse of the vertebra, Chronic compression of the L2 vertebra seen  · Stroke alert called over night 4/30 due to left lower extremity weakness  CT head and CTA head/neck demonstrating chronic changes    · Discussed with Neurology - MRI brain negative  No indication for anti-platelet therapy especially with ongoing anemia/concern for gastrointestinal bleed  · CT recon lumbar spine: "Chronic, mild degenerative changes and chronic moderate L2 compression fracture  No new disc herniation or high-grade stenosis compared to prior studies  Asymmetric left iliopsoas muscle enlargement of unclear etiology, new compared to prior studies  No focal mass or fluid collection  No evidence of discitis on CT   · MRI lumbar - concern for Intramuscular hematoma, consult placed to surgery recommending if there's evidence of further bleeding IR consult for embolization, however her hb is now stable, continue to monitor  · Neurovascular checks ordered  Pulses remain intact  Pain appears better/mobility improved  · S/p FFP 2 units, 4U PRBCs and vitamin K  · XR left hip/femur unremarkable  · PT/OT consult-recommending short-term rehab  · Continue pain control          VTE Pharmacologic Prophylaxis: VTE Score: 1 Moderate Risk (Score 3-4) - Pharmacological DVT Prophylaxis Contraindicated  Sequential Compression Devices Ordered  Patient Centered Rounds: I performed bedside rounds with nursing staff today  Discussions with Specialists or Other Care Team Provider:  Discussed with nursing    Education and Discussions with Family / Patient: Updated  (son) at bedside  Time Spent for Care: 30 minutes  More than 50% of total time spent on counseling and coordination of care as described above  Current Length of Stay: 9 day(s)  Current Patient Status: Inpatient   Certification Statement: The patient will continue to require additional inpatient hospital stay due to Close monitoring and management of abnormal LFTs  Discharge Plan: Anticipate discharge in >72 hrs to rehab facility  Code Status: Level 1 - Full Code    Subjective:   Patient seen and examined at bedside    Appears irritable and agitated secondary to prolonged hospitalization and slow recovery  Denies any worsening shortness of breath or confusion  Denies any worsening abdominal discomfort but continues to complain of left lower extremity pain  Objective:     Vitals:   Temp (24hrs), Av 8 °F (36 6 °C), Min:97 5 °F (36 4 °C), Max:98 3 °F (36 8 °C)    Temp:  [97 5 °F (36 4 °C)-98 3 °F (36 8 °C)] 97 7 °F (36 5 °C)  HR:  [71-82] 75  Resp:  [16-18] 16  BP: (124-135)/(68-81) 135/69  SpO2:  [91 %-93 %] 93 %  Body mass index is 26 15 kg/m²  Input and Output Summary (last 24 hours):   No intake or output data in the 24 hours ending 22 1216    Physical Exam:   Physical Exam  Constitutional:       General: She is in acute distress  Appearance: She is ill-appearing  HENT:      Head: Normocephalic  Nose: Nose normal       Mouth/Throat:      Mouth: Mucous membranes are dry  Eyes:      Extraocular Movements: Extraocular movements intact  Pupils: Pupils are equal, round, and reactive to light  Cardiovascular:      Rate and Rhythm: Normal rate and regular rhythm  Pulmonary:      Effort: Pulmonary effort is normal       Comments: Diminished breath sounds at bases  Abdominal:      General: There is distension  Palpations: Abdomen is soft  Tenderness: There is no abdominal tenderness  Musculoskeletal:      Cervical back: Neck supple  Comments: No asterixis   Skin:     Coloration: Skin is jaundiced  Neurological:      General: No focal deficit present  Mental Status: She is alert and oriented to person, place, and time  Mental status is at baseline     Psychiatric:      Comments: Irritable         Additional Data:     Labs:  Results from last 7 days   Lab Units 22  0747 22  0525 22  0525 22  0517 22  0536   WBC Thousand/uL 10 42*   < > 7 90   < > 6 96   HEMOGLOBIN g/dL 7 8*   < > 8 1*   < > 8 4*   HEMATOCRIT % 23 4*   < > 23 9*   < > 25 5*   PLATELETS Thousands/uL 80*   < > 62*   < > 54*   BANDS PCT %  --   --  3  --   -- NEUTROS PCT %  --   --   --   --  73   LYMPHS PCT %  --   --   --   --  13*   LYMPHO PCT %  --   --  7*  --   --    MONOS PCT %  --   --   --   --  11   MONO PCT %  --   --  5  --   --    EOS PCT %  --   --  1  --  1    < > = values in this interval not displayed  Results from last 7 days   Lab Units 05/07/22  0747   SODIUM mmol/L 134*   POTASSIUM mmol/L 4 1   CHLORIDE mmol/L 99*   CO2 mmol/L 25   BUN mg/dL 23   CREATININE mg/dL 1 02   ANION GAP mmol/L 10   CALCIUM mg/dL 8 5   ALBUMIN g/dL 2 2*   TOTAL BILIRUBIN mg/dL 28 00*   ALK PHOS U/L 64   ALT U/L 53   AST U/L 141*   GLUCOSE RANDOM mg/dL 91     Results from last 7 days   Lab Units 05/06/22  0525   INR  2 31*     Results from last 7 days   Lab Units 05/01/22  0718   POC GLUCOSE mg/dl 134               Lines/Drains:  Invasive Devices  Report    Peripheral Intravenous Line            Peripheral IV 05/05/22 Distal;Right;Ventral (anterior) Forearm 2 days          Drain            External Urinary Catheter 1 day                      Imaging: No pertinent imaging reviewed      Recent Cultures (last 7 days):         Last 24 Hours Medication List:   Current Facility-Administered Medications   Medication Dose Route Frequency Provider Last Rate    acetaminophen  650 mg Oral Q4H PRN Earney Smoke, CRNP      aluminum-magnesium hydroxide-simethicone  30 mL Oral Q6H PRN Earney Smoke, CRNP      gabapentin  100 mg Oral TID Earney Smoke, CRNP      HYDROmorphone  0 5 mg Intravenous Q1H PRN TRAV Wheeler      HYDROmorphone  1 mg Intravenous Once PRN TRAV Wheeler      influenza vaccine  0 5 mL Intramuscular Prior to discharge Earney Smoke, CRNP      lactulose  30 g Oral BID Herber Waggoner DO      LORazepam  1 mg Intravenous Once Earney Smoke, CRNP      melatonin  3 mg Oral HS Ck Velasco MD      ondansetron  4 mg Intravenous Q4H PRN Earney Smoke, CRMICHAEL      oxyCODONE  10 mg Oral Q4H PRN Earney Smoke, CRNP  oxyCODONE  5 mg Oral Q4H PRN Kim Motta, CRNP      pantoprazole  40 mg Intravenous Q12H José Aguiar [place], [place]      prednisoLONE  40 mg Oral Daily Holly Reeves [place]      senna-docusate sodium  2 tablet Oral BID PRN Kim Motta, CRNP      sodium chloride  1 spray Each Nare Q1H PRN Jack Chau MD      spironolactone  25 mg Oral Daily TRAV Eubanks          Today, Patient Was Seen By: Jack Chau MD    **Please Note: This note may have been constructed using a voice recognition system  **

## 2022-05-07 NOTE — PLAN OF CARE
Problem: Potential for Falls  Goal: Patient will remain free of falls  Description: INTERVENTIONS:  - Educate patient/family on patient safety including physical limitations  - Instruct patient to call for assistance with activity   - Consult OT/PT to assist with strengthening/mobility   - Keep Call bell within reach  - Keep bed low and locked with side rails adjusted as appropriate  - Keep care items and personal belongings within reach  - Initiate and maintain comfort rounds  - Make Fall Risk Sign visible to staff  - Offer Toileting every 2 Hours, in advance of need  - Initiate/Maintain bed alarm  - Obtain necessary fall risk management equipment: non slip socks  - Apply yellow socks and bracelet for high fall risk patients  - Consider moving patient to room near nurses station  Outcome: Progressing     Problem: HEMATOLOGIC - ADULT  Goal: Maintains hematologic stability  Description: INTERVENTIONS  - Assess for signs and symptoms of bleeding or hemorrhage  - Monitor labs  - Administer supportive blood products/factors as ordered and appropriate  Outcome: Progressing     Problem: Prexisting or High Potential for Compromised Skin Integrity  Goal: Skin integrity is maintained or improved  Description: INTERVENTIONS:  - Identify patients at risk for skin breakdown  - Assess and monitor skin integrity  - Assess and monitor nutrition and hydration status  - Monitor labs   - Assess for incontinence   - Turn and reposition patient  - Assist with mobility/ambulation  - Relieve pressure over bony prominences  - Avoid friction and shearing  - Provide appropriate hygiene as needed including keeping skin clean and dry  - Evaluate need for skin moisturizer/barrier cream  - Collaborate with interdisciplinary team   - Patient/family teaching  - Consider wound care consult   Outcome: Progressing     Problem: MOBILITY - ADULT  Goal: Maintain or return to baseline ADL function  Description: INTERVENTIONS:  -  Assess patient's ability to carry out ADLs; assess patient's baseline for ADL function and identify physical deficits which impact ability to perform ADLs (bathing, care of mouth/teeth, toileting, grooming, dressing, etc )  - Assess/evaluate cause of self-care deficits   - Assess range of motion  - Assess patient's mobility; develop plan if impaired  - Assess patient's need for assistive devices and provide as appropriate  - Encourage maximum independence but intervene and supervise when necessary  - Involve family in performance of ADLs  - Assess for home care needs following discharge   - Consider OT consult to assist with ADL evaluation and planning for discharge  - Provide patient education as appropriate  Outcome: Progressing  Goal: Maintains/Returns to pre admission functional level  Description: INTERVENTIONS:  - Perform BMAT or MOVE assessment daily    - Set and communicate daily mobility goal to care team and patient/family/caregiver  - Collaborate with rehabilitation services on mobility goals if consulted  - Perform Range of Motion 5 times a day  - Reposition patient every 2 hours  - Dangle patient 3 times a day  - Stand patient 3 times a day  - Ambulate patient 3 times a day  - Out of bed to chair 3 times a day   - Out of bed for meals 3 times a day  - Out of bed for toileting  - Record patient progress and toleration of activity level   Outcome: Progressing     Problem: Neurological Deficit  Goal: Neurological status is stable or improving  Description: Interventions:  - Monitor and assess patient's level of consciousness, motor function, sensory function, and level of assistance needed for ADLs  - Monitor and report changes from baseline  Collaborate with interdisciplinary team to initiate plan and implement interventions as ordered  - Provide and maintain a safe environment  - Consider seizure precautions  - Consider fall precautions  - Consider aspiration precautions    - Consider bleeding precautions  Outcome: Progressing     Problem: Activity Intolerance/Impaired Mobility  Goal: Mobility/activity is maintained at optimum level for patient  Description: Interventions:  - Assess and monitor patient  barriers to mobility and need for assistive/adaptive devices  - Assess patient's emotional response to limitations  - Collaborate with interdisciplinary team and initiate plans and interventions as ordered  - Encourage independent activity per ability   - Maintain proper body alignment  - Perform active/passive rom as tolerated/ordered  - Plan activities to conserve energy   - Turn patient as appropriate  Outcome: Progressing     Problem: Communication Impairment  Goal: Ability to express needs and understand communication  Description: Assess patient's communication skills and ability to understand information  Patient will demonstrate use of effective communication techniques, alternative methods of communication and understanding even if not able to speak  - Encourage communication and provide alternate methods of communication as needed  - Collaborate with case management/ for discharge needs  - Include patient/family/caregiver in decisions related to communication  Outcome: Progressing     Problem: Potential for Aspiration  Goal: Non-ventilated patient's risk of aspiration is minimized  Description: Assess and monitor vital signs, respiratory status, and labs (WBC)  Monitor for signs of aspiration (tachypnea, cough, rales, wheezing, cyanosis, fever)  - Assess and monitor patient's ability to swallow  - Place patient up in chair to eat if possible  - HOB up at 90 degrees to eat if unable to get patient up into chair   - Supervise patient during oral intake  - Instruct patient/ family to take small bites  - Instruct patient/ family to take small single sips when taking liquids    - Follow patient-specific strategies generated by speech pathologist   Outcome: Progressing  Goal: Ventilated patient's risk of aspiration is minimized  Description: Assess and monitor vital signs, respiratory status, airway cuff pressure, and labs (WBC)  Monitor for signs of aspiration (tachypnea, cough, rales, wheezing, cyanosis, fever)  - Elevate head of bed 30 degrees if patient has tube feeding   - Monitor tube feeding  Outcome: Progressing     Problem: Nutrition  Goal: Nutrition/Hydration status is improving  Description: Monitor and assess patient's nutrition/hydration status for malnutrition (ex- brittle hair, bruises, dry skin, pale skin and conjunctiva, muscle wasting, smooth red tongue, and disorientation)  Collaborate with interdisciplinary team and initiate plan and interventions as ordered  Monitor patient's weight and dietary intake as ordered or per policy  Utilize nutrition screening tool and intervene per policy  Determine patient's food preferences and provide high-protein, high-caloric foods as appropriate  - Assist patient with eating   - Allow adequate time for meals   - Encourage patient to take dietary supplement as ordered  - Collaborate with clinical nutritionist   - Include patient/family/caregiver in decisions related to nutrition    Outcome: Progressing     Problem: PAIN - ADULT  Goal: Verbalizes/displays adequate comfort level or baseline comfort level  Description: Interventions:  - Encourage patient to monitor pain and request assistance  - Assess pain using appropriate pain scale  - Administer analgesics based on type and severity of pain and evaluate response  - Implement non-pharmacological measures as appropriate and evaluate response  - Consider cultural and social influences on pain and pain management  - Notify physician/advanced practitioner if interventions unsuccessful or patient reports new pain  Outcome: Progressing     Problem: INFECTION - ADULT  Goal: Absence or prevention of progression during hospitalization  Description: INTERVENTIONS:  - Assess and monitor for signs and symptoms of infection  - Monitor lab/diagnostic results  - Monitor all insertion sites, i e  indwelling lines, tubes, and drains  - Monitor endotracheal if appropriate and nasal secretions for changes in amount and color  - Indore appropriate cooling/warming therapies per order  - Administer medications as ordered  - Instruct and encourage patient and family to use good hand hygiene technique  - Identify and instruct in appropriate isolation precautions for identified infection/condition  Outcome: Progressing     Problem: SAFETY ADULT  Goal: Patient will remain free of falls  Description: INTERVENTIONS:  - Educate patient/family on patient safety including physical limitations  - Instruct patient to call for assistance with activity   - Consult OT/PT to assist with strengthening/mobility   - Keep Call bell within reach  - Keep bed low and locked with side rails adjusted as appropriate  - Keep care items and personal belongings within reach  - Initiate and maintain comfort rounds  - Make Fall Risk Sign visible to staff  - Offer Toileting every 2 Hours, in advance of need  - Initiate/Maintain bed alarm  - Obtain necessary fall risk management equipment: non slip socks  - Apply yellow socks and bracelet for high fall risk patients  - Consider moving patient to room near nurses station  Outcome: Progressing     Problem: DISCHARGE PLANNING  Goal: Discharge to home or other facility with appropriate resources  Description: INTERVENTIONS:  - Identify barriers to discharge w/patient and caregiver  - Arrange for needed discharge resources and transportation as appropriate  - Identify discharge learning needs (meds, wound care, etc )  - Arrange for interpretive services to assist at discharge as needed  - Refer to Case Management Department for coordinating discharge planning if the patient needs post-hospital services based on physician/advanced practitioner order or complex needs related to functional status, cognitive ability, or social support system  Outcome: Progressing     Problem: Knowledge Deficit  Goal: Patient/family/caregiver demonstrates understanding of disease process, treatment plan, medications, and discharge instructions  Description: Complete learning assessment and assess knowledge base  Interventions:  - Provide teaching at level of understanding  - Provide teaching via preferred learning methods  Outcome: Progressing     Problem: Nutrition/Hydration-ADULT  Goal: Nutrient/Hydration intake appropriate for improving, restoring or maintaining nutritional needs  Description: Monitor and assess patient's nutrition/hydration status for malnutrition  Collaborate with interdisciplinary team and initiate plan and interventions as ordered  Monitor patient's weight and dietary intake as ordered or per policy  Utilize nutrition screening tool and intervene as necessary  Determine patient's food preferences and provide high-protein, high-caloric foods as appropriate       INTERVENTIONS:  - Monitor oral intake, urinary output, labs, and treatment plans  - Assess nutrition and hydration status and recommend course of action  - Evaluate amount of meals eaten  - Assist patient with eating if necessary   - Allow adequate time for meals  - Recommend/ encourage appropriate diets, oral nutritional supplements, and vitamin/mineral supplements  - Order, calculate, and assess calorie counts as needed  - Recommend, monitor, and adjust tube feedings and TPN/PPN based on assessed needs  - Assess need for intravenous fluids  - Provide specific nutrition/hydration education as appropriate  - Include patient/family/caregiver in decisions related to nutrition  Outcome: Progressing     Problem: NEUROSENSORY - ADULT  Goal: Achieves stable or improved neurological status  Description: INTERVENTIONS  - Monitor and report changes in neurological status  - Monitor vital signs such as temperature, blood pressure, glucose, and any other labs ordered   - Initiate measures to prevent increased intracranial pressure  - Monitor for seizure activity and implement precautions if appropriate      Outcome: Progressing     Problem: RESPIRATORY - ADULT  Goal: Achieves optimal ventilation and oxygenation  Description: INTERVENTIONS:  - Assess for changes in respiratory status  - Assess for changes in mentation and behavior  - Position to facilitate oxygenation and minimize respiratory effort  - Oxygen administered by appropriate delivery if ordered  - Initiate smoking cessation education as indicated  - Encourage broncho-pulmonary hygiene including cough, deep breathe, Incentive Spirometry  - Assess the need for suctioning and aspirate as needed  - Assess and instruct to report SOB or any respiratory difficulty  - Respiratory Therapy support as indicated  Outcome: Progressing     Problem: GASTROINTESTINAL - ADULT  Goal: Minimal or absence of nausea and/or vomiting  Description: INTERVENTIONS:  - Administer IV fluids if ordered to ensure adequate hydration  - Maintain NPO status until nausea and vomiting are resolved  - Nasogastric tube if ordered  - Administer ordered antiemetic medications as needed  - Provide nonpharmacologic comfort measures as appropriate  - Advance diet as tolerated, if ordered  - Consider nutrition services referral to assist patient with adequate nutrition and appropriate food choices  Outcome: Progressing  Goal: Maintains or returns to baseline bowel function  Description: INTERVENTIONS:  - Assess bowel function  - Encourage oral fluids to ensure adequate hydration  - Administer IV fluids if ordered to ensure adequate hydration  - Administer ordered medications as needed  - Encourage mobilization and activity  - Consider nutritional services referral to assist patient with adequate nutrition and appropriate food choices  Outcome: Progressing  Goal: Maintains adequate nutritional intake  Description: INTERVENTIONS:  - Monitor percentage of each meal consumed  - Identify factors contributing to decreased intake, treat as appropriate  - Assist with meals as needed  - Monitor I&O, weight, and lab values if indicated  - Obtain nutrition services referral as needed  Outcome: Progressing     Problem: GENITOURINARY - ADULT  Goal: Maintains or returns to baseline urinary function  Description: INTERVENTIONS:  - Assess urinary function  - Encourage oral fluids to ensure adequate hydration if ordered  - Administer IV fluids as ordered to ensure adequate hydration  - Administer ordered medications as needed  - Offer frequent toileting  - Follow urinary retention protocol if ordered  Outcome: Progressing     Problem: SKIN/TISSUE INTEGRITY - ADULT  Goal: Skin Integrity remains intact(Skin Breakdown Prevention)  Description: Assess:  -Perform Brigido assessment  -Clean and moisturize skin   -Inspect skin when repositioning, toileting, and assisting with ADLS  -Assess under medical devices   -Assess extremities for adequate circulation and sensation     Bed Management:  -Have minimal linens on bed & keep smooth, unwrinkled  -Change linens as needed when moist or perspiring  -Avoid sitting or lying in one position for more than 2 hours while in bed  -Keep HOB at 30 degrees     Toileting:  -Offer bedside commode  -Assess for incontinence  -Use incontinent care products after each incontinent episode    Activity:  -Mobilize patient 2 times a day  -Encourage activity and walks on unit  -Encourage or provide ROM exercises   -Turn and reposition patient every 2 Hours  -Use appropriate equipment to lift or move patient in bed  -Instruct/ Assist with weight shifting when out of bed in chair  -Consider limitation of chair time 2 hour intervals    Skin Care:  -Avoid use of baby powder, tape, friction and shearing, hot water or constrictive clothing  -Relieve pressure over bony prominences u  -Do not massage red bony areas    Next Steps:  -Teach patient strategies to minimize risks   -Consider consults to  interdisciplinary teams  Outcome: Progressing  Goal: Incision(s), wounds(s) or drain site(s) healing without S/S of infection  Description: INTERVENTIONS  - Assess and document dressing, incision, wound bed, drain sites and surrounding tissue  - Provide patient and family education  - Perform skin care/dressing changes   Outcome: Progressing     Problem: MUSCULOSKELETAL - ADULT  Goal: Maintain or return mobility to safest level of function  Description: INTERVENTIONS:  - Assess patient's ability to carry out ADLs; assess patient's baseline for ADL function and identify physical deficits which impact ability to perform ADLs (bathing, care of mouth/teeth, toileting, grooming, dressing, etc )  - Assess/evaluate cause of self-care deficits   - Assess range of motion  - Assess patient's mobility  - Assess patient's need for assistive devices and provide as appropriate  - Encourage maximum independence but intervene and supervise when necessary  - Involve family in performance of ADLs  - Assess for home care needs following discharge   - Consider OT consult to assist with ADL evaluation and planning for discharge  - Provide patient education as appropriate  Outcome: Progressing

## 2022-05-08 LAB
ALBUMIN SERPL BCP-MCNC: 2.1 G/DL (ref 3.5–5)
ALP SERPL-CCNC: 65 U/L (ref 46–116)
ALT SERPL W P-5'-P-CCNC: 55 U/L (ref 12–78)
ANION GAP SERPL CALCULATED.3IONS-SCNC: 7 MMOL/L (ref 4–13)
AST SERPL W P-5'-P-CCNC: 144 U/L (ref 5–45)
BILIRUB SERPL-MCNC: 29.5 MG/DL (ref 0.2–1)
BUN SERPL-MCNC: 24 MG/DL (ref 5–25)
CALCIUM ALBUM COR SERPL-MCNC: 10 MG/DL (ref 8.3–10.1)
CALCIUM SERPL-MCNC: 8.5 MG/DL (ref 8.3–10.1)
CHLORIDE SERPL-SCNC: 100 MMOL/L (ref 100–108)
CO2 SERPL-SCNC: 26 MMOL/L (ref 21–32)
CREAT SERPL-MCNC: 1.1 MG/DL (ref 0.6–1.3)
GFR SERPL CREATININE-BSD FRML MDRD: 55 ML/MIN/1.73SQ M
GLUCOSE SERPL-MCNC: 112 MG/DL (ref 65–140)
HCT VFR BLD AUTO: 25 % (ref 34.8–46.1)
HGB BLD-MCNC: 8.1 G/DL (ref 11.5–15.4)
INR PPP: 2.26 (ref 0.84–1.19)
MCH RBC QN AUTO: 35.7 PG (ref 26.8–34.3)
MCHC RBC AUTO-ENTMCNC: 32.4 G/DL (ref 31.4–37.4)
MCV RBC AUTO: 110 FL (ref 82–98)
NRBC BLD AUTO-RTO: 0 /100 WBCS
PLATELET # BLD AUTO: 86 THOUSANDS/UL (ref 149–390)
PMV BLD AUTO: 11.1 FL (ref 8.9–12.7)
POTASSIUM SERPL-SCNC: 3.9 MMOL/L (ref 3.5–5.3)
PROT SERPL-MCNC: 7.4 G/DL (ref 6.4–8.2)
PROTHROMBIN TIME: 24.4 SECONDS (ref 11.6–14.5)
RBC # BLD AUTO: 2.27 MILLION/UL (ref 3.81–5.12)
SODIUM SERPL-SCNC: 133 MMOL/L (ref 136–145)
WBC # BLD AUTO: 9.71 THOUSAND/UL (ref 4.31–10.16)

## 2022-05-08 PROCEDURE — 99232 SBSQ HOSP IP/OBS MODERATE 35: CPT | Performed by: INTERNAL MEDICINE

## 2022-05-08 PROCEDURE — 85027 COMPLETE CBC AUTOMATED: CPT | Performed by: INTERNAL MEDICINE

## 2022-05-08 PROCEDURE — 80053 COMPREHEN METABOLIC PANEL: CPT | Performed by: INTERNAL MEDICINE

## 2022-05-08 PROCEDURE — C9113 INJ PANTOPRAZOLE SODIUM, VIA: HCPCS | Performed by: NURSE PRACTITIONER

## 2022-05-08 PROCEDURE — 85610 PROTHROMBIN TIME: CPT | Performed by: INTERNAL MEDICINE

## 2022-05-08 RX ORDER — LACTULOSE 20 G/30ML
20 SOLUTION ORAL 2 TIMES DAILY
Status: DISCONTINUED | OUTPATIENT
Start: 2022-05-08 | End: 2022-05-12

## 2022-05-08 RX ADMIN — GABAPENTIN 100 MG: 100 CAPSULE ORAL at 15:59

## 2022-05-08 RX ADMIN — SPIRONOLACTONE 25 MG: 25 TABLET ORAL at 08:08

## 2022-05-08 RX ADMIN — OXYCODONE HYDROCHLORIDE 5 MG: 5 TABLET ORAL at 06:43

## 2022-05-08 RX ADMIN — PANTOPRAZOLE SODIUM 40 MG: 40 INJECTION, POWDER, FOR SOLUTION INTRAVENOUS at 03:52

## 2022-05-08 RX ADMIN — PREDNISOLONE SODIUM PHOSPHATE 40 MG: 15 SOLUTION ORAL at 08:10

## 2022-05-08 RX ADMIN — LACTULOSE 30 G: 20 SOLUTION ORAL at 08:08

## 2022-05-08 RX ADMIN — Medication 3 MG: at 23:34

## 2022-05-08 RX ADMIN — GABAPENTIN 100 MG: 100 CAPSULE ORAL at 23:34

## 2022-05-08 RX ADMIN — GABAPENTIN 100 MG: 100 CAPSULE ORAL at 08:08

## 2022-05-08 RX ADMIN — ONDANSETRON 4 MG: 2 INJECTION INTRAMUSCULAR; INTRAVENOUS at 20:27

## 2022-05-08 RX ADMIN — ONDANSETRON 4 MG: 2 INJECTION INTRAMUSCULAR; INTRAVENOUS at 15:59

## 2022-05-08 RX ADMIN — OXYCODONE HYDROCHLORIDE 5 MG: 5 TABLET ORAL at 20:27

## 2022-05-08 RX ADMIN — PANTOPRAZOLE SODIUM 40 MG: 40 INJECTION, POWDER, FOR SOLUTION INTRAVENOUS at 15:59

## 2022-05-08 RX ADMIN — OXYCODONE HYDROCHLORIDE 5 MG: 5 TABLET ORAL at 14:13

## 2022-05-08 RX ADMIN — HYDROMORPHONE HYDROCHLORIDE 0.5 MG: 1 INJECTION, SOLUTION INTRAMUSCULAR; INTRAVENOUS; SUBCUTANEOUS at 16:01

## 2022-05-08 NOTE — PLAN OF CARE
Problem: Potential for Falls  Goal: Patient will remain free of falls  Description: INTERVENTIONS:  - Educate patient/family on patient safety including physical limitations  - Instruct patient to call for assistance with activity   - Consult OT/PT to assist with strengthening/mobility   - Keep Call bell within reach  - Keep bed low and locked with side rails adjusted as appropriate  - Keep care items and personal belongings within reach  - Initiate and maintain comfort rounds  - Make Fall Risk Sign visible to staff  - Offer Toileting every 2 Hours, in advance of need  - Initiate/Maintain bed alarm  - Obtain necessary fall risk management equipment: non slip socks  - Apply yellow socks and bracelet for high fall risk patients  - Consider moving patient to room near nurses station  Outcome: Progressing     Problem: HEMATOLOGIC - ADULT  Goal: Maintains hematologic stability  Description: INTERVENTIONS  - Assess for signs and symptoms of bleeding or hemorrhage  - Monitor labs  - Administer supportive blood products/factors as ordered and appropriate  Outcome: Progressing     Problem: Prexisting or High Potential for Compromised Skin Integrity  Goal: Skin integrity is maintained or improved  Description: INTERVENTIONS:  - Identify patients at risk for skin breakdown  - Assess and monitor skin integrity  - Assess and monitor nutrition and hydration status  - Monitor labs   - Assess for incontinence   - Turn and reposition patient  - Assist with mobility/ambulation  - Relieve pressure over bony prominences  - Avoid friction and shearing  - Provide appropriate hygiene as needed including keeping skin clean and dry  - Evaluate need for skin moisturizer/barrier cream  - Collaborate with interdisciplinary team   - Patient/family teaching  - Consider wound care consult   Outcome: Progressing     Problem: MOBILITY - ADULT  Goal: Maintain or return to baseline ADL function  Description: INTERVENTIONS:  -  Assess patient's ability to carry out ADLs; assess patient's baseline for ADL function and identify physical deficits which impact ability to perform ADLs (bathing, care of mouth/teeth, toileting, grooming, dressing, etc )  - Assess/evaluate cause of self-care deficits   - Assess range of motion  - Assess patient's mobility; develop plan if impaired  - Assess patient's need for assistive devices and provide as appropriate  - Encourage maximum independence but intervene and supervise when necessary  - Involve family in performance of ADLs  - Assess for home care needs following discharge   - Consider OT consult to assist with ADL evaluation and planning for discharge  - Provide patient education as appropriate  Outcome: Progressing  Goal: Maintains/Returns to pre admission functional level  Description: INTERVENTIONS:  - Perform BMAT or MOVE assessment daily    - Set and communicate daily mobility goal to care team and patient/family/caregiver  - Collaborate with rehabilitation services on mobility goals if consulted  - Perform Range of Motion 5 times a day  - Reposition patient every 2 hours  - Dangle patient 3 times a day  - Stand patient 3 times a day  - Ambulate patient 3 times a day  - Out of bed to chair 3 times a day   - Out of bed for meals 3 times a day  - Out of bed for toileting  - Record patient progress and toleration of activity level   Outcome: Progressing     Problem: Neurological Deficit  Goal: Neurological status is stable or improving  Description: Interventions:  - Monitor and assess patient's level of consciousness, motor function, sensory function, and level of assistance needed for ADLs  - Monitor and report changes from baseline  Collaborate with interdisciplinary team to initiate plan and implement interventions as ordered  - Provide and maintain a safe environment  - Consider seizure precautions  - Consider fall precautions  - Consider aspiration precautions    - Consider bleeding precautions  Outcome: Progressing     Problem: Activity Intolerance/Impaired Mobility  Goal: Mobility/activity is maintained at optimum level for patient  Description: Interventions:  - Assess and monitor patient  barriers to mobility and need for assistive/adaptive devices  - Assess patient's emotional response to limitations  - Collaborate with interdisciplinary team and initiate plans and interventions as ordered  - Encourage independent activity per ability   - Maintain proper body alignment  - Perform active/passive rom as tolerated/ordered  - Plan activities to conserve energy   - Turn patient as appropriate  Outcome: Progressing     Problem: Communication Impairment  Goal: Ability to express needs and understand communication  Description: Assess patient's communication skills and ability to understand information  Patient will demonstrate use of effective communication techniques, alternative methods of communication and understanding even if not able to speak  - Encourage communication and provide alternate methods of communication as needed  - Collaborate with case management/ for discharge needs  - Include patient/family/caregiver in decisions related to communication  Outcome: Progressing     Problem: Potential for Aspiration  Goal: Non-ventilated patient's risk of aspiration is minimized  Description: Assess and monitor vital signs, respiratory status, and labs (WBC)  Monitor for signs of aspiration (tachypnea, cough, rales, wheezing, cyanosis, fever)  - Assess and monitor patient's ability to swallow  - Place patient up in chair to eat if possible  - HOB up at 90 degrees to eat if unable to get patient up into chair   - Supervise patient during oral intake  - Instruct patient/ family to take small bites  - Instruct patient/ family to take small single sips when taking liquids    - Follow patient-specific strategies generated by speech pathologist   Outcome: Progressing  Goal: Ventilated patient's risk of aspiration is minimized  Description: Assess and monitor vital signs, respiratory status, airway cuff pressure, and labs (WBC)  Monitor for signs of aspiration (tachypnea, cough, rales, wheezing, cyanosis, fever)  - Elevate head of bed 30 degrees if patient has tube feeding   - Monitor tube feeding  Outcome: Progressing     Problem: Nutrition  Goal: Nutrition/Hydration status is improving  Description: Monitor and assess patient's nutrition/hydration status for malnutrition (ex- brittle hair, bruises, dry skin, pale skin and conjunctiva, muscle wasting, smooth red tongue, and disorientation)  Collaborate with interdisciplinary team and initiate plan and interventions as ordered  Monitor patient's weight and dietary intake as ordered or per policy  Utilize nutrition screening tool and intervene per policy  Determine patient's food preferences and provide high-protein, high-caloric foods as appropriate  - Assist patient with eating   - Allow adequate time for meals   - Encourage patient to take dietary supplement as ordered  - Collaborate with clinical nutritionist   - Include patient/family/caregiver in decisions related to nutrition    Outcome: Progressing     Problem: PAIN - ADULT  Goal: Verbalizes/displays adequate comfort level or baseline comfort level  Description: Interventions:  - Encourage patient to monitor pain and request assistance  - Assess pain using appropriate pain scale  - Administer analgesics based on type and severity of pain and evaluate response  - Implement non-pharmacological measures as appropriate and evaluate response  - Consider cultural and social influences on pain and pain management  - Notify physician/advanced practitioner if interventions unsuccessful or patient reports new pain  Outcome: Progressing     Problem: INFECTION - ADULT  Goal: Absence or prevention of progression during hospitalization  Description: INTERVENTIONS:  - Assess and monitor for signs and symptoms of infection  - Monitor lab/diagnostic results  - Monitor all insertion sites, i e  indwelling lines, tubes, and drains  - Monitor endotracheal if appropriate and nasal secretions for changes in amount and color  - [place] appropriate cooling/warming therapies per order  - Administer medications as ordered  - Instruct and encourage patient and family to use good hand hygiene technique  - Identify and instruct in appropriate isolation precautions for identified infection/condition  Outcome: Progressing     Problem: SAFETY ADULT  Goal: Patient will remain free of falls  Description: INTERVENTIONS:  - Educate patient/family on patient safety including physical limitations  - Instruct patient to call for assistance with activity   - Consult OT/PT to assist with strengthening/mobility   - Keep Call bell within reach  - Keep bed low and locked with side rails adjusted as appropriate  - Keep care items and personal belongings within reach  - Initiate and maintain comfort rounds  - Make Fall Risk Sign visible to staff  - Offer Toileting every 2 Hours, in advance of need  - Initiate/Maintain bed alarm  - Obtain necessary fall risk management equipment: non slip socks  - Apply yellow socks and bracelet for high fall risk patients  - Consider moving patient to room near nurses station  Outcome: Progressing     Problem: DISCHARGE PLANNING  Goal: Discharge to home or other facility with appropriate resources  Description: INTERVENTIONS:  - Identify barriers to discharge w/patient and caregiver  - Arrange for needed discharge resources and transportation as appropriate  - Identify discharge learning needs (meds, wound care, etc )  - Arrange for interpretive services to assist at discharge as needed  - Refer to Case Management Department for coordinating discharge planning if the patient needs post-hospital services based on physician/advanced practitioner order or complex needs related to functional status, cognitive ability, or social support system  Outcome: Progressing     Problem: Knowledge Deficit  Goal: Patient/family/caregiver demonstrates understanding of disease process, treatment plan, medications, and discharge instructions  Description: Complete learning assessment and assess knowledge base  Interventions:  - Provide teaching at level of understanding  - Provide teaching via preferred learning methods  Outcome: Progressing     Problem: Nutrition/Hydration-ADULT  Goal: Nutrient/Hydration intake appropriate for improving, restoring or maintaining nutritional needs  Description: Monitor and assess patient's nutrition/hydration status for malnutrition  Collaborate with interdisciplinary team and initiate plan and interventions as ordered  Monitor patient's weight and dietary intake as ordered or per policy  Utilize nutrition screening tool and intervene as necessary  Determine patient's food preferences and provide high-protein, high-caloric foods as appropriate       INTERVENTIONS:  - Monitor oral intake, urinary output, labs, and treatment plans  - Assess nutrition and hydration status and recommend course of action  - Evaluate amount of meals eaten  - Assist patient with eating if necessary   - Allow adequate time for meals  - Recommend/ encourage appropriate diets, oral nutritional supplements, and vitamin/mineral supplements  - Order, calculate, and assess calorie counts as needed  - Recommend, monitor, and adjust tube feedings and TPN/PPN based on assessed needs  - Assess need for intravenous fluids  - Provide specific nutrition/hydration education as appropriate  - Include patient/family/caregiver in decisions related to nutrition  Outcome: Progressing     Problem: NEUROSENSORY - ADULT  Goal: Achieves stable or improved neurological status  Description: INTERVENTIONS  - Monitor and report changes in neurological status  - Monitor vital signs such as temperature, blood pressure, glucose, and any other labs ordered   - Initiate measures to prevent increased intracranial pressure  - Monitor for seizure activity and implement precautions if appropriate      Outcome: Progressing     Problem: RESPIRATORY - ADULT  Goal: Achieves optimal ventilation and oxygenation  Description: INTERVENTIONS:  - Assess for changes in respiratory status  - Assess for changes in mentation and behavior  - Position to facilitate oxygenation and minimize respiratory effort  - Oxygen administered by appropriate delivery if ordered  - Initiate smoking cessation education as indicated  - Encourage broncho-pulmonary hygiene including cough, deep breathe, Incentive Spirometry  - Assess the need for suctioning and aspirate as needed  - Assess and instruct to report SOB or any respiratory difficulty  - Respiratory Therapy support as indicated  Outcome: Progressing     Problem: GASTROINTESTINAL - ADULT  Goal: Minimal or absence of nausea and/or vomiting  Description: INTERVENTIONS:  - Administer IV fluids if ordered to ensure adequate hydration  - Maintain NPO status until nausea and vomiting are resolved  - Nasogastric tube if ordered  - Administer ordered antiemetic medications as needed  - Provide nonpharmacologic comfort measures as appropriate  - Advance diet as tolerated, if ordered  - Consider nutrition services referral to assist patient with adequate nutrition and appropriate food choices  Outcome: Progressing  Goal: Maintains or returns to baseline bowel function  Description: INTERVENTIONS:  - Assess bowel function  - Encourage oral fluids to ensure adequate hydration  - Administer IV fluids if ordered to ensure adequate hydration  - Administer ordered medications as needed  - Encourage mobilization and activity  - Consider nutritional services referral to assist patient with adequate nutrition and appropriate food choices  Outcome: Progressing  Goal: Maintains adequate nutritional intake  Description: INTERVENTIONS:  - Monitor percentage of each meal consumed  - Identify factors contributing to decreased intake, treat as appropriate  - Assist with meals as needed  - Monitor I&O, weight, and lab values if indicated  - Obtain nutrition services referral as needed  Outcome: Progressing     Problem: GENITOURINARY - ADULT  Goal: Maintains or returns to baseline urinary function  Description: INTERVENTIONS:  - Assess urinary function  - Encourage oral fluids to ensure adequate hydration if ordered  - Administer IV fluids as ordered to ensure adequate hydration  - Administer ordered medications as needed  - Offer frequent toileting  - Follow urinary retention protocol if ordered  Outcome: Progressing     Problem: SKIN/TISSUE INTEGRITY - ADULT  Goal: Skin Integrity remains intact(Skin Breakdown Prevention)  Description: Assess:  -Perform [place] assessment  -Clean and moisturize ski  -Inspect skin when repositioning, toileting, and assisting with ADLS  -Assess under medical devices   -Assess extremities for adequate circulation and sensation     Bed Management:  -Have minimal linens on bed & keep smooth, unwrinkled  -Change linens as needed when moist or perspiring  -Avoid sitting or lying in one position for more than 2 hours while in bed  -Keep HOB at 30 degrees     Toileting:  -Offer bedside commode  -Assess for incontinence  -Use incontinent care products after each incontinent episode    Activity:  -Mobilize patient 3 times a day  -Encourage activity and walks on unit  -Encourage or provide ROM exercises   -Turn and reposition patient every 2 Hours  -Use appropriate equipment to lift or move patient in bed  -Instruct/ Assist with weight shifting  when out of bed in chair  -Consider limitation of chair time 2 hour intervals    Skin Care:  -Avoid use of baby powder, tape, friction and shearing, hot water or constrictive clothing  -Relieve pressure over bony prominences   -Do not massage red bony areas    Next Steps:  -Teach patient strategies to minimize risks    -Consider consults to  interdisciplinary teams   Outcome: Progressing  Goal: Incision(s), wounds(s) or drain site(s) healing without S/S of infection  Description: INTERVENTIONS  - Assess and document dressing, incision, wound bed, drain sites and surrounding tissue  - Provide patient and family education  - Perform skin care/dressing changes  Outcome: Progressing     Problem: MUSCULOSKELETAL - ADULT  Goal: Maintain or return mobility to safest level of function  Description: INTERVENTIONS:  - Assess patient's ability to carry out ADLs; assess patient's baseline for ADL function and identify physical deficits which impact ability to perform ADLs (bathing, care of mouth/teeth, toileting, grooming, dressing, etc )  - Assess/evaluate cause of self-care deficits   - Assess range of motion  - Assess patient's mobility  - Assess patient's need for assistive devices and provide as appropriate  - Encourage maximum independence but intervene and supervise when necessary  - Involve family in performance of ADLs  - Assess for home care needs following discharge   - Consider OT consult to assist with ADL evaluation and planning for discharge  - Provide patient education as appropriate  Outcome: Progressing

## 2022-05-08 NOTE — PROGRESS NOTES
[place]  Progress Note - Marcus Brown 1960, 63 y o  female MRN: [id number]  Unit/Bed#: -01 Encounter: [id number]  Primary Care Provider: Consuelo Palacio MD   Date and time admitted to hospital: 4/28/2022 10:36 AM    * Decompensated cirrhosis  Assessment & Plan  Patient presents with acute hip pain abdominal pain abdominal distension  · Endorses history of fatty liver disease, drinks wine occasionally, never been tested for hepatitis  · Found to have significant ascites on exam    Paracentesis negative for SBP  · CT abd/pelvis - new large ascites, cirrhotic liver, hypodense area measuring 1 7 cm at junction of segment 4 and 8 in liver, distended gallbladder, splenomegaly with recanalized umbilical vein  · Underwent paracentesis 4/29 with IR - approximately 1 8 L removed  The reports worsening distension  IR consulted for repeat paracentesis in a [place] · GI following  · MELD: 30 with 27-32% estimated mortality  · Continue ceftriaxone for SBP prophylaxis  · lactulose   Titrate for 3-4 soft stools daily  · Re-initiated aldactone 5/3   · Continue to trend LFTs and INR daily   · If continues to have worsening LFTs, may need transfer to tertiary center for evaluation of  liver transplantation    · Importance of strict alcohol cessation discussion was done   ·     Acute kidney injury [facility])  Assessment & Plan  Multifactorial, related to decompensated cirrhosis, contrast exposure, low blood pressures  Re-initiated aldactone    Nephrology following  Dc'd Midodrine  Octreotide gtt dc'd  Continue to hold ARB  Renal functions have improved      Pleural effusion  Assessment & Plan  Moderate left effusion noted on CT  Currently on 2 L oxygen  Respiratory protocol  Incentive spirometer  S/p diuresis due to ascites and pleural effusion    Hyperbilirubinemia  Assessment & Plan    · Bilirubin elevated to 5 69 ->9 70->17 2->20->25-->28-->29 5  · D Bili 5-> 11->15-->14 4-->15 7  · CT abdomen pelvis showing - distended gallbladder, large new ascites, hepatomegaly   · Gastroenterology following  · Hyperbilirubinemia multifactorial in the setting of resorption of thigh/ retroperitoneal hematoma and alcoholic hepatitis  · In view of Concern for alcoholic hepatitis  patient was started on oral prednisolone by gastroenterology 5/5  · Continue to follow LFTs and INR closely  · If continues to have elevated bilirubin despite steroids, patient may need transfer to a tertiary care center for evaluation of liver transplantation  This was discussed at length with family at bedside today        Hyponatremia  Assessment & Plan  · Sodium on admission 131  · Likely in setting of cirrhosis  · stable   Free water restriction (1-1 5L)    spironolactone re-initiated   improving   Monitor BMP     Lab Results   Component Value Date    SODIUM 133 (L) 05/08/2022    SODIUM 134 (L) 05/07/2022    SODIUM 135 (L) 05/06/2022         Thrombocytopenia (HCC)  Assessment & Plan  · Likely in setting of severe liver disease and splenomegaly  · improving  · transfuse <50k in the setting of bleeding or < 20k if no evidence of bleeding      Acute blood loss anemia  Assessment & Plan  Presented with acute hip pain , abdominal pain, abdominal distension   CBC showing: Hgb 5 5/Hct16 3,  - macrocytic anemia    Occult heme + stool   Transfusion S/P 4 units PRBCs  o Transfuse to keep Hgb >7   Hb stablized-> has significant ecchymosis in the left back and left LE   Continue Protonix IV b i d   · Abraham need outpt f/u for screening EGD for esophageal varices    Lab Results   Component Value Date    HGB 8 1 (L) 05/08/2022    HGB 7 8 (L) 05/07/2022         Weakness of left lower extremity  Assessment & Plan  · Reason for presentation - pain started a few days ago after dog jumping on her - difficulty with ambulation and weight on left side  · Patient was found on imaging including CT scan of the abdomen and pelvis, MRI and CT scan of the lumbar spine to have large retroperitoneal and iliopsoas hematoma  · consult placed to surgery recommending if there's evidence of further bleeding IR consult for embolization, however her hb is now stable, continue to monitor  · Neurovascular checks ordered  Pulses remain intact  Pain appears better/mobility improved  · S/p FFP 2 units, 4U PRBCs and vitamin K since admission  · XR left hip/femur unremarkable  · PT/OT consult-recommending short-term rehab  · Continue pain control          VTE Pharmacologic Prophylaxis: VTE Score: 1 Moderate Risk (Score 3-4) - Pharmacological DVT Prophylaxis Contraindicated  Sequential Compression Devices Ordered  Patient Centered Rounds: I performed bedside rounds with nursing staff today  Discussions with Specialists or Other Care Team Provider:  Discussed with nursing    Education and Discussions with Family / Patient: Updated  (daughter) at bedside  Time Spent for Care: 45 minutes  More than 50% of total time spent on counseling and coordination of care as described above  Current Length of Stay: 10 day(s)  Current Patient Status: Inpatient   Certification Statement: The patient will continue to require additional inpatient hospital stay due to Close monitoring of liver function  Discharge Plan: Anticipate discharge in 24-48 hrs to rehab facility  Code Status: Level 1 - Full Code    Subjective:   Seen and examined  Complains of abdominal distension and some shortness of breath secondary to worsening ascites  Continues to have 6-8 soft stools daily  Objective:     Vitals:   Temp (24hrs), Av 7 °F (36 5 °C), Min:97 4 °F (36 3 °C), Max:97 9 °F (36 6 °C)    Temp:  [97 4 °F (36 3 °C)-97 9 °F (36 6 °C)] 97 4 °F (36 3 °C)  HR:  [76-82] 82  Resp:  [18-19] 18  BP: (134-137)/(61-69) 134/61  SpO2:  [91 %-93 %] 91 %  Body mass index is 26 15 kg/m²  Input and Output Summary (last 24 hours):      Intake/Output Summary (Last 24 hours) at 2022 1155  Last data filed at 5/8/2022 1145  Gross per 24 hour   Intake 900 ml   Output --   Net 900 ml       Physical Exam:   Physical Exam  Constitutional:       General: She is not in acute distress  Appearance: She is ill-appearing  Comments: Profusely jaundice   HENT:      Head: Normocephalic  Nose: Nose normal       Mouth/Throat:      Mouth: Mucous membranes are moist    Eyes:      Extraocular Movements: Extraocular movements intact  Pupils: Pupils are equal, round, and reactive to light  Cardiovascular:      Rate and Rhythm: Normal rate and regular rhythm  Pulmonary:      Effort: Pulmonary effort is normal       Comments: Diminished breath sounds at bases  Abdominal:      General: Bowel sounds are normal  There is distension  Tenderness: There is no abdominal tenderness  Musculoskeletal:         General: Tenderness present  No swelling or deformity  Cervical back: Neck supple  Skin:     Coloration: Skin is jaundiced and pale  Findings: Bruising present  Neurological:      General: No focal deficit present  Mental Status: She is alert and oriented to person, place, and time  Mental status is at baseline  Psychiatric:         Mood and Affect: Mood normal          Additional Data:     Labs:  Results from last 7 days   Lab Units 05/08/22  0412 05/07/22  0747 05/06/22  0525 05/05/22  0517 05/04/22  0536   WBC Thousand/uL 9 71   < > 7 90   < > 6 96   HEMOGLOBIN g/dL 8 1*   < > 8 1*   < > 8 4*   HEMATOCRIT % 25 0*   < > 23 9*   < > 25 5*   PLATELETS Thousands/uL 86*   < > 62*   < > 54*   BANDS PCT %  --   --  3  --   --    NEUTROS PCT %  --   --   --   --  73   LYMPHS PCT %  --   --   --   --  13*   LYMPHO PCT %  --   --  7*  --   --    MONOS PCT %  --   --   --   --  11   MONO PCT %  --   --  5  --   --    EOS PCT %  --   --  1  --  1    < > = values in this interval not displayed       Results from last 7 days   Lab Units 05/08/22 0412   SODIUM mmol/L 133*   POTASSIUM mmol/L 3 9   CHLORIDE mmol/L 100   CO2 mmol/L 26   BUN mg/dL 24   CREATININE mg/dL 1 10   ANION GAP mmol/L 7   CALCIUM mg/dL 8 5   ALBUMIN g/dL 2 1*   TOTAL BILIRUBIN mg/dL 29 50*   ALK PHOS U/L 65   ALT U/L 55   AST U/L 144*   GLUCOSE RANDOM mg/dL 112     Results from last 7 days   Lab Units 05/08/22  0412   INR  2 26*                   Lines/Drains:  Invasive Devices  Report    Peripheral Intravenous Line            Peripheral IV 05/05/22 Distal;Right;Ventral (anterior) Forearm 3 days          Drain            External Urinary Catheter 2 days                      Imaging: No pertinent imaging reviewed      Recent Cultures (last 7 days):         Last 24 Hours Medication List:   Current Facility-Administered Medications   Medication Dose Route Frequency Provider Last Rate    acetaminophen  650 mg Oral Q4H PRN TRAV Dunlap      aluminum-magnesium hydroxide-simethicone  30 mL Oral Q6H PRN TRAV Dunlap      gabapentin  100 mg Oral TID TRAV Dunlap      HYDROmorphone  0 5 mg Intravenous Q1H PRN TRAV Napier      HYDROmorphone  1 mg Intravenous Once PRN TRAV Napier      influenza vaccine  0 5 mL Intramuscular Prior to discharge TRAV Dunlap      lactulose  20 g Oral BID Mendoza Yang MD      LORazepam  1 mg Intravenous Once TRAV Dunlap      melatonin  3 mg Oral HS Mendoza Yang MD      ondansetron  4 mg Intravenous Q4H PRN Piero Canela, TRAV      oxyCODONE  10 mg Oral Q4H PRN TRAV Dunlap      oxyCODONE  5 mg Oral Q4H PRN TRAV Dunlap      pantoprazole  40 [facility]12H [facility]      prednisoLONE  40 mg Oral Daily Spencer Neal DO      senna-docusate sodium  2 tablet Oral BID PRN TRAV Dunlap      sodium chloride  1 spray Each Nare Q1H PRN Mendoza Yang MD      spironolactone  25 mg Oral Daily TRAV Peterson          Today, Patient Was Seen By: Lin Ann Snehal Parker MD    **Please Note: This note may have been constructed using a voice recognition system  **

## 2022-05-08 NOTE — PROGRESS NOTES
Progress note - [facility]   Antoinette Salinas 63 y o  female MRN: [id number]  Unit/Bed#: -01 Encounter: [id number]    ASSESSMENT and PLAN    1  Cirrhosis decompensated with ascites  61F with recently diagnosed ETOH hepatitis and RG, decompensated over the past 6 weeks with encephalopathy, thrombocytopenia, coagulopathy, ascites, ALEJANDRO  1800 cc paracentesis negative for SBP      Diuretics were on hold due to ALEJANDRO which has improved, appreciate renal input    Increasing bilirubin likely multifactorial from hematoma resorption and alcoholic hepatitis  Julián Jefferson started 05/04/2022     Bilirubin increased but INR is essentially sideways over 5 consecutive measurements  Remains the same today   Prognosis remains guarded  I had a long discussion with her and her sons regarding transplant 5/7 and discussed with her daughter by phone today  They will decide on a transplant center if she requires transfer  At this point that is not necessary or emergent but would like to get their thoughts if it is necessary  If she recovers, she stabilizes and improves  Will establish care with hepatology after discharge with Dr José Cruz     2  Hepatic encephalopathy  Improved with lactulose t i d , had excessive stools 05/04/2022, will reduce to twice daily     3 Retroperitoneal hemorrhage  4  Anemia  Significant anemia in setting of coagulopathy and thrombocytopenia  8cm left retroperitoneal hematoma centered in ileus psoas muscle   Appreciate surgical input  No overt GI bleeding, but will need EGD to assess for varices after she recovers from this hospitalization  Lashell Bonilla on colonoscopy April 2019, 5 recall     5  ETOH use  Patient admits to drinking 1-2 glasses a wine every evening prior to admission  Reinforced need for complete alcohol cessation          Chief Complaint   Patient presents with    Leg Pain     Pt reports left leg pain since sunday 4/24/22 when her dog jumped on her   Took tylenol #3 prior to arrival today           SUBJECTIVE/HPI   Feeling okay eating niece is in attendance and says she is clear mentally patient denies any GI symptoms except decreased abdominal distension    /71 (BP Location: Left arm)   Pulse 79   Temp 97 8 °F (36 6 °C) (Oral)   Resp 16   Ht 5' 6" (1 676 m)   Wt 73 5 kg (162 lb 0 6 oz)   SpO2 90%   BMI 26 15 kg/m²     PHYSICALEXAM  General appearance: alert, appears stated age and cooperative  Eyes: PERLLA, EOMI, positive icterus   Head: Normocephalic, without obvious abnormality, atraumatic  Abdomen:  Distended soft with a fluid wave  Extremities: extremities normal, atraumatic, no cyanosis or edema  Neurologic: Grossly normal    Lab Results   Component Value Date    GLUCOSE 94 05/08/2015    CALCIUM 8 5 05/08/2022     05/08/2015    K 3 9 05/08/2022    CO2 26 05/08/2022     05/08/2022    BUN 24 05/08/2022    CREATININE 1 10 05/08/2022     Lab Results   Component Value Date    WBC 9 71 05/08/2022    HGB 8 1 (L) 05/08/2022    HCT 25 0 (L) 05/08/2022     (H) 05/08/2022    PLT 86 (L) 05/08/2022     Lab Results   Component Value Date    ALT 55 05/08/2022     (H) 05/08/2022    GGT 81 (H) 04/30/2022    ALKPHOS 65 05/08/2022    BILITOT 0 2 05/08/2015     No results found for: AMYLASE  Lab Results   Component Value Date    LIPASE 76 04/28/2022     Lab Results   Component Value Date    IRON 97 04/28/2022    TIBC 146 (L) 04/28/2022    FERRITIN 1,238 (H) 04/28/2022     Lab Results   Component Value Date    INR 2 26 (H) 05/08/2022

## 2022-05-08 NOTE — ASSESSMENT & PLAN NOTE
· Reason for presentation - pain started a few days ago after dog jumping on her - difficulty with ambulation and weight on left side  · Patient was found on imaging including CT scan of the abdomen and pelvis, MRI and CT scan of the lumbar spine to have large retroperitoneal and iliopsoas hematoma  · consult placed to surgery recommending if there's evidence of further bleeding IR consult for embolization, however her hb is now stable, continue to monitor  · Neurovascular checks ordered  Pulses remain intact   Pain appears better/mobility improved  · S/p FFP 2 units, 4U PRBCs and vitamin K since admission  · XR left hip/femur unremarkable  · PT/OT consult-recommending short-term rehab  · Continue pain control

## 2022-05-08 NOTE — ASSESSMENT & PLAN NOTE
Presented with acute hip pain , abdominal pain, abdominal distension   CBC showing: Hgb 5 5/Hct16 3,  - macrocytic anemia    Occult heme + stool   Transfusion S/P 4 units PRBCs  o Transfuse to keep Hgb >7   Hb stablized-> has significant ecchymosis in the left back and left LE   Continue Protonix IV b i d   · Abraham need outpt f/u for screening EGD for esophageal varices    Lab Results   Component Value Date    HGB 8 1 (L) 05/08/2022    HGB 7 8 (L) 05/07/2022

## 2022-05-08 NOTE — PLAN OF CARE
Problem: Potential for Falls  Goal: Patient will remain free of falls  Description: INTERVENTIONS:  - Educate patient/family on patient safety including physical limitations  - Instruct patient to call for assistance with activity   - Consult OT/PT to assist with strengthening/mobility   - Keep Call bell within reach  - Keep bed low and locked with side rails adjusted as appropriate  - Keep care items and personal belongings within reach  - Initiate and maintain comfort rounds  - Make Fall Risk Sign visible to staff  - Offer Toileting every 2 Hours, in advance of need  - Initiate/Maintain bed alarm  - Obtain necessary fall risk management equipment: non slip socks  - Apply yellow socks and bracelet for high fall risk patients  - Consider moving patient to room near nurses station  Outcome: Progressing     Problem: HEMATOLOGIC - ADULT  Goal: Maintains hematologic stability  Description: INTERVENTIONS  - Assess for signs and symptoms of bleeding or hemorrhage  - Monitor labs  - Administer supportive blood products/factors as ordered and appropriate  Outcome: Progressing     Problem: NEUROSENSORY - ADULT  Goal: Achieves stable or improved neurological status  Description: INTERVENTIONS  - Monitor and report changes in neurological status  - Monitor vital signs such as temperature, blood pressure, glucose, and any other labs ordered   - Initiate measures to prevent increased intracranial pressure  - Monitor for seizure activity and implement precautions if appropriate      Outcome: Progressing     Problem: RESPIRATORY - ADULT  Goal: Achieves optimal ventilation and oxygenation  Description: INTERVENTIONS:  - Assess for changes in respiratory status  - Assess for changes in mentation and behavior  - Position to facilitate oxygenation and minimize respiratory effort  - Oxygen administered by appropriate delivery if ordered  - Initiate smoking cessation education as indicated  - Encourage broncho-pulmonary hygiene including cough, deep breathe, Incentive Spirometry  - Assess the need for suctioning and aspirate as needed  - Assess and instruct to report SOB or any respiratory difficulty  - Respiratory Therapy support as indicated  Outcome: Progressing     Problem: GASTROINTESTINAL - ADULT  Goal: Minimal or absence of nausea and/or vomiting  Description: INTERVENTIONS:  - Administer IV fluids if ordered to ensure adequate hydration  - Maintain NPO status until nausea and vomiting are resolved  - Nasogastric tube if ordered  - Administer ordered antiemetic medications as needed  - Provide nonpharmacologic comfort measures as appropriate  - Advance diet as tolerated, if ordered  - Consider nutrition services referral to assist patient with adequate nutrition and appropriate food choices  Outcome: Progressing  Goal: Maintains or returns to baseline bowel function  Description: INTERVENTIONS:  - Assess bowel function  - Encourage oral fluids to ensure adequate hydration  - Administer IV fluids if ordered to ensure adequate hydration  - Administer ordered medications as needed  - Encourage mobilization and activity  - Consider nutritional services referral to assist patient with adequate nutrition and appropriate food choices  Outcome: Progressing  Goal: Maintains adequate nutritional intake  Description: INTERVENTIONS:  - Monitor percentage of each meal consumed  - Identify factors contributing to decreased intake, treat as appropriate  - Assist with meals as needed  - Monitor I&O, weight, and lab values if indicated  - Obtain nutrition services referral as needed  Outcome: Progressing     Problem: GENITOURINARY - ADULT  Goal: Maintains or returns to baseline urinary function  Description: INTERVENTIONS:  - Assess urinary function  - Encourage oral fluids to ensure adequate hydration if ordered  - Administer IV fluids as ordered to ensure adequate hydration  - Administer ordered medications as needed  - Offer frequent toileting  - Follow urinary retention protocol if ordered  Outcome: Progressing       Problem: Neurological Deficit  Goal: Neurological status is stable or improving  Description: Interventions:  - Monitor and assess patient's level of consciousness, motor function, sensory function, and level of assistance needed for ADLs  - Monitor and report changes from baseline  Collaborate with interdisciplinary team to initiate plan and implement interventions as ordered  - Provide and maintain a safe environment  - Consider seizure precautions  - Consider fall precautions  - Consider aspiration precautions  - Consider bleeding precautions  Outcome: Progressing     Problem: Activity Intolerance/Impaired Mobility  Goal: Mobility/activity is maintained at optimum level for patient  Description: Interventions:  - Assess and monitor patient  barriers to mobility and need for assistive/adaptive devices  - Assess patient's emotional response to limitations  - Collaborate with interdisciplinary team and initiate plans and interventions as ordered  - Encourage independent activity per ability   - Maintain proper body alignment  - Perform active/passive rom as tolerated/ordered    - Plan activities to conserve energy   - Turn patient as appropriate  Outcome: Progressing     Problem: MUSCULOSKELETAL - ADULT  Goal: Maintain or return mobility to safest level of function  Description: INTERVENTIONS:  - Assess patient's ability to carry out ADLs; assess patient's baseline for ADL function and identify physical deficits which impact ability to perform ADLs (bathing, care of mouth/teeth, toileting, grooming, dressing, etc )  - Assess/evaluate cause of self-care deficits   - Assess range of motion  - Assess patient's mobility  - Assess patient's need for assistive devices and provide as appropriate  - Encourage maximum independence but intervene and supervise when necessary  - Involve family in performance of ADLs  - Assess for home care needs following discharge   - Consider OT consult to assist with ADL evaluation and planning for discharge  - Provide patient education as appropriate  Outcome: Progressing

## 2022-05-08 NOTE — ASSESSMENT & PLAN NOTE
· Sodium on admission 131  · Likely in setting of cirrhosis  · stable   Free water restriction (1-1 5L)    spironolactone re-initiated   improving   Monitor BMP     Lab Results   Component Value Date    SODIUM 133 (L) 05/08/2022    SODIUM 134 (L) 05/07/2022    SODIUM 135 (L) 05/06/2022

## 2022-05-08 NOTE — ASSESSMENT & PLAN NOTE
Patient presents with acute hip pain abdominal pain abdominal distension  · Endorses history of fatty liver disease, drinks wine occasionally, never been tested for hepatitis  · Found to have significant ascites on exam    Paracentesis negative for SBP  · CT abd/pelvis - new large ascites, cirrhotic liver, hypodense area measuring 1 7 cm at junction of segment 4 and 8 in liver, distended gallbladder, splenomegaly with recanalized umbilical vein  · Underwent paracentesis 4/29 with IR - approximately 1 8 L removed  The reports worsening distension  IR consulted for repeat paracentesis in [place] · GI following  · MELD: 30 with 27-32% estimated mortality  · Continue ceftriaxone for SBP prophylaxis  · lactulose   Titrate for 3-4 soft stools daily  · Re-initiated aldactone 5/3   · Continue to trend LFTs and INR daily   · If continues to have worsening LFTs, may need transfer to tertiary center for evaluation of  liver transplantation    · Importance of strict alcohol cessation discussion was done   ·

## 2022-05-08 NOTE — ASSESSMENT & PLAN NOTE
· Bilirubin elevated to 5 69 ->9 70->17 2->20->25-->28-->29 5  · D Bili 5-> 11->15-->14 4-->15 7  · CT abdomen pelvis showing - distended gallbladder, large new ascites, hepatomegaly   · Gastroenterology following  · Hyperbilirubinemia multifactorial in the setting of resorption of thigh/ retroperitoneal hematoma and alcoholic hepatitis  · In view of Concern for alcoholic hepatitis  patient was started on oral prednisolone by gastroenterology 5/5  · Continue to follow LFTs and INR closely  · If continues to have elevated bilirubin despite steroids, patient may need transfer to a tertiary care center for evaluation of liver transplantation  This was discussed at length with family at bedside today

## 2022-05-09 ENCOUNTER — APPOINTMENT (INPATIENT)
Dept: INTERVENTIONAL RADIOLOGY/VASCULAR | Facility: HOSPITAL | Age: 62
DRG: 432 | End: 2022-05-09
Attending: INTERNAL MEDICINE
Payer: COMMERCIAL

## 2022-05-09 LAB
ALBUMIN SERPL BCP-MCNC: 2.1 G/DL (ref 3.5–5)
ALBUMIN SERPL BCP-MCNC: 2.1 G/DL (ref 3.5–5)
ALP SERPL-CCNC: 71 U/L (ref 46–116)
ALP SERPL-CCNC: 71 U/L (ref 46–116)
ALT SERPL W P-5'-P-CCNC: 64 U/L (ref 12–78)
ALT SERPL W P-5'-P-CCNC: 69 U/L (ref 12–78)
ANION GAP SERPL CALCULATED.3IONS-SCNC: 9 MMOL/L (ref 4–13)
ANION GAP SERPL CALCULATED.3IONS-SCNC: 9 MMOL/L (ref 4–13)
APPEARANCE FLD: CLEAR
AST SERPL W P-5'-P-CCNC: 142 U/L (ref 5–45)
AST SERPL W P-5'-P-CCNC: 145 U/L (ref 5–45)
BASOPHILS # BLD AUTO: 0.02 THOUSANDS/ΜL (ref 0–0.1)
BASOPHILS NFR BLD AUTO: 0 % (ref 0–1)
BILIRUB SERPL-MCNC: 31.4 MG/DL (ref 0.2–1)
BILIRUB SERPL-MCNC: 33.5 MG/DL (ref 0.2–1)
BUN SERPL-MCNC: 31 MG/DL (ref 5–25)
BUN SERPL-MCNC: 34 MG/DL (ref 5–25)
CALCIUM ALBUM COR SERPL-MCNC: 10 MG/DL (ref 8.3–10.1)
CALCIUM ALBUM COR SERPL-MCNC: 10.4 MG/DL (ref 8.3–10.1)
CALCIUM SERPL-MCNC: 8.5 MG/DL (ref 8.3–10.1)
CALCIUM SERPL-MCNC: 8.9 MG/DL (ref 8.3–10.1)
CHLORIDE SERPL-SCNC: 97 MMOL/L (ref 100–108)
CHLORIDE SERPL-SCNC: 99 MMOL/L (ref 100–108)
CO2 SERPL-SCNC: 23 MMOL/L (ref 21–32)
CO2 SERPL-SCNC: 25 MMOL/L (ref 21–32)
COLOR FLD: YELLOW
CREAT SERPL-MCNC: 1.2 MG/DL (ref 0.6–1.3)
CREAT SERPL-MCNC: 1.52 MG/DL (ref 0.6–1.3)
EOSINOPHIL # BLD AUTO: 0 THOUSAND/ΜL (ref 0–0.61)
EOSINOPHIL NFR BLD AUTO: 0 % (ref 0–6)
GFR SERPL CREATININE-BSD FRML MDRD: 37 ML/MIN/1.73SQ M
GFR SERPL CREATININE-BSD FRML MDRD: 49 ML/MIN/1.73SQ M
GLUCOSE SERPL-MCNC: 117 MG/DL (ref 65–140)
GLUCOSE SERPL-MCNC: 148 MG/DL (ref 65–140)
HCT VFR BLD AUTO: 23.7 % (ref 34.8–46.1)
HGB BLD-MCNC: 7.9 G/DL (ref 11.5–15.4)
HISTIOCYTES NFR FLD: 33 %
IMM GRANULOCYTES # BLD AUTO: 0.18 THOUSAND/UL (ref 0–0.2)
IMM GRANULOCYTES NFR BLD AUTO: 2 % (ref 0–2)
INR PPP: 2.23 (ref 0.84–1.19)
LYMPHOCYTES # BLD AUTO: 0.96 THOUSANDS/ΜL (ref 0.6–4.47)
LYMPHOCYTES NFR BLD AUTO: 32 %
LYMPHOCYTES NFR BLD AUTO: 8 % (ref 14–44)
MCH RBC QN AUTO: 36.4 PG (ref 26.8–34.3)
MCHC RBC AUTO-ENTMCNC: 33.3 G/DL (ref 31.4–37.4)
MCV RBC AUTO: 109 FL (ref 82–98)
MONOCYTES # BLD AUTO: 0.82 THOUSAND/ΜL (ref 0.17–1.22)
MONOCYTES NFR BLD AUTO: 20 %
MONOCYTES NFR BLD AUTO: 7 % (ref 4–12)
NEUTROPHILS # BLD AUTO: 10.3 THOUSANDS/ΜL (ref 1.85–7.62)
NEUTS SEG NFR BLD AUTO: 15 %
NEUTS SEG NFR BLD AUTO: 83 % (ref 43–75)
NRBC BLD AUTO-RTO: 0 /100 WBCS
PLATELET # BLD AUTO: 91 THOUSANDS/UL (ref 149–390)
PMV BLD AUTO: 11.1 FL (ref 8.9–12.7)
POTASSIUM SERPL-SCNC: 4.6 MMOL/L (ref 3.5–5.3)
POTASSIUM SERPL-SCNC: 4.7 MMOL/L (ref 3.5–5.3)
PROT SERPL-MCNC: 7.5 G/DL (ref 6.4–8.2)
PROT SERPL-MCNC: 7.6 G/DL (ref 6.4–8.2)
PROTHROMBIN TIME: 24.1 SECONDS (ref 11.6–14.5)
RBC # BLD AUTO: 2.17 MILLION/UL (ref 3.81–5.12)
SITE: NORMAL
SODIUM SERPL-SCNC: 129 MMOL/L (ref 136–145)
SODIUM SERPL-SCNC: 133 MMOL/L (ref 136–145)
TOTAL CELLS COUNTED SPEC: 100
WBC # BLD AUTO: 12.28 THOUSAND/UL (ref 4.31–10.16)
WBC # FLD MANUAL: 73 /UL

## 2022-05-09 PROCEDURE — 80053 COMPREHEN METABOLIC PANEL: CPT | Performed by: INTERNAL MEDICINE

## 2022-05-09 PROCEDURE — 49083 ABD PARACENTESIS W/IMAGING: CPT

## 2022-05-09 PROCEDURE — 87070 CULTURE OTHR SPECIMN AEROBIC: CPT | Performed by: INTERNAL MEDICINE

## 2022-05-09 PROCEDURE — 85610 PROTHROMBIN TIME: CPT | Performed by: INTERNAL MEDICINE

## 2022-05-09 PROCEDURE — 0W9G3ZZ DRAINAGE OF PERITONEAL CAVITY, PERCUTANEOUS APPROACH: ICD-10-PCS | Performed by: INTERNAL MEDICINE

## 2022-05-09 PROCEDURE — 49083 ABD PARACENTESIS W/IMAGING: CPT | Performed by: INTERNAL MEDICINE

## 2022-05-09 PROCEDURE — C9113 INJ PANTOPRAZOLE SODIUM, VIA: HCPCS | Performed by: NURSE PRACTITIONER

## 2022-05-09 PROCEDURE — 99232 SBSQ HOSP IP/OBS MODERATE 35: CPT | Performed by: INTERNAL MEDICINE

## 2022-05-09 PROCEDURE — 99232 SBSQ HOSP IP/OBS MODERATE 35: CPT | Performed by: HOSPITALIST

## 2022-05-09 PROCEDURE — 87205 SMEAR GRAM STAIN: CPT | Performed by: INTERNAL MEDICINE

## 2022-05-09 PROCEDURE — 89051 BODY FLUID CELL COUNT: CPT | Performed by: INTERNAL MEDICINE

## 2022-05-09 PROCEDURE — 85025 COMPLETE CBC W/AUTO DIFF WBC: CPT | Performed by: INTERNAL MEDICINE

## 2022-05-09 RX ORDER — LIDOCAINE WITH 8.4% SOD BICARB 0.9%(10ML)
SYRINGE (ML) INJECTION CODE/TRAUMA/SEDATION MEDICATION
Status: COMPLETED | OUTPATIENT
Start: 2022-05-09 | End: 2022-05-09

## 2022-05-09 RX ADMIN — PANTOPRAZOLE SODIUM 40 MG: 40 INJECTION, POWDER, FOR SOLUTION INTRAVENOUS at 16:29

## 2022-05-09 RX ADMIN — GABAPENTIN 100 MG: 100 CAPSULE ORAL at 22:03

## 2022-05-09 RX ADMIN — GABAPENTIN 100 MG: 100 CAPSULE ORAL at 16:29

## 2022-05-09 RX ADMIN — ONDANSETRON 4 MG: 2 INJECTION INTRAMUSCULAR; INTRAVENOUS at 17:32

## 2022-05-09 RX ADMIN — PANTOPRAZOLE SODIUM 40 MG: 40 INJECTION, POWDER, FOR SOLUTION INTRAVENOUS at 05:48

## 2022-05-09 RX ADMIN — SPIRONOLACTONE 25 MG: 25 TABLET ORAL at 08:37

## 2022-05-09 RX ADMIN — OXYCODONE HYDROCHLORIDE 5 MG: 5 TABLET ORAL at 22:03

## 2022-05-09 RX ADMIN — Medication 10 ML: at 14:15

## 2022-05-09 RX ADMIN — LACTULOSE 20 G: 20 SOLUTION ORAL at 17:32

## 2022-05-09 RX ADMIN — GABAPENTIN 100 MG: 100 CAPSULE ORAL at 08:37

## 2022-05-09 RX ADMIN — LACTULOSE 20 G: 20 SOLUTION ORAL at 08:37

## 2022-05-09 RX ADMIN — OXYCODONE HYDROCHLORIDE 5 MG: 5 TABLET ORAL at 08:49

## 2022-05-09 RX ADMIN — PREDNISOLONE SODIUM PHOSPHATE 40 MG: 15 SOLUTION ORAL at 08:42

## 2022-05-09 RX ADMIN — OXYCODONE HYDROCHLORIDE 10 MG: 5 TABLET ORAL at 16:40

## 2022-05-09 RX ADMIN — Medication 3 MG: at 22:03

## 2022-05-09 NOTE — ASSESSMENT & PLAN NOTE
· Bilirubin elevated to 5 69 ->9 70->17 2->20->25-->28-->29 5  · D Bili 5-> 11->15-->14 4-->15 7  · CT abdomen pelvis showing - distended gallbladder, large new ascites, hepatomegaly   · Gastroenterology following  · Hyperbilirubinemia multifactorial in the setting of resorption of thigh/ retroperitoneal hematoma and alcoholic hepatitis  · In view of Concern for alcoholic hepatitis  patient was started on oral prednisolone by gastroenterology 5/5  · Continue to follow LFTs and INR closely  · If continues to have elevated bilirubin despite steroids, patient may need transfer to a tertiary care center for evaluation of liver transplantation  Dr Addison Bro discussed w/ family today  I discussed with patients long term friend today

## 2022-05-09 NOTE — PROGRESS NOTES
Progress note - [facility]   Rogelio Hoyos 63 y o  female MRN: [id number]  Unit/Bed#: -01 Encounter: [id number]    ASSESSMENT and PLAN    1  Cirrhosis decompensated with ascites  61F with recently diagnosed ETOH hepatitis and RG, decompensated over the past 6 weeks with encephalopathy, thrombocytopenia, coagulopathy, ascites, ALEJANDRO  1800 cc paracentesis negative for SBP   Repeat just done  Diuretics were on hold due to ALEJANDRO which has improved, appreciate renal input     Increasing bilirubin likely multifactorial from hematoma resorption and alcoholic hepatitis  Ancelmo Browning started 05/04/2022     Bilirubin increased but INR is essentially sideways since admission       Prognosis remains guarded  I had a long discussion with her and her sons regarding transplant 5/7 and discussed with her daughter by phone yesterday and then again today  He would like the patient to go to [facility] for consideration of liver transplant if this is an option  I explained to the family that Dr Cinthia Floyd is rounding for us tomorrow  He is a hepatologist might have some insight on this process  Note the iron saturation is high and the ferritin is high  I do not believe this indicative of hemochromatosis but will discuss with Dr Cinthia Floyd        2  Hepatic encephalopathy  Improved with lactulose t i d , had excessive stools 05/04/2022, will reduce to twice daily     3 Retroperitoneal hemorrhage  4  Anemia  Significant anemia in setting of coagulopathy and thrombocytopenia  8cm left retroperitoneal hematoma centered in ileus psoas muscle   Appreciate surgical input  No overt GI bleeding, but will need EGD to assess for varices after she recovers from this hospitalization  Mace Rinne on colonoscopy April 2019, 5 recall     5  ETOH use  Patient admits to drinking 1-2 glasses a wine every evening prior to admission  Reinforced need for complete alcohol cessation          Chief Complaint   Patient presents with  Leg Pain     Pt reports left leg pain since sunday 4/24/22 when her dog jumped on her  Took tylenol #3 prior to arrival today           SUBJECTIVE/HPI   Feels better after paracentesis did have a lot of diarrhea yesterday tolerating p o     /67   Pulse 79   Temp 97 7 °F (36 5 °C)   Resp 18   Ht 5' 6" (1 676 m)   Wt 73 5 kg (162 lb 0 6 oz)   SpO2 92%   BMI 26 15 kg/m²     PHYSICALEXAM  General appearance: alert, appears stated age and cooperative  Eyes: PERLLA, EOMI, quite icteric   Head: Normocephalic, without obvious abnormality, atraumatic  Lungs: clear to auscultation bilaterally  Abdomen: soft, non-tender; bowel sounds normal; no masses,  no organomegaly  Extremities: extremities normal, atraumatic, no cyanosis or edema  Neurologic: Grossly normal no asterixis    Lab Results   Component Value Date    GLUCOSE 94 05/08/2015    CALCIUM 8 9 05/09/2022     05/08/2015    K 4 7 05/09/2022    CO2 25 05/09/2022    CL 99 (L) 05/09/2022    BUN 31 (H) 05/09/2022    CREATININE 1 20 05/09/2022     Lab Results   Component Value Date    WBC 12 28 (H) 05/09/2022    HGB 7 9 (L) 05/09/2022    HCT 23 7 (L) 05/09/2022     (H) 05/09/2022    PLT 91 (L) 05/09/2022     Lab Results   Component Value Date    ALT 64 05/09/2022     (H) 05/09/2022    GGT 81 (H) 04/30/2022    ALKPHOS 71 05/09/2022    BILITOT 0 2 05/08/2015     No results found for: AMYLASE  Lab Results   Component Value Date    LIPASE 76 04/28/2022     Lab Results   Component Value Date    IRON 97 04/28/2022    TIBC 146 (L) 04/28/2022    FERRITIN 1,238 (H) 04/28/2022     Lab Results   Component Value Date    INR 2 26 (H) 05/08/2022

## 2022-05-09 NOTE — PROGRESS NOTES
[place]  Progress Note - Halley Osborne 1960, 63 y o  female MRN: [id number]  Unit/Bed#: -01 Encounter: [id number]  Primary Care Provider: Dora Dejesus MD   Date and time admitted to hospital: 4/28/2022 10:36 AM    Acute kidney injury [facility])  Assessment & Plan  Multifactorial, related to decompensated cirrhosis, contrast exposure, low blood pressures  Re-initiated aldactone    Nephrology following  Dc'd Midodrine  Octreotide gtt dc'd  Continue to hold ARB  Renal functions have improved      Pleural effusion  Assessment & Plan  Moderate left effusion noted on CT  Currently on 2 L oxygen  Respiratory protocol  Incentive spirometer  S/p diuresis due to ascites and pleural effusion    Hyperbilirubinemia  Assessment & Plan    · Bilirubin elevated to 5 69 ->9 70->17 2->20->25-->28-->29 5  · D Bili 5-> 11->15-->14 4-->15 7  · CT abdomen pelvis showing - distended gallbladder, large new ascites, hepatomegaly   · Gastroenterology following  · Hyperbilirubinemia multifactorial in the setting of resorption of thigh/ retroperitoneal hematoma and alcoholic hepatitis  · In view of Concern for alcoholic hepatitis  patient was started on oral prednisolone by gastroenterology 5/5  · Continue to follow LFTs and INR closely  · If continues to have elevated bilirubin despite steroids, patient may need transfer to a tertiary care center for evaluation of liver transplantation  Dr Christophe Porter discussed w/ family today  I discussed with patients long term friend today        Hyponatremia  Assessment & Plan  · Sodium on admission 131  · Likely in setting of cirrhosis  · stable   Free water restriction (1-1 5L)    spironolactone re-initiated   improving   Monitor BMP     Lab Results   Component Value Date    SODIUM 133 (L) 05/09/2022    SODIUM 133 (L) 05/08/2022    SODIUM 134 (L) 05/07/2022         Thrombocytopenia (HCC)  Assessment & Plan  · Likely in setting of severe liver disease and splenomegaly  · improving  · transfuse <50k in the setting of bleeding or < 20k if no evidence of bleeding      Acute blood loss anemia  Assessment & Plan  Presented with acute hip pain , abdominal pain, abdominal distension   CBC showing: Hgb 5 5/Hct16 3,  - macrocytic anemia    Occult heme + stool   Transfusion S/P 4 units PRBCs  o Transfuse to keep Hgb >7    Hb stablized-> has significant ecchymosis in the left back and left LE   Continue Protonix IV b i d   · Abraham need outpt f/u for screening EGD for esophageal varices    Lab Results   Component Value Date    HGB 7 9 (L) 05/09/2022    HGB 8 1 (L) 05/08/2022         Weakness of left lower extremity  Assessment & Plan  · Reason for presentation - pain started a few days ago after dog jumping on her - difficulty with ambulation and weight on left side  · Patient was found on imaging including CT scan of the abdomen and pelvis, MRI and CT scan of the lumbar spine to have large retroperitoneal and iliopsoas hematoma  · consult placed to surgery recommending if there's evidence of further bleeding IR consult for embolization, however her hb is now stable, continue to monitor  · Neurovascular checks ordered  Pulses remain intact   Pain appears better/mobility improved  · S/p FFP 2 units, 4U PRBCs and vitamin K since admission  · XR left hip/femur unremarkable  · PT/OT consult-recommending short-term rehab  · Continue pain control    * Decompensated cirrhosis  Assessment & Plan  Patient presents with acute hip pain abdominal pain abdominal distension  · Endorses history of fatty liver disease, drinks wine occasionally, never been tested for hepatitis  · Found to have significant ascites on exam    Paracentesis negative for SBP  · CT abd/pelvis - new large ascites, cirrhotic liver, hypodense area measuring 1 7 cm at junction of segment 4 and 8 in liver, distended gallbladder, splenomegaly with recanalized umbilical vein  · Underwent paracentesis 4/29 with IR - approximately 1 8 L removed  reported worsening distension  IR consulted for repeat paracentesis   Teresa Anderson 2L removed  Follow studies  · GI following  · MELD: 30 with 27-32% estimated mortality  · Continue ceftriaxone for SBP prophylaxis  · lactulose   Titrate for 3-4 soft stools daily  · Re-initiated aldactone 5/3   · Continue to trend LFTs and INR daily   · Reviewed with Dr Tim Leyva (hepatologist) will be rounding tomorrow, will await his recommendations  INR, Na are encouraging but bilirubin is rising  May require transfer if bilirubin does not normalize  · Importance of strict alcohol cessation discussion was done   ·          VTE  Prophylaxis:   Pharmacologic: in place    Patient Centered Rounds: I have performed bedside rounds with nursing staff today  Discussions with Specialists or Other Care Team Provider: case management    Education and Discussions with Family / Patient: GI spoke with family today      Current Length of Stay: 11 day(s)    Current Patient Status: Inpatient        Code Status: Level 1 - Full Code      Subjective:   Pt seen  Feels better after para  Largest concern is her jaundice    Patient is seen and examined at bedside  All other ROS are negative  Objective:     Vitals:   Temp (24hrs), Av 7 °F (36 5 °C), Min:97 7 °F (36 5 °C), Max:97 7 °F (36 5 °C)    Temp:  [97 7 °F (36 5 °C)] 97 7 °F (36 5 °C)  HR:  [74-79] 79  Resp:  [17-18] 18  BP: (128-130)/(67-68) 128/67  SpO2:  [89 %-93 %] 92 %  Body mass index is 26 15 kg/m²  Input and Output Summary (last 24 hours):        Intake/Output Summary (Last 24 hours) at 2022 1521  Last data filed at 2022 1401  Gross per 24 hour   Intake 720 ml   Output 2400 ml   Net -1680 ml       Physical Exam:       GEN: No acute distress, comfortable, middle aged female, severely jaundiced, on o2  HEEENT: No JVD, PERRLA, icteric  RESP: Lungs clear to auscultation bilaterally  CV: RRR, +s1/s2   ABD: SOFT NON TENDER, POSITIVE BOWEL SOUNDS, NO DISTENTION  PSYCH: CALM  NEURO: no asterixis, mentation intact  SKIN: NO RASH  EXTREM: NO EDEMA    Additional Data:     Labs:    Results from last 7 days   Lab Units 05/09/22  0525 05/07/22  0747 05/06/22  0525   WBC Thousand/uL 12 28*   < > 7 90   HEMOGLOBIN g/dL 7 9*   < > 8 1*   HEMATOCRIT % 23 7*   < > 23 9*   PLATELETS Thousands/uL 91*   < > 62*   BANDS PCT %  --   --  3   NEUTROS PCT % 83*  --   --    LYMPHS PCT % 8*  --   --    LYMPHO PCT %  --   --  7*   MONOS PCT % 7  --   --    MONO PCT %  --   --  5   EOS PCT % 0  --  1    < > = values in this interval not displayed  Results from last 7 days   Lab Units 05/09/22  0525   SODIUM mmol/L 133*   POTASSIUM mmol/L 4 7   CHLORIDE mmol/L 99*   CO2 mmol/L 25   BUN mg/dL 31*   CREATININE mg/dL 1 20   ANION GAP mmol/L 9   CALCIUM mg/dL 8 9   ALBUMIN g/dL 2 1*   TOTAL BILIRUBIN mg/dL 31 40*   ALK PHOS U/L 71   ALT U/L 64   AST U/L 142*   GLUCOSE RANDOM mg/dL 117     Results from last 7 days   Lab Units 05/08/22  0412   INR  2 26*                       * I Have Reviewed All Lab Data Listed Above  Imaging:     Results for orders placed during the hospital encounter of 04/28/22    XR chest portable    Narrative  CHEST    INDICATION:   hypoxia, increase o2 requirement  COMPARISON:  4/28/2022    EXAM PERFORMED/VIEWS:  XR CHEST PORTABLE      FINDINGS:    Cardiomediastinal silhouette appears unremarkable  There is an increased left pleural effusion with overlying atelectasis  Osseous structures appear within normal limits for patient age  Impression  Increasing left pleural effusion with left basilar atelectasis  The study was marked in [facility] for immediate notification  Workstation performed: [id number]    No results found for this or any previous visit        *I have reviewed all imaging reports listed above      Recent Cultures (last 7 days):           Last 24 Hours Medication List:   Current Facility-Administered Medications   Medication Dose Route Frequency Provider Last Rate    acetaminophen  650 mg Oral Q4H PRN Piero Canela, TRAV      aluminum-magnesium hydroxide-simethicone  30 mL Oral Q6H PRN Piero Canela, TRAV      gabapentin  100 mg Oral TID Piero Canela, TRAV      HYDROmorphone  0 5 mg Intravenous Q1H PRN Edward Naranjo, TRAV      HYDROmorphone  1 mg Intravenous Once PRN Edward Pedersen, TRAV      influenza vaccine  0 5 mL Intramuscular Prior to discharge TRAV Dunlap      lactulose  20 g Oral BID Mendoza Yang MD      LORazepam  1 mg Intravenous Once Edward Pedersen, TRAV      melatonin  3 mg Oral HS Mendoza Yang MD      ondansetron  4 mg Intravenous Q4H PRN Piero Canela, TRAV      oxyCODONE  10 mg Oral Q4H PRN Piero Canela, TRAV      oxyCODONE  5 mg Oral Q4H PRN Piero Canela, TRAV      pantoprazole  40 mg Intravenous Q12H Edward Naranjo, TRAV      prednisoLONE  40 mg Oral Daily Spencer Neal DO      senna-docusate sodium  2 tablet Oral BID PRN Piero Canela, TRAV      sodium chloride  1 spray Each Nare Q1H PRN Mendoza Yang MD      spironolactone  25 mg Oral Daily TRAV Peterson          Today, Patient Was Seen By: Carolynn Vaughn MD    ** Please Note: Dictation voice to text software may have been used in the creation of this document   **

## 2022-05-09 NOTE — BRIEF OP NOTE (RAD/CATH)
INTERVENTIONAL RADIOLOGY PROCEDURE NOTE    Date: 5/9/2022    Procedure: Paracentesis    Preoperative diagnosis:   1  Anemia    2  Liver cirrhosis ([facility] )    3  Left hip pain    4  Pleural effusion on left    5  Hyperbilirubinemia    6  Gastrointestinal hemorrhage with melena    7  Cirrhosis of liver with ascites, unspecified hepatic cirrhosis type ([facility] )    8  Left leg weakness    9  Intramuscular hematoma    10  Acute kidney failure (HCC)         Postoperative diagnosis: Same  Surgeon: Kimberly Salmeron MD     Assistant: None  No qualified resident was available  Blood loss: None    Specimens: Ascites fluid sent to the laboratory for further evaluation     Findings: Ultrasound-guided paracentesis performed  Please see the radiology report for details  Complications: None immediate      Anesthesia: local

## 2022-05-09 NOTE — CASE MANAGEMENT
Case Management Discharge Planning Note    Patient name Crow Segura  Location /-92 MRN [id number]  : 1960 Date 2022       Current Admission Date: 2022  Current Admission Diagnosis:Decompensated cirrhosis   Patient Active Problem List    Diagnosis Date Noted    [facility] ) 2022    Decompensated cirrhosis 2022    Weakness of left lower extremity 2022    Acute blood loss anemia 2022    Thrombocytopenia [facility] ) 2022    Hyponatremia 2022    Hyperbilirubinemia 2022    Pleural effusion 2022    Abnormal LFTs 2021    Personal history of colonic polyps 2021    Right lower quadrant abdominal pain 06/15/2021    Acute cystitis without hematuria 05/10/2021    Chronic pain syndrome 2020    Contusion of left foot 2020    Chronic bilateral low back pain without sciatica 2020    Lumbar spondylosis 2020    DDD (degenerative disc disease), lumbar 2020    Right lumbar radiculitis 2020    Left wrist tendonitis 2019    Nondisplaced fracture of distal end of left radius 2019    Closed compression fracture of L2 vertebra ([facility] ) 2019      LOS (days): 11  Geometric Mean LOS (GMLOS) (days): 4 70  Days to GMLOS:-6 4     OBJECTIVE:  Risk of Unplanned Readmission Score: 13         Current admission status: Inpatient   Preferred Pharmacy:   [address] [place] [id number]  Phone: [phone] Fax: [phone]    Primary Care Provider: Hamlet Perry MD    Primary Insurance: [facility] [employer]  Secondary Insurance:     DISCHARGE DETAILS:    Additional Comments: [facility] does not have bed available this week, one may open up next week  Call placed to Pt's ayder, CM informed Regina Moeller that [facility] does not have bed available this week   Regina Moeller will review additional facilities with Pt in AM  Pt's dtr informed CM that Pt may need to transfer to higher level of care  CM to follow

## 2022-05-09 NOTE — CASE MANAGEMENT
Case Management Discharge Planning Note    Patient name Soren Starks  Location /-91 MRN [id number]  : 1960 Date 2022       Current Admission Date: 2022  Current Admission Diagnosis:Decompensated cirrhosis   Patient Active Problem List    Diagnosis Date Noted    Acute kidney injury ([facility] ) 2022    Decompensated cirrhosis 2022    Weakness of left lower extremity 2022    Acute blood loss anemia 2022    Thrombocytopenia ([facility] ) 2022    Hyponatremia 2022    Hyperbilirubinemia 2022    Pleural effusion 2022    Abnormal LFTs 2021    Personal history of colonic polyps 2021    Right lower quadrant abdominal pain 06/15/2021    Acute cystitis without hematuria 05/10/2021    Chronic pain syndrome 2020    Contusion of left foot 2020    Chronic bilateral low back pain without sciatica 2020    Lumbar spondylosis 2020    DDD (degenerative disc disease), lumbar 2020    Right lumbar radiculitis 2020    Left wrist tendonitis 2019    Nondisplaced fracture of distal end of left radius 2019    Closed compression fracture of L2 vertebra ([facility] ) 2019      LOS (days): 11  Geometric Mean LOS (GMLOS) (days): 4 70  Days to GMLOS:-6 1     OBJECTIVE:  Risk of Unplanned Readmission Score: 13     Current admission status: Inpatient   Preferred Pharmacy:   [address] [place] [address] [address] [address]  Phone: [phone] Fax: [phone]    Primary Care Provider: Marquis Puentes MD    Primary Insurance: [facility] [employer]  Secondary Insurance:     DISCHARGE DETAILS:    Additional Comments: [address] do not have bed available  Waiting determination from [facility]   Call placed to Pt's dtr(Cristine: [phone]), informed Pt's dtr that [address] do not have bed available for Pt and CM is waiting to hear from [facility]  Pt's dtr reports that she spoke with someone from [facility] over weekend and that is their first choice  Pt's dtr also informed CM that family has spoken with GI and [facility] and there is a possibility that Pt may be transferred to a higher level of care for liver transplant  Call placed to Johnathan Villagomez([phone]), left message  Anticipate return call  CM to follow

## 2022-05-09 NOTE — ASSESSMENT & PLAN NOTE
· Sodium on admission 131  · Likely in setting of cirrhosis  · stable   Free water restriction (1-1 5L)    spironolactone re-initiated   improving   Monitor BMP     Lab Results   Component Value Date    SODIUM 133 (L) 05/09/2022    SODIUM 133 (L) 05/08/2022    SODIUM 134 (L) 05/07/2022

## 2022-05-09 NOTE — SEDATION DOCUMENTATION
Diagnostic and therapeutic paracentesis  Removed 2400ml of dark yellow fluid, RLQ  Patient tolerated the procedure well  Labs were sent

## 2022-05-09 NOTE — PLAN OF CARE
Problem: Potential for Falls  Goal: Patient will remain free of falls  Description: INTERVENTIONS:  - Educate patient/family on patient safety including physical limitations  - Instruct patient to call for assistance with activity   - Consult OT/PT to assist with strengthening/mobility   - Keep Call bell within reach  - Keep bed low and locked with side rails adjusted as appropriate  - Keep care items and personal belongings within reach  - Initiate and maintain comfort rounds  - Make Fall Risk Sign visible to staff  - Offer Toileting every 2 Hours, in advance of need  - Initiate/Maintain bed alarm  - Obtain necessary fall risk management equipment: non slip socks  - Apply yellow socks and bracelet for high fall risk patients  - Consider moving patient to room near nurses station  Outcome: Progressing     Problem: HEMATOLOGIC - ADULT  Goal: Maintains hematologic stability  Description: INTERVENTIONS  - Assess for signs and symptoms of bleeding or hemorrhage  - Monitor labs  - Administer supportive blood products/factors as ordered and appropriate  Outcome: Progressing     Problem: Prexisting or High Potential for Compromised Skin Integrity  Goal: Skin integrity is maintained or improved  Description: INTERVENTIONS:  - Identify patients at risk for skin breakdown  - Assess and monitor skin integrity  - Assess and monitor nutrition and hydration status  - Monitor labs   - Assess for incontinence   - Turn and reposition patient  - Assist with mobility/ambulation  - Relieve pressure over bony prominences  - Avoid friction and shearing  - Provide appropriate hygiene as needed including keeping skin clean and dry  - Evaluate need for skin moisturizer/barrier cream  - Collaborate with interdisciplinary team   - Patient/family teaching  - Consider wound care consult   Outcome: Progressing     Problem: MOBILITY - ADULT  Goal: Maintain or return to baseline ADL function  Description: INTERVENTIONS:  -  Assess patient's ability to carry out ADLs; assess patient's baseline for ADL function and identify physical deficits which impact ability to perform ADLs (bathing, care of mouth/teeth, toileting, grooming, dressing, etc )  - Assess/evaluate cause of self-care deficits   - Assess range of motion  - Assess patient's mobility; develop plan if impaired  - Assess patient's need for assistive devices and provide as appropriate  - Encourage maximum independence but intervene and supervise when necessary  - Involve family in performance of ADLs  - Assess for home care needs following discharge   - Consider OT consult to assist with ADL evaluation and planning for discharge  - Provide patient education as appropriate  Outcome: Progressing  Goal: Maintains/Returns to pre admission functional level  Description: INTERVENTIONS:  - Perform BMAT or MOVE assessment daily    - Set and communicate daily mobility goal to care team and patient/family/caregiver  - Collaborate with rehabilitation services on mobility goals if consulted  - Perform Range of Motion 5 times a day  - Reposition patient every 2 hours  - Dangle patient 3 times a day  - Stand patient 3 times a day  - Ambulate patient 3 times a day  - Out of bed to chair 3 times a day   - Out of bed for meals 3 times a day  - Out of bed for toileting  - Record patient progress and toleration of activity level   Outcome: Progressing     Problem: Neurological Deficit  Goal: Neurological status is stable or improving  Description: Interventions:  - Monitor and assess patient's level of consciousness, motor function, sensory function, and level of assistance needed for ADLs  - Monitor and report changes from baseline  Collaborate with interdisciplinary team to initiate plan and implement interventions as ordered  - Provide and maintain a safe environment  - Consider seizure precautions  - Consider fall precautions  - Consider aspiration precautions    - Consider bleeding precautions  Outcome: Progressing     Problem: Activity Intolerance/Impaired Mobility  Goal: Mobility/activity is maintained at optimum level for patient  Description: Interventions:  - Assess and monitor patient  barriers to mobility and need for assistive/adaptive devices  - Assess patient's emotional response to limitations  - Collaborate with interdisciplinary team and initiate plans and interventions as ordered  - Encourage independent activity per ability   - Maintain proper body alignment  - Perform active/passive rom as tolerated/ordered  - Plan activities to conserve energy   - Turn patient as appropriate  Outcome: Progressing     Problem: Communication Impairment  Goal: Ability to express needs and understand communication  Description: Assess patient's communication skills and ability to understand information  Patient will demonstrate use of effective communication techniques, alternative methods of communication and understanding even if not able to speak  - Encourage communication and provide alternate methods of communication as needed  - Collaborate with case management/ for discharge needs  - Include patient/family/caregiver in decisions related to communication  Outcome: Progressing     Problem: Potential for Aspiration  Goal: Non-ventilated patient's risk of aspiration is minimized  Description: Assess and monitor vital signs, respiratory status, and labs (WBC)  Monitor for signs of aspiration (tachypnea, cough, rales, wheezing, cyanosis, fever)  - Assess and monitor patient's ability to swallow  - Place patient up in chair to eat if possible  - HOB up at 90 degrees to eat if unable to get patient up into chair   - Supervise patient during oral intake  - Instruct patient/ family to take small bites  - Instruct patient/ family to take small single sips when taking liquids    - Follow patient-specific strategies generated by speech pathologist   Outcome: Progressing  Goal: Ventilated patient's risk of aspiration is minimized  Description: Assess and monitor vital signs, respiratory status, airway cuff pressure, and labs (WBC)  Monitor for signs of aspiration (tachypnea, cough, rales, wheezing, cyanosis, fever)  - Elevate head of bed 30 degrees if patient has tube feeding   - Monitor tube feeding  Outcome: Progressing     Problem: Nutrition  Goal: Nutrition/Hydration status is improving  Description: Monitor and assess patient's nutrition/hydration status for malnutrition (ex- brittle hair, bruises, dry skin, pale skin and conjunctiva, muscle wasting, smooth red tongue, and disorientation)  Collaborate with interdisciplinary team and initiate plan and interventions as ordered  Monitor patient's weight and dietary intake as ordered or per policy  Utilize nutrition screening tool and intervene per policy  Determine patient's food preferences and provide high-protein, high-caloric foods as appropriate  - Assist patient with eating   - Allow adequate time for meals   - Encourage patient to take dietary supplement as ordered  - Collaborate with clinical nutritionist   - Include patient/family/caregiver in decisions related to nutrition    Outcome: Progressing     Problem: PAIN - ADULT  Goal: Verbalizes/displays adequate comfort level or baseline comfort level  Description: Interventions:  - Encourage patient to monitor pain and request assistance  - Assess pain using appropriate pain scale  - Administer analgesics based on type and severity of pain and evaluate response  - Implement non-pharmacological measures as appropriate and evaluate response  - Consider cultural and social influences on pain and pain management  - Notify physician/advanced practitioner if interventions unsuccessful or patient reports new pain  Outcome: Progressing     Problem: INFECTION - ADULT  Goal: Absence or prevention of progression during hospitalization  Description: INTERVENTIONS:  - Assess and monitor for signs and symptoms of infection  - Monitor lab/diagnostic results  - Monitor all insertion sites, i e  indwelling lines, tubes, and drains  - Monitor endotracheal if appropriate and nasal secretions for changes in amount and color  - [place] appropriate cooling/warming therapies per order  - Administer medications as ordered  - Instruct and encourage patient and family to use good hand hygiene technique  - Identify and instruct in appropriate isolation precautions for identified infection/condition  Outcome: Progressing     Problem: SAFETY ADULT  Goal: Patient will remain free of falls  Description: INTERVENTIONS:  - Educate patient/family on patient safety including physical limitations  - Instruct patient to call for assistance with activity   - Consult OT/PT to assist with strengthening/mobility   - Keep Call bell within reach  - Keep bed low and locked with side rails adjusted as appropriate  - Keep care items and personal belongings within reach  - Initiate and maintain comfort rounds  - Make Fall Risk Sign visible to staff  - Offer Toileting every 2 Hours, in advance of need  - Initiate/Maintain bed alarm  - Obtain necessary fall risk management equipment: non slip socks  - Apply yellow socks and bracelet for high fall risk patients  - Consider moving patient to room near nurses station  Outcome: Progressing     Problem: DISCHARGE PLANNING  Goal: Discharge to home or other facility with appropriate resources  Description: INTERVENTIONS:  - Identify barriers to discharge w/patient and caregiver  - Arrange for needed discharge resources and transportation as appropriate  - Identify discharge learning needs (meds, wound care, etc )  - Arrange for interpretive services to assist at discharge as needed  - Refer to Case Management Department for coordinating discharge planning if the patient needs post-hospital services based on physician/advanced practitioner order or complex needs related to functional status, cognitive ability, or social support system  Outcome: Progressing     Problem: Knowledge Deficit  Goal: Patient/family/caregiver demonstrates understanding of disease process, treatment plan, medications, and discharge instructions  Description: Complete learning assessment and assess knowledge base  Interventions:  - Provide teaching at level of understanding  - Provide teaching via preferred learning methods  Outcome: Progressing     Problem: Nutrition/Hydration-ADULT  Goal: Nutrient/Hydration intake appropriate for improving, restoring or maintaining nutritional needs  Description: Monitor and assess patient's nutrition/hydration status for malnutrition  Collaborate with interdisciplinary team and initiate plan and interventions as ordered  Monitor patient's weight and dietary intake as ordered or per policy  Utilize nutrition screening tool and intervene as necessary  Determine patient's food preferences and provide high-protein, high-caloric foods as appropriate       INTERVENTIONS:  - Monitor oral intake, urinary output, labs, and treatment plans  - Assess nutrition and hydration status and recommend course of action  - Evaluate amount of meals eaten  - Assist patient with eating if necessary   - Allow adequate time for meals  - Recommend/ encourage appropriate diets, oral nutritional supplements, and vitamin/mineral supplements  - Order, calculate, and assess calorie counts as needed  - Recommend, monitor, and adjust tube feedings and TPN/PPN based on assessed needs  - Assess need for intravenous fluids  - Provide specific nutrition/hydration education as appropriate  - Include patient/family/caregiver in decisions related to nutrition  Outcome: Progressing     Problem: NEUROSENSORY - ADULT  Goal: Achieves stable or improved neurological status  Description: INTERVENTIONS  - Monitor and report changes in neurological status  - Monitor vital signs such as temperature, blood pressure, glucose, and any other labs ordered   - Initiate measures to prevent increased intracranial pressure  - Monitor for seizure activity and implement precautions if appropriate      Outcome: Progressing     Problem: RESPIRATORY - ADULT  Goal: Achieves optimal ventilation and oxygenation  Description: INTERVENTIONS:  - Assess for changes in respiratory status  - Assess for changes in mentation and behavior  - Position to facilitate oxygenation and minimize respiratory effort  - Oxygen administered by appropriate delivery if ordered  - Initiate smoking cessation education as indicated  - Encourage broncho-pulmonary hygiene including cough, deep breathe, Incentive Spirometry  - Assess the need for suctioning and aspirate as needed  - Assess and instruct to report SOB or any respiratory difficulty  - Respiratory Therapy support as indicated  Outcome: Progressing     Problem: GASTROINTESTINAL - ADULT  Goal: Minimal or absence of nausea and/or vomiting  Description: INTERVENTIONS:  - Administer IV fluids if ordered to ensure adequate hydration  - Maintain NPO status until nausea and vomiting are resolved  - Nasogastric tube if ordered  - Administer ordered antiemetic medications as needed  - Provide nonpharmacologic comfort measures as appropriate  - Advance diet as tolerated, if ordered  - Consider nutrition services referral to assist patient with adequate nutrition and appropriate food choices  Outcome: Progressing  Goal: Maintains or returns to baseline bowel function  Description: INTERVENTIONS:  - Assess bowel function  - Encourage oral fluids to ensure adequate hydration  - Administer IV fluids if ordered to ensure adequate hydration  - Administer ordered medications as needed  - Encourage mobilization and activity  - Consider nutritional services referral to assist patient with adequate nutrition and appropriate food choices  Outcome: Progressing  Goal: Maintains adequate nutritional intake  Description: INTERVENTIONS:  - Monitor percentage of each meal consumed  - Identify factors contributing to decreased intake, treat as appropriate  - Assist with meals as needed  - Monitor I&O, weight, and lab values if indicated  - Obtain nutrition services referral as needed  Outcome: Progressing     Problem: GENITOURINARY - ADULT  Goal: Maintains or returns to baseline urinary function  Description: INTERVENTIONS:  - Assess urinary function  - Encourage oral fluids to ensure adequate hydration if ordered  - Administer IV fluids as ordered to ensure adequate hydration  - Administer ordered medications as needed  - Offer frequent toileting  - Follow urinary retention protocol if ordered  Outcome: Progressing     Problem: SKIN/TISSUE INTEGRITY - ADULT  Goal: Skin Integrity remains intact(Skin Breakdown Prevention)  Description: Assess:  -Perform Brigido assessment   -Clean and moisturize skin  -Inspect skin when repositioning, toileting, and assisting with ADLS  -Assess under medical devices   -Assess extremities for adequate circulation and sensation     Bed Management:  -Have minimal linens on bed & keep smooth, unwrinkled  -Change linens as needed when moist or perspiring  -Avoid sitting or lying in one position for more than 2 hours while in bed  -Keep HOB at 30degrees     Toileting:  -Offer bedside commode  -Assess for incontinence  -Use incontinent care products after each incontinent episode    Activity:  -Mobilize patient 2 times a day  -Encourage activity and walks on unit  -Encourage or provide ROM exercises   -Turn and reposition patient every 2 Hours  -Use appropriate equipment to lift or move patient in bed  -Instruct/ Assist with weight shifting when out of bed in chair  -Consider limitation of chair time 2 hour intervals    Skin Care:  -Avoid use of baby powder, tape, friction and shearing, hot water or constrictive clothing  -Relieve pressure over bony prominences   -Do not massage red bony areas    Next Steps:  -Teach patient strategies to minimize risks    -Consider consults to  interdisciplinary teams  Outcome: Progressing  Goal: Incision(s), wounds(s) or drain site(s) healing without S/S of infection  Description: INTERVENTIONS  - Assess and document dressing, incision, wound bed, drain sites and surrounding tissue  - Provide patient and family education  - Perform skin care/dressing changes  Outcome: Progressing     Problem: MUSCULOSKELETAL - ADULT  Goal: Maintain or return mobility to safest level of function  Description: INTERVENTIONS:  - Assess patient's ability to carry out ADLs; assess patient's baseline for ADL function and identify physical deficits which impact ability to perform ADLs (bathing, care of mouth/teeth, toileting, grooming, dressing, etc )  - Assess/evaluate cause of self-care deficits   - Assess range of motion  - Assess patient's mobility  - Assess patient's need for assistive devices and provide as appropriate  - Encourage maximum independence but intervene and supervise when necessary  - Involve family in performance of ADLs  - Assess for home care needs following discharge   - Consider OT consult to assist with ADL evaluation and planning for discharge  - Provide patient education as appropriate  Outcome: Progressing

## 2022-05-09 NOTE — ASSESSMENT & PLAN NOTE
Patient presents with acute hip pain abdominal pain abdominal distension  · Endorses history of fatty liver disease, drinks wine occasionally, never been tested for hepatitis  · Found to have significant ascites on exam    Paracentesis negative for SBP  · CT abd/pelvis - new large ascites, cirrhotic liver, hypodense area measuring 1 7 cm at junction of segment 4 and 8 in liver, distended gallbladder, splenomegaly with recanalized umbilical vein  · Underwent paracentesis 4/29 with IR - approximately 1 8 L removed  reported worsening distension  IR consulted for repeat paracentesis   Bonita Hopkins 2L removed  Follow studies  · GI following  · MELD: 30 with 27-32% estimated mortality  · Continue ceftriaxone for SBP prophylaxis  · lactulose   Titrate for 3-4 soft stools daily  · Re-initiated aldactone 5/3   · Continue to trend LFTs and INR daily   · Reviewed with Dr Harpreet Arroyo (hepatologist) will be rounding tomorrow, will await his recommendations  INR, Na are encouraging but bilirubin is rising  May require transfer if bilirubin does not normalize    · Importance of strict alcohol cessation discussion was done   ·

## 2022-05-10 LAB
ALBUMIN SERPL BCP-MCNC: 2 G/DL (ref 3.5–5)
ALP SERPL-CCNC: 68 U/L (ref 46–116)
ALT SERPL W P-5'-P-CCNC: 61 U/L (ref 12–78)
ANION GAP SERPL CALCULATED.3IONS-SCNC: 11 MMOL/L (ref 4–13)
AST SERPL W P-5'-P-CCNC: 123 U/L (ref 5–45)
BASOPHILS # BLD AUTO: 0.03 THOUSANDS/ΜL (ref 0–0.1)
BASOPHILS NFR BLD AUTO: 0 % (ref 0–1)
BILIRUB SERPL-MCNC: 29.8 MG/DL (ref 0.2–1)
BUN SERPL-MCNC: 37 MG/DL (ref 5–25)
CALCIUM ALBUM COR SERPL-MCNC: 10.2 MG/DL (ref 8.3–10.1)
CALCIUM SERPL-MCNC: 8.6 MG/DL (ref 8.3–10.1)
CHLORIDE SERPL-SCNC: 98 MMOL/L (ref 100–108)
CO2 SERPL-SCNC: 23 MMOL/L (ref 21–32)
CREAT SERPL-MCNC: 1.61 MG/DL (ref 0.6–1.3)
EOSINOPHIL # BLD AUTO: 0 THOUSAND/ΜL (ref 0–0.61)
EOSINOPHIL NFR BLD AUTO: 0 % (ref 0–6)
FLUAV RNA RESP QL NAA+PROBE: NEGATIVE
FLUBV RNA RESP QL NAA+PROBE: NEGATIVE
GFR SERPL CREATININE-BSD FRML MDRD: 34 ML/MIN/1.73SQ M
GLUCOSE SERPL-MCNC: 139 MG/DL (ref 65–140)
HCT VFR BLD AUTO: 24.2 % (ref 34.8–46.1)
HGB BLD-MCNC: 8.1 G/DL (ref 11.5–15.4)
IMM GRANULOCYTES # BLD AUTO: 0.15 THOUSAND/UL (ref 0–0.2)
IMM GRANULOCYTES NFR BLD AUTO: 1 % (ref 0–2)
INR PPP: 2.3 (ref 0.84–1.19)
LYMPHOCYTES # BLD AUTO: 0.74 THOUSANDS/ΜL (ref 0.6–4.47)
LYMPHOCYTES NFR BLD AUTO: 5 % (ref 14–44)
MCH RBC QN AUTO: 36.5 PG (ref 26.8–34.3)
MCHC RBC AUTO-ENTMCNC: 33.5 G/DL (ref 31.4–37.4)
MCV RBC AUTO: 109 FL (ref 82–98)
MONOCYTES # BLD AUTO: 0.96 THOUSAND/ΜL (ref 0.17–1.22)
MONOCYTES NFR BLD AUTO: 7 % (ref 4–12)
NEUTROPHILS # BLD AUTO: 11.84 THOUSANDS/ΜL (ref 1.85–7.62)
NEUTS SEG NFR BLD AUTO: 87 % (ref 43–75)
NRBC BLD AUTO-RTO: 0 /100 WBCS
PLATELET # BLD AUTO: 95 THOUSANDS/UL (ref 149–390)
PMV BLD AUTO: 11.3 FL (ref 8.9–12.7)
POTASSIUM SERPL-SCNC: 4.4 MMOL/L (ref 3.5–5.3)
PROT SERPL-MCNC: 8.2 G/DL (ref 6.4–8.2)
PROTHROMBIN TIME: 24.7 SECONDS (ref 11.6–14.5)
RBC # BLD AUTO: 2.22 MILLION/UL (ref 3.81–5.12)
RSV RNA RESP QL NAA+PROBE: NEGATIVE
SARS-COV-2 RNA RESP QL NAA+PROBE: NEGATIVE
SODIUM SERPL-SCNC: 132 MMOL/L (ref 136–145)
WBC # BLD AUTO: 13.72 THOUSAND/UL (ref 4.31–10.16)

## 2022-05-10 PROCEDURE — 85025 COMPLETE CBC W/AUTO DIFF WBC: CPT | Performed by: HOSPITALIST

## 2022-05-10 PROCEDURE — 99232 SBSQ HOSP IP/OBS MODERATE 35: CPT | Performed by: INTERNAL MEDICINE

## 2022-05-10 PROCEDURE — 0241U HB NFCT DS VIR RESP RNA 4 TRGT: CPT | Performed by: HOSPITALIST

## 2022-05-10 PROCEDURE — 85610 PROTHROMBIN TIME: CPT | Performed by: HOSPITALIST

## 2022-05-10 PROCEDURE — C9113 INJ PANTOPRAZOLE SODIUM, VIA: HCPCS | Performed by: NURSE PRACTITIONER

## 2022-05-10 PROCEDURE — 99239 HOSP IP/OBS DSCHRG MGMT >30: CPT | Performed by: HOSPITALIST

## 2022-05-10 PROCEDURE — 80053 COMPREHEN METABOLIC PANEL: CPT | Performed by: HOSPITALIST

## 2022-05-10 RX ORDER — ONDANSETRON 2 MG/ML
4 INJECTION INTRAMUSCULAR; INTRAVENOUS EVERY 4 HOURS PRN
Status: CANCELLED | OUTPATIENT
Start: 2022-05-10

## 2022-05-10 RX ORDER — LACTULOSE 20 G/30ML
20 SOLUTION ORAL 2 TIMES DAILY
Status: CANCELLED | OUTPATIENT
Start: 2022-05-10

## 2022-05-10 RX ORDER — GABAPENTIN 100 MG/1
100 CAPSULE ORAL 3 TIMES DAILY
Status: CANCELLED | OUTPATIENT
Start: 2022-05-10

## 2022-05-10 RX ORDER — OXYCODONE HYDROCHLORIDE 5 MG/1
5 TABLET ORAL EVERY 4 HOURS PRN
Status: CANCELLED | OUTPATIENT
Start: 2022-05-10

## 2022-05-10 RX ORDER — ALBUMIN (HUMAN) 12.5 G/50ML
25 SOLUTION INTRAVENOUS EVERY 8 HOURS
Status: COMPLETED | OUTPATIENT
Start: 2022-05-10 | End: 2022-05-12

## 2022-05-10 RX ORDER — LANOLIN ALCOHOL/MO/W.PET/CERES
3 CREAM (GRAM) TOPICAL
Status: CANCELLED | OUTPATIENT
Start: 2022-05-10

## 2022-05-10 RX ORDER — PREDNISOLONE SODIUM PHOSPHATE 15 MG/5ML
40 SOLUTION ORAL DAILY
Status: CANCELLED | OUTPATIENT
Start: 2022-05-10

## 2022-05-10 RX ORDER — ECHINACEA PURPUREA EXTRACT 125 MG
1 TABLET ORAL
Status: CANCELLED | OUTPATIENT
Start: 2022-05-10

## 2022-05-10 RX ORDER — MAGNESIUM HYDROXIDE/ALUMINUM HYDROXICE/SIMETHICONE 120; 1200; 1200 MG/30ML; MG/30ML; MG/30ML
30 SUSPENSION ORAL EVERY 6 HOURS PRN
Status: CANCELLED | OUTPATIENT
Start: 2022-05-10

## 2022-05-10 RX ORDER — HYDROMORPHONE HCL/PF 1 MG/ML
1 SYRINGE (ML) INJECTION ONCE AS NEEDED
Status: CANCELLED | OUTPATIENT
Start: 2022-05-10

## 2022-05-10 RX ORDER — PANTOPRAZOLE SODIUM 40 MG/1
40 INJECTION, POWDER, FOR SOLUTION INTRAVENOUS EVERY 12 HOURS
Status: CANCELLED | OUTPATIENT
Start: 2022-05-10

## 2022-05-10 RX ORDER — ACETAMINOPHEN 325 MG/1
650 TABLET ORAL EVERY 4 HOURS PRN
Status: CANCELLED | OUTPATIENT
Start: 2022-05-10

## 2022-05-10 RX ORDER — HYDROMORPHONE HCL/PF 1 MG/ML
0.5 SYRINGE (ML) INJECTION
Status: CANCELLED | OUTPATIENT
Start: 2022-05-10

## 2022-05-10 RX ORDER — OXYCODONE HYDROCHLORIDE 5 MG/1
10 TABLET ORAL EVERY 4 HOURS PRN
Status: CANCELLED | OUTPATIENT
Start: 2022-05-10

## 2022-05-10 RX ORDER — AMOXICILLIN 250 MG
2 CAPSULE ORAL 2 TIMES DAILY PRN
Status: CANCELLED | OUTPATIENT
Start: 2022-05-10

## 2022-05-10 RX ADMIN — LACTULOSE 20 G: 20 SOLUTION ORAL at 17:28

## 2022-05-10 RX ADMIN — ALBUMIN (HUMAN) 25 G: 0.25 INJECTION, SOLUTION INTRAVENOUS at 20:55

## 2022-05-10 RX ADMIN — LACTULOSE 20 G: 20 SOLUTION ORAL at 08:24

## 2022-05-10 RX ADMIN — PANTOPRAZOLE SODIUM 40 MG: 40 INJECTION, POWDER, FOR SOLUTION INTRAVENOUS at 04:15

## 2022-05-10 RX ADMIN — PREDNISOLONE SODIUM PHOSPHATE 40 MG: 15 SOLUTION ORAL at 08:25

## 2022-05-10 RX ADMIN — GABAPENTIN 100 MG: 100 CAPSULE ORAL at 17:29

## 2022-05-10 RX ADMIN — GABAPENTIN 100 MG: 100 CAPSULE ORAL at 20:54

## 2022-05-10 RX ADMIN — OXYCODONE HYDROCHLORIDE 5 MG: 5 TABLET ORAL at 14:33

## 2022-05-10 RX ADMIN — ALBUMIN (HUMAN) 25 G: 0.25 INJECTION, SOLUTION INTRAVENOUS at 14:19

## 2022-05-10 RX ADMIN — Medication 3 MG: at 20:55

## 2022-05-10 RX ADMIN — GABAPENTIN 100 MG: 100 CAPSULE ORAL at 08:24

## 2022-05-10 RX ADMIN — PANTOPRAZOLE SODIUM 40 MG: 40 INJECTION, POWDER, FOR SOLUTION INTRAVENOUS at 17:29

## 2022-05-10 NOTE — PHYSICAL THERAPY NOTE
PT dc     05/10/22 0956   PT Last Visit   PT Visit Date 05/10/22   Note Type   Note Type Cancelled Session   Cancel Reasons Medical status  (pt for transfer to transplant center   d/c PT)   Shantell Cueto, PT

## 2022-05-10 NOTE — ASSESSMENT & PLAN NOTE
Multifactorial, related to decompensated cirrhosis, contrast exposure, low blood pressures  Cr rising again  Hold spirionlactone  nephro re-evaluation, reviewing with them albumin    Nephrology following  Dc'd Midodrine  Octreotide gtt dc'd  Continue to hold ARB

## 2022-05-10 NOTE — ASSESSMENT & PLAN NOTE
· Sodium on admission 131  · Likely in setting of cirrhosis  · stable   Free water restriction (1-1 5L)    spironolactone re-initiated   improving   Monitor BMP     Lab Results   Component Value Date    SODIUM 132 (L) 05/10/2022    SODIUM 129 (L) 05/09/2022    SODIUM 133 (L) 05/09/2022

## 2022-05-10 NOTE — ASSESSMENT & PLAN NOTE
· Bilirubin elevated to 5 69 ->9 70->17 2->20->25-->28-->29 5  · D Bili 5-> 11->15-->14 4-->15 7  · CT abdomen pelvis showing - distended gallbladder, large new ascites, hepatomegaly   · Gastroenterology following  · Hyperbilirubinemia multifactorial in the setting of resorption of thigh/ retroperitoneal hematoma and alcoholic hepatitis  · In view of Concern for alcoholic hepatitis  patient was started on oral prednisolone by gastroenterology 5/5  · Continue to follow LFTs and INR closely  · Hold further steroids per discussion with hepatology

## 2022-05-10 NOTE — OCCUPATIONAL THERAPY NOTE
Occupational Therapy Cx Note     Patient Name: Renetta Villarreal  ABDNC'H Date: 5/10/2022  Problem List  Principal Problem:    Decompensated cirrhosis  Active Problems:    Weakness of left lower extremity    Acute blood loss anemia    Thrombocytopenia (HCC)    Hyponatremia    Hyperbilirubinemia    Pleural effusion    Acute kidney injury ([facility] [address] )            05/10/22 1338   OT Last Visit   OT Visit Date 05/10/22   Note Type   Note type Cancelled Session   Additional Comments Pt awaiting transfer to another facility for evaluation of liver transplant   Will D/C from OT program          Osiel Mcnamara OTR/MEGHAN

## 2022-05-10 NOTE — PROGRESS NOTES
Progress note - [facility]   Red Ruiz 63 y o  female MRN: [id number]  Unit/Bed#: -01 Encounter: [id number]    ASSESSMENT and PLAN    1  Cirrhosis decompensated with ascites, pleural effusion, SBE, thrombocytopenia, hypoalbuminemia, hypoprothrombinemia, sarcopenia  Decompensated over the past 6 weeks with encephalopathy, thrombocytopenia, coagulopathy, ascites, ALEJANDRO  1800 cc paracentesis (4/28) negative for SBP   Repeat (5/9) 1400 cc, also no SBP  Diuretics on hold due to ALEJANDRO which has improved, appreciate renal input     Increasing bilirubin likely multifactorial from hematoma resorption and possible alcoholic hepatitis   Prednisolone started 05/04/2022  Lille Model Score will indicate steroid non-response given rise of TBili since day of initiation  Indirect bili possibly increased by resorption of retroperitoneal hematoma  Await bili fractionation from today  Will likely recommend discontinuation of steroids  High expected mortality  Recommend transfer to transplant center  [facility]      Coagulopathy:  Did received Vit K earlier on in admission  INR 2 3 today  2  Hepatic encephalopathy  Improved with lactulose t i d , had excessive stools 05/04/2022, will reduce to twice daily     3 Retroperitoneal hemorrhage    4  Anemia  Significant anemia in setting of coagulopathy and thrombocytopenia  8cm left retroperitoneal hematoma centered in ileus psoas muscle   Appreciate surgical input  Possible BRBPR overnight  Hemodynamically stable  Hgb stable  Has known history of hemorrhoids  No brisk overt GI bleeding, but will need EGD to assess for varices after she recovers from this hospitalization  Katerinaata Pay on colonoscopy April 2019, 5 year recall     5  ETOH use  Patient admits to drinking several glasses of wine daily in remote past (10+ years ago)  Cut back to 1-2 glasses a wine every evening few years ago    Cut back further due to neurological symptoms persistent after fall and diagnosis of CSF leak  Extensive records available through [facility] and [address]           Chief Complaint   Patient presents with    Leg Pain     Pt reports left leg pain since sunday 4/24/22 when her dog jumped on her  Took tylenol #3 prior to arrival today           SUBJECTIVE/HPI   Feels better after paracentesis did have a lot of diarrhea yesterday tolerating p o     /64   Pulse 79   Temp (!) 97 4 °F (36 3 °C)   Resp 18   Ht 5' 6" (1 676 m)   Wt 73 5 kg (162 lb 0 6 oz)   SpO2 92%   BMI 26 15 kg/m²     PHYSICALEXAM  General appearance: alert, appears stated age and cooperative  Eyes: PERLLA, EOMI, quite icteric   Head: Normocephalic, without obvious abnormality, atraumatic  Lungs: clear to auscultation bilaterally  Abdomen: soft, non-tender; bowel sounds normal; somewhat tympanic to percussion, no masses,  no organomegaly  Extremities: extremities normal, atraumatic, no cyanosis or edema  Neurologic: Grossly normal, possible left facial asymetry, mild asterixis    Lab Results   Component Value Date    GLUCOSE 94 05/08/2015    CALCIUM 8 6 05/10/2022     05/08/2015    K 4 4 05/10/2022    CO2 23 05/10/2022    CL 98 (L) 05/10/2022    BUN 37 (H) 05/10/2022    CREATININE 1 61 (H) 05/10/2022     Lab Results   Component Value Date    WBC 13 72 (H) 05/10/2022    HGB 8 1 (L) 05/10/2022    HCT 24 2 (L) 05/10/2022     (H) 05/10/2022    PLT 95 (L) 05/10/2022     Lab Results   Component Value Date    ALT 61 05/10/2022     (H) 05/10/2022    GGT 81 (H) 04/30/2022    ALKPHOS 68 05/10/2022    BILITOT 0 2 05/08/2015     No results found for: AMYLASE  Lab Results   Component Value Date    LIPASE 76 04/28/2022     Lab Results   Component Value Date    IRON 97 04/28/2022    TIBC 146 (L) 04/28/2022    FERRITIN 1,238 (H) 04/28/2022     Lab Results   Component Value Date    INR 2 30 (H) 05/10/2022

## 2022-05-10 NOTE — DISCHARGE SUMMARY
[place]  Discharge- Crow Segura 1960, 63 y o  female MRN: [id number]  Unit/Bed#: -01 Encounter: [id number]  Primary Care Provider: Hamlet Perry MD   Date and time admitted to hospital: 4/28/2022 10:36 AM    Acute kidney injury [facility])  Assessment & Plan  Multifactorial, related to decompensated cirrhosis, contrast exposure, low blood pressures  Cr rising again  Hold spirionlactone  nephro re-evaluation, reviewing with them albumin    Nephrology following  Dc'd Midodrine  Octreotide gtt dc'd  Continue to hold ARB     Pleural effusion  Assessment & Plan  Moderate left effusion noted on CT  Currently on 2 L oxygen  Respiratory protocol  Incentive spirometer  S/p diuresis due to ascites and pleural effusion    Hyperbilirubinemia  Assessment & Plan    · Bilirubin elevated to 5 69 ->9 70->17 2->20->25-->28-->29 5  · D Bili 5-> 11->15-->14 4-->15 7  · CT abdomen pelvis showing - distended gallbladder, large new ascites, hepatomegaly   · Gastroenterology following  · Hyperbilirubinemia multifactorial in the setting of resorption of thigh/ retroperitoneal hematoma and alcoholic hepatitis  · In view of Concern for alcoholic hepatitis  patient was started on oral prednisolone by gastroenterology 5/5  · Continue to follow LFTs and INR closely  · Hold further steroids per discussion with hepatology         Hyponatremia  Assessment & Plan  · Sodium on admission 131  · Likely in setting of cirrhosis  · stable   Free water restriction (1-1 5L)    spironolactone re-initiated   improving   Monitor BMP     Lab Results   Component Value Date    SODIUM 132 (L) 05/10/2022    SODIUM 129 (L) 05/09/2022    SODIUM 133 (L) 05/09/2022         Thrombocytopenia (HCC)  Assessment & Plan  · Likely in setting of severe liver disease and splenomegaly  · improving  · transfuse <50k in the setting of bleeding or < 20k if no evidence of bleeding      Acute blood loss anemia  Assessment & Plan  Presented with acute hip pain , abdominal pain, abdominal distension   CBC showing: Hgb 5 5/Hct16 3,  - macrocytic anemia    Occult heme + stool   Transfusion S/P 4 units PRBCs  o Transfuse to keep Hgb >7    Hb stablized-> has significant ecchymosis in the left back and left LE   Continue Protonix IV b i d   · Abraham need outpt f/u for screening EGD for esophageal varices    Lab Results   Component Value Date    HGB 8 1 (L) 05/10/2022    HGB 7 9 (L) 05/09/2022         Weakness of left lower extremity  Assessment & Plan  · Reason for presentation - pain started a few days ago after dog jumping on her - difficulty with ambulation and weight on left side  · Patient was found on imaging including CT scan of the abdomen and pelvis, MRI and CT scan of the lumbar spine to have large retroperitoneal and iliopsoas hematoma  · consult placed to surgery recommending if there's evidence of further bleeding IR consult for embolization, however her hb is now stable, continue to monitor  · Neurovascular checks ordered  Pulses remain intact  Pain appears better/mobility improved  · S/p FFP 2 units, 4U PRBCs and vitamin K since admission  · XR left hip/femur unremarkable  · PT/OT consult-recommending short-term rehab  · Continue pain control    * Decompensated cirrhosis  Assessment & Plan  Patient presents with acute hip pain abdominal pain abdominal distension  · Endorses history of fatty liver disease, drinks wine occasionally, never been tested for hepatitis  · Found to have significant ascites on exam    Paracentesis negative for SBP  · CT abd/pelvis - new large ascites, cirrhotic liver, hypodense area measuring 1 7 cm at junction of segment 4 and 8 in liver, distended gallbladder, splenomegaly with recanalized umbilical vein  · Underwent paracentesis 4/29 with IR - approximately 1 8 L removed  reported worsening distension  IR consulted for repeat paracentesis   Teresa Anderson 2L removed   Follow studies  · GI following  · MELD: 34  · Continue ceftriaxone for SBP prophylaxis  · lactulose   Titrate for 3-4 soft stools daily  · Re-initiated aldactone 5/3   · Continue to trend LFTs and INR daily   · Reviewed with Dr Gael Rubio  Transfer to [address] [place] [address]  · Importance of strict alcohol cessation discussion was done   ·        Hospital Course:     Constantin Hernandez is a 63 y o  female patient who originally presented to the hospital on   Admission Orders (From admission, onward)     Ordered        04/28/22 1440  Inpatient Admission  Once                     due to  weakness of left lower extremity  She was found to have stigmata cirrhosis on admission and GI services was consulted  It was discovered that her weakness in the left lower extremity was due to a hematoma that was performed likely from coagulopathy, thrombocytopenia in the setting of her cirrhosis  Surgery followed and no acute surgical intervention is suggested  She was given reversal of coagulopathy with vitamin K etc and had improvement of this left leg pain and mobility  Her hemoglobin stabilized with transfusions  She was also noted to have acute kidney injury, followed by Nephrology she completed octreotide and midodrine as well as albumin infusions  Likely her renal failure was multifactorial from hypotension and dye exposure  Fortunately creatinine improved  She is however having rising bilirubin, patient was 1st trialed on steroids for a few days  However given lack of improvement of the bilirubin despite their steroid administration, it is felt patient's needs are better served at an academic center where she can be evaluated by liver Transplant Services  She will be transferred [facility] [place]  Please see above list of diagnoses and related plan for additional information  Physical Exam:    GEN: No acute distress, comfortable, chronically ill appearing, severe jaundice    HEEENT: No JVD, PERMELVIN, no scleral icterus  On o2  RESP: Lungs clear to auscultation bilaterally  CV: RRR, +s1/s2   ABD: SOFT NON TENDER, POSITIVE BOWEL SOUNDS, NO DISTENTION  PSYCH: CALM  NEURO: A X O X 3, NO FOCAL DEFICITS  No asterixis  SKIN: NO RASH  EXTREM: NO EDEMA      Condition at Discharge:  serious      Discharge instructions/Information to patient and family:   See after visit summary for information provided to patient and family  Provisions for Follow-Up Care:  See after visit summary for information related to follow-up care and any pertinent home health orders  Disposition:     [address] Transfer to [facility]       Discharge Statement:  I spent 41 minutes discharging the patient  This time was spent on the day of discharge  I had direct contact with the patient on the day of discharge  Greater than 50% of the total time was spent examining patient, answering all patient questions, arranging and discussing plan of care with patient as well as directly providing post-discharge instructions  Additional time then spent on discharge activities  Discharge Medications:  See after visit summary for reconciled discharge medications provided to patient and family        ** Please Note: This note has been constructed using a voice recognition system **

## 2022-05-10 NOTE — PLAN OF CARE
Problem: Potential for Falls  Goal: Patient will remain free of falls  Description: INTERVENTIONS:  - Educate patient/family on patient safety including physical limitations  - Instruct patient to call for assistance with activity   - Consult OT/PT to assist with strengthening/mobility   - Keep Call bell within reach  - Keep bed low and locked with side rails adjusted as appropriate  - Keep care items and personal belongings within reach  - Initiate and maintain comfort rounds  - Make Fall Risk Sign visible to staff  - Offer Toileting every 2 Hours, in advance of need  - Initiate/Maintain bed alarm  - Obtain necessary fall risk management equipment: non slip socks  - Apply yellow socks and bracelet for high fall risk patients  - Consider moving patient to room near nurses station  Outcome: Progressing     Problem: HEMATOLOGIC - ADULT  Goal: Maintains hematologic stability  Description: INTERVENTIONS  - Assess for signs and symptoms of bleeding or hemorrhage  - Monitor labs  - Administer supportive blood products/factors as ordered and appropriate  Outcome: Progressing     Problem: Prexisting or High Potential for Compromised Skin Integrity  Goal: Skin integrity is maintained or improved  Description: INTERVENTIONS:  - Identify patients at risk for skin breakdown  - Assess and monitor skin integrity  - Assess and monitor nutrition and hydration status  - Monitor labs   - Assess for incontinence   - Turn and reposition patient  - Assist with mobility/ambulation  - Relieve pressure over bony prominences  - Avoid friction and shearing  - Provide appropriate hygiene as needed including keeping skin clean and dry  - Evaluate need for skin moisturizer/barrier cream  - Collaborate with interdisciplinary team   - Patient/family teaching  - Consider wound care consult   Outcome: Progressing     Problem: MOBILITY - ADULT  Goal: Maintain or return to baseline ADL function  Description: INTERVENTIONS:  -  Assess patient's ability to carry out ADLs; assess patient's baseline for ADL function and identify physical deficits which impact ability to perform ADLs (bathing, care of mouth/teeth, toileting, grooming, dressing, etc )  - Assess/evaluate cause of self-care deficits   - Assess range of motion  - Assess patient's mobility; develop plan if impaired  - Assess patient's need for assistive devices and provide as appropriate  - Encourage maximum independence but intervene and supervise when necessary  - Involve family in performance of ADLs  - Assess for home care needs following discharge   - Consider OT consult to assist with ADL evaluation and planning for discharge  - Provide patient education as appropriate  Outcome: Progressing  Goal: Maintains/Returns to pre admission functional level  Description: INTERVENTIONS:  - Perform BMAT or MOVE assessment daily    - Set and communicate daily mobility goal to care team and patient/family/caregiver  - Collaborate with rehabilitation services on mobility goals if consulted  - Perform Range of Motion 5 times a day  - Reposition patient every 2 hours  - Dangle patient 3 times a day  - Stand patient 3 times a day  - Ambulate patient 3 times a day  - Out of bed to chair 3 times a day   - Out of bed for meals 3 times a day  - Out of bed for toileting  - Record patient progress and toleration of activity level   Outcome: Progressing     Problem: Neurological Deficit  Goal: Neurological status is stable or improving  Description: Interventions:  - Monitor and assess patient's level of consciousness, motor function, sensory function, and level of assistance needed for ADLs  - Monitor and report changes from baseline  Collaborate with interdisciplinary team to initiate plan and implement interventions as ordered  - Provide and maintain a safe environment  - Consider seizure precautions  - Consider fall precautions  - Consider aspiration precautions    - Consider bleeding precautions  Outcome: Progressing     Problem: Activity Intolerance/Impaired Mobility  Goal: Mobility/activity is maintained at optimum level for patient  Description: Interventions:  - Assess and monitor patient  barriers to mobility and need for assistive/adaptive devices  - Assess patient's emotional response to limitations  - Collaborate with interdisciplinary team and initiate plans and interventions as ordered  - Encourage independent activity per ability   - Maintain proper body alignment  - Perform active/passive rom as tolerated/ordered  - Plan activities to conserve energy   - Turn patient as appropriate  Outcome: Progressing     Problem: Communication Impairment  Goal: Ability to express needs and understand communication  Description: Assess patient's communication skills and ability to understand information  Patient will demonstrate use of effective communication techniques, alternative methods of communication and understanding even if not able to speak  - Encourage communication and provide alternate methods of communication as needed  - Collaborate with case management/ for discharge needs  - Include patient/family/caregiver in decisions related to communication  Outcome: Progressing     Problem: Potential for Aspiration  Goal: Non-ventilated patient's risk of aspiration is minimized  Description: Assess and monitor vital signs, respiratory status, and labs (WBC)  Monitor for signs of aspiration (tachypnea, cough, rales, wheezing, cyanosis, fever)  - Assess and monitor patient's ability to swallow  - Place patient up in chair to eat if possible  - HOB up at 90 degrees to eat if unable to get patient up into chair   - Supervise patient during oral intake  - Instruct patient/ family to take small bites  - Instruct patient/ family to take small single sips when taking liquids    - Follow patient-specific strategies generated by speech pathologist   Outcome: Progressing  Goal: Ventilated patient's risk of aspiration is minimized  Description: Assess and monitor vital signs, respiratory status, airway cuff pressure, and labs (WBC)  Monitor for signs of aspiration (tachypnea, cough, rales, wheezing, cyanosis, fever)  - Elevate head of bed 30 degrees if patient has tube feeding   - Monitor tube feeding  Outcome: Progressing     Problem: Nutrition  Goal: Nutrition/Hydration status is improving  Description: Monitor and assess patient's nutrition/hydration status for malnutrition (ex- brittle hair, bruises, dry skin, pale skin and conjunctiva, muscle wasting, smooth red tongue, and disorientation)  Collaborate with interdisciplinary team and initiate plan and interventions as ordered  Monitor patient's weight and dietary intake as ordered or per policy  Utilize nutrition screening tool and intervene per policy  Determine patient's food preferences and provide high-protein, high-caloric foods as appropriate  - Assist patient with eating   - Allow adequate time for meals   - Encourage patient to take dietary supplement as ordered  - Collaborate with clinical nutritionist   - Include patient/family/caregiver in decisions related to nutrition    Outcome: Progressing     Problem: PAIN - ADULT  Goal: Verbalizes/displays adequate comfort level or baseline comfort level  Description: Interventions:  - Encourage patient to monitor pain and request assistance  - Assess pain using appropriate pain scale  - Administer analgesics based on type and severity of pain and evaluate response  - Implement non-pharmacological measures as appropriate and evaluate response  - Consider cultural and social influences on pain and pain management  - Notify physician/advanced practitioner if interventions unsuccessful or patient reports new pain  Outcome: Progressing     Problem: INFECTION - ADULT  Goal: Absence or prevention of progression during hospitalization  Description: INTERVENTIONS:  - Assess and monitor for signs and symptoms of infection  - Monitor lab/diagnostic results  - Monitor all insertion sites, i e  indwelling lines, tubes, and drains  - Monitor endotracheal if appropriate and nasal secretions for changes in amount and color  - [place] appropriate cooling/warming therapies per order  - Administer medications as ordered  - Instruct and encourage patient and family to use good hand hygiene technique  - Identify and instruct in appropriate isolation precautions for identified infection/condition  Outcome: Progressing     Problem: SAFETY ADULT  Goal: Patient will remain free of falls  Description: INTERVENTIONS:  - Educate patient/family on patient safety including physical limitations  - Instruct patient to call for assistance with activity   - Consult OT/PT to assist with strengthening/mobility   - Keep Call bell within reach  - Keep bed low and locked with side rails adjusted as appropriate  - Keep care items and personal belongings within reach  - Initiate and maintain comfort rounds  - Make Fall Risk Sign visible to staff  - Offer Toileting every 2 Hours, in advance of need  - Initiate/Maintain bed alarm  - Obtain necessary fall risk management equipment: non slip socks  - Apply yellow socks and bracelet for high fall risk patients  - Consider moving patient to room near nurses station  Outcome: Progressing     Problem: DISCHARGE PLANNING  Goal: Discharge to home or other facility with appropriate resources  Description: INTERVENTIONS:  - Identify barriers to discharge w/patient and caregiver  - Arrange for needed discharge resources and transportation as appropriate  - Identify discharge learning needs (meds, wound care, etc )  - Arrange for interpretive services to assist at discharge as needed  - Refer to Case Management Department for coordinating discharge planning if the patient needs post-hospital services based on physician/advanced practitioner order or complex needs related to functional status, cognitive ability, or social support system  Outcome: Progressing     Problem: Knowledge Deficit  Goal: Patient/family/caregiver demonstrates understanding of disease process, treatment plan, medications, and discharge instructions  Description: Complete learning assessment and assess knowledge base  Interventions:  - Provide teaching at level of understanding  - Provide teaching via preferred learning methods  Outcome: Progressing     Problem: Nutrition/Hydration-ADULT  Goal: Nutrient/Hydration intake appropriate for improving, restoring or maintaining nutritional needs  Description: Monitor and assess patient's nutrition/hydration status for malnutrition  Collaborate with interdisciplinary team and initiate plan and interventions as ordered  Monitor patient's weight and dietary intake as ordered or per policy  Utilize nutrition screening tool and intervene as necessary  Determine patient's food preferences and provide high-protein, high-caloric foods as appropriate       INTERVENTIONS:  - Monitor oral intake, urinary output, labs, and treatment plans  - Assess nutrition and hydration status and recommend course of action  - Evaluate amount of meals eaten  - Assist patient with eating if necessary   - Allow adequate time for meals  - Recommend/ encourage appropriate diets, oral nutritional supplements, and vitamin/mineral supplements  - Order, calculate, and assess calorie counts as needed  - Recommend, monitor, and adjust tube feedings and TPN/PPN based on assessed needs  - Assess need for intravenous fluids  - Provide specific nutrition/hydration education as appropriate  - Include patient/family/caregiver in decisions related to nutrition  Outcome: Progressing     Problem: NEUROSENSORY - ADULT  Goal: Achieves stable or improved neurological status  Description: INTERVENTIONS  - Monitor and report changes in neurological status  - Monitor vital signs such as temperature, blood pressure, glucose, and any other labs ordered   - Initiate measures to prevent increased intracranial pressure  - Monitor for seizure activity and implement precautions if appropriate      Outcome: Progressing     Problem: RESPIRATORY - ADULT  Goal: Achieves optimal ventilation and oxygenation  Description: INTERVENTIONS:  - Assess for changes in respiratory status  - Assess for changes in mentation and behavior  - Position to facilitate oxygenation and minimize respiratory effort  - Oxygen administered by appropriate delivery if ordered  - Initiate smoking cessation education as indicated  - Encourage broncho-pulmonary hygiene including cough, deep breathe, Incentive Spirometry  - Assess the need for suctioning and aspirate as needed  - Assess and instruct to report SOB or any respiratory difficulty  - Respiratory Therapy support as indicated  Outcome: Progressing     Problem: GASTROINTESTINAL - ADULT  Goal: Minimal or absence of nausea and/or vomiting  Description: INTERVENTIONS:  - Administer IV fluids if ordered to ensure adequate hydration  - Maintain NPO status until nausea and vomiting are resolved  - Nasogastric tube if ordered  - Administer ordered antiemetic medications as needed  - Provide nonpharmacologic comfort measures as appropriate  - Advance diet as tolerated, if ordered  - Consider nutrition services referral to assist patient with adequate nutrition and appropriate food choices  Outcome: Progressing  Goal: Maintains or returns to baseline bowel function  Description: INTERVENTIONS:  - Assess bowel function  - Encourage oral fluids to ensure adequate hydration  - Administer IV fluids if ordered to ensure adequate hydration  - Administer ordered medications as needed  - Encourage mobilization and activity  - Consider nutritional services referral to assist patient with adequate nutrition and appropriate food choices  Outcome: Progressing  Goal: Maintains adequate nutritional intake  Description: INTERVENTIONS:  - Monitor percentage of each meal consumed  - Identify factors contributing to decreased intake, treat as appropriate  - Assist with meals as needed  - Monitor I&O, weight, and lab values if indicated  - Obtain nutrition services referral as needed  Outcome: Progressing     Problem: GENITOURINARY - ADULT  Goal: Maintains or returns to baseline urinary function  Description: INTERVENTIONS:  - Assess urinary function  - Encourage oral fluids to ensure adequate hydration if ordered  - Administer IV fluids as ordered to ensure adequate hydration  - Administer ordered medications as needed  - Offer frequent toileting  - Follow urinary retention protocol if ordered  Outcome: Progressing    Goal: Incision(s), wounds(s) or drain site(s) healing without S/S of infection  Description: INTERVENTIONS  - Assess and document dressing, incision, wound bed, drain sites and surrounding tissue  - Provide patient and family education  - Perform skin care/dressing changes every 2 hours   Outcome: Progressing     Problem: MUSCULOSKELETAL - ADULT  Goal: Maintain or return mobility to safest level of function  Description: INTERVENTIONS:  - Assess patient's ability to carry out ADLs; assess patient's baseline for ADL function and identify physical deficits which impact ability to perform ADLs (bathing, care of mouth/teeth, toileting, grooming, dressing, etc )  - Assess/evaluate cause of self-care deficits   - Assess range of motion  - Assess patient's mobility  - Assess patient's need for assistive devices and provide as appropriate  - Encourage maximum independence but intervene and supervise when necessary  - Involve family in performance of ADLs  - Assess for home care needs following discharge   - Consider OT consult to assist with ADL evaluation and planning for discharge  - Provide patient education as appropriate  Outcome: Progressing

## 2022-05-10 NOTE — PROGRESS NOTES
NEPHROLOGY PROGRESS NOTE   Dontrell Israel 63 y o  female MRN: [id number]  Unit/Bed#: -01 Encounter: [id number]  Reason for Consult:  Worsening renal function      SUMMARY:    44-year-old female with history of chronic subdural hematoma, fatty liver disease, temporal arteritis, chronic CSF leak, was initially admitted at [facility] for low hemoglobin of 5 5 with a retroperitoneal bleed and left iliopsoas muscle hematoma along with decompensated cirrhosis  We were initially consult for acute kidney injury which had improved and was subsequently signed off  We were asked to re-evaluate this patient for worsening renal function over the last few days        ASSESSMENT and PLAN:    Acute kidney injury/worsening renal function  --baseline creatinine  Less than 1 mg/dL  --recent acute kidney injury, in the setting of acute anemia, auto regulatory dysfunction, low blood pressure as well as contrast associated nephropathy, which resolved and improved back to baseline  --renal function started to worsen in the last 48 hours  --creatinine now to 1 6 mg/dL  --underwent a paracentesis yesterday was 2 4 L removed yesterday  --blood pressures have been stable with no documented hypotension, no recent contrast studies  --spironolactone has been held after receiving it for the last few days  --suspected etiology includes prerenal azotemia versus hepatorenal syndrome  --continue to hold spironolactone  --colloid expansion with IV albumin 25% 25 g every 8 hours times 48 hours  --awaiting transfer to [facility] to be evaluated by the liver transplant team    Hyponatremia  --chronic  --the setting of poor effective circulating volume  --fluid restriction  --colloid expansion  --sodium level improved to 132 today  --potassium and serum chloride level are normal    Anemia  --acute on chronic  --admission hemoglobin 5 5  --status post 4 units blood transfusion  --was evaluated by Gastroenterology  --hemoglobin low but stable  --concurrent thrombocytopenia in the setting of liver disease and splenomegaly    Decompensated cirrhosis  --tentatively plan for transfer to [facility] for transplant evaluation      SUBJECTIVE / INTERVAL HISTORY:    Having loose stools related to lactulose  No nausea no vomiting  Eating as best she can  No documented hypotension  Family at bedside  Some abdominal discomfort even post paracentesis    OBJECTIVE:  Current Weight: Weight - Scale: 73 5 kg (162 lb 0 6 oz)  Vitals:    05/09/22 2154 05/10/22 0204 05/10/22 0715 05/10/22 0900   BP: 128/66 127/66 123/64    Pulse: 75 78 79    Resp:       Temp: (!) 97 3 °F (36 3 °C) (!) 97 4 °F (36 3 °C) (!) 97 4 °F (36 3 °C)    TempSrc:       SpO2: 92% 92% 92% 93%   Weight:       Height:           Intake/Output Summary (Last 24 hours) at 5/10/2022 1340  Last data filed at 5/9/2022 1401  Gross per 24 hour   Intake --   Output 2400 ml   Net -2400 ml       Review of Systems:    12 point ROS has been reviewed  Physical Exam  Vitals and nursing note reviewed  Exam conducted with a chaperone present  Constitutional:       General: She is not in acute distress  Appearance: She is well-developed  HENT:      Head: Normocephalic and atraumatic  Eyes:      General: Scleral icterus present  Conjunctiva/sclera: Conjunctivae normal       Pupils: Pupils are equal, round, and reactive to light  Cardiovascular:      Rate and Rhythm: Normal rate and regular rhythm  Heart sounds: S1 normal and S2 normal  No murmur heard  No friction rub  No gallop  Pulmonary:      Effort: Pulmonary effort is normal  No respiratory distress  Breath sounds: Normal breath sounds  No wheezing or rales  Abdominal:      General: Bowel sounds are normal  There is distension  Palpations: Abdomen is soft  Tenderness: There is no abdominal tenderness  There is no rebound     Musculoskeletal:         General: Normal range of motion  Cervical back: Normal range of motion and neck supple  Right lower leg: Edema present  Left lower leg: Edema present  Skin:     Coloration: Skin is jaundiced  Findings: No rash  Neurological:      Mental Status: She is alert and oriented to person, place, and time     Psychiatric:         Behavior: Behavior normal          Medications:    Current Facility-Administered Medications:     acetaminophen (TYLENOL) tablet 650 mg, 650 mg, Oral, Q4H PRN, TRAV Etienne, 650 mg at 05/06/22 1757    albumin human (FLEXBUMIN) 25 % injection 25 g, 25 g, Intravenous, Q8H, Spencer James MD    aluminum-magnesium hydroxide-simethicone (MYLANTA) oral suspension 30 mL, 30 mL, Oral, Q6H PRN, TRAV Etienne    gabapentin (NEURONTIN) capsule 100 mg, 100 mg, Oral, TID, TRAV Etienne, 100 mg at 05/10/22 6271    HYDROmorphone (DILAUDID) injection 0 5 mg, 0 5 mg, Intravenous, Q1H PRN, TRAV Etienne, 0 5 mg at 05/08/22 1601    HYDROmorphone (DILAUDID) injection 1 mg, 1 mg, Intravenous, Once PRN, TRAV Shell    influenza vaccine, recombinant, quadrivalent (FLUBLOK) IM injection 0 5 mL, 0 5 mL, Intramuscular, Prior to discharge, TRAV Shell    lactulose oral solution 20 g, 20 g, Oral, BID, Tee Posadas MD, 20 g at 05/10/22 0824    LORazepam (ATIVAN) injection 1 mg, 1 mg, Intravenous, Once, TRAV Shell    melatonin tablet 3 mg, 3 mg, Oral, HS, Tee Posadas MD, 3 mg at 05/09/22 2203    ondansetron (ZOFRAN) injection 4 mg, 4 mg, Intravenous, Q4H PRN, TRAV Etienne, 4 mg at 05/09/22 1732    oxyCODONE (ROXICODONE) IR tablet 10 mg, 10 mg, Oral, Q4H PRN, TRAV Etienne, 10 mg at 05/09/22 1640    oxyCODONE (ROXICODONE) IR tablet 5 mg, 5 mg, Oral, Q4H PRN, TRAV Etienne, 5 mg at 05/09/22 2203    pantoprazole (PROTONIX) injection 40 mg, 40 mg, Intravenous, Q12H, Scot Officer, TRAV, 40 mg at 05/10/22 0415    prednisoLONE (ORAPRED) oral solution 40 mg, 40 mg, Oral, Daily, [place], DO, 40 mg at 05/10/22 0825    senna-docusate sodium (SENOKOT S) 8 6-50 mg per tablet 2 tablet, 2 tablet, Oral, BID PRN, TRAV Loera    sodium chloride (OCEAN) 0 65 % nasal spray 1 spray, 1 spray, Each Nare, Q1H PRN, Joseph Espinal MD    Laboratory Results:  Results from last 7 days   Lab Units 05/10/22  0236 05/09/22  1537 05/09/22  0525 05/08/22  9525 05/07/22  6522 05/06/22  0525 05/05/22  0517 05/04/22  0536 05/04/22  0536   WBC Thousand/uL 13 72*  --  12 28* 9 71 10 42* 7 90 7 50  --  6 96   HEMOGLOBIN g/dL 8 1*  --  7 9* 8 1* 7 8* 8 1* 7 6*  --  8 4*   HEMATOCRIT % 24 2*  --  23 7* 25 0* 23 4* 23 9* 23 5*  --  25 5*   PLATELETS Thousands/uL 95*  --  91* 86* 80* 62* 58*  --  54*   POTASSIUM mmol/L 4 4 4 6 4 7 3 9 4 1 4 2 5 1   < > 4 2   CHLORIDE mmol/L 98* 97* 99* 100 99* 102 101   < > 101   CO2 mmol/L 23 23 25 26 25 25 27   < > 28   BUN mg/dL 37* 34* 31* 24 23 20 16   < > 12   CREATININE mg/dL 1 61* 1 52* 1 20 1 10 1 02 1 02 0 89   < > 0 86   CALCIUM mg/dL 8 6 8 5 8 9 8 5 8 5 8 5 8 5   < > 8 4   MAGNESIUM mg/dL  --   --   --   --   --   --  2 1  --  2 1    < > = values in this interval not displayed  PLEASE NOTE:  This encounter was completed utilizing the [employer] Direct Speech Voice Recognition Software  Grammatical errors, random word insertions, pronoun errors and incomplete sentences are occasional consequences of the system due to software limitations, ambient noise and hardware issues  These may be missed by proof reading prior to affixing electronic signature  Any questions or concerns about the content, text or information contained within the body of this dictation should be directly addressed to the physician for clarification  Please do not hesitate to call me directly if you have any any questions or concerns

## 2022-05-10 NOTE — ASSESSMENT & PLAN NOTE
Presented with acute hip pain , abdominal pain, abdominal distension   CBC showing: Hgb 5 5/Hct16 3,  - macrocytic anemia    Occult heme + stool   Transfusion S/P 4 units PRBCs  o Transfuse to keep Hgb >7    Hb stablized-> has significant ecchymosis in the left back and left LE   Continue Protonix IV b i d   · Abraham need outpt f/u for screening EGD for esophageal varices    Lab Results   Component Value Date    HGB 8 1 (L) 05/10/2022    HGB 7 9 (L) 05/09/2022

## 2022-05-10 NOTE — PLAN OF CARE
Problem: Potential for Falls  Goal: Patient will remain free of falls  Description: INTERVENTIONS:  - Educate patient/family on patient safety including physical limitations  - Instruct patient to call for assistance with activity   - Consult OT/PT to assist with strengthening/mobility   - Keep Call bell within reach  - Keep bed low and locked with side rails adjusted as appropriate  - Keep care items and personal belongings within reach  - Initiate and maintain comfort rounds  - Make Fall Risk Sign visible to staff  - Offer Toileting every 2 Hours, in advance of need  - Initiate/Maintain bed alarm  - Obtain necessary fall risk management equipment: non slip socks  - Apply yellow socks and bracelet for high fall risk patients  - Consider moving patient to room near nurses station  Outcome: Progressing     Problem: HEMATOLOGIC - ADULT  Goal: Maintains hematologic stability  Description: INTERVENTIONS  - Assess for signs and symptoms of bleeding or hemorrhage  - Monitor labs  - Administer supportive blood products/factors as ordered and appropriate  Outcome: Progressing     Problem: Prexisting or High Potential for Compromised Skin Integrity  Goal: Skin integrity is maintained or improved  Description: INTERVENTIONS:  - Identify patients at risk for skin breakdown  - Assess and monitor skin integrity  - Assess and monitor nutrition and hydration status  - Monitor labs   - Assess for incontinence   - Turn and reposition patient  - Assist with mobility/ambulation  - Relieve pressure over bony prominences  - Avoid friction and shearing  - Provide appropriate hygiene as needed including keeping skin clean and dry  - Evaluate need for skin moisturizer/barrier cream  - Collaborate with interdisciplinary team   - Patient/family teaching  - Consider wound care consult   Outcome: Progressing     Problem: MOBILITY - ADULT  Goal: Maintain or return to baseline ADL function  Description: INTERVENTIONS:  -  Assess patient's ability to carry out ADLs; assess patient's baseline for ADL function and identify physical deficits which impact ability to perform ADLs (bathing, care of mouth/teeth, toileting, grooming, dressing, etc )  - Assess/evaluate cause of self-care deficits   - Assess range of motion  - Assess patient's mobility; develop plan if impaired  - Assess patient's need for assistive devices and provide as appropriate  - Encourage maximum independence but intervene and supervise when necessary  - Involve family in performance of ADLs  - Assess for home care needs following discharge   - Consider OT consult to assist with ADL evaluation and planning for discharge  - Provide patient education as appropriate  Outcome: Progressing  Goal: Maintains/Returns to pre admission functional level  Description: INTERVENTIONS:  - Perform BMAT or MOVE assessment daily    - Set and communicate daily mobility goal to care team and patient/family/caregiver  - Collaborate with rehabilitation services on mobility goals if consulted  - Perform Range of Motion 5 times a day  - Reposition patient every 2 hours  - Dangle patient 3 times a day  - Stand patient 3 times a day  - Ambulate patient 3 times a day  - Out of bed to chair 3 times a day   - Out of bed for meals 3 times a day  - Out of bed for toileting  - Record patient progress and toleration of activity level   Outcome: Progressing     Problem: Neurological Deficit  Goal: Neurological status is stable or improving  Description: Interventions:  - Monitor and assess patient's level of consciousness, motor function, sensory function, and level of assistance needed for ADLs  - Monitor and report changes from baseline  Collaborate with interdisciplinary team to initiate plan and implement interventions as ordered  - Provide and maintain a safe environment  - Consider seizure precautions  - Consider fall precautions  - Consider aspiration precautions    - Consider bleeding precautions  Outcome: Progressing     Problem: Activity Intolerance/Impaired Mobility  Goal: Mobility/activity is maintained at optimum level for patient  Description: Interventions:  - Assess and monitor patient  barriers to mobility and need for assistive/adaptive devices  - Assess patient's emotional response to limitations  - Collaborate with interdisciplinary team and initiate plans and interventions as ordered  - Encourage independent activity per ability   - Maintain proper body alignment  - Perform active/passive rom as tolerated/ordered  - Plan activities to conserve energy   - Turn patient as appropriate  Outcome: Progressing     Problem: Communication Impairment  Goal: Ability to express needs and understand communication  Description: Assess patient's communication skills and ability to understand information  Patient will demonstrate use of effective communication techniques, alternative methods of communication and understanding even if not able to speak  - Encourage communication and provide alternate methods of communication as needed  - Collaborate with case management/ for discharge needs  - Include patient/family/caregiver in decisions related to communication  Outcome: Progressing     Problem: Potential for Aspiration  Goal: Non-ventilated patient's risk of aspiration is minimized  Description: Assess and monitor vital signs, respiratory status, and labs (WBC)  Monitor for signs of aspiration (tachypnea, cough, rales, wheezing, cyanosis, fever)  - Assess and monitor patient's ability to swallow  - Place patient up in chair to eat if possible  - HOB up at 90 degrees to eat if unable to get patient up into chair   - Supervise patient during oral intake  - Instruct patient/ family to take small bites  - Instruct patient/ family to take small single sips when taking liquids    - Follow patient-specific strategies generated by speech pathologist   Outcome: Progressing  Goal: Ventilated patient's risk of aspiration is minimized  Description: Assess and monitor vital signs, respiratory status, airway cuff pressure, and labs (WBC)  Monitor for signs of aspiration (tachypnea, cough, rales, wheezing, cyanosis, fever)  - Elevate head of bed 30 degrees if patient has tube feeding   - Monitor tube feeding  Outcome: Progressing     Problem: Nutrition  Goal: Nutrition/Hydration status is improving  Description: Monitor and assess patient's nutrition/hydration status for malnutrition (ex- brittle hair, bruises, dry skin, pale skin and conjunctiva, muscle wasting, smooth red tongue, and disorientation)  Collaborate with interdisciplinary team and initiate plan and interventions as ordered  Monitor patient's weight and dietary intake as ordered or per policy  Utilize nutrition screening tool and intervene per policy  Determine patient's food preferences and provide high-protein, high-caloric foods as appropriate  - Assist patient with eating   - Allow adequate time for meals   - Encourage patient to take dietary supplement as ordered  - Collaborate with clinical nutritionist   - Include patient/family/caregiver in decisions related to nutrition    Outcome: Progressing     Problem: PAIN - ADULT  Goal: Verbalizes/displays adequate comfort level or baseline comfort level  Description: Interventions:  - Encourage patient to monitor pain and request assistance  - Assess pain using appropriate pain scale  - Administer analgesics based on type and severity of pain and evaluate response  - Implement non-pharmacological measures as appropriate and evaluate response  - Consider cultural and social influences on pain and pain management  - Notify physician/advanced practitioner if interventions unsuccessful or patient reports new pain  Outcome: Progressing     Problem: INFECTION - ADULT  Goal: Absence or prevention of progression during hospitalization  Description: INTERVENTIONS:  - Assess and monitor for signs and symptoms of infection  - Monitor lab/diagnostic results  - Monitor all insertion sites, i e  indwelling lines, tubes, and drains  - Monitor endotracheal if appropriate and nasal secretions for changes in amount and color  - [place] appropriate cooling/warming therapies per order  - Administer medications as ordered  - Instruct and encourage patient and family to use good hand hygiene technique  - Identify and instruct in appropriate isolation precautions for identified infection/condition  Outcome: Progressing     Problem: SAFETY ADULT  Goal: Patient will remain free of falls  Description: INTERVENTIONS:  - Educate patient/family on patient safety including physical limitations  - Instruct patient to call for assistance with activity   - Consult OT/PT to assist with strengthening/mobility   - Keep Call bell within reach  - Keep bed low and locked with side rails adjusted as appropriate  - Keep care items and personal belongings within reach  - Initiate and maintain comfort rounds  - Make Fall Risk Sign visible to staff  - Offer Toileting every 2 Hours, in advance of need  - Initiate/Maintain bed alarm  - Obtain necessary fall risk management equipment: non slip socks  - Apply yellow socks and bracelet for high fall risk patients  - Consider moving patient to room near nurses station  Outcome: Progressing     Problem: DISCHARGE PLANNING  Goal: Discharge to home or other facility with appropriate resources  Description: INTERVENTIONS:  - Identify barriers to discharge w/patient and caregiver  - Arrange for needed discharge resources and transportation as appropriate  - Identify discharge learning needs (meds, wound care, etc )  - Arrange for interpretive services to assist at discharge as needed  - Refer to Case Management Department for coordinating discharge planning if the patient needs post-hospital services based on physician/advanced practitioner order or complex needs related to functional status, cognitive ability, or social support system  Outcome: Progressing     Problem: Knowledge Deficit  Goal: Patient/family/caregiver demonstrates understanding of disease process, treatment plan, medications, and discharge instructions  Description: Complete learning assessment and assess knowledge base  Interventions:  - Provide teaching at level of understanding  - Provide teaching via preferred learning methods  Outcome: Progressing     Problem: Nutrition/Hydration-ADULT  Goal: Nutrient/Hydration intake appropriate for improving, restoring or maintaining nutritional needs  Description: Monitor and assess patient's nutrition/hydration status for malnutrition  Collaborate with interdisciplinary team and initiate plan and interventions as ordered  Monitor patient's weight and dietary intake as ordered or per policy  Utilize nutrition screening tool and intervene as necessary  Determine patient's food preferences and provide high-protein, high-caloric foods as appropriate       INTERVENTIONS:  - Monitor oral intake, urinary output, labs, and treatment plans  - Assess nutrition and hydration status and recommend course of action  - Evaluate amount of meals eaten  - Assist patient with eating if necessary   - Allow adequate time for meals  - Recommend/ encourage appropriate diets, oral nutritional supplements, and vitamin/mineral supplements  - Order, calculate, and assess calorie counts as needed  - Recommend, monitor, and adjust tube feedings and TPN/PPN based on assessed needs  - Assess need for intravenous fluids  - Provide specific nutrition/hydration education as appropriate  - Include patient/family/caregiver in decisions related to nutrition  Outcome: Progressing     Problem: NEUROSENSORY - ADULT  Goal: Achieves stable or improved neurological status  Description: INTERVENTIONS  - Monitor and report changes in neurological status  - Monitor vital signs such as temperature, blood pressure, glucose, and any other labs ordered   - Initiate measures to prevent increased intracranial pressure  - Monitor for seizure activity and implement precautions if appropriate      Outcome: Progressing     Problem: RESPIRATORY - ADULT  Goal: Achieves optimal ventilation and oxygenation  Description: INTERVENTIONS:  - Assess for changes in respiratory status  - Assess for changes in mentation and behavior  - Position to facilitate oxygenation and minimize respiratory effort  - Oxygen administered by appropriate delivery if ordered  - Initiate smoking cessation education as indicated  - Encourage broncho-pulmonary hygiene including cough, deep breathe, Incentive Spirometry  - Assess the need for suctioning and aspirate as needed  - Assess and instruct to report SOB or any respiratory difficulty  - Respiratory Therapy support as indicated  Outcome: Progressing     Problem: GASTROINTESTINAL - ADULT  Goal: Minimal or absence of nausea and/or vomiting  Description: INTERVENTIONS:  - Administer IV fluids if ordered to ensure adequate hydration  - Maintain NPO status until nausea and vomiting are resolved  - Nasogastric tube if ordered  - Administer ordered antiemetic medications as needed  - Provide nonpharmacologic comfort measures as appropriate  - Advance diet as tolerated, if ordered  - Consider nutrition services referral to assist patient with adequate nutrition and appropriate food choices  Outcome: Progressing  Goal: Maintains or returns to baseline bowel function  Description: INTERVENTIONS:  - Assess bowel function  - Encourage oral fluids to ensure adequate hydration  - Administer IV fluids if ordered to ensure adequate hydration  - Administer ordered medications as needed  - Encourage mobilization and activity  - Consider nutritional services referral to assist patient with adequate nutrition and appropriate food choices  Outcome: Progressing  Goal: Maintains adequate nutritional intake  Description: INTERVENTIONS:  - Monitor percentage of each meal consumed  - Identify factors contributing to decreased intake, treat as appropriate  - Assist with meals as needed  - Monitor I&O, weight, and lab values if indicated  - Obtain nutrition services referral as needed  Outcome: Progressing     Problem: GENITOURINARY - ADULT  Goal: Maintains or returns to baseline urinary function  Description: INTERVENTIONS:  - Assess urinary function  - Encourage oral fluids to ensure adequate hydration if ordered  - Administer IV fluids as ordered to ensure adequate hydration  - Administer ordered medications as needed  - Offer frequent toileting  - Follow urinary retention protocol if ordered  Outcome: Progressing     Problem: SKIN/TISSUE INTEGRITY - ADULT  Goal: Skin Integrity remains intact(Skin Breakdown Prevention)  Description: Assess:  -Perform [place] assessment   -Clean and moisturize skin  -Inspect skin when repositioning, toileting, and assisting with ADLS  -Assess under medical devices  -Assess extremities for adequate circulation and sensation     Bed Management:  -Have minimal linens on bed & keep smooth, unwrinkled  -Change linens as needed when moist or perspiring  -Avoid sitting or lying in one position for more than 2 hours while in bed  -Keep HOB at 30 degrees     Toileting:  -Offer bedside commode  -Assess for incontinenc  -Use incontinent care products after each incontinent episode    Activity:  -Mobilize patient 3 times a day  -Encourage activity and walks on unit  -Encourage or provide ROM exercises   -Turn and reposition patient every 2 Hours  -Use appropriate equipment to lift or move patient in bed  -Instruct/ Assist with weight shifting  when out of bed in chair  -Consider limitation of chair time 2 hour intervals    Skin Care:  -Avoid use of baby powder, tape, friction and shearing, hot water or constrictive clothing  -Relieve pressure over bony prominences   -Do not massage red bony areas    Next Steps:  -Teach patient strategies to minimize risks    -Consider consults to  interdisciplinary teams  Outcome: Progressing  Goal: Incision(s), wounds(s) or drain site(s) healing without S/S of infection  Description: INTERVENTIONS  - Assess and document dressing, incision, wound bed, drain sites and surrounding tissue  - Provide patient and family education  - Perform skin care/dressing changes  Outcome: Progressing     Problem: MUSCULOSKELETAL - ADULT  Goal: Maintain or return mobility to safest level of function  Description: INTERVENTIONS:  - Assess patient's ability to carry out ADLs; assess patient's baseline for ADL function and identify physical deficits which impact ability to perform ADLs (bathing, care of mouth/teeth, toileting, grooming, dressing, etc )  - Assess/evaluate cause of self-care deficits   - Assess range of motion  - Assess patient's mobility  - Assess patient's need for assistive devices and provide as appropriate  - Encourage maximum independence but intervene and supervise when necessary  - Involve family in performance of ADLs  - Assess for home care needs following discharge   - Consider OT consult to assist with ADL evaluation and planning for discharge  - Provide patient education as appropriate  Outcome: Progressing

## 2022-05-10 NOTE — EMTALA/ACUTE CARE TRANSFER
[facility] MED [facility]G [facility]  [address] [facility] [address]  Dept: [phone]      ACUTE CARE TRANSFER CONSENT    NAME Flako Araujo                                         1960                              MRN [id number]    I have been informed of my rights regarding examination, treatment, and transfer   by Dr Frida Stevens MD    Benefits:  evaluation by liver transplant team    Risks:  deterioration during transfer      Consent for Transfer:  I acknowledge that my medical condition has been evaluated and explained to me by the treating physician or other qualified medical person and/or my attending physician, who has recommended that I be transferred to the service of    at    The above potential benefits of such transfer, the potential risks associated with such transfer, and the probable risks of not being transferred have been explained to me, and I fully understand them  The doctor has explained that, in my case, the benefits of transfer outweigh the risks  I agree to be transferred  I authorize the performance of emergency medical procedures and treatments upon me in both transit and upon arrival at the receiving facility  Additionally, I authorize the release of any and all medical records to the receiving facility and request they be transported with me, if possible  I understand that the safest mode of transportation during a medical emergency is an ambulance and that the Hospital advocates the use of this mode of transport  Risks of traveling to the receiving facility by car, including absence of medical control, life sustaining equipment, such as oxygen, and medical personnel has been explained to me and I fully understand them  (BRAULIO CORRECT BOX BELOW)  [  ]  I consent to the stated transfer and to be transported by ambulance/helicopter    [  ]  I consent to the stated transfer, but refuse transportation by ambulance and accept full responsibility for my transportation by car  I understand the risks of non-ambulance transfers and I exonerate the Hospital and its staff from any deterioration in my condition that results from this refusal     X___________________________________________    DATE  05/10/22  TIME________  Signature of patient or legally responsible individual signing on patient behalf           RELATIONSHIP TO PATIENT_________________________          Provider Certification    NAME Renetta Villarreal                                         1960                              MRN [id number]    A medical screening exam was performed on the above named patient  Based on the examination:    Condition Necessitating Transfer liver cirrhosis    Patient Condition:  serious    Reason for Transfer:  evaluation by transplant team    Transfer Requirements: Facility     · Space available and qualified personnel available for treatment as acknowledged by    · Agreed to accept transfer and to provide appropriate medical treatment as acknowledged by          · Appropriate medical records of the examination and treatment of the patient are provided at the time of transfer   [address] _______  · Transfer will be performed by qualified personnel from    and appropriate transfer equipment as required, including the use of necessary and appropriate life support measures      Provider Certification: I have examined the patient and explained the following risks and benefits of being transferred/refusing transfer to the patient/family:         Based on these reasonable risks and benefits to the patient and/or the unborn child(marc), and based upon the information available at the time of the patients examination, I certify that the medical benefits reasonably to be expected from the provision of appropriate medical treatments at another medical facility outweigh the increasing risks, if any, to the individuals medical condition, and in the case of labor to the unborn child, from effecting the transfer      X____________________________________________ DATE 05/10/22        TIME_______      ORIGINAL - SEND TO MEDICAL RECORDS   COPY - SEND WITH PATIENT DURING TRANSFER

## 2022-05-10 NOTE — ASSESSMENT & PLAN NOTE
Patient presents with acute hip pain abdominal pain abdominal distension  · Endorses history of fatty liver disease, drinks wine occasionally, never been tested for hepatitis  · Found to have significant ascites on exam    Paracentesis negative for SBP  · CT abd/pelvis - new large ascites, cirrhotic liver, hypodense area measuring 1 7 cm at junction of segment 4 and 8 in liver, distended gallbladder, splenomegaly with recanalized umbilical vein  · Underwent paracentesis 4/29 with IR - approximately 1 8 L removed  reported worsening distension  IR consulted for repeat paracentesis   [employer] 2L removed  Follow studies  · GI following  · MELD: 34  · Continue ceftriaxone for SBP prophylaxis  · lactulose   Titrate for 3-4 soft stools daily  · Re-initiated aldactone 5/3   · Continue to trend LFTs and INR daily   · Reviewed with Dr Gael Rubio  Transfer to [address] [place] [address]    · Importance of strict alcohol cessation discussion was done   ·

## 2022-05-11 LAB
ABO GROUP BLD: NORMAL
ALBUMIN SERPL BCP-MCNC: 2.5 G/DL (ref 3.5–5)
ALP SERPL-CCNC: 60 U/L (ref 46–116)
ALT SERPL W P-5'-P-CCNC: 51 U/L (ref 12–78)
ANA TITR SER IF: NEGATIVE {TITER}
ANION GAP SERPL CALCULATED.3IONS-SCNC: 11 MMOL/L (ref 4–13)
AST SERPL W P-5'-P-CCNC: 98 U/L (ref 5–45)
BASOPHILS # BLD AUTO: 0.03 THOUSANDS/ΜL (ref 0–0.1)
BASOPHILS NFR BLD AUTO: 0 % (ref 0–1)
BILIRUB SERPL-MCNC: 35.3 MG/DL (ref 0.2–1)
BLD GP AB SCN SERPL QL: NEGATIVE
BUN SERPL-MCNC: 52 MG/DL (ref 5–25)
CALCIUM ALBUM COR SERPL-MCNC: 9.5 MG/DL (ref 8.3–10.1)
CALCIUM SERPL-MCNC: 8.3 MG/DL (ref 8.3–10.1)
CHLORIDE SERPL-SCNC: 98 MMOL/L (ref 100–108)
CO2 SERPL-SCNC: 22 MMOL/L (ref 21–32)
CREAT SERPL-MCNC: 2.21 MG/DL (ref 0.6–1.3)
EOSINOPHIL # BLD AUTO: 0.01 THOUSAND/ΜL (ref 0–0.61)
EOSINOPHIL NFR BLD AUTO: 0 % (ref 0–6)
GFR SERPL CREATININE-BSD FRML MDRD: 23 ML/MIN/1.73SQ M
GLUCOSE SERPL-MCNC: 111 MG/DL (ref 65–140)
HCT VFR BLD AUTO: 22.2 % (ref 34.8–46.1)
HGB BLD-MCNC: 7.4 G/DL (ref 11.5–15.4)
IMM GRANULOCYTES # BLD AUTO: 0.22 THOUSAND/UL (ref 0–0.2)
IMM GRANULOCYTES NFR BLD AUTO: 2 % (ref 0–2)
INR PPP: 2.49 (ref 0.84–1.19)
LYMPHOCYTES # BLD AUTO: 0.86 THOUSANDS/ΜL (ref 0.6–4.47)
LYMPHOCYTES NFR BLD AUTO: 6 % (ref 14–44)
MCH RBC QN AUTO: 37 PG (ref 26.8–34.3)
MCHC RBC AUTO-ENTMCNC: 33.3 G/DL (ref 31.4–37.4)
MCV RBC AUTO: 111 FL (ref 82–98)
MONOCYTES # BLD AUTO: 0.83 THOUSAND/ΜL (ref 0.17–1.22)
MONOCYTES NFR BLD AUTO: 6 % (ref 4–12)
NEUTROPHILS # BLD AUTO: 12.18 THOUSANDS/ΜL (ref 1.85–7.62)
NEUTS SEG NFR BLD AUTO: 86 % (ref 43–75)
NRBC BLD AUTO-RTO: 0 /100 WBCS
PLATELET # BLD AUTO: 71 THOUSANDS/UL (ref 149–390)
PMV BLD AUTO: 10.8 FL (ref 8.9–12.7)
POTASSIUM SERPL-SCNC: 4.4 MMOL/L (ref 3.5–5.3)
PROT SERPL-MCNC: 9.9 G/DL (ref 6.4–8.2)
PROTHROMBIN TIME: 26.3 SECONDS (ref 11.6–14.5)
RBC # BLD AUTO: 2 MILLION/UL (ref 3.81–5.12)
RH BLD: POSITIVE
SODIUM SERPL-SCNC: 131 MMOL/L (ref 136–145)
SPECIMEN EXPIRATION DATE: NORMAL
WBC # BLD AUTO: 14.13 THOUSAND/UL (ref 4.31–10.16)

## 2022-05-11 PROCEDURE — 86900 BLOOD TYPING SEROLOGIC ABO: CPT | Performed by: HOSPITALIST

## 2022-05-11 PROCEDURE — 99232 SBSQ HOSP IP/OBS MODERATE 35: CPT | Performed by: HOSPITALIST

## 2022-05-11 PROCEDURE — P9016 RBC LEUKOCYTES REDUCED: HCPCS

## 2022-05-11 PROCEDURE — 85025 COMPLETE CBC W/AUTO DIFF WBC: CPT | Performed by: HOSPITALIST

## 2022-05-11 PROCEDURE — 86923 COMPATIBILITY TEST ELECTRIC: CPT

## 2022-05-11 PROCEDURE — 86901 BLOOD TYPING SEROLOGIC RH(D): CPT | Performed by: HOSPITALIST

## 2022-05-11 PROCEDURE — C9113 INJ PANTOPRAZOLE SODIUM, VIA: HCPCS | Performed by: NURSE PRACTITIONER

## 2022-05-11 PROCEDURE — 80053 COMPREHEN METABOLIC PANEL: CPT | Performed by: HOSPITALIST

## 2022-05-11 PROCEDURE — 86850 RBC ANTIBODY SCREEN: CPT | Performed by: HOSPITALIST

## 2022-05-11 PROCEDURE — 99232 SBSQ HOSP IP/OBS MODERATE 35: CPT | Performed by: INTERNAL MEDICINE

## 2022-05-11 PROCEDURE — 85610 PROTHROMBIN TIME: CPT | Performed by: HOSPITALIST

## 2022-05-11 RX ADMIN — ALBUMIN (HUMAN) 25 G: 0.25 INJECTION, SOLUTION INTRAVENOUS at 21:45

## 2022-05-11 RX ADMIN — PREDNISOLONE SODIUM PHOSPHATE 40 MG: 15 SOLUTION ORAL at 10:09

## 2022-05-11 RX ADMIN — ALBUMIN (HUMAN) 25 G: 0.25 INJECTION, SOLUTION INTRAVENOUS at 04:42

## 2022-05-11 RX ADMIN — LACTULOSE 20 G: 20 SOLUTION ORAL at 10:11

## 2022-05-11 RX ADMIN — OXYCODONE HYDROCHLORIDE 5 MG: 5 TABLET ORAL at 18:52

## 2022-05-11 RX ADMIN — Medication 3 MG: at 21:07

## 2022-05-11 RX ADMIN — GABAPENTIN 100 MG: 100 CAPSULE ORAL at 18:42

## 2022-05-11 RX ADMIN — ALBUMIN (HUMAN) 25 G: 0.25 INJECTION, SOLUTION INTRAVENOUS at 14:03

## 2022-05-11 RX ADMIN — GABAPENTIN 100 MG: 100 CAPSULE ORAL at 21:06

## 2022-05-11 RX ADMIN — OXYCODONE HYDROCHLORIDE 10 MG: 5 TABLET ORAL at 04:32

## 2022-05-11 RX ADMIN — PANTOPRAZOLE SODIUM 40 MG: 40 INJECTION, POWDER, FOR SOLUTION INTRAVENOUS at 04:35

## 2022-05-11 RX ADMIN — PANTOPRAZOLE SODIUM 40 MG: 40 INJECTION, POWDER, FOR SOLUTION INTRAVENOUS at 15:59

## 2022-05-11 RX ADMIN — SENNOSIDES AND DOCUSATE SODIUM 2 TABLET: 50; 8.6 TABLET ORAL at 10:10

## 2022-05-11 RX ADMIN — OXYCODONE HYDROCHLORIDE 5 MG: 5 TABLET ORAL at 10:11

## 2022-05-11 RX ADMIN — GABAPENTIN 100 MG: 100 CAPSULE ORAL at 10:11

## 2022-05-11 RX ADMIN — LACTULOSE 20 G: 20 SOLUTION ORAL at 18:42

## 2022-05-11 NOTE — UTILIZATION REVIEW
Continued Stay Review  REQUEST AUTHORIZATION FOR TRANSFER TO [facility] NPI [id number]  ACCEPTING PHYSICIAN:  DR Christopher Vu NPI [id number]  TRANSPORT:  BLS  RE:  CIRRHOSIS WITH ASCITES NEEDING EVALUATION FOR LIVER TRANSPLANT     Date: 5/11/22                       Current Patient Class: inpatient  Current Level of Care: med surg    HPI:61 y o  female initially admitted on 4/28/22 inpatient due to acute blood loss anemia/GI Bleed/CIrrhosis of liver with ascites  PMH of chronic CSF leak, hepatomegaly, lumbar radiculopathy, chronic pain, migraines  Presented due to left hip pain after dog jumped on her  Had increased weakness, nausea, abdominal pain and abdominal distention staring month prior to arrival    Has chronic NSAID use, steady alcohol use of 2 glasses wine daily  In the ED had diagnostic paracentesis, started on ceftriaxone  H&H 5 5/16  3  Found to have LLE weakness due  To hematoma  Ct showed large retroperitoneal and iliopsoas hematoma  No surgical intervention  This admission transfused 2 units FFP, 4 units of PRBC and given vitamin K  Newly diagnosed cirrhosis and IR for paracentesis x 2  Encephalopathy starting about 6 weeks prior to arrival    This admission started on lactulose  Rising bilirubin, prednisolone started 5/4/22  Developed ALEJANDRO  Procedure 4/29/22 Paracentesis - 1800 mL monique ascites, RLQ abd access    Per GI 4/29- Has Cirrhosis decompensated with ascites/anemia  New diagnosis of Cirrhosis  MELD 26   Started on aldactone and Lasix  Per nephrology 5/1/22 : ALEJANDRO with baseline creatinine 0 9 in 6/2021  diuretics held, octreotide in progress  Started on albumin  Started on midodrine for borderline BP  Procedure 5/9/22: A total of 2400 milliliters of fluid was removed       Assessment/Plan:   5/11/22:   Patient with ALEJANDRO/Pleural effusion/hyperbilirubinemia/acute blood loss anemia/weakness of LLE  Due to large retroperitoneal and iliopsoas hematoma/decompensated Cirrhosis  + loose stools  Abdominal discomfort  Has ongoing lle pain and numbness  On exam: scleral icterus  Abdominal distention and tympanic  Bilateral LE edema  Juandice  On 5/10/22: Total bilirubin 29 80  Bun 27, creatinine 1 61    INR 2 30  Wbc 13 72   H&H 8 1/24 2  Today 5/11/22 INR 2 49  Wbc 14 13   H&H 7 4/22  2  Platelets 71  Plan is transfer to transplant center  Continue Lactulose, diuretics on hold  Continue albumin, started yesterday  Fluid restriction  Continue protonix, prednisolone  Vital Signs:   05/11/22 07:20:06 -- 85 18 119/61 80 91 % -- -- -- --   05/11/22 07:11:38 97 3 °F (36 3 °C) Abnormal  85 18 119/61 80 91 % -- -- -- --   05/10/22 2228 -- -- -- -- -- -- 32 3 L/min None (Room air) --   05/10/22 21:04:58 -- 83 15 121/62 82 91 % -- -- -- --   05/10/22 16:13:58 97 7 °F (36 5 °C) 79 16 125/65 85 92 % 32 3 L/min Nasal cannula Lying   05/10/22 1421 -- 79 -- -- -- 91 % 32 3 L/min Nasal cannula --   05/10/22 0900 -- -- -- -- -- 93 % -- -- None (Room air) --   05/10/22 07:15:58 97 4 °F (36 3 °C) Abnormal  79 -- 123/64 84 92 % -- -- --        Pertinent Labs/Diagnostic Results:   IR INPATIENT Paracentesis   Final Result by Kip Jenkins MD (05/09 5942)   Diagnostic and therapeutic paracentesis  Workstation performed: [id number]         MRI brain wo contrast   Final Result by Norman Vincent MD (05/02 2247)      1  No acute ischemia  2   Stable minimal bifrontal white matter changes  Differential considerations include sequela of migraine disease versus microangiopathy  Workstation performed: [id number]         XR chest portable   Final Result by Dustin Obrien MD (05/02 0053)      Increasing left pleural effusion with left basilar atelectasis  The study was marked in [facility] for immediate notification              Workstation performed: [id number]         CT lower extremity wo contrast left   Final Result by Maria Luisa العراقي Aaron Erwin MD (04/30 1607)   Addendum 1 of 1 by Opal Napier MD (04/30 1607)   ADDENDUM:      IMPRESSION:      5  Marked persistent nephrograms compatible with ATN or contrast    nephropathy in the context of acute renal dysfunction  Final      1  Overall stable size of the large left left retroperitoneal hematoma centered in the iliopsoas muscle with some caudal redistribution  No findings to suggest abscess formation  Disproportionate subcutaneous stranding throughout the left lower    extremity is more likely to relate to impaired venous return secondary to mass effect from hematoma than related to cellulitis  The imaged left lower extremity is otherwise within normal limits  2   Worsening left and severe right pleural effusions with subtotal atelectasis of the left lower lobe  Worsening anasarca  3   Diffuse thickening of the small bowel and ascending colon compatible with a nonspecific enterocolitis, similar to prior study and may represent an infectious/inflammatory etiology or be related to underlying volume/oncotic status or portal    hypertension  4   Cirrhosis with sequela of portal hypertension  Mild ascites significantly improved post paracentesis  The study was marked in [facility] for significant notification  Workstation performed: [id number]         CT abdomen pelvis wo contrast   Final Result by Opal Napier MD (04/30 1607)   Addendum 1 of 1 by Opal Napier MD (04/30 1607)   ADDENDUM:      IMPRESSION:      5  Marked persistent nephrograms compatible with ATN or contrast    nephropathy in the context of acute renal dysfunction  Final      1  Overall stable size of the large left left retroperitoneal hematoma centered in the iliopsoas muscle with some caudal redistribution  No findings to suggest abscess formation   Disproportionate subcutaneous stranding throughout the left lower    extremity is more likely to relate to impaired venous return secondary to mass effect from hematoma than related to cellulitis  The imaged left lower extremity is otherwise within normal limits  2   Worsening left and severe right pleural effusions with subtotal atelectasis of the left lower lobe  Worsening anasarca  3   Diffuse thickening of the small bowel and ascending colon compatible with a nonspecific enterocolitis, similar to prior study and may represent an infectious/inflammatory etiology or be related to underlying volume/oncotic status or portal    hypertension  4   Cirrhosis with sequela of portal hypertension  Mild ascites significantly improved post paracentesis  The study was marked in EP[facility] for significant notification  Workstation performed: [id number]         MRI lumbar spine wo contrast   Final Result by Sherry Cullen MD (04/30 1307)      Suboptimal examination due to mild-to-moderate motion artifact  - Similar moderate size complex heterogeneous fluid collection in left iliacus musculature, likely intramuscular hematoma  - Multilevel degenerative changes of lumbar spine with suspected mild foraminal narrowing at left L4-L5 and bilateral L5-S1 given degree of motion artifact, as detailed above  - Additional chronic/incidental findings as detailed above  The study was marked in [facility] for immediate notification  Workstation performed: [id number]         XR hip/pelv 2-3 vws left if performed   Final Result by Tammy Villanueva MD (04/29 1237)      No acute osseous abnormality  Workstation performed: [id number]         XR femur 2 vw left   Final Result by Tammy Villanueva MD (04/29 1237)      No acute osseous abnormality  Workstation performed: [id number]         IR INPATIENT Paracentesis   Final Result by Yen Oneil DO (04/29 9388)   Paracentesis        _________________________________________________________________   COMPARISON: None PROCEDURE DETAILS:    Operators: Dr Mike León   Anesthesia: Local   Medications: 1% lidocaine      COMMENTS:   A preprocedure timeout was performed per [facility] protocol  The right abdomen was prepped and draped in the usual sterile fashion  5-[place] Yueh catheter was advanced using ultrasound guidance into ascites  Following drainage, the skin was cleansed and sterile dressings were applied  Workstation performed: [id number]         CT recon only lumbar spine   Final Result by Mitchell Moore MD (04/29 1120)      Chronic, mild degenerative changes and chronic moderate L2 compression fracture  No new disc herniation or high-grade stenosis compared to prior studies  Asymmetric left iliopsoas muscle enlargement of unclear etiology, new compared to prior studies  No focal mass or fluid collection  No evidence of discitis on CT  MR of the lumbar spine and pelvis is recommended for further evaluation  Workstation performed: [id number]         CTA stroke alert (head/neck)   Final Result by Gumaro Jolley DO (04/29 0608)   1  Mild atherosclerotic disease of the internal carotid arteries bilaterally, without evidence of flow-limiting stenosis  No evidence of carotid artery or vertebral artery dissection  2   Mild atherosclerotic changes of the supraclinoid internal carotid arteries bilaterally, without evidence of flow-limiting stenosis  3   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[place]  [place] otherwise intact  No evidence of large vessel central occlusive disease involving the [place]  4   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  Follow-up neurosurgical evaluation is recommended  Consider dedicated cerebral angiography versus repeat CTA of the intracranial circulation only  5   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, consistent with developmental venous anomaly  If there is continued concern for acute cerebral ischemia, recommend follow-up MRI of the brain with diffusion-weighted sequencing  The above findings were sent via [address] to the on-call stroke neurologist Dr Ana Luisa Yoo at [address] [place] [address] [address] AM on April 29, 2022  Workstation performed: [id number]         CT stroke alert brain   Final Result by Sancho Garcia DO (04/29 0508)   1  Stable cerebral atrophy with chronic small vessel ischemic white matter disease  2   Improved prominence of the extra-axial space along the cerebral convexities bilaterally, left greater than right, consistent with chronic bilateral subdural hygromas  3   No acute intracranial abnormality  If there is continued concern for acute cerebral ischemia, consider follow-up MRI of the brain with diffusion-weighted sequencing  The above findings were sent via [address] to the on-call stroke neurologist Dr Ana Luisa Yoo at [address] [place] [address] [address] AM on April 29, 2022  Workstation performed: [id number]         CT abdomen pelvis with contrast   Final Result by Vicki Anderson MD (04/28 8929)      New large ascites   Distended gallbladder   Cirrhotic liver  Splenomegaly with recanalized umbilical vein, correlate with hypertension      A faint hypodense area seen in image 24 series 2 measuring 1 7 cm at the junction of the segment 8 and 4, indeterminate May be perfusion related or focal lesion  Suggest nonemergent evaluation with the MRI      Mild thickening of the ascending colon may be due to colitis, also seen on the previous study      Moderate left effusion   The study was marked in EPIC for immediate notification  Workstation performed: [id number]         XR chest 1 view portable   Final Result by Clint Clements MD (04/28 5170)      Mild hazy density of the left lower hemithorax may be secondary to developing airspace disease versus overlying soft tissue  Workstation performed: [id number]         MRI pelvis bony wo and w contrast    (Results Pending)     4/29/22 ecg - Normal sinus rhythm  Nonspecific ST and T wave abnormality  Prolonged QT  Abnormal ECG  When compared with ECG of 10-SEP-2016 10:37,  T wave amplitude has decreased in Inferior leads    4/29/22 echo Left Ventricle: Left ventricular cavity size is normal  Wall thickness is normal  The left ventricular ejection fraction is 70%  Systolic function is vigorous  Wall motion is normal  Diastolic function is normal for age    Aortic Valve: The aortic valve velocity is increased due to increased flow    Mitral Valve: There is mild annular calcification    Tricuspid Valve: There is mild regurgitation      Pericardium: There is a left pleural effusion    Results from last 7 days   Lab Units 05/10/22  1424   SARS-COV-2  Negative     Results from last 7 days   Lab Units 05/11/22  0853 05/10/22  0236 05/09/22  0525 05/08/22  0412 05/07/22  0747 05/06/22  0525   WBC Thousand/uL 14 13* 13 72* 12 28* 9 71 10 42* 7 90   HEMOGLOBIN g/dL 7 4* 8 1* 7 9* 8 1* 7 8* 8 1*   HEMATOCRIT % 22 2* 24 2* 23 7* 25 0* 23 4* 23 9*   PLATELETS Thousands/uL 71* 95* 91* 86* 80* 62*   NEUTROS ABS Thousands/µL 12 18* 11 84* 10 30*  --   --   --    BANDS PCT %  --   --   --   --   --  3         Results from last 7 days   Lab Units 05/10/22  0236 05/09/22  1537 05/09/22  0525 05/08/22  0412 05/07/22  0747 05/06/22  0525 05/05/22  0517   SODIUM mmol/L 132* 129* 133* 133* 134*   < > 134*   POTASSIUM mmol/L 4 4 4 6 4 7 3 9 4 1   < > 5 1   CHLORIDE mmol/L 98* 97* 99* 100 99*   < > 101   CO2 mmol/L 23 23 25 26 25   < > 27   ANION GAP mmol/L 11 9 9 7 10   < > 6   BUN mg/dL 37* 34* 31* 24 23   < > 16   CREATININE mg/dL 1 61* 1 52* 1 20 1 10 1 02   < > 0 89   EGFR ml/min/1 73sq m 34 37 49 55 60   < > 71   CALCIUM mg/dL 8 6 8 5 8 9 8 5 8 5   < > 8 5   MAGNESIUM mg/dL  --   --   --   --   --   --  2 1    < > = values in this interval not displayed       Results from last 7 days   Lab Units 05/10/22  0236 05/09/22  1537 05/09/22  0525 05/08/22  0412 05/07/22  0747 05/06/22  0525 05/05/22  0517   AST U/L 123* 145* 142* 144* 141* 125* 151*   ALT U/L 61 69 64 55 53 42 39   ALK PHOS U/L 68 71 71 65 64 53 50   TOTAL PROTEIN g/dL 8 2 7 6 7 5 7 4 7 4 6 9 7 0   ALBUMIN g/dL 2 0* 2 1* 2 1* 2 1* 2 2* 2 1* 2 2*   TOTAL BILIRUBIN mg/dL 29 80* 33 50* 31 40* 29 50* 28 00* 25 30* 26 50*   BILIRUBIN DIRECT mg/dL  --   --   --   --   --  15 70* 14 43*     Results from last 7 days   Lab Units 05/10/22  0236 05/09/22  1537 05/09/22  0525 05/08/22  0412 05/07/22  0747 05/06/22  0525 05/05/22  0517   GLUCOSE RANDOM mg/dL 139 148* 117 112 91 140 144*     Results from last 7 days   Lab Units 05/11/22  0853 05/10/22  0236 05/09/22  1537   PROTIME seconds 26 3* 24 7* 24 1*   INR  2 49* 2 30* 2 23*     Results from last 7 days   Lab Units 05/06/22  0525   CRP mg/L 48 1*     Results from last 7 days   Lab Units 05/10/22  1424   INFLUENZA A PCR  Negative   INFLUENZA B PCR  Negative   RSV PCR  Negative     Results from last 7 days   Lab Units 05/09/22  1359   GRAM STAIN RESULT  Rare Polys  No organisms seen   BODY FLUID CULTURE, STERILE  No growth     Results from last 7 days   Lab Units 05/09/22  1359   TOTAL COUNTED  100   WBC FLUID /ul 73         Medications:   Scheduled Medications:  albumin human, 25 g, Intravenous, Q8H  gabapentin, 100 mg, Oral, TID  lactulose, 20 g, Oral, BID  LORazepam, 1 mg, Intravenous, Once  melatonin, 3 mg, Oral, HS  pantoprazole, 40 mg, Intravenous, Q12H  prednisoLONE, 40 mg, Oral, Daily    Continuous IV Infusions: none      PRN Meds:  acetaminophen, 650 mg, Oral, Q4H PRN  aluminum-magnesium hydroxide-simethicone, 30 mL, Oral, Q6H PRN  HYDROmorphone, 0 5 mg, Intravenous, Q1H PRN  HYDROmorphone, 1 mg, Intravenous, Once PRN  influenza vaccine, 0 5 mL, Intramuscular, Prior to discharge  ondansetron, 4 mg, Intravenous, Q4H PRN  oxyCODONE, 10 mg, Oral, Q4H PRN - used x 1 5/11/22   oxyCODONE, 5 mg, Oral, Q4H PRN  senna-docusate sodium, 2 tablet, Oral, BID PRN  sodium chloride, 1 spray, Each Nare, Q1H PRN        Discharge Plan:  Request for transfer to [facility] transplant evaluation  [facility] Utilization Review Department  ATTENTION: Please call with any questions or concerns to [phone] and carefully listen to the prompts so that you are directed to the right person  All voicemails are confidential   Catherine Melara all requests for admission clinical reviews, approved or denied determinations and any other requests to dedicated fax number below belonging to the campus where the patient is receiving treatment   List of dedicated fax numbers for the Facilities:  [address] DENIALS (Administrative/Medical Necessity) [phone]   [address] (Maternity/NICU/Pediatrics) [phone]   [address]  [address] [place] [address] [id number] [address] [facility] [phone]

## 2022-05-11 NOTE — PROGRESS NOTES
Progress note - [facility]   Devin Martines 63 y o  female MRN: [id number]  Unit/Bed#: -01 Encounter: [id number]    ASSESSMENT and PLAN    1  Cirrhosis decompensated with ascites, pleural effusion, SBE, thrombocytopenia, hypoalbuminemia, hypoprothrombinemia, sarcopenia  Decompensated over the past 6 weeks with encephalopathy, thrombocytopenia, coagulopathy, ascites, ALEJANDRO  1800 cc paracentesis (4/28) negative for SBP   Repeat (5/9) 1400 cc, also no SBP  Diuretics on hold due to ALEJANDRO which has improved, appreciate renal input     Increasing bilirubin likely multifactorial from hematoma resorption and possible alcoholic hepatitis   Prednisolone started 05/04/2022  No significant improvement in TBili  Actually slightly higher yesterday, however resorption of retroperitoneal hematoma may be clouding result  Recommend discontinuing prednisone  High expected mortality  [facility] has accepted patient in transfer for transplant evaluation      Coagulopathy:  Did received Vit K earlier on in admission  INR rising  Currently 2 49    2  Hepatic encephalopathy  Mental status currently stable  Cont lactulose titrated for effect of 3 soft, but formed BM/day      3 Retroperitoneal hemorrhage    4  Anemia  Significant anemia in setting of coagulopathy and thrombocytopenia  8cm left retroperitoneal hematoma centered in ileus psoas muscle   Appreciate surgical input  Hemodynamically stable  Hgb stable  Continue to monitor for evidence of GI blood loss  Will need EGD for variceal screening      5  ETOH use  Patient admits to drinking several glasses of wine daily in remote past (10+ years ago)  Cut back to 1-2 glasses a wine every evening few years ago  Cut back further due to neurological symptoms persistent after fall and diagnosis of CSF leak  Extensive records available through [facility] and [address] [facility]      6  Ongoing LLE pain/numbness    No unilateral edema, or difference in temp on exam, however, complains of numbness and ongoing pain   -Consider LLE doppler if continues to worsen  Chief Complaint   Patient presents with    Leg Pain     Pt reports left leg pain since sunday 4/24/22 when her dog jumped on her  Took tylenol #3 prior to arrival today  SUBJECTIVE/HPI   Stable overnight, however, complaining of ongoing LLE pain/numbness  Denies significant abd pain, fevers, chills, confusion, SOB, CP, nausea  /61   Pulse 85   Temp (!) 97 3 °F (36 3 °C)   Resp 18   Ht 5' 6" (1 676 m)   Wt 73 5 kg (162 lb 0 6 oz)   SpO2 91%   BMI 26 15 kg/m²     PHYSICALEXAM  General appearance: alert, appears stated age and cooperative  Eyes: PERLLA, EOMI, quite icteric   Head: Normocephalic, without obvious abnormality, atraumatic  Lungs: clear to auscultation bilaterally  Abdomen: soft, non-tender; bowel sounds normal; somewhat tympanic to percussion, no masses,  no organomegaly  Extremities: extremities normal, atraumatic, no cyanosis or edema  Neurologic: left facial asymetry does not seem to be present today  No asterixis, decrease sensation to palpation of LLE      Lab Results   Component Value Date    GLUCOSE 94 05/08/2015    CALCIUM 8 6 05/10/2022     05/08/2015    K 4 4 05/10/2022    CO2 23 05/10/2022    CL 98 (L) 05/10/2022    BUN 37 (H) 05/10/2022    CREATININE 1 61 (H) 05/10/2022     Lab Results   Component Value Date    WBC 14 13 (H) 05/11/2022    HGB 7 4 (L) 05/11/2022    HCT 22 2 (L) 05/11/2022     (H) 05/11/2022    PLT 71 (L) 05/11/2022     Lab Results   Component Value Date    ALT 61 05/10/2022     (H) 05/10/2022    GGT 81 (H) 04/30/2022    ALKPHOS 68 05/10/2022    BILITOT 0 2 05/08/2015     No results found for: AMYLASE  Lab Results   Component Value Date    LIPASE 76 04/28/2022     Lab Results   Component Value Date    IRON 97 04/28/2022    TIBC 146 (L) 04/28/2022    FERRITIN 1,238 (H) 04/28/2022     Lab Results   Component Value Date    INR 2 49 (H) 05/11/2022

## 2022-05-11 NOTE — ASSESSMENT & PLAN NOTE
Multifactorial, related to decompensated cirrhosis, contrast exposure, low blood pressures  Cr rising again- await 5/11 labs  Hold spirionlactone  nephro re-evaluation appreciated  Cont albumin supplementation    Nephrology following  Dc'd Midodrine  Octreotide gtt dc'd  Continue to hold ARB

## 2022-05-11 NOTE — ASSESSMENT & PLAN NOTE
Presented with acute hip pain , abdominal pain, abdominal distension   CBC showing: Hgb 5 5/Hct16 3,  - macrocytic anemia    Occult heme + stool   Transfusion S/P 4 units PRBCs  o Transfuse to keep Hgb >7    Currently 7 4   Hb stablized-> has significant ecchymosis in the left back and left LE   Continue Protonix IV b i d   · Abraham need outpt f/u for screening EGD for esophageal varices    Lab Results   Component Value Date    HGB 7 4 (L) 05/11/2022    HGB 8 1 (L) 05/10/2022

## 2022-05-11 NOTE — PLAN OF CARE
Problem: Potential for Falls  Goal: Patient will remain free of falls  Description: INTERVENTIONS:  - Educate patient/family on patient safety including physical limitations  - Instruct patient to call for assistance with activity   - Consult OT/PT to assist with strengthening/mobility   - Keep Call bell within reach  - Keep bed low and locked with side rails adjusted as appropriate  - Keep care items and personal belongings within reach  - Initiate and maintain comfort rounds  - Make Fall Risk Sign visible to staff  - Offer Toileting every 2 Hours, in advance of need  - Initiate/Maintain bed alarm  - Obtain necessary fall risk management equipment: non slip socks  - Apply yellow socks and bracelet for high fall risk patients  - Consider moving patient to room near nurses station  Outcome: Progressing     Problem: HEMATOLOGIC - ADULT  Goal: Maintains hematologic stability  Description: INTERVENTIONS  - Assess for signs and symptoms of bleeding or hemorrhage  - Monitor labs  - Administer supportive blood products/factors as ordered and appropriate  Outcome: Progressing     Problem: Prexisting or High Potential for Compromised Skin Integrity  Goal: Skin integrity is maintained or improved  Description: INTERVENTIONS:  - Identify patients at risk for skin breakdown  - Assess and monitor skin integrity  - Assess and monitor nutrition and hydration status  - Monitor labs   - Assess for incontinence   - Turn and reposition patient  - Assist with mobility/ambulation  - Relieve pressure over bony prominences  - Avoid friction and shearing  - Provide appropriate hygiene as needed including keeping skin clean and dry  - Evaluate need for skin moisturizer/barrier cream  - Collaborate with interdisciplinary team   - Patient/family teaching  - Consider wound care consult   Outcome: Progressing     Problem: MOBILITY - ADULT  Goal: Maintain or return to baseline ADL function  Description: INTERVENTIONS:  -  Assess patient's ability to carry out ADLs; assess patient's baseline for ADL function and identify physical deficits which impact ability to perform ADLs (bathing, care of mouth/teeth, toileting, grooming, dressing, etc )  - Assess/evaluate cause of self-care deficits   - Assess range of motion  - Assess patient's mobility; develop plan if impaired  - Assess patient's need for assistive devices and provide as appropriate  - Encourage maximum independence but intervene and supervise when necessary  - Involve family in performance of ADLs  - Assess for home care needs following discharge   - Consider OT consult to assist with ADL evaluation and planning for discharge  - Provide patient education as appropriate  Outcome: Progressing  Goal: Maintains/Returns to pre admission functional level  Description: INTERVENTIONS:  - Perform BMAT or MOVE assessment daily    - Set and communicate daily mobility goal to care team and patient/family/caregiver  - Collaborate with rehabilitation services on mobility goals if consulted  - Perform Range of Motion 5 times a day  - Reposition patient every 2 hours  - Dangle patient 3 times a day  - Stand patient 3 times a day  - Ambulate patient 3 times a day  - Out of bed to chair 3 times a day   - Out of bed for meals 3 times a day  - Out of bed for toileting  - Record patient progress and toleration of activity level   Outcome: Progressing     Problem: Neurological Deficit  Goal: Neurological status is stable or improving  Description: Interventions:  - Monitor and assess patient's level of consciousness, motor function, sensory function, and level of assistance needed for ADLs  - Monitor and report changes from baseline  Collaborate with interdisciplinary team to initiate plan and implement interventions as ordered  - Provide and maintain a safe environment  - Consider seizure precautions  - Consider fall precautions  - Consider aspiration precautions    - Consider bleeding precautions  Outcome: Progressing     Problem: Activity Intolerance/Impaired Mobility  Goal: Mobility/activity is maintained at optimum level for patient  Description: Interventions:  - Assess and monitor patient  barriers to mobility and need for assistive/adaptive devices  - Assess patient's emotional response to limitations  - Collaborate with interdisciplinary team and initiate plans and interventions as ordered  - Encourage independent activity per ability   - Maintain proper body alignment  - Perform active/passive rom as tolerated/ordered  - Plan activities to conserve energy   - Turn patient as appropriate  Outcome: Progressing     Problem: Communication Impairment  Goal: Ability to express needs and understand communication  Description: Assess patient's communication skills and ability to understand information  Patient will demonstrate use of effective communication techniques, alternative methods of communication and understanding even if not able to speak  - Encourage communication and provide alternate methods of communication as needed  - Collaborate with case management/ for discharge needs  - Include patient/family/caregiver in decisions related to communication  Outcome: Progressing     Problem: Potential for Aspiration  Goal: Non-ventilated patient's risk of aspiration is minimized  Description: Assess and monitor vital signs, respiratory status, and labs (WBC)  Monitor for signs of aspiration (tachypnea, cough, rales, wheezing, cyanosis, fever)  - Assess and monitor patient's ability to swallow  - Place patient up in chair to eat if possible  - HOB up at 90 degrees to eat if unable to get patient up into chair   - Supervise patient during oral intake  - Instruct patient/ family to take small bites  - Instruct patient/ family to take small single sips when taking liquids    - Follow patient-specific strategies generated by speech pathologist   Outcome: Progressing  Goal: Ventilated patient's risk of aspiration is minimized  Description: Assess and monitor vital signs, respiratory status, airway cuff pressure, and labs (WBC)  Monitor for signs of aspiration (tachypnea, cough, rales, wheezing, cyanosis, fever)  - Elevate head of bed 30 degrees if patient has tube feeding   - Monitor tube feeding  Outcome: Progressing     Problem: Nutrition  Goal: Nutrition/Hydration status is improving  Description: Monitor and assess patient's nutrition/hydration status for malnutrition (ex- brittle hair, bruises, dry skin, pale skin and conjunctiva, muscle wasting, smooth red tongue, and disorientation)  Collaborate with interdisciplinary team and initiate plan and interventions as ordered  Monitor patient's weight and dietary intake as ordered or per policy  Utilize nutrition screening tool and intervene per policy  Determine patient's food preferences and provide high-protein, high-caloric foods as appropriate  - Assist patient with eating   - Allow adequate time for meals   - Encourage patient to take dietary supplement as ordered  - Collaborate with clinical nutritionist   - Include patient/family/caregiver in decisions related to nutrition    Outcome: Progressing     Problem: PAIN - ADULT  Goal: Verbalizes/displays adequate comfort level or baseline comfort level  Description: Interventions:  - Encourage patient to monitor pain and request assistance  - Assess pain using appropriate pain scale  - Administer analgesics based on type and severity of pain and evaluate response  - Implement non-pharmacological measures as appropriate and evaluate response  - Consider cultural and social influences on pain and pain management  - Notify physician/advanced practitioner if interventions unsuccessful or patient reports new pain  Outcome: Progressing     Problem: INFECTION - ADULT  Goal: Absence or prevention of progression during hospitalization  Description: INTERVENTIONS:  - Assess and monitor for signs and symptoms of infection  - Monitor lab/diagnostic results  - Monitor all insertion sites, i e  indwelling lines, tubes, and drains  - Monitor endotracheal if appropriate and nasal secretions for changes in amount and color  - [place] appropriate cooling/warming therapies per order  - Administer medications as ordered  - Instruct and encourage patient and family to use good hand hygiene technique  - Identify and instruct in appropriate isolation precautions for identified infection/condition  Outcome: Progressing     Problem: SAFETY ADULT  Goal: Patient will remain free of falls  Description: INTERVENTIONS:  - Educate patient/family on patient safety including physical limitations  - Instruct patient to call for assistance with activity   - Consult OT/PT to assist with strengthening/mobility   - Keep Call bell within reach  - Keep bed low and locked with side rails adjusted as appropriate  - Keep care items and personal belongings within reach  - Initiate and maintain comfort rounds  - Make Fall Risk Sign visible to staff  - Offer Toileting every 2 Hours, in advance of need  - Initiate/Maintain bed alarm  - Obtain necessary fall risk management equipment: non slip socks  - Apply yellow socks and bracelet for high fall risk patients  - Consider moving patient to room near nurses station  Outcome: Progressing     Problem: DISCHARGE PLANNING  Goal: Discharge to home or other facility with appropriate resources  Description: INTERVENTIONS:  - Identify barriers to discharge w/patient and caregiver  - Arrange for needed discharge resources and transportation as appropriate  - Identify discharge learning needs (meds, wound care, etc )  - Arrange for interpretive services to assist at discharge as needed  - Refer to Case Management Department for coordinating discharge planning if the patient needs post-hospital services based on physician/advanced practitioner order or complex needs related to functional status, cognitive ability, or social support system  Outcome: Progressing     Problem: Knowledge Deficit  Goal: Patient/family/caregiver demonstrates understanding of disease process, treatment plan, medications, and discharge instructions  Description: Complete learning assessment and assess knowledge base  Interventions:  - Provide teaching at level of understanding  - Provide teaching via preferred learning methods  Outcome: Progressing     Problem: Nutrition/Hydration-ADULT  Goal: Nutrient/Hydration intake appropriate for improving, restoring or maintaining nutritional needs  Description: Monitor and assess patient's nutrition/hydration status for malnutrition  Collaborate with interdisciplinary team and initiate plan and interventions as ordered  Monitor patient's weight and dietary intake as ordered or per policy  Utilize nutrition screening tool and intervene as necessary  Determine patient's food preferences and provide high-protein, high-caloric foods as appropriate       INTERVENTIONS:  - Monitor oral intake, urinary output, labs, and treatment plans  - Assess nutrition and hydration status and recommend course of action  - Evaluate amount of meals eaten  - Assist patient with eating if necessary   - Allow adequate time for meals  - Recommend/ encourage appropriate diets, oral nutritional supplements, and vitamin/mineral supplements  - Order, calculate, and assess calorie counts as needed  - Recommend, monitor, and adjust tube feedings and TPN/PPN based on assessed needs  - Assess need for intravenous fluids  - Provide specific nutrition/hydration education as appropriate  - Include patient/family/caregiver in decisions related to nutrition  Outcome: Progressing     Problem: NEUROSENSORY - ADULT  Goal: Achieves stable or improved neurological status  Description: INTERVENTIONS  - Monitor and report changes in neurological status  - Monitor vital signs such as temperature, blood pressure, glucose, and any other labs ordered   - Initiate measures to prevent increased intracranial pressure  - Monitor for seizure activity and implement precautions if appropriate      Outcome: Progressing     Problem: RESPIRATORY - ADULT  Goal: Achieves optimal ventilation and oxygenation  Description: INTERVENTIONS:  - Assess for changes in respiratory status  - Assess for changes in mentation and behavior  - Position to facilitate oxygenation and minimize respiratory effort  - Oxygen administered by appropriate delivery if ordered  - Initiate smoking cessation education as indicated  - Encourage broncho-pulmonary hygiene including cough, deep breathe, Incentive Spirometry  - Assess the need for suctioning and aspirate as needed  - Assess and instruct to report SOB or any respiratory difficulty  - Respiratory Therapy support as indicated  Outcome: Progressing     Problem: GASTROINTESTINAL - ADULT  Goal: Minimal or absence of nausea and/or vomiting  Description: INTERVENTIONS:  - Administer IV fluids if ordered to ensure adequate hydration  - Maintain NPO status until nausea and vomiting are resolved  - Nasogastric tube if ordered  - Administer ordered antiemetic medications as needed  - Provide nonpharmacologic comfort measures as appropriate  - Advance diet as tolerated, if ordered  - Consider nutrition services referral to assist patient with adequate nutrition and appropriate food choices  Outcome: Progressing  Goal: Maintains or returns to baseline bowel function  Description: INTERVENTIONS:  - Assess bowel function  - Encourage oral fluids to ensure adequate hydration  - Administer IV fluids if ordered to ensure adequate hydration  - Administer ordered medications as needed  - Encourage mobilization and activity  - Consider nutritional services referral to assist patient with adequate nutrition and appropriate food choices  Outcome: Progressing  Goal: Maintains adequate nutritional intake  Description: INTERVENTIONS:  - Monitor percentage of each meal consumed  - Identify factors contributing to decreased intake, treat as appropriate  - Assist with meals as needed  - Monitor I&O, weight, and lab values if indicated  - Obtain nutrition services referral as needed  Outcome: Progressing     Problem: GENITOURINARY - ADULT  Goal: Maintains or returns to baseline urinary function  Description: INTERVENTIONS:  - Assess urinary function  - Encourage oral fluids to ensure adequate hydration if ordered  - Administer IV fluids as ordered to ensure adequate hydration  - Administer ordered medications as needed  - Offer frequent toileting  - Follow urinary retention protocol if ordered  Outcome: Progressing    Problem: MUSCULOSKELETAL - ADULT  Goal: Maintain or return mobility to safest level of function  Description: INTERVENTIONS:  - Assess patient's ability to carry out ADLs; assess patient's baseline for ADL function and identify physical deficits which impact ability to perform ADLs (bathing, care of mouth/teeth, toileting, grooming, dressing, etc )  - Assess/evaluate cause of self-care deficits   - Assess range of motion  - Assess patient's mobility  - Assess patient's need for assistive devices and provide as appropriate  - Encourage maximum independence but intervene and supervise when necessary  - Involve family in performance of ADLs  - Assess for home care needs following discharge   - Consider OT consult to assist with ADL evaluation and planning for discharge  - Provide patient education as appropriate  Outcome: Progressing

## 2022-05-11 NOTE — ASSESSMENT & PLAN NOTE
Patient presents with acute hip pain abdominal pain abdominal distension  · Endorses history of fatty liver disease, drinks wine occasionally, never been tested for hepatitis  · Found to have significant ascites on exam    Paracentesis negative for SBP  · CT abd/pelvis - new large ascites, cirrhotic liver, hypodense area measuring 1 7 cm at junction of segment 4 and 8 in liver, distended gallbladder, splenomegaly with recanalized umbilical vein  · Underwent paracentesis 4/29 with IR - approximately 1 8 L removed  reported worsening distension  IR consulted for repeat paracentesis   Stephy Caldwell 2L removed  Follow studies  · GI following  · MELD: 34  · Continue ceftriaxone for SBP prophylaxis  · lactulose   Titrate for 3-4 soft stools daily  · Re-initiated aldactone 5/3   · Continue to trend LFTs and INR daily   · Reviewed with Dr Jeanette June  Transfer to [address] [place] [address]    · Importance of strict alcohol cessation discussion was done   ·

## 2022-05-11 NOTE — PLAN OF CARE
Problem: HEMATOLOGIC - ADULT  Goal: Maintains hematologic stability  Description: INTERVENTIONS  - Assess for signs and symptoms of bleeding or hemorrhage  - Monitor labs  - Administer supportive blood products/factors as ordered and appropriate  Outcome: Progressing     Problem: RESPIRATORY - ADULT  Goal: Achieves optimal ventilation and oxygenation  Description: INTERVENTIONS:  - Assess for changes in respiratory status  - Assess for changes in mentation and behavior  - Position to facilitate oxygenation and minimize respiratory effort  - Oxygen administered by appropriate delivery if ordered  - Initiate smoking cessation education as indicated  - Encourage broncho-pulmonary hygiene including cough, deep breathe, Incentive Spirometry  - Assess the need for suctioning and aspirate as needed  - Assess and instruct to report SOB or any respiratory difficulty  - Respiratory Therapy support as indicated  Outcome: Progressing     Problem: GASTROINTESTINAL - ADULT  Goal: Minimal or absence of nausea and/or vomiting  Description: INTERVENTIONS:  - Administer IV fluids if ordered to ensure adequate hydration  - Maintain NPO status until nausea and vomiting are resolved  - Nasogastric tube if ordered  - Administer ordered antiemetic medications as needed  - Provide nonpharmacologic comfort measures as appropriate  - Advance diet as tolerated, if ordered  - Consider nutrition services referral to assist patient with adequate nutrition and appropriate food choices  Outcome: Progressing  Goal: Maintains or returns to baseline bowel function  Description: INTERVENTIONS:  - Assess bowel function  - Encourage oral fluids to ensure adequate hydration  - Administer IV fluids if ordered to ensure adequate hydration  - Administer ordered medications as needed  - Encourage mobilization and activity  - Consider nutritional services referral to assist patient with adequate nutrition and appropriate food choices  Outcome: Progressing  Goal: Maintains adequate nutritional intake  Description: INTERVENTIONS:  - Monitor percentage of each meal consumed  - Identify factors contributing to decreased intake, treat as appropriate  - Assist with meals as needed  - Monitor I&O, weight, and lab values if indicated  - Obtain nutrition services referral as needed  Outcome: Progressing     Problem: GENITOURINARY - ADULT  Goal: Maintains or returns to baseline urinary function  Description: INTERVENTIONS:  - Assess urinary function  - Encourage oral fluids to ensure adequate hydration if ordered  - Administer IV fluids as ordered to ensure adequate hydration  - Administer ordered medications as needed  - Offer frequent toileting  - Follow urinary retention protocol if ordered  Outcome: Progressing

## 2022-05-11 NOTE — PROGRESS NOTES
NEPHROLOGY PROGRESS NOTE   Ramiro Gilmore 63 y o  female MRN: [id number]  Unit/Bed#: -01 Encounter: [id number]  Reason for Consult: ALEJANDRO      SUMMARY:    29-year-old female with history of chronic subdural hematoma, fatty liver disease, temporal arteritis, chronic CSF leak, was initially admitted at [facility] for low hemoglobin of 5 5 with a retroperitoneal bleed and left iliopsoas muscle hematoma along with decompensated cirrhosis  We were initially consult for acute kidney injury which had improved and was subsequently signed off    We were asked to re-evaluate this patient for worsening renal function over the last few days         ASSESSMENT and PLAN:     Acute kidney injury/worsening renal function  --baseline creatinine  Less than 1 mg/dL  --recent acute kidney injury, in the setting of acute anemia, auto regulatory dysfunction, low blood pressure as well as contrast associated nephropathy, which resolved and improved back to baseline  --renal function started to worsen in the last 48 hours  --awaiting this morning's blood work, creatinine yesterday was 1 6 mg/dL, follow up today's blood work  --underwent a paracentesis yesterday was 2 4 L removed May 9th  --blood pressures have been stable with no documented hypotension, no recent contrast studies  --spironolactone has been held after receiving it for the last few days  --poor oral intake  --suspected etiology includes prerenal azotemia versus hepatorenal syndrome  --continue to hold spironolactone  --colloid expansion with IV albumin 25% 25 g every 8 hours times 48 hours  --awaiting transfer to [facility] to be evaluated by the liver transplant team     Hyponatremia  --chronic  --the setting of poor effective circulating volume  --fluid restriction  --colloid expansion  --sodium level improved to 132 yesterday  --follow-up today CMP results     Anemia  --acute on chronic  --admission hemoglobin 5 5  --status post 4 units blood transfusion  --was evaluated by Gastroenterology  --hemoglobin now drifting down to his 7 4 today  --concurrent thrombocytopenia in the setting of liver disease and splenomegaly     Decompensated cirrhosis  --tentatively plan for transfer to [facility] for transplant evaluation, awaiting a bed    SUBJECTIVE / INTERVAL HISTORY:    Poor appetite, eating some fruit trying to drink water, loose stools from the lactulose, no chest pain or shortness of breath    OBJECTIVE:  Current Weight: Weight - Scale: 73 5 kg (162 lb 0 6 oz)  Vitals:    05/10/22 1613 05/10/22 2104 05/11/22 0711 05/11/22 0720   BP: 125/65 121/62 119/61 119/61   BP Location: Left arm      Pulse: 79 83 85 85   Resp: 16 15 18 18   Temp: 97 7 °F (36 5 °C)  (!) 97 3 °F (36 3 °C)    TempSrc: Oral      SpO2: 92% 91% 91% 91%   Weight:       Height:           Intake/Output Summary (Last 24 hours) at 5/11/2022 1205  Last data filed at 5/10/2022 1850  Gross per 24 hour   Intake 390 ml   Output --   Net 390 ml       Review of Systems:    12 point ROS has been reviewed  Physical Exam  Vitals and nursing note reviewed  Exam conducted with a chaperone present  Constitutional:       General: She is not in acute distress  Appearance: She is well-developed  She is not diaphoretic  HENT:      Head: Normocephalic and atraumatic  Eyes:      General: Scleral icterus present  Pupils: Pupils are equal, round, and reactive to light  Cardiovascular:      Rate and Rhythm: Normal rate and regular rhythm  Heart sounds: Normal heart sounds  No murmur heard  No friction rub  No gallop  Pulmonary:      Effort: Pulmonary effort is normal  No respiratory distress  Breath sounds: Normal breath sounds  No wheezing or rales  Chest:      Chest wall: No tenderness  Abdominal:      General: Bowel sounds are normal  There is no distension  Palpations: Abdomen is soft  Tenderness: There is no abdominal tenderness   There is no rebound  Musculoskeletal:         General: Swelling present  Normal range of motion  Cervical back: Normal range of motion and neck supple  Right lower leg: Edema present  Left lower leg: Edema present  Skin:     General: Skin is dry  Coloration: Skin is jaundiced and pale  Findings: No rash  Neurological:      Mental Status: She is alert and oriented to person, place, and time           Medications:    Current Facility-Administered Medications:     acetaminophen (TYLENOL) tablet 650 mg, 650 mg, Oral, Q4H PRN, TRAV Gamble, 650 mg at 05/06/22 1757    albumin human (FLEXBUMIN) 25 % injection 25 g, 25 g, Intravenous, Q8H, Paul Mesa MD, 25 g at 05/11/22 0442    aluminum-magnesium hydroxide-simethicone (MYLANTA) oral suspension 30 mL, 30 mL, Oral, Q6H PRN, TRAV Gamble    gabapentin (NEURONTIN) capsule 100 mg, 100 mg, Oral, TID, TRAV Gamble, 100 mg at 05/11/22 1011    HYDROmorphone (DILAUDID) injection 0 5 mg, 0 5 mg, Intravenous, Q1H PRN, TRAV Gamble, 0 5 mg at 05/08/22 1601    HYDROmorphone (DILAUDID) injection 1 mg, 1 mg, Intravenous, Once PRN, TRAV Heredia    influenza vaccine, recombinant, quadrivalent (FLUBLOK) IM injection 0 5 mL, 0 5 mL, Intramuscular, Prior to discharge, TRAV Heredia    lactulose oral solution 20 g, 20 g, Oral, BID, Caden Brooks MD, 20 g at 05/11/22 1011    LORazepam (ATIVAN) injection 1 mg, 1 mg, Intravenous, Once, TRAV Heredia    melatonin tablet 3 mg, 3 mg, Oral, HS, Caden Brooks MD, 3 mg at 05/10/22 2055    ondansetron (ZOFRAN) injection 4 mg, 4 mg, Intravenous, Q4H PRN, TRAV Gamble, 4 mg at 05/09/22 1732    oxyCODONE (ROXICODONE) IR tablet 10 mg, 10 mg, Oral, Q4H PRN, TRAV Gamble, 10 mg at 05/11/22 0432    oxyCODONE (ROXICODONE) IR tablet 5 mg, 5 mg, Oral, Q4H PRN, TRAV Gamble, 5 mg at 05/11/22 1011    pantoprazole (PROTONIX) injection 40 mg, 40 mg, Intravenous, Q12H, Loli Liudmila aDryljackieTRAV, 40 mg at 05/11/22 0435    prednisoLONE (ORAPRED) oral solution 40 mg, 40 mg, Oral, Daily, Vera Fam, DO, 40 mg at 05/11/22 1009    senna-docusate sodium (SENOKOT S) 8 6-50 mg per tablet 2 tablet, 2 tablet, Oral, BID PRN, TRAV Palacio, 2 tablet at 05/11/22 1010    sodium chloride (OCEAN) 0 65 % nasal spray 1 spray, 1 spray, Each Nare, Q1H PRN, Johnnie Reeves MD    Laboratory Results:  Results from last 7 days   Lab Units 05/11/22  0853 05/10/22  0236 05/09/22  1537 05/09/22  0525 05/08/22  0412 05/07/22  0747 05/06/22  0525 05/05/22  0517   WBC Thousand/uL 14 13* 13 72*  --  12 28* 9 71 10 42* 7 90 7 50   HEMOGLOBIN g/dL 7 4* 8 1*  --  7 9* 8 1* 7 8* 8 1* 7 6*   HEMATOCRIT % 22 2* 24 2*  --  23 7* 25 0* 23 4* 23 9* 23 5*   PLATELETS Thousands/uL 71* 95*  --  91* 86* 80* 62* 58*   POTASSIUM mmol/L  --  4 4 4 6 4 7 3 9 4 1 4 2 5 1   CHLORIDE mmol/L  --  98* 97* 99* 100 99* 102 101   CO2 mmol/L  --  23 23 25 26 25 25 27   BUN mg/dL  --  37* 34* 31* 24 23 20 16   CREATININE mg/dL  --  1 61* 1 52* 1 20 1 10 1 02 1 02 0 89   CALCIUM mg/dL  --  8 6 8 5 8 9 8 5 8 5 8 5 8 5   MAGNESIUM mg/dL  --   --   --   --   --   --   --  2 1       PLEASE NOTE:  This encounter was completed utilizing the [employer] Direct Speech Voice Recognition Software  Grammatical errors, random word insertions, pronoun errors and incomplete sentences are occasional consequences of the system due to software limitations, ambient noise and hardware issues  These may be missed by proof reading prior to affixing electronic signature  Any questions or concerns about the content, text or information contained within the body of this dictation should be directly addressed to the physician for clarification  Please do not hesitate to call me directly if you have any any questions or concerns

## 2022-05-11 NOTE — CASE MANAGEMENT
Case Management Discharge Planning Note    Patient name Antoinette Salinas  Location /-87 MRN [id number]  : 1960 Date 2022       Current Admission Date: 2022  Current Admission Diagnosis:Decompensated cirrhosis   Patient Active Problem List    Diagnosis Date Noted    Acute kidney injury ([facility] ) 2022    Decompensated cirrhosis 2022    Weakness of left lower extremity 2022    Acute blood loss anemia 2022    Thrombocytopenia ([facility] ) 2022    Hyponatremia 2022    Hyperbilirubinemia 2022    Pleural effusion 2022    Abnormal LFTs 2021    Personal history of colonic polyps 2021    Right lower quadrant abdominal pain 06/15/2021    Acute cystitis without hematuria 05/10/2021    Chronic pain syndrome 2020    Contusion of left foot 2020    Chronic bilateral low back pain without sciatica 2020    Lumbar spondylosis 2020    DDD (degenerative disc disease), lumbar 2020    Right lumbar radiculitis 2020    Left wrist tendonitis 2019    Nondisplaced fracture of distal end of left radius 2019    Closed compression fracture of L2 vertebra ([facility] ) 2019      LOS (days): 13  Geometric Mean LOS (GMLOS) (days): 4 70  Days to GMLOS:-8 1     OBJECTIVE:  Risk of Unplanned Readmission Score: 14 23         Current admission status: Inpatient   [facility]   [facility] [address] [place] [address],[address] [place] [id number]  Phone: [phone] Fax: [phone]    Primary Care Provider: Brendan Louis MD    Primary Insurance: [facility]  Secondary Insurance:     DISCHARGE DETAILS:  Continuing to follow patient  As per  Hospitalist, patient for transfer to [facility] when bed available  Medical Necessity form updated to today's date

## 2022-05-11 NOTE — CASE MANAGEMENT
Submitted transfer auth request to [facility] via availity, pending ref# [id number] for transfer to [facility] re: cirrhosis with ascites and needs evaluation for liver transplant; has ALEJANDRO with worsening renal failure  [facility] NPI: [id number]  Accepting Dr Manish Alvarez NPI: [id number]  Clinicals attached in availity

## 2022-05-11 NOTE — PROGRESS NOTES
[place]  Progress Note - Marcus Brown 1960, 63 y o  female MRN: [id number]  Unit/Bed#: -01 Encounter: [id number]  Primary Care Provider: Consuelo Palacio MD   Date and time admitted to hospital: 4/28/2022 10:36 AM    Acute kidney injury [facility])  Assessment & Plan  Multifactorial, related to decompensated cirrhosis, contrast exposure, low blood pressures  Cr rising again- await 5/11 labs  Hold spirionlactone  nephro re-evaluation appreciated  Cont albumin supplementation    Nephrology following  Dc'd Midodrine  Octreotide gtt dc'd  Continue to hold ARB     Pleural effusion  Assessment & Plan  Moderate left effusion noted on CT  Currently on 2 L oxygen  Respiratory protocol  Incentive spirometer  S/p diuresis due to ascites and pleural effusion    Hyperbilirubinemia  Assessment & Plan    · Bilirubin elevated to 5 69 ->9 70->17 2->20->25-->28-->29 5  · D Bili 5-> 11->15-->14 4-->15 7  · CT abdomen pelvis showing - distended gallbladder, large new ascites, hepatomegaly   · Gastroenterology following  · Hyperbilirubinemia multifactorial in the setting of resorption of thigh/ retroperitoneal hematoma and alcoholic hepatitis  · In view of Concern for alcoholic hepatitis  patient was started on oral prednisolone by gastroenterology 5/5  · Continue to follow LFTs and INR closely  · Hold further steroids per discussion with hepatology         Hyponatremia  Assessment & Plan  · Sodium on admission 131  · Likely in setting of cirrhosis  · stable   Free water restriction (1-1 5L)    spironolactone re-initiated   improving   Monitor BMP     Lab Results   Component Value Date    SODIUM 132 (L) 05/10/2022    SODIUM 129 (L) 05/09/2022    SODIUM 133 (L) 05/09/2022         Thrombocytopenia (HCC)  Assessment & Plan  · Likely in setting of severe liver disease and splenomegaly  · improving  · transfuse <50k in the setting of bleeding or < 20k if no evidence of bleeding      Acute blood loss anemia  Assessment & Plan  Presented with acute hip pain , abdominal pain, abdominal distension   CBC showing: Hgb 5 5/Hct16 3,  - macrocytic anemia    Occult heme + stool   Transfusion S/P 4 units PRBCs  o Transfuse to keep Hgb >7   Currently 7 4   Hb stablized-> has significant ecchymosis in the left back and left LE   Continue Protonix IV b i d   · Abraham need outpt f/u for screening EGD for esophageal varices    Lab Results   Component Value Date    HGB 7 4 (L) 05/11/2022    HGB 8 1 (L) 05/10/2022         Weakness of left lower extremity  Assessment & Plan  · Reason for presentation - pain started a few days ago after dog jumping on her - difficulty with ambulation and weight on left side  · Patient was found on imaging including CT scan of the abdomen and pelvis, MRI and CT scan of the lumbar spine to have large retroperitoneal and iliopsoas hematoma  · consult placed to surgery recommending if there's evidence of further bleeding IR consult for embolization, however her hb is now stable, continue to monitor  · Neurovascular checks ordered  Pulses remain intact  Pain appears better/mobility improved  · S/p FFP 2 units, 4U PRBCs and vitamin K since admission  · XR left hip/femur unremarkable  · PT/OT consult-recommending short-term rehab  · Continue pain control    * Decompensated cirrhosis  Assessment & Plan  Patient presents with acute hip pain abdominal pain abdominal distension  · Endorses history of fatty liver disease, drinks wine occasionally, never been tested for hepatitis  · Found to have significant ascites on exam    Paracentesis negative for SBP  · CT abd/pelvis - new large ascites, cirrhotic liver, hypodense area measuring 1 7 cm at junction of segment 4 and 8 in liver, distended gallbladder, splenomegaly with recanalized umbilical vein  · Underwent paracentesis 4/29 with IR - approximately 1 8 L removed  reported worsening distension  IR consulted for repeat paracentesis   Matthew Pipe   2L removed  Follow studies  · GI following  · MELD: 34  · Continue ceftriaxone for SBP prophylaxis  · lactulose   Titrate for 3-4 soft stools daily  · Re-initiated aldactone 5/3   · Continue to trend LFTs and INR daily   · Reviewed with Dr Ismael Lopez  Transfer to [address] [place] [address]  · Importance of strict alcohol cessation discussion was done   ·          VTE  Prophylaxis:   Pharmacologic: in place    Patient Centered Rounds: I have performed bedside rounds with nursing staff today  Discussions with Specialists or Other Care Team Provider: case management    Education and Discussions with Family / Patient: pt, son in law      Current Length of Stay: 13 day(s)    Current Patient Status: Inpatient        Code Status: Level 1 - Full Code      Subjective:   Pt seen  States has some pain LLE   No fever nausea  Will eat breakfast soon states    Patient is seen and examined at bedside  All other ROS are negative  Objective:     Vitals:   Temp (24hrs), Av 5 °F (36 4 °C), Min:97 3 °F (36 3 °C), Max:97 7 °F (36 5 °C)    Temp:  [97 3 °F (36 3 °C)-97 7 °F (36 5 °C)] 97 3 °F (36 3 °C)  HR:  [79-85] 85  Resp:  [15-18] 18  BP: (119-125)/(61-65) 119/61  SpO2:  [91 %-92 %] 91 %  Body mass index is 26 15 kg/m²  Input and Output Summary (last 24 hours): Intake/Output Summary (Last 24 hours) at 2022 1316  Last data filed at 5/10/2022 1850  Gross per 24 hour   Intake 180 ml   Output --   Net 180 ml       Physical Exam:       GEN: No acute distress, comfortable, frail appearing, jaundiced   HEEENT: No JVD, PERRLA,  scleral icterus  RESP: Lungs clear to auscultation bilaterally  CV: RRR, +s1/s2   ABD: SOFT NON TENDER, POSITIVE BOWEL SOUNDS, NO DISTENTION  Mild ascites  PSYCH: CALM  NEURO: A X O X 3, NO FOCAL DEFICITS  No asterixis    SKIN: NO RASH  EXTREM: NO EDEMA    Additional Data:     Labs:    Results from last 7 days   Lab Units 22  0853 22  0747 22  0525   WBC Thousand/uL 14 13*   < > 7 90 HEMOGLOBIN g/dL 7 4*   < > 8 1*   HEMATOCRIT % 22 2*   < > 23 9*   PLATELETS Thousands/uL 71*   < > 62*   BANDS PCT %  --   --  3   NEUTROS PCT % 86*   < >  --    LYMPHS PCT % 6*   < >  --    LYMPHO PCT %  --   --  7*   MONOS PCT % 6   < >  --    MONO PCT %  --   --  5   EOS PCT % 0   < > 1    < > = values in this interval not displayed  Results from last 7 days   Lab Units 05/10/22  0236   SODIUM mmol/L 132*   POTASSIUM mmol/L 4 4   CHLORIDE mmol/L 98*   CO2 mmol/L 23   BUN mg/dL 37*   CREATININE mg/dL 1 61*   ANION GAP mmol/L 11   CALCIUM mg/dL 8 6   ALBUMIN g/dL 2 0*   TOTAL BILIRUBIN mg/dL 29 80*   ALK PHOS U/L 68   ALT U/L 61   AST U/L 123*   GLUCOSE RANDOM mg/dL 139     Results from last 7 days   Lab Units 05/11/22  0853   INR  2 49*                       * I Have Reviewed All Lab Data Listed Above  Imaging:     Results for orders placed during the hospital encounter of 04/28/22    XR chest portable    Narrative  CHEST    INDICATION:   hypoxia, increase o2 requirement  COMPARISON:  4/28/2022    EXAM PERFORMED/VIEWS:  XR CHEST PORTABLE      FINDINGS:    Cardiomediastinal silhouette appears unremarkable  There is an increased left pleural effusion with overlying atelectasis  Osseous structures appear within normal limits for patient age  Impression  Increasing left pleural effusion with left basilar atelectasis  The study was marked in [facility] for immediate notification  Workstation performed: [id number]    No results found for this or any previous visit        *I have reviewed all imaging reports listed above      Recent Cultures (last 7 days):     Results from last 7 days   Lab Units 05/09/22  1359   GRAM STAIN RESULT  Rare Polys  No organisms seen   BODY FLUID CULTURE, STERILE  No growth       Last 24 Hours Medication List:   Current Facility-Administered Medications   Medication Dose Route Frequency Provider Last Rate    acetaminophen  650 mg Oral Q4H PRN Arvil Leak Slava, TRAV      albumin human  25 g Intravenous Mervat Washington MD      aluminum-magnesium hydroxide-simethicone  30 mL Oral Q6H PRN Alie Manuel, TRAV      gabapentin  100 mg Oral TID Alie Manuel, TRAV      HYDROmorphone  0 5 mg Intravenous Q1H PRN Skip Naranjo, TRAV      HYDROmorphone  1 mg Intravenous Once PRN Skip Pedersen, TRAV      influenza vaccine  0 5 mL Intramuscular Prior to discharge TRAV Fontaine      lactulose  20 g Oral BID Dilcia Centeno MD      LORazepam  1 mg Intravenous Once Skip Pedersen, TRAV      melatonin  3 mg Oral HS Dilcia Centeno MD      ondansetron  4 mg Intravenous Q4H PRN Alie Manuel, TRAV      oxyCODONE  10 mg Oral Q4H PRN Alie Manuel, TRAV      oxyCODONE  5 mg Oral Q4H PRN Alie Manuel, SAROJNP      pantoprazole  40 mg Intravenous Q12H Skip Naranjo, TRAV      prednisoLONE  40 mg Oral Daily Isi Dennis DO      senna-docusate sodium  2 tablet Oral BID PRN Alie Manuel, TRAV      sodium chloride  1 spray Each Nare Q1H PRN Dilcia Centeno MD          Today, Patient Was Seen By: Cayla Velazquez MD    ** Please Note: Dictation voice to text software may have been used in the creation of this document   **

## 2022-05-12 ENCOUNTER — APPOINTMENT (INPATIENT)
Dept: INTERVENTIONAL RADIOLOGY/VASCULAR | Facility: HOSPITAL | Age: 62
DRG: 432 | End: 2022-05-12
Attending: HOSPITALIST
Payer: COMMERCIAL

## 2022-05-12 ENCOUNTER — APPOINTMENT (INPATIENT)
Dept: CT IMAGING | Facility: HOSPITAL | Age: 62
DRG: 432 | End: 2022-05-12
Payer: COMMERCIAL

## 2022-05-12 VITALS
BODY MASS INDEX: 26.04 KG/M2 | RESPIRATION RATE: 18 BRPM | SYSTOLIC BLOOD PRESSURE: 132 MMHG | DIASTOLIC BLOOD PRESSURE: 60 MMHG | WEIGHT: 162.04 LBS | OXYGEN SATURATION: 95 % | HEIGHT: 66 IN | TEMPERATURE: 98.1 F | HEART RATE: 79 BPM

## 2022-05-12 LAB
ABO GROUP BLD BPU: NORMAL
ALBUMIN SERPL BCP-MCNC: 3 G/DL (ref 3.5–5)
ALP SERPL-CCNC: 65 U/L (ref 46–116)
ALT SERPL W P-5'-P-CCNC: 50 U/L (ref 12–78)
ANION GAP SERPL CALCULATED.3IONS-SCNC: 15 MMOL/L (ref 4–13)
APPEARANCE FLD: CLEAR
AST SERPL W P-5'-P-CCNC: 94 U/L (ref 5–45)
BASOPHILS # BLD AUTO: 0.01 THOUSANDS/ΜL (ref 0–0.1)
BASOPHILS NFR BLD AUTO: 0 % (ref 0–1)
BILIRUB SERPL-MCNC: 37.1 MG/DL (ref 0.2–1)
BPU ID: NORMAL
BUN SERPL-MCNC: 64 MG/DL (ref 5–25)
CALCIUM ALBUM COR SERPL-MCNC: 9.3 MG/DL (ref 8.3–10.1)
CALCIUM SERPL-MCNC: 8.5 MG/DL (ref 8.3–10.1)
CHLORIDE SERPL-SCNC: 96 MMOL/L (ref 100–108)
CO2 SERPL-SCNC: 21 MMOL/L (ref 21–32)
COLOR FLD: YELLOW
CREAT SERPL-MCNC: 2.7 MG/DL (ref 0.6–1.3)
CROSSMATCH: NORMAL
EOSINOPHIL # BLD AUTO: 0 THOUSAND/ΜL (ref 0–0.61)
EOSINOPHIL NFR BLD AUTO: 0 % (ref 0–6)
GFR SERPL CREATININE-BSD FRML MDRD: 18 ML/MIN/1.73SQ M
GLUCOSE SERPL-MCNC: 139 MG/DL (ref 65–140)
HCT VFR BLD AUTO: 23.7 % (ref 34.8–46.1)
HGB BLD-MCNC: 8.2 G/DL (ref 11.5–15.4)
HISTIOCYTES NFR FLD: 26 %
IMM GRANULOCYTES # BLD AUTO: 0.21 THOUSAND/UL (ref 0–0.2)
IMM GRANULOCYTES NFR BLD AUTO: 1 % (ref 0–2)
INR PPP: 2.49 (ref 0.84–1.19)
LYMPHOCYTES # BLD AUTO: 0.68 THOUSANDS/ΜL (ref 0.6–4.47)
LYMPHOCYTES NFR BLD AUTO: 23 %
LYMPHOCYTES NFR BLD AUTO: 4 % (ref 14–44)
MCH RBC QN AUTO: 36 PG (ref 26.8–34.3)
MCHC RBC AUTO-ENTMCNC: 34.6 G/DL (ref 31.4–37.4)
MCV RBC AUTO: 104 FL (ref 82–98)
MONO+MESO NFR FLD MANUAL: 5 %
MONOCYTES # BLD AUTO: 0.95 THOUSAND/ΜL (ref 0.17–1.22)
MONOCYTES NFR BLD AUTO: 5 %
MONOCYTES NFR BLD AUTO: 6 % (ref 4–12)
NEUTROPHILS # BLD AUTO: 15 THOUSANDS/ΜL (ref 1.85–7.62)
NEUTS SEG NFR BLD AUTO: 41 %
NEUTS SEG NFR BLD AUTO: 89 % (ref 43–75)
NRBC BLD AUTO-RTO: 0 /100 WBCS
PATHOLOGY REVIEW: YES
PLATELET # BLD AUTO: 68 THOUSANDS/UL (ref 149–390)
PMV BLD AUTO: 11.2 FL (ref 8.9–12.7)
POTASSIUM SERPL-SCNC: 4.7 MMOL/L (ref 3.5–5.3)
PROT SERPL-MCNC: 6.7 G/DL (ref 6.4–8.2)
PROTHROMBIN TIME: 26.3 SECONDS (ref 11.6–14.5)
RBC # BLD AUTO: 2.28 MILLION/UL (ref 3.81–5.12)
SITE: NORMAL
SODIUM SERPL-SCNC: 132 MMOL/L (ref 136–145)
TOTAL CELLS COUNTED SPEC: 100
UNIT DISPENSE STATUS: NORMAL
UNIT PRODUCT CODE: NORMAL
UNIT PRODUCT VOLUME: 350 ML
UNIT RH: NORMAL
WBC # BLD AUTO: 16.85 THOUSAND/UL (ref 4.31–10.16)
WBC # FLD MANUAL: 82 /UL

## 2022-05-12 PROCEDURE — 87205 SMEAR GRAM STAIN: CPT | Performed by: HOSPITALIST

## 2022-05-12 PROCEDURE — C9113 INJ PANTOPRAZOLE SODIUM, VIA: HCPCS | Performed by: NURSE PRACTITIONER

## 2022-05-12 PROCEDURE — 99232 SBSQ HOSP IP/OBS MODERATE 35: CPT | Performed by: INTERNAL MEDICINE

## 2022-05-12 PROCEDURE — 49083 ABD PARACENTESIS W/IMAGING: CPT | Performed by: RADIOLOGY

## 2022-05-12 PROCEDURE — 87070 CULTURE OTHR SPECIMN AEROBIC: CPT | Performed by: HOSPITALIST

## 2022-05-12 PROCEDURE — 80053 COMPREHEN METABOLIC PANEL: CPT | Performed by: HOSPITALIST

## 2022-05-12 PROCEDURE — 89051 BODY FLUID CELL COUNT: CPT | Performed by: HOSPITALIST

## 2022-05-12 PROCEDURE — 73700 CT LOWER EXTREMITY W/O DYE: CPT

## 2022-05-12 PROCEDURE — 85610 PROTHROMBIN TIME: CPT | Performed by: HOSPITALIST

## 2022-05-12 PROCEDURE — 0W9G3ZZ DRAINAGE OF PERITONEAL CAVITY, PERCUTANEOUS APPROACH: ICD-10-PCS | Performed by: RADIOLOGY

## 2022-05-12 PROCEDURE — 99233 SBSQ HOSP IP/OBS HIGH 50: CPT | Performed by: HOSPITALIST

## 2022-05-12 PROCEDURE — C1729 CATH, DRAINAGE: HCPCS

## 2022-05-12 PROCEDURE — G1004 CDSM NDSC: HCPCS

## 2022-05-12 PROCEDURE — 49083 ABD PARACENTESIS W/IMAGING: CPT

## 2022-05-12 PROCEDURE — 85025 COMPLETE CBC W/AUTO DIFF WBC: CPT | Performed by: HOSPITALIST

## 2022-05-12 RX ORDER — LACTULOSE 20 G/30ML
20 SOLUTION ORAL 3 TIMES DAILY
Status: DISCONTINUED | OUTPATIENT
Start: 2022-05-12 | End: 2022-05-12 | Stop reason: HOSPADM

## 2022-05-12 RX ORDER — LIDOCAINE WITH 8.4% SOD BICARB 0.9%(10ML)
SYRINGE (ML) INJECTION CODE/TRAUMA/SEDATION MEDICATION
Status: COMPLETED | OUTPATIENT
Start: 2022-05-12 | End: 2022-05-12

## 2022-05-12 RX ORDER — OCTREOTIDE ACETATE 100 UG/ML
100 INJECTION, SOLUTION INTRAVENOUS; SUBCUTANEOUS EVERY 8 HOURS SCHEDULED
Status: CANCELLED | OUTPATIENT
Start: 2022-05-12

## 2022-05-12 RX ORDER — LACTULOSE 20 G/30ML
20 SOLUTION ORAL 3 TIMES DAILY
Status: CANCELLED | OUTPATIENT
Start: 2022-05-12

## 2022-05-12 RX ORDER — MIDODRINE HYDROCHLORIDE 5 MG/1
2.5 TABLET ORAL
Status: DISCONTINUED | OUTPATIENT
Start: 2022-05-12 | End: 2022-05-12 | Stop reason: HOSPADM

## 2022-05-12 RX ORDER — ALBUMIN (HUMAN) 12.5 G/50ML
25 SOLUTION INTRAVENOUS EVERY 8 HOURS
Status: DISCONTINUED | OUTPATIENT
Start: 2022-05-12 | End: 2022-05-12 | Stop reason: HOSPADM

## 2022-05-12 RX ORDER — OCTREOTIDE ACETATE 100 UG/ML
100 INJECTION, SOLUTION INTRAVENOUS; SUBCUTANEOUS EVERY 8 HOURS SCHEDULED
Status: DISCONTINUED | OUTPATIENT
Start: 2022-05-12 | End: 2022-05-12 | Stop reason: HOSPADM

## 2022-05-12 RX ORDER — MIDODRINE HYDROCHLORIDE 5 MG/1
2.5 TABLET ORAL
Status: CANCELLED | OUTPATIENT
Start: 2022-05-12

## 2022-05-12 RX ORDER — ALBUMIN (HUMAN) 12.5 G/50ML
25 SOLUTION INTRAVENOUS EVERY 8 HOURS
Status: CANCELLED | OUTPATIENT
Start: 2022-05-12 | End: 2022-05-16

## 2022-05-12 RX ADMIN — PANTOPRAZOLE SODIUM 40 MG: 40 INJECTION, POWDER, FOR SOLUTION INTRAVENOUS at 04:50

## 2022-05-12 RX ADMIN — ONDANSETRON 4 MG: 2 INJECTION INTRAMUSCULAR; INTRAVENOUS at 09:34

## 2022-05-12 RX ADMIN — OXYCODONE HYDROCHLORIDE 10 MG: 5 TABLET ORAL at 00:58

## 2022-05-12 RX ADMIN — OCTREOTIDE ACETATE 100 MCG: 100 INJECTION, SOLUTION INTRAVENOUS; SUBCUTANEOUS at 16:48

## 2022-05-12 RX ADMIN — LACTULOSE 20 G: 20 SOLUTION ORAL at 16:50

## 2022-05-12 RX ADMIN — LACTULOSE 20 G: 20 SOLUTION ORAL at 09:34

## 2022-05-12 RX ADMIN — ALBUMIN (HUMAN) 25 G: 0.25 INJECTION, SOLUTION INTRAVENOUS at 04:50

## 2022-05-12 RX ADMIN — LACTULOSE 20 G: 20 SOLUTION ORAL at 20:57

## 2022-05-12 RX ADMIN — OXYCODONE HYDROCHLORIDE 10 MG: 5 TABLET ORAL at 16:48

## 2022-05-12 RX ADMIN — Medication 10 ML: at 15:16

## 2022-05-12 RX ADMIN — GABAPENTIN 100 MG: 100 CAPSULE ORAL at 09:32

## 2022-05-12 RX ADMIN — PREDNISOLONE SODIUM PHOSPHATE 40 MG: 15 SOLUTION ORAL at 09:34

## 2022-05-12 RX ADMIN — OXYCODONE HYDROCHLORIDE 10 MG: 5 TABLET ORAL at 09:32

## 2022-05-12 RX ADMIN — OXYCODONE HYDROCHLORIDE 5 MG: 5 TABLET ORAL at 20:54

## 2022-05-12 RX ADMIN — MIDODRINE HYDROCHLORIDE 2.5 MG: 5 TABLET ORAL at 16:50

## 2022-05-12 RX ADMIN — GABAPENTIN 100 MG: 100 CAPSULE ORAL at 16:50

## 2022-05-12 RX ADMIN — PANTOPRAZOLE SODIUM 40 MG: 40 INJECTION, POWDER, FOR SOLUTION INTRAVENOUS at 16:51

## 2022-05-12 RX ADMIN — ALBUMIN (HUMAN) 25 G: 0.25 INJECTION, SOLUTION INTRAVENOUS at 16:51

## 2022-05-12 RX ADMIN — GABAPENTIN 100 MG: 100 CAPSULE ORAL at 20:54

## 2022-05-12 RX ADMIN — ALBUMIN (HUMAN) 25 G: 0.25 INJECTION, SOLUTION INTRAVENOUS at 21:08

## 2022-05-12 RX ADMIN — SENNOSIDES AND DOCUSATE SODIUM 2 TABLET: 50; 8.6 TABLET ORAL at 09:32

## 2022-05-12 NOTE — ASSESSMENT & PLAN NOTE
Multifactorial, related to decompensated cirrhosis, contrast exposure, low blood pressures  Cr rising again   Hold spirionlactone  nephro re-evaluation appreciated  Cont albumin supplementation  Reinitiated on HRS cocktail  Continue to hold ARB

## 2022-05-12 NOTE — PLAN OF CARE
Problem: HEMATOLOGIC - ADULT  Goal: Maintains hematologic stability  Description: INTERVENTIONS  - Assess for signs and symptoms of bleeding or hemorrhage  - Monitor labs  - Administer supportive blood products/factors as ordered and appropriate  Outcome: Progressing     Problem: RESPIRATORY - ADULT  Goal: Achieves optimal ventilation and oxygenation  Description: INTERVENTIONS:  - Assess for changes in respiratory status  - Assess for changes in mentation and behavior  - Position to facilitate oxygenation and minimize respiratory effort  - Oxygen administered by appropriate delivery if ordered  - Initiate smoking cessation education as indicated  - Encourage broncho-pulmonary hygiene including cough, deep breathe, Incentive Spirometry  - Assess the need for suctioning and aspirate as needed  - Assess and instruct to report SOB or any respiratory difficulty  - Respiratory Therapy support as indicated  Outcome: Progressing     Problem: GASTROINTESTINAL - ADULT  Goal: Minimal or absence of nausea and/or vomiting  Description: INTERVENTIONS:  - Administer IV fluids if ordered to ensure adequate hydration  - Maintain NPO status until nausea and vomiting are resolved  - Nasogastric tube if ordered  - Administer ordered antiemetic medications as needed  - Provide nonpharmacologic comfort measures as appropriate  - Advance diet as tolerated, if ordered  - Consider nutrition services referral to assist patient with adequate nutrition and appropriate food choices  Outcome: Progressing  Goal: Maintains or returns to baseline bowel function  Description: INTERVENTIONS:  - Assess bowel function  - Encourage oral fluids to ensure adequate hydration  - Administer IV fluids if ordered to ensure adequate hydration  - Administer ordered medications as needed  - Encourage mobilization and activity  - Consider nutritional services referral to assist patient with adequate nutrition and appropriate food choices  Outcome: Progressing  Goal: Maintains adequate nutritional intake  Description: INTERVENTIONS:  - Monitor percentage of each meal consumed  - Identify factors contributing to decreased intake, treat as appropriate  - Assist with meals as needed  - Monitor I&O, weight, and lab values if indicated  - Obtain nutrition services referral as needed  Outcome: Progressing

## 2022-05-12 NOTE — BRIEF OP NOTE (RAD/CATH)
Paracentesis Procedure Note    PATIENT NAME: Tatianna Garcia  : 1960  MRN: [id number]     Pre-op Diagnosis:   1  Anemia    2  Liver cirrhosis ([address] )    3  Left hip pain    4  Pleural effusion on left    5  Hyperbilirubinemia    6  Gastrointestinal hemorrhage with melena    7  Cirrhosis of liver with ascites, unspecified hepatic cirrhosis type ([address] )    8  Left leg weakness    9  Intramuscular hematoma    10  Acute kidney failure (HCC)      Post-op Diagnosis:   1  Anemia    2  Liver cirrhosis ([address] )    3  Left hip pain    4  Pleural effusion on left    5  Hyperbilirubinemia    6  Gastrointestinal hemorrhage with melena    7  Cirrhosis of liver with ascites, unspecified hepatic cirrhosis type ([facility] )    8  Left leg weakness    9  Intramuscular hematoma    10  Acute kidney failure [facility])        Surgeon:   Yen Oneil DO  Assistants:     No qualified resident was available      Estimated Blood Loss: none  Findings: RLQ abd access    Specimens: ascites    Complications:  none    Anesthesia: local    Yen Oneil DO     Date: 2022  Time: 3:31 PM

## 2022-05-12 NOTE — PROGRESS NOTES
Progress note - Gastroenterology   Crow Segura 63 y o  female MRN: [id number]  Unit/Bed#: -01 Encounter: [id number]    ASSESSMENT and PLAN    1  Cirrhosis decompensated with ascites, pleural effusion, SBE, thrombocytopenia, hypoalbuminemia, hypoprothrombinemia, sarcopenia  Decompensated over the past 6 weeks with encephalopathy, thrombocytopenia, coagulopathy, ascites, ALEJANDRO  1800 cc paracentesis (4/28) negative for SBP   Repeat (5/9) 1400 cc, also no SBP  Diuretics on hold due to ALEJANDRO which has improved, appreciate renal input     Increasing bilirubin likely multifactorial from hematoma resorption and possible alcoholic hepatitis  Hillary Alves started 05/04/2022      No significant improvement in TBili  Actually slightly higher yesterday, however resorption of retroperitoneal hematoma may be clouding result  T bili with a m  Labs 37 10    Recommend discontinuing prednisone      High expected mortality  [facility] has accepted patient in transfer for transplant evaluation      Coagulopathy:  Did received Vit K earlier on in admission  INR rising  INR 2 49 with today's labs      2  Hepatic encephalopathy  Mental status currently stable  Cont lactulose titrated for effect of 3 soft, but formed BM/day  Patient states she is averaging 2-3 per day  Required titration of lactulose due to recently having as many as 8 bowel movements per day      3 Retroperitoneal hemorrhage     4  Anemia  Significant anemia in setting of coagulopathy and thrombocytopenia  8cm left retroperitoneal hematoma centered in ileus psoas muscle   Hemodynamically stable  Hgb stable 8 2  Patient has received 5 units PRBCs this admission  Continue to monitor for evidence of GI blood loss  Will need EGD for variceal screening      5  ETOH use  Patient admits to drinking several glasses of wine daily in remote past (10+ years ago)  Cut back to 1-2 glasses a wine every evening few years ago    Cut back further due to neurological symptoms persistent after fall and diagnosis of CSF leak  Extensive records available through [facility] and [address] [facility]      6  Ongoing LLE pain/numbness  No unilateral edema, or difference in temp on exam, however, complains of numbness and ongoing pain   -Consider LLE doppler if continues to worsen  Chief Complaint   Patient presents with    Leg Pain     Pt reports left leg pain since sunday 4/24/22 when her dog jumped on her  Took tylenol #3 prior to arrival today  SUBJECTIVE/HPI   Patient is accompanied by her daughter in her room  She states she is doing okay  She was assisted to the bedside commode  States she ate small amount of her breakfast   Feels distended in her abdomen with increased discomfort to her lower abdomen and states she is having cramping and pain in her legs  Discussed with patient and daughter transfer process to [facility] and answered any questions      /66   Pulse 84   Temp (!) 97 3 °F (36 3 °C)   Resp 18   Ht 5' 6" (1 676 m)   Wt 73 5 kg (162 lb 0 6 oz)   SpO2 (!) 89%   BMI 26 15 kg/m²     PHYSICALEXAM  General appearance: alert, appears stated age and cooperative, jaundice  Eyes: PERLLA, EOMI, no icterus, scleral icterus   Head: Normocephalic, without obvious abnormality, atraumatic  Lungs: clear to auscultation bilaterally  Heart: regular rate and rhythm, S1, S2 normal, no murmur, click, rub or gallop  Abdomen: soft, large distended abdomen, increased discomfort with palpation; bowel sounds normal; no masses,  no organomegaly  Extremities: extremities normal, atraumatic, no cyanosis or edema  Neurologic: Grossly normal    Lab Results   Component Value Date    GLUCOSE 94 05/08/2015    CALCIUM 8 5 05/12/2022     05/08/2015    K 4 7 05/12/2022    CO2 21 05/12/2022    CL 96 (L) 05/12/2022    BUN 64 (H) 05/12/2022    CREATININE 2 70 (H) 05/12/2022     Lab Results   Component Value Date    WBC 16 85 (H) 05/12/2022    HGB 8 2 (L) 05/12/2022    HCT 23 7 (L) 05/12/2022     (H) 05/12/2022    PLT 68 (L) 05/12/2022     Lab Results   Component Value Date    ALT 50 05/12/2022    AST 94 (H) 05/12/2022    GGT 81 (H) 04/30/2022    ALKPHOS 65 05/12/2022    BILITOT 0 2 05/08/2015     No results found for: AMYLASE  Lab Results   Component Value Date    LIPASE 76 04/28/2022     Lab Results   Component Value Date    IRON 97 04/28/2022    TIBC 146 (L) 04/28/2022    FERRITIN 1,238 (H) 04/28/2022     Lab Results   Component Value Date    INR 2 49 (H) 05/12/2022

## 2022-05-12 NOTE — ASSESSMENT & PLAN NOTE
Patient presents with acute hip pain abdominal pain abdominal distension  · Endorses history of fatty liver disease, drinks wine occasionally, never been tested for hepatitis  · Found to have significant ascites on exam    Paracentesis negative for SBP  · CT abd/pelvis - new large ascites, cirrhotic liver, hypodense area measuring 1 7 cm at junction of segment 4 and 8 in liver, distended gallbladder, splenomegaly with recanalized umbilical vein  · Underwent paracentesis 4/29 with IR - approximately 1 8 L removed  reported worsening distension  IR consulted for repeat paracentesis   Kendra James 2L removed  Follow studies  · GI following  · MELD> 34  · Completed ceftriaxone for SBP prophylaxis  · lactulose   Titrate for 3-4 soft stools daily  · Aldactone held  · Continue to trend LFTs and INR daily   · Reviewed with Dr Annamarie Perkins  Transfer to [address] [place] [address] arranged  Bed still pending  Dr Annamarie Perkins is re-reaching out to the hospital   · Importance of strict alcohol cessation discussion was done   · Plan paracentesis today

## 2022-05-12 NOTE — ASSESSMENT & PLAN NOTE
· Sodium on admission 131  · Likely in setting of cirrhosis  · stable   Free water restriction (1-1 5L)    Monitor BMP     Lab Results   Component Value Date    SODIUM 132 (L) 05/12/2022    SODIUM 131 (L) 05/11/2022    SODIUM 132 (L) 05/10/2022

## 2022-05-12 NOTE — PLAN OF CARE
Problem: Potential for Falls  Goal: Patient will remain free of falls  Description: INTERVENTIONS:  - Educate patient/family on patient safety including physical limitations  - Instruct patient to call for assistance with activity   - Consult OT/PT to assist with strengthening/mobility   - Keep Call bell within reach  - Keep bed low and locked with side rails adjusted as appropriate  - Keep care items and personal belongings within reach  - Initiate and maintain comfort rounds  - Make Fall Risk Sign visible to staff  - Offer Toileting every 2 Hours, in advance of need  - Initiate/Maintain bed alarm  - Obtain necessary fall risk management equipment: non slip socks  - Apply yellow socks and bracelet for high fall risk patients  - Consider moving patient to room near nurses station  Outcome: Progressing     Problem: HEMATOLOGIC - ADULT  Goal: Maintains hematologic stability  Description: INTERVENTIONS  - Assess for signs and symptoms of bleeding or hemorrhage  - Monitor labs  - Administer supportive blood products/factors as ordered and appropriate  Outcome: Progressing     Problem: Prexisting or High Potential for Compromised Skin Integrity  Goal: Skin integrity is maintained or improved  Description: INTERVENTIONS:  - Identify patients at risk for skin breakdown  - Assess and monitor skin integrity  - Assess and monitor nutrition and hydration status  - Monitor labs   - Assess for incontinence   - Turn and reposition patient  - Assist with mobility/ambulation  - Relieve pressure over bony prominences  - Avoid friction and shearing  - Provide appropriate hygiene as needed including keeping skin clean and dry  - Evaluate need for skin moisturizer/barrier cream  - Collaborate with interdisciplinary team   - Patient/family teaching  - Consider wound care consult   Outcome: Progressing     Problem: MOBILITY - ADULT  Goal: Maintain or return to baseline ADL function  Description: INTERVENTIONS:  -  Assess patient's ability to carry out ADLs; assess patient's baseline for ADL function and identify physical deficits which impact ability to perform ADLs (bathing, care of mouth/teeth, toileting, grooming, dressing, etc )  - Assess/evaluate cause of self-care deficits   - Assess range of motion  - Assess patient's mobility; develop plan if impaired  - Assess patient's need for assistive devices and provide as appropriate  - Encourage maximum independence but intervene and supervise when necessary  - Involve family in performance of ADLs  - Assess for home care needs following discharge   - Consider OT consult to assist with ADL evaluation and planning for discharge  - Provide patient education as appropriate  Outcome: Progressing  Goal: Maintains/Returns to pre admission functional level  Description: INTERVENTIONS:  - Perform BMAT or MOVE assessment daily    - Set and communicate daily mobility goal to care team and patient/family/caregiver  - Collaborate with rehabilitation services on mobility goals if consulted  - Perform Range of Motion 5 times a day  - Reposition patient every 2 hours  - Dangle patient 3 times a day  - Stand patient 3 times a day  - Ambulate patient 3 times a day  - Out of bed to chair 3 times a day   - Out of bed for meals 3 times a day  - Out of bed for toileting  - Record patient progress and toleration of activity level   Outcome: Progressing     Problem: Neurological Deficit  Goal: Neurological status is stable or improving  Description: Interventions:  - Monitor and assess patient's level of consciousness, motor function, sensory function, and level of assistance needed for ADLs  - Monitor and report changes from baseline  Collaborate with interdisciplinary team to initiate plan and implement interventions as ordered  - Provide and maintain a safe environment  - Consider seizure precautions  - Consider fall precautions  - Consider aspiration precautions    - Consider bleeding precautions  Outcome: Progressing     Problem: Activity Intolerance/Impaired Mobility  Goal: Mobility/activity is maintained at optimum level for patient  Description: Interventions:  - Assess and monitor patient  barriers to mobility and need for assistive/adaptive devices  - Assess patient's emotional response to limitations  - Collaborate with interdisciplinary team and initiate plans and interventions as ordered  - Encourage independent activity per ability   - Maintain proper body alignment  - Perform active/passive rom as tolerated/ordered  - Plan activities to conserve energy   - Turn patient as appropriate  Outcome: Progressing     Problem: Communication Impairment  Goal: Ability to express needs and understand communication  Description: Assess patient's communication skills and ability to understand information  Patient will demonstrate use of effective communication techniques, alternative methods of communication and understanding even if not able to speak  - Encourage communication and provide alternate methods of communication as needed  - Collaborate with case management/ for discharge needs  - Include patient/family/caregiver in decisions related to communication  Outcome: Progressing     Problem: Potential for Aspiration  Goal: Non-ventilated patient's risk of aspiration is minimized  Description: Assess and monitor vital signs, respiratory status, and labs (WBC)  Monitor for signs of aspiration (tachypnea, cough, rales, wheezing, cyanosis, fever)  - Assess and monitor patient's ability to swallow  - Place patient up in chair to eat if possible  - HOB up at 90 degrees to eat if unable to get patient up into chair   - Supervise patient during oral intake  - Instruct patient/ family to take small bites  - Instruct patient/ family to take small single sips when taking liquids    - Follow patient-specific strategies generated by speech pathologist   Outcome: Progressing  Goal: Ventilated patient's risk of aspiration is minimized  Description: Assess and monitor vital signs, respiratory status, airway cuff pressure, and labs (WBC)  Monitor for signs of aspiration (tachypnea, cough, rales, wheezing, cyanosis, fever)  - Elevate head of bed 30 degrees if patient has tube feeding   - Monitor tube feeding  Outcome: Progressing     Problem: Nutrition  Goal: Nutrition/Hydration status is improving  Description: Monitor and assess patient's nutrition/hydration status for malnutrition (ex- brittle hair, bruises, dry skin, pale skin and conjunctiva, muscle wasting, smooth red tongue, and disorientation)  Collaborate with interdisciplinary team and initiate plan and interventions as ordered  Monitor patient's weight and dietary intake as ordered or per policy  Utilize nutrition screening tool and intervene per policy  Determine patient's food preferences and provide high-protein, high-caloric foods as appropriate  - Assist patient with eating   - Allow adequate time for meals   - Encourage patient to take dietary supplement as ordered  - Collaborate with clinical nutritionist   - Include patient/family/caregiver in decisions related to nutrition    Outcome: Progressing     Problem: PAIN - ADULT  Goal: Verbalizes/displays adequate comfort level or baseline comfort level  Description: Interventions:  - Encourage patient to monitor pain and request assistance  - Assess pain using appropriate pain scale  - Administer analgesics based on type and severity of pain and evaluate response  - Implement non-pharmacological measures as appropriate and evaluate response  - Consider cultural and social influences on pain and pain management  - Notify physician/advanced practitioner if interventions unsuccessful or patient reports new pain  Outcome: Progressing     Problem: INFECTION - ADULT  Goal: Absence or prevention of progression during hospitalization  Description: INTERVENTIONS:  - Assess and monitor for signs and symptoms of infection  - Monitor lab/diagnostic results  - Monitor all insertion sites, i e  indwelling lines, tubes, and drains  - Monitor endotracheal if appropriate and nasal secretions for changes in amount and color  - [place] appropriate cooling/warming therapies per order  - Administer medications as ordered  - Instruct and encourage patient and family to use good hand hygiene technique  - Identify and instruct in appropriate isolation precautions for identified infection/condition  Outcome: Progressing     Problem: SAFETY ADULT  Goal: Patient will remain free of falls  Description: INTERVENTIONS:  - Educate patient/family on patient safety including physical limitations  - Instruct patient to call for assistance with activity   - Consult OT/PT to assist with strengthening/mobility   - Keep Call bell within reach  - Keep bed low and locked with side rails adjusted as appropriate  - Keep care items and personal belongings within reach  - Initiate and maintain comfort rounds  - Make Fall Risk Sign visible to staff  - Offer Toileting every 2 Hours, in advance of need  - Initiate/Maintain bed alarm  - Obtain necessary fall risk management equipment: non slip socks  - Apply yellow socks and bracelet for high fall risk patients  - Consider moving patient to room near nurses station  Outcome: Progressing     Problem: DISCHARGE PLANNING  Goal: Discharge to home or other facility with appropriate resources  Description: INTERVENTIONS:  - Identify barriers to discharge w/patient and caregiver  - Arrange for needed discharge resources and transportation as appropriate  - Identify discharge learning needs (meds, wound care, etc )  - Arrange for interpretive services to assist at discharge as needed  - Refer to Case Management Department for coordinating discharge planning if the patient needs post-hospital services based on physician/advanced practitioner order or complex needs related to functional status, cognitive ability, or social support system  Outcome: Progressing     Problem: Knowledge Deficit  Goal: Patient/family/caregiver demonstrates understanding of disease process, treatment plan, medications, and discharge instructions  Description: Complete learning assessment and assess knowledge base  Interventions:  - Provide teaching at level of understanding  - Provide teaching via preferred learning methods  Outcome: Progressing     Problem: Nutrition/Hydration-ADULT  Goal: Nutrient/Hydration intake appropriate for improving, restoring or maintaining nutritional needs  Description: Monitor and assess patient's nutrition/hydration status for malnutrition  Collaborate with interdisciplinary team and initiate plan and interventions as ordered  Monitor patient's weight and dietary intake as ordered or per policy  Utilize nutrition screening tool and intervene as necessary  Determine patient's food preferences and provide high-protein, high-caloric foods as appropriate       INTERVENTIONS:  - Monitor oral intake, urinary output, labs, and treatment plans  - Assess nutrition and hydration status and recommend course of action  - Evaluate amount of meals eaten  - Assist patient with eating if necessary   - Allow adequate time for meals  - Recommend/ encourage appropriate diets, oral nutritional supplements, and vitamin/mineral supplements  - Order, calculate, and assess calorie counts as needed  - Recommend, monitor, and adjust tube feedings and TPN/PPN based on assessed needs  - Assess need for intravenous fluids  - Provide specific nutrition/hydration education as appropriate  - Include patient/family/caregiver in decisions related to nutrition  Outcome: Progressing     Problem: NEUROSENSORY - ADULT  Goal: Achieves stable or improved neurological status  Description: INTERVENTIONS  - Monitor and report changes in neurological status  - Monitor vital signs such as temperature, blood pressure, glucose, and any other labs ordered   - Initiate measures to prevent increased intracranial pressure  - Monitor for seizure activity and implement precautions if appropriate      Outcome: Progressing     Problem: RESPIRATORY - ADULT  Goal: Achieves optimal ventilation and oxygenation  Description: INTERVENTIONS:  - Assess for changes in respiratory status  - Assess for changes in mentation and behavior  - Position to facilitate oxygenation and minimize respiratory effort  - Oxygen administered by appropriate delivery if ordered  - Initiate smoking cessation education as indicated  - Encourage broncho-pulmonary hygiene including cough, deep breathe, Incentive Spirometry  - Assess the need for suctioning and aspirate as needed  - Assess and instruct to report SOB or any respiratory difficulty  - Respiratory Therapy support as indicated  Outcome: Progressing     Problem: GASTROINTESTINAL - ADULT  Goal: Minimal or absence of nausea and/or vomiting  Description: INTERVENTIONS:  - Administer IV fluids if ordered to ensure adequate hydration  - Maintain NPO status until nausea and vomiting are resolved  - Nasogastric tube if ordered  - Administer ordered antiemetic medications as needed  - Provide nonpharmacologic comfort measures as appropriate  - Advance diet as tolerated, if ordered  - Consider nutrition services referral to assist patient with adequate nutrition and appropriate food choices  Outcome: Progressing  Goal: Maintains or returns to baseline bowel function  Description: INTERVENTIONS:  - Assess bowel function  - Encourage oral fluids to ensure adequate hydration  - Administer IV fluids if ordered to ensure adequate hydration  - Administer ordered medications as needed  - Encourage mobilization and activity  - Consider nutritional services referral to assist patient with adequate nutrition and appropriate food choices  Outcome: Progressing  Goal: Maintains adequate nutritional intake  Description: INTERVENTIONS:  - Monitor percentage of each meal consumed  - Identify factors contributing to decreased intake, treat as appropriate  - Assist with meals as needed  - Monitor I&O, weight, and lab values if indicated  - Obtain nutrition services referral as needed  Outcome: Progressing     Problem: GENITOURINARY - ADULT  Goal: Maintains or returns to baseline urinary function  Description: INTERVENTIONS:  - Assess urinary function  - Encourage oral fluids to ensure adequate hydration if ordered  - Administer IV fluids as ordered to ensure adequate hydration  - Administer ordered medications as needed  - Offer frequent toileting  - Follow urinary retention protocol if ordered  Outcome: Progressing     Problem: MUSCULOSKELETAL - ADULT  Goal: Maintain or return mobility to safest level of function  Description: INTERVENTIONS:  - Assess patient's ability to carry out ADLs; assess patient's baseline for ADL function and identify physical deficits which impact ability to perform ADLs (bathing, care of mouth/teeth, toileting, grooming, dressing, etc )  - Assess/evaluate cause of self-care deficits   - Assess range of motion  - Assess patient's mobility  - Assess patient's need for assistive devices and provide as appropriate  - Encourage maximum independence but intervene and supervise when necessary  - Involve family in performance of ADLs  - Assess for home care needs following discharge   - Consider OT consult to assist with ADL evaluation and planning for discharge  - Provide patient education as appropriate  Outcome: Progressing

## 2022-05-12 NOTE — UTILIZATION REVIEW
Continued Stay Review    Date: 5/12/22                          Current Patient Class: inpatient  Current Level of Care: med surg    HPI:61 y o  female initially admitted on 4/28/22 inpatient due to acute blood loss anemia/GI Bleed/CIrrhosis of liver with ascites  PMH of chronic CSF leak, hepatomegaly, lumbar radiculopathy, chronic pain, migraines  Has chronic NSAID use, steady alcohol use of 2 glasses wine daily  Found to have LLE weakness due  To hematoma  Ct showed large retroperitoneal and iliopsoas hematoma  No surgical intervention  Encephalopathy starting about 6 weeks prior to arrival  Has  Newly diagnosed cirrhosis and IR for paracentesis x 2  This admission transfused 2 units FFP, 5units of PRBC and given vitamin K  This admission started on lactulose  required Octreotide gtt dc on 5/3/22   Rising bilirubin, prednisolone started 5/4/22  Developed ALEJANDRO  baseline creatinine is less than 1    5/10/22 request to [facility] for transfer:  Bilirubin continues to increase despite steroid  Needs evaluation for liver transplant  Accepted by Anil Echeverria [id number], attending Dr Lexx Fatima NPI:  [id number]    5/11/22 request to [facility] ustyme for authorization  Pending ref# [id number]  Procedure 4/29/22 Paracentesis - 1800 mL monique ascites,    Procedure 5/9/22 Paracentesis : A total of 2400 milliliters of fluid was removed  Assessment/Plan:   5/12/22:  Patient has Cirrhosis decompensated with ascites/pleural effusion/SBE, thrombocytopenia, hypoalbuminemia, hypoprothrombinemia and sarcopneia  Has ALEJANDRO  Today appetite poor - small amount of breakfast consumed  Has abdominal distention with increased discomfort to lower abdomen and cramping in legs  On exam:  Scleral icterus  Abdomen large distended  Increased discomfort with palpation  Alert and oriented  Wbc 16 85   H&H 8 2/23  7  Platelets 68  AST 94  Bun 64  Creatinine 2 70  INR 2 49  Has 2 to 3 stools daily     Albumin IV completed this am   Continue Lactulose, Protonix, prednisolone  Pain control  Awaiting approval for transfer to [facility]  Vital Signs:   05/12/22 07:19:05 97 3 °F (36 3 °C) Abnormal  84 -- 128/66 87 89 % Abnormal  -- -- -- --   05/11/22 21:41:33 97 4 °F (36 3 °C) Abnormal  78 18 135/68 90 90 % -- -- -- --   05/11/22 19:03:50 97 6 °F (36 4 °C) 82 18 130/67 88 92 % -- -- -- --   05/11/22 18:56:53 -- 82 -- 130/67 88 90 % -- -- -- --   05/11/22 18:29:56 98 °F (36 7 °C) 81 18 131/65 87 91 % -- -- --        Pertinent Labs/Diagnostic Results:   IR INPATIENT Paracentesis   Final Result by Ta Foster MD (05/09 1612)   Diagnostic and therapeutic paracentesis  Workstation performed: [id number]         MRI brain wo contrast   Final Result by Sofie Lacey MD (05/02 6184)      1  No acute ischemia  2   Stable minimal bifrontal white matter changes  Differential considerations include sequela of migraine disease versus microangiopathy  Workstation performed: [id number]         XR chest portable   Final Result by Staci Vaughn MD (05/02 5237)      Increasing left pleural effusion with left basilar atelectasis  The study was marked in [facility] for immediate notification  Workstation performed: [id number]         CT lower extremity wo contrast left   Final Result by Felix Concepcion MD (04/30 1607)   Addendum 1 of 1 by Felix Concepcion MD (04/30 1607)   ADDENDUM:      IMPRESSION:      5  Marked persistent nephrograms compatible with ATN or contrast    nephropathy in the context of acute renal dysfunction  Final      1  Overall stable size of the large left left retroperitoneal hematoma centered in the iliopsoas muscle with some caudal redistribution  No findings to suggest abscess formation   Disproportionate subcutaneous stranding throughout the left lower    extremity is more likely to relate to impaired venous return secondary to mass effect from hematoma than related to cellulitis  The imaged left lower extremity is otherwise within normal limits  2   Worsening left and severe right pleural effusions with subtotal atelectasis of the left lower lobe  Worsening anasarca  3   Diffuse thickening of the small bowel and ascending colon compatible with a nonspecific enterocolitis, similar to prior study and may represent an infectious/inflammatory etiology or be related to underlying volume/oncotic status or portal    hypertension  4   Cirrhosis with sequela of portal hypertension  Mild ascites significantly improved post paracentesis  The study was marked in [facility] for significant notification  Workstation performed: [id number]         CT abdomen pelvis wo contrast   Final Result by Nawaf Way MD (04/30 1607)   Addendum 1 of 1 by Nawaf Way MD (04/30 1607)   ADDENDUM:      IMPRESSION:      5  Marked persistent nephrograms compatible with ATN or contrast    nephropathy in the context of acute renal dysfunction  Final      1  Overall stable size of the large left left retroperitoneal hematoma centered in the iliopsoas muscle with some caudal redistribution  No findings to suggest abscess formation  Disproportionate subcutaneous stranding throughout the left lower    extremity is more likely to relate to impaired venous return secondary to mass effect from hematoma than related to cellulitis  The imaged left lower extremity is otherwise within normal limits  2   Worsening left and severe right pleural effusions with subtotal atelectasis of the left lower lobe  Worsening anasarca  3   Diffuse thickening of the small bowel and ascending colon compatible with a nonspecific enterocolitis, similar to prior study and may represent an infectious/inflammatory etiology or be related to underlying volume/oncotic status or portal    hypertension  4   Cirrhosis with sequela of portal hypertension   Mild ascites significantly improved post paracentesis  The study was marked in [facility] for significant notification  Workstation performed: [id number]         MRI lumbar spine wo contrast   Final Result by Perri Torres MD (04/30 1307)      Suboptimal examination due to mild-to-moderate motion artifact  - Similar moderate size complex heterogeneous fluid collection in left iliacus musculature, likely intramuscular hematoma  - Multilevel degenerative changes of lumbar spine with suspected mild foraminal narrowing at left L4-L5 and bilateral L5-S1 given degree of motion artifact, as detailed above  - Additional chronic/incidental findings as detailed above  The study was marked in [facility] for immediate notification  Workstation performed: [id number]         XR hip/pelv 2-3 vws left if performed   Final Result by Bienvenido Villavicencio MD (04/29 1237)      No acute osseous abnormality  Workstation performed: [id number]         XR femur 2 vw left   Final Result by Bienvenido Villavicencio MD (04/29 1237)      No acute osseous abnormality  Workstation performed: [id number]         IR INPATIENT Paracentesis   Final Result by Zane Liu DO (04/29 5388)   Paracentesis  _________________________________________________________________   COMPARISON: None      PROCEDURE DETAILS:    Operators: Dr Nora Peck   Anesthesia: Local   Medications: 1% lidocaine      COMMENTS:   A preprocedure timeout was performed per [facility] protocol  The right abdomen was prepped and draped in the usual sterile fashion  5-[place] Yueh catheter was advanced using ultrasound guidance into ascites  Following drainage, the skin was cleansed and sterile dressings were applied  Workstation performed: [id number]         CT recon only lumbar spine   Final Result by Andres Hopkins MD (04/29 6284)      Chronic, mild degenerative changes and chronic moderate L2 compression fracture  No new disc herniation or high-grade stenosis compared to prior studies  Asymmetric left iliopsoas muscle enlargement of unclear etiology, new compared to prior studies  No focal mass or fluid collection  No evidence of discitis on CT  MR of the lumbar spine and pelvis is recommended for further evaluation  Workstation performed: [id number]         CTA stroke alert (head/neck)   Final Result by Cari Borjas DO (04/29 6470)   1  Mild atherosclerotic disease of the internal carotid arteries bilaterally, without evidence of flow-limiting stenosis  No evidence of carotid artery or vertebral artery dissection  2   Mild atherosclerotic changes of the supraclinoid internal carotid arteries bilaterally, without evidence of flow-limiting stenosis  3   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[place]  Native of [place] otherwise intact  No evidence of large vessel central occlusive disease involving the [place]  4   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  Follow-up neurosurgical evaluation is recommended  Consider dedicated cerebral angiography versus repeat CTA of the intracranial circulation only  5   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, consistent with developmental venous anomaly  If there is continued concern for acute cerebral ischemia, recommend follow-up MRI of the brain with diffusion-weighted sequencing  The above findings were sent via [address]  [place] [address] to the on-call stroke neurologist Dr Zac Nichols at [address] [place] [address], [address] AM on April 29, 2022  Workstation performed: [id number]         CT stroke alert brain   Final Result by Cari Borjas DO (04/29 3938)   1  Stable cerebral atrophy with chronic small vessel ischemic white matter disease        2   Improved prominence of the extra-axial space along the cerebral convexities bilaterally, left greater than right, consistent with chronic bilateral subdural hygromas  3   No acute intracranial abnormality  If there is continued concern for acute cerebral ischemia, consider follow-up MRI of the brain with diffusion-weighted sequencing  The above findings were sent via [employer] to the on-call stroke neurologist Dr Vianney Kelly at [address] [place] [address],  [address] AM on April 29, 2022  Workstation performed: [id number]         CT abdomen pelvis with contrast   Final Result by Shakir Best MD (04/28 1400)      New large ascites   Distended gallbladder   Cirrhotic liver  Splenomegaly with recanalized umbilical vein, correlate with hypertension      A faint hypodense area seen in image 24 series 2 measuring 1 7 cm at the junction of the segment 8 and 4, indeterminate May be perfusion related or focal lesion  Suggest nonemergent evaluation with the MRI      Mild thickening of the ascending colon may be due to colitis, also seen on the previous study      Moderate left effusion   The study was marked in [facility] for immediate notification  Workstation performed: [id number]         XR chest 1 view portable   Final Result by Rohit Granger MD (04/28 4847)      Mild hazy density of the left lower hemithorax may be secondary to developing airspace disease versus overlying soft tissue                    Workstation performed: [id number]         MRI pelvis bony wo and w contrast    (Results Pending)     Results from last 7 days   Lab Units 05/10/22  1424   SARS-COV-2  Negative     Results from last 7 days   Lab Units 05/12/22  0450 05/11/22  0853 05/10/22  0236 05/09/22  0525 05/08/22  0412 05/08/22  0412 05/07/22  0747 05/06/22  0525   WBC Thousand/uL 16 85* 14 13* 13 72* 12 28*  --  9 71   < > 7 90   HEMOGLOBIN g/dL 8 2* 7 4* 8 1* 7 9*  --  8 1*   < > 8 1*   HEMATOCRIT % 23 7* 22 2* 24 2* 23 7*  --  25 0*   < > 23 9*   PLATELETS Thousands/uL 68* 71* 95* 91*  --  86*   < > 62*   NEUTROS ABS Thousands/µL 15 00* 12 18* 11 84* 10 30*   < >  --   --   --    BANDS PCT %  --   --   --   --   --   --   --  3    < > = values in this interval not displayed  Results from last 7 days   Lab Units 05/12/22 0450 05/11/22 0853 05/10/22  0236 05/09/22  1537 05/09/22  0525   SODIUM mmol/L 132* 131* 132* 129* 133*   POTASSIUM mmol/L 4 7 4 4 4 4 4 6 4 7   CHLORIDE mmol/L 96* 98* 98* 97* 99*   CO2 mmol/L 21 22 23 23 25   ANION GAP mmol/L 15* 11 11 9 9   BUN mg/dL 64* 52* 37* 34* 31*   CREATININE mg/dL 2 70* 2 21* 1 61* 1 52* 1 20   EGFR ml/min/1 73sq m 18 23 34 37 49   CALCIUM mg/dL 8 5 8 3 8 6 8 5 8 9     Results from last 7 days   Lab Units 05/12/22  0450 05/11/22  0853 05/10/22  0236 05/09/22  1537 05/09/22  0525 05/07/22  0747 05/06/22  0525   AST U/L 94* 98* 123* 145* 142*   < > 125*   ALT U/L 50 51 61 69 64   < > 42   ALK PHOS U/L 65 60 68 71 71   < > 53   TOTAL PROTEIN g/dL 6 7 9 9* 8 2 7 6 7 5   < > 6 9   ALBUMIN g/dL 3 0* 2 5* 2 0* 2 1* 2 1*   < > 2 1*   TOTAL BILIRUBIN mg/dL 37 10* 35 30* 29 80* 33 50* 31 40*   < > 25 30*   BILIRUBIN DIRECT mg/dL  --   --   --   --   --   --  15 70*    < > = values in this interval not displayed       Results from last 7 days   Lab Units 05/12/22 0450 05/11/22 0853 05/10/22  0236 05/09/22  1537 05/09/22  0525 05/08/22  0412 05/07/22  0747 05/06/22  0525   GLUCOSE RANDOM mg/dL 139 111 139 148* 117 112 91 140     Results from last 7 days   Lab Units 05/12/22 0450 05/11/22  0853 05/10/22  0236   PROTIME seconds 26 3* 26 3* 24 7*   INR  2 49* 2 49* 2 30*     Results from last 7 days   Lab Units 05/06/22  0525   CRP mg/L 48 1*     Results from last 7 days   Lab Units 05/10/22  1424   INFLUENZA A PCR  Negative   INFLUENZA B PCR  Negative   RSV PCR  Negative     Results from last 7 days   Lab Units 05/09/22  1359   GRAM STAIN RESULT  Rare Polys  No organisms seen   BODY FLUID CULTURE, STERILE  No growth     Results from last 7 days   Lab Units 05/09/22  1359   TOTAL COUNTED  100   WBC FLUID /ul 73       Medications:   Scheduled Medications:  gabapentin, 100 mg, Oral, TID  lactulose, 20 g, Oral, BID  LORazepam, 1 mg, Intravenous, Once  melatonin, 3 mg, Oral, HS  pantoprazole, 40 mg, Intravenous, Q12H  prednisoLONE, 40 mg, Oral, Daily    albumin human (FLEXBUMIN) 25 % injection 25 g  Dose: 25 g  Freq: Every 8 hours Route: IV  Last Dose: 0 g (05/11/22 2230)  Start: 05/10/22 1345 End: 05/12/22 0450    Continuous IV Infusions: none      PRN Meds:  acetaminophen, 650 mg, Oral, Q4H PRN  aluminum-magnesium hydroxide-simethicone, 30 mL, Oral, Q6H PRN  HYDROmorphone, 0 5 mg, Intravenous, Q1H PRN  HYDROmorphone, 1 mg, Intravenous, Once PRN  influenza vaccine, 0 5 mL, Intramuscular, Prior to discharge  ondansetron, 4 mg, Intravenous, Q4H PRN - used x 1 5/12/22   oxyCODONE, 10 mg, Oral, Q4H PRN - used x 2 5/10/22   oxyCODONE, 5 mg, Oral, Q4H PRN  senna-docusate sodium, 2 tablet, Oral, BID PRN - used x 1 5/12/22   sodium chloride, 1 spray, Each Nare, Q1H PRN        Discharge Plan:  Request to transfer to [facility] pending  Network Utilization Review Department  ATTENTION: Please call with any questions or concerns to [phone] and carefully listen to the prompts so that you are directed to the right person  All voicemails are confidential   [employer] all requests for admission clinical reviews, approved or denied determinations and any other requests to dedicated fax number below belonging to the campus where the patient is receiving treatment   List of dedicated fax numbers for the Facilities:  [address] [place] [address] DENIALS (Administrative/Medical Necessity) [phone]   [address] [place] [address] (Maternity/NICU/Pediatrics) [phone] [address]  [address] Medical [place] [phone]     Lesli Ray [address] [place] [address] [address] [place] [address] [phone]

## 2022-05-12 NOTE — PROGRESS NOTES
Pastoral Care Progress Note    2022  Patient: Seng Luna : 1960  Admission Date & Time: 2022 1036  MRN: [id number] [facility]: [id number]                     Chaplaincy Interventions Utilized:   Empowerment: Encouraged focus on present and Encouraged self-care    Exploration: Explored hope, Explored emotional needs & resources, Explored spiritual needs & resources, and Facilitated story telling    Collaboration: Facilitated respect for spiritual/cultural practice during hospitalization    Relationship Building: Cultivated a relationship of care and support, Listened empathically, Hospitality, and Provided silent and supportive presence    Ritual: Provided prayer        Chaplaincy Outcomes Achieved:  Expressed gratitude, Expressed humor, Expressed peace, Expressed ultimate hope, Identified meaningful connections, and Spiritual resources utilized        Spiritual Coping Strategies Utilized:   Spiritual practices, Spiritual comfort, Spiritual gratitude, Spiritual meaning, and Spiritual community       22 1300   Clinical Encounter Type   Visited With Patient and family together   Routine Visit Introduction   Baptism Encounters   Baptism Needs Prayer   Patient Spiritual Encounters   Spiritual Encounter Notes Met patients est friend Lauren Maravilla in [facility] she filled me in on patient  Brought service dog and volunteer into [employer] as Glorine Crews loves animals and rescues dogs  Glorine Pema resonded well to [employer]" and gave him treats  Also met Lonny's daughter Tara Alegre there waiting for her brothers to arrive  Glosalma Mcadams and I spoke about being spiritual but not organized Voodoo and the energy that pets provide us  Spoke with Lauren Maravilla in [facility] about Reiki and spiritual support  Hue Pema has many firends and family supporting her as she awaits her next medical steps  Hue Mcadams expressed hope that her will and support will get her through this journey

## 2022-05-12 NOTE — UTILIZATION REVIEW
Criselda Stager      Discharge Summary for Savannah Stanford from [facility]

## 2022-05-12 NOTE — PROGRESS NOTES
[place]  Progress Note - [facility] Merlin 1960, 63 y o  female MRN: [id number]  Unit/Bed#: -01 Encounter: [id number]  Primary Care Provider: Gasper Schroeder MD   Date and time admitted to hospital: 4/28/2022 10:36 AM    Acute kidney injury [facility])  Assessment & Plan  Multifactorial, related to decompensated cirrhosis, contrast exposure, low blood pressures  Cr rising again   Hold spirionlactone  nephro re-evaluation appreciated  Cont albumin supplementation  Reinitiated on HRS cocktail  Continue to hold ARB     Pleural effusion  Assessment & Plan  Moderate left effusion noted on CT  Currently on 2 L oxygen  Respiratory protocol  Incentive spirometer  S/p diuresis due to ascites and pleural effusion    Hyperbilirubinemia  Assessment & Plan    · Bilirubin continues to climb  Related to decompensated cirrhosis  · CT abdomen pelvis showing - distended gallbladder, large new ascites, hepatomegaly   · Gastroenterology following  · Hyperbilirubinemia multifactorial in the setting of resorption of thigh/ retroperitoneal hematoma and alcoholic hepatitis  · In view of Concern for alcoholic hepatitis  patient was started on oral prednisolone by gastroenterology 5/4  · Continue to follow LFTs and INR closely          Hyponatremia  Assessment & Plan  · Sodium on admission 131  · Likely in setting of cirrhosis  · stable   Free water restriction (1-1 5L)    Monitor BMP     Lab Results   Component Value Date    SODIUM 132 (L) 05/12/2022    SODIUM 131 (L) 05/11/2022    SODIUM 132 (L) 05/10/2022         Thrombocytopenia (HCC)  Assessment & Plan  · Likely in setting of severe liver disease and splenomegaly  · improving  · transfuse <50k in the setting of bleeding or < 20k if no evidence of bleeding      Acute blood loss anemia  Assessment & Plan  Presented with acute hip pain , abdominal pain, abdominal distension   CBC showing: Hgb 5 5/Hct16 3,  - macrocytic anemia    Occult heme + stool   Transfusion S/P 4 units PRBCs  o Transfuse to keep Hgb >7   Currently >8   Hb stablized-> has significant ecchymosis in the left back and left LE   Continue Protonix IV b i d   · Abraham need outpt f/u for screening EGD for esophageal varices    Lab Results   Component Value Date    HGB 8 2 (L) 05/12/2022    HGB 7 4 (L) 05/11/2022         Weakness of left lower extremity  Assessment & Plan  · Reason for presentation - pain started a few days ago after dog jumping on her - difficulty with ambulation and weight on left side  · Patient was found on imaging including CT scan of the abdomen and pelvis, MRI and CT scan of the lumbar spine to have large retroperitoneal and iliopsoas hematoma  · consult placed to surgery recommending if there's evidence of further bleeding IR consult for embolization, however her hb is now stable, continue to monitor  · Neurovascular checks ordered  Pulses remain intact  Pain appears better/mobility improved  · S/p FFP 2 units, 4U PRBCs and vitamin K since admission  · XR left hip/femur unremarkable  · PT/OT consult-recommending short-term rehab  · Continue pain control    * Decompensated cirrhosis  Assessment & Plan  Patient presents with acute hip pain abdominal pain abdominal distension  · Endorses history of fatty liver disease, drinks wine occasionally, never been tested for hepatitis  · Found to have significant ascites on exam    Paracentesis negative for SBP  · CT abd/pelvis - new large ascites, cirrhotic liver, hypodense area measuring 1 7 cm at junction of segment 4 and 8 in liver, distended gallbladder, splenomegaly with recanalized umbilical vein  · Underwent paracentesis 4/29 with IR - approximately 1 8 L removed  reported worsening distension  IR consulted for repeat paracentesis   Xu [place] 2L removed  Follow studies  · GI following  · MELD> 34  · Completed ceftriaxone for SBP prophylaxis  · lactulose     Titrate for 3-4 soft stools daily  · Aldactone held  · Continue to trend LFTs and INR daily   · Reviewed with Dr Karishma De Los Santos  Transfer to [address] [place] [address] arranged  Bed still pending  Dr Karishma De Los Santos is re-reaching out to the hospital   · Importance of strict alcohol cessation discussion was done   · Plan paracentesis today  VTE  Prophylaxis:   Pharmacologic: in place    Patient Centered Rounds: I have performed bedside rounds with nursing staff today  Discussions with Specialists or Other Care Team Provider: case management    Education and Discussions with Family / Patient: pt daughter at bedside    Current Length of Stay: 15 day(s)    Current Patient Status: Inpatient        Code Status: Level 1 - Full Code      Subjective:   Pt seen   Today with tremors some encephalopathy  Reports ascites is building up  Pt daughter at bedside  No fevers overnight    Patient is seen and examined at bedside  All other ROS are negative  Objective:     Vitals:   Temp (24hrs), Av 6 °F (36 4 °C), Min:97 3 °F (36 3 °C), Max:98 °F (36 7 °C)    Temp:  [97 3 °F (36 3 °C)-98 °F (36 7 °C)] 97 3 °F (36 3 °C)  HR:  [78-84] 84  Resp:  [18] 18  BP: (118-135)/(60-68) 128/66  SpO2:  [89 %-95 %] 89 %  Body mass index is 26 15 kg/m²  Input and Output Summary (last 24 hours): Intake/Output Summary (Last 24 hours) at 2022 1347  Last data filed at 2022 2145  Gross per 24 hour   Intake 350 ml   Output --   Net 350 ml       Physical Exam:       GEN: No acute distress, comfortable, acutely ill, profoundly jaundiced  Muscle wasting  HEEENT: No JVD, PERRLA, +icteric  RESP: Lungs clear to auscultation bilaterally  CV: RRR, +s1/s2   ABD: SOFT ascites present  POSITIVE BOWEL SOUNDS, NO DISTENTION  PSYCH: CALM  NEURO: tremors  Mild confusion     SKIN: NO RASH  EXTREM: NO EDEMA    Additional Data:     Labs:    Results from last 7 days   Lab Units 22  0450 22  0747 22  0525   WBC Thousand/uL 16 85*   < > 7 90   HEMOGLOBIN g/dL 8 2*   < > 8 1*   HEMATOCRIT % 23 7*   < > 23 9*   PLATELETS Thousands/uL 68*   < > 62*   BANDS PCT %  --   --  3   NEUTROS PCT % 89*   < >  --    LYMPHS PCT % 4*   < >  --    LYMPHO PCT %  --   --  7*   MONOS PCT % 6   < >  --    MONO PCT %  --   --  5   EOS PCT % 0   < > 1    < > = values in this interval not displayed  Results from last 7 days   Lab Units 05/12/22  0450   SODIUM mmol/L 132*   POTASSIUM mmol/L 4 7   CHLORIDE mmol/L 96*   CO2 mmol/L 21   BUN mg/dL 64*   CREATININE mg/dL 2 70*   ANION GAP mmol/L 15*   CALCIUM mg/dL 8 5   ALBUMIN g/dL 3 0*   TOTAL BILIRUBIN mg/dL 37 10*   ALK PHOS U/L 65   ALT U/L 50   AST U/L 94*   GLUCOSE RANDOM mg/dL 139     Results from last 7 days   Lab Units 05/12/22  0450   INR  2 49*                       * I Have Reviewed All Lab Data Listed Above  Imaging:     Results for orders placed during the hospital encounter of 04/28/22    XR chest portable    Narrative  CHEST    INDICATION:   hypoxia, increase o2 requirement  COMPARISON:  4/28/2022    EXAM PERFORMED/VIEWS:  XR CHEST PORTABLE      FINDINGS:    Cardiomediastinal silhouette appears unremarkable  There is an increased left pleural effusion with overlying atelectasis  Osseous structures appear within normal limits for patient age  Impression  Increasing left pleural effusion with left basilar atelectasis  The study was marked in [facility] for immediate notification  Workstation performed: [id number]    No results found for this or any previous visit        *I have reviewed all imaging reports listed above      Recent Cultures (last 7 days):     Results from last 7 days   Lab Units 05/09/22  1359   GRAM STAIN RESULT  Rare Polys  No organisms seen   BODY FLUID CULTURE, STERILE  No growth       Last 24 Hours Medication List:   Current Facility-Administered Medications   Medication Dose Route Frequency Provider Last Rate    acetaminophen  650 mg Oral Q4H PRN Azeem Getting, CRNP      albumin human  25 g Intravenous Q8H Swomya MD Moshe      aluminum-magnesium hydroxide-simethicone  30 mL Oral Q6H PRN TRAV Levine      gabapentin  100 mg Oral TID TRAV Levine      HYDROmorphone  0 5 mg Intravenous Q1H PRN [facility] Jose A, CRNP      HYDROmorphone  1 mg Intravenous Once PRN TRAV Levine      influenza vaccine  0 5 mL Intramuscular Prior to discharge TRAV Levine      lactulose  20 g Oral TID Jeanette Fuentes MD      LORazepam  1 mg Intravenous Once TRAV Levine      melatonin  3 mg Oral HS Bryce Joseph MD      midodrine  2 5 mg Oral TID AC Adrian Infante MD      octreotide  100 mcg Subcutaneous Q8H [facility] Adrian Infante MD      ondansetron  4 mg Intravenous Q4H PRN TRAV Levine      oxyCODONE  10 mg Oral Q4H PRN TRAV Levine      oxyCODONE  5 mg Oral Q4H PRN TRAV Levine      pantoprazole  40 mg Intravenous Q12H [facility] Jose A, CRNP      prednisoLONE  40 mg Oral Daily DO Waqas Holloway-docusate sodium  2 tablet Oral BID PRN TRAV Levine      sodium chloride  1 spray Each Nare Q1H PRN Bryce Joseph MD          Today, Patient Was Seen By: Jeanette Fuentes MD    ** Please Note: Dictation voice to text software may have been used in the creation of this document   **

## 2022-05-12 NOTE — SEDATION DOCUMENTATION
2550ml clear yellow fluid removed from paracentesis  Specimen sent to lab  Band aid to site  Patient tolerated well and transferred back to room  Report and care assumed by primary RN

## 2022-05-12 NOTE — PROGRESS NOTES
NEPHROLOGY PROGRESS NOTE   Harman Curiel 63 y o  female MRN: [id number]  Unit/Bed#: -01 Encounter: [id number]  Reason for Consult: ALEJANDRO      SUMMARY:    65-year-old female with history of chronic subdural hematoma, fatty liver disease, temporal arteritis, chronic CSF leak, was initially admitted at [facility] for low hemoglobin of 5 5 with a retroperitoneal bleed and left iliopsoas muscle hematoma along with decompensated cirrhosis   We were initially consult for acute kidney injury which had improved and was subsequently signed off   We were asked to re-evaluate this patient for worsening renal function over the last few days         ASSESSMENT and PLAN:     Acute kidney injury/worsening renal function  --baseline creatinine  Less than 1 mg/dL  --recent acute kidney injury, in the setting of acute anemia, auto regulatory dysfunction, low blood pressure as well as contrast associated nephropathy, which resolved and improved back to baseline  --renal function started to worsen in the last 48 hours  --renal function continues to worsen, creatinine up to 2 7 mg/dL with oliguria  --underwent a paracentesis 2 4 L removed May 9th, plan for repeat paracentesis today  --blood pressure has been stage  --continue to hold diuretic  --poor oral intake  --suspected acute tubular necrosis versus hepatorenal syndrome given the poor response to colloid expansion less supportive of prerenal azotemia  --continue IV albumin 25% 25 g every 8 hours, and please supplement additional IV albumin for any large volume paracentesis  --start octreotide 100 mcg every 8 hours, start midodrine 2 5 mg t i d   --it should be noted that the creatinine will also be under generated in the setting of cirrhosis and I suspect her kidney function to be much worse  --discussed with gastroenterology  --awaiting transfer to [facility] to be evaluated by the liver transplant team  --without a transplant the overall prognosis is quite poor  I would not recommend dialysis unless patient was plan to get a liver transplant     Hyponatremia  --chronic  --the setting of poor effective circulating volume  --fluid restriction  --colloid expansion  --sodium level stable     Anemia  --acute on chronic  --admission hemoglobin 5 5  --status post 4 units blood transfusion  --was evaluated by Gastroenterology  --hemoglobin stable at 8 2  --concurrent thrombocytopenia in the setting of liver disease and splenomegaly     Decompensated cirrhosis  --tentatively plan for transfer to [facility] for transplant evaluation, awaiting a bed      SUBJECTIVE / INTERVAL HISTORY:    Not having as much bowel movements  No chest pain or shortness of breath abdomen is getting more distended    OBJECTIVE:  Current Weight: Weight - Scale: 73 5 kg (162 lb 0 6 oz)  Vitals:    05/11/22 1856 05/11/22 1903 05/11/22 2141 05/12/22 0719   BP: 130/67 130/67 135/68 128/66   Pulse: 82 82 78 84   Resp:  18 18    Temp:  97 6 °F (36 4 °C) (!) 97 4 °F (36 3 °C) (!) 97 3 °F (36 3 °C)   TempSrc:       SpO2: 90% 92% 90% (!) 89%   Weight:       Height:           Intake/Output Summary (Last 24 hours) at 5/12/2022 1317  Last data filed at 5/11/2022 2145  Gross per 24 hour   Intake 350 ml   Output --   Net 350 ml       Review of Systems:    12 point ROS has been reviewed  Physical Exam  Vitals and nursing note reviewed  Exam conducted with a chaperone present  Constitutional:       General: She is not in acute distress  Appearance: She is well-developed  She is not diaphoretic  HENT:      Head: Normocephalic and atraumatic  Eyes:      General: Scleral icterus present  Right eye: No discharge  Left eye: No discharge  Pupils: Pupils are equal, round, and reactive to light  Cardiovascular:      Rate and Rhythm: Normal rate and regular rhythm  Heart sounds: Normal heart sounds  No murmur heard  No friction rub  No gallop  Pulmonary:      Effort: Pulmonary effort is normal  No respiratory distress  Breath sounds: Normal breath sounds  No wheezing or rales  Chest:      Chest wall: No tenderness  Abdominal:      General: Bowel sounds are normal  There is distension  Palpations: Abdomen is soft  Tenderness: There is no abdominal tenderness  There is no rebound  Musculoskeletal:         General: Swelling present  Normal range of motion  Cervical back: Normal range of motion and neck supple  Right lower leg: Edema present  Left lower leg: Edema present  Skin:     Coloration: Skin is jaundiced  Findings: No rash  Neurological:      Mental Status: She is alert and oriented to person, place, and time           Medications:    Current Facility-Administered Medications:     acetaminophen (TYLENOL) tablet 650 mg, 650 mg, Oral, Q4H PRN, TRAV Ross, 650 mg at 05/06/22 1757    albumin human (FLEXBUMIN) 25 % injection 25 g, 25 g, Intravenous, Q8H, Bernadette Jefferson MD    aluminum-magnesium hydroxide-simethicone (MYLANTA) oral suspension 30 mL, 30 mL, Oral, Q6H PRN, TRAV Ross    gabapentin (NEURONTIN) capsule 100 mg, 100 mg, Oral, TID, TRAV Ross, 100 mg at 05/12/22 0932    HYDROmorphone (DILAUDID) injection 0 5 mg, 0 5 mg, Intravenous, Q1H PRN, TRAV Ross, 0 5 mg at 05/08/22 1601    HYDROmorphone (DILAUDID) injection 1 mg, 1 mg, Intravenous, Once PRN, TRAV Fontaine    influenza vaccine, recombinant, quadrivalent (FLUBLOK) IM injection 0 5 mL, 0 5 mL, Intramuscular, Prior to discharge, TRAV Fontaine    lactulose oral solution 20 g, 20 g, Oral, BID, Dilcia Centeno MD, 20 g at 05/12/22 0934    LORazepam (ATIVAN) injection 1 mg, 1 mg, Intravenous, Once, TRAV Fontaine    melatonin tablet 3 mg, 3 mg, Oral, HS, Dilcia Centeno MD, 3 mg at 05/11/22 2107    midodrine (PROAMATINE) tablet 2 5 mg, 2 5 mg, Oral, TID AC, Ashlia Nurys Mora MD    octreotide (SandoSTATIN) injection 100 mcg, 100 mcg, Subcutaneous, Q8H [address], Basilio Enriquez MD    ondansetron [facility]) injection 4 mg, 4 mg, Intravenous, Q4H PRN, TRAV Wheeler, 4 mg at 05/12/22 0934    oxyCODONE (ROXICODONE) IR tablet 10 mg, 10 mg, Oral, Q4H PRN, TRAV Wheeler, 10 mg at 05/12/22 0932    oxyCODONE (ROXICODONE) IR tablet 5 mg, 5 mg, Oral, Q4H PRN, TRAV Wheeler, 5 mg at 05/11/22 1852    pantoprazole (PROTONIX) injection 40 mg, 40 mg, Intravenous, Q12H, TRAV Wheeler, 40 mg at 05/12/22 0450    prednisoLONE (ORAPRED) oral solution 40 mg, 40 mg, Oral, Daily, Herber Serene, DO, 40 mg at 05/12/22 0934    senna-docusate sodium (SENOKOT S) 8 6-50 mg per tablet 2 tablet, 2 tablet, Oral, BID PRN, TRVA Wheeler, 2 tablet at 05/12/22 0932    sodium chloride (OCEAN) 0 65 % nasal spray 1 spray, 1 spray, Each Nare, Q1H PRN, Ck Velasco MD    Laboratory Results:  Results from last 7 days   Lab Units 05/12/22  0450 05/11/22  8582 05/10/22  0236 05/09/22  1537 05/09/22  0525 05/08/22  0412 05/07/22  0747 05/06/22  0525   WBC Thousand/uL 16 85* 14 13* 13 72*  --  12 28* 9 71 10 42* 7 90   HEMOGLOBIN g/dL 8 2* 7 4* 8 1*  --  7 9* 8 1* 7 8* 8 1*   HEMATOCRIT % 23 7* 22 2* 24 2*  --  23 7* 25 0* 23 4* 23 9*   PLATELETS Thousands/uL 68* 71* 95*  --  91* 86* 80* 62*   POTASSIUM mmol/L 4 7 4 4 4 4 4 6 4 7 3 9 4 1 4 2   CHLORIDE mmol/L 96* 98* 98* 97* 99* 100 99* 102   CO2 mmol/L 21 22 23 23 25 26 25 25   BUN mg/dL 64* 52* 37* 34* 31* 24 23 20   CREATININE mg/dL 2 70* 2 21* 1 61* 1 52* 1 20 1 10 1 02 1 02   CALCIUM mg/dL 8 5 8 3 8 6 8 5 8 9 8 5 8 5 8 5       PLEASE NOTE:  This encounter was completed utilizing the [employer] Direct Speech Voice Recognition Software   Grammatical errors, random word insertions, pronoun errors and incomplete sentences are occasional consequences of the system due to software limitations, ambient noise and hardware issues  These may be missed by proof reading prior to affixing electronic signature  Any questions or concerns about the content, text or information contained within the body of this dictation should be directly addressed to the physician for clarification  Please do not hesitate to call me directly if you have any any questions or concerns

## 2022-05-12 NOTE — ASSESSMENT & PLAN NOTE
· Bilirubin continues to climb  Related to decompensated cirrhosis  · CT abdomen pelvis showing - distended gallbladder, large new ascites, hepatomegaly   · Gastroenterology following  · Hyperbilirubinemia multifactorial in the setting of resorption of thigh/ retroperitoneal hematoma and alcoholic hepatitis  · In view of Concern for alcoholic hepatitis  patient was started on oral prednisolone by gastroenterology 5/4  · Continue to follow LFTs and INR closely

## 2022-05-12 NOTE — ASSESSMENT & PLAN NOTE
Presented with acute hip pain , abdominal pain, abdominal distension   CBC showing: Hgb 5 5/Hct16 3,  - macrocytic anemia    Occult heme + stool   Transfusion S/P 4 units PRBCs  o Transfuse to keep Hgb >7    Currently >8   Hb stablized-> has significant ecchymosis in the left back and left LE   Continue Protonix IV b i d   · Abraham need outpt f/u for screening EGD for esophageal varices    Lab Results   Component Value Date    HGB 8 2 (L) 05/12/2022    HGB 7 4 (L) 05/11/2022

## 2022-05-13 LAB
BACTERIA SPEC BFLD CULT: NO GROWTH
GRAM STN SPEC: NORMAL
GRAM STN SPEC: NORMAL
PATHOLOGIST INTERPRETATION: NORMAL

## 2022-05-13 NOTE — NURSING NOTE
Patient discharge to acute care facility [facility]  Patient transported by EMS- stretcher  Patient in stable condition  Report called in- by previous nurse

## 2022-05-13 NOTE — PLAN OF CARE
Problem: HEMATOLOGIC - ADULT  Goal: Maintains hematologic stability  Description: INTERVENTIONS  - Assess for signs and symptoms of bleeding or hemorrhage  - Monitor labs  - Administer supportive blood products/factors as ordered and appropriate  Outcome: Progressing     Problem: Potential for Falls  Goal: Patient will remain free of falls  Description: INTERVENTIONS:  - Educate patient/family on patient safety including physical limitations  - Instruct patient to call for assistance with activity   - Consult OT/PT to assist with strengthening/mobility   - Keep Call bell within reach  - Keep bed low and locked with side rails adjusted as appropriate  - Keep care items and personal belongings within reach  - Initiate and maintain comfort rounds  - Make Fall Risk Sign visible to staff  - Offer Toileting every 2 Hours, in advance of need  - Initiate/Maintain bed alarm  - Obtain necessary fall risk management equipment: non slip socks  - Apply yellow socks and bracelet for high fall risk patients  - Consider moving patient to room near nurses station  Outcome: Progressing     Problem: NEUROSENSORY - ADULT  Goal: Achieves stable or improved neurological status  Description: INTERVENTIONS  - Monitor and report changes in neurological status  - Monitor vital signs such as temperature, blood pressure, glucose, and any other labs ordered   - Initiate measures to prevent increased intracranial pressure  - Monitor for seizure activity and implement precautions if appropriate      Outcome: Progressing     Problem: GASTROINTESTINAL - ADULT  Goal: Minimal or absence of nausea and/or vomiting  Description: INTERVENTIONS:  - Administer IV fluids if ordered to ensure adequate hydration  - Maintain NPO status until nausea and vomiting are resolved  - Nasogastric tube if ordered  - Administer ordered antiemetic medications as needed  - Provide nonpharmacologic comfort measures as appropriate  - Advance diet as tolerated, if ordered  - Consider nutrition services referral to assist patient with adequate nutrition and appropriate food choices  Outcome: Progressing  Goal: Maintains or returns to baseline bowel function  Description: INTERVENTIONS:  - Assess bowel function  - Encourage oral fluids to ensure adequate hydration  - Administer IV fluids if ordered to ensure adequate hydration  - Administer ordered medications as needed  - Encourage mobilization and activity  - Consider nutritional services referral to assist patient with adequate nutrition and appropriate food choices  Outcome: Progressing  Goal: Maintains adequate nutritional intake  Description: INTERVENTIONS:  - Monitor percentage of each meal consumed  - Identify factors contributing to decreased intake, treat as appropriate  - Assist with meals as needed  - Monitor I&O, weight, and lab values if indicated  - Obtain nutrition services referral as needed  Outcome: Progressing

## 2022-05-15 LAB
BACTERIA SPEC BFLD CULT: NO GROWTH
GRAM STN SPEC: NORMAL
GRAM STN SPEC: NORMAL

## 2022-05-18 ENCOUNTER — TELEPHONE (OUTPATIENT)
Dept: GASTROENTEROLOGY | Facility: CLINIC | Age: 62
End: 2022-05-18

## 2022-05-18 NOTE — TELEPHONE ENCOUNTER
Dr Zakiya Holt from [facility] Liver transplant called requesting to speak to you regarding patient  Please call [phone]

## 2022-06-29 ENCOUNTER — HOME HEALTH ADMISSION (OUTPATIENT)
Dept: HOME HEALTH SERVICES | Facility: HOME HEALTHCARE | Age: 62
End: 2022-06-29
Payer: COMMERCIAL

## 2022-06-29 ENCOUNTER — TRANSCRIBE ORDERS (OUTPATIENT)
Dept: HOME HEALTH SERVICES | Facility: HOME HEALTHCARE | Age: 62
End: 2022-06-29

## 2022-06-29 DIAGNOSIS — K74.60 HEPATIC CIRRHOSIS, UNSPECIFIED HEPATIC CIRRHOSIS TYPE, UNSPECIFIED WHETHER ASCITES PRESENT (HCC): Primary | ICD-10-CM

## 2022-06-30 ENCOUNTER — HOME CARE VISIT (OUTPATIENT)
Dept: HOME HEALTH SERVICES | Facility: HOME HEALTHCARE | Age: 62
End: 2022-06-30
Payer: COMMERCIAL

## 2022-06-30 VITALS
SYSTOLIC BLOOD PRESSURE: 120 MMHG | DIASTOLIC BLOOD PRESSURE: 68 MMHG | RESPIRATION RATE: 16 BRPM | TEMPERATURE: 96.8 F | WEIGHT: 147 LBS | BODY MASS INDEX: 23.73 KG/M2 | OXYGEN SATURATION: 95 % | HEART RATE: 88 BPM

## 2022-06-30 PROCEDURE — 400013 VN SOC

## 2022-06-30 PROCEDURE — G0299 HHS/HOSPICE OF RN EA 15 MIN: HCPCS

## 2022-07-01 ENCOUNTER — HOME CARE VISIT (OUTPATIENT)
Dept: HOME HEALTH SERVICES | Facility: HOME HEALTHCARE | Age: 62
End: 2022-07-01
Payer: COMMERCIAL

## 2022-07-01 PROCEDURE — G0151 HHCP-SERV OF PT,EA 15 MIN: HCPCS

## 2022-07-01 NOTE — CASE COMMUNICATION
Physical therapy evaluation completed  Patient is unable to amb due to LLE paralysis from hematoma  She requires mod assist for stand pivot and sit pivot transfers  Physical therapy will see patient 1 w 1 and 2 w 3 for transfer training, ther exercises and HEP

## 2022-07-05 ENCOUNTER — HOME CARE VISIT (OUTPATIENT)
Dept: HOME HEALTH SERVICES | Facility: HOME HEALTHCARE | Age: 62
End: 2022-07-05
Payer: COMMERCIAL

## 2022-07-05 NOTE — CASE COMMUNICATION
OT received a voicemail from pt daughter, Ms Yuri Stokes, reporting that the pt's labs were off and would need to be readmitted for a treatment they anticipate will take a couple days at [facility] in [place]

## 2022-07-06 ENCOUNTER — HOME CARE VISIT (OUTPATIENT)
Dept: HOME HEALTH SERVICES | Facility: HOME HEALTHCARE | Age: 62
End: 2022-07-06
Payer: COMMERCIAL

## 2022-07-08 ENCOUNTER — HOME CARE VISIT (OUTPATIENT)
Dept: HOME HEALTH SERVICES | Facility: HOME HEALTHCARE | Age: 62
End: 2022-07-08
Payer: COMMERCIAL

## 2022-07-11 ENCOUNTER — HOME CARE VISIT (OUTPATIENT)
Dept: HOME HEALTH SERVICES | Facility: HOME HEALTHCARE | Age: 62
End: 2022-07-11
Payer: COMMERCIAL

## 2022-07-14 ENCOUNTER — HOME CARE VISIT (OUTPATIENT)
Dept: HOME HEALTH SERVICES | Facility: HOME HEALTHCARE | Age: 62
End: 2022-07-14
Payer: COMMERCIAL

## 2022-07-14 NOTE — CASE COMMUNICATION
called patient on 7 14 22 around 9am and left voicemail requesting pt return call to resume services post hospitalization or inform discipline pt is no longer in need of services

## 2022-07-16 ENCOUNTER — HOME CARE VISIT (OUTPATIENT)
Dept: HOME HEALTH SERVICES | Facility: HOME HEALTHCARE | Age: 62
End: 2022-07-16
Payer: COMMERCIAL

## 2022-07-16 VITALS
HEART RATE: 78 BPM | OXYGEN SATURATION: 96 % | RESPIRATION RATE: 18 BRPM | HEIGHT: 66 IN | DIASTOLIC BLOOD PRESSURE: 78 MMHG | SYSTOLIC BLOOD PRESSURE: 130 MMHG | TEMPERATURE: 97 F | BODY MASS INDEX: 23.73 KG/M2

## 2022-07-16 PROCEDURE — G0299 HHS/HOSPICE OF RN EA 15 MIN: HCPCS

## 2022-07-16 NOTE — CASE COMMUNICATION
[facility] resumed your patient to [address] [place] [address] service with the following disciplines:      SN    This report is informational only, no responses is needed- phyician notified via phone message  Primary focus of home health care: GIGU sp hospitalization for liver transplant rejection and ALEJANDRO  Patient stated goals of care: remain at home and not require further hospitalization  Anticipated visit pattern and next visit date: 1w1, 2w6 n ext visit: 7/19/22    See medication list - meds in home differ from AVS: NA home meds reflect AVS    Significant clinical findings: 7-8/10 pain in LLE not relieved by gabapentin  not consistently taking tramadol  prescribed BID PRN  encouraged use if needed  Potential barriers to goal achievement: functional mobility deficit, pain, complexity of care    Thank you for allowing us to participate in the care of your patient        Anuradha Bhagat RN with [facility]

## 2022-07-19 ENCOUNTER — HOME CARE VISIT (OUTPATIENT)
Dept: HOME HEALTH SERVICES | Facility: HOME HEALTHCARE | Age: 62
End: 2022-07-19
Payer: COMMERCIAL

## 2022-07-19 VITALS — SYSTOLIC BLOOD PRESSURE: 140 MMHG | DIASTOLIC BLOOD PRESSURE: 82 MMHG

## 2022-07-19 VITALS — HEART RATE: 80 BPM | DIASTOLIC BLOOD PRESSURE: 84 MMHG | OXYGEN SATURATION: 98 % | SYSTOLIC BLOOD PRESSURE: 147 MMHG

## 2022-07-19 PROCEDURE — G0151 HHCP-SERV OF PT,EA 15 MIN: HCPCS

## 2022-07-19 PROCEDURE — G0152 HHCP-SERV OF OT,EA 15 MIN: HCPCS

## 2022-07-20 ENCOUNTER — HOME CARE VISIT (OUTPATIENT)
Dept: HOME HEALTH SERVICES | Facility: HOME HEALTHCARE | Age: 62
End: 2022-07-20
Payer: COMMERCIAL

## 2022-07-20 VITALS
WEIGHT: 132 LBS | DIASTOLIC BLOOD PRESSURE: 90 MMHG | RESPIRATION RATE: 16 BRPM | HEART RATE: 83 BPM | BODY MASS INDEX: 21.31 KG/M2 | TEMPERATURE: 98.1 F | OXYGEN SATURATION: 95 % | SYSTOLIC BLOOD PRESSURE: 152 MMHG

## 2022-07-20 PROCEDURE — G0299 HHS/HOSPICE OF RN EA 15 MIN: HCPCS

## 2022-07-20 NOTE — CASE COMMUNICATION
Pt currently limited by complex medical hx, L LE hemiparesis, and decreased functional activity tolerance  Pt feels comfortable with the education of recommendations provided this date although she does not need any further OT at this time  She will notify a [facility] staff member in on the case if something comes up  Eval only this date

## 2022-07-22 ENCOUNTER — HOME CARE VISIT (OUTPATIENT)
Dept: HOME HEALTH SERVICES | Facility: HOME HEALTHCARE | Age: 62
End: 2022-07-22
Payer: COMMERCIAL

## 2022-07-22 VITALS
OXYGEN SATURATION: 93 % | DIASTOLIC BLOOD PRESSURE: 68 MMHG | RESPIRATION RATE: 16 BRPM | SYSTOLIC BLOOD PRESSURE: 110 MMHG | TEMPERATURE: 97.4 F | HEART RATE: 82 BPM

## 2022-07-22 PROCEDURE — G0299 HHS/HOSPICE OF RN EA 15 MIN: HCPCS

## 2022-07-26 ENCOUNTER — HOME CARE VISIT (OUTPATIENT)
Dept: HOME HEALTH SERVICES | Facility: HOME HEALTHCARE | Age: 62
End: 2022-07-26
Payer: COMMERCIAL

## 2022-07-26 VITALS — DIASTOLIC BLOOD PRESSURE: 70 MMHG | SYSTOLIC BLOOD PRESSURE: 112 MMHG

## 2022-07-26 PROCEDURE — G0151 HHCP-SERV OF PT,EA 15 MIN: HCPCS

## 2022-07-27 ENCOUNTER — HOME CARE VISIT (OUTPATIENT)
Dept: HOME HEALTH SERVICES | Facility: HOME HEALTHCARE | Age: 62
End: 2022-07-27
Payer: COMMERCIAL

## 2022-07-27 VITALS
HEART RATE: 80 BPM | SYSTOLIC BLOOD PRESSURE: 120 MMHG | OXYGEN SATURATION: 97 % | DIASTOLIC BLOOD PRESSURE: 62 MMHG | RESPIRATION RATE: 16 BRPM | TEMPERATURE: 97.5 F

## 2022-07-27 PROCEDURE — G0299 HHS/HOSPICE OF RN EA 15 MIN: HCPCS

## 2022-07-27 NOTE — CASE COMMUNICATION
would it be possible for you to reach out to the patient via tc and discuss with her insurance and copays as she is having some difficulty with co pays for her clinic visits for her liver transplant  if we need a new order please let me know and i will get one

## 2022-07-29 ENCOUNTER — HOME CARE VISIT (OUTPATIENT)
Dept: HOME HEALTH SERVICES | Facility: HOME HEALTHCARE | Age: 62
End: 2022-07-29
Payer: COMMERCIAL

## 2022-07-29 ENCOUNTER — TELEPHONE (OUTPATIENT)
Dept: LAB | Facility: HOSPITAL | Age: 62
End: 2022-07-29

## 2022-07-29 VITALS
TEMPERATURE: 98 F | SYSTOLIC BLOOD PRESSURE: 102 MMHG | RESPIRATION RATE: 16 BRPM | DIASTOLIC BLOOD PRESSURE: 62 MMHG | OXYGEN SATURATION: 98 % | HEART RATE: 81 BPM

## 2022-07-29 PROCEDURE — G0151 HHCP-SERV OF PT,EA 15 MIN: HCPCS

## 2022-07-29 PROCEDURE — G0299 HHS/HOSPICE OF RN EA 15 MIN: HCPCS

## 2022-07-29 NOTE — TELEPHONE ENCOUNTER
7/29- pt was calling to see about our services - she is wheelchair bound currently - I gave her the fax number to have her Dr fax the script over - she will call back after her Dr appt next week to start setting up weekly draws

## 2022-08-03 ENCOUNTER — HOME CARE VISIT (OUTPATIENT)
Dept: HOME HEALTH SERVICES | Facility: HOME HEALTHCARE | Age: 62
End: 2022-08-03
Payer: COMMERCIAL

## 2022-08-03 VITALS
RESPIRATION RATE: 16 BRPM | DIASTOLIC BLOOD PRESSURE: 62 MMHG | TEMPERATURE: 97.2 F | SYSTOLIC BLOOD PRESSURE: 108 MMHG | OXYGEN SATURATION: 96 % | HEART RATE: 81 BPM

## 2022-08-03 PROCEDURE — G0299 HHS/HOSPICE OF RN EA 15 MIN: HCPCS

## 2022-08-03 PROCEDURE — 400013 VN SOC

## 2022-08-05 ENCOUNTER — TELEPHONE (OUTPATIENT)
Dept: LAB | Facility: HOSPITAL | Age: 62
End: 2022-08-05

## 2022-08-08 ENCOUNTER — APPOINTMENT (OUTPATIENT)
Dept: LAB | Facility: HOSPITAL | Age: 62
End: 2022-08-08
Payer: COMMERCIAL

## 2022-08-08 ENCOUNTER — EVALUATION (OUTPATIENT)
Dept: PHYSICAL THERAPY | Facility: CLINIC | Age: 62
End: 2022-08-08
Payer: COMMERCIAL

## 2022-08-08 DIAGNOSIS — Z94.4 LIVER REPLACED BY TRANSPLANT (HCC): ICD-10-CM

## 2022-08-08 DIAGNOSIS — Z94.4 LIVER TRANSPLANTED (HCC): ICD-10-CM

## 2022-08-08 DIAGNOSIS — Z51.81 ENCOUNTER FOR THERAPEUTIC DRUG MONITORING: Primary | ICD-10-CM

## 2022-08-08 DIAGNOSIS — T14.8XXA HEMATOMA: Primary | ICD-10-CM

## 2022-08-08 LAB
ALBUMIN SERPL BCP-MCNC: 3.7 G/DL (ref 3.5–5)
ALP SERPL-CCNC: 53 U/L (ref 46–116)
ALT SERPL W P-5'-P-CCNC: 43 U/L (ref 12–78)
ANION GAP SERPL CALCULATED.3IONS-SCNC: 7 MMOL/L (ref 4–13)
ANISOCYTOSIS BLD QL SMEAR: PRESENT
AST SERPL W P-5'-P-CCNC: 14 U/L (ref 5–45)
BASOPHILS # BLD MANUAL: 0 THOUSAND/UL (ref 0–0.1)
BASOPHILS NFR MAR MANUAL: 0 % (ref 0–1)
BILIRUB DIRECT SERPL-MCNC: 0.13 MG/DL (ref 0–0.2)
BILIRUB SERPL-MCNC: 0.6 MG/DL (ref 0.2–1)
BUN SERPL-MCNC: 21 MG/DL (ref 5–25)
CALCIUM SERPL-MCNC: 9 MG/DL (ref 8.3–10.1)
CHLORIDE SERPL-SCNC: 103 MMOL/L (ref 96–108)
CO2 SERPL-SCNC: 31 MMOL/L (ref 21–32)
CREAT SERPL-MCNC: 0.7 MG/DL (ref 0.6–1.3)
EOSINOPHIL # BLD MANUAL: 0.14 THOUSAND/UL (ref 0–0.4)
EOSINOPHIL NFR BLD MANUAL: 3 % (ref 0–6)
ERYTHROCYTE [DISTWIDTH] IN BLOOD BY AUTOMATED COUNT: 15.8 % (ref 11.6–15.1)
GFR SERPL CREATININE-BSD FRML MDRD: 95 ML/MIN/1.73SQ M
GLUCOSE P FAST SERPL-MCNC: 80 MG/DL (ref 65–99)
HCT VFR BLD AUTO: 33.8 % (ref 34.8–46.1)
HGB BLD-MCNC: 10.4 G/DL (ref 11.5–15.4)
LYMPHOCYTES # BLD AUTO: 0.79 THOUSAND/UL (ref 0.6–4.47)
LYMPHOCYTES # BLD AUTO: 17 % (ref 14–44)
MAGNESIUM SERPL-MCNC: 1.5 MG/DL (ref 1.6–2.6)
MCH RBC QN AUTO: 33 PG (ref 26.8–34.3)
MCHC RBC AUTO-ENTMCNC: 30.8 G/DL (ref 31.4–37.4)
MCV RBC AUTO: 107 FL (ref 82–98)
METAMYELOCYTES NFR BLD MANUAL: 1 % (ref 0–1)
MONOCYTES # BLD AUTO: 0.23 THOUSAND/UL (ref 0–1.22)
MONOCYTES NFR BLD: 5 % (ref 4–12)
MYELOCYTES NFR BLD MANUAL: 1 % (ref 0–1)
NEUTROPHILS # BLD MANUAL: 3.39 THOUSAND/UL (ref 1.85–7.62)
NEUTS SEG NFR BLD AUTO: 73 % (ref 43–75)
PLATELET # BLD AUTO: 179 THOUSANDS/UL (ref 149–390)
PLATELET BLD QL SMEAR: ABNORMAL
PMV BLD AUTO: 9.2 FL (ref 8.9–12.7)
POTASSIUM SERPL-SCNC: 4.3 MMOL/L (ref 3.5–5.3)
PROT SERPL-MCNC: 6.9 G/DL (ref 6.4–8.4)
RBC # BLD AUTO: 3.15 MILLION/UL (ref 3.81–5.12)
RBC MORPH BLD: PRESENT
SODIUM SERPL-SCNC: 141 MMOL/L (ref 135–147)
TACROLIMUS BLD-MCNC: 7.3 NG/ML (ref 2–20)
WBC # BLD AUTO: 4.65 THOUSAND/UL (ref 4.31–10.16)

## 2022-08-08 PROCEDURE — 97162 PT EVAL MOD COMPLEX 30 MIN: CPT

## 2022-08-08 PROCEDURE — 85027 COMPLETE CBC AUTOMATED: CPT

## 2022-08-08 PROCEDURE — 36415 COLL VENOUS BLD VENIPUNCTURE: CPT

## 2022-08-08 PROCEDURE — 83735 ASSAY OF MAGNESIUM: CPT

## 2022-08-08 PROCEDURE — 85007 BL SMEAR W/DIFF WBC COUNT: CPT

## 2022-08-08 PROCEDURE — 80048 BASIC METABOLIC PNL TOTAL CA: CPT

## 2022-08-08 PROCEDURE — 80197 ASSAY OF TACROLIMUS: CPT

## 2022-08-08 PROCEDURE — 80076 HEPATIC FUNCTION PANEL: CPT

## 2022-08-08 NOTE — PROGRESS NOTES
PT Evaluation     Today's date: 2022  Patient name: Igor Wright  : 1960  MRN: [id number]  Referring provider: Carlos Hudson  Dx:   Encounter Diagnosis     ICD-10-CM    1  Hematoma  T14  8XXA    2  Liver transplanted ([facility] [address] )  Z94 4        Start Time: 130  Stop Time: 245  Total time in clinic (min): 75 minutes    Assessment  Assessment details: Patient presents to PT with history of recent liver transplant due to liver failure and history of hematoma left thigh resulting in muscle function loss of her iliopsoas and quad  Per patient she underwent inpatient rehab at [facility] and has made gains in standing tolerance, however, continues to experience intermittent buckling of her left LE in stance  Proximal left LE weakness is evident and activity tolerance is limited by pain and weakness  She presently relies on a wheelchair for all home mobility and is unable to enter/leave her home without assist due to current ramp being too steep for her to independently negotiate  She is looking into purchase of another ramp to allow greater independence  She also presently has a high co-pay for therapy and is negotiating with her insurance company with hopes of addressing her financial concerns  She has been given network contacts as well for financial assistance  Patient is able to perform walking in parallel bars for short distances with good tolerance, close CTG needed due to LE buckling history  Patient is a very motivated individual and is anticipated to benefit from skilled intervention to facilitate maximal safe mobility and return to walking as primary means of mobility  Patient expressed her primary goal is to return to walking  Progress may be slow given co-morbidities  Patient is in agreement with treatment plan    Impairments: abnormal gait, abnormal muscle tone, activity intolerance, impaired balance, impaired physical strength, lacks appropriate home exercise program, pain with function and safety issue    Goals  STG (5 weeks)  Patient will be independent in general strengthening program to maximize strength gains with improved tolerance to stance time                            Patient will be able to walk with rolling walker for short household distances utilizing pacing to avoid fatigue  Patient will be assessed on HOYOS to determine further balance deficits needs with details to follow                            Patient will demonstrate ability to stance unsupported for 1 minute to promote increased overall stability  LTG  (8weeks)  Patient will be able to ambulate short community distances with pacing with appropriate device                           Patient will be independent in all transitions                           Patient will be independent in advanced HEP for strength and balance to promote increased community tolerance                           Patient will achieve predicted FOTO scoring to facilitate increased ADL tolerance      Plan  Patient would benefit from: skilled physical therapy  Planned therapy interventions: ADL retraining, manual therapy, neuromuscular re-education, balance, patient education, postural training, strengthening, stretching, therapeutic activities, therapeutic exercise and home exercise program  Frequency: 2x week  Duration in weeks: 10  Treatment plan discussed with: patient and family        Subjective Evaluation    History of Present Illness  Mechanism of injury: May 17 patient underwent liver transplant  She has a history of concussion  Patient had a hematoma left thigh with low platelet and liver failure, with nerve damage  She did go to  for inpatient rehab  She arrived with wheelchair accompanied by daughter  EMG oct 7th scheduled  Patient does have a referral to pain management, no appointment yet  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  She has lifting restrictions not more than 5 pounds    Pain  Current pain ratin  At best pain ratin  At worst pain ratin  Location: left thigh    Social Support  Steps to enter house: yes  Stairs in house: yes   Lives in: multiple-level home  Lives with: significant other    Employment status: not working  Exercise history: ramp ordered, she has a ramp and is unble to negotiate current ramp without assist    Treatments  Previous treatment: physical therapy  Patient Goals  Patient goals for therapy: increased strength  Patient goal: I want to walk        Objective     Neurological Testing     Additional Neurological Details  Diminished light touch t/o left thigh region    Strength/Myotome Testing     Left Shoulder     Planes of Motion   Flexion: 4+   Abduction: 4+     Right Shoulder     Planes of Motion   Flexion: 4+   Abduction: 4+     Left Elbow   Flexion: 4+  Extension: 4+    Right Elbow   Flexion: 4+  Extension: 4+    Left Wrist/Hand   Wrist extension: 4+  Wrist flexion: 4+    Right Wrist/Hand   Wrist extension: 4+  Wrist flexion: 4+    Left Hip   Planes of Motion   Flexion: 1  Extension: 3-  Abduction: 3-    Right Hip   Planes of Motion   Flexion: 4  Extension: 4  Abduction: 4    Left Knee   Flexion: 3+  Extension: 3+    Right Knee   Flexion: 4+  Extension: 4+    Left Ankle/Foot   Dorsiflexion: 4-  Plantar flexion: 4-    Right Ankle/Foot   Dorsiflexion: 4+  Plantar flexion: 4+  Neuro Exam:     Sensation   Light touch LE: left impaired  Light touch LE: right WNL    Coordination   Finger to nose: left WNL and right WNL  Rapid alternating movements: UE WNL and LE impaired     Functional outcomes   Functional outcome gait comment: Patient able to ambulate in // bars for 20' with CTG for safety, decreased stride length left and decreased LE clearance  Reliant on B/L UE support  Balance:  Patient relies on UE support for stability, guarding for left LE buckling in stance  Further balance testing to follow once patient is able to stand unsupported    Independent sit to stand with UE support    Flowsheet Rows    Flowsheet Row Most Recent Value   PT/OT G-Codes    Current Score 26   Projected Score 55   FOTO information reviewed Yes   Assessment Type Evaluation   G code set Mobility: Walking & Moving Around             Precautions:  H/o concussion w/ CSF leak, h/o blood patch x3, h/o migraines assoc, h/o left thigh hematoma with radicular pain/ motor loss, h/o liver failure with liver transplant  EPOC:  10/17/2022      Manuals 8/8                                                                Neuro Re-Ed             Step taps             Sidestepping             hurdles                                                                 Ther Ex             Sit to stand             Hip flex/ext/abd                                                                                           Ther Activity                                       Gait Training             Ambulation in solo step with UE support                          Modalities

## 2022-08-08 NOTE — LETTER
2022    Shanthi Ortiz  [facility] [address]    Patient: Glen Dorsey   YOB: 1960   Date of Visit: 2022     Encounter Diagnosis     ICD-10-CM    1  Hematoma  T14  8XXA    2  Liver transplanted [facility])  Z94 4        Dear Dr Joselito Ortiz: Thank you for your recent referral of Glen Dorsey  Please review the attached evaluation summary from Linda's recent visit  Please verify that you agree with the plan of care by signing the attached order  If you have any questions or concerns, please do not hesitate to call  I sincerely appreciate the opportunity to share in the care of one of your patients and hope to have another opportunity to work with you in the near future  Sincerely,    Jarred Reeves, PT      Referring Provider:      I certify that I have read the below Plan of Care and certify the need for these services furnished under this plan of treatment while under my care  Shanthi Ortiz  [facility] [address]  Via Fax: [phone]          PT Evaluation     Today's date: 2022  Patient name: Glen Dorsey  : 1960  MRN: [id number]  Referring provider: Nguyen Weber  Dx:   Encounter Diagnosis     ICD-10-CM    1  Hematoma  T14  8XXA    2  [facility]  Z94 4        Start Time: 0  Stop Time: 024  Total time in clinic (min): 75 minutes    Assessment  Assessment details: Patient presents to PT with history of recent liver transplant due to liver failure and history of hematoma left thigh resulting in muscle function loss of her iliopsoas and quad  Per patient she underwent inpatient rehab at [facility] and has made gains in standing tolerance, however, continues to experience intermittent buckling of her left LE in stance  Proximal left LE weakness is evident and activity tolerance is limited by pain and weakness    She presently relies on a wheelchair for all home mobility and is unable to enter/leave her home without assist due to current ramp being too steep for her to independently negotiate  She is looking into purchase of another ramp to allow greater independence  She also presently has a high co-pay for therapy and is negotiating with her insurance company with hopes of addressing her financial concerns  She has been given network contacts as well for financial assistance  Patient is able to perform walking in parallel bars for short distances with good tolerance, close CTG needed due to LE buckling history  Patient is a very motivated individual and is anticipated to benefit from skilled intervention to facilitate maximal safe mobility and return to walking as primary means of mobility  Patient expressed her primary goal is to return to walking  Progress may be slow given co-morbidities  Patient is in agreement with treatment plan  Impairments: abnormal gait, abnormal muscle tone, activity intolerance, impaired balance, impaired physical strength, lacks appropriate home exercise program, pain with function and safety issue    Goals  STG (5 weeks)  Patient will be independent in general strengthening program to maximize strength gains with improved tolerance to stance time                            Patient will be able to walk with rolling walker for short household distances utilizing pacing to avoid fatigue                              Patient will be assessed on HOYOS to determine further balance deficits needs with details to follow                            Patient will demonstrate ability to stance unsupported for 1 minute to promote increased overall stability  LTG  (8weeks)  Patient will be able to ambulate short community distances with pacing with appropriate device                           Patient will be independent in all transitions                           Patient will be independent in advanced HEP for strength and balance to promote increased community tolerance                           Patient will achieve predicted FOTO scoring to facilitate increased ADL tolerance      Plan  Patient would benefit from: skilled physical therapy  Planned therapy interventions: ADL retraining, manual therapy, neuromuscular re-education, balance, patient education, postural training, strengthening, stretching, therapeutic activities, therapeutic exercise and home exercise program  Frequency: 2x week  Duration in weeks: 10  Treatment plan discussed with: patient and family        Subjective Evaluation    History of Present Illness  Mechanism of injury: May 17 patient underwent liver transplant  She has a history of concussion  Patient had a hematoma left thigh with low platelet and liver failure, with nerve damage  She did go to  for inpatient rehab  She arrived with wheelchair accompanied by daughter  EMG oct 7th scheduled  Patient does have a referral to pain management, no appointment yet  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  She has lifting restrictions not more than 5 pounds    Pain  Current pain ratin  At best pain ratin  At worst pain ratin  Location: left thigh    Social Support  Steps to enter house: yes  Stairs in house: yes   Lives in: multiple-level home  Lives with: significant other    Employment status: not working  Exercise history: ramp ordered, she has a ramp and is unble to negotiate current ramp without assist    Treatments  Previous treatment: physical therapy  Patient Goals  Patient goals for therapy: increased strength  Patient goal: I want to walk        Objective     Neurological Testing     Additional Neurological Details  Diminished light touch t/o left thigh region    Strength/Myotome Testing     Left Shoulder     Planes of Motion   Flexion: 4+   Abduction: 4+     Right Shoulder     Planes of Motion   Flexion: 4+   Abduction: 4+     Left Elbow   Flexion: 4+  Extension: 4+    Right Elbow   Flexion: 4+  Extension: 4+    Left Wrist/Hand   Wrist extension: 4+  Wrist flexion: 4+    Right Wrist/Hand   Wrist extension: 4+  Wrist flexion: 4+    Left Hip   Planes of Motion   Flexion: 1  Extension: 3-  Abduction: 3-    Right Hip   Planes of Motion   Flexion: 4  Extension: 4  Abduction: 4    Left Knee   Flexion: 3+  Extension: 3+    Right Knee   Flexion: 4+  Extension: 4+    Left Ankle/Foot   Dorsiflexion: 4-  Plantar flexion: 4-    Right Ankle/Foot   Dorsiflexion: 4+  Plantar flexion: 4+  Neuro Exam:     Sensation   Light touch LE: left impaired  Light touch LE: right WNL    Coordination   Finger to nose: left WNL and right WNL  Rapid alternating movements: UE WNL and LE impaired     Functional outcomes   Functional outcome gait comment: Patient able to ambulate in // bars for 20' with CTG for safety, decreased stride length left and decreased LE clearance  Reliant on B/L UE support  Balance:  Patient relies on UE support for stability, guarding for left LE buckling in stance  Further balance testing to follow once patient is able to stand unsupported  Independent sit to stand with UE support    Flowsheet Rows    Flowsheet Row Most Recent Value   PT/OT G-Codes    Current Score 26   Projected Score 55   FOTO information reviewed Yes   Assessment Type Evaluation   G code set Mobility: Walking & Moving Around             Precautions:  H/o concussion w/ CSF leak, h/o blood patch x3, h/o migraines assoc, h/o left thigh hematoma with radicular pain/ motor loss, h/o liver failure with liver transplant  EPOC:  10/17/2022      Manuals 8/8                                                                Neuro Re-Ed             Step taps             Sidestepping             hurdles                                                                 Ther Ex             Sit to stand             Hip flex/ext/abd                                                                                           Ther Activity Gait Training             Ambulation in solo step with UE support                          Modalities

## 2022-08-10 ENCOUNTER — HOME CARE VISIT (OUTPATIENT)
Dept: HOME HEALTH SERVICES | Facility: HOME HEALTHCARE | Age: 62
End: 2022-08-10
Payer: COMMERCIAL

## 2022-08-10 VITALS
HEART RATE: 83 BPM | DIASTOLIC BLOOD PRESSURE: 70 MMHG | OXYGEN SATURATION: 98 % | RESPIRATION RATE: 16 BRPM | SYSTOLIC BLOOD PRESSURE: 138 MMHG | TEMPERATURE: 97.8 F

## 2022-08-10 PROCEDURE — G0299 HHS/HOSPICE OF RN EA 15 MIN: HCPCS

## 2022-08-12 ENCOUNTER — OFFICE VISIT (OUTPATIENT)
Dept: PHYSICAL THERAPY | Facility: CLINIC | Age: 62
End: 2022-08-12
Payer: COMMERCIAL

## 2022-08-12 DIAGNOSIS — Z94.4 LIVER TRANSPLANTED (HCC): ICD-10-CM

## 2022-08-12 DIAGNOSIS — T14.8XXA HEMATOMA: Primary | ICD-10-CM

## 2022-08-12 PROCEDURE — 97112 NEUROMUSCULAR REEDUCATION: CPT

## 2022-08-12 NOTE — PROGRESS NOTES
Daily Note     Today's date: 2022  Patient name: Saba Garcia  : 1960  MRN: [id number]  Referring provider: Mony Rodgers  Dx:   Encounter Diagnosis     ICD-10-CM    1  Hematoma  T14  8XXA    2  Liver transplanted ([facility] [address] )  Z94 4        Start Time: 8188  Stop Time: 0935  Total time in clinic (min): 45 minutes    Subjective:   Patient arrived in wheelchair with no current complaints of pain  She continues to express concern regarding her ability to restore her strength  Objective: See treatment diary below      Assessment:   Reassurance continues to be given t/o session  Solo step with ability to ambulate with ease using RW  Patient advanced to marquise walker for stability with good tolerance and then progressed to std cane with ease in solo step  Step taps with nice stability with cane for stability  Patient instructed in step taps in front of kitchen sink with B/L UE support with good demonstration and voiced understanding  Plan:   Continue PT per POC  No complaints post treatment  Continue to progress walking and standing tolerance and introduce compliant surfaces as appropriate         Precautions:  H/o concussion w/ CSF leak, h/o blood patch x3, h/o migraines assoc, h/o left thigh hematoma with radicular pain/ motor loss, h/o liver failure with liver transplant  EPOC:  10/17/2022      Manuals                                                                Neuro Re-Ed             Step taps  5x2 in solo step           Sidestepping             hurdles             Solo step walking with RW, Marquise walker and std cane  40' x2, 40'x2, 20' x2                                                  Ther Ex             Sit to stand             Hip flex/ext/abd                                                                                           Ther Activity                                       Gait Training             Ambulation in solo step with UE support                          Modalities

## 2022-08-15 ENCOUNTER — OFFICE VISIT (OUTPATIENT)
Dept: PHYSICAL THERAPY | Facility: CLINIC | Age: 62
End: 2022-08-15
Payer: COMMERCIAL

## 2022-08-15 ENCOUNTER — APPOINTMENT (OUTPATIENT)
Dept: LAB | Facility: HOSPITAL | Age: 62
End: 2022-08-15
Payer: COMMERCIAL

## 2022-08-15 DIAGNOSIS — T14.8XXA HEMATOMA: ICD-10-CM

## 2022-08-15 DIAGNOSIS — Z94.4 LIVER TRANSPLANTED (HCC): Primary | ICD-10-CM

## 2022-08-15 LAB
ALBUMIN SERPL BCP-MCNC: 3.5 G/DL (ref 3.5–5)
ALP SERPL-CCNC: 49 U/L (ref 46–116)
ALT SERPL W P-5'-P-CCNC: 46 U/L (ref 12–78)
ANION GAP SERPL CALCULATED.3IONS-SCNC: 4 MMOL/L (ref 4–13)
AST SERPL W P-5'-P-CCNC: 19 U/L (ref 5–45)
BASOPHILS # BLD MANUAL: 0 THOUSAND/UL (ref 0–0.1)
BASOPHILS NFR MAR MANUAL: 0 % (ref 0–1)
BILIRUB DIRECT SERPL-MCNC: 0.16 MG/DL (ref 0–0.2)
BILIRUB SERPL-MCNC: 0.52 MG/DL (ref 0.2–1)
BUN SERPL-MCNC: 25 MG/DL (ref 5–25)
CALCIUM SERPL-MCNC: 8.8 MG/DL (ref 8.3–10.1)
CHLORIDE SERPL-SCNC: 105 MMOL/L (ref 96–108)
CO2 SERPL-SCNC: 30 MMOL/L (ref 21–32)
CREAT SERPL-MCNC: 0.8 MG/DL (ref 0.6–1.3)
EOSINOPHIL # BLD MANUAL: 0.15 THOUSAND/UL (ref 0–0.4)
EOSINOPHIL NFR BLD MANUAL: 3 % (ref 0–6)
ERYTHROCYTE [DISTWIDTH] IN BLOOD BY AUTOMATED COUNT: 15.2 % (ref 11.6–15.1)
GFR SERPL CREATININE-BSD FRML MDRD: 80 ML/MIN/1.73SQ M
GLUCOSE P FAST SERPL-MCNC: 91 MG/DL (ref 65–99)
HCT VFR BLD AUTO: 35 % (ref 34.8–46.1)
HGB BLD-MCNC: 10.8 G/DL (ref 11.5–15.4)
LYMPHOCYTES # BLD AUTO: 0.61 THOUSAND/UL (ref 0.6–4.47)
LYMPHOCYTES # BLD AUTO: 12 % (ref 14–44)
MACROCYTES BLD QL AUTO: PRESENT
MAGNESIUM SERPL-MCNC: 1.8 MG/DL (ref 1.6–2.6)
MCH RBC QN AUTO: 33.3 PG (ref 26.8–34.3)
MCHC RBC AUTO-ENTMCNC: 30.9 G/DL (ref 31.4–37.4)
MCV RBC AUTO: 108 FL (ref 82–98)
METAMYELOCYTES NFR BLD MANUAL: 1 % (ref 0–1)
MONOCYTES # BLD AUTO: 0 THOUSAND/UL (ref 0–1.22)
MONOCYTES NFR BLD: 0 % (ref 4–12)
NEUTROPHILS # BLD MANUAL: 4.29 THOUSAND/UL (ref 1.85–7.62)
NEUTS BAND NFR BLD MANUAL: 1 % (ref 0–8)
NEUTS SEG NFR BLD AUTO: 83 % (ref 43–75)
PLATELET # BLD AUTO: 184 THOUSANDS/UL (ref 149–390)
PLATELET BLD QL SMEAR: ADEQUATE
PMV BLD AUTO: 9.6 FL (ref 8.9–12.7)
POTASSIUM SERPL-SCNC: 3.9 MMOL/L (ref 3.5–5.3)
PROT SERPL-MCNC: 6.8 G/DL (ref 6.4–8.4)
RBC # BLD AUTO: 3.24 MILLION/UL (ref 3.81–5.12)
RBC MORPH BLD: PRESENT
SODIUM SERPL-SCNC: 139 MMOL/L (ref 135–147)
TACROLIMUS BLD-MCNC: 7.9 NG/ML (ref 2–20)
WBC # BLD AUTO: 5.11 THOUSAND/UL (ref 4.31–10.16)

## 2022-08-15 PROCEDURE — 80197 ASSAY OF TACROLIMUS: CPT

## 2022-08-15 PROCEDURE — 80076 HEPATIC FUNCTION PANEL: CPT

## 2022-08-15 PROCEDURE — 85027 COMPLETE CBC AUTOMATED: CPT

## 2022-08-15 PROCEDURE — 85007 BL SMEAR W/DIFF WBC COUNT: CPT

## 2022-08-15 PROCEDURE — 36415 COLL VENOUS BLD VENIPUNCTURE: CPT

## 2022-08-15 PROCEDURE — 97112 NEUROMUSCULAR REEDUCATION: CPT

## 2022-08-15 PROCEDURE — 83735 ASSAY OF MAGNESIUM: CPT

## 2022-08-15 PROCEDURE — 80048 BASIC METABOLIC PNL TOTAL CA: CPT

## 2022-08-15 NOTE — PROGRESS NOTES
Daily Note     Today's date: 8/15/2022  Patient name: Kevin Murdock  : 1960  MRN: [id number]  Referring provider: Severiano Bliss  Dx:   Encounter Diagnosis     ICD-10-CM    1  Liver transplanted ([address] )  Z94 4    2  Hematoma  T14  8XXA        Start Time: 145  Stop Time:   Total time in clinic (min): 50 minutes    Subjective:   Patient arrived in wheelchair with no current complaints of pain  She brought her rollator with her today  Objective: See treatment diary below      Assessment:   Focused today on ambulation with rollator, device adjusted for proper height and patient educated on how to fold, lock and maintain  Increased tolerance to distance ambulation noted with only one episode of knee buckling with no assist needed to maintain stance  Added hamstring/gastroc stretch to avoid increased tightness with good demonstration  Plan:   Continue PT per POC  No complaints post treatment  Continue to progress walking and standing tolerance and introduce compliant surfaces as appropriate         Precautions:  H/o concussion w/ CSF leak, h/o blood patch x3, h/o migraines assoc, h/o left thigh hematoma with radicular pain/ motor loss, h/o liver failure with liver transplant  EPOC:  10/17/2022      Manuals 8/8 8/12 8/15                                                         Neuro Re-Ed            Step taps  5x2 in solo step          Sidestepping            hurdles            Solo step walking with RW, Marquise walker and std cane  40' x2, 40'x2, 20' x2 Rollator on varied tile/carpeted surfaces 40' x2, 160' with CTG          Stepping over foam beam with rollator   x4 CTG                                 Ther Ex            Sit to stand            Hip flex/ext/abd            Seated hamstring/gastroc stretch   10" x4 ea                                                                     Ther Activity                                    Gait Training            Ambulation in solo step with UE support Modalities

## 2022-08-16 ENCOUNTER — HOME CARE VISIT (OUTPATIENT)
Dept: HOME HEALTH SERVICES | Facility: HOME HEALTHCARE | Age: 62
End: 2022-08-16
Payer: COMMERCIAL

## 2022-08-16 VITALS
RESPIRATION RATE: 16 BRPM | OXYGEN SATURATION: 98 % | HEART RATE: 69 BPM | DIASTOLIC BLOOD PRESSURE: 62 MMHG | TEMPERATURE: 97.3 F | SYSTOLIC BLOOD PRESSURE: 102 MMHG

## 2022-08-16 PROCEDURE — G0299 HHS/HOSPICE OF RN EA 15 MIN: HCPCS

## 2022-08-18 ENCOUNTER — OFFICE VISIT (OUTPATIENT)
Dept: PHYSICAL THERAPY | Facility: CLINIC | Age: 62
End: 2022-08-18
Payer: COMMERCIAL

## 2022-08-18 DIAGNOSIS — Z94.4 LIVER TRANSPLANTED (HCC): Primary | ICD-10-CM

## 2022-08-18 DIAGNOSIS — T14.8XXA HEMATOMA: ICD-10-CM

## 2022-08-18 PROCEDURE — 97112 NEUROMUSCULAR REEDUCATION: CPT

## 2022-08-18 NOTE — PROGRESS NOTES
Daily Note     Today's date: 2022  Patient name: Thiago Davis  : 1960  MRN: [id number]  Referring provider: Nehemiah Coulter  Dx:   Encounter Diagnosis     ICD-10-CM    1  Liver transplanted ([address] )  Z94 4    2  Hematoma  T14  8XXA        Start Time:   Stop Time: 1155  Total time in clinic (min): 42 minutes    Subjective:   Patient arrived in wheelchair with increased pain in L LE nerve pain  Practicing standing and is up and down all the time  More tired the day after therapy but nothing too intense  Getting put on lasix tomorrow to help with increased water weight  Objective: See treatment diary below      Assessment:  Seated rests for fatigue and pain management  Seems to walk quicker with tiled surface vs carpet  Good ability to navigate uneven surfaces with slower pacing  Good rollator management throughout session  Responded well to additional activities like lateral stepping and backward stepping with no increase in knee instability  1 episode of knee buckling during walking with rollator while turning, independently maintained balance  Plan:   Continue PT per POC  No complaints post treatment  Continue to progress walking and standing tolerance and introduce compliant surfaces as appropriate         Precautions:  H/o concussion w/ CSF leak, h/o blood patch x3, h/o migraines assoc, h/o left thigh hematoma with radicular pain/ motor loss, h/o liver failure with liver transplant  EPOC:  10/17/2022      Manuals 8/8 8/12 8/15 8/18                                                        Neuro Re-Ed            Step taps  5x2 in solo step  2x10 // bar 4' step        Sidestepping    4 laps // bars        Backward steps    2 laps // bars        hurdles            Solo step walking with RW, Marquise walker and std cane  40' x2, 40'x2, 20' x2 Rollator on varied tile/carpeted surfaces 40' x2, 160' with CTG  Rollator 60ft x1 CTG carpet, Rollator 90ft CTG carpet/tile        Stepping over foam beam with rollator   x4 CTG         Uneven surfaces    Rollator 4 fwd - 2 bwd                    Ther Ex            Sit to stand            Hip flex/ext/abd            Seated hamstring/gastroc stretch   10" x4 ea                                                                     Ther Activity                                    Gait Training            Ambulation in solo step with UE support                        Modalities

## 2022-08-22 ENCOUNTER — APPOINTMENT (OUTPATIENT)
Dept: LAB | Facility: HOSPITAL | Age: 62
End: 2022-08-22
Payer: COMMERCIAL

## 2022-08-22 ENCOUNTER — OFFICE VISIT (OUTPATIENT)
Dept: PHYSICAL THERAPY | Facility: CLINIC | Age: 62
End: 2022-08-22
Payer: COMMERCIAL

## 2022-08-22 DIAGNOSIS — Z94.4 LIVER TRANSPLANTED (HCC): Primary | ICD-10-CM

## 2022-08-22 DIAGNOSIS — T14.8XXA HEMATOMA: ICD-10-CM

## 2022-08-22 PROCEDURE — 97112 NEUROMUSCULAR REEDUCATION: CPT

## 2022-08-22 PROCEDURE — 97110 THERAPEUTIC EXERCISES: CPT

## 2022-08-22 NOTE — PROGRESS NOTES
Daily Note     Today's date: 2022  Patient name: Nora Skaggs  : 1960  MRN: [id number]  Referring provider: Frank Mcintosh  Dx:   Encounter Diagnosis     ICD-10-CM    1  Liver transplanted ([address] )  Z94 4    2  Hematoma  T14  8XXA        Start Time: 0100  Stop Time: 0145  Total time in clinic (min): 45 minutes    Subjective:   Patient arrived in wheelchair and states left thigh pain continues to be noted,  Edema continues to be noted left  Objective: See treatment diary below      Assessment:   Introduced ambulation outside on ramps and sidewalks with CTG using rollator  Rests needed t/o session for fatigue management  Squats and sit taps to increase quad/glut strength with good performance  Patient encouraged to continue at home, with focus of energy on ambulation first and squats as tolerated in front of chair for safety  Patient voiced understanding  Consider stair negotiation   Plan:   Continue PT per POC  No complaints post treatment  Continue to progress walking and standing tolerance and introduce compliant surfaces as appropriate         Precautions:  H/o concussion w/ CSF leak, h/o blood patch x3, h/o migraines assoc, h/o left thigh hematoma with radicular pain/ motor loss, h/o liver failure with liver transplant  EPOC:  10/17/2022      Manuals 8/8 8/12 8/15 8/18 8/22                                                       Neuro Re-Ed            Step taps  5x2 in solo step  2x10 // bar 4' step        Sidestepping    4 laps // bars        Backward steps    2 laps // bars        hurdles            Solo step walking with RW, Marquise walker and std cane  40' x2, 40'x2, 20' x2 Rollator on varied tile/carpeted surfaces 40' x2, 160' with CTG  Rollator 60ft x1 CTG carpet, Rollator 90ft CTG carpet/tile Rollator outside surfaces 140' CTG, inside surfaces 140'x2 CTG       Stepping over foam beam with rollator   x4 CTG         Uneven surfaces    Rollator 4 fwd - 2 bwd                    Ther Ex Sit to stand     5x3, one set without UE       Hip flex/ext/abd            Seated hamstring/gastroc stretch   10" x4 ea                                                                     Ther Activity                                    Gait Training            Ambulation in solo step with UE support                        Modalities

## 2022-08-25 ENCOUNTER — APPOINTMENT (OUTPATIENT)
Dept: PHYSICAL THERAPY | Facility: CLINIC | Age: 62
End: 2022-08-25
Payer: COMMERCIAL

## 2022-08-29 ENCOUNTER — APPOINTMENT (OUTPATIENT)
Dept: LAB | Facility: HOSPITAL | Age: 62
End: 2022-08-29
Payer: COMMERCIAL

## 2022-08-30 ENCOUNTER — OFFICE VISIT (OUTPATIENT)
Dept: PHYSICAL THERAPY | Facility: CLINIC | Age: 62
End: 2022-08-30
Payer: COMMERCIAL

## 2022-08-30 DIAGNOSIS — Z94.4 LIVER TRANSPLANTED (HCC): Primary | ICD-10-CM

## 2022-08-30 DIAGNOSIS — T14.8XXA HEMATOMA: ICD-10-CM

## 2022-08-30 PROCEDURE — 97112 NEUROMUSCULAR REEDUCATION: CPT

## 2022-08-30 NOTE — PROGRESS NOTES
Daily Note     Today's date: 2022  Patient name: Carter Schwartz  : 1960  MRN: [id number]  Referring provider: Kaleigh Kendall  Dx:   Encounter Diagnosis     ICD-10-CM    1  Liver transplanted ([address] )  Z94 4    2  Hematoma  T14  8XXA        Start Time:   Stop Time: 200  Total time in clinic (min): 45 minutes    Subjective:   Patient states on Sat she had been practicing her squats and felt she was walking better  The end of the day, she was walking into the bathroom with her walker and landed on her knees  The following day she did not have any further pain  Objective: See treatment diary below      Assessment:    Initiated stepping over hurdles and steps in //bars to allow for improved sequencing and stability  Due to recent buckling, patient kept in solo step t/o session  Seated rests intermittent given  Encouraged continued ambulation to tolerance at home with use of rollator with focus on pacing to avoid fatigue  Discussed appropriate placement of rails for stairs to facilitate transition to step negotiation at home  Patient instructed not to attempt stairs without assist with review of supported stance and gait belt for safety  Patient voiced understanding to all instructions  Plan:   Continue PT per POC  No complaints post treatment  Continue to progress walking and standing tolerance and introduce compliant surfaces as appropriate         Precautions:  H/o concussion w/ CSF leak, h/o blood patch x3, h/o migraines assoc, h/o left thigh hematoma with radicular pain/ motor loss, h/o liver failure with liver transplant  EPOC:  10/17/2022      Manuals 8/8 8/12 8/15 8/18 8/22 8/30                                                      Neuro Re-Ed            Step ups       1 rise x4 with bars, 2 rise x4 with rbars, cuing for sequencing      Step taps  5x2 in solo step  2x10 // bar 4' step        Sidestepping    4 laps // bars        Backward steps    2 laps // bars        hurdles 5 hurdles in bars x4 laps      Solo step walking with RW, Marquise walker and std cane  40' x2, 40'x2, 20' x2 Rollator on varied tile/carpeted surfaces 40' x2, 160' with CTG  Rollator 60ft x1 CTG carpet, Rollator 90ft CTG carpet/tile Rollator outside surfaces 140' CTG, inside surfaces 140'x2 CTG Cane stepping in bars with solo step 4 laps x2      Stepping over foam beam with rollator   x4 CTG         Uneven surfaces    Rollator 4 fwd - 2 bwd                    Ther Ex            Sit to stand     5x3, one set without UE 5      Hip flex/ext/abd            Seated hamstring/gastroc stretch   10" x4 ea                                                                     Ther Activity                                    Gait Training            Ambulation in solo step with UE support                        Modalities

## 2022-09-01 ENCOUNTER — APPOINTMENT (OUTPATIENT)
Dept: PHYSICAL THERAPY | Facility: CLINIC | Age: 62
End: 2022-09-01
Payer: COMMERCIAL

## 2022-09-01 ENCOUNTER — TELEPHONE (OUTPATIENT)
Dept: LAB | Facility: HOSPITAL | Age: 62
End: 2022-09-01

## 2022-09-06 ENCOUNTER — OFFICE VISIT (OUTPATIENT)
Dept: PHYSICAL THERAPY | Facility: CLINIC | Age: 62
End: 2022-09-06
Payer: COMMERCIAL

## 2022-09-06 DIAGNOSIS — Z94.4 LIVER TRANSPLANTED (HCC): Primary | ICD-10-CM

## 2022-09-06 DIAGNOSIS — T14.8XXA HEMATOMA: ICD-10-CM

## 2022-09-06 PROCEDURE — 97116 GAIT TRAINING THERAPY: CPT

## 2022-09-06 PROCEDURE — 97112 NEUROMUSCULAR REEDUCATION: CPT

## 2022-09-06 NOTE — PROGRESS NOTES
Daily Note     Today's date: 2022  Patient name: Suleman Aguiar  : 1960  MRN: [id number]  Referring provider: Paul Zaragoza  Dx:   Encounter Diagnosis     ICD-10-CM    1  Liver transplanted ([address] )  Z94 4    2  Hematoma  T14  8XXA        Start Time: 1100  Stop Time: 1145  Total time in clinic (min): 45 minutes    Subjective:   Yesterday had severe pain in R leg  Much better today  Has had little pauses with her walking  Is gaining some confidence in her walking, transfers going well  Objective: See treatment diary below      Assessment:   Good carryover with minimal cues for step sequencing  Improving confidence with continued repetition  Trials of training with SPC using gait belt tolerated well with no episodes of knee buckling  Good tolerance to upright activity with directional walking without any knee buckling  Slower more cautious gait over uneven surfaces, increased pacing after a few trials  Pt does note intermittent pain in L knee but continued trials  Discussed maintaining activity with leg exercises at home safely, pt verbalized understanding  Plan:   Continue PT per POC  No complaints post treatment  Continue to progress walking and standing tolerance and introduce compliant surfaces as appropriate         Precautions:  H/o concussion w/ CSF leak, h/o blood patch x3, h/o migraines assoc, h/o left thigh hematoma with radicular pain/ motor loss, h/o liver failure with liver transplant  EPOC:  10/17/2022      Manuals 8/8 8/12 8/15 8/18 8/22 8/30 9/6                                                     Neuro Re-Ed            Step ups       1 rise x4 with bars, 2 rise x4 with rbars, cuing for sequencing SPC x8 4' step cues for sequencing     Step taps  5x2 in solo step  2x10 // bar 4' step        Sidestepping    4 laps // bars   SPC 20ft 2x2     Backward steps    2 laps // bars   SPC 20ft x2     hurdles      5 hurdles in bars x4 laps      Solo step walking with RW, Marquise walker and std cane  40' x2, 40'x2, 20' x2 Rollator on varied tile/carpeted surfaces 40' x2, 160' with CTG  Rollator 60ft x1 CTG carpet, Rollator 90ft CTG carpet/tile Rollator outside surfaces 140' CTG, inside surfaces 140'x2 CTG Cane stepping in bars with solo step 4 laps x2      Stepping over foam beam with rollator   x4 CTG         Uneven surfaces    Rollator 4 fwd - 2 bwd   SPC 4 laps x1                 Ther Ex            Sit to stand     5x3, one set without UE 5      Hip flex/ext/abd       2x6 // bar extension     Seated hamstring/gastroc stretch   10" x4 ea                                                                     Ther Activity                                    Gait Training            Ambulation in solo step with UE support       SPC 40ft x2                 Modalities

## 2022-09-08 ENCOUNTER — OFFICE VISIT (OUTPATIENT)
Dept: PHYSICAL THERAPY | Facility: CLINIC | Age: 62
End: 2022-09-08
Payer: COMMERCIAL

## 2022-09-08 DIAGNOSIS — T14.8XXA HEMATOMA: ICD-10-CM

## 2022-09-08 DIAGNOSIS — Z94.4 LIVER TRANSPLANTED (HCC): Primary | ICD-10-CM

## 2022-09-08 PROCEDURE — 97112 NEUROMUSCULAR REEDUCATION: CPT

## 2022-09-08 NOTE — PROGRESS NOTES
PT PROGRESS NOTE/ DAILY NOTE     Today's date: 2022  Patient name: Cesario Bianchi  : 1960  MRN: [id number]  Referring provider: Naif Bishop  Dx:   Encounter Diagnosis     ICD-10-CM    1  Liver transplanted ([address] )  Z94 4    2  Hematoma  T14  8XXA        Start Time: 1714  Stop Time: 200  Total time in clinic (min): 55 minutes    Assessment  Assessment details: Patient presents to PT with history of recent liver transplant due to liver failure and history of hematoma left thigh resulting in muscle function loss of her iliopsoas and quad  Per patient she underwent inpatient rehab at [facility] and has made gains in standing tolerance, however, continues to experience intermittent buckling of her left LE in stance  Proximal left LE weakness is evident and activity tolerance is limited by pain and weakness  She presently relies on a wheelchair for all home mobility and is unable to enter/leave her home without assist due to current ramp being too steep for her to independently negotiate  She is looking into purchase of another ramp to allow greater independence  She also presently has a high co-pay for therapy and is negotiating with her insurance company with hopes of addressing her financial concerns  She has been given network contacts as well for financial assistance  Patient is able to perform walking in parallel bars for short distances with good tolerance, close CTG needed due to LE buckling history  Patient is a very motivated individual and is anticipated to benefit from skilled intervention to facilitate maximal safe mobility and return to walking as primary means of mobility  Patient expressed her primary goal is to return to walking  Progress may be slow given co-morbidities  Patient is in agreement with treatment plan    Patient has made substantial gains with PT achieving 42/56 on HOYOS and ability to perform 4 min walk achieving 190'    Endurance testing completed for 5x sit to stand scoring 11 8 seconds  Improved left LE strength noted and no present buckling noted in gait and stance  Gradually increase ambulation at home with rollator for safety  Patient continues to improve functional stability and safety  She continues to benefit from skilled intervention to increase functional tolerance to ADL tasks      Impairments: abnormal gait, abnormal muscle tone, activity intolerance, impaired balance, impaired physical strength, lacks appropriate home exercise program, pain with function and safety issue    Goals  STG (5 weeks)  Patient will be independent in general strengthening program to maximize strength gains with improved tolerance to stance time- MET                            Patient will be able to walk with rolling walker for short household distances utilizing pacing to avoid fatigue- partially met                            Patient will be assessed on HOYOS to determine further balance deficits needs with details to follow- MET with scoring 42/56                            Patient will demonstrate ability to stance unsupported for 1 minute to promote increased overall stability- MET  LTG  (8weeks)  Patient will be able to ambulate short community distances with pacing with appropriate device                           Patient will be independent in all transitions                           Patient will be independent in advanced HEP for strength and balance to promote increased community tolerance                           Patient will achieve predicted FOTO scoring to facilitate increased ADL tolerance                           Patient will improve HOYOS scoring by 6 points demonstrating increased overall stability and balance for functional tasks       Plan  Patient would benefit from: skilled physical therapy  Planned therapy interventions: ADL retraining, manual therapy, neuromuscular re-education, balance, patient education, postural training, strengthening, stretching, therapeutic activities, therapeutic exercise and home exercise program  Frequency: 2x week  Duration in weeks: 6  Treatment plan discussed with: patient and family        Subjective Evaluation    History of Present Illness  Mechanism of injury: May 17 patient underwent liver transplant  She has a history of concussion  Patient had a hematoma left thigh with low platelet and liver failure, with nerve damage  She did go to  for inpatient rehab  She arrived with wheelchair accompanied by daughter  EMG oct 7th scheduled  Patient does have a referral to pain management, no appointment yet  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  She has lifting restrictions not more than 5 pounds    No current complaints of pain and no recent falls noted  She arrives to PT with wheelchair and is able to use rolling walker for short distances      Pain  Current pain ratin  At best pain ratin  At worst pain ratin  Location: left thigh    Social Support  Steps to enter house: yes  Stairs in house: yes   Lives in: multiple-level home  Lives with: significant other    Employment status: not working  Exercise history: ramp ordered, she has a ramp and is unble to negotiate current ramp without assist    Treatments  Previous treatment: physical therapy  Patient Goals  Patient goals for therapy: increased strength  Patient goal: I want to walk        Objective     Neurological Testing     Additional Neurological Details  Diminished light touch t/o left thigh region    Strength/Myotome Testing     Left Shoulder     Planes of Motion   Flexion: 4+   Abduction: 4+     Right Shoulder     Planes of Motion   Flexion: 4+   Abduction: 4+     Left Elbow   Flexion: 4+  Extension: 4+    Right Elbow   Flexion: 4+  Extension: 4+    Left Wrist/Hand   Wrist extension: 4+  Wrist flexion: 4+    Right Wrist/Hand   Wrist extension: 4+  Wrist flexion: 4+    Left Hip   Planes of Motion   Flexion: 3- and 3  Extension: 3  Abduction: 3    Right Hip   Planes of Motion   Flexion: 4 and 4+  Extension: 4 and 4+  Abduction: 4 and 4+    Left Knee   Flexion: 3+  Extension: 3+    Right Knee   Flexion: 4+  Extension: 4+    Left Ankle/Foot   Dorsiflexion: 4-  Plantar flexion: 4-    Right Ankle/Foot   Dorsiflexion: 4+  Plantar flexion: 4+  Neuro Exam:     Sensation   Light touch LE: left impaired  Light touch LE: right WNL    Coordination   Finger to nose: left WNL and right WNL  Rapid alternating movements: UE WNL and LE impaired     Functional outcomes   Functional outcome gait comment: Patient able to ambulate in // bars for 20' with CTG for safety, decreased stride length left and decreased LE clearance  Reliant on B/L UE support  Patient now able to ambulate short distances with standard cane, CTG for safety, improved overall stride length and stability in gait noted  Balance:  Patient relies on UE support for stability, guarding for left LE buckling in stance  Further balance testing to follow once patient is able to stand unsupported  Independent sit to stand with UE support  Flowsheet Rows    Flowsheet Row Most Recent Value   PT/OT G-Codes    Current Score 38   Projected Score 55   FOTO information reviewed Yes   Assessment Type Re-evaluation   G code set Mobility: Walking & Moving Around             Precautions:  H/o concussion w/ CSF leak, h/o blood patch x3, h/o migraines assoc, h/o left thigh hematoma with radicular pain/ motor loss, h/o liver failure with liver transplant  EPOC:  10/17/2022      TESTING  8/8 9/8           5x sit to stand   11  8sec           HOYOS  42/56           FOTO  38           4 min walk  190' x2 w/ RW           Neuro Re-Ed             Step taps  5x2 CTG           Sidestepping             hurdles                                                                 Ther Ex             Sit to stand             Hip flex/ext/abd Ther Activity                                       Gait Training             Ambulation in solo step with UE support                          Modalities

## 2022-09-12 ENCOUNTER — APPOINTMENT (OUTPATIENT)
Dept: LAB | Facility: HOSPITAL | Age: 62
End: 2022-09-12
Payer: COMMERCIAL

## 2022-09-12 ENCOUNTER — OFFICE VISIT (OUTPATIENT)
Dept: PHYSICAL THERAPY | Facility: CLINIC | Age: 62
End: 2022-09-12
Payer: COMMERCIAL

## 2022-09-12 DIAGNOSIS — Z94.4 LIVER TRANSPLANTED (HCC): Primary | ICD-10-CM

## 2022-09-12 DIAGNOSIS — T14.8XXA HEMATOMA: ICD-10-CM

## 2022-09-12 PROCEDURE — 97112 NEUROMUSCULAR REEDUCATION: CPT

## 2022-09-12 NOTE — PROGRESS NOTES
Daily Note     Today's date: 2022  Patient name: Prasad Hudson  : 1960  MRN: [id number]  Referring provider: Stephy Perez  Dx:   Encounter Diagnosis     ICD-10-CM    1  Liver transplanted ([address] )  Z94 4    2  Hematoma  T14  8XXA        Start Time: 0103  Stop Time: 0150  Total time in clinic (min): 47 minutes    Subjective:   She was busy over the weekend with her new puppy  Objective: See treatment diary below      Assessment:    Distance ambulation with RW with good tolerance, no buckling noted  Discussed at length use of RW at home and pacing to avoid significant fatigue with voiced understanding  Outside ambulation with good negotiation on curbs and ramps  Full flight of stairs with cuing for sequencing with good tolerance  Plan:   Continue PT per POC  No complaints post treatment  Assess floor to stance transitions next session       Precautions:  H/o concussion w/ CSF leak, h/o blood patch x3, h/o migraines assoc, h/o left thigh hematoma with radicular pain/ motor loss, h/o liver failure with liver transplant  EPOC:  10/17/2022      Manuals 8/8 8/12 8/15 8/18 8/22 8/30 9/6 9/12                                                    Neuro Re-Ed            Step ups       1 rise x4 with bars, 2 rise x4 with rbars, cuing for sequencing SPC x8 4' step cues for sequencing RW oustide curbs x4    Step taps  5x2 in solo step  2x10 // bar 4' step        Sidestepping    4 laps // bars   SPC 20ft 2x2     Backward steps    2 laps // bars   SPC 20ft x2     hurdles      5 hurdles in bars x4 laps      Solo step walking with RW, Marquise walker and std cane  40' x2, 40'x2, 20' x2 Rollator on varied tile/carpeted surfaces 40' x2, 160' with CTG  Rollator 60ft x1 CTG carpet, Rollator 90ft CTG carpet/tile Rollator outside surfaces 140' CTG, inside surfaces 140'x2 CTG Cane stepping in bars with solo step 4 laps x2  Outside ambulation ramps and unevens 400' w/ RW    Stepping over foam beam with rollator   x4 CTG         Uneven surfaces    Rollator 4 fwd - 2 bwd   SPC 4 laps x1     Stair negotiation        Full flight with rail and cane    Ther Ex            Sit to stand     5x3, one set without UE 5      Hip flex/ext/abd       2x6 // bar extension     Seated hamstring/gastroc stretch   10" x4 ea                                                                     Ther Activity                                    Gait Training            Ambulation in solo step with UE support       SPC 40ft x2                 Modalities

## 2022-09-15 ENCOUNTER — APPOINTMENT (OUTPATIENT)
Dept: PHYSICAL THERAPY | Facility: CLINIC | Age: 62
End: 2022-09-15
Payer: COMMERCIAL

## 2022-09-19 ENCOUNTER — APPOINTMENT (OUTPATIENT)
Dept: LAB | Facility: HOSPITAL | Age: 62
End: 2022-09-19
Payer: COMMERCIAL

## 2022-09-19 ENCOUNTER — OFFICE VISIT (OUTPATIENT)
Dept: PHYSICAL THERAPY | Facility: CLINIC | Age: 62
End: 2022-09-19
Payer: COMMERCIAL

## 2022-09-19 DIAGNOSIS — T14.8XXA HEMATOMA: ICD-10-CM

## 2022-09-19 DIAGNOSIS — Z94.4 LIVER TRANSPLANTED (HCC): Primary | ICD-10-CM

## 2022-09-19 PROCEDURE — 97112 NEUROMUSCULAR REEDUCATION: CPT

## 2022-09-19 NOTE — PROGRESS NOTES
Daily Note     Today's date: 2022  Patient name: Doe Johnson  : 1960  MRN: [id number]  Referring provider: Isabel Chicas  Dx:   Encounter Diagnosis     ICD-10-CM    1  Liver transplanted ([address] )  Z94 4    2  Hematoma  T14  8XXA        Start Time:   Stop Time: 230  Total time in clinic (min): 40 minutes    Subjective:   Patient did go out this weekend  Patient states she was not feeling well last week, but no episodes of knee buckling noted  Objective: See treatment diary below      Assessment:    Distance ambulation with intermittent sensitivity with seated rests needed  Increased fatigue with not feeling well last week  Outside ambulation with improved stability  Discussed options for device as rollator continues to feel more unstable and she would like to obtain a std RW  Patient instructed on how to pursue obtaining device with instruction to reach out to PT should any problems arise  Plan:   Continue PT per POC  No complaints post treatment         Precautions:  H/o concussion w/ CSF leak, h/o blood patch x3, h/o migraines assoc, h/o left thigh hematoma with radicular pain/ motor loss, h/o liver failure with liver transplant  EPOC:  10/17/2022      Manuals 8/12 8/15 8/18 8/22 8/30 9/6 9/12 9/19                                               Neuro Re-Ed           Step ups      1 rise x4 with bars, 2 rise x4 with rbars, cuing for sequencing SPC x8 4' step cues for sequencing RW oustide curbs x4 RW outside curbs x4   Step taps 5x2 in solo step  2x10 // bar 4' step        Sidestepping   4 laps // bars   SPC 20ft 2x2     Backward steps   2 laps // bars   SPC 20ft x2     hurdles     5 hurdles in bars x4 laps      Solo step walking with RW, Marquise walker and std cane 40' x2, 40'x2, 20' x2 Rollator on varied tile/carpeted surfaces 40' x2, 160' with CTG  Rollator 60ft x1 CTG carpet, Rollator 90ft CTG carpet/tile Rollator outside surfaces 140' CTG, inside surfaces 140'x2 CTG Cane stepping in bars with solo step 4 laps x2  Outside ambulation ramps and unevens 400' w/ RW Outside ambulation with curbs and ramps w/ RW and 'x2, inside ambulation 140'   Stepping over foam beam with rollator  x4 CTG         Uneven surfaces   Rollator 4 fwd - 2 bwd   SPC 4 laps x1     Stair negotiation       Full flight with rail and cane    Ther Ex           Sit to stand    5x3, one set without UE 5      Hip flex/ext/abd      2x6 // bar extension     Seated hamstring/gastroc stretch  10" x4 ea                                                                Ther Activity                                 Gait Training           Ambulation in solo step with UE support      SPC 40ft x2                Modalities

## 2022-09-20 ENCOUNTER — TELEPHONE (OUTPATIENT)
Dept: LAB | Facility: HOSPITAL | Age: 62
End: 2022-09-20

## 2022-09-22 ENCOUNTER — OFFICE VISIT (OUTPATIENT)
Dept: PHYSICAL THERAPY | Facility: CLINIC | Age: 62
End: 2022-09-22
Payer: COMMERCIAL

## 2022-09-22 DIAGNOSIS — Z94.4 LIVER TRANSPLANTED (HCC): Primary | ICD-10-CM

## 2022-09-22 DIAGNOSIS — T14.8XXA HEMATOMA: ICD-10-CM

## 2022-09-22 PROCEDURE — 97110 THERAPEUTIC EXERCISES: CPT | Performed by: PHYSICAL THERAPY ASSISTANT

## 2022-09-22 PROCEDURE — 97112 NEUROMUSCULAR REEDUCATION: CPT | Performed by: PHYSICAL THERAPY ASSISTANT

## 2022-09-22 NOTE — PROGRESS NOTES
Daily Note     Today's date: 2022  Patient name: Igor Wright  : 1960  MRN: [id number]  Referring provider: Carlos Friend  Dx:   Encounter Diagnosis     ICD-10-CM    1  Liver transplanted ([address] )  Z94 4    2  Hematoma  T14  8XXA                   Subjective:   Pt reports she is still feeling tired from being sick last week  Objective: See treatment diary below      Assessment:    Distance ambulation in SOLO without AD with good tolerance  Required seated rest breaks between ambulation trials  Sit to stand transfers with focus on eccentric control when sitting  Pt demonstrated appropriate fatigue post therapy and would benefit from continued therapy  Plan:   Continue PT per POC  No complaints post treatment         Precautions:  H/o concussion w/ CSF leak, h/o blood patch x3, h/o migraines assoc, h/o left thigh hematoma with radicular pain/ motor loss, h/o liver failure with liver transplant  EPOC:  10/17/2022      Manuals                                                 Neuro Re-Ed           Step ups    1 rise x4 with bars, 2 rise x4 with rbars, cuing for sequencing SPC x8 4' step cues for sequencing RW oustide curbs x4 RW outside curbs x4     Step taps 2x10 // bar 4' step          Sidestepping 4 laps // bars   SPC 20ft 2x2       Backward steps 2 laps // bars   SPC 20ft x2       hurdles   5 hurdles in bars x4 laps        Solo step walking with RW, Marquise walker and std cane Rollator 60ft x1 CTG carpet, Rollator 90ft CTG carpet/tile Rollator outside surfaces 140' CTG, inside surfaces 140'x2 CTG Cane stepping in bars with solo step 4 laps x2  Outside ambulation ramps and unevens 400' w/ RW Outside ambulation with curbs and ramps w/ RW and 'x2, inside ambulation 140'     Stepping over foam beam with rollator           Uneven surfaces Rollator 4 fwd - 2 bwd   SPC 4 laps x1       Stair negotiation     Full flight with rail and cane  Full flight with rail and spc Ther Ex           Sit to stand  5x3, one set without UE 5    x5  UE to stand no UE to sit    Hip flex/ext/abd    2x6 // bar extension   x10 ea flex/abd/ext/HS curls    Seated hamstring/gastroc stretch                                                                  Ther Activity                                 Gait Training           Ambulation in solo step with UE support    SPC 40ft x2   Solo step no AD 65'x1,  120'x1 and  200'x1               Modalities

## 2022-09-26 ENCOUNTER — OFFICE VISIT (OUTPATIENT)
Dept: PHYSICAL THERAPY | Facility: CLINIC | Age: 62
End: 2022-09-26
Payer: COMMERCIAL

## 2022-09-26 DIAGNOSIS — T14.8XXA HEMATOMA: ICD-10-CM

## 2022-09-26 DIAGNOSIS — Z94.4 LIVER TRANSPLANTED (HCC): Primary | ICD-10-CM

## 2022-09-26 PROCEDURE — 97112 NEUROMUSCULAR REEDUCATION: CPT

## 2022-09-26 NOTE — PROGRESS NOTES
Daily Note     Today's date: 2022  Patient name: Katiana Mcdowell  : 1960  MRN: [id number]  Referring provider: Clary Client  Dx:   Encounter Diagnosis     ICD-10-CM    1  Liver transplanted ([address] )  Z94 4    2  Hematoma  T14  8XXA        Start Time: 0100  Stop Time: 0150  Total time in clinic (min): 50 minutes    Subjective:   Pt reports she is feeling a bit more fatigued today after running to her appointments this morning  Objective: See treatment diary below      Assessment:   Seated rests continue to be needed today due to fatigue  Outside ambulation with rolling walker with occasional standing rests  Cuing continues to be needed for sequencing over curbs and ramps  Stairs held for today due to fatigue  Plan:   Continue PT per POC  No complaints post treatment         Precautions:  H/o concussion w/ CSF leak, h/o blood patch x3, h/o migraines assoc, h/o left thigh hematoma with radicular pain/ motor loss, h/o liver failure with liver transplant  EPOC:  10/17/2022      Manuals                                                Neuro Re-Ed           Step ups    1 rise x4 with bars, 2 rise x4 with rbars, cuing for sequencing SPC x8 4' step cues for sequencing RW oustide curbs x4 RW outside curbs x4  RW outside curbs x4   Step taps 2x10 // bar 4' step          Sidestepping 4 laps // bars   SPC 20ft 2x2       Backward steps 2 laps // bars   SPC 20ft x2       hurdles   5 hurdles in bars x4 laps        Solo step walking with RW, Marquise walker and std cane Rollator 60ft x1 CTG carpet, Rollator 90ft CTG carpet/tile Rollator outside surfaces 140' CTG, inside surfaces 140'x2 CTG Cane stepping in bars with solo step 4 laps x2  Outside ambulation ramps and unevens 400' w/ RW Outside ambulation with curbs and ramps w/ RW and 'x2, inside ambulation 140'  Outside ambulation with curbs and ramps with RW & ', 140' x2   Stepping over foam beam with rollator Uneven surfaces Rollator 4 fwd - 2 bwd   SPC 4 laps x1       Stair negotiation     Full flight with rail and cane  Full flight with rail and spc     Ther Ex           Sit to stand  5x3, one set without UE 5    x5  UE to stand no UE to sit    Hip flex/ext/abd    2x6 // bar extension   x10 ea flex/abd/ext/HS curls    Seated hamstring/gastroc stretch                                                                  Ther Activity                                 Gait Training           Ambulation in solo step with UE support    SPC 40ft x2   Solo step no AD 65'x1,  120'x1 and  200'x1               Modalities

## 2022-09-27 ENCOUNTER — OFFICE VISIT (OUTPATIENT)
Dept: PHYSICAL THERAPY | Facility: CLINIC | Age: 62
End: 2022-09-27
Payer: COMMERCIAL

## 2022-09-27 DIAGNOSIS — T14.8XXA HEMATOMA: ICD-10-CM

## 2022-09-27 DIAGNOSIS — Z94.4 LIVER TRANSPLANTED (HCC): Primary | ICD-10-CM

## 2022-09-27 PROCEDURE — 97112 NEUROMUSCULAR REEDUCATION: CPT

## 2022-09-27 PROCEDURE — 97116 GAIT TRAINING THERAPY: CPT

## 2022-09-27 NOTE — PROGRESS NOTES
Daily Note     Today's date: 2022  Patient name: Rubin Hanson  : 1960  MRN: [id number]  Referring provider: Kacy Rueda  Dx:   Encounter Diagnosis     ICD-10-CM    1  Liver transplanted ([address] )  Z94 4    2  Hematoma  T14  8XXA        Start Time: 7839  Stop Time: 1100  Total time in clinic (min): 45 minutes    Subjective:   Pt has no complaints this morning  Less overall fatigue today  Objective: See treatment diary below      Assessment:   Progressed gait with RW to supervised to promote increased ambulation at home  Cane ambulation with std cane on levels outside solo step with no buckling noted  Stair negotiation with rail on right with cuing needed for sequencing intermittently needed  Discussed increased ambulation at home with voiced understanding  Continue to promote stance and gait for general strengthening  Plan:   Continue PT per POC  No complaints post treatment         Precautions:  H/o concussion w/ CSF leak, h/o blood patch x3, h/o migraines assoc, h/o left thigh hematoma with radicular pain/ motor loss, h/o liver failure with liver transplant  EPOC:  10/17/2022      Manuals                                                Neuro Re-Ed           Step ups   1 rise x4 with bars, 2 rise x4 with rbars, cuing for sequencing SPC x8 4' step cues for sequencing RW oustide curbs x4 RW outside curbs x4  RW outside curbs x4    Step taps           Sidestepping   SPC 20ft 2x2        Backward steps   SPC 20ft x2        hurdles  5 hurdles in bars x4 laps         Solo step walking with RW, Marquise walker and std cane Rollator outside surfaces 140' CTG, inside surfaces 140'x2 CTG Cane stepping in bars with solo step 4 laps x2  Outside ambulation ramps and unevens 400' w/ RW Outside ambulation with curbs and ramps w/ RW and 'x2, inside ambulation 140'  Outside ambulation with curbs and ramps with RW & ', 140' x2    Stepping over foam beam with rollator           Uneven surfaces   SPC 4 laps x1        Stair negotiation    Full flight with rail and cane  Full flight with rail and spc   Full flight with rail and SPC with CTG 2 sets, occ cuing for dequencing   Ther Ex           Sit to stand 5x3, one set without UE 5    x5  UE to stand no UE to sit     Hip flex/ext/abd   2x6 // bar extension   x10 ea flex/abd/ext/HS curls     Seated hamstring/gastroc stretch                                                                  Ther Activity                                 Gait Training           Ambulation in solo step with UE support   SPC 40ft x2   Solo step no AD 65'x1,  120'x1 and  200'x1  CTG with std cane on levels 80'x2; 280' with RW supervised                Modalities

## 2022-09-29 ENCOUNTER — APPOINTMENT (OUTPATIENT)
Dept: PHYSICAL THERAPY | Facility: CLINIC | Age: 62
End: 2022-09-29
Payer: COMMERCIAL

## 2022-10-03 ENCOUNTER — APPOINTMENT (OUTPATIENT)
Dept: PHYSICAL THERAPY | Facility: CLINIC | Age: 62
End: 2022-10-03

## 2022-10-03 ENCOUNTER — APPOINTMENT (OUTPATIENT)
Dept: LAB | Facility: HOSPITAL | Age: 62
End: 2022-10-03
Payer: COMMERCIAL

## 2022-10-06 ENCOUNTER — OFFICE VISIT (OUTPATIENT)
Dept: PHYSICAL THERAPY | Facility: CLINIC | Age: 62
End: 2022-10-06
Payer: COMMERCIAL

## 2022-10-06 DIAGNOSIS — T14.8XXA HEMATOMA: ICD-10-CM

## 2022-10-06 DIAGNOSIS — Z94.4 LIVER TRANSPLANTED (HCC): Primary | ICD-10-CM

## 2022-10-06 PROCEDURE — 97110 THERAPEUTIC EXERCISES: CPT

## 2022-10-06 PROCEDURE — 97116 GAIT TRAINING THERAPY: CPT

## 2022-10-06 NOTE — PROGRESS NOTES
Daily Note     Today's date: 10/6/2022  Patient name: Doe Johnson  : 1960  MRN: [id number]  Referring provider: Isabel Chicas  Dx:   Encounter Diagnosis     ICD-10-CM    1  Liver transplanted ([address] )  Z94 4    2  Hematoma  T14  8XXA        Start Time: 428  Stop Time:   Total time in clinic (min): 46 minutes    Subjective:   Pt has no complaints this morning  Notes she was feeling a little under the weather, was doing walking with the walker and SPC  Walked into [employer] with RW  Does not have tray yet for walker  Objective: See treatment diary below      Assessment:   Progressed gait with RW to supervised to promote increased ambulation at home  CGA used for [facility] ambulation trials with good pacing and ability to turn, no episodes of knee buckling  Discussed acquiring tray for RW, to promote more walking at home as long as she can negotiate her environment safely  No cues needed for stair negotiation with good stability  Does need short seated rests to recover from LE fatigue  Physical cues for for anterior weight shift when performing sit<>stand, trials with no UE support with achieving approx half stand  Pt will continue to benefit from skilled PT to address her gait deficits and overall mobility  Plan:   Continue PT per POC  No complaints post treatment         Precautions:  H/o concussion w/ CSF leak, h/o blood patch x3, h/o migraines assoc, h/o left thigh hematoma with radicular pain/ motor loss, h/o liver failure with liver transplant  EPOC:  10/17/2022      Manuals 8/22 8/30 9/6 9/12 9/19 9/22 9/26 9/27 10/6                                                   Neuro Re-Ed            Step ups   1 rise x4 with bars, 2 rise x4 with rbars, cuing for sequencing SPC x8 4' step cues for sequencing RW oustide curbs x4 RW outside curbs x4  RW outside curbs x4  // bar 4' LLE only x16 UE support   Step taps            Sidestepping   SPC 20ft 2x2         Backward steps   SPC 20ft x2         hurdles  5 hurdles in bars x4 laps          Solo step walking with RW, Marquise walker and std cane Rollator outside surfaces 140' CTG, inside surfaces 140'x2 CTG Cane stepping in bars with solo step 4 laps x2  Outside ambulation ramps and unevens 400' w/ RW Outside ambulation with curbs and ramps w/ RW and 'x2, inside ambulation 140'  Outside ambulation with curbs and ramps with RW & ', 140' x2     Stepping over foam beam with rollator            Uneven surfaces   SPC 4 laps x1         Stair negotiation    Full flight with rail and cane  Full flight with rail and spc   Full flight with rail and SPC with CTG 2 sets, occ cuing for dequencing    Ther Ex            Sit to stand 5x3, one set without UE 5    x5  UE to stand no UE to sit   x6 with UE to stand no UE to sit   Hip flex/ext/abd   2x6 // bar extension   x10 ea flex/abd/ext/HS curls      Seated hamstring/gastroc stretch                                                                        Ther Activity                                    Gait Training            Ambulation in solo step with UE support   SPC 40ft x2   Solo step no AD 65'x1,  120'x1 and  200'x1  CTG with std cane on levels 80'x2; 280' with RW supervised   RW ortho side x2 - brief seated rest between [facility] lob x2 CGA    Stairs with SPC and rail x1 step to               Modalities

## 2022-10-10 ENCOUNTER — EVALUATION (OUTPATIENT)
Dept: PHYSICAL THERAPY | Facility: CLINIC | Age: 62
End: 2022-10-10
Payer: COMMERCIAL

## 2022-10-10 ENCOUNTER — APPOINTMENT (OUTPATIENT)
Dept: LAB | Facility: HOSPITAL | Age: 62
End: 2022-10-10
Payer: COMMERCIAL

## 2022-10-10 DIAGNOSIS — T14.8XXA HEMATOMA: ICD-10-CM

## 2022-10-10 DIAGNOSIS — Z94.4 LIVER TRANSPLANTED (HCC): Primary | ICD-10-CM

## 2022-10-10 PROCEDURE — 97112 NEUROMUSCULAR REEDUCATION: CPT

## 2022-10-10 PROCEDURE — 97116 GAIT TRAINING THERAPY: CPT

## 2022-10-10 NOTE — LETTER
October 10, 2022    [address] [address]    Patient: Philipp Patino   YOB: 1960   Date of Visit: 10/10/2022     Encounter Diagnosis     ICD-10-CM    1  Liver transplanted ([facility] [address] )  Z94 4    2  Hematoma  T14  Aneta Kerns        Dear Dr Zhao Flournoy: Thank you for your recent referral of Philipp Patino  Please review the attached evaluation summary from Linda's recent visit  Please verify that you agree with the plan of care by signing the attached order  If you have any questions or concerns, please do not hesitate to call  I sincerely appreciate the opportunity to share in the care of one of your patients and hope to have another opportunity to work with you in the near future  Sincerely,    Lizbet Anna, PT      Referring Provider:      I certify that I have read the below Plan of Care and certify the need for these services furnished under this plan of treatment while under my care  Guerline Kraft  [address] [place] [id number]  [address]          PT PROGRESS NOTE/ DAILY NOTE     Today's date: 10/10/2022  Patient name: Philipp Patino  : 1960  MRN: [id number]  Referring provider: Gladies American  Dx:   Encounter Diagnosis     ICD-10-CM    1  Liver transplanted ([facility] [address] )  Z94 4    2  Hematoma  T14  8XXA        Start Time: 1430  Stop Time: 1512  Total time in clinic (min): 42 minutes    Assessment  Assessment details: Patient presents to PT with history of recent liver transplant due to liver failure and history of hematoma left thigh resulting in muscle function loss of her iliopsoas and quad  Per patient she underwent inpatient rehab at [facility] and has made gains in standing tolerance, however, continues to experience intermittent buckling of her left LE in stance  Proximal left LE weakness is evident and activity tolerance is limited by pain and weakness    She presently relies on a wheelchair for all home mobility and is unable to enter/leave her home without assist due to current ramp being too steep for her to independently negotiate  She is looking into purchase of another ramp to allow greater independence  She also presently has a high co-pay for therapy and is negotiating with her insurance company with hopes of addressing her financial concerns  She has been given network contacts as well for financial assistance  Patient is able to perform walking in parallel bars for short distances with good tolerance, close CTG needed due to LE buckling history  Patient is a very motivated individual and is anticipated to benefit from skilled intervention to facilitate maximal safe mobility and return to walking as primary means of mobility  Patient expressed her primary goal is to return to walking  Progress may be slow given co-morbidities  Patient is in agreement with treatment plan  9/8  Patient has made substantial gains with PT achieving 42/56 on HOYOS and ability to perform 4 min walk achieving 190'  Endurance testing completed for 5x sit to stand scoring 11 8 seconds  Improved left LE strength noted and no present buckling noted in gait and stance  Gradually increase ambulation at home with rollator for safety  Patient continues to improve functional stability and safety  She continues to benefit from skilled intervention to increase functional tolerance to ADL tasks  10/10: Pt has shown improvement in her gait and mobility  Her gait speed is 0 51m/s using SPC with minimal imbalance or knee instability  She is classified as a most limited community ambulator  Her Lamount Christina has improved by 2 points since last re-assessment and is just under the cutoff for fall risk  Her 6MWT has greatly improved with using a SPC and walking 576ft showing improved ambulatory endurance   She is able to walk short distances without an AD, discussed importance of using SPC at home for safety and to remain mobile and reducing WC to enhance mobility  Progressing goals remain applicable  She will continue to benefit from skilled intervention to improve community ambulation safety, improve strength, and general mobility  Continue per plan of care  Impairments: abnormal gait, abnormal muscle tone, activity intolerance, impaired balance, impaired physical strength, lacks appropriate home exercise program, pain with function and safety issue     Prognosis: good    Goals  STG (5 weeks)  Patient will be independent in general strengthening program to maximize strength gains with improved tolerance to stance time- MET                            Patient will be able to walk with rolling walker for short household distances utilizing pacing to avoid fatigue- partially met                            Patient will be assessed on HOYOS to determine further balance deficits needs with details to follow- MET with scoring 42/56                            Patient will demonstrate ability to stance unsupported for 1 minute to promote increased overall stability- MET  LTG  (8weeks)  Patient will be able to ambulate short community distances with pacing with appropriate device - met                           Patient will be independent in all transitions - progressing                           Patient will be independent in advanced HEP for strength and balance to promote increased community tolerance - progressing                           Patient will achieve predicted FOTO scoring to facilitate increased ADL tolerance - progressing                           Patient will improve HOYOS scoring by 6 points demonstrating increased overall stability and balance for functional tasks - progressing    10/10: In 6 weeks the patient will:  1  Improve her gait speed by at least 0 08m/s with least restrictive device to improve safety while crossing a crosswalk  2  Improve 6MWT by at least 100ft using least restrictive device to assist with improving community ambulation    3  Improve sit<>stand to consistently use 1UE to show improving leg strength and stability  Plan  Patient would benefit from: skilled physical therapy  Planned therapy interventions: ADL retraining, manual therapy, neuromuscular re-education, balance, patient education, postural training, strengthening, stretching, therapeutic activities, therapeutic exercise and home exercise program  Frequency: 2x week  Duration in weeks: 6  Plan of Care beginning date: 10/10/2022  Plan of Care expiration date: 2022  Treatment plan discussed with: patient        Subjective Evaluation    History of Present Illness  Mechanism of injury: May 17 patient underwent liver transplant  She has a history of concussion  Patient had a hematoma left thigh with low platelet and liver failure, with nerve damage  She did go to  for inpatient rehab  She arrived with wheelchair accompanied by daughter  EMG oct 7th scheduled  Patient does have a referral to pain management, no appointment yet  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  She has lifting restrictions not more than 5 pounds    No current complaints of pain and no recent falls noted  She arrives to PT with wheelchair and is able to use rolling walker for short distances  10/10: Pt with no complaints  She feels like therapy is helping tremendously  Walking more at home, using WC less  Using rollator for more community ambulation, occasionally not all the time  Practicing her exercises at home  Working on getting better sit to stand    Pain  Current pain ratin  At best pain ratin  At worst pain ratin  Location: left thigh    Social Support  Steps to enter house: yes  Stairs in house: yes   Lives in: multiple-level home  Lives with: significant other    Employment status: not working  Exercise history: ramp ordered, she has a ramp and is unble to negotiate current ramp without assist    Treatments  Previous treatment: physical therapy  Patient Goals  Patient goals for therapy: increased strength  Patient goal: I want to walk        Objective     Neurological Testing     Additional Neurological Details  Diminished light touch t/o left thigh region    Strength/Myotome Testing     Left Shoulder     Planes of Motion   Flexion: 4+   Abduction: 4+     Right Shoulder     Planes of Motion   Flexion: 4+   Abduction: 4+     Left Elbow   Flexion: 4+  Extension: 4+    Right Elbow   Flexion: 4+  Extension: 4+    Left Wrist/Hand   Wrist extension: 4+  Wrist flexion: 4+    Right Wrist/Hand   Wrist extension: 4+  Wrist flexion: 4+    Left Hip   Planes of Motion   Flexion: 3- and 3  Extension: 3  Abduction: 3    Right Hip   Planes of Motion   Flexion: 4 and 4+  Extension: 4 and 4+  Abduction: 4 and 4+    Left Knee   Flexion: 3+  Extension: 3+    Right Knee   Flexion: 4+  Extension: 4+    Left Ankle/Foot   Dorsiflexion: 4-  Plantar flexion: 4-    Right Ankle/Foot   Dorsiflexion: 4+  Plantar flexion: 4+  Neuro Exam:     Sensation   Light touch LE: left impaired  Light touch LE: right WNL    Coordination   Finger to nose: left WNL and right WNL  Rapid alternating movements: UE WNL and LE impaired     Functional outcomes   Functional outcome gait comment: Patient able to ambulate in // bars for 20' with CTG for safety, decreased stride length left and decreased LE clearance  Reliant on B/L UE support  Patient now able to ambulate short distances with standard cane, CTG for safety, improved overall stride length and stability in gait noted  Balance:  Patient relies on UE support for stability, guarding for left LE buckling in stance  Further balance testing to follow once patient is able to stand unsupported  Independent sit to stand with UE support           Precautions:  H/o concussion w/ CSF leak, h/o blood patch x3, h/o migraines assoc, h/o left thigh hematoma with radicular pain/ motor loss, h/o liver failure with liver transplant  EPOC: 11/21/2022      TESTING  8/8 9/8 10/10          5x sit to stand   11  8sec 08 25          HOYOS  42/56 44/56          FOTO  38           4 min walk  190' x2 w/ RW 576ft SPC          Gait speed   0 51m/s SPC (1 68ft/s)          Neuro Re-Ed             Step taps  5x2 CTG           Sidestepping             hurdles                                                                 Ther Ex             Sit to stand             Hip flex/ext/abd                                                                                           Ther Activity                                       Gait Training             Ambulation in solo step with UE support   Lobby x2 no AD CGA                       Modalities

## 2022-10-10 NOTE — PROGRESS NOTES
PT PROGRESS NOTE/ DAILY NOTE     Today's date: 10/10/2022  Patient name: Doe Johnson  : 1960  MRN: [id number]  Referring provider: Isabel Chicas  Dx:   Encounter Diagnosis     ICD-10-CM    1  Liver transplanted ([facility] [address] )  Z94 4    2  Hematoma  T14  8XXA        Start Time: 1430  Stop Time: 1512  Total time in clinic (min): 42 minutes    Assessment  Assessment details: Patient presents to PT with history of recent liver transplant due to liver failure and history of hematoma left thigh resulting in muscle function loss of her iliopsoas and quad  Per patient she underwent inpatient rehab at [facility] and has made gains in standing tolerance, however, continues to experience intermittent buckling of her left LE in stance  Proximal left LE weakness is evident and activity tolerance is limited by pain and weakness  She presently relies on a wheelchair for all home mobility and is unable to enter/leave her home without assist due to current ramp being too steep for her to independently negotiate  She is looking into purchase of another ramp to allow greater independence  She also presently has a high co-pay for therapy and is negotiating with her insurance company with hopes of addressing her financial concerns  She has been given network contacts as well for financial assistance  Patient is able to perform walking in parallel bars for short distances with good tolerance, close CTG needed due to LE buckling history  Patient is a very motivated individual and is anticipated to benefit from skilled intervention to facilitate maximal safe mobility and return to walking as primary means of mobility  Patient expressed her primary goal is to return to walking  Progress may be slow given co-morbidities  Patient is in agreement with treatment plan    Patient has made substantial gains with PT achieving 42/56 on HOYOS and ability to perform 4 min walk achieving 190'    Endurance testing completed for 5x sit to stand scoring 11 8 seconds  Improved left LE strength noted and no present buckling noted in gait and stance  Gradually increase ambulation at home with rollator for safety  Patient continues to improve functional stability and safety  She continues to benefit from skilled intervention to increase functional tolerance to ADL tasks  10/10: Pt has shown improvement in her gait and mobility  Her gait speed is 0 51m/s using SPC with minimal imbalance or knee instability  She is classified as a most limited community ambulator  Her Gemma Carota has improved by 2 points since last re-assessment and is just under the cutoff for fall risk  Her 6MWT has greatly improved with using a SPC and walking 576ft showing improved ambulatory endurance  She is able to walk short distances without an AD, discussed importance of using SPC at home for safety and to remain mobile and reducing WC to enhance mobility  Progressing goals remain applicable  She will continue to benefit from skilled intervention to improve community ambulation safety, improve strength, and general mobility  Continue per plan of care    Impairments: abnormal gait, abnormal muscle tone, activity intolerance, impaired balance, impaired physical strength, lacks appropriate home exercise program, pain with function and safety issue     Prognosis: good    Goals  STG (5 weeks)  Patient will be independent in general strengthening program to maximize strength gains with improved tolerance to stance time- MET                            Patient will be able to walk with rolling walker for short household distances utilizing pacing to avoid fatigue- partially met                            Patient will be assessed on HOYOS to determine further balance deficits needs with details to follow- MET with scoring 42/56                            Patient will demonstrate ability to stance unsupported for 1 minute to promote increased overall stability- MET  LTG  (8weeks)  Patient will be able to ambulate short community distances with pacing with appropriate device - met                           Patient will be independent in all transitions - progressing                           Patient will be independent in advanced HEP for strength and balance to promote increased community tolerance - progressing                           Patient will achieve predicted FOTO scoring to facilitate increased ADL tolerance - progressing                           Patient will improve HOYOS scoring by 6 points demonstrating increased overall stability and balance for functional tasks - progressing    10/10: In 6 weeks the patient will:  1  Improve her gait speed by at least 0 08m/s with least restrictive device to improve safety while crossing a crosswalk  2  Improve 6MWT by at least 100ft using least restrictive device to assist with improving community ambulation  3  Improve sit<>stand to consistently use 1UE to show improving leg strength and stability  Plan  Patient would benefit from: skilled physical therapy  Planned therapy interventions: ADL retraining, manual therapy, neuromuscular re-education, balance, patient education, postural training, strengthening, stretching, therapeutic activities, therapeutic exercise and home exercise program  Frequency: 2x week  Duration in weeks: 6  Plan of Care beginning date: 10/10/2022  Plan of Care expiration date: 11/21/2022  Treatment plan discussed with: patient        Subjective Evaluation    History of Present Illness  Mechanism of injury: May 17 patient underwent liver transplant  She has a history of concussion  Patient had a hematoma left thigh with low platelet and liver failure, with nerve damage  She did go to  for inpatient rehab  She arrived with wheelchair accompanied by daughter  EMG oct 7th scheduled  Patient does have a referral to pain management, no appointment yet  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    She has lifting restrictions not more than 5 pounds    No current complaints of pain and no recent falls noted  She arrives to PT with wheelchair and is able to use rolling walker for short distances  10/10: Pt with no complaints  She feels like therapy is helping tremendously  Walking more at home, using WC less  Using rollator for more community ambulation, occasionally not all the time  Practicing her exercises at home  Working on getting better sit to stand    Pain  Current pain ratin  At best pain ratin  At worst pain ratin  Location: left thigh    Social Support  Steps to enter house: yes  Stairs in house: yes   Lives in: multiple-level home  Lives with: significant other    Employment status: not working  Exercise history: ramp ordered, she has a ramp and is unble to negotiate current ramp without assist    Treatments  Previous treatment: physical therapy  Patient Goals  Patient goals for therapy: increased strength  Patient goal: I want to walk        Objective     Neurological Testing     Additional Neurological Details  Diminished light touch t/o left thigh region    Strength/Myotome Testing     Left Shoulder     Planes of Motion   Flexion: 4+   Abduction: 4+     Right Shoulder     Planes of Motion   Flexion: 4+   Abduction: 4+     Left Elbow   Flexion: 4+  Extension: 4+    Right Elbow   Flexion: 4+  Extension: 4+    Left Wrist/Hand   Wrist extension: 4+  Wrist flexion: 4+    Right Wrist/Hand   Wrist extension: 4+  Wrist flexion: 4+    Left Hip   Planes of Motion   Flexion: 3- and 3  Extension: 3  Abduction: 3    Right Hip   Planes of Motion   Flexion: 4 and 4+  Extension: 4 and 4+  Abduction: 4 and 4+    Left Knee   Flexion: 3+  Extension: 3+    Right Knee   Flexion: 4+  Extension: 4+    Left Ankle/Foot   Dorsiflexion: 4-  Plantar flexion: 4-    Right Ankle/Foot   Dorsiflexion: 4+  Plantar flexion: 4+  Neuro Exam:     Sensation   Light touch LE: left impaired  Light touch LE: right WNL    Coordination   Finger to nose: left WNL and right WNL  Rapid alternating movements: UE WNL and LE impaired     Functional outcomes   Functional outcome gait comment: Patient able to ambulate in // bars for 20' with CTG for safety, decreased stride length left and decreased LE clearance  Reliant on B/L UE support  Patient now able to ambulate short distances with standard cane, CTG for safety, improved overall stride length and stability in gait noted  Balance:  Patient relies on UE support for stability, guarding for left LE buckling in stance  Further balance testing to follow once patient is able to stand unsupported  Independent sit to stand with UE support  Precautions:  H/o concussion w/ CSF leak, h/o blood patch x3, h/o migraines assoc, h/o left thigh hematoma with radicular pain/ motor loss, h/o liver failure with liver transplant  EPOC:  11/21/2022      TESTING  8/8 9/8 10/10          5x sit to stand   11  8sec 08 25          HOYOS  42/56 44/56          FOTO  38           4 min walk  190' x2 w/ RW 576ft SPC          Gait speed   0 51m/s SPC (1 68ft/s)          Neuro Re-Ed             Step taps  5x2 CTG           Sidestepping             hurdles                                                                 Ther Ex             Sit to stand             Hip flex/ext/abd                                                                                           Ther Activity                                       Gait Training             Ambulation in solo step with UE support   Lobby x2 no AD CGA                       Modalities

## 2022-10-12 PROBLEM — N30.00 ACUTE CYSTITIS WITHOUT HEMATURIA: Status: RESOLVED | Noted: 2021-05-10 | Resolved: 2022-10-12

## 2022-10-13 ENCOUNTER — OFFICE VISIT (OUTPATIENT)
Dept: PHYSICAL THERAPY | Facility: CLINIC | Age: 62
End: 2022-10-13
Payer: COMMERCIAL

## 2022-10-13 DIAGNOSIS — T14.8XXA HEMATOMA: ICD-10-CM

## 2022-10-13 DIAGNOSIS — Z94.4 LIVER TRANSPLANTED (HCC): Primary | ICD-10-CM

## 2022-10-13 PROCEDURE — 97112 NEUROMUSCULAR REEDUCATION: CPT

## 2022-10-13 PROCEDURE — 97116 GAIT TRAINING THERAPY: CPT

## 2022-10-13 NOTE — PROGRESS NOTES
Daily Note     Today's date: 10/13/2022  Patient name: Suleman Aguiar  : 1960  MRN: [id number]  Referring provider: Paul Zaragoza  Dx:   Encounter Diagnosis     ICD-10-CM    1  Liver transplanted ([address] )  Z94 4    2  Hematoma  T14  8XXA                   Subjective: Patient noted pain in L LE and nerve pain in L calf , patient noted she has been using her walker a lot more  Objective: See treatment diary below      Assessment: Tolerated treatment fair  Patient was able to perform listed exercises with correct form  Patient was able to perform 3 laps today around lobby  Patient would benefit from continued PT  Plan: Continue per plan of care  Precautions:  H/o concussion w/ CSF leak, h/o blood patch x3, h/o migraines assoc, h/o left thigh hematoma with radicular pain/ motor loss, h/o liver failure with liver transplant  EPOC:  2022      TESTING  8/8 9/8 10/10 10/13         5x sit to stand   11  8sec 08 25          HOYOS  42/56 44/56          FOTO  38           4 min walk  190' x2 w/ RW 576ft SPC          Gait speed   0 51m/s SPC (1 68ft/s)          Neuro Re-Ed             Step taps  5x2 CTG   2 x 10          Sidestepping    3 laps          hurdles    2 laps  ea fwd/ 3 laps lat                                                             Ther Ex             Sit to stand    2 x 10         Hip flex/ext/abd    15 x ea                                                                                       Ther Activity                                       Gait Training             Ambulation in solo step with UE support   Lobby x2 no AD CGA Lobby x3 no AD CGA                      Modalities

## 2022-10-17 ENCOUNTER — APPOINTMENT (OUTPATIENT)
Dept: LAB | Facility: HOSPITAL | Age: 62
End: 2022-10-17
Payer: COMMERCIAL

## 2022-10-17 ENCOUNTER — OFFICE VISIT (OUTPATIENT)
Dept: PHYSICAL THERAPY | Facility: CLINIC | Age: 62
End: 2022-10-17
Payer: COMMERCIAL

## 2022-10-17 DIAGNOSIS — Z94.4 LIVER TRANSPLANTED (HCC): Primary | ICD-10-CM

## 2022-10-17 DIAGNOSIS — T14.8XXA HEMATOMA: ICD-10-CM

## 2022-10-17 PROCEDURE — 97112 NEUROMUSCULAR REEDUCATION: CPT

## 2022-10-17 PROCEDURE — 97116 GAIT TRAINING THERAPY: CPT

## 2022-10-17 NOTE — PROGRESS NOTES
Daily Note     Today's date: 10/17/2022  Patient name: Prasad Hudson  : 1960  MRN: [id number]  Referring provider: Stephy Perez  Dx:   Encounter Diagnosis     ICD-10-CM    1  Liver transplanted ([address] )  Z94 4    2  Hematoma  T14  8XXA        Start Time: 0145  Stop Time: 0230  Total time in clinic (min): 45 minutes    Subjective: Patient arrived with RW  Some sensitivity is noted on incision area  Objective: See treatment diary below      Assessment:  Nice gains in stepping with good sequencing, occasional cuing needed for breathing  Initiated RW on stairs with rail to allow patient to exit home and use RW for access to driveway  Ambulation without device with good tolerance, continued cuing for pacing  Stepping without rail with cane with increased insecurity  Plan: Continue per plan of care  No complaints end of session  Continue to progress gait without device  Precautions:  H/o concussion w/ CSF leak, h/o blood patch x3, h/o migraines assoc, h/o left thigh hematoma with radicular pain/ motor loss, h/o liver failure with liver transplant  EPOC:  2022      TESTING  8/8 9/8 10/10 10/13 10/17        5x sit to stand   11  8sec 08 25          HOYOS  42/56 44/56          FOTO  38           4 min walk  190' x2 w/ RW 576ft SPC          Gait speed   0 51m/s SPC (1 68ft/s)          Neuro Re-Ed             Step taps  5x2 CTG   2 x 10          Sidestepping    3 laps          hurdles    2 laps  ea fwd/ 3 laps lat         Step ups     4 reps with cane no rail        Full flight with cane and rail      2x2 with CTG        4 steps with RW and rail     W/ CTG                     Ther Ex             Sit to stand    2 x 10         Hip flex/ext/abd    15 x ea                                                                                       Ther Activity                                       Gait Training             Ambulation in solo step with UE support   Lobby x2 no AD CGA Lobby x3 no AD CGA No device 140' with CTG                     Modalities

## 2022-10-20 ENCOUNTER — OFFICE VISIT (OUTPATIENT)
Dept: PHYSICAL THERAPY | Facility: CLINIC | Age: 62
End: 2022-10-20
Payer: COMMERCIAL

## 2022-10-20 DIAGNOSIS — T14.8XXA HEMATOMA: ICD-10-CM

## 2022-10-20 DIAGNOSIS — Z94.4 LIVER TRANSPLANTED (HCC): Primary | ICD-10-CM

## 2022-10-20 PROCEDURE — 97112 NEUROMUSCULAR REEDUCATION: CPT

## 2022-10-20 PROCEDURE — 97116 GAIT TRAINING THERAPY: CPT

## 2022-10-20 NOTE — PROGRESS NOTES
Daily Note     Today's date: 10/20/2022  Patient name: Alejandro Estimable  : 1960  MRN: [id number]  Referring provider: Sarah Rubio  Dx:   Encounter Diagnosis     ICD-10-CM    1  Liver transplanted ([address] )  Z94 4    2  Hematoma  T14  8XXA        Start Time: 52  Stop Time: 1230  Total time in clinic (min): 35 minutes    Subjective: Arrives 10 min late  Patient arrived with [facility], notes she was fatigued and in a rush  Objective: See treatment diary below      Assessment:  Pt with one subjective knee buckling during gait training  Shows improving endurance with walking trials with good, steady pacing  Notes some fatigue with directional walking/balance drills  No instability with stepping up, cautious stepping down using strong LE  She will continue to benefit from skilled intervention to improve her strength and walking endurance  Plan: Continue per plan of care  No complaints end of session  Continue to progress gait without device  Precautions:  H/o concussion w/ CSF leak, h/o blood patch x3, h/o migraines assoc, h/o left thigh hematoma with radicular pain/ motor loss, h/o liver failure with liver transplant  EPOC:  2022      TESTING  8 98 10/10 10/13 10/17 10/20       5x sit to stand   11  8sec 08 25          HOYOS  42/56 44/56          FOTO  38           4 min walk  190' x2 w/ RW 576ft SPC          Gait speed   0 51m/s SPC (1 68ft/s)          Neuro Re-Ed             Step taps  5x2 CTG   2 x 10          Sidestepping    3 laps   20ft x8 laps       bwd steps      20ft x2       hurdles    2 laps  ea fwd/ 3 laps lat         Step ups     4 reps with cane no rail // bar no UE x12 RLE       Full flight with cane and rail      2x2 with CTG        4 steps with RW and rail     W/ CTG                     Ther Ex             Sit to stand    2 x 10         Hip flex/ext/abd    15 x ea                                                                                       Ther Activity Gait Training             Ambulation in solo step with UE support   Lobby x2 no AD CGA Lobby x3 no AD CGA No device 140' with CTG No device lobby + ortho side x1    Ortho side x2                    Modalities

## 2022-10-24 ENCOUNTER — OFFICE VISIT (OUTPATIENT)
Dept: PHYSICAL THERAPY | Facility: CLINIC | Age: 62
End: 2022-10-24
Payer: COMMERCIAL

## 2022-10-24 ENCOUNTER — APPOINTMENT (OUTPATIENT)
Dept: LAB | Facility: HOSPITAL | Age: 62
End: 2022-10-24
Payer: COMMERCIAL

## 2022-10-24 DIAGNOSIS — T14.8XXA HEMATOMA: ICD-10-CM

## 2022-10-24 DIAGNOSIS — Z94.4 LIVER TRANSPLANTED (HCC): Primary | ICD-10-CM

## 2022-10-24 PROCEDURE — 97116 GAIT TRAINING THERAPY: CPT

## 2022-10-24 PROCEDURE — 97112 NEUROMUSCULAR REEDUCATION: CPT

## 2022-10-24 NOTE — PROGRESS NOTES
Daily Note     Today's date: 10/24/2022  Patient name: Nori Hernandez  : 1960  MRN: [id number]  Referring provider: Berta Swain  Dx:   Encounter Diagnosis     ICD-10-CM    1  Liver transplanted ([address] )  Z94 4    2  Hematoma  T14  8XXA        Start Time: 0100  Stop Time: 0145  Total time in clinic (min): 45 minutes    Subjective:   Patient indicates she has noticed intermittent thigh discomfort noted more with inclement weather  She arrived today with RW       Objective: See treatment diary below      Assessment:    Ambulation without a device on levels with ease and no buckling noted  Stairs with rail and no device with good sequencing and no safety concerns  Discussed at length increase in ambulation and initiation of driving as MD allows  Patient encouraged to increase community mobility with voiced agreement  Plan: Continue per plan of care  No complaints end of session  Continue to progress gait without device  Decreased frequency of PT to once weekly with instruction to contact PT should any increase in difficulty be noted  Precautions:  H/o concussion w/ CSF leak, h/o blood patch x3, h/o migraines assoc, h/o left thigh hematoma with radicular pain/ motor loss, h/o liver failure with liver transplant  EPOC:  2022      TESTING   9/8 10/10 10/13 10/17 10/20 10/24      5x sit to stand   11  8sec 08 25          HOYOS  42/56 44/56          FOTO  38           4 min walk  190' x2 w/ RW 576ft SPC          Gait speed   0 51m/s SPC (1 68ft/s)          Neuro Re-Ed             Step taps  5x2 CTG   2 x 10    10x2      Sidestepping    3 laps   20ft x8 laps       bwd steps      20ft x2       hurdles    2 laps  ea fwd/ 3 laps lat         Step ups     4 reps with cane no rail // bar no UE x12 RLE       Full flight with cane and rail      2x2 with CTG  With rail no device 1 flight      4 steps with RW and rail     W/ CTG                     Ther Ex             Sit to stand    2 x 10   5x2 Hip flex/ext/abd    15 x ea                                                                                       Ther Activity                                       Gait Training             Ambulation in solo step with UE support   Lobby x2 no AD CGA Lobby x3 no AD CGA No device 140' with CTG No device lobby + ortho side x1    Ortho side x2 No device 140' CTG x2                   Modalities

## 2022-10-27 ENCOUNTER — APPOINTMENT (OUTPATIENT)
Dept: PHYSICAL THERAPY | Facility: CLINIC | Age: 62
End: 2022-10-27

## 2022-10-31 ENCOUNTER — TELEPHONE (OUTPATIENT)
Dept: PAIN MEDICINE | Facility: CLINIC | Age: 62
End: 2022-10-31

## 2022-10-31 ENCOUNTER — APPOINTMENT (OUTPATIENT)
Dept: PHYSICAL THERAPY | Facility: CLINIC | Age: 62
End: 2022-10-31

## 2022-10-31 ENCOUNTER — TELEPHONE (OUTPATIENT)
Dept: LAB | Facility: HOSPITAL | Age: 62
End: 2022-10-31

## 2022-10-31 NOTE — TELEPHONE ENCOUNTER
S/W her daughter to reschedule her appt  She told me that her mom is in the hospital and that she will call us back to reschedule her appt with Dani Hernandezr will not be in office today      Thank You

## 2022-11-11 ENCOUNTER — TELEPHONE (OUTPATIENT)
Dept: LAB | Facility: HOSPITAL | Age: 62
End: 2022-11-11

## 2022-11-14 ENCOUNTER — APPOINTMENT (OUTPATIENT)
Dept: LAB | Facility: HOSPITAL | Age: 62
End: 2022-11-14

## 2022-11-14 DIAGNOSIS — I10 ESSENTIAL HYPERTENSION: ICD-10-CM

## 2022-11-14 DIAGNOSIS — E09.9 DRUG OR CHEMICAL INDUCED DIABETES MELLITUS WITHOUT COMPLICATIONS (HCC): ICD-10-CM

## 2022-11-14 DIAGNOSIS — E04.2 NONTOXIC MULTINODULAR GOITER: Primary | ICD-10-CM

## 2022-11-14 DIAGNOSIS — E55.9 VITAMIN D DEFICIENCY: ICD-10-CM

## 2022-11-14 LAB
25(OH)D3 SERPL-MCNC: 55.9 NG/ML (ref 30–100)
EST. AVERAGE GLUCOSE BLD GHB EST-MCNC: 74 MG/DL
HBA1C MFR BLD: 4.2 %
T4 FREE SERPL-MCNC: 0.97 NG/DL (ref 0.76–1.46)
TSH SERPL DL<=0.05 MIU/L-ACNC: 4.69 UIU/ML (ref 0.45–4.5)
VIT B12 SERPL-MCNC: 379 PG/ML (ref 100–900)

## 2022-11-15 LAB — FRUCTOSAMINE SERPL-SCNC: 219 UMOL/L (ref 0–285)

## 2022-11-21 ENCOUNTER — TELEPHONE (OUTPATIENT)
Dept: LAB | Facility: HOSPITAL | Age: 62
End: 2022-11-21

## 2022-11-28 ENCOUNTER — APPOINTMENT (OUTPATIENT)
Dept: LAB | Facility: HOSPITAL | Age: 62
End: 2022-11-28

## 2022-11-30 ENCOUNTER — OFFICE VISIT (OUTPATIENT)
Dept: PAIN MEDICINE | Facility: CLINIC | Age: 62
End: 2022-11-30

## 2022-11-30 VITALS
HEIGHT: 66 IN | BODY MASS INDEX: 25.55 KG/M2 | DIASTOLIC BLOOD PRESSURE: 72 MMHG | TEMPERATURE: 97.9 F | HEART RATE: 78 BPM | SYSTOLIC BLOOD PRESSURE: 124 MMHG | WEIGHT: 159 LBS

## 2022-11-30 DIAGNOSIS — M47.816 LUMBAR SPONDYLOSIS: ICD-10-CM

## 2022-11-30 DIAGNOSIS — Z79.891 LONG-TERM CURRENT USE OF OPIATE ANALGESIC: ICD-10-CM

## 2022-11-30 DIAGNOSIS — G89.29 CHRONIC BILATERAL LOW BACK PAIN WITHOUT SCIATICA: ICD-10-CM

## 2022-11-30 DIAGNOSIS — G89.4 CHRONIC PAIN SYNDROME: Primary | ICD-10-CM

## 2022-11-30 DIAGNOSIS — R29.898 WEAKNESS OF LEFT LOWER EXTREMITY: ICD-10-CM

## 2022-11-30 DIAGNOSIS — M54.16 LUMBAR RADICULOPATHY: ICD-10-CM

## 2022-11-30 DIAGNOSIS — M51.36 DDD (DEGENERATIVE DISC DISEASE), LUMBAR: ICD-10-CM

## 2022-11-30 DIAGNOSIS — M54.50 CHRONIC BILATERAL LOW BACK PAIN WITHOUT SCIATICA: ICD-10-CM

## 2022-11-30 DIAGNOSIS — G61.89 OTHER INFLAMMATORY POLYNEUROPATHIES (HCC): ICD-10-CM

## 2022-11-30 PROBLEM — G62.9 PERIPHERAL NEUROPATHY: Status: ACTIVE | Noted: 2022-11-30

## 2022-11-30 RX ORDER — TRAMADOL HYDROCHLORIDE 50 MG/1
TABLET ORAL
Qty: 45 TABLET | Refills: 1 | Status: SHIPPED | OUTPATIENT
Start: 2022-11-30

## 2022-11-30 RX ORDER — CALCIUM CARBONATE/VITAMIN D3 500-10/5ML
LIQUID (ML) ORAL
COMMUNITY

## 2022-11-30 RX ORDER — TRAMADOL HYDROCHLORIDE 100 MG/1
TABLET, EXTENDED RELEASE ORAL
Qty: 30 TABLET | Refills: 1 | Status: SHIPPED | OUTPATIENT
Start: 2022-11-30

## 2022-11-30 NOTE — PROGRESS NOTES
Assessment:  1  Chronic pain syndrome    2  Chronic bilateral low back pain without sciatica    3  Lumbar radiculopathy    4  Other inflammatory polyneuropathies ([address] )    5  Weakness of left lower extremity    6  Lumbar spondylosis    7  DDD (degenerative disc disease), lumbar    8  Long-term current use of opiate analgesic        Plan:  While the patient was in the office today, I did have a thorough conversation with the patient regarding their chronic pain syndrome, symptoms, medication regimen, and treatment plan  I discussed with the patient that at this point time since I do not feel there any good procedure options with her other comorbidities and she is already on a therapeutic dose of gabapentin, with her underlying etiology and to try to help manage her pain so she can progress with therapy, for now, we are going to start her on tramadol  mg daily at bedtime and then she will have tramadol IR 50 mg, 1 p o  b i d  p r n  for breakthrough pain during the day  I explained to the patient that hopefully with the long-acting tramadol she will have a better overall control of her baseline pain and then if she has worsening pain during the day she has something to take as needed with the hope that the tramadol ER keep her pain relatively manageable and is only using the breakthrough tramadol occasionally  I advised the patient that if they experience any side effects or issues with the changes in their medication regiment, they should give our office a call to discuss  I also advised the patient not to drive or operate machinery until they see how the changes in the medication regimen affects them  The patient was agreeable and verbalized an understanding  I did discuss with the patient that at this point she would have to agree to abstain from the medical marijuana products from here on out  The patient was agreeable and verbalized an understanding           The patient is to continue to follow-up with her surgeons and the transplant team as well as Neurology for her chronic headaches and previous head injury  The patient was agreeable and verbalized an understanding  [place] Prescription Drug Monitoring Program report was reviewed and was appropriate     While the patient was in the office today an opioid contract was thoroughly reviewed and signed by the patient  The patient was given adequate time to ask questions in regards to the contract and a signed copy was sent home for her records  A urine drug screen was collected at today's office visit as part of our medication management protocol  The point of care testing results were appropriate for what was being prescribed  The specimen will be sent for confirmatory testing  The drug screen is medically necessary because the patient is either dependent on opioid medication or is being considered for opioid medication therapy and the results could impact ongoing or future treatment  The drug screen is to evaluate for the presences or absence of prescribed, non-prescribed, and/or illicit drugs/substances  There are risks associated with opioid medications, including dependence, addiction and tolerance  The patient understands and agrees to use these medications only as prescribed  Potential side effects of the medications include, but are not limited to, constipation, drowsiness, addiction, impaired judgment and risk of fatal overdose if not taken as prescribed  The patient was warned against driving while taking sedation medications  Sharing medications is a felony  At this point in time, the patient is showing no signs of addiction, abuse, diversion or suicidal ideation  The patient will follow-up in 7 weeks for medication prescription refill and reevaluation  The patient was advised to contact the office should their symptoms worsen in the interim  The patient was agreeable and verbalized an understanding        History of Present Illness: The patient is a 63 y o  female last seen on 11/4/2020 who presents for a follow up office visit in regards to chronic pain syndrome, as the patient's pain has been ongoing for greater than a year, secondary to lumbar degenerative disc disease with spondylosis and radiculopathy as well as weakness of the left lower extremity with peripheral neuropathy  The patient currently reports that since her last office visit she has had a lot of significant medical issues as approximately 6 months ago she was in liver failure and underwent a liver transplant and secondary to a hematoma and blood clot in her left leg she has new onset left-sided peripheral neuropathy and has only really started to be able to stand and walk over the past 6-8 weeks thanks to physical therapy  The patient reports that her transplant specialists and surgeons as well as Neurology and physical therapy have referred her back to our office as currently her transplant team as prescribing tramadol once a day as she is also back on gabapentin 1800 mg a day with minimal improvement  The patient reports that the tramadol is helpful and they want her to be careful with what she uses because of her transplant, however, unfortunately she is been referred back to our office to discuss her medication management as it does not appear there would be any good procedures or surgical options at this time and she just wants to be able to continue to get through rehab and get stronger and unless her pain is better manage she is not going to be able to do that  The patient reports she has also been trying medical marijuana edibles as per the approval and recommendation of her transplant team which she does find somewhat helpful but is only been doing that for a week and is willing to stop using the medical marijuana products if we do not feel it is appropriate and if she should not be taking the tramadol and medical marijuana      Current pain medications includes:  Tramadol 50 mg q day and gabapentin 1800 mg daily  The patient reports that this regimen is providing minimal pain relief  The patient is reporting no side effects from this pain medication regimen  Pain Contract Signed: 11/30/22  Last Urine Drug Screen: 11/30/22    I have personally reviewed and/or updated the patient's past medical history, past surgical history, family history, social history, current medications, allergies, and vital signs today  Review of Systems:    Review of Systems   Respiratory: Negative for shortness of breath  Cardiovascular: Negative for chest pain  Gastrointestinal: Negative for constipation, diarrhea, nausea and vomiting  Musculoskeletal: Positive for gait problem and joint swelling (joint stiffness)  Negative for arthralgias and myalgias  Skin: Negative for rash  Neurological: Positive for weakness  Negative for dizziness and seizures  All other systems reviewed and are negative          Past Medical History:   Diagnosis Date   • Concussion    • CSF leak    • Hyperbilirubinemia 4/28/2022   • Liver failure (HCC)    • Migraines        Past Surgical History:   Procedure Laterality Date   • ABLATION SOFT TISSUE     • BREAST LUMPECTOMY     • IR PARACENTESIS  4/29/2022   • IR PARACENTESIS  5/9/2022   • IR PARACENTESIS  5/12/2022   • LAPAROSCOPY FOR ECTOPIC PREGNANCY     • SINUS SURGERY     • TONSILECTOMY AND ADNOIDECTOMY         Family History   Problem Relation Age of Onset   • Colon cancer Father    • Breast cancer Sister        Social History     Occupational History   • Not on file   Tobacco Use   • Smoking status: Former     Packs/day: 1 00     Types: Cigarettes   • Smokeless tobacco: Never   Vaping Use   • Vaping Use: Never used   Substance and Sexual Activity   • Alcohol use: Yes     Comment: socially, had glass of wine today   • Drug use: Not Currently     Comment: CBD   • Sexual activity: Not on file         Current Outpatient Medications:   •  dapsone 25 mg tablet, Take 25 mg by mouth daily  2 tablets in the AM  Indications: Immune Thrombocytopenia, Disp: , Rfl:   •  diphenhydrAMINE (BENADRYL) 25 mg capsule, Take 25 mg by mouth every 8 (eight) hours as needed for itching  Indications: Itching, Disp: , Rfl:   •  ergocalciferol (ERGOCALCIFEROL) 1 25 MG (72646 UT) capsule, Take 50,000 Units by mouth once a week  Every Monday AM  Indications: Vitamin D Deficiency, Disp: , Rfl:   •  gabapentin (NEURONTIN) 300 mg capsule, Take 600 mg by mouth 3 (three) times a day  Indications: Neuropathic Pain, Disp: , Rfl:   •  linaGLIPtin (Tradjenta) 5 MG TABS, Take 5 mg by mouth in the morning  Indications: Type 2 Diabetes, Disp: , Rfl:   •  lisinopril (ZESTRIL) 10 mg tablet, Take 10 mg by mouth daily  Indications: High Blood Pressure Disorder, Disp: , Rfl:   •  Magnesium Oxide 400 MG CAPS, Take by mouth, Disp: , Rfl:   •  melatonin 3 mg, Take 3 mg by mouth daily at bedtime as needed (sleep)  Indications: Trouble Sleeping, Disp: , Rfl:   •  mycophenolate (MYFORTIC) 360 MG TBEC, Take 360 mg by mouth 2 (two) times a day  Indications: Liver Transplant Recipient, Disp: , Rfl:   •  NIFEdipine ER (ADALAT CC) 30 MG 24 hr tablet, Take 30 mg by mouth 2 (two) times a day  Indications: High Blood Pressure Disorder, Disp: , Rfl:   •  pantoprazole (PROTONIX) 40 mg tablet, Take 40 mg by mouth 2 (two) times a day  Indications: Gastroesophageal Reflux Disease, Disp: , Rfl:   •  predniSONE 5 mg tablet, Take 20 mg by mouth daily  4 tablets in AM  Indications: Organ or Tissue Transplant Recipient, Disp: , Rfl:   •  Sennosides-Docusate Sodium (SB DOCUSATE SODIUM-SENNA PO), Take 50 mg by mouth daily as needed (constipation)  Indications: constipation, Disp: , Rfl:   •  tacrolimus (PROGRAF) 1 mg capsule, Take 7 mg by mouth every 12 (twelve) hours   7 capsules AM 7 capsules PM  Indications: Liver Transplant Recipient, Disp: , Rfl:   •  thiamine 100 MG tablet, Take 100 mg by mouth daily  Indications: Deficiency of Vitamin B1, Disp: , Rfl:   •  traMADol (ULTRAM) 50 mg tablet, Take 1 PO BID PRN for pain , Disp: 45 tablet, Rfl: 1  •  traMADol (ULTRAM-ER) 100 mg 24 hr tablet, Take 1 PO HS for pain , Disp: 30 tablet, Rfl: 1  •  valGANciclovir (VALCYTE) 450 mg tablet, Take 450 mg by mouth daily with breakfast  2 tablets in the AM  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, Disp: , Rfl:     No Known Allergies    Physical Exam:    /72 (BP Location: Left arm, Patient Position: Sitting, Cuff Size: Standard)   Pulse 78   Temp 97 9 °F (36 6 °C)   Ht 5' 6" (1 676 m)   Wt 72 1 kg (159 lb)   BMI 25 66 kg/m²     Constitutional:normal, well developed, well nourished, alert, in no distress and non-toxic and no overt pain behavior  Eyes:anicteric  HEENT:grossly intact  Neck:supple, symmetric, trachea midline and no masses   Pulmonary:even and unlabored  Cardiovascular:No edema or pitting edema present  Skin:Normal without rashes or lesions and well hydrated  Psychiatric:Mood and affect appropriate  Neurologic:Cranial Nerves II-XII grossly intact  Musculoskeletal:The patient's gait is slow, antalgic, painful, but steady with the use of a single cane        Imaging  No orders to display         Orders Placed This Encounter   Procedures   • [facility] All Prescribed Meds and Special Instructions   • Amphetamines, Methamphetamines   • Butalbital   • Phenobarbital   • Secobarbital   • Temazepam   • Alprazolam   • Clonazepam   • Diazepam   • Lorazepam   • Oxazepam   • Gabapentin   • Pregabalin   • Cocaine   • Heroin   • Buprenorphine   • Levorphanol   • Meperidine   • Naltrexone   • Fentanyl   • Methadone   • Oxycodone   • Oxymorphone   • Tapentadol   • THC   • Tramadol   • Codeine, Hydrocodone, Hydropmorphone, Morphine   • Bath Salts   • Ethyl Glucuronide/Ethyl Sulfate   • Kratom   • Spice   • Methylphenidate   • Phentermine   • Validity Oxidant   • Validity Creatinine   • Validity pH   • Validity Specific

## 2022-11-30 NOTE — PATIENT INSTRUCTIONS

## 2022-12-01 ENCOUNTER — TELEPHONE (OUTPATIENT)
Dept: PAIN MEDICINE | Facility: MEDICAL CENTER | Age: 62
End: 2022-12-01

## 2022-12-01 ENCOUNTER — TELEPHONE (OUTPATIENT)
Dept: LAB | Facility: HOSPITAL | Age: 62
End: 2022-12-01

## 2022-12-01 NOTE — TELEPHONE ENCOUNTER
S/w pt, stated that she was put on the ursidol by the liver transplant clinic  She does not have a nephrologist  Pt questioned if she should stop the ursidol  Advised pt that she should fu w/ the prescriber and cb to advise if adjustments need to be made to the tramadol  Pt verbalized understanding and appreciation

## 2022-12-01 NOTE — TELEPHONE ENCOUNTER
Caller: Patient    Doctor: Bibi Centeno    Reason for call: patient is currently back on Ursodiol for her kidney and is wondering should she not take the tramadol    Call back#: [phone]

## 2022-12-02 LAB
6MAM UR QL CFM: NEGATIVE NG/ML
7AMINOCLONAZEPAM UR QL CFM: NEGATIVE NG/ML
A-OH ALPRAZ UR QL CFM: NEGATIVE NG/ML
ACCEPTABLE CREAT UR QL: ABNORMAL MG/DL
ACCEPTIBLE SP GR UR QL: NORMAL
AMPHET UR QL CFM: NEGATIVE NG/ML
BUPRENORPHINE UR QL CFM: NEGATIVE NG/ML
BUTALBITAL UR QL CFM: NEGATIVE NG/ML
BZE UR QL CFM: NEGATIVE NG/ML
CODEINE UR QL CFM: NEGATIVE NG/ML
EDDP UR QL CFM: NEGATIVE NG/ML
ETHYL GLUCURONIDE UR QL CFM: NEGATIVE NG/ML
ETHYL SULFATE UR QL SCN: NEGATIVE NG/ML
EUTYLONE UR QL: NEGATIVE NG/ML
FENTANYL UR QL CFM: NEGATIVE NG/ML
GLIADIN IGG SER IA-ACNC: NEGATIVE NG/ML
HYDROCODONE UR QL CFM: NEGATIVE NG/ML
HYDROMORPHONE UR QL CFM: NEGATIVE NG/ML
LORAZEPAM UR QL CFM: NEGATIVE NG/ML
ME-PHENIDATE UR QL CFM: NEGATIVE NG/ML
MEPERIDINE UR QL CFM: NEGATIVE NG/ML
METHADONE UR QL CFM: NEGATIVE NG/ML
METHAMPHET UR QL CFM: NEGATIVE NG/ML
MORPHINE UR QL CFM: NEGATIVE NG/ML
NALTREXONE UR QL CFM: NEGATIVE NG/ML
NITRITE UR QL: NORMAL UG/ML
NORBUPRENORPHINE UR QL CFM: NEGATIVE NG/ML
NORDIAZEPAM UR QL CFM: NEGATIVE NG/ML
NORFENTANYL UR QL CFM: NEGATIVE NG/ML
NORHYDROCODONE UR QL CFM: NEGATIVE NG/ML
NORMEPERIDINE UR QL CFM: NEGATIVE NG/ML
NOROXYCODONE UR QL CFM: NEGATIVE NG/ML
OXAZEPAM UR QL CFM: NEGATIVE NG/ML
OXYCODONE UR QL CFM: NEGATIVE NG/ML
OXYMORPHONE UR QL CFM: NEGATIVE NG/ML
OXYMORPHONE UR QL CFM: NEGATIVE NG/ML
PARA-FLUOROFENTANYL QUANTIFICATION: NORMAL NG/ML
PHENOBARB UR QL CFM: NEGATIVE NG/ML
RESULT ALL_PRESCRIBED MEDS AND SPECIAL INSTRUCTIONS: NORMAL
SECOBARBITAL UR QL CFM: NEGATIVE NG/ML
SL AMB 4-ANPP QUANTIFICATION: NORMAL NG/ML
SL AMB 5F-ADB-M7 METABOLITE QUANTIFICATION: NEGATIVE NG/ML
SL AMB 7-OH-MITRAGYNINE (KRATOM ALKALOID) QUANTIFICATION: NEGATIVE NG/ML
SL AMB AB-FUBINACA-M3 METABOLITE QUANTIFICATION: NEGATIVE NG/ML
SL AMB ACETYL FENTANYL QUANTIFICATION: NORMAL NG/ML
SL AMB ACETYL NORFENTANYL QUANTIFICATION: NORMAL NG/ML
SL AMB ACRYL FENTANYL QUANTIFICATION: NORMAL NG/ML
SL AMB CARFENTANIL QUANTIFICATION: NORMAL NG/ML
SL AMB CTHC (MARIJUANA METABOLITE) QUANTIFICATION: NEGATIVE NG/ML
SL AMB DEXTRORPHAN (DEXTROMETHORPHAN METABOLITE) QUANT: NEGATIVE NG/ML
SL AMB GABAPENTIN QUANTIFICATION: NORMAL
SL AMB JWH018 METABOLITE QUANTIFICATION: NEGATIVE NG/ML
SL AMB JWH073 METABOLITE QUANTIFICATION: NEGATIVE NG/ML
SL AMB MDMB-FUBINACA-M1 METABOLITE QUANTIFICATION: NEGATIVE NG/ML
SL AMB METHYLONE QUANTIFICATION: NEGATIVE NG/ML
SL AMB N-DESMETHYL-TRAMADOL QUANTIFICATION: NORMAL NG/ML
SL AMB PHENTERMINE QUANTIFICATION: NEGATIVE NG/ML
SL AMB PREGABALIN QUANTIFICATION: NEGATIVE
SL AMB RCS4 METABOLITE QUANTIFICATION: NEGATIVE NG/ML
SL AMB RITALINIC ACID QUANTIFICATION: NEGATIVE NG/ML
SMOOTH MUSCLE AB TITR SER IF: NEGATIVE NG/ML
SPECIMEN DRAWN SERPL: NEGATIVE NG/ML
SPECIMEN PH ACCEPTABLE UR: NORMAL
TAPENTADOL UR QL CFM: NEGATIVE NG/ML
TEMAZEPAM UR QL CFM: NEGATIVE NG/ML
TEMAZEPAM UR QL CFM: NEGATIVE NG/ML
TRAMADOL UR QL CFM: NORMAL NG/ML
URATE/CREAT 24H UR: NORMAL NG/ML

## 2022-12-12 ENCOUNTER — TELEPHONE (OUTPATIENT)
Dept: LAB | Facility: HOSPITAL | Age: 62
End: 2022-12-12

## 2022-12-13 ENCOUNTER — TELEPHONE (OUTPATIENT)
Dept: PHYSICAL THERAPY | Facility: CLINIC | Age: 62
End: 2022-12-13

## 2022-12-13 NOTE — TELEPHONE ENCOUNTER
Patient contacted due to hiatus from PT  Per patient, she was hospitalized for a period of time and she now feels she is doing well with mobility and does not feel she needs to return for treatment at this time  She will be discharged from PT and has been encouraged to contact PT should any questions or problems arise

## 2023-01-09 ENCOUNTER — APPOINTMENT (OUTPATIENT)
Dept: LAB | Facility: HOSPITAL | Age: 63
End: 2023-01-09

## 2023-01-16 ENCOUNTER — APPOINTMENT (OUTPATIENT)
Dept: LAB | Facility: HOSPITAL | Age: 63
End: 2023-01-16

## 2023-01-18 ENCOUNTER — APPOINTMENT (OUTPATIENT)
Dept: RADIOLOGY | Facility: CLINIC | Age: 63
End: 2023-01-18

## 2023-01-18 ENCOUNTER — OFFICE VISIT (OUTPATIENT)
Dept: PAIN MEDICINE | Facility: CLINIC | Age: 63
End: 2023-01-18

## 2023-01-18 VITALS
HEART RATE: 68 BPM | TEMPERATURE: 98 F | SYSTOLIC BLOOD PRESSURE: 126 MMHG | BODY MASS INDEX: 26.68 KG/M2 | HEIGHT: 66 IN | DIASTOLIC BLOOD PRESSURE: 74 MMHG | WEIGHT: 166 LBS

## 2023-01-18 DIAGNOSIS — M79.671 ACUTE PAIN OF RIGHT FOOT: ICD-10-CM

## 2023-01-18 DIAGNOSIS — M54.50 CHRONIC BILATERAL LOW BACK PAIN WITHOUT SCIATICA: ICD-10-CM

## 2023-01-18 DIAGNOSIS — R29.898 WEAKNESS OF LEFT LOWER EXTREMITY: ICD-10-CM

## 2023-01-18 DIAGNOSIS — G89.29 CHRONIC BILATERAL LOW BACK PAIN WITHOUT SCIATICA: ICD-10-CM

## 2023-01-18 DIAGNOSIS — M54.16 LUMBAR RADICULOPATHY: ICD-10-CM

## 2023-01-18 DIAGNOSIS — G89.4 CHRONIC PAIN SYNDROME: Primary | ICD-10-CM

## 2023-01-18 DIAGNOSIS — M47.816 LUMBAR SPONDYLOSIS: ICD-10-CM

## 2023-01-18 DIAGNOSIS — M51.36 DDD (DEGENERATIVE DISC DISEASE), LUMBAR: ICD-10-CM

## 2023-01-18 RX ORDER — TRAMADOL HYDROCHLORIDE 50 MG/1
TABLET ORAL
Qty: 45 TABLET | Refills: 1 | Status: SHIPPED | OUTPATIENT
Start: 2023-01-18

## 2023-01-18 RX ORDER — TRAMADOL HYDROCHLORIDE 100 MG/1
TABLET, EXTENDED RELEASE ORAL
Qty: 30 TABLET | Refills: 1 | Status: CANCELLED | OUTPATIENT
Start: 2023-01-18

## 2023-01-18 RX ORDER — TRAMADOL HYDROCHLORIDE 200 MG/1
TABLET, EXTENDED RELEASE ORAL
Qty: 30 TABLET | Refills: 1 | Status: SHIPPED | OUTPATIENT
Start: 2023-01-18

## 2023-01-18 NOTE — PATIENT INSTRUCTIONS

## 2023-01-18 NOTE — PROGRESS NOTES
Assessment:  1  Chronic pain syndrome    2  Acute pain of right foot    3  Chronic bilateral low back pain without sciatica    4  Lumbar radiculopathy    5  Lumbar spondylosis    6  Weakness of left lower extremity    7  DDD (degenerative disc disease), lumbar        Plan:  While the patient was in the office today, I did have a thorough conversation with the patient regarding their chronic pain syndrome, symptoms, and medication regimen/treatment plan  With regards to her right foot and toe pain, I did give the patient a prescription for a right foot x-ray to evaluate for any underlying etiology  I advised the patient once we have the x-ray results, our office will give her a call to review the results and discuss the neck step in her treatment plan, which may include a follow-up with orthopedics for further evaluation  The patient was agreeable and verbalized an understanding  With regards to her medication regimen, I had a thorough conversation with the patient and explained to her that overall I think it is in her best interest to increase the tramadol ER to 200 mg at bedtime to see if that would provide better overall baseline control of her pain so she will continue to sleep better but also hopefully see more relief of her pain during the day as well on a consistent basis  However, I also let her know that it is still okay and reasonable to take the tramadol IR 50 mg once or twice a day as needed and not to suffer and if she is having pain take the tramadol and to continue to abstain from medical marijuana products as the patient had reported that the 1 day when she overdid it she was so uncomfortable and that even after taking 1 tramadol, but not the second tramadol, she decided to take a piece of a THC gummy to help with the pain    I advised the patient that she needs to continue to abstain from the medical marijuana products as we discussed and hopefully this increase in the tramadol ER will be more helpful overall and to use the tramadol IR as needed  I also thoroughly discussed with the patient that she needs to do a better job of knowing her limits and slowly and steadily increasing her activity, as tolerated, allowing pain to be her guide and try to slowly build up her strength and stamina over time  I discussed with the patient that they should not drive or operate machinery until they see how the medication changes affects them and if they have any side effects or issues, they are to call our office and make us aware so we can take the appropriate action and provide changes to the medications or treatment plan  The patient was agreeable and verbalized an understanding  [place] Prescription Drug Monitoring Program report was reviewed and was appropriate     There are risks associated with opioid medications, including dependence, addiction and tolerance  The patient understands and agrees to use these medications only as prescribed  Potential side effects of the medications include, but are not limited to, constipation, drowsiness, addiction, impaired judgment and risk of fatal overdose if not taken as prescribed  The patient was warned against driving while taking sedation medications  Sharing medications is a felony  At this point in time, the patient is showing no signs of addiction, abuse, diversion or suicidal ideation  While the patient was in the office today an annual depression and opioid side effects screening was administered to continue to monitor for signs and symptoms of developing or worsening depression/anxiety  The patient completed the following screening tools: BECKS Depression Inventory, COMM, SOAPP, NOSE Assessment Tool, and Pain Disability Index  A copy of the patients annual results is scanned into their chart as part of their medical record   If the results require further intervention or evaluation a concern letter will be sent explaining the results and the resources we have to offer in the network and a copy of that letter will also be scanned into their chart as part of their medical record  The patient will follow-up in 8 weeks for medication prescription refill and reevaluation  The patient was advised to contact the office should their symptoms worsen in the interim  The patient was agreeable and verbalized an understanding  History of Present Illness: The patient is a 63 y o  female last seen on 11/30/2022 who presents for a follow up office visit in regards to chronic pain syndrome, as the patient's pain has been ongoing for greater than a year, secondary to lumbar degenerative disc disease and spondylosis and radiculopathy as well as right foot pain and lower extremity weakness  The patient currently reports that since her last office visit initially she definitely feels that the tramadol  mg at bedtime helps her fall asleep and sleep a little bit better at nighttime that she was, however, does not feel that it is providing as much relief during the day if she would like but also reports she has not been using the as needed tramadol as she just does not like to take medicine, but also understands that the medicine is there to help keep her as functional as possible and she also admits that sometimes she is her own worst enemy with regards to doing too much activity all at once and not being better at spreading it out to different days and pacing herself  The patient also reports that a few weeks ago she hit her right foot on her wheelchair and her second toe on her right foot has remained swollen and is very painful in that area  The patient was wondering if we could consider some kind of imaging or x-rays to see what is going on in her foot  The patient reports that she feels that since starting the tramadol ER she has been able to use her wheelchair last and is ambulating better with her walker and single cane but again is may be just overdoing it  The patient reports that there is also a lot of extraneous stressors between her and her partner and their relationship as well as all the other things she has going on with her health and that she continues to follow-up with her therapist   The patient presents today for regular medication follow-up visit  Current pain medications includes: Tramadol  mg at bedtime and tramadol IR 50 mg, 1 p o  twice daily as needed for pain  The patient reports that this regimen is providing 30-40% pain relief  The patient is reporting no side effects from this pain medication regimen  Pain Contract Signed: 11/30/2022  Last Urine Drug Screen: 11/30/2022    I have personally reviewed and/or updated the patient's past medical history, past surgical history, family history, social history, current medications, allergies, and vital signs today  Review of Systems:    Review of Systems   Respiratory: Negative for shortness of breath  Cardiovascular: Negative for chest pain  Gastrointestinal: Negative for constipation, diarrhea, nausea and vomiting  Musculoskeletal: Positive for gait problem and joint swelling (Joint stiffness)  Negative for arthralgias and myalgias  Skin: Negative for rash  Neurological: Positive for weakness  Negative for dizziness and seizures  All other systems reviewed and are negative          Past Medical History:   Diagnosis Date   • Concussion    • CSF leak    • Hyperbilirubinemia 4/28/2022   • Liver failure (HCC)    • Migraines        Past Surgical History:   Procedure Laterality Date   • ABLATION SOFT TISSUE     • BREAST LUMPECTOMY     • IR PARACENTESIS  4/29/2022   • IR PARACENTESIS  5/9/2022   • IR PARACENTESIS  5/12/2022   • LAPAROSCOPY FOR ECTOPIC PREGNANCY     • SINUS SURGERY     • TONSILECTOMY AND ADNOIDECTOMY         Family History   Problem Relation Age of Onset   • Colon cancer Father    • Breast cancer Sister        Social History     Occupational History   • Not on file   Tobacco Use   • Smoking status: Former     Packs/day: 1 00     Types: Cigarettes   • Smokeless tobacco: Never   Vaping Use   • Vaping Use: Never used   Substance and Sexual Activity   • Alcohol use: Yes     Comment: socially, had glass of wine today   • Drug use: Not Currently     Comment: CBD   • Sexual activity: Not on file         Current Outpatient Medications:   •  traMADol (ULTRAM) 50 mg tablet, Take 1 PO BID PRN for pain , Disp: 45 tablet, Rfl: 1  •  traMADol (ULTRAM-ER) 200 MG 24 hr tablet, Take 1 PO HS for pain , Disp: 30 tablet, Rfl: 1  •  dapsone 25 mg tablet, Take 25 mg by mouth daily  2 tablets in the AM  Indications: Immune Thrombocytopenia, Disp: , Rfl:   •  diphenhydrAMINE (BENADRYL) 25 mg capsule, Take 25 mg by mouth every 8 (eight) hours as needed for itching  Indications: Itching, Disp: , Rfl:   •  ergocalciferol (ERGOCALCIFEROL) 1 25 MG (67461 UT) capsule, Take 50,000 Units by mouth once a week  Every Monday AM  Indications: Vitamin D Deficiency, Disp: , Rfl:   •  gabapentin (NEURONTIN) 300 mg capsule, Take 600 mg by mouth 3 (three) times a day  Indications: Neuropathic Pain, Disp: , Rfl:   •  linaGLIPtin (Tradjenta) 5 MG TABS, Take 5 mg by mouth in the morning  Indications: Type 2 Diabetes, Disp: , Rfl:   •  lisinopril (ZESTRIL) 10 mg tablet, Take 10 mg by mouth daily  Indications: High Blood Pressure Disorder, Disp: , Rfl:   •  Magnesium Oxide 400 MG CAPS, Take by mouth, Disp: , Rfl:   •  melatonin 3 mg, Take 3 mg by mouth daily at bedtime as needed (sleep)  Indications: Trouble Sleeping, Disp: , Rfl:   •  mycophenolate (MYFORTIC) 360 MG TBEC, Take 360 mg by mouth 2 (two) times a day  Indications: Liver Transplant Recipient, Disp: , Rfl:   •  NIFEdipine ER (ADALAT CC) 30 MG 24 hr tablet, Take 30 mg by mouth 2 (two) times a day  Indications: High Blood Pressure Disorder, Disp: , Rfl:   •  pantoprazole (PROTONIX) 40 mg tablet, Take 40 mg by mouth 2 (two) times a day   Indications: Gastroesophageal Reflux Disease, Disp: , Rfl:   •  predniSONE 5 mg tablet, Take 20 mg by mouth daily  4 tablets in AM  Indications: Organ or Tissue Transplant Recipient, Disp: , Rfl:   •  Sennosides-Docusate Sodium (SB DOCUSATE SODIUM-SENNA PO), Take 50 mg by mouth daily as needed (constipation)  Indications: constipation, Disp: , Rfl:   •  tacrolimus (PROGRAF) 1 mg capsule, Take 7 mg by mouth every 12 (twelve) hours  7 capsules AM 7 capsules PM  Indications: Liver Transplant Recipient, Disp: , Rfl:   •  thiamine 100 MG tablet, Take 100 mg by mouth daily  Indications: Deficiency of Vitamin B1, Disp: , Rfl:   •  valGANciclovir (VALCYTE) 450 mg tablet, Take 450 mg by mouth daily with breakfast  2 tablets in the AM  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, Disp: , Rfl:     No Known Allergies    Physical Exam:    /74 (BP Location: Left arm, Patient Position: Sitting, Cuff Size: Standard)   Pulse 68   Temp 98 °F (36 7 °C)   Ht 5' 6" (1 676 m)   Wt 75 3 kg (166 lb)   BMI 26 79 kg/m²     Constitutional:normal, well developed, well nourished, alert, in no distress and non-toxic and no overt pain behavior  Eyes:anicteric  HEENT:grossly intact  Neck:supple, symmetric, trachea midline and no masses   Pulmonary:even and unlabored  Cardiovascular:No edema or pitting edema present  Skin:Normal without rashes or lesions and well hydrated  Psychiatric:Mood and affect appropriate  Neurologic:Cranial Nerves II-XII grossly intact  Musculoskeletal:The patient's gait is slightly antalgic, limping at times, but steady with the use of a single cane  The patient's right second toe on her right foot does appear swollen and ecchymotic and is very tender to the touch        Imaging  No orders to display         Orders Placed This Encounter   Procedures   • XR foot 3+ vw right

## 2023-01-19 PROBLEM — M79.671 ACUTE PAIN OF RIGHT FOOT: Status: ACTIVE | Noted: 2023-01-19

## 2023-01-23 ENCOUNTER — APPOINTMENT (OUTPATIENT)
Dept: LAB | Facility: HOSPITAL | Age: 63
End: 2023-01-23

## 2023-01-23 ENCOUNTER — TRANSCRIBE ORDERS (OUTPATIENT)
Dept: LAB | Facility: HOSPITAL | Age: 63
End: 2023-01-23

## 2023-01-23 DIAGNOSIS — D84.9 IMMUNOSUPPRESSION-RELATED INFECTIOUS DISEASE (HCC): Primary | ICD-10-CM

## 2023-01-23 DIAGNOSIS — B99.8 IMMUNOSUPPRESSION-RELATED INFECTIOUS DISEASE (HCC): Primary | ICD-10-CM

## 2023-01-23 DIAGNOSIS — Z94.4 LIVER REPLACED BY TRANSPLANT (HCC): ICD-10-CM

## 2023-01-23 LAB
ALBUMIN SERPL BCP-MCNC: 4 G/DL (ref 3.5–5)
ALP SERPL-CCNC: 49 U/L (ref 46–116)
ALT SERPL W P-5'-P-CCNC: 49 U/L (ref 12–78)
ANION GAP SERPL CALCULATED.3IONS-SCNC: 4 MMOL/L (ref 4–13)
AST SERPL W P-5'-P-CCNC: 13 U/L (ref 5–45)
BASOPHILS # BLD AUTO: 0.09 THOUSANDS/ÂΜL (ref 0–0.1)
BASOPHILS NFR BLD AUTO: 2 % (ref 0–1)
BILIRUB DIRECT SERPL-MCNC: 0.15 MG/DL (ref 0–0.2)
BILIRUB SERPL-MCNC: 1.02 MG/DL (ref 0.2–1)
BUN SERPL-MCNC: 22 MG/DL (ref 5–25)
CALCIUM SERPL-MCNC: 9.4 MG/DL (ref 8.3–10.1)
CHLORIDE SERPL-SCNC: 103 MMOL/L (ref 96–108)
CO2 SERPL-SCNC: 31 MMOL/L (ref 21–32)
CREAT SERPL-MCNC: 0.73 MG/DL (ref 0.6–1.3)
EOSINOPHIL # BLD AUTO: 0.19 THOUSAND/ÂΜL (ref 0–0.61)
EOSINOPHIL NFR BLD AUTO: 3 % (ref 0–6)
ERYTHROCYTE [DISTWIDTH] IN BLOOD BY AUTOMATED COUNT: 14.5 % (ref 11.6–15.1)
GFR SERPL CREATININE-BSD FRML MDRD: 89 ML/MIN/1.73SQ M
GLUCOSE P FAST SERPL-MCNC: 77 MG/DL (ref 65–99)
HCT VFR BLD AUTO: 37.6 % (ref 34.8–46.1)
HGB BLD-MCNC: 11.6 G/DL (ref 11.5–15.4)
IMM GRANULOCYTES # BLD AUTO: 0.14 THOUSAND/UL (ref 0–0.2)
IMM GRANULOCYTES NFR BLD AUTO: 2 % (ref 0–2)
LYMPHOCYTES # BLD AUTO: 0.86 THOUSANDS/ÂΜL (ref 0.6–4.47)
LYMPHOCYTES NFR BLD AUTO: 15 % (ref 14–44)
MAGNESIUM SERPL-MCNC: 2.2 MG/DL (ref 1.6–2.6)
MCH RBC QN AUTO: 32.8 PG (ref 26.8–34.3)
MCHC RBC AUTO-ENTMCNC: 30.9 G/DL (ref 31.4–37.4)
MCV RBC AUTO: 106 FL (ref 82–98)
MONOCYTES # BLD AUTO: 0.39 THOUSAND/ÂΜL (ref 0.17–1.22)
MONOCYTES NFR BLD AUTO: 7 % (ref 4–12)
NEUTROPHILS # BLD AUTO: 4.21 THOUSANDS/ÂΜL (ref 1.85–7.62)
NEUTS SEG NFR BLD AUTO: 71 % (ref 43–75)
NRBC BLD AUTO-RTO: 0 /100 WBCS
PLATELET # BLD AUTO: 158 THOUSANDS/UL (ref 149–390)
PMV BLD AUTO: 10.9 FL (ref 8.9–12.7)
POTASSIUM SERPL-SCNC: 4.6 MMOL/L (ref 3.5–5.3)
PROT SERPL-MCNC: 6.7 G/DL (ref 6.4–8.4)
RBC # BLD AUTO: 3.54 MILLION/UL (ref 3.81–5.12)
SODIUM SERPL-SCNC: 138 MMOL/L (ref 135–147)
WBC # BLD AUTO: 5.88 THOUSAND/UL (ref 4.31–10.16)

## 2023-01-24 LAB — TACROLIMUS BLD-MCNC: 6.9 NG/ML (ref 3–15)

## 2023-01-30 ENCOUNTER — APPOINTMENT (OUTPATIENT)
Dept: LAB | Facility: HOSPITAL | Age: 63
End: 2023-01-30

## 2023-01-30 DIAGNOSIS — M81.0 AGE-RELATED OSTEOPOROSIS WITHOUT CURRENT PATHOLOGICAL FRACTURE: ICD-10-CM

## 2023-01-30 DIAGNOSIS — E04.2 NONTOXIC MULTINODULAR GOITER: Primary | ICD-10-CM

## 2023-01-30 DIAGNOSIS — E09.9 DRUG-INDUCED DIABETES MELLITUS (HCC): ICD-10-CM

## 2023-01-30 DIAGNOSIS — E55.9 VITAMIN D DEFICIENCY, UNSPECIFIED: ICD-10-CM

## 2023-01-30 LAB
25(OH)D3 SERPL-MCNC: 48.1 NG/ML (ref 30–100)
ALBUMIN SERPL BCP-MCNC: 4 G/DL (ref 3.5–5)
ALP SERPL-CCNC: 50 U/L (ref 46–116)
ALT SERPL W P-5'-P-CCNC: 50 U/L (ref 12–78)
ANION GAP SERPL CALCULATED.3IONS-SCNC: 6 MMOL/L (ref 4–13)
AST SERPL W P-5'-P-CCNC: 15 U/L (ref 5–45)
BASOPHILS # BLD MANUAL: 0 THOUSAND/UL (ref 0–0.1)
BASOPHILS NFR MAR MANUAL: 0 % (ref 0–1)
BILIRUB DIRECT SERPL-MCNC: 0.16 MG/DL (ref 0–0.2)
BILIRUB SERPL-MCNC: 0.92 MG/DL (ref 0.2–1)
BUN SERPL-MCNC: 15 MG/DL (ref 5–25)
CALCIUM SERPL-MCNC: 9.1 MG/DL (ref 8.3–10.1)
CHLORIDE SERPL-SCNC: 104 MMOL/L (ref 96–108)
CO2 SERPL-SCNC: 29 MMOL/L (ref 21–32)
CREAT SERPL-MCNC: 0.72 MG/DL (ref 0.6–1.3)
EOSINOPHIL # BLD MANUAL: 0.25 THOUSAND/UL (ref 0–0.4)
EOSINOPHIL NFR BLD MANUAL: 5 % (ref 0–6)
ERYTHROCYTE [DISTWIDTH] IN BLOOD BY AUTOMATED COUNT: 14.3 % (ref 11.6–15.1)
GFR SERPL CREATININE-BSD FRML MDRD: 91 ML/MIN/1.73SQ M
GLUCOSE P FAST SERPL-MCNC: 74 MG/DL (ref 65–99)
HCT VFR BLD AUTO: 38.1 % (ref 34.8–46.1)
HGB BLD-MCNC: 12.3 G/DL (ref 11.5–15.4)
LYMPHOCYTES # BLD AUTO: 0.3 THOUSAND/UL (ref 0.6–4.47)
LYMPHOCYTES # BLD AUTO: 6 % (ref 14–44)
MAGNESIUM SERPL-MCNC: 2 MG/DL (ref 1.6–2.6)
MCH RBC QN AUTO: 33 PG (ref 26.8–34.3)
MCHC RBC AUTO-ENTMCNC: 32.3 G/DL (ref 31.4–37.4)
MCV RBC AUTO: 102 FL (ref 82–98)
MONOCYTES # BLD AUTO: 0.15 THOUSAND/UL (ref 0–1.22)
MONOCYTES NFR BLD: 3 % (ref 4–12)
NEUTROPHILS # BLD MANUAL: 4.31 THOUSAND/UL (ref 1.85–7.62)
NEUTS BAND NFR BLD MANUAL: 1 % (ref 0–8)
NEUTS SEG NFR BLD AUTO: 85 % (ref 43–75)
PLATELET # BLD AUTO: 168 THOUSANDS/UL (ref 149–390)
PLATELET BLD QL SMEAR: ADEQUATE
PMV BLD AUTO: 10.2 FL (ref 8.9–12.7)
POLYCHROMASIA BLD QL SMEAR: PRESENT
POTASSIUM SERPL-SCNC: 3.9 MMOL/L (ref 3.5–5.3)
PROT SERPL-MCNC: 6.8 G/DL (ref 6.4–8.4)
PTH-INTACT SERPL-MCNC: 130.4 PG/ML (ref 18.4–80.1)
RBC # BLD AUTO: 3.73 MILLION/UL (ref 3.81–5.12)
RBC MORPH BLD: PRESENT
SODIUM SERPL-SCNC: 139 MMOL/L (ref 135–147)
T4 FREE SERPL-MCNC: 1 NG/DL (ref 0.76–1.46)
TACROLIMUS BLD-MCNC: 7.4 NG/ML (ref 3–15)
TSH SERPL DL<=0.05 MIU/L-ACNC: 5.85 UIU/ML (ref 0.45–4.5)
WBC # BLD AUTO: 5.01 THOUSAND/UL (ref 4.31–10.16)

## 2023-01-31 ENCOUNTER — HOSPITAL ENCOUNTER (OUTPATIENT)
Dept: ULTRASOUND IMAGING | Facility: HOSPITAL | Age: 63
Discharge: HOME | End: 2023-01-31

## 2023-01-31 DIAGNOSIS — E04.2 NONTOXIC MULTINODULAR GOITER: ICD-10-CM

## 2023-02-03 ENCOUNTER — TELEPHONE (OUTPATIENT)
Dept: PAIN MEDICINE | Facility: MEDICAL CENTER | Age: 63
End: 2023-02-03

## 2023-02-03 NOTE — TELEPHONE ENCOUNTER
S/w pt, stated that she is picking up her last refill of the tramadol 200 mg qhs  Pt stated that she has + relief, no se's with this medication  Per pt, she has been getting this medication in 7 day increments  Advised pt, please fu with the pharmacy and cb if anything less than 30 days of medication is dispensed  Pt verbalized understanding and appreciation

## 2023-02-03 NOTE — TELEPHONE ENCOUNTER
Caller: patient    Doctor: Deanna Collins    Reason for call: refill her tramadol, and will need another refill for next Friday    Insurance only allows 7 day supplies    Call back#:

## 2023-02-06 ENCOUNTER — APPOINTMENT (OUTPATIENT)
Dept: LAB | Facility: HOSPITAL | Age: 63
End: 2023-02-06

## 2023-02-08 ENCOUNTER — OFFICE VISIT (OUTPATIENT)
Dept: OBGYN CLINIC | Facility: CLINIC | Age: 63
End: 2023-02-08

## 2023-02-08 VITALS
HEIGHT: 66 IN | DIASTOLIC BLOOD PRESSURE: 74 MMHG | BODY MASS INDEX: 26.36 KG/M2 | WEIGHT: 164 LBS | SYSTOLIC BLOOD PRESSURE: 120 MMHG

## 2023-02-08 DIAGNOSIS — S92.514A CLOSED NONDISPLACED FRACTURE OF PROXIMAL PHALANX OF LESSER TOE OF RIGHT FOOT, INITIAL ENCOUNTER: Primary | ICD-10-CM

## 2023-02-08 RX ORDER — MIRABEGRON 25 MG/1
TABLET, FILM COATED, EXTENDED RELEASE ORAL
COMMUNITY
Start: 2023-01-20

## 2023-02-08 NOTE — PROGRESS NOTES
1  Closed nondisplaced fracture of proximal phalanx of lesser toe of right foot, initial encounter  Ambulatory referral to Orthopedic Surgery    Ambulatory Referral to [facility] This Encounter   Procedures   • Ambulatory Referral to Podiatry        IMAGING STUDIES: (I personally reviewed images in PACS and report):      PAST REPORTS:  Xray right foot 1/18/23:  Fracture neck of proximal phalanx of the 2nd toe with some periosteal change suggesting healing  ASSESSMENT/PLAN:  Right Proximal Phalanx 2nd Toe Neck subacute fracture  Metatarsal pad pain    Repeat X-ray next visit: None    Return for Follow-up with Surgeon  Patient Instructions   Trial post op shoe  Recommended metatarsal head pad which can be purchased at local pharmacy  __________________________________________________________________________    HISTORY OF PRESENT ILLNESS:  Patient evaluated for chronic right foot pain ongoing since end of November 2022  She states her foot was run over by wheelchair when her friend was pushing her  Points to the second toe MTP as source of pain  Describes it as tolerable but at times can be severe with associated symptoms including persistent swelling  She did have x-rays performed at her pain management doctor found fracture of the proximal phalanx of the second toe with healing            Review of Systems      Following history reviewed and update:    Past Medical History:   Diagnosis Date   • Concussion    • CSF leak    • Hyperbilirubinemia 4/28/2022   • Liver failure ([address] )    • Migraines      Past Surgical History:   Procedure Laterality Date   • ABLATION SOFT TISSUE     • BREAST LUMPECTOMY     • IR PARACENTESIS  4/29/2022   • IR PARACENTESIS  5/9/2022   • IR PARACENTESIS  5/12/2022   • LAPAROSCOPY FOR ECTOPIC PREGNANCY     • SINUS SURGERY     • TONSILECTOMY AND ADNOIDECTOMY       Social History   Social History     Substance and Sexual Activity   Alcohol Use Yes    Comment: socially, had glass of wine today     Social History     Substance and Sexual Activity   Drug Use Not Currently    Comment: CBD     Social History     Tobacco Use   Smoking Status Former   • Packs/day: 1 00   • Types: Cigarettes   Smokeless Tobacco Never     Family History   Problem Relation Age of Onset   • Colon cancer Father    • Breast cancer Sister      No Known Allergies       Physical Exam  /74   Ht 5' 6" (1 676 m)   Wt 74 4 kg (164 lb)   BMI 26 47 kg/m²     Constitutional:  see vital signs  Gen: well-developed, normocephalic/atraumatic, well-groomed  Eyes: No inflammation or discharge of conjunctiva or lids; sclera clear   Pharynx: no inflammation, lesion, or mass of lips  Neck: supple, no masses, non-distended  MSK: no inflammation, lesion, mass, or clubbing of nails and digits except for other than mentioned below  SKIN: no visible rashes or skin lesions  Pulmonary/Chest: Effort normal  No respiratory distress     NEURO: cranial nerves grossly intact  PSYCH:  Alert and oriented to person, place, and time; recent and remote memory intact; mood normal, no depression, anxiety, or agitation, judgment and insight good and intact     Ortho Exam    Right foot exam  No swelling, erythema or increased warmth  Tenderness:proximal phalanx 2nd toe; 2nd MTP plantar  ROM Toes extension: intact  ROM Toes flexion: intact  Strength Toes: 5/5 flex, ext  __________________________________________________________________________  Procedures

## 2023-02-13 ENCOUNTER — HOSPITAL ENCOUNTER (OUTPATIENT)
Dept: ULTRASOUND IMAGING | Facility: HOSPITAL | Age: 63
Discharge: HOME | End: 2023-02-13

## 2023-02-13 ENCOUNTER — APPOINTMENT (OUTPATIENT)
Dept: LAB | Facility: HOSPITAL | Age: 63
End: 2023-02-13

## 2023-02-13 DIAGNOSIS — R22.42 LOCALIZED SWELLING, MASS AND LUMP, LEFT LOWER LIMB: ICD-10-CM

## 2023-02-17 ENCOUNTER — TELEPHONE (OUTPATIENT)
Dept: LAB | Facility: HOSPITAL | Age: 63
End: 2023-02-17

## 2023-02-20 ENCOUNTER — APPOINTMENT (OUTPATIENT)
Dept: LAB | Facility: HOSPITAL | Age: 63
End: 2023-02-20

## 2023-02-22 ENCOUNTER — APPOINTMENT (OUTPATIENT)
Dept: RADIOLOGY | Facility: CLINIC | Age: 63
End: 2023-02-22

## 2023-02-22 ENCOUNTER — OFFICE VISIT (OUTPATIENT)
Dept: PODIATRY | Facility: CLINIC | Age: 63
End: 2023-02-22

## 2023-02-22 VITALS
DIASTOLIC BLOOD PRESSURE: 78 MMHG | HEIGHT: 66 IN | BODY MASS INDEX: 26.36 KG/M2 | WEIGHT: 164 LBS | HEART RATE: 69 BPM | SYSTOLIC BLOOD PRESSURE: 126 MMHG

## 2023-02-22 DIAGNOSIS — M79.89 PAIN AND SWELLING OF TOE, RIGHT: ICD-10-CM

## 2023-02-22 DIAGNOSIS — S92.514A CLOSED NONDISPLACED FRACTURE OF PROXIMAL PHALANX OF LESSER TOE OF RIGHT FOOT, INITIAL ENCOUNTER: Primary | ICD-10-CM

## 2023-02-22 DIAGNOSIS — M79.674 PAIN AND SWELLING OF TOE, RIGHT: ICD-10-CM

## 2023-02-22 DIAGNOSIS — S92.514A CLOSED NONDISPLACED FRACTURE OF PROXIMAL PHALANX OF LESSER TOE OF RIGHT FOOT, INITIAL ENCOUNTER: ICD-10-CM

## 2023-02-22 RX ORDER — SITAGLIPTIN 100 MG/1
TABLET, FILM COATED ORAL
COMMUNITY
Start: 2023-02-08

## 2023-02-22 RX ORDER — CONJUGATED ESTROGENS 0.62 MG/G
CREAM VAGINAL
COMMUNITY
Start: 2022-11-18

## 2023-02-22 RX ORDER — LANCETS
EACH MISCELLANEOUS
COMMUNITY
Start: 2022-11-17

## 2023-02-22 RX ORDER — CLINDAMYCIN HYDROCHLORIDE 300 MG/1
CAPSULE ORAL
COMMUNITY
Start: 2023-02-16

## 2023-02-22 RX ORDER — BLOOD SUGAR DIAGNOSTIC
STRIP MISCELLANEOUS
COMMUNITY
Start: 2023-02-08

## 2023-02-22 RX ORDER — URSODIOL 250 MG/1
TABLET, FILM COATED ORAL
COMMUNITY
Start: 2023-02-08

## 2023-02-22 NOTE — PROGRESS NOTES
PATIENT:  Aretha Benavides  1960       ASSESSMENT:     1  Closed nondisplaced fracture of proximal phalanx of lesser toe of right foot, initial encounter  Ambulatory Referral to Podiatry    XR toe right second min 2 views      2  Pain and swelling of toe, right                  PLAN:  1  Reviewed the medical records  Reviewed previous foot X-ray and the note from ortho  Patient was counseled and educated on the condition and the diagnosis  2  X-ray was obtained and personally reviewed  The radiological findings were discussed with the patient  3  The diagnosis, treatment options and prognosis were discussed with the patient  4  Applied mallory splint  Instructed daily application  Recommended her to wear surgical shoe  5  Discussed possible need for PIPJ arthroplasty or arthrodesis depending on the progress  6  Resting, elevation, and icing  7  Patient will return in 4 weeks for re-evaluation  Imaging: I have personally reviewed pertinent films in PACS  Labs, pathology, and Other Studies: I have personally reviewed pertinent reports  Subjective:     HPI  The patient was referred to my office for injury on right 2nd toe  Wheelchair ran over her right foot about 2 months ago  She did not seek medical attention right after the injury  She had X-ray about 1 month ago and it showed a fracture  She was recently seen by Dr Poonam Chacon  Her pain is still significant  It can be 10 out of 10  She still has swelling in right 2nd toe  No associated numbness or paresthesia  No significant weakness or dysfunction  The following portions of the patient's history were reviewed and updated as appropriate: allergies, current medications, past family history, past medical history, past social history, past surgical history and problem list   All pertinent labs and images were reviewed        Past Medical History  Past Medical History:   Diagnosis Date   • Concussion • CSF leak    • Hyperbilirubinemia 4/28/2022   • Liver failure (HCC)    • Migraines        Past Surgical History  Past Surgical History:   Procedure Laterality Date   • ABLATION SOFT TISSUE     • BREAST LUMPECTOMY     • IR PARACENTESIS  4/29/2022   • IR PARACENTESIS  5/9/2022   • IR PARACENTESIS  5/12/2022   • LAPAROSCOPY FOR ECTOPIC PREGNANCY     • SINUS SURGERY     • TONSILECTOMY AND ADNOIDECTOMY          Allergies:  Patient has no known allergies  Medications:  Current Outpatient Medications   Medication Sig Dispense Refill   • Accu-Chek Guide test strip      • clindamycin (CLEOCIN) 300 MG capsule      • dapsone 25 mg tablet Take 25 mg by mouth daily  2 tablets in the AM  Indications: Immune Thrombocytopenia     • diphenhydrAMINE (BENADRYL) 25 mg capsule Take 25 mg by mouth every 8 (eight) hours as needed for itching  Indications: Itching     • ergocalciferol (ERGOCALCIFEROL) 1 25 MG (68415 UT) capsule Take 50,000 Units by mouth once a week  Every Monday AM  Indications: Vitamin D Deficiency     • gabapentin (NEURONTIN) 300 mg capsule Take 600 mg by mouth 3 (three) times a day  Indications: Neuropathic Pain     • Januvia 100 MG tablet      • linaGLIPtin (Tradjenta) 5 MG TABS Take 5 mg by mouth in the morning  Indications: Type 2 Diabetes     • lisinopril (ZESTRIL) 10 mg tablet Take 10 mg by mouth daily  Indications: High Blood Pressure Disorder     • Magnesium Oxide 400 MG CAPS Take by mouth     • melatonin 3 mg Take 3 mg by mouth daily at bedtime as needed (sleep)  Indications: Trouble Sleeping     • Microlet Lancets MISC      • mycophenolate (MYFORTIC) 360 MG TBEC Take 360 mg by mouth 2 (two) times a day  Indications: Liver Transplant Recipient     • Myrbetriq 25 MG TB24      • NIFEdipine ER (ADALAT CC) 30 MG 24 hr tablet Take 30 mg by mouth 2 (two) times a day  Indications: High Blood Pressure Disorder     • pantoprazole (PROTONIX) 40 mg tablet Take 40 mg by mouth 2 (two) times a day   Indications: Gastroesophageal Reflux Disease     • predniSONE 5 mg tablet Take 20 mg by mouth daily  4 tablets in AM  Indications: Organ or Tissue Transplant Recipient     • Premarin vaginal cream      • Sennosides-Docusate Sodium (SB DOCUSATE SODIUM-SENNA PO) Take 50 mg by mouth daily as needed (constipation)  Indications: constipation     • tacrolimus (PROGRAF) 1 mg capsule Take 7 mg by mouth every 12 (twelve) hours  7 capsules AM  7 capsules PM  Indications: Liver Transplant Recipient     • thiamine 100 MG tablet Take 100 mg by mouth daily  Indications: Deficiency of Vitamin B1     • traMADol (ULTRAM) 50 mg tablet Take 1 PO BID PRN for pain  45 tablet 1   • traMADol (ULTRAM-ER) 200 MG 24 hr tablet Take 1 PO HS for pain  30 tablet 1   • ursodiol (ACTIGALL) 250 mg tablet      • valGANciclovir (VALCYTE) 450 mg tablet Take 450 mg by mouth daily with breakfast  2 tablets in the AM  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       No current facility-administered medications for this visit         Social History:  Social History     Socioeconomic History   • Marital status: Single     Spouse name: None   • Number of children: None   • Years of education: None   • Highest education level: None   Occupational History   • None   Tobacco Use   • Smoking status: Former     Packs/day: 1 00     Types: Cigarettes   • Smokeless tobacco: Never   Vaping Use   • Vaping Use: Never used   Substance and Sexual Activity   • Alcohol use: Yes     Comment: socially, had glass of wine today   • Drug use: Not Currently     Comment: CBD   • Sexual activity: None   Other Topics Concern   • None   Social History Narrative   • None     Social Determinants of Health     Financial Resource Strain: Not on file   Food Insecurity: No Food Insecurity   • Worried About Running Out of Food in the Last Year: Never true   • Ran Out of Food in the Last Year: Never true   Transportation Needs: No Transportation Needs   • Lack of Transportation (Medical): No   • Lack of Transportation (Non-Medical): No   Physical Activity: Not on file   Stress: Not on file   Social Connections: Not on file   Intimate Partner Violence: Not on file   Housing Stability: Low Risk    • Unable to Pay for Housing in the Last Year: No   • Number of Places Lived in the Last Year: 1   • Unstable Housing in the Last Year: No          Review of Systems   Constitutional: Negative for chills and fever  Respiratory: Negative for cough and shortness of breath  Cardiovascular: Negative for chest pain  Gastrointestinal: Negative for nausea and vomiting  Musculoskeletal: Positive for arthralgias  Skin: Negative for wound  Neurological: Negative for weakness and numbness  Hematological: Negative  Psychiatric/Behavioral: Negative for behavioral problems and confusion  Objective:      /78   Pulse 69   Ht 5' 6" (1 676 m) Comment: verbal  Wt 74 4 kg (164 lb)   BMI 26 47 kg/m²          Physical Exam  Vitals reviewed  Constitutional:       General: She is not in acute distress  Appearance: She is not toxic-appearing or diaphoretic  HENT:      Head: Normocephalic and atraumatic  Eyes:      Extraocular Movements: Extraocular movements intact  Cardiovascular:      Rate and Rhythm: Normal rate and regular rhythm  Pulses: Normal pulses  Dorsalis pedis pulses are 2+ on the right side and 2+ on the left side  Posterior tibial pulses are 2+ on the right side and 2+ on the left side  Pulmonary:      Effort: Pulmonary effort is normal  No respiratory distress  Musculoskeletal:         General: Swelling, tenderness and signs of injury present  Cervical back: Normal range of motion and neck supple  Right lower leg: No edema  Left lower leg: No edema  Right foot: No foot drop  Left foot: No foot drop  Comments: Swelling and pain in right 2nd toe  Hammertoes present  Skin:     General: Skin is warm and dry  Capillary Refill: Capillary refill takes less than 2 seconds  Coloration: Skin is not cyanotic or mottled  Findings: No abscess, ecchymosis, erythema or wound  Nails: There is no clubbing  Neurological:      General: No focal deficit present  Mental Status: She is alert and oriented to person, place, and time  Cranial Nerves: No cranial nerve deficit  Sensory: No sensory deficit  Motor: No weakness  Coordination: Coordination normal    Psychiatric:         Mood and Affect: Mood normal          Behavior: Behavior normal          Thought Content:  Thought content normal          Judgment: Judgment normal

## 2023-02-22 NOTE — LETTER
February 22, 2023     Maxim Benitez, [address] [facility] [address]    Patient: Saba Garcia   YOB: 1960   Date of Visit: 2/22/2023       Dear Dr Weeks Route: Thank you for referring Nile Garay to me for evaluation  Below are my notes for this consultation  If you have questions, please do not hesitate to call me  I look forward to following your patient along with you  Sincerely,        Mart Granados DPM        CC: No Recipients  LINDA Jimenez [facility]  2/22/2023  1:23 PM  Sign when Signing Visit                 PATIENT:  Saba Garcia  1960       ASSESSMENT:    1  Closed nondisplaced fracture of proximal phalanx of lesser toe of right foot, initial encounter  Ambulatory Referral to Podiatry    XR toe right second min 2 views      2  Pain and swelling of toe, right                 PLAN:  1  Reviewed the medical records  Reviewed previous foot X-ray and the note from ortho  Patient was counseled and educated on the condition and the diagnosis  2  X-ray was obtained and personally reviewed  The radiological findings were discussed with the patient  3  The diagnosis, treatment options and prognosis were discussed with the patient  4  Applied mallory splint  Instructed daily application  Recommended her to wear surgical shoe  5  Discussed possible need for PIPJ arthroplasty or arthrodesis depending on the progress  6  Resting, elevation, and icing  7  Patient will return in 4 weeks for re-evaluation  Imaging: I have personally reviewed pertinent films in PACS  Labs, pathology, and Other Studies: I have personally reviewed pertinent reports  Subjective:     HPI  The patient was referred to my office for injury on right 2nd toe  Wheelchair ran over her right foot about 2 months ago  She did not seek medical attention right after the injury  She had X-ray about 1 month ago and it showed a fracture  She was recently seen by Dr Storm Brooks    Her pain is still significant  It can be 10 out of 10  She still has swelling in right 2nd toe  No associated numbness or paresthesia  No significant weakness or dysfunction  The following portions of the patient's history were reviewed and updated as appropriate: allergies, current medications, past family history, past medical history, past social history, past surgical history and problem list   All pertinent labs and images were reviewed  Past Medical History  Past Medical History:   Diagnosis Date   • Concussion    • CSF leak    • Hyperbilirubinemia 4/28/2022   • Liver failure (HCC)    • Migraines        Past Surgical History  Past Surgical History:   Procedure Laterality Date   • ABLATION SOFT TISSUE     • BREAST LUMPECTOMY     • IR PARACENTESIS  4/29/2022   • IR PARACENTESIS  5/9/2022   • IR PARACENTESIS  5/12/2022   • LAPAROSCOPY FOR ECTOPIC PREGNANCY     • SINUS SURGERY     • TONSILECTOMY AND ADNOIDECTOMY          Allergies:  Patient has no known allergies  Medications:  Current Outpatient Medications   Medication Sig Dispense Refill   • Accu-Chek Guide test strip      • clindamycin (CLEOCIN) 300 MG capsule      • dapsone 25 mg tablet Take 25 mg by mouth daily  2 tablets in the AM  Indications: Immune Thrombocytopenia     • diphenhydrAMINE (BENADRYL) 25 mg capsule Take 25 mg by mouth every 8 (eight) hours as needed for itching  Indications: Itching     • ergocalciferol (ERGOCALCIFEROL) 1 25 MG (90415 UT) capsule Take 50,000 Units by mouth once a week  Every Monday AM  Indications: Vitamin D Deficiency     • gabapentin (NEURONTIN) 300 mg capsule Take 600 mg by mouth 3 (three) times a day  Indications: Neuropathic Pain     • Januvia 100 MG tablet      • linaGLIPtin (Tradjenta) 5 MG TABS Take 5 mg by mouth in the morning  Indications: Type 2 Diabetes     • lisinopril (ZESTRIL) 10 mg tablet Take 10 mg by mouth daily   Indications: High Blood Pressure Disorder     • Magnesium Oxide 400 MG CAPS Take by mouth     • melatonin 3 mg Take 3 mg by mouth daily at bedtime as needed (sleep)  Indications: Trouble Sleeping     • Microlet Lancets MISC      • mycophenolate (MYFORTIC) 360 MG TBEC Take 360 mg by mouth 2 (two) times a day  Indications: Liver Transplant Recipient     • Myrbetriq 25 MG TB24      • NIFEdipine ER (ADALAT CC) 30 MG 24 hr tablet Take 30 mg by mouth 2 (two) times a day  Indications: High Blood Pressure Disorder     • pantoprazole (PROTONIX) 40 mg tablet Take 40 mg by mouth 2 (two) times a day  Indications: Gastroesophageal Reflux Disease     • predniSONE 5 mg tablet Take 20 mg by mouth daily  4 tablets in AM  Indications: Organ or Tissue Transplant Recipient     • Premarin vaginal cream      • Sennosides-Docusate Sodium (SB DOCUSATE SODIUM-SENNA PO) Take 50 mg by mouth daily as needed (constipation)  Indications: constipation     • tacrolimus (PROGRAF) 1 mg capsule Take 7 mg by mouth every 12 (twelve) hours  7 capsules AM  7 capsules PM  Indications: Liver Transplant Recipient     • thiamine 100 MG tablet Take 100 mg by mouth daily  Indications: Deficiency of Vitamin B1     • traMADol (ULTRAM) 50 mg tablet Take 1 PO BID PRN for pain  45 tablet 1   • traMADol (ULTRAM-ER) 200 MG 24 hr tablet Take 1 PO HS for pain  30 tablet 1   • ursodiol (ACTIGALL) 250 mg tablet      • valGANciclovir (VALCYTE) 450 mg tablet Take 450 mg by mouth daily with breakfast  2 tablets in the AM  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       No current facility-administered medications for this visit         Social History:  Social History     Socioeconomic History   • Marital status: Single     Spouse name: None   • Number of children: None   • Years of education: None   • Highest education level: None   Occupational History   • None   Tobacco Use   • Smoking status: Former     Packs/day: 1 00     Types: Cigarettes   • Smokeless tobacco: Never   Vaping Use   • Vaping Use: Never used   Substance and Sexual Activity • Alcohol use: Yes     Comment: socially, had glass of wine today   • Drug use: Not Currently     Comment: CBD   • Sexual activity: None   Other Topics Concern   • None   Social History Narrative   • None     Social Determinants of Health     Financial Resource Strain: Not on file   Food Insecurity: No Food Insecurity   • Worried About Running Out of Food in the Last Year: Never true   • Ran Out of Food in the Last Year: Never true   Transportation Needs: No Transportation Needs   • Lack of Transportation (Medical): No   • Lack of Transportation (Non-Medical): No   Physical Activity: Not on file   Stress: Not on file   Social Connections: Not on file   Intimate Partner Violence: Not on file   Housing Stability: Low Risk    • Unable to Pay for Housing in the Last Year: No   • Number of Places Lived in the Last Year: 1   • Unstable Housing in the Last Year: No          Review of Systems   Constitutional: Negative for chills and fever  Respiratory: Negative for cough and shortness of breath  Cardiovascular: Negative for chest pain  Gastrointestinal: Negative for nausea and vomiting  Musculoskeletal: Positive for arthralgias  Skin: Negative for wound  Neurological: Negative for weakness and numbness  Hematological: Negative  Psychiatric/Behavioral: Negative for behavioral problems and confusion  Objective:      /78   Pulse 69   Ht 5' 6" (1 676 m) Comment: verbal  Wt 74 4 kg (164 lb)   BMI 26 47 kg/m²         Physical Exam  Vitals reviewed  Constitutional:       General: She is not in acute distress  Appearance: She is not toxic-appearing or diaphoretic  HENT:      Head: Normocephalic and atraumatic  Eyes:      Extraocular Movements: Extraocular movements intact  Cardiovascular:      Rate and Rhythm: Normal rate and regular rhythm  Pulses: Normal pulses  Dorsalis pedis pulses are 2+ on the right side and 2+ on the left side          Posterior tibial pulses are 2+ on the right side and 2+ on the left side  Pulmonary:      Effort: Pulmonary effort is normal  No respiratory distress  Musculoskeletal:         General: Swelling, tenderness and signs of injury present  Cervical back: Normal range of motion and neck supple  Right lower leg: No edema  Left lower leg: No edema  Right foot: No foot drop  Left foot: No foot drop  Comments: Swelling and pain in right 2nd toe  Hammertoes present  Skin:     General: Skin is warm and dry  Capillary Refill: Capillary refill takes less than 2 seconds  Coloration: Skin is not cyanotic or mottled  Findings: No abscess, ecchymosis, erythema or wound  Nails: There is no clubbing  Neurological:      General: No focal deficit present  Mental Status: She is alert and oriented to person, place, and time  Cranial Nerves: No cranial nerve deficit  Sensory: No sensory deficit  Motor: No weakness  Coordination: Coordination normal    Psychiatric:         Mood and Affect: Mood normal          Behavior: Behavior normal          Thought Content:  Thought content normal          Judgment: Judgment normal

## 2023-02-27 ENCOUNTER — APPOINTMENT (OUTPATIENT)
Dept: LAB | Facility: HOSPITAL | Age: 63
End: 2023-02-27

## 2023-03-03 ENCOUNTER — TELEPHONE (OUTPATIENT)
Dept: PAIN MEDICINE | Facility: CLINIC | Age: 63
End: 2023-03-03

## 2023-03-03 DIAGNOSIS — G89.4 CHRONIC PAIN SYNDROME: ICD-10-CM

## 2023-03-03 DIAGNOSIS — M54.50 CHRONIC BILATERAL LOW BACK PAIN WITHOUT SCIATICA: ICD-10-CM

## 2023-03-03 DIAGNOSIS — M47.816 LUMBAR SPONDYLOSIS: ICD-10-CM

## 2023-03-03 DIAGNOSIS — M54.16 LUMBAR RADICULOPATHY: Primary | ICD-10-CM

## 2023-03-03 DIAGNOSIS — M51.36 DDD (DEGENERATIVE DISC DISEASE), LUMBAR: ICD-10-CM

## 2023-03-03 DIAGNOSIS — G61.89 OTHER INFLAMMATORY POLYNEUROPATHIES (HCC): ICD-10-CM

## 2023-03-03 DIAGNOSIS — G89.29 CHRONIC BILATERAL LOW BACK PAIN WITHOUT SCIATICA: ICD-10-CM

## 2023-03-03 NOTE — TELEPHONE ENCOUNTER
Pt contacted Call Center requested refill of their medication  Medication Name: traMADol (ULTRAM-ER)          Dosage of Med: 200 mg      Frequency of Med: Take 1 PO HS for pain  Remaining Medication: 0      Pharmacy and Location:     [id number] [address] [address]   Phone:  [phone]      Pt  Preferred Callback Phone Number:   [phone]    Thank you

## 2023-03-06 ENCOUNTER — APPOINTMENT (OUTPATIENT)
Dept: LAB | Facility: HOSPITAL | Age: 63
End: 2023-03-06

## 2023-03-06 NOTE — TELEPHONE ENCOUNTER
S/w pt, confirmed tramadol ER, 1 pill at hs w/ + relief, some drowsiness  Pt stated that she has been getting 8 pills each week from the pharmacy however, when she called today she was advised that there are no refills remaining and that it is no longer being covered by ins  Advised pt, the writer will fu w/ the pharmacy and cb to advise  Be aware, if necessary, this office will fu with prior auth which may take up to 72 hours for a determination  Pt verbalized understanding and appreciation  Attempted to contact pharmacy - closed for lunch  The writer will fu

## 2023-03-06 NOTE — TELEPHONE ENCOUNTER
S/w pharmacist, questioning rx for tramadol  mg  Per pharmacist, the pt has been getting her rx in 8 pill increments  No reason known  Per pharmacist, ins will only allow 5 fills  Per pharmacist, new rx is required  Please cb tomorrow to s/w the pharmacist who initiated the 8 pills per fill       Per pharmacist, if a prior Starodilone Oralia is required:  ID: [id number]  Phone: [phone]

## 2023-03-08 NOTE — TELEPHONE ENCOUNTER
This medication or product was previously approved on [id number] from 1/25/2023 to 12/31/2023  You  will be able to fill a prescription for this medication at your pharmacy  If your pharmacy has questions  regarding the processing of your prescription, please have them contact pharmacy benefit manager at  [phone]  I will contact pharmacy once they open

## 2023-03-09 ENCOUNTER — TELEPHONE (OUTPATIENT)
Dept: PHYSICAL THERAPY | Facility: CLINIC | Age: 63
End: 2023-03-09

## 2023-03-09 DIAGNOSIS — R29.898 WEAKNESS OF LEFT LOWER EXTREMITY: ICD-10-CM

## 2023-03-09 DIAGNOSIS — G89.4 CHRONIC PAIN SYNDROME: ICD-10-CM

## 2023-03-09 DIAGNOSIS — M54.50 CHRONIC BILATERAL LOW BACK PAIN WITHOUT SCIATICA: ICD-10-CM

## 2023-03-09 DIAGNOSIS — M47.816 LUMBAR SPONDYLOSIS: ICD-10-CM

## 2023-03-09 DIAGNOSIS — G89.29 CHRONIC BILATERAL LOW BACK PAIN WITHOUT SCIATICA: ICD-10-CM

## 2023-03-09 DIAGNOSIS — M51.36 DDD (DEGENERATIVE DISC DISEASE), LUMBAR: ICD-10-CM

## 2023-03-09 DIAGNOSIS — M54.16 LUMBAR RADICULOPATHY: ICD-10-CM

## 2023-03-09 RX ORDER — TRAMADOL HYDROCHLORIDE 200 MG/1
TABLET, EXTENDED RELEASE ORAL
Qty: 30 TABLET | Refills: 0 | Status: SHIPPED | OUTPATIENT
Start: 2023-03-09 | End: 2023-03-13

## 2023-03-09 NOTE — TELEPHONE ENCOUNTER
Caller: patient    Doctor: Jade Adams    Reason for call: patient calling for script    Call back#: [phone]

## 2023-03-13 ENCOUNTER — TELEPHONE (OUTPATIENT)
Dept: PAIN MEDICINE | Facility: CLINIC | Age: 63
End: 2023-03-13

## 2023-03-13 RX ORDER — TRAMADOL HYDROCHLORIDE 200 MG/1
TABLET, EXTENDED RELEASE ORAL
Qty: 30 TABLET | Refills: 0 | Status: SHIPPED | OUTPATIENT
Start: 2023-03-13 | End: 2023-03-15 | Stop reason: SDUPTHER

## 2023-03-13 NOTE — TELEPHONE ENCOUNTER
S/w [facility] pharmacist  Confirmed tramadol rx of 3/9 was received and deleted in error  Per the pharmacist, he cannot explain the error  Apologized for any inconvenience  Requested a new rx be sent  Advised pharmacist, the writer will mykel w/ GREGG  Anticipate rx will be sent today  Pharmacist verbalized understanding and appreciation  Advised pt of above  Pt verbalized understanding and appreciation  Pt added that she is also getting 1 week of medication at a time and does not understand why  Advised pt, the writer will d/w GREGG and cb if there is anything additional  Please discuss this situation with the pharmacist as well  Pt verbalized understanding and appreciation

## 2023-03-13 NOTE — TELEPHONE ENCOUNTER
Caller: Abbey Lambert     Doctor: Alexsander Linder     Reason for call: patient was told on Friday script was sent, she went Saturday to  medication they never got script  She is calling us to confirm she is good to go to  Tramadol?  Please advise     Call back#: [phone]

## 2023-03-13 NOTE — TELEPHONE ENCOUNTER
It looks like her Tramadol ER required a pre-auth and we were able to get it approved until the end of this year so with this script that I am re-sending they should give her a full 30 day supply  Thank you

## 2023-03-13 NOTE — TELEPHONE ENCOUNTER
Caller: Kim Machuca     Doctor: Stephanie Greenberg     Reason for call: Patient calling stating per pharmacy script for Tramadol was not sent please advise      Call back#: [phone]

## 2023-03-14 NOTE — TELEPHONE ENCOUNTER
S/w pt, advised of above  Pt stated that she did not  the tramadol er last night - she will go today and advise the office if there is any issue  Pt stated that last week her tramadol ir was also dispensed in 1 week supplies  Advised pt, discuss this with the pharmacist and advise  The writer will also make DG aware  Pt verbalized understanding and appreciation  Stated that she will fu tomorrow am as scheduled

## 2023-03-15 ENCOUNTER — OFFICE VISIT (OUTPATIENT)
Dept: PAIN MEDICINE | Facility: CLINIC | Age: 63
End: 2023-03-15

## 2023-03-15 VITALS
TEMPERATURE: 97.3 F | HEIGHT: 66 IN | WEIGHT: 171 LBS | HEART RATE: 72 BPM | BODY MASS INDEX: 27.48 KG/M2 | SYSTOLIC BLOOD PRESSURE: 132 MMHG | DIASTOLIC BLOOD PRESSURE: 76 MMHG

## 2023-03-15 DIAGNOSIS — G89.4 CHRONIC PAIN SYNDROME: Primary | ICD-10-CM

## 2023-03-15 DIAGNOSIS — F11.20 UNCOMPLICATED OPIOID DEPENDENCE (HCC): ICD-10-CM

## 2023-03-15 DIAGNOSIS — M54.16 LUMBAR RADICULOPATHY: ICD-10-CM

## 2023-03-15 DIAGNOSIS — G61.89 OTHER INFLAMMATORY POLYNEUROPATHIES (HCC): ICD-10-CM

## 2023-03-15 DIAGNOSIS — Z79.891 ENCOUNTER FOR LONG-TERM OPIATE ANALGESIC USE: ICD-10-CM

## 2023-03-15 DIAGNOSIS — G89.29 CHRONIC BILATERAL LOW BACK PAIN WITHOUT SCIATICA: ICD-10-CM

## 2023-03-15 DIAGNOSIS — R29.898 WEAKNESS OF LEFT LOWER EXTREMITY: ICD-10-CM

## 2023-03-15 DIAGNOSIS — Z79.891 LONG-TERM CURRENT USE OF OPIATE ANALGESIC: ICD-10-CM

## 2023-03-15 DIAGNOSIS — M51.36 DDD (DEGENERATIVE DISC DISEASE), LUMBAR: ICD-10-CM

## 2023-03-15 DIAGNOSIS — M47.816 LUMBAR SPONDYLOSIS: ICD-10-CM

## 2023-03-15 DIAGNOSIS — M54.50 CHRONIC BILATERAL LOW BACK PAIN WITHOUT SCIATICA: ICD-10-CM

## 2023-03-15 RX ORDER — TRAMADOL HYDROCHLORIDE 50 MG/1
TABLET ORAL
Qty: 45 TABLET | Refills: 2 | Status: SHIPPED | OUTPATIENT
Start: 2023-03-15

## 2023-03-15 RX ORDER — TRAMADOL HYDROCHLORIDE 200 MG/1
TABLET, EXTENDED RELEASE ORAL
Qty: 30 TABLET | Refills: 2 | Status: SHIPPED | OUTPATIENT
Start: 2023-03-15

## 2023-03-15 NOTE — PROGRESS NOTES
Assessment:  1  Chronic pain syndrome    2  Chronic bilateral low back pain without sciatica    3  Lumbar radiculopathy    4  Lumbar spondylosis    5  Weakness of left lower extremity    6  DDD (degenerative disc disease), lumbar    7  Other inflammatory polyneuropathies ([address] )        Plan:  While the patient was in the office today, I did have a thorough conversation with the patient regarding their chronic pain syndrome, symptoms, and medication regimen/treatment plan  I did encourage the patient to restart her physical therapy as scheduled next week and reminded her that she should slowly and steadily increase her activity, as tolerated, allowing pain to be her guide and she still may have good days and bad days and some days where she cannot do as much as she would like and that even on the good days she should not overdo it but find a happy medium to keep things slowly progressing over time  The patient was agreeable and verbalized an understanding  With regards to her medication regimen, I explained to the patient that hopefully at this point we have everything sorted out with regards to the prior authorization for the tramadol ER and IR and she will be able to consistently stay on the medications which should hopefully keep her pain more manageable  However, I reminded the patient that when we took over prescribing her medications she agreed to completely abstain from medical marijuana products and although I appreciate her honesty, in the future if there be any deviation from the contract, we will have no choice but to discharge her from the practice  The patient was agreeable and verbalized an understanding      [place] Prescription Drug Monitoring Program report was reviewed and was appropriate     While the patient was in the office today I reminded them that as per the opioid contract that they signed and agreed to that are required to bring their last filled bottle of their prescription opioid(s), with what they have left in the bottles, to every office visit for random pill counts and appropriate use monitoring  I reminded that continued failure to bring the medications to future office visit may result in titration down and off of opioid medications as per the opioid contract  The patient verbalized an understanding  A urine drug screen was collected at today's office visit as part of our medication management protocol  The point of care testing results were appropriate for what was being prescribed  The specimen will be sent for confirmatory testing  The drug screen is medically necessary because the patient is either dependent on opioid medication or is being considered for opioid medication therapy and the results could impact ongoing or future treatment  The drug screen is to evaluate for the presences or absence of prescribed, non-prescribed, and/or illicit drugs/substances  There are risks associated with opioid medications, including dependence, addiction and tolerance  The patient understands and agrees to use these medications only as prescribed  Potential side effects of the medications include, but are not limited to, constipation, drowsiness, addiction, impaired judgment and risk of fatal overdose if not taken as prescribed  The patient was warned against driving while taking sedation medications  Sharing medications is a felony  At this point in time, the patient is showing no signs of addiction, abuse, diversion or suicidal ideation  The patient will follow-up in 12 weeks for medication prescription refill and reevaluation  The patient was advised to contact the office should their symptoms worsen in the interim  The patient was agreeable and verbalized an understanding  History of Present Illness:     The patient is a 63 y o  female last seen on 1/18/2023 who presents for a follow up office visit in regards to chronic pain syndrome, as the patient's pain has been ongoing for greater than a year, secondary to lumbar degenerative disc disease with spondylosis and radiculopathy and inflammatory polyneuropathy is status post her cancer treatment  The patient currently reports that since her last office visit she was having issues getting her medications approved and we finally were able to get her tramadol ER approved until the end of the year and then they were also only filling 7-day supplies of her as needed tramadol and we were also able to confirm that her as needed tramadol does not need authorization and this should be a refill the 45 pills for 30 days and I gave her the documentation from her insurance company stating that  The patient reports that when she was not getting the medication consistently there was 1 day where she was more active than normal and her pain was significantly out of control that she did take a quarter of one of her leftover medical marijuana Gummies as she reports she only has 2 or 3 left from when she was using it during her cancer treatment she did not know what else to do  The patient reports that now that she is back on the tramadol ER she feels that things are starting to get better and is hopeful the longer she is on it and if she can use the as needed tramadol as prescribed her pain will least be more manageable than it was without it  The patient reports that she is about to restart physical therapy next week as she is working on core strengthening, balance, and gait training as she is trying to slowly and steadily become more active and get back into a more normal lifestyle and activities of daily living  The patient presents today for regular medication follow-up visit  Current pain medications includes: Tramadol  mg at bedtime and tramadol IR 50 mg, 1 p o  twice daily as needed for pain  The patient reports that this regimen is providing minimal to moderate pain relief    The patient is reporting dizziness from this pain medication regimen  Pain Contract Signed: 11/30/2022  Last Urine Drug Screen: 3/14/2023    I have personally reviewed and/or updated the patient's past medical history, past surgical history, family history, social history, current medications, allergies, and vital signs today  Review of Systems:    Review of Systems   Respiratory: Negative for shortness of breath  Cardiovascular: Negative for chest pain  Gastrointestinal: Positive for constipation  Negative for diarrhea, nausea and vomiting  Musculoskeletal: Positive for gait problem and joint swelling (Joint stiffness)  Negative for arthralgias and myalgias  Skin: Negative for rash  Neurological: Positive for dizziness and weakness  Negative for seizures  All other systems reviewed and are negative  Past Medical History:   Diagnosis Date   • Concussion    • CSF leak    • Hyperbilirubinemia 4/28/2022   • Liver failure (HCC)    • Migraines        Past Surgical History:   Procedure Laterality Date   • ABLATION SOFT TISSUE     • BREAST LUMPECTOMY     • IR PARACENTESIS  4/29/2022   • IR PARACENTESIS  5/9/2022   • IR PARACENTESIS  5/12/2022   • LAPAROSCOPY FOR ECTOPIC PREGNANCY     • SINUS SURGERY     • TONSILECTOMY AND ADNOIDECTOMY         Family History   Problem Relation Age of Onset   • Colon cancer Father    • Breast cancer Sister        Social History     Occupational History   • Not on file   Tobacco Use   • Smoking status: Former     Packs/day: 1 00     Types: Cigarettes   • Smokeless tobacco: Never   Vaping Use   • Vaping Use: Never used   Substance and Sexual Activity   • Alcohol use: Yes     Comment: socially, had glass of wine today   • Drug use: Not Currently     Comment: CBD   • Sexual activity: Not on file         Current Outpatient Medications:   •  dapsone 25 mg tablet, Take 25 mg by mouth daily   2 tablets in the AM  Indications: Immune Thrombocytopenia, Disp: , Rfl:   •  diphenhydrAMINE (BENADRYL) 25 mg capsule, Take 25 mg by mouth every 8 (eight) hours as needed for itching  Indications: Itching, Disp: , Rfl:   •  ergocalciferol (ERGOCALCIFEROL) 1 25 MG (29513 UT) capsule, Take 50,000 Units by mouth once a week  Every Monday AM  Indications: Vitamin D Deficiency, Disp: , Rfl:   •  gabapentin (NEURONTIN) 300 mg capsule, Take 600 mg by mouth 3 (three) times a day  Indications: Neuropathic Pain, Disp: , Rfl:   •  Januvia 100 MG tablet, , Disp: , Rfl:   •  lisinopril (ZESTRIL) 10 mg tablet, Take 10 mg by mouth daily  Indications: High Blood Pressure Disorder, Disp: , Rfl:   •  Magnesium Oxide 400 MG CAPS, Take by mouth, Disp: , Rfl:   •  melatonin 3 mg, Take 3 mg by mouth daily at bedtime as needed (sleep)  Indications: Trouble Sleeping, Disp: , Rfl:   •  mycophenolate (MYFORTIC) 360 MG TBEC, Take 360 mg by mouth 2 (two) times a day  Indications: Liver Transplant Recipient, Disp: , Rfl:   •  NIFEdipine ER (ADALAT CC) 30 MG 24 hr tablet, Take 30 mg by mouth 2 (two) times a day  Indications: High Blood Pressure Disorder, Disp: , Rfl:   •  pantoprazole (PROTONIX) 40 mg tablet, Take 40 mg by mouth 2 (two) times a day  Indications: Gastroesophageal Reflux Disease, Disp: , Rfl:   •  predniSONE 5 mg tablet, Take 20 mg by mouth daily  4 tablets in AM  Indications: Organ or Tissue Transplant Recipient, Disp: , Rfl:   •  Premarin vaginal cream, , Disp: , Rfl:   •  Sennosides-Docusate Sodium (SB DOCUSATE SODIUM-SENNA PO), Take 50 mg by mouth daily as needed (constipation)  Indications: constipation, Disp: , Rfl:   •  tacrolimus (PROGRAF) 1 mg capsule, Take 7 mg by mouth every 12 (twelve) hours  7 capsules AM 7 capsules PM  Indications: Liver Transplant Recipient, Disp: , Rfl:   •  thiamine 100 MG tablet, Take 100 mg by mouth daily   Indications: Deficiency of Vitamin B1, Disp: , Rfl:   •  traMADol (ULTRAM) 50 mg tablet, Take 1 PO BID PRN for pain , Disp: 45 tablet, Rfl: 2  •  traMADol (ULTRAM-ER) 200 MG 24 hr tablet, Take 1 PO QD for pain , Disp: 30 tablet, Rfl: 2  •  ursodiol (ACTIGALL) 250 mg tablet, , Disp: , Rfl:   •  valGANciclovir (VALCYTE) 450 mg tablet, Take 450 mg by mouth daily with breakfast  2 tablets in the AM  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, Disp: , Rfl:   •  Accu-Chek Guide test strip, , Disp: , Rfl:   •  clindamycin (CLEOCIN) 300 MG capsule, , Disp: , Rfl:   •  linaGLIPtin (Tradjenta) 5 MG TABS, Take 5 mg by mouth in the morning  Indications: Type 2 Diabetes (Patient not taking: Reported on 3/15/2023), Disp: , Rfl:   •  Microlet Lancets MISC, , Disp: , Rfl:   •  Myrbetriq 25 MG TB24, , Disp: , Rfl:     No Known Allergies    Physical Exam:    /76 (BP Location: Left arm, Patient Position: Sitting, Cuff Size: Large)   Pulse 72   Temp (!) 97 3 °F (36 3 °C)   Ht 5' 6" (1 676 m)   Wt 77 6 kg (171 lb)   BMI 27 60 kg/m²     Constitutional:normal, well developed, well nourished, alert, in no distress and non-toxic and no overt pain behavior  Eyes:anicteric  HEENT:grossly intact  Neck:supple, symmetric, trachea midline and no masses   Pulmonary:even and unlabored  Cardiovascular:No edema or pitting edema present  Skin:Normal without rashes or lesions and well hydrated  Psychiatric:Mood and affect appropriate  Neurologic:Cranial Nerves II-XII grossly intact  Musculoskeletal:The patient's gait is slightly antalgic, painful at times, but steady with the use of a single cane  Imaging  No orders to display         No orders of the defined types were placed in this encounter

## 2023-03-15 NOTE — TELEPHONE ENCOUNTER
Received a message back from pts insurance  No Kaufman Giovanni is required medication is formulary

## 2023-03-15 NOTE — PATIENT INSTRUCTIONS

## 2023-03-17 ENCOUNTER — TELEPHONE (OUTPATIENT)
Dept: LAB | Facility: HOSPITAL | Age: 63
End: 2023-03-17

## 2023-03-17 LAB
6MAM UR QL CFM: NEGATIVE NG/ML
7AMINOCLONAZEPAM UR QL CFM: NEGATIVE NG/ML
A-OH ALPRAZ UR QL CFM: NEGATIVE NG/ML
ACCEPTABLE CREAT UR QL: NORMAL MG/DL
ACCEPTIBLE SP GR UR QL: NORMAL
AMPHET UR QL CFM: NEGATIVE NG/ML
BUPRENORPHINE UR QL CFM: NEGATIVE NG/ML
BUTALBITAL UR QL CFM: NEGATIVE NG/ML
BZE UR QL CFM: NEGATIVE NG/ML
CODEINE UR QL CFM: NEGATIVE NG/ML
EDDP UR QL CFM: NEGATIVE NG/ML
ETHYL GLUCURONIDE UR QL CFM: NEGATIVE NG/ML
ETHYL SULFATE UR QL SCN: NEGATIVE NG/ML
EUTYLONE UR QL: NEGATIVE NG/ML
FENTANYL UR QL CFM: NEGATIVE NG/ML
GLIADIN IGG SER IA-ACNC: NEGATIVE NG/ML
HYDROCODONE UR QL CFM: NEGATIVE NG/ML
HYDROMORPHONE UR QL CFM: NEGATIVE NG/ML
LORAZEPAM UR QL CFM: NEGATIVE NG/ML
ME-PHENIDATE UR QL CFM: NEGATIVE NG/ML
MEPERIDINE UR QL CFM: NEGATIVE NG/ML
METHADONE UR QL CFM: NEGATIVE NG/ML
METHAMPHET UR QL CFM: NEGATIVE NG/ML
MORPHINE UR QL CFM: NEGATIVE NG/ML
NALTREXONE UR QL CFM: NEGATIVE NG/ML
NITRITE UR QL: NORMAL UG/ML
NORBUPRENORPHINE UR QL CFM: NEGATIVE NG/ML
NORDIAZEPAM UR QL CFM: NEGATIVE NG/ML
NORFENTANYL UR QL CFM: NEGATIVE NG/ML
NORHYDROCODONE UR QL CFM: NEGATIVE NG/ML
NORMEPERIDINE UR QL CFM: NEGATIVE NG/ML
NOROXYCODONE UR QL CFM: NEGATIVE NG/ML
OXAZEPAM UR QL CFM: NEGATIVE NG/ML
OXYCODONE UR QL CFM: NEGATIVE NG/ML
OXYMORPHONE UR QL CFM: NEGATIVE NG/ML
OXYMORPHONE UR QL CFM: NEGATIVE NG/ML
PARA-FLUOROFENTANYL QUANTIFICATION: NORMAL NG/ML
PHENOBARB UR QL CFM: NEGATIVE NG/ML
RESULT ALL_PRESCRIBED MEDS AND SPECIAL INSTRUCTIONS: NORMAL
SECOBARBITAL UR QL CFM: NEGATIVE NG/ML
SL AMB 4-ANPP QUANTIFICATION: NORMAL NG/ML
SL AMB 5F-ADB-M7 METABOLITE QUANTIFICATION: NEGATIVE NG/ML
SL AMB 7-OH-MITRAGYNINE (KRATOM ALKALOID) QUANTIFICATION: NEGATIVE NG/ML
SL AMB AB-FUBINACA-M3 METABOLITE QUANTIFICATION: NEGATIVE NG/ML
SL AMB ACETYL FENTANYL QUANTIFICATION: NORMAL NG/ML
SL AMB ACETYL NORFENTANYL QUANTIFICATION: NORMAL NG/ML
SL AMB ACRYL FENTANYL QUANTIFICATION: NORMAL NG/ML
SL AMB CARFENTANIL QUANTIFICATION: NORMAL NG/ML
SL AMB CTHC (MARIJUANA METABOLITE) QUANTIFICATION: NEGATIVE NG/ML
SL AMB DEXTRORPHAN (DEXTROMETHORPHAN METABOLITE) QUANT: NEGATIVE NG/ML
SL AMB GABAPENTIN QUANTIFICATION: NORMAL
SL AMB JWH018 METABOLITE QUANTIFICATION: NEGATIVE NG/ML
SL AMB JWH073 METABOLITE QUANTIFICATION: NEGATIVE NG/ML
SL AMB MDMB-FUBINACA-M1 METABOLITE QUANTIFICATION: NEGATIVE NG/ML
SL AMB METHYLONE QUANTIFICATION: NEGATIVE NG/ML
SL AMB N-DESMETHYL-TRAMADOL QUANTIFICATION: NORMAL NG/ML
SL AMB PHENTERMINE QUANTIFICATION: NEGATIVE NG/ML
SL AMB PREGABALIN QUANTIFICATION: NEGATIVE
SL AMB RCS4 METABOLITE QUANTIFICATION: NEGATIVE NG/ML
SL AMB RITALINIC ACID QUANTIFICATION: NEGATIVE NG/ML
SMOOTH MUSCLE AB TITR SER IF: NEGATIVE NG/ML
SPECIMEN DRAWN SERPL: NEGATIVE NG/ML
SPECIMEN PH ACCEPTABLE UR: NORMAL
TAPENTADOL UR QL CFM: NEGATIVE NG/ML
TEMAZEPAM UR QL CFM: NEGATIVE NG/ML
TEMAZEPAM UR QL CFM: NEGATIVE NG/ML
TRAMADOL UR QL CFM: NORMAL NG/ML
URATE/CREAT 24H UR: NORMAL NG/ML

## 2023-03-20 ENCOUNTER — APPOINTMENT (OUTPATIENT)
Dept: LAB | Facility: HOSPITAL | Age: 63
End: 2023-03-20

## 2023-03-20 DIAGNOSIS — D84.9 IMMUNOSUPPRESSION-RELATED INFECTIOUS DISEASE (HCC): ICD-10-CM

## 2023-03-20 DIAGNOSIS — Z94.4 LIVER REPLACED BY TRANSPLANT (HCC): ICD-10-CM

## 2023-03-20 DIAGNOSIS — B99.8 IMMUNOSUPPRESSION-RELATED INFECTIOUS DISEASE (HCC): ICD-10-CM

## 2023-03-20 LAB
ALBUMIN SERPL BCP-MCNC: 4 G/DL (ref 3.5–5)
ALP SERPL-CCNC: 42 U/L (ref 46–116)
ALT SERPL W P-5'-P-CCNC: 46 U/L (ref 12–78)
ANION GAP SERPL CALCULATED.3IONS-SCNC: 4 MMOL/L (ref 4–13)
AST SERPL W P-5'-P-CCNC: 12 U/L (ref 5–45)
BASOPHILS # BLD MANUAL: 0.13 THOUSAND/UL (ref 0–0.1)
BASOPHILS NFR MAR MANUAL: 3 % (ref 0–1)
BILIRUB DIRECT SERPL-MCNC: 0.28 MG/DL (ref 0–0.2)
BILIRUB SERPL-MCNC: 0.93 MG/DL (ref 0.2–1)
BUN SERPL-MCNC: 19 MG/DL (ref 5–25)
CALCIUM SERPL-MCNC: 9.1 MG/DL (ref 8.3–10.1)
CHLORIDE SERPL-SCNC: 104 MMOL/L (ref 96–108)
CO2 SERPL-SCNC: 29 MMOL/L (ref 21–32)
CREAT SERPL-MCNC: 0.74 MG/DL (ref 0.6–1.3)
DACRYOCYTES BLD QL SMEAR: PRESENT
EOSINOPHIL # BLD MANUAL: 0.17 THOUSAND/UL (ref 0–0.4)
EOSINOPHIL NFR BLD MANUAL: 4 % (ref 0–6)
ERYTHROCYTE [DISTWIDTH] IN BLOOD BY AUTOMATED COUNT: 14.3 % (ref 11.6–15.1)
GFR SERPL CREATININE-BSD FRML MDRD: 88 ML/MIN/1.73SQ M
GLUCOSE SERPL-MCNC: 76 MG/DL (ref 65–140)
HCT VFR BLD AUTO: 36.7 % (ref 34.8–46.1)
HELMET CELLS BLD QL SMEAR: PRESENT
HGB BLD-MCNC: 11.5 G/DL (ref 11.5–15.4)
LYMPHOCYTES # BLD AUTO: 0.08 THOUSAND/UL (ref 0.6–4.47)
LYMPHOCYTES # BLD AUTO: 2 % (ref 14–44)
MAGNESIUM SERPL-MCNC: 2.1 MG/DL (ref 1.6–2.6)
MCH RBC QN AUTO: 32.9 PG (ref 26.8–34.3)
MCHC RBC AUTO-ENTMCNC: 31.3 G/DL (ref 31.4–37.4)
MCV RBC AUTO: 105 FL (ref 82–98)
MONOCYTES # BLD AUTO: 0.34 THOUSAND/UL (ref 0–1.22)
MONOCYTES NFR BLD: 8 % (ref 4–12)
NEUTROPHILS # BLD MANUAL: 3.13 THOUSAND/UL (ref 1.85–7.62)
NEUTS BAND NFR BLD MANUAL: 6 % (ref 0–8)
NEUTS SEG NFR BLD AUTO: 68 % (ref 43–75)
PLATELET # BLD AUTO: 167 THOUSANDS/UL (ref 149–390)
PLATELET BLD QL SMEAR: ADEQUATE
PLATELET CLUMP BLD QL SMEAR: PRESENT
PMV BLD AUTO: 10.5 FL (ref 8.9–12.7)
POLYCHROMASIA BLD QL SMEAR: PRESENT
POTASSIUM SERPL-SCNC: 4.1 MMOL/L (ref 3.5–5.3)
PROT SERPL-MCNC: 6.5 G/DL (ref 6.4–8.4)
RBC # BLD AUTO: 3.5 MILLION/UL (ref 3.81–5.12)
RBC MORPH BLD: PRESENT
SODIUM SERPL-SCNC: 137 MMOL/L (ref 135–147)
VARIANT LYMPHS # BLD AUTO: 9 %
WBC # BLD AUTO: 4.23 THOUSAND/UL (ref 4.31–10.16)

## 2023-03-21 LAB — TACROLIMUS BLD-MCNC: 9.2 NG/ML (ref 3–15)

## 2023-03-27 ENCOUNTER — APPOINTMENT (OUTPATIENT)
Dept: LAB | Facility: HOSPITAL | Age: 63
End: 2023-03-27

## 2023-03-29 ENCOUNTER — OFFICE VISIT (OUTPATIENT)
Dept: PODIATRY | Facility: CLINIC | Age: 63
End: 2023-03-29

## 2023-03-29 VITALS
HEIGHT: 66 IN | BODY MASS INDEX: 27.48 KG/M2 | HEART RATE: 83 BPM | SYSTOLIC BLOOD PRESSURE: 102 MMHG | DIASTOLIC BLOOD PRESSURE: 65 MMHG | WEIGHT: 171 LBS

## 2023-03-29 DIAGNOSIS — S92.514A CLOSED NONDISPLACED FRACTURE OF PROXIMAL PHALANX OF LESSER TOE OF RIGHT FOOT, INITIAL ENCOUNTER: Primary | ICD-10-CM

## 2023-03-29 NOTE — PROGRESS NOTES
PATIENT:  Eleanor Maddox  1960       ASSESSMENT:     1  Closed nondisplaced fracture of proximal phalanx of lesser toe of right foot, initial encounter                PLAN:  1  Reviewed the medical records  Reviewed previous foot X-ray  Patient was counseled and educated on the condition and the diagnosis  2  Taping as needed  She may advance shoes as tolerated  Advance activity as tolerated  3  Discussed possible need for PIPJ arthroplasty or arthrodesis depending on the progress  Imaging: I have personally reviewed pertinent films in PACS  Labs, pathology, and Other Studies: I have personally reviewed pertinent reports  Subjective:     HPI  The patient presents for follow-up on right 2nd toe fracture  Her pain has been much better  Still has mild swelling  She presents with sneakers  No associated numbness or paresthesia  No significant weakness or dysfunction  The following portions of the patient's history were reviewed and updated as appropriate: allergies, current medications, past family history, past medical history, past social history, past surgical history and problem list   All pertinent labs and images were reviewed  Past Medical History  Past Medical History:   Diagnosis Date   • Concussion    • CSF leak    • Hyperbilirubinemia 4/28/2022   • Liver failure (HCC)    • Migraines        Past Surgical History  Past Surgical History:   Procedure Laterality Date   • ABLATION SOFT TISSUE     • BREAST LUMPECTOMY     • IR PARACENTESIS  4/29/2022   • IR PARACENTESIS  5/9/2022   • IR PARACENTESIS  5/12/2022   • LAPAROSCOPY FOR ECTOPIC PREGNANCY     • SINUS SURGERY     • TONSILECTOMY AND ADNOIDECTOMY          Allergies:  Patient has no known allergies  Medications:  Current Outpatient Medications   Medication Sig Dispense Refill   • Accu-Chek Guide test strip      • dapsone 25 mg tablet Take 25 mg by mouth daily   2 tablets in the AM  Indications: Immune Thrombocytopenia     • diphenhydrAMINE (BENADRYL) 25 mg capsule Take 25 mg by mouth every 8 (eight) hours as needed for itching  Indications: Itching     • ergocalciferol (ERGOCALCIFEROL) 1 25 MG (33052 UT) capsule Take 50,000 Units by mouth once a week  Every Monday AM  Indications: Vitamin D Deficiency     • gabapentin (NEURONTIN) 300 mg capsule Take 600 mg by mouth 3 (three) times a day  Indications: Neuropathic Pain     • Januvia 100 MG tablet      • lisinopril (ZESTRIL) 10 mg tablet Take 10 mg by mouth daily  Indications: High Blood Pressure Disorder     • Magnesium Oxide 400 MG CAPS Take by mouth     • melatonin 3 mg Take 3 mg by mouth daily at bedtime as needed (sleep)  Indications: Trouble Sleeping     • Microlet Lancets MISC      • mycophenolate (MYFORTIC) 360 MG TBEC Take 360 mg by mouth 2 (two) times a day  Indications: Liver Transplant Recipient     • NIFEdipine ER (ADALAT CC) 30 MG 24 hr tablet Take 30 mg by mouth 2 (two) times a day  Indications: High Blood Pressure Disorder     • pantoprazole (PROTONIX) 40 mg tablet Take 40 mg by mouth 2 (two) times a day  Indications: Gastroesophageal Reflux Disease     • predniSONE 5 mg tablet Take 20 mg by mouth daily  4 tablets in AM  Indications: Organ or Tissue Transplant Recipient     • Premarin vaginal cream      • Sennosides-Docusate Sodium (SB DOCUSATE SODIUM-SENNA PO) Take 50 mg by mouth daily as needed (constipation)  Indications: constipation     • tacrolimus (PROGRAF) 1 mg capsule Take 7 mg by mouth every 12 (twelve) hours  7 capsules AM  7 capsules PM  Indications: Liver Transplant Recipient     • thiamine 100 MG tablet Take 100 mg by mouth daily  Indications: Deficiency of Vitamin B1     • traMADol (ULTRAM) 50 mg tablet Take 1 PO BID PRN for pain  45 tablet 2   • traMADol (ULTRAM-ER) 200 MG 24 hr tablet Take 1 PO QD for pain   30 tablet 2   • ursodiol (ACTIGALL) 250 mg tablet      • valGANciclovir (VALCYTE) 450 mg tablet Take 450 mg by mouth daily with breakfast  2 tablets in the AM  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     • clindamycin (CLEOCIN) 300 MG capsule  (Patient not taking: Reported on 3/15/2023)     • linaGLIPtin (Tradjenta) 5 MG TABS Take 5 mg by mouth in the morning  Indications: Type 2 Diabetes (Patient not taking: Reported on 3/15/2023)     • Myrbetriq 25 MG TB24  (Patient not taking: Reported on 3/15/2023)       No current facility-administered medications for this visit  Social History:  Social History     Socioeconomic History   • Marital status: Single     Spouse name: None   • Number of children: None   • Years of education: None   • Highest education level: None   Occupational History   • None   Tobacco Use   • Smoking status: Former     Packs/day: 1 00     Types: Cigarettes   • Smokeless tobacco: Never   Vaping Use   • Vaping Use: Never used   Substance and Sexual Activity   • Alcohol use: Yes     Comment: socially, had glass of wine today   • Drug use: Not Currently     Comment: CBD   • Sexual activity: None   Other Topics Concern   • None   Social History Narrative   • None     Social Determinants of Health     Financial Resource Strain: Not on file   Food Insecurity: No Food Insecurity   • Worried About Running Out of Food in the Last Year: Never true   • Ran Out of Food in the Last Year: Never true   Transportation Needs: No Transportation Needs   • Lack of Transportation (Medical): No   • Lack of Transportation (Non-Medical): No   Physical Activity: Not on file   Stress: Not on file   Social Connections: Not on file   Intimate Partner Violence: Not on file   Housing Stability: Low Risk    • Unable to Pay for Housing in the Last Year: No   • Number of Places Lived in the Last Year: 1   • Unstable Housing in the Last Year: No          Review of Systems   Constitutional: Negative for chills and fever  Respiratory: Negative for cough and shortness of breath  Cardiovascular: Negative for chest pain  "  Gastrointestinal: Negative for nausea and vomiting  Musculoskeletal: Positive for arthralgias  Neurological: Negative for weakness and numbness  Objective:      /65   Pulse 83   Ht 5' 6\" (1 676 m)   Wt 77 6 kg (171 lb)   BMI 27 60 kg/m²          Physical Exam  Vitals reviewed  Constitutional:       General: She is not in acute distress  Appearance: She is not toxic-appearing or diaphoretic  Cardiovascular:      Rate and Rhythm: Normal rate and regular rhythm  Pulses: Normal pulses  Dorsalis pedis pulses are 2+ on the right side and 2+ on the left side  Posterior tibial pulses are 2+ on the right side and 2+ on the left side  Pulmonary:      Effort: Pulmonary effort is normal  No respiratory distress  Musculoskeletal:         General: Swelling and deformity present  Right lower leg: No edema  Left lower leg: No edema  Right foot: No foot drop  Left foot: No foot drop  Comments: Mild tenderness right 2nd toe  Decreased swelling  Hammertoes present  Skin:     General: Skin is warm and dry  Capillary Refill: Capillary refill takes less than 2 seconds  Coloration: Skin is not cyanotic or mottled  Findings: No abscess, ecchymosis, erythema or wound  Nails: There is no clubbing  Neurological:      General: No focal deficit present  Mental Status: She is alert and oriented to person, place, and time  Cranial Nerves: No cranial nerve deficit  Sensory: No sensory deficit  Motor: No weakness  Coordination: Coordination normal    Psychiatric:         Mood and Affect: Mood normal          Behavior: Behavior normal          Thought Content:  Thought content normal          Judgment: Judgment normal          "

## 2023-04-03 ENCOUNTER — APPOINTMENT (OUTPATIENT)
Dept: LAB | Facility: HOSPITAL | Age: 63
End: 2023-04-03

## 2023-04-20 ENCOUNTER — APPOINTMENT (OUTPATIENT)
Dept: PHYSICAL THERAPY | Facility: CLINIC | Age: 63
End: 2023-04-20

## 2023-04-24 ENCOUNTER — APPOINTMENT (OUTPATIENT)
Dept: LAB | Facility: HOSPITAL | Age: 63
End: 2023-04-24

## 2023-04-25 ENCOUNTER — OFFICE VISIT (OUTPATIENT)
Dept: PHYSICAL THERAPY | Facility: CLINIC | Age: 63
End: 2023-04-25

## 2023-04-25 DIAGNOSIS — G61.89 OTHER INFLAMMATORY POLYNEUROPATHIES (HCC): ICD-10-CM

## 2023-04-25 DIAGNOSIS — R29.898 WEAKNESS OF LEFT LOWER EXTREMITY: Primary | ICD-10-CM

## 2023-04-25 NOTE — PROGRESS NOTES
"Daily Note     Today's date: 2023  Patient name: Janelle Chand  : 1960  MRN: [id number]  Referring provider: Katherine Kilpatrick  Dx:   Encounter Diagnosis     ICD-10-CM    1  Weakness of left lower extremity  R29 898       2  Other inflammatory polyneuropathies ([facility] [address] )  G61 89           Start Time: 145  Stop Time: 225  Total time in clinic (min): 40 minutes    Subjective:   Patient states she was hospitalized due to GI bleed  Her blood counts are low  She states she has been cleared to return to PT  She has no present complaints  Objective: See treatment diary below      Assessment:    Patient with increased hesitation in all movement due to guarding after recent medical events  Reassurance and pacing to encourage improved stability with good results  Step taps and Step ups with supervision  Sit to stand off foam with assist needed  Nice initiation with sit to stand off firm  LE strengthening with UE haptic touch with good demonstration  Patient instructed to continue LE strengthening at home with voiced understanding  Plan:    Continue to progress advanced gait and balance challenges to tolerance  No complaints post treatment  Gradually increase strengthening challenges       Precautions:  Liver transplant, h/o hematoma left LE with peripheral neuropathy  EPOC:  2023      TESTING          TUG 11sec            5x sit to stand 11 8\"            HOYOS 48/56            6 min walk test 980\" w/ cane            FOTO 53            Neuro Re-Ed             Step ups   8\" 10x2 8\" 10x2         Step taps  10x2 10x2 10x2         Foam beam  4 laps CTG 4 laps no UE support 4 laps no UE support         Foam S/P sway    A/P sway 10x2         Floor to overhead cone stack             Hidden obstacles             Resisted ambulation  With perturbation 8 laps without device Distance, walking without device 140' x2- focused on pacing          Sit to stand off foam on raised seat  " 10x2 5x2 Foam under LE with assist 5x2         Ther Ex             Sit to stand     10 off chair no UE,                   Hip flex/abd/ ext    10ea         Toe raises    10                                                                          Ther Activity                                       Gait Training             Outside ambulation over curbs, ramps and grass  15'                        Modalities

## 2023-04-27 ENCOUNTER — OFFICE VISIT (OUTPATIENT)
Dept: PHYSICAL THERAPY | Facility: CLINIC | Age: 63
End: 2023-04-27

## 2023-04-27 DIAGNOSIS — R29.898 WEAKNESS OF LEFT LOWER EXTREMITY: Primary | ICD-10-CM

## 2023-04-27 DIAGNOSIS — G61.89 OTHER INFLAMMATORY POLYNEUROPATHIES (HCC): ICD-10-CM

## 2023-04-27 NOTE — PROGRESS NOTES
"Daily Note     Today's date: 2023  Patient name: Cindy Saleem  : 1960  MRN: [id number]  Referring provider: Celia Farris  Dx:   Encounter Diagnosis     ICD-10-CM    1  Weakness of left lower extremity  [id number]       2  Other inflammatory polyneuropathies ([facility] [address] )  G61 89           Start Time: 1110  Stop Time: 1150  Total time in clinic (min): 40 minutes    Subjective:   Patient arrived with std cane, no pain today  She indicates she has been feeling tired  Objective: See treatment diary below      Assessment:    Discussed weighted blankets and pacing with breathing to allow for better relaxation  Step up with left with difficulty in initiation and fear of LE buckling, reduced step height with improved performance  Cone taps added to foam beam with CTG needed for reassurance  Deep breathing with nice gains in tolerance to tasks  Plan:    Continue to progress advanced gait and balance challenges to tolerance  No complaints post treatment  Gradually increase strengthening challenges       Precautions:  Liver transplant, h/o hematoma left LE with peripheral neuropathy  EPOC:  2023      TESTING         TUG 11sec            5x sit to stand 11 8\"            HOYOS 48/56            6 min walk test 980\" w/ cane            FOTO 53            Neuro Re-Ed             Step ups   8\" 10x2 8\" 10x2 8\" 10x2, flat for left lead 10        Step taps  10x2 10x2 10x2 10x2        Foam beam  4 laps CTG 4 laps no UE support 4 laps no UE support 4 laps no UE sidestelpping;  Cone taps 8x2 with CTG on foam        Foam S/P sway    A/P sway 10x2 A/P sway 10x2        Floor to overhead cone stack             Hidden obstacles             Resisted ambulation  With perturbation 8 laps without device Distance, walking without device 140' x2- focused on pacing          Sit to stand off foam on raised seat  10x2 5x2 Foam under LE with assist 5x2 10x2no UE        Ther Ex             Sit to stand  " 10 off chair no UE,                   Hip flex/abd/ ext    10ea 10ea        Toe raises    10         Seated breathing     5                                                            Ther Activity                                       Gait Training             Outside ambulation over curbs, ramps and grass  15'                        Modalities

## 2023-05-01 ENCOUNTER — APPOINTMENT (OUTPATIENT)
Dept: LAB | Facility: HOSPITAL | Age: 63
End: 2023-05-01

## 2023-05-01 ENCOUNTER — OFFICE VISIT (OUTPATIENT)
Dept: PHYSICAL THERAPY | Facility: CLINIC | Age: 63
End: 2023-05-01

## 2023-05-01 DIAGNOSIS — G61.89 OTHER INFLAMMATORY POLYNEUROPATHIES (HCC): ICD-10-CM

## 2023-05-01 DIAGNOSIS — R29.898 WEAKNESS OF LEFT LOWER EXTREMITY: Primary | ICD-10-CM

## 2023-05-03 ENCOUNTER — TELEPHONE (OUTPATIENT)
Dept: LAB | Facility: HOSPITAL | Age: 63
End: 2023-05-03

## 2023-05-04 ENCOUNTER — APPOINTMENT (OUTPATIENT)
Dept: PHYSICAL THERAPY | Facility: CLINIC | Age: 63
End: 2023-05-04
Payer: COMMERCIAL

## 2023-05-05 ENCOUNTER — APPOINTMENT (OUTPATIENT)
Dept: LAB | Facility: HOSPITAL | Age: 63
End: 2023-05-05

## 2023-05-05 DIAGNOSIS — Z94.4 LIVER REPLACED BY TRANSPLANT (HCC): ICD-10-CM

## 2023-05-09 ENCOUNTER — OFFICE VISIT (OUTPATIENT)
Dept: PHYSICAL THERAPY | Facility: CLINIC | Age: 63
End: 2023-05-09

## 2023-05-09 DIAGNOSIS — R29.898 WEAKNESS OF LEFT LOWER EXTREMITY: Primary | ICD-10-CM

## 2023-05-09 DIAGNOSIS — G61.89 OTHER INFLAMMATORY POLYNEUROPATHIES (HCC): ICD-10-CM

## 2023-05-09 NOTE — PROGRESS NOTES
"Daily Note     Today's date: 2023  Patient name: Ricardo Callahan  : 1960  MRN: [id number]  Referring provider: Flip Paul  Dx:   Encounter Diagnosis     ICD-10-CM    1  Weakness of left lower extremity  R29 898       2  Other inflammatory polyneuropathies ([facility] [address] )  G61 89           Start Time: 1102  Stop Time: 1145  Total time in clinic (min): 43 minutes    Subjective:  Was almost hospitalized due to pain and lab numbers  Was generally an emotional week last week  Graduated from clinic, now following with hemeonc every 6 weeks  Staying busy at home, did not do exercises as much as she would've liked due to how busy her week was  Objective: See treatment diary below      Assessment:   Improving stability on compliant surfaces with minimal stepping strategy noted with single limb stance or with bending over to pick items up  Minimal lateral instability noted with hurdles and suitcase carry  Slowed backward walking and step ups, most likely due to reduced balance confidence  Shows good form with exercises, challenged with reducing UE assist with mini squat  Pt will continue to benefit from skilled PT to address her strength and balance deficits  Plan:    Continue to progress advanced gait and balance challenges to tolerance  No complaints post treatment  Gradually increase strengthening challenges       Precautions:  Liver transplant, h/o hematoma left LE with peripheral neuropathy  EPOC:  2023      TESTING       TUG 11sec            5x sit to stand 11 8\"            HOYOS 48/56            6 min walk test 980\" w/ cane            FOTO 53            Neuro Re-Ed             Backward stepping       15ft x8       Step ups   8\" 10x2 8\" 10x2 8\" 10x2, flat for left lead 10 8\" 10x2 alt lead 6' R 2x10, 4' 2x12      Step taps  10x2 10x2 10x2 10x2 10x3 FWD/LAT Fwd 6' x20      Foam beam  4 laps CTG 4 laps no UE support 4 laps no UE support 4 laps no UE sidestelpping;  Cone " taps 8x2 with CTG on foam 4 laps no UE sidestepping, heel to toe 4 laps no UE Lat step 4 laps no UE CGA cone taps      Foam S/P sway    A/P sway 10x2 A/P sway 10x2 A/P sway 10x2       Floor to overhead cone stack      Foam cone taps 3x4ea Static cone  foam 3x5      Hidden obstacles             Resisted ambulation  With perturbation 8 laps without device Distance, walking without device 140' x2- focused on pacing    Hurdles 5lb suitcase carry 10 laps      Sit to stand off foam on raised seat  10x2 5x2 Foam under LE with assist 5x2 10x2no UE 10x2 no UE, foam under feel 5x2 with UE       Ther Ex             Sit to stand     10 off chair no UE,            Sit taps 10x2 Mini squat x10 hover UE      Hip flex/abd/ ext    10ea 10ea 10 ea no UE 2x10 abd/ext      Toe raises    10         Seated breathing     5                                                            Ther Activity                                       Gait Training             Outside ambulation over curbs, ramps and grass  15'                        Modalities

## 2023-05-11 ENCOUNTER — OFFICE VISIT (OUTPATIENT)
Dept: PHYSICAL THERAPY | Facility: CLINIC | Age: 63
End: 2023-05-11

## 2023-05-11 DIAGNOSIS — R29.898 WEAKNESS OF LEFT LOWER EXTREMITY: Primary | ICD-10-CM

## 2023-05-11 DIAGNOSIS — G61.89 OTHER INFLAMMATORY POLYNEUROPATHIES (HCC): ICD-10-CM

## 2023-05-11 NOTE — PROGRESS NOTES
PT PROGRESS NOTE/ DAILY NOTE    Today's date: 2023  Patient name: Gaye Lewis  : 1960  MRN: [id number]  Referring provider: Lizzie Peres  Dx:   Encounter Diagnosis     ICD-10-CM    1  Weakness of left lower extremity  R29 898       2  Other inflammatory polyneuropathies ([facility] [address] )  G61 89           Start Time: 0800  Stop Time: 0845  Total time in clinic (min): 45 minutes    Assessment  Assessment details: Patient presents to skilled PT for further strengthening  She is familiar to this PT with history of left LE hematoma and resulting neuropathy  She has intermittent buckling of her left LE, less than previous and increased left LE pain with prolonged walking  Patient has not experienced any recent falls, however, indicates she does note near falls occurring several times a week  Patient displays abnormal muscle strength with overall grade of 3- to 3/5 left LE  Patient sensation is normal throughout lower extremities  Patient coordination is abnormal per alterate toe tapping  Patient balance scores are as follows: 48/56 HOYOS, 11 seconds TUG without Assistive Device with overall results noting increased risk for falls  Patient endurance scores are as follows; 980 feet with  6 minute walk test with standard cane ( Device ), 11 8 seconds with 5 x sit to stand test with overall results noting decrease functional endurance and cardio capacity  Patient subjective report notes the following functional limitation with normal ADL tasks fatiguing frequently with occasional left LE buckling in prolonged gait  Patient will benefit from skilled PT to address noted impairments and functional limitations they are causing with overall goal to return patient to highest level possible with reduced risk for falls  Given patient's co-morbidities, additional time given for POC to allow for strengthening and balance adjustments, patient is in agreement with treatment plan           Patient demonstrates nice gains overall with PT improving standardized balance testing on HOYOS scoring 48/56 (from 51/56 on IE) and 7 7 seconds on TUG (from 11seconds on IE)  Endurance testing has also improved scoring 1260' on 6 min walk test (improved from 980' on IE) and 9 seconds on 5x sit to stand (from 11  8seconds on IE)  She continues to intermittently experience weakness in her left LE, however, no buckling has been noted  While she has made substantial gains, she is still below average in her scoring for her age  Recommend continuation of PT per POC to continue to address her limitations and promote return to functional mobility  Patient is in agreement with treatment plan  Impairments: abnormal gait, activity intolerance, impaired balance and lacks appropriate home exercise program    Goals  Goals  STG 30 days    Patient will improve static balance with feet together eyes closed on foam to 20 seconds indicating reduction in fall risk- MET  Patient will achieve 190 feet improvement with overall distance achieved of 1170 feet with 6 minute walk test which is Minimal Detectable Change pre current research standards with endurance to demonstrate enhance functional capacity-= MET and exceeded  Patient will display 2 3  second improvement with overall score of 8 7 seconds  with TUG test or lower with noted improvement being Minimal Detectable Change pre current research standards with fall risk- MET   Patient will achieve 54/56 HOYOS score with minimal improve by 6 points or more demonstrating Minimal Detectable change per current research standards for this objective test which assess fall risk- partially met      Patient will perform  5 x sit to stand test with overall reduction by 3 seconds to 8 8 seconds score indicating improvement with functional endurance- MET  Patient will be independent in general strengthening and balance HEP- MET    LT days   Patient will score low risk for falls with 3/4 fall risk measures Patient will be able to ambulate 1500 feet without AD during 6 minute walk test   Patient will be able to perform floor transfer without physical assistance   Patient will be able to carry objects without loss of balance  Patient will be able to ambulate outdoors without any loss of balance  Patient will independent in community based exercise program with walking program for endurance    Cut off score   All date taken from [facility] Neuro Section or Rehab Measures    HOYOS test: 46/56                                                         5 x STS Test:  MDC: 6 points                                                              MDC: 2 3 seconds   age norms                                                                    Age Norms   61-76 year old = M: 54, F: 54                                       61-76 year old: 11 4 seconds   66-77 year old = M 47,  F: 48                                       66-77 year old: 12 6 seconds    80-80 year old = M46,   F: 48                                       80-80 year old: 14 8 seconds     TUG test:                                                                     10 Meter Walk Test:  MDC: 4 14 seconds                                                      MDC:  59 ft/sec  Cut off score for Falls                                                  Age Norms  > 13 5 seconds community dwelling adults                20-29; M: 4 56 ft/sec F: 4 62 ft/sec  > 32 2 Frail Elderly                                                     30-39: M 4 76 ft/sec  F: 4 68 ft/sec                                                                                       40-49: M: 4 79 ft/sec  F: 4 62 ft/sec  6 Minute Walk Test                                                      50-59: M: 4 76 ft/sec  F: 4 56 ft/sec  MDC: 190 feet                                                              60-69: M: 4 56 ft/sec  F: 4 26 ft/sec  Age Norms                                                                   70-+ M: 4 36 ft/sec  F: 4 16 ft/sec  60-69:    M: 1876 F: 1765  70-79:    M: 1729 F: 1545  80-89 +: M: 80 F; 1286     Plan  Patient would benefit from: skilled physical therapy  Planned therapy interventions: manual therapy, neuromuscular re-education, balance, patient education, postural training, strengthening, stretching, therapeutic activities, therapeutic exercise and home exercise program  Frequency: 2x week  Duration in weeks: 6  Treatment plan discussed with: patient        Subjective Evaluation    History of Present Illness  Mechanism of injury: Patient arrived with std cane  She has not been using her wheelchair  She did get placed on steroids due to transplant issues  Patient states she has recent gone through hearing testing with noted 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  She continues to experience groin to ankle pain on left ever since hematoma  She continues to feel intermittent unsteadiness with ADL tasks and increased fatigue requiring pacing       She indicates she is feeling off with her present steroid dose  She is to follow with hematology to work on decreasing her dosage per her report      Pain  Current pain ratin  At best pain ratin  At worst pain rating: 10    Social Support  Steps to enter house: yes  4  Stairs in house: yes   13  Lives in: multiple-level home  Lives with: significant other    Employment status: not working  Treatments  Previous treatment: physical therapy  Patient Goals  Patient goals for therapy: increased strength, independence with ADLs/IADLs and return to sport/leisure activities  Patient goal: to be able to move better with improved tolerance to home tasks        Objective     Strength/Myotome Testing     Left Shoulder     Planes of Motion   Flexion: 4+   Abduction: 4+     Right Shoulder     Planes of Motion   Flexion: 4+   Abduction: 4+     Left Elbow   Flexion: 4+  Extension: 4+    Right Elbow   Flexion: 4+  Extension: 4+    Left Wrist/Hand   Wrist "extension: 3+ and 3  Wrist flexion: 3 and 3+    Right Wrist/Hand   Wrist extension: 4+  Wrist flexion: 4+    Left Hip   Planes of Motion   Flexion: 3+  Extension: 3-  Abduction: 3    Right Hip   Planes of Motion   Flexion: 4+  Extension: 4+  Abduction: 4+    Left Knee   Flexion: 4  Extension: 4    Right Knee   Flexion: 4+  Extension: 4+    Left Ankle/Foot   Dorsiflexion: 4- and 3+  Plantar flexion: 4- and 3+    Right Ankle/Foot   Dorsiflexion: 4+  Plantar flexion: 4+  Neuro Exam:     Sensation   Light touch LE: left WNL and right WNL    Coordination   Finger to nose: left WNL and right WNL  Rapid alternating movements: UE WNL and LE impaired    Functional outcomes   Functional outcome gait comment: Increased genu recurvatum left, decreased stride length      Flowsheet Rows    Flowsheet Row Most Recent Value   PT/OT G-Codes    Current Score 55   Projected Score 56   FOTO information reviewed Yes   Assessment Type Re-evaluation   G code set Mobility: Walking & Moving Around             Precautions:  Liver transplant, h/o hematoma left LE with peripheral neuropathy  EPOC:  6/20/2023      TESTING 4/11 5/11           TUG 11sec 7 7sec           5x sit to stand 11 8\" 9 0sec           HOYOS 48/56 52/56           6 min walk test 980\" w/ cane 1260' w/ cane           FOTO 53 55           Neuro Re-Ed             Step taps  10x2           Foam beam             Foam S/P sway             Floor to overhead cone stack             Hidden obstacles  Gait over uneven surfaces, curbs and ramps           Resisted ambulation                          Ther Ex             Sit to stand  5x2           Hip flex/abd/ ext                                                                                           Ther Activity                                       Gait Training                                       Modalities                                          "

## 2023-05-15 ENCOUNTER — APPOINTMENT (OUTPATIENT)
Dept: LAB | Facility: HOSPITAL | Age: 63
End: 2023-05-15

## 2023-05-16 ENCOUNTER — OFFICE VISIT (OUTPATIENT)
Dept: PHYSICAL THERAPY | Facility: CLINIC | Age: 63
End: 2023-05-16

## 2023-05-16 ENCOUNTER — HOSPITAL ENCOUNTER (OUTPATIENT)
Dept: CT IMAGING | Facility: HOSPITAL | Age: 63
Discharge: HOME | End: 2023-05-16

## 2023-05-16 DIAGNOSIS — H90.2 CONDUCTIVE HEARING LOSS, UNSPECIFIED LATERALITY: ICD-10-CM

## 2023-05-16 DIAGNOSIS — R29.898 WEAKNESS OF LEFT LOWER EXTREMITY: Primary | ICD-10-CM

## 2023-05-16 DIAGNOSIS — G61.89 OTHER INFLAMMATORY POLYNEUROPATHIES (HCC): ICD-10-CM

## 2023-05-16 NOTE — PROGRESS NOTES
"Daily Note     Today's date: 2023  Patient name: Alla Washington  : 1960  MRN: [id number]  Referring provider: Jason Machuca  Dx:   Encounter Diagnosis     ICD-10-CM    1  Weakness of left lower extremity  R29 898       2  [facility]uropathies [facility] )  G61 89           Start Time: 1015  Stop Time: 1100  Total time in clinic (min): 45 minutes    Subjective:   Patient states today she is not feeling well  She had her CT scan this morning, and had therapy before she came to PT  Objective: See treatment diary below      Assessment:    Slower hardeep noted today with increased time required for tasks  Increased difficulty noted from sit to stand due to increased knee discomfort  Reviewed HEP and importance of pacing to allow for more energy conservation  STM with thoracic outlet MFR to allow for increased relaxation and decreased muscle guarding  Plan:    Continue to progress advanced gait and balance challenges to tolerance  No complaints post treatment  Gradually increase strengthening challenges       Precautions:  Liver transplant, h/o hematoma left LE with peripheral neuropathy  EPOC:  2023      TESTING    TUG 11sec         5x sit to stand 11 8\"         HOYOS 48/56         6 min walk test 980\" w/ cane         FOTO 53         Neuro Re-Ed          Backward stepping      15ft x8  15' x4   Step ups  8\" 10x2 8\" 10x2 8\" 10x2, flat for left lead 10 8\" 10x2 alt lead 6' R 2x10, 4' 2x12    Step taps  10x2 10x2 10x2 10x3 FWD/LAT Fwd 6' x20    Foam beam  4 laps no UE support 4 laps no UE support 4 laps no UE sidestelpping;  Cone taps 8x2 with CTG on foam 4 laps no UE sidestepping, heel to toe 4 laps no UE Lat step 4 laps no UE CGA cone taps Lat steps 4 laps CTG ; cone taps 8x4    Foam S/P sway   A/P sway 10x2 A/P sway 10x2 A/P sway 10x2     Floor to overhead cone stack     Foam cone taps 3x4ea Static cone  foam 3x5    Hidden obstacles        " Resisted ambulation  Distance, walking without device 140' x2- focused on pacing    Hurdles 5lb suitcase carry 10 laps Hurdles 6 x4 laps , repeated foot over foot 6 laps   Sit to stand off foam on raised seat  5x2 Foam under LE with assist 5x2 10x2no UE 10x2 no UE, foam under feel 5x2 with UE  10x2, on foam with UE support, 10x2 with UE   Ther Ex          Sit to stand    10 off chair no UE,            Sit taps 10x2 Mini squat x10 hover UE    Hip flex/abd/ ext   10ea 10ea 10 ea no UE 2x10 abd/ext    Toe raises   10       Seated breathing    5                                              Ther Activity                              Gait Training          Outside ambulation over curbs, ramps and grass                    Manuals          MFR thoracic outlet       VM

## 2023-05-18 ENCOUNTER — ANNUAL EXAM (OUTPATIENT)
Dept: OBGYN CLINIC | Facility: CLINIC | Age: 63
End: 2023-05-18
Payer: COMMERCIAL

## 2023-05-18 ENCOUNTER — OFFICE VISIT (OUTPATIENT)
Dept: PHYSICAL THERAPY | Facility: CLINIC | Age: 63
End: 2023-05-18

## 2023-05-18 VITALS
SYSTOLIC BLOOD PRESSURE: 116 MMHG | HEIGHT: 65 IN | DIASTOLIC BLOOD PRESSURE: 62 MMHG | BODY MASS INDEX: 29.02 KG/M2 | WEIGHT: 174.2 LBS

## 2023-05-18 DIAGNOSIS — Z80.3 FAMILY HISTORY OF BREAST CANCER IN SISTER: ICD-10-CM

## 2023-05-18 DIAGNOSIS — Z01.419 ROUTINE GYNECOLOGICAL EXAMINATION: Primary | ICD-10-CM

## 2023-05-18 DIAGNOSIS — Z12.31 BREAST CANCER SCREENING BY MAMMOGRAM: ICD-10-CM

## 2023-05-18 DIAGNOSIS — G61.89 OTHER INFLAMMATORY POLYNEUROPATHIES (HCC): ICD-10-CM

## 2023-05-18 DIAGNOSIS — R29.898 WEAKNESS OF LEFT LOWER EXTREMITY: Primary | ICD-10-CM

## 2023-05-18 PROCEDURE — G0101 CA SCREEN;PELVIC/BREAST EXAM: HCPCS | Performed by: OBSTETRICS & GYNECOLOGY

## 2023-05-18 NOTE — PROGRESS NOTES
[id number]   [address] [place] [address], [facility]    ASSESSMENT/PLAN: Naldo Guzman is a 63 y o   who presents for annual gynecologic exam     Encounter for routine gynecologic examination  - Routine well woman exam completed today  - Cervical Cancer Screening: Current ASCCP Guidelines reviewed  Last Pap: 2021   Next Pap Due:   - HPV Vaccination status: Not immunized  - Breast Cancer Screening: Last Mammogram Not on file, ordered  - Colorectal cancer screening was not ordered  - The following were reviewed in today's visit: breast self exam and mammography screening ordered    Additional problems addressed during this visit:  1  Family history of breast cancer in sister  -     Mammo screening bilateral w 3d & cad; Future    2  Breast cancer screening by mammogram  -     Mammo screening bilateral w 3d & cad; Future        CC:  Annual Gynecologic Examination    HPI: Naldo Guzman is a 63 y o   who presents for annual gynecologic examination  HPI    The following portions of the patient's history were reviewed and updated as appropriate: She  has a past medical history of Clotting disorder ([facility] [address] ), Concussion, CSF leak, Hyperbilirubinemia (2022), Hypertension, Liver failure ([facility] [address] ), Liver transplant recipient [facility]), Migraines, Peripheral neuropathy, and Varicella ()  She  has a past surgical history that includes TONSILECTOMY AND ADNOIDECTOMY; Laparoscopy for ectopic pregnancy; Breast lumpectomy; Sinus surgery; ABLATION SOFT TISSUE; IR paracentesis (2022); IR paracentesis (2022); IR paracentesis (2022); and Mammo (historical) (Bilateral, 2021)    Her family history includes Autoimmune disease in her daughter; Breast cancer in her mother and sister; Colon cancer in her father; Heart disease in her half-brother; Melanoma in her half-brother; No Known Problems in her paternal grandfather, paternal grandmother, son, and son; Prostate cancer in her half-brother  She  reports that she has quit smoking  Her smoking use included cigarettes  She smoked an average of 1 pack per day  She has never used smokeless tobacco  She reports that she does not currently use alcohol  She reports that she does not currently use drugs  Current Outpatient Medications   Medication Sig Dispense Refill   • Accu-Chek Guide test strip      • dapsone 25 mg tablet Take 25 mg by mouth daily  2 tablets in the AM  Indications: Immune Thrombocytopenia     • diphenhydrAMINE (BENADRYL) 25 mg capsule Take 25 mg by mouth every 8 (eight) hours as needed for itching  Indications: Itching     • ergocalciferol (ERGOCALCIFEROL) 1 25 MG (29943 UT) capsule Take 50,000 Units by mouth once a week  Every Monday AM  Indications: Vitamin D Deficiency     • gabapentin (NEURONTIN) 300 mg capsule Take 600 mg by mouth 3 (three) times a day  Indications: Neuropathic Pain     • Januvia 100 MG tablet      • lisinopril (ZESTRIL) 10 mg tablet Take 10 mg by mouth daily  Indications: High Blood Pressure Disorder     • Magnesium Oxide 400 MG CAPS Take by mouth     • melatonin 3 mg Take 3 mg by mouth daily at bedtime as needed (sleep)  Indications: Trouble Sleeping     • Microlet Lancets MISC      • mycophenolate (MYFORTIC) 360 MG TBEC Take 360 mg by mouth 2 (two) times a day  Indications: Liver Transplant Recipient     • NIFEdipine ER (ADALAT CC) 30 MG 24 hr tablet Take 30 mg by mouth 2 (two) times a day  Indications: High Blood Pressure Disorder     • pantoprazole (PROTONIX) 40 mg tablet Take 40 mg by mouth 2 (two) times a day  Indications: Gastroesophageal Reflux Disease     • predniSONE 5 mg tablet Take 20 mg by mouth daily  4 tablets in AM  Indications: Organ or Tissue Transplant Recipient     • Premarin vaginal cream      • Sennosides-Docusate Sodium (SB DOCUSATE SODIUM-SENNA PO) Take 50 mg by mouth daily as needed (constipation)   Indications: constipation     • tacrolimus (PROGRAF) 1 mg capsule Take 6 mg by "mouth every 12 (twelve) hours 7 capsules AM  7 capsules PM     • thiamine 100 MG tablet Take 100 mg by mouth daily  Indications: Deficiency of Vitamin B1     • traMADol (ULTRAM) 50 mg tablet Take 1 PO BID PRN for pain  45 tablet 2   • traMADol (ULTRAM-ER) 200 MG 24 hr tablet Take 1 PO QD for pain  30 tablet 2   • ursodiol (ACTIGALL) 250 mg tablet      • valGANciclovir (VALCYTE) 450 mg tablet Take 450 mg by mouth daily with breakfast  2 tablets in the AM  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     • clindamycin (CLEOCIN) 300 MG capsule  (Patient not taking: Reported on 3/15/2023)     • linaGLIPtin (Tradjenta) 5 MG TABS Take 5 mg by mouth in the morning  Indications: Type 2 Diabetes (Patient not taking: Reported on 3/15/2023)     • Myrbetriq 25 MG TB24  (Patient not taking: Reported on 3/15/2023)       No current facility-administered medications for this visit  She has No Known Allergies       Review of Systems      Objective:  /62 (BP Location: Left arm, Patient Position: Sitting, Cuff Size: Large)   Ht 5' 4 75\" (1 645 m)   Wt 79 kg (174 lb 3 2 oz)   BMI 29 21 kg/m²    Physical Exam      PE:  General Appearance: alert and oriented, in no acute distress  HEENT: PERRL, thyroid without masses or tenderness  Breast: No masses, tenderness, skin changes, nipple D/C or axillary or supraclavicular adenopathy  Abdomen: Soft, non-tender, non-distended, no masses, no rebound or guarding  Pelvic:       External genitalia: Normal appearance, no abnormal pigmentation, no lesions or masses  Normal Bartholin's and P[place]'s  Urinary system: Urethral meatus normal, bladder non-tender  Vaginal: normal mucosa without prolapse or lesions  Normal-appearing physiologic discharge      Cervix: Normal-appearing, well-epithelialized, no gross lesions or masses No cervical motion tenderness  Adnexa: No adnexal masses or tenderness noted        Uterus: Normal-sized, regular contour, midline, mobile, " no uterine tenderness  Extremities: Normal range of motion     Skin: normal, no rash or abnormalities  Neurologic: alert, oriented x3  Psychiatric: Appropriate affect, mood stable, cooperative with exam

## 2023-05-18 NOTE — PROGRESS NOTES
"Daily Note     Today's date: 2023  Patient name: Alla Washington  : 1960  MRN: [id number]  Referring provider: Jason Machuca  Dx:   Encounter Diagnosis     ICD-10-CM    1  Weakness of left lower extremity  R29 898       2  Other inflammatory polyneuropathies ([facility] [address] )  G61 89           Start Time: 1015  Stop Time: 1100  Total time in clinic (min): 45 minutes    Subjective:   Patient states she had 3 days of not feeling great, but is now feeling better  Objective: See treatment diary below      Assessment:    Hurdles with good control  Initiated balance board weight shift A/P and Lateral with good tolerance and occasional haptic touch  Full [place] weight shift with UE support needed  Increased hyperextension noted left knee today  Initiated nustep with good tolerance  Added hamstring and iliopsoas stretch to encourage increased flexibility with good demonstration  Patient instructed to continues at home and voiced understanding with written instructions given  Plan:    Continue to progress advanced gait and balance challenges to tolerance  No complaints post treatment  Gradually increase strengthening challenges  Assess tolerance to HEP       Precautions:  Liver transplant, h/o hematoma left LE with peripheral neuropathy  EPOC:  2023      TESTING    TUG 11sec         5x sit to stand 11 8\"         HOYOS 48/56         6 min walk test 980\" w/ cane         FOTO 53         Neuro Re-Ed          Backward stepping     15ft x8  15' x4    Step ups  8\" 10x2 8\" 10x2, flat for left lead 10 8\" 10x2 alt lead 6' R 2x10, 4' 2x12     Step taps  10x2 10x2 10x3 FWD/LAT Fwd 6' x20     Foam beam  4 laps no UE support 4 laps no UE sidestelpping;  Cone taps 8x2 with CTG on foam 4 laps no UE sidestepping, heel to toe 4 laps no UE Lat step 4 laps no UE CGA cone taps Lat steps 4 laps CTG ; cone taps 8x4     Foam S/P sway  A/P sway 10x2 A/P sway 10x2 A/P sway 10x2   Weight shift " "on balance board A/P and Lat, full [place] 10ea   Floor to overhead cone stack    Foam cone taps 3x4ea Static cone  foam 3x5     Hidden obstacles          Resisted ambulation     Hurdles 5lb suitcase carry 10 laps Hurdles 6 x4 laps , repeated foot over foot 6 laps Hurdles foot over foot 6 hurdles 4 laps    Sit to stand off foam on raised seat  Foam under LE with assist 5x2 10x2no UE 10x2 no UE, foam under feel 5x2 with UE  10x2, on foam with UE support, 10x2 with UE 10 no UE   Ther Ex          Sit to stand  10 off chair no UE,            Sit taps 10x2 Mini squat x10 hover UE     Hip flex/abd/ ext  10ea 10ea 10 ea no UE 2x10 abd/ext  With protected knee position 10ea   Toe raises  10     10   Seated breathing   5       Strap stretch hamstring 3 way       10\" x3ea B/L   Marquis test stretch       10\" x3ea                       Ther Activity                              Gait Training          Outside ambulation over curbs, ramps and grass                    Manuals          MFR thoracic outlet      VM                   "

## 2023-05-19 NOTE — PROGRESS NOTES
"Daily Note     Today's date: 2023  Patient name: Bernell Sandhoff  : 1960  MRN: [id number]  Referring provider: Matt Wahl  Dx:   Encounter Diagnosis     ICD-10-CM    1  Weakness of left lower extremity  R29 898       2  Other inflammatory polyneuropathies ([facility] [address] )  G61 89           Start Time: 1230  Stop Time: 1315  Total time in clinic (min): 45 minutes    Subjective: Increased posterior knee pain after dog ran into her knee, 5/10  She arrived with Std cane and notices overall soreness  with inclement weather  Objective: See treatment diary below      Assessment:     Nice gains with sit to stand without UE support  Added sit taps with good tolerance  Alternating step ups with good stability noted  Compliant surfaces are challenging  Initiated cone taps off foam with improving stability  Discussed addition of solo cup taps in [place] with wall behind for safety  Patient demonstrates well and voiced understanding  Plan:    Continue to progress advanced gait and balance challenges to tolerance  No complaints post treatment  Gradually increase strengthening challenges       Precautions:  Liver transplant, h/o hematoma left LE with peripheral neuropathy  EPOC:  2023      TESTING        TUG 11sec            5x sit to stand 11 8\"            HOYOS 48/56            6 min walk test 980\" w/ cane            FOTO 53            Neuro Re-Ed             Step ups   8\" 10x2 8\" 10x2 8\" 10x2, flat for left lead 10 8\" 10x2 alt lead       Step taps  10x2 10x2 10x2 10x2 10x3 FWD/LAT       Foam beam  4 laps CTG 4 laps no UE support 4 laps no UE support 4 laps no UE sidestelpping;  Cone taps 8x2 with CTG on foam 4 laps no UE sidestepping, heel to toe 4 laps no UE       Foam S/P sway    A/P sway 10x2 A/P sway 10x2 A/P sway 10x2       Floor to overhead cone stack      Foam cone taps 3x4ea       Hidden obstacles             Resisted ambulation  With perturbation 8 laps without " device Distance, walking without device 140' x2- focused on pacing          Sit to stand off foam on raised seat  10x2 5x2 Foam under LE with assist 5x2 10x2no UE 10x2 no UE, foam under feel 5x2 with UE       Ther Ex             Sit to stand     10 off chair no UE,            Sit taps 10x2       Hip flex/abd/ ext    10ea 10ea 10 ea no UE       Toe raises    10         Seated breathing     5                                                            Ther Activity                                       Gait Training             Outside ambulation over curbs, ramps and grass  15'                        Modalities Deep Sutures: 5-0 PGLC

## 2023-05-22 ENCOUNTER — APPOINTMENT (OUTPATIENT)
Dept: LAB | Facility: HOSPITAL | Age: 63
End: 2023-05-22

## 2023-05-23 ENCOUNTER — OFFICE VISIT (OUTPATIENT)
Dept: PHYSICAL THERAPY | Facility: CLINIC | Age: 63
End: 2023-05-23

## 2023-05-23 DIAGNOSIS — G61.89 OTHER INFLAMMATORY POLYNEUROPATHIES (HCC): ICD-10-CM

## 2023-05-23 DIAGNOSIS — R29.898 WEAKNESS OF LEFT LOWER EXTREMITY: Primary | ICD-10-CM

## 2023-05-23 NOTE — PROGRESS NOTES
"Daily Note     Today's date: 2023  Patient name: Ivy Griffin  : 1960  MRN: [id number]  Referring provider: Patrick Wesley  Dx:   Encounter Diagnosis     ICD-10-CM    1  Weakness of left lower extremity  R29 898       2  Other inflammatory polyneuropathies ([facility] [address] )  G61 89           Start Time: 1015  Stop Time: 1100  Total time in clinic (min): 45 minutes    Subjective:   Patient indicates her dog ran into her hitting her left knee cap  She states this happened over the weekend and she has noted increased overall pain  8-9/10  Objective: See treatment diary below      Assessment:    Avoided squats and steps due to increased knee discomfort  LE strengthening with good tolerance  Hurdles with good clearance and no increase in pain noted  STM with decreased pain noted  Full ROM, slight increase in pain noted with terminal knee extension  Discussed use of tub rail to assist with getting in and out of her shower  Patient encouraged to continue to use ice as needed for comfort  No increase in knee pain noted with HEP and overall standing exercises tolerated well without pain  Plan:    Continue to progress advanced gait and balance challenges to tolerance  No complaints post treatment  Gradually increase strengthening challenges  Assess knee for any increase in pain       Precautions:  Liver transplant, h/o hematoma left LE with peripheral neuropathy  EPOC:  2023      TESTING    TUG 11sec         5x sit to stand 11 8\"         HOYOS 48/56         6 min walk test 980\" w/ cane         FOTO 53         Neuro Re-Ed          Backward stepping    15ft x8  15' x4  4 laps in bars   Step ups  8\" 10x2, flat for left lead 10 8\" 10x2 alt lead 6' R 2x10, 4' 2x12      Step taps  10x2 10x3 FWD/LAT Fwd 6' x20   10x4   Foam beam  4 laps no UE sidestelpping;  Cone taps 8x2 with CTG on foam 4 laps no UE sidestepping, heel to toe 4 laps no UE Lat step 4 laps no UE CGA " "cone taps Lat steps 4 laps CTG ; cone taps 8x4      Foam S/P sway  A/P sway 10x2 A/P sway 10x2   Weight shift on balance board A/P and Lat, full [place] 10ea No foam A/P sway 10x2   Floor to overhead cone stack   Foam cone taps 3x4ea Static cone  foam 3x5      Hidden obstacles          Resisted ambulation    Hurdles 5lb suitcase carry 10 laps Hurdles 6 x4 laps , repeated foot over foot 6 laps Hurdles foot over foot 6 hurdles 4 laps  Hurdles foot over foot 6 x 4 laps; sidestepping 4 laps   Sit to stand off foam on raised seat  10x2no UE 10x2 no UE, foam under feel 5x2 with UE  10x2, on foam with UE support, 10x2 with UE 10 no UE    Ther Ex          Sit to stand   Sit taps 10x2 Mini squat x10 hover UE      Hip flex/abd/ ext  10ea 10 ea no UE 2x10 abd/ext  With protected knee position 10ea 10x ea   Toe raises      10 10   Seated breathing  5        Strap stretch hamstring 3 way      10\" x3ea B/L    Marquis test stretch      10\" x3ea                        Ther Activity                              Gait Training          Outside ambulation over curbs, ramps and grass                    Manuals          MFR thoracic outlet     VM  VM- MFR posterior knee                  "

## 2023-05-25 ENCOUNTER — OFFICE VISIT (OUTPATIENT)
Dept: PHYSICAL THERAPY | Facility: CLINIC | Age: 63
End: 2023-05-25

## 2023-05-25 DIAGNOSIS — G61.89 OTHER INFLAMMATORY POLYNEUROPATHIES (HCC): ICD-10-CM

## 2023-05-25 DIAGNOSIS — R29.898 WEAKNESS OF LEFT LOWER EXTREMITY: Primary | ICD-10-CM

## 2023-05-25 NOTE — PROGRESS NOTES
"Daily Note     Today's date: 2023  Patient name: Supa Mosquera  : 1960  MRN: [id number]  Referring provider: Erlinda Welch  Dx:   Encounter Diagnosis     ICD-10-CM    1  Weakness of left lower extremity  R29 898       2  Other inflammatory polyneuropathies ([facility] [address] )  G61 89           Start Time: 1015  Stop Time: 1100  Total time in clinic (min): 45 minutes    Subjective:  Patient continues to feel left leg pain, \" it feels better\"         Objective: See treatment diary below      Assessment:    Nice gains in hurdles and sidestepping  Decreased overall edema noted  Stability in unilateral stance improved today with decreased reliance on UE for support in LE strengthening  Pacing to encourage return to tasks reviewed  Consider return to step ups to tolerance  Plan:    Continue to progress advanced gait and balance challenges to tolerance  No complaints post treatment  Gradually increase strengthening challenges  Assess knee for any increase in pain       Precautions:  Liver transplant, h/o hematoma left LE with peripheral neuropathy  EPOC:  2023      TESTING    TUG 11sec         5x sit to stand 11 8\"         HOYOS 48/56         6 min walk test 980\" w/ cane         FOTO 53         Neuro Re-Ed          Backward stepping   15ft x8  15' x4  4 laps in bars 4 laps in bars   Step ups  8\" 10x2 alt lead 6' R 2x10, 4' 2x12       Step taps  10x3 FWD/LAT Fwd 6' x20   10x4 10x3, FWD/LAT   Foam beam  4 laps no UE sidestepping, heel to toe 4 laps no UE Lat step 4 laps no UE CGA cone taps Lat steps 4 laps CTG ; cone taps 8x4       Foam S/P sway  A/P sway 10x2   Weight shift on balance board A/P and Lat, full [place] 10ea No foam A/P sway 10x2 No Foam 10x2   Floor to overhead cone stack  Foam cone taps 3x4ea Static cone  foam 3x5       Hidden obstacles          Resisted ambulation   Hurdles 5lb suitcase carry 10 laps Hurdles 6 x4 laps , repeated foot over foot 6 laps " "Hurdles foot over foot 6 hurdles 4 laps  Hurdles foot over foot 6 x 4 laps; sidestepping 4 laps Hurdles foot over foot 6 laps, sidestepping 6 laps   Sit to stand off foam on raised seat  10x2 no UE, foam under feel 5x2 with UE  10x2, on foam with UE support, 10x2 with UE 10 no UE  10 no UE off foam seat   Ther Ex          Sit to stand  Sit taps 10x2 Mini squat x10 hover UE       Hip flex/abd/ ext  10 ea no UE 2x10 abd/ext  With protected knee position 10ea 10x ea 10ea   Toe raises     10 10 10x2   Seated breathing          Strap stretch hamstring 3 way     10\" x3ea B/L     Marquis test stretch     10\" x3ea                         Ther Activity                              Gait Training          Outside ambulation over curbs, ramps and grass                    Manuals          MFR thoracic outlet    VM  VM- MFR posterior knee                   "

## 2023-05-30 ENCOUNTER — OFFICE VISIT (OUTPATIENT)
Dept: PHYSICAL THERAPY | Facility: CLINIC | Age: 63
End: 2023-05-30

## 2023-05-30 ENCOUNTER — APPOINTMENT (OUTPATIENT)
Dept: LAB | Facility: HOSPITAL | Age: 63
End: 2023-05-30

## 2023-05-30 DIAGNOSIS — R29.898 WEAKNESS OF LEFT LOWER EXTREMITY: Primary | ICD-10-CM

## 2023-05-30 DIAGNOSIS — G61.89 OTHER INFLAMMATORY POLYNEUROPATHIES (HCC): ICD-10-CM

## 2023-05-30 NOTE — PROGRESS NOTES
"Daily Note     Today's date: 2023  Patient name: Porfirio Leal  : 1960  MRN: [id number]  Referring provider: Shari Khan  Dx:   Encounter Diagnosis     ICD-10-CM    1  Weakness of left lower extremity  R29 898       2  Other inflammatory polyneuropathies ([facility])  G61 89                      Subjective: \"I feel a little more off today after the weekend  \"      Objective: See treatment diary below      Assessment: Tolerated treatment well  Reports improvements in knee motion and pain following TKE, likely cause self mobilization to joint  Pt educated in home performance for TKE and lateral band walks if she wishes to add them  Notes improvements in balance compared to [facility] tasks from previous visits, minimal to moderate postural sway w/ these tasks  Patient demonstrated fatigue post treatment, exhibited good technique with therapeutic exercises and would benefit from continued PT      Plan: Continue per plan of care  Precautions:  Liver transplant, h/o hematoma left LE with peripheral neuropathy  EPOC:  2023      TESTING    TUG 11sec          5x sit to stand 11 8\"          HOYOS 48/56          6 min walk test 980\" w/ cane          FOTO 53          Neuro Re-Ed           Backward stepping   15ft x8  15' x4  4 laps in bars 4 laps in bars 4 laps // bars    Step ups  8\" 10x2 alt lead 6' R 2x10, 4' 2x12        Step taps  10x3 FWD/LAT Fwd 6' x20   10x4 10x3, FWD/LAT    Foam beam  4 laps no UE sidestepping, heel to toe 4 laps no UE Lat step 4 laps no UE CGA cone taps Lat steps 4 laps CTG ; cone taps 8x4     tadnem amb   No foam x3 laps 0UE   Foam S/P sway  A/P sway 10x2   Weight shift on balance board A/P and Lat, full [place] 10ea No foam A/P sway 10x2 No Foam 10x2 Foam FT 2x30\"   Floor to overhead cone stack  Foam cone taps 3x4ea Static cone  foam 3x5        Hidden obstacles           Resisted ambulation   Hurdles 5lb suitcase carry 10 laps Hurdles 6 x4 laps , " "repeated foot over foot 6 laps Hurdles foot over foot 6 hurdles 4 laps  Hurdles foot over foot 6 x 4 laps; sidestepping 4 laps Hurdles foot over foot 6 laps, sidestepping 6 laps Hurdles foot over foot 6 laps, sidestepping 6 laps   TKE        Pink TB 15x5\"   Sit to stand off foam on raised seat  10x2 no UE, foam under feel 5x2 with UE  10x2, on foam with UE support, 10x2 with UE 10 no UE  10 no UE off foam seat 10 no UE no foam   Ther Ex           Sit to stand  Sit taps 10x2 Mini squat x10 hover UE        Hip flex/abd/ ext  10 ea no UE 2x10 abd/ext  With protected knee position 10ea 10x ea 10ea x10 ea B   Toe raises     10 10 10x2 2x10    Seated breathing           Strap stretch hamstring 3 way     10\" x3ea B/L      Marquis test stretch     10\" x3ea      Lateral stepping w/ TB        x3 laps OTB              Ther Activity                                 Gait Training           Outside ambulation over curbs, ramps and grass                      Manuals           MFR thoracic outlet    VM  VM- MFR posterior knee                     "

## 2023-06-01 ENCOUNTER — OFFICE VISIT (OUTPATIENT)
Dept: PHYSICAL THERAPY | Facility: CLINIC | Age: 63
End: 2023-06-01

## 2023-06-01 DIAGNOSIS — R29.898 WEAKNESS OF LEFT LOWER EXTREMITY: Primary | ICD-10-CM

## 2023-06-01 DIAGNOSIS — G61.89 OTHER INFLAMMATORY POLYNEUROPATHIES (HCC): ICD-10-CM

## 2023-06-01 NOTE — PROGRESS NOTES
"Daily Note     Today's date: 2023  Patient name: Leslie Hui  : 1960  MRN: [id number]  Referring provider: Everett Wiggins  Dx:   Encounter Diagnosis     ICD-10-CM    1  Weakness of left lower extremity  R29 898       2  Other inflammatory polyneuropathies (HCC)  G61 89                      Subjective: No new complaints  Pt reports she doesn't have much confidence in her LLE  Objective: See treatment diary below      Assessment: Tolerated treatment well  Progressed to walking step overs for 6\" step without AD with good tolerance  Pt demonstrated increased difficulty when leading with LLE ascending and descending but improved with repetition    Patient demonstrated fatigue post treatment, exhibited good technique with therapeutic exercises and would benefit from continued PT      Plan: Continue per plan of care  Precautions:  Liver transplant, h/o hematoma left LE with peripheral neuropathy  EPOC:  2023      TESTING     TUG 11sec          5x sit to stand 11 8\"          HOYOS 48/56          6 min walk test 980\" w/ cane          FOTO 53          Neuro Re-Ed           Backward stepping  15' x4  4 laps in bars 4 laps in bars 4 laps // bars      Step ups       RLE 8\"x10  LLE 6\"x10    LLE leads  x10 walking step over 6\" no SPC    Step taps    10x4 10x3, FWD/LAT      Foam beam  Lat steps 4 laps CTG ; cone taps 8x4     tadnem amb   No foam x3 laps 0UE     Foam S/P sway   Weight shift on balance board A/P and Lat, full [place] 10ea No foam A/P sway 10x2 No Foam 10x2 Foam FT 2x30\"     Floor to overhead cone stack       Slow HKM with focus on SLS b/l 20'x2    Hidden obstacles           Resisted ambulation  Hurdles 6 x4 laps , repeated foot over foot 6 laps Hurdles foot over foot 6 hurdles 4 laps  Hurdles foot over foot 6 x 4 laps; sidestepping 4 laps Hurdles foot over foot 6 laps, sidestepping 6 laps Hurdles foot over foot 6 laps, sidestepping 6 laps     TKE      Pink " "TB 15x5\"     Sit to stand off foam on raised seat  10x2, on foam with UE support, 10x2 with UE 10 no UE  10 no UE off foam seat 10 no UE no foam Hip taps to foam on chair no UE x 10 reps    Ther Ex           Sit to stand           Hip flex/abd/ ext   With protected knee position 10ea 10x ea 10ea x10 ea B X15 reps ea    Toe raises   10 10 10x2 2x10  3x10    Seated breathing           Strap stretch hamstring 3 way   10\" x3ea B/L        Marquis test stretch   10\" x3ea        Lateral stepping w/ TB      x3 laps OTB x3 laps OTB               Ther Activity                                 Gait Training           Outside ambulation over curbs, ramps and grass                      Manuals           MFR thoracic outlet  VM  VM- MFR posterior knee                       "

## 2023-06-06 ENCOUNTER — APPOINTMENT (OUTPATIENT)
Dept: LAB | Facility: HOSPITAL | Age: 63
End: 2023-06-06
Payer: COMMERCIAL

## 2023-06-06 ENCOUNTER — OFFICE VISIT (OUTPATIENT)
Dept: PAIN MEDICINE | Facility: CLINIC | Age: 63
End: 2023-06-06
Payer: COMMERCIAL

## 2023-06-06 VITALS
WEIGHT: 173 LBS | BODY MASS INDEX: 28.82 KG/M2 | HEIGHT: 65 IN | DIASTOLIC BLOOD PRESSURE: 78 MMHG | SYSTOLIC BLOOD PRESSURE: 122 MMHG | HEART RATE: 68 BPM | TEMPERATURE: 97.8 F

## 2023-06-06 DIAGNOSIS — Z79.891 LONG-TERM CURRENT USE OF OPIATE ANALGESIC: ICD-10-CM

## 2023-06-06 DIAGNOSIS — R29.898 WEAKNESS OF LEFT LOWER EXTREMITY: ICD-10-CM

## 2023-06-06 DIAGNOSIS — G61.89 OTHER INFLAMMATORY POLYNEUROPATHIES (HCC): ICD-10-CM

## 2023-06-06 DIAGNOSIS — G89.4 CHRONIC PAIN SYNDROME: Primary | ICD-10-CM

## 2023-06-06 DIAGNOSIS — M51.36 DDD (DEGENERATIVE DISC DISEASE), LUMBAR: ICD-10-CM

## 2023-06-06 DIAGNOSIS — M54.16 LUMBAR RADICULOPATHY: ICD-10-CM

## 2023-06-06 DIAGNOSIS — M47.816 LUMBAR SPONDYLOSIS: ICD-10-CM

## 2023-06-06 PROCEDURE — 80305 DRUG TEST PRSMV DIR OPT OBS: CPT | Performed by: PHYSICIAN ASSISTANT

## 2023-06-06 PROCEDURE — 99214 OFFICE O/P EST MOD 30 MIN: CPT | Performed by: PHYSICIAN ASSISTANT

## 2023-06-06 NOTE — PROGRESS NOTES
Assessment:  1  Chronic pain syndrome    2  Long-term current use of opiate analgesic    3  Lumbar spondylosis    4  DDD (degenerative disc disease), lumbar    5  Lumbar radiculopathy    6  Other inflammatory polyneuropathies ([address] )    7  Weakness of left lower extremity        Plan:  While the patient was in the office today, I did have a thorough conversation regarding their chronic pain syndrome, medication management, and treatment plan options  Patient seems to be having some side effects when she takes the tramadol extended release feeling somewhat nauseated and dizzy a few hours after taking it  We spent some time discussing options and since she does have some 100 mg tablets at home I have recommended that she decrease to 100 mg nightly  She will continue the instant release tramadol 50 mg as needed which she does not require refill today  She will contact our office in about 1 week to let me know how she is doing on the decreased dose of the extended release  Otherwise, the patient will follow up in 3 months or sooner if needed if the pain changes or symptoms  [place] Prescription Drug Monitoring Program report was reviewed and was appropriate     A urine drug screen was collected at today's office visit as part of our medication management protocol  The point of care testing results were appropriate for what was being prescribed  The specimen will be sent for confirmatory testing  The drug screen is medically necessary because the patient is either dependent on opioid medication or is being considered for opioid medication therapy and the results could impact ongoing or future treatment  The drug screen is to evaluate for the presences or absence of prescribed, non-prescribed, and/or illicit drugs/substances  There are risks associated with opioid medications, including dependence, addiction and tolerance  The patient understands and agrees to use these medications only as prescribed  Potential side effects of the medications include, but are not limited to, constipation, drowsiness, addiction, impaired judgment and risk of fatal overdose if not taken as prescribed  The patient was warned against driving while taking sedation medications  Sharing medications is a felony  At this point in time, the patient is showing no signs of addiction, abuse, diversion or suicidal ideation  The patient was selected for a random pill count at today’s office visit  The medication was available for the count and the amount present was found to be appropriate according to the date(s) filled and the medication prescription instructions noted  The patient will follow-up in 12 weeks for medication prescription refill and reevaluation  The patient was advised to contact the office should their symptoms worsen in the interim  The patient was agreeable and verbalized an understanding  History of Present Illness: The patient is a 63 y o  female last seen on 3/15/2023 who presents for a follow up office visit in regards to chronic low back pain  The patient currently reports chronic radicular low back pain she presently rates a 4 out of 10 on the pain scale and describes it as a burning, dull, aching, sharp, pressure-like and shooting pain with intermittent numbness, paresthesias and electrical shocks into the left lower extremity  Overall she feels that she is improving over the past few months and continues to participate in physical therapy and is no longer using the walker  She has been maintained on Tramadol  mg nightly is experiencing some side effects including nausea, dizziness that seems to occur a few hours after taking the medication  She is utilizing the immediate release 32eu5-7 times a day as needed and does not seem to be experiencing any side effects from this  Her goal is to reduce and hopefully discontinue medication at some point      Pain Contract Signed: 11/30/2022  Last Urine Drug Screen: 6/6/2023    I have personally reviewed and/or updated the patient's past medical history, past surgical history, family history, social history, current medications, allergies, and vital signs today  Review of Systems:    Review of Systems   Respiratory: Negative for shortness of breath  Cardiovascular: Negative for chest pain  Gastrointestinal: Positive for nausea  Negative for constipation, diarrhea and vomiting  Musculoskeletal: Positive for gait problem and joint swelling (Joint stiffness)  Negative for arthralgias and myalgias  Skin: Negative for rash  Neurological: Positive for dizziness and weakness  Negative for seizures  All other systems reviewed and are negative  Past Medical History:   Diagnosis Date   • Clotting disorder [facility])    • Concussion    • CSF leak    • Hyperbilirubinemia 04/28/2022   • Hypertension    • Liver failure [facility])    • Liver transplant recipient [facility])    • Migraines    • Peripheral neuropathy    • Varicella 1968    As a child       Past Surgical History:   Procedure Laterality Date   • ABLATION SOFT TISSUE     • BREAST LUMPECTOMY     • IR PARACENTESIS  04/29/2022   • IR PARACENTESIS  05/09/2022   • IR PARACENTESIS  05/12/2022   • LAPAROSCOPY FOR ECTOPIC PREGNANCY     • MAMMO (HISTORICAL) Bilateral 01/14/2021    [facility] BI-RADS 2 Benign findings   after U/S Scattered areas fibrogladulae density; 25% to 50% glandular breast tissue   • SINUS SURGERY     • TONSILECTOMY AND ADNOIDECTOMY         Family History   Problem Relation Age of Onset   • Breast cancer Mother    • Colon cancer Father    • Breast cancer Sister    • Autoimmune disease Daughter    • No Known Problems Son    • No Known Problems Son    • No Known Problems Paternal Grandmother    • No Known Problems Paternal Grandfather    • Heart disease Half-Brother    • Prostate cancer Half-Brother    • Melanoma Half-Brother        Social History     Occupational History   • Not on file   Tobacco Use   • Smoking status: Former     Packs/day: 1 00     Types: Cigarettes   • Smokeless tobacco: Never   Vaping Use   • Vaping Use: Never used   Substance and Sexual Activity   • Alcohol use: Not Currently     Comment: socially, had glass of wine today   • Drug use: Not Currently     Comment: CBD   • Sexual activity: Not Currently     Partners: Male     Birth control/protection: Post-menopausal     Comment: no new partner in past year         Current Outpatient Medications:   •  diphenhydrAMINE (BENADRYL) 25 mg capsule, Take 25 mg by mouth every 8 (eight) hours as needed for itching  Indications: Itching, Disp: , Rfl:   •  ergocalciferol (ERGOCALCIFEROL) 1 25 MG (44325 UT) capsule, Take 50,000 Units by mouth once a week  Every Monday AM  Indications: Vitamin D Deficiency, Disp: , Rfl:   •  gabapentin (NEURONTIN) 300 mg capsule, Take 600 mg by mouth 3 (three) times a day  Indications: Neuropathic Pain, Disp: , Rfl:   •  lisinopril (ZESTRIL) 10 mg tablet, Take 10 mg by mouth daily  Indications: High Blood Pressure Disorder, Disp: , Rfl:   •  Magnesium Oxide 400 MG CAPS, Take by mouth, Disp: , Rfl:   •  melatonin 3 mg, Take 3 mg by mouth daily at bedtime as needed (sleep)  Indications: Trouble Sleeping, Disp: , Rfl:   •  mycophenolate (MYFORTIC) 360 MG TBEC, Take 360 mg by mouth 2 (two) times a day  Indications: Liver Transplant Recipient, Disp: , Rfl:   •  NIFEdipine ER (ADALAT CC) 30 MG 24 hr tablet, Take 30 mg by mouth 2 (two) times a day  Indications: High Blood Pressure Disorder, Disp: , Rfl:   •  pantoprazole (PROTONIX) 40 mg tablet, Take 40 mg by mouth 2 (two) times a day  Indications: Gastroesophageal Reflux Disease, Disp: , Rfl:   •  predniSONE 5 mg tablet, Take 20 mg by mouth daily   4 tablets in AM  Indications: Organ or Tissue Transplant Recipient, Disp: , Rfl:   •  Premarin vaginal cream, , Disp: , Rfl:   •  Sennosides-Docusate Sodium (SB DOCUSATE SODIUM-SENNA PO), Take 50 mg by mouth daily as needed "(constipation)  Indications: constipation, Disp: , Rfl:   •  tacrolimus (PROGRAF) 1 mg capsule, Take 6 mg by mouth every 12 (twelve) hours 7 capsules AM 7 capsules PM, Disp: , Rfl:   •  thiamine 100 MG tablet, Take 100 mg by mouth daily  Indications: Deficiency of Vitamin B1, Disp: , Rfl:   •  traMADol (ULTRAM) 50 mg tablet, Take 1 PO BID PRN for pain , Disp: 45 tablet, Rfl: 2  •  traMADol (ULTRAM-ER) 200 MG 24 hr tablet, Take 1 PO QD for pain , Disp: 30 tablet, Rfl: 2  •  ursodiol (ACTIGALL) 250 mg tablet, , Disp: , Rfl:   •  Accu-Chek Guide test strip, , Disp: , Rfl:   •  dapsone 25 mg tablet, Take 25 mg by mouth daily  2 tablets in the AM  Indications: Immune Thrombocytopenia (Patient not taking: Reported on 6/6/2023), Disp: , Rfl:   •  Januvia 100 MG tablet, , Disp: , Rfl:   •  linaGLIPtin (Tradjenta) 5 MG TABS, Take 5 mg by mouth in the morning, Disp: , Rfl:   •  Microlet Lancets MISC, , Disp: , Rfl:   •  Myrbetriq 25 MG TB24, , Disp: , Rfl:   •  valGANciclovir (VALCYTE) 450 mg tablet, Take 450 mg by mouth daily with breakfast  2 tablets in the AM  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 (Patient not taking: Reported on 6/6/2023), Disp: , Rfl:     No Known Allergies    Physical Exam:    /78 (BP Location: Right arm, Patient Position: Sitting, Cuff Size: Standard)   Pulse 68   Temp 97 8 °F (36 6 °C)   Ht 5' 4 75\" (1 645 m)   Wt 78 5 kg (173 lb)   BMI 29 01 kg/m²     Constitutional:normal, well developed, well nourished, alert, in no distress and non-toxic and no overt pain behavior    Eyes:anicteric  HEENT:grossly intact  Neck:supple, symmetric, trachea midline and no masses   Pulmonary:even and unlabored  Cardiovascular:No edema or pitting edema present  Skin:Normal without rashes or lesions and well hydrated  Psychiatric:Mood and affect appropriate  Neurologic:Cranial Nerves II-XII grossly intact  Musculoskeletal: Ambulates with cane      Imaging  No orders to display         Orders " Placed This Encounter   Procedures   • [facility] All Prescribed Meds and Special Instructions   • Amphetamines, Methamphetamines   • Butalbital   • Phenobarbital   • Secobarbital   • Alprazolam   • Clonazepam   • Diazepam   • Lorazepam   • Gabapentin   • Pregabalin   • Cocaine   • Heroin   • Buprenorphine   • Levorphanol   • Meperidine   • Naltrexone   • Fentanyl   • Methadone   • Oxycodone   • Tapentadol   • THC   • Tramadol   • Codeine, Hydrocodone, Hydropmorphone, Morphine   • Bath Salts   • Ethyl Glucuronide/Ethyl Sulfate   • Kratom   • Spice   • Methylphenidate   • Phentermine   • Validity Oxidant   • Validity Creatinine   • Validity pH   • Validity Specific

## 2023-06-08 ENCOUNTER — OFFICE VISIT (OUTPATIENT)
Dept: PHYSICAL THERAPY | Facility: CLINIC | Age: 63
End: 2023-06-08
Payer: COMMERCIAL

## 2023-06-08 DIAGNOSIS — R29.898 WEAKNESS OF LEFT LOWER EXTREMITY: Primary | ICD-10-CM

## 2023-06-08 DIAGNOSIS — G61.89 OTHER INFLAMMATORY POLYNEUROPATHIES (HCC): ICD-10-CM

## 2023-06-08 LAB
6MAM UR QL CFM: NEGATIVE NG/ML
7AMINOCLONAZEPAM UR QL CFM: NEGATIVE NG/ML
A-OH ALPRAZ UR QL CFM: NEGATIVE NG/ML
ACCEPTABLE CREAT UR QL: NORMAL MG/DL
ACCEPTIBLE SP GR UR QL: NORMAL
AMPHET UR QL CFM: NEGATIVE NG/ML
BUPRENORPHINE UR QL CFM: NEGATIVE NG/ML
BUTALBITAL UR QL CFM: NEGATIVE NG/ML
BZE UR QL CFM: NEGATIVE NG/ML
CODEINE UR QL CFM: NEGATIVE NG/ML
EDDP UR QL CFM: NEGATIVE NG/ML
ETHYL GLUCURONIDE UR QL CFM: NEGATIVE NG/ML
ETHYL SULFATE UR QL SCN: NEGATIVE NG/ML
EUTYLONE UR QL: NEGATIVE NG/ML
FENTANYL UR QL CFM: NEGATIVE NG/ML
GLIADIN IGG SER IA-ACNC: NEGATIVE NG/ML
HYDROCODONE UR QL CFM: NEGATIVE NG/ML
HYDROMORPHONE UR QL CFM: NEGATIVE NG/ML
LORAZEPAM UR QL CFM: NEGATIVE NG/ML
ME-PHENIDATE UR QL CFM: NEGATIVE NG/ML
MEPERIDINE UR QL CFM: NEGATIVE NG/ML
METHADONE UR QL CFM: NEGATIVE NG/ML
METHAMPHET UR QL CFM: NEGATIVE NG/ML
MORPHINE UR QL CFM: NEGATIVE NG/ML
NALTREXONE UR QL CFM: NEGATIVE NG/ML
NITRITE UR QL: NORMAL UG/ML
NORBUPRENORPHINE UR QL CFM: NEGATIVE NG/ML
NORDIAZEPAM UR QL CFM: NEGATIVE NG/ML
NORFENTANYL UR QL CFM: NEGATIVE NG/ML
NORHYDROCODONE UR QL CFM: NEGATIVE NG/ML
NORMEPERIDINE UR QL CFM: NEGATIVE NG/ML
NOROXYCODONE UR QL CFM: NEGATIVE NG/ML
OXAZEPAM UR QL CFM: NEGATIVE NG/ML
OXYCODONE UR QL CFM: NEGATIVE NG/ML
OXYMORPHONE UR QL CFM: NEGATIVE NG/ML
PARA-FLUOROFENTANYL QUANTIFICATION: NORMAL NG/ML
PHENOBARB UR QL CFM: NEGATIVE NG/ML
RESULT ALL_PRESCRIBED MEDS AND SPECIAL INSTRUCTIONS: NORMAL
SECOBARBITAL UR QL CFM: NEGATIVE NG/ML
SL AMB 4-ANPP QUANTIFICATION: NORMAL NG/ML
SL AMB 5F-ADB-M7 METABOLITE QUANTIFICATION: NEGATIVE NG/ML
SL AMB 7-OH-MITRAGYNINE (KRATOM ALKALOID) QUANTIFICATION: NEGATIVE NG/ML
SL AMB AB-FUBINACA-M3 METABOLITE QUANTIFICATION: NEGATIVE NG/ML
SL AMB ACETYL FENTANYL QUANTIFICATION: NORMAL NG/ML
SL AMB ACETYL NORFENTANYL QUANTIFICATION: NORMAL NG/ML
SL AMB ACRYL FENTANYL QUANTIFICATION: NORMAL NG/ML
SL AMB CARFENTANIL QUANTIFICATION: NORMAL NG/ML
SL AMB CTHC (MARIJUANA METABOLITE) QUANTIFICATION: NEGATIVE NG/ML
SL AMB DEXTRORPHAN (DEXTROMETHORPHAN METABOLITE) QUANT: NEGATIVE NG/ML
SL AMB GABAPENTIN QUANTIFICATION: NORMAL
SL AMB JWH018 METABOLITE QUANTIFICATION: NEGATIVE NG/ML
SL AMB JWH073 METABOLITE QUANTIFICATION: NEGATIVE NG/ML
SL AMB MDMB-FUBINACA-M1 METABOLITE QUANTIFICATION: NEGATIVE NG/ML
SL AMB METHYLONE QUANTIFICATION: NEGATIVE NG/ML
SL AMB N-DESMETHYL-TRAMADOL QUANTIFICATION: NORMAL NG/ML
SL AMB PHENTERMINE QUANTIFICATION: NEGATIVE NG/ML
SL AMB PREGABALIN QUANTIFICATION: NEGATIVE
SL AMB RCS4 METABOLITE QUANTIFICATION: NEGATIVE NG/ML
SL AMB RITALINIC ACID QUANTIFICATION: NEGATIVE NG/ML
SMOOTH MUSCLE AB TITR SER IF: NEGATIVE NG/ML
SPECIMEN DRAWN SERPL: NEGATIVE NG/ML
SPECIMEN PH ACCEPTABLE UR: NORMAL
TAPENTADOL UR QL CFM: NEGATIVE NG/ML
TEMAZEPAM UR QL CFM: NEGATIVE NG/ML
TRAMADOL UR QL CFM: NORMAL NG/ML
URATE/CREAT 24H UR: NORMAL NG/ML

## 2023-06-08 PROCEDURE — 97112 NEUROMUSCULAR REEDUCATION: CPT

## 2023-06-08 NOTE — PROGRESS NOTES
"Daily Note     Today's date: 2023  Patient name: Naldo Guzman  : 1960  MRN: [id number]  Referring provider: Harriett Winn  Dx:   Encounter Diagnosis     ICD-10-CM    1  Weakness of left lower extremity  R29 898       2  Other inflammatory polyneuropathies ([facility] [address] )  G61 89           Start Time: 1015  Stop Time: 1100  Total time in clinic (min): 45 minutes    Subjective: No new complaints  Pt states with current air quality she has noticed increased SOB  Slight increase in LE pain noted with increased walking  Objective: See treatment diary below      Assessment:   Distance walking without device at beginning of session with nice stability noted  Advance gait challenges with occasional instability, patient able to self correct  Foam added to hurdles and Foam beam with forward cone taps in tandem with improved stability  Patient progressing nicely towards goals  Discussed with patient transition to community based exercise program       Plan: Continue per plan of care  No complaints end of session  Re-assessment next session with details to follow on continuation of PT verses transition to home program      Precautions:  Liver transplant, h/o hematoma left LE with peripheral neuropathy  EPOC:  2023      TESTING  6   TUG 11sec        5x sit to stand 11 8\"        HOYOS 48/56        6 min walk test 980\" w/ cane        FOTO 53        Neuro Re-Ed         Backward stepping  4 laps in bars 4 laps in bars 4 laps // bars   4 laps outside bars   Step ups     RLE 8\"x10  LLE 6\"x10    LLE leads  x10 walking step over 6\" no SPC Alternating LE 10x2, repeated LAT 10x2   Step taps  10x4 10x3, FWD/LAT   Off foam 10x2   Foam beam    tadnem amb   No foam x3 laps 0UE  Tandem 4 laps, occ LOB, cone taps 8x2   Foam S/P sway  No foam A/P sway 10x2 No Foam 10x2 Foam FT 2x30\"  On balance board 10x2 with UE support   Floor to overhead cone stack     Slow HKM with focus on SLS b/l 20'x2  " "  Hidden obstacles         Resisted ambulation  Hurdles foot over foot 6 x 4 laps; sidestepping 4 laps Hurdles foot over foot 6 laps, sidestepping 6 laps Hurdles foot over foot 6 laps, sidestepping 6 laps  Hurdles foot over foot with foam between 6 laps   TKE    Pink TB 15x5\"     Sit to stand off foam on raised seat   10 no UE off foam seat 10 no UE no foam Hip taps to foam on chair no UE x 10 reps    Ther Ex         Sit to stand         Hip flex/abd/ ext  10x ea 10ea x10 ea B X15 reps ea    Toe raises  10 10x2 2x10  3x10    Seated breathing         Strap stretch hamstring 3 way         Marquis test stretch         Lateral stepping w/ TB    x3 laps OTB x3 laps OTB             Ther Activity                           Gait Training         Outside ambulation over curbs, ramps and grass                  Manuals         MFR thoracic outlet  VM- MFR posterior knee                     "

## 2023-06-13 ENCOUNTER — APPOINTMENT (OUTPATIENT)
Dept: PHYSICAL THERAPY | Facility: CLINIC | Age: 63
End: 2023-06-13
Payer: COMMERCIAL

## 2023-06-14 ENCOUNTER — TELEPHONE (OUTPATIENT)
Dept: LAB | Facility: HOSPITAL | Age: 63
End: 2023-06-14

## 2023-06-15 ENCOUNTER — OFFICE VISIT (OUTPATIENT)
Dept: PHYSICAL THERAPY | Facility: CLINIC | Age: 63
End: 2023-06-15
Payer: COMMERCIAL

## 2023-06-15 DIAGNOSIS — R29.898 WEAKNESS OF LEFT LOWER EXTREMITY: Primary | ICD-10-CM

## 2023-06-15 DIAGNOSIS — G61.89 OTHER INFLAMMATORY POLYNEUROPATHIES (HCC): ICD-10-CM

## 2023-06-15 PROCEDURE — 97112 NEUROMUSCULAR REEDUCATION: CPT

## 2023-06-15 NOTE — PROGRESS NOTES
PT DISCHARGE / DAILY NOTE    Today's date: 6/15/2023  Patient name: Olivia Gomez  : 1960  MRN: [id number]  Referring provider: Clay Jacob  Dx:   Encounter Diagnosis     ICD-10-CM    1  Weakness of left lower extremity  R29 898       2  Other inflammatory polyneuropathies ([facility] )  G61 89           Start Time: 0845  Stop Time: 930  Total time in clinic (min): 45 minutes    Assessment  Assessment details: Patient presents to skilled PT for further strengthening  She is familiar to this PT with history of left LE hematoma and resulting neuropathy  She has intermittent buckling of her left LE, less than previous and increased left LE pain with prolonged walking  Patient has not experienced any recent falls, however, indicates she does note near falls occurring several times a week  Patient displays abnormal muscle strength with overall grade of 3- to 3/5 left LE  Patient sensation is normal throughout lower extremities  Patient coordination is abnormal per alterate toe tapping  Patient balance scores are as follows: 48/56 HOYOS, 11 seconds TUG without Assistive Device with overall results noting increased risk for falls  Patient endurance scores are as follows; 980 feet with  6 minute walk test with standard cane ( Device ), 11 8 seconds with 5 x sit to stand test with overall results noting decrease functional endurance and cardio capacity  Patient subjective report notes the following functional limitation with normal ADL tasks fatiguing frequently with occasional left LE buckling in prolonged gait  Patient will benefit from skilled PT to address noted impairments and functional limitations they are causing with overall goal to return patient to highest level possible with reduced risk for falls  Given patient's co-morbidities, additional time given for POC to allow for strengthening and balance adjustments, patient is in agreement with treatment plan           Patient demonstrates nice gains overall with PT improving standardized balance testing on HOYOS scoring 48/56 (from 51/56 on IE) and 7 7 seconds on TUG (from 11seconds on IE)  Endurance testing has also improved scoring 1260' on 6 min walk test (improved from 980' on IE) and 9 seconds on 5x sit to stand (from 11  8seconds on IE)  She continues to intermittently experience weakness in her left LE, however, no buckling has been noted  While she has made substantial gains, she is still below average in her scoring for her age  Recommend continuation of PT per POC to continue to address her limitations and promote return to functional mobility  Patient is in agreement with treatment plan  6/14  Patient has continued in gains achieving 6 5 seconds in TUG, and improved scoring on HOYOS achieving 56/56  Endurance testing also demonstrates improvements scoring 1400' in 6 min walk test and improved 5 x sit to stand scoring 7 5 seconds  She has achieved maximal gains from PT at this time  She has been instructed to continue HEP and to contact PT should any questions or problems arise  Patient is discharged from PT at this time and is in agreement with discharge plans and voiced understanding to all instructions       Goals  Goals  STG 30 days    Patient will improve static balance with feet together eyes closed on foam to 20 seconds indicating reduction in fall risk- MET  Patient will achieve 190 feet improvement with overall distance achieved of 1170 feet with 6 minute walk test which is Minimal Detectable Change pre current research standards with endurance to demonstrate enhance functional capacity-= MET and exceeded  Patient will display 2 3  second improvement with overall score of 8 7 seconds  with TUG test or lower with noted improvement being Minimal Detectable Change pre current research standards with fall risk- MET   Patient will achieve 54/56 HOYOS score with minimal improve by 6 points or more demonstrating Minimal Detectable change per current research standards for this objective test which assess fall risk- partially met      Patient will perform  5 x sit to stand test with overall reduction by 3 seconds to 8 8 seconds score indicating improvement with functional endurance- MET  Patient will be independent in general strengthening and balance HEP- MET    LT days   Patient will score low risk for falls with 3/4 fall risk measures - MET  Patient will be able to ambulate 1500 feet without AD during 6 minute walk test - mostly met  Patient will be able to perform floor transfer without physical assistance - partially met with UE assist  Patient will be able to carry objects without loss of balance- MET  Patient will be able to ambulate outdoors without any loss of balance -MET  Patient will independent in community based exercise program with walking program for endurance- MET    Cut off score   All date taken from [facility] Neuro Section or Rehab Measures    HOYOS test: 4656                                                         5 x STS Test:  MDC: 6 points                                                              MDC: 2 3 seconds   age norms                                                                    Age Norms   61-76 year old = M: 54, F: 54                                       61-76 year old: 11 4 seconds   66-77 year old = M 47,  F: 48                                       66-77 year old: 12 6 seconds    80-80 year old = M46,   F: 48                                       80-80 year old: 14 8 seconds     TUG test:                                                                     10 Meter Walk Test:  MDC: 4 14 seconds                                                      MDC:  59 ft/sec  Cut off score for Falls                                                  Age Norms  > 13 5 seconds community dwelling adults                20-29; M: 4 56 ft/sec F: 4 62 ft/sec  > 32 2 Frail Elderly 30-39: M 4 76 ft/sec  F: 4 68 ft/sec                                                                                       40-49: M: 4 79 ft/sec  F: 4 62 ft/sec  6 Minute Walk Test                                                      50-59: M: 4 76 ft/sec  F: 4 56 ft/sec  MDC: 190 feet                                                              60-69: M: 4 56 ft/sec  F: 4 26 ft/sec  Age Norms                                                                   70-+    M: 4 36 ft/sec  F: 4 16 ft/sec  60-69:    M: 1876 F: 9413  :    M: 1729 F: 7209  93-34 +: M: 3908 F; 1286     Plan  Treatment plan discussed with: patient        Subjective Evaluation    History of Present Illness  Mechanism of injury: Patient arrived with std cane  She has not been using her wheelchair  She did get placed on steroids due to transplant issues  Patient states she has recent gone through hearing testing with noted 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  She continues to experience groin to ankle pain on left ever since hematoma  She continues to feel intermittent unsteadiness with ADL tasks and increased fatigue requiring pacing       She indicates she is feeling off with her present steroid dose  She is to follow with hematology to work on decreasing her dosage per her report    Patient did follow up with Pain and Spine with decrease in medication and onset of severe increase in pain  She has returned to her previous dose and is feeling better  Steroids have been reduced  No falls and patient has arrived with std cane    Pain  Current pain ratin  At best pain ratin  At worst pain rating: 10    Social Support  Steps to enter house: yes  4  Stairs in house: yes   13  Lives in: multiple-level home  Lives with: significant other    Employment status: not working  Treatments  Previous treatment: physical therapy  Patient Goals  Patient goals for therapy: increased strength, independence with ADLs/IADLs and return "to sport/leisure activities  Patient goal: to be able to move better with improved tolerance to home tasks        Objective     Strength/Myotome Testing     Left Shoulder     Planes of Motion   Flexion: 4+   Abduction: 4+     Right Shoulder     Planes of Motion   Flexion: 4+   Abduction: 4+     Left Elbow   Flexion: 4+  Extension: 4+    Right Elbow   Flexion: 4+  Extension: 4+    Left Wrist/Hand   Wrist extension: 3+ and 3  Wrist flexion: 3 and 3+    Right Wrist/Hand   Wrist extension: 4+  Wrist flexion: 4+    Left Hip   Planes of Motion   Flexion: 3+ and 4-  Extension: 3 and 3+  Abduction: 3 and 3+    Right Hip   Planes of Motion   Flexion: 4+  Extension: 4+  Abduction: 4+    Left Knee   Flexion: 4  Extension: 4    Right Knee   Flexion: 4+  Extension: 4+    Left Ankle/Foot   Dorsiflexion: 4- and 3+  Plantar flexion: 4- and 3+    Right Ankle/Foot   Dorsiflexion: 4+  Plantar flexion: 4+  Neuro Exam:     Sensation   Light touch LE: left WNL and right WNL    Coordination   Finger to nose: left WNL and right WNL  Rapid alternating movements: UE WNL and LE impaired    Functional outcomes   Functional outcome gait comment: Increased genu recurvatum left, decreased stride length      Flowsheet Rows    Flowsheet Row Most Recent Value   PT/OT G-Codes    Current Score 61   Projected Score 56   FOTO information reviewed Yes   Assessment Type Discharge   G code set Mobility: Walking & Moving Around             Precautions:  Liver transplant, h/o hematoma left LE with peripheral neuropathy  EPO:  6/20/2023      TESTING 4/11 5/11 6/14          TUG 11sec 7 7sec 6 5sec          5x sit to stand 11 8\" 9 0sec 7 5sec          HOYOS 48/56 52/56 56/56          6 min walk test 980\" w/ cane 1260' w/ cane 1400' w/cane          FOTO 53 55 61          Neuro Re-Ed             Step taps  10x2           Foam beam             Foam S/P sway             Floor to overhead cone stack             Hidden obstacles  Gait over uneven surfaces, curbs and " ramps Gait over uneven surfaces curbs and ramps, decreased hesitation          Resisted ambulation                          Ther Ex             Sit to stand  5x2 5x2          Hip flex/abd/ ext                                                                                           Ther Activity                                       Gait Training                                       Modalities

## 2023-06-26 ENCOUNTER — APPOINTMENT (OUTPATIENT)
Dept: LAB | Facility: HOSPITAL | Age: 63
End: 2023-06-26
Payer: COMMERCIAL

## 2023-06-28 ENCOUNTER — OFFICE VISIT (OUTPATIENT)
Dept: URGENT CARE | Facility: CLINIC | Age: 63
End: 2023-06-28
Payer: COMMERCIAL

## 2023-06-28 ENCOUNTER — APPOINTMENT (OUTPATIENT)
Dept: LAB | Facility: HOSPITAL | Age: 63
End: 2023-06-28
Payer: COMMERCIAL

## 2023-06-28 VITALS
HEART RATE: 85 BPM | DIASTOLIC BLOOD PRESSURE: 70 MMHG | SYSTOLIC BLOOD PRESSURE: 122 MMHG | RESPIRATION RATE: 18 BRPM | OXYGEN SATURATION: 94 % | TEMPERATURE: 98.5 F

## 2023-06-28 DIAGNOSIS — N30.01 ACUTE CYSTITIS WITH HEMATURIA: Primary | ICD-10-CM

## 2023-06-28 PROCEDURE — 99213 OFFICE O/P EST LOW 20 MIN: CPT | Performed by: FAMILY MEDICINE

## 2023-06-28 RX ORDER — SULFAMETHOXAZOLE AND TRIMETHOPRIM 800; 160 MG/1; MG/1
1 TABLET ORAL EVERY 12 HOURS SCHEDULED
Qty: 14 TABLET | Refills: 0 | Status: SHIPPED | OUTPATIENT
Start: 2023-06-28 | End: 2023-07-05

## 2023-06-28 NOTE — PROGRESS NOTES
[address]        NAME: Tulio Crawford is a 63 y o  female  : 1960    MRN: [id number]  DATE: 2023  TIME: 6:20 PM    Assessment and Plan   Acute cystitis with hematuria [N30 01]  1  Acute cystitis with hematuria  sulfamethoxazole-trimethoprim (BACTRIM DS) 800-160 mg per tablet    Urine culture            Patient Instructions       Follow up with PCP in 3-5 days  Proceed to  ER if symptoms worsen  Chief Complaint     Chief Complaint   Patient presents with   • Sore Throat   • Fever     Patient arrived c/o fever (highest 100 6), sore throat that started  since then developed more cold like symptoms and lower back pain soreness  History of Present Illness       82-year-old female presenting with 5-day history of sore throat, low-grade fever and increased urinary frequency  She states that she has noted low-grade temperatures in the low 100s for this duration in conjunction with some left flank discomfort and left lower back pain  She also does note increased urinary frequency and has been drinking increased cranberry juice for concerns of a recurrent UTI  Additionally, she does note some sore throat and nasal congestion  She also does note increased tiredness and fatigue  She did have repeat blood work this morning to recheck on her blood counts ordered by her [facility] transplant team on [facility]  Review of Systems   Review of Systems   Constitutional: Positive for fatigue and fever  HENT: Positive for sore throat  Eyes: Negative  Respiratory: Positive for cough  Cardiovascular: Negative  Gastrointestinal: Negative  Genitourinary: Positive for flank pain and frequency  Musculoskeletal: Positive for arthralgias and myalgias  Skin: Negative  Allergic/Immunologic: Negative  Neurological: Negative  Hematological: Negative  Psychiatric/Behavioral: Negative            Current Medications       Current Outpatient Medications:   • sulfamethoxazole-trimethoprim (BACTRIM DS) 800-160 mg per tablet, Take 1 tablet by mouth every 12 (twelve) hours for 7 days, Disp: 14 tablet, Rfl: 0  •  Accu-Chek Guide test strip, , Disp: , Rfl:   •  diphenhydrAMINE (BENADRYL) 25 mg capsule, Take 25 mg by mouth every 8 (eight) hours as needed for itching  Indications: Itching, Disp: , Rfl:   •  ergocalciferol (ERGOCALCIFEROL) 1 25 MG (83543 UT) capsule, Take 50,000 Units by mouth once a week  Every Monday AM  Indications: Vitamin D Deficiency, Disp: , Rfl:   •  gabapentin (NEURONTIN) 300 mg capsule, Take 600 mg by mouth 3 (three) times a day  Indications: Neuropathic Pain, Disp: , Rfl:   •  lisinopril (ZESTRIL) 10 mg tablet, Take 10 mg by mouth daily  Indications: High Blood Pressure Disorder, Disp: , Rfl:   •  Magnesium Oxide 400 MG CAPS, Take by mouth, Disp: , Rfl:   •  melatonin 3 mg, Take 3 mg by mouth daily at bedtime as needed (sleep)  Indications: Trouble Sleeping, Disp: , Rfl:   •  Microlet Lancets MISC, , Disp: , Rfl:   •  mycophenolate (MYFORTIC) 360 MG TBEC, Take 360 mg by mouth 2 (two) times a day  Indications: Liver Transplant Recipient, Disp: , Rfl:   •  NIFEdipine ER (ADALAT CC) 30 MG 24 hr tablet, Take 30 mg by mouth 2 (two) times a day  Indications: High Blood Pressure Disorder, Disp: , Rfl:   •  pantoprazole (PROTONIX) 40 mg tablet, Take 40 mg by mouth 2 (two) times a day  Indications: Gastroesophageal Reflux Disease, Disp: , Rfl:   •  predniSONE 5 mg tablet, Take 15 mg by mouth daily 4 tablets in AM, Disp: , Rfl:   •  Premarin vaginal cream, , Disp: , Rfl:   •  Sennosides-Docusate Sodium (SB DOCUSATE SODIUM-SENNA PO), Take 50 mg by mouth daily as needed (constipation)  Indications: constipation, Disp: , Rfl:   •  tacrolimus (PROGRAF) 1 mg capsule, Take 6 mg by mouth every 12 (twelve) hours 7 capsules AM 7 capsules PM, Disp: , Rfl:   •  thiamine 100 MG tablet, Take 100 mg by mouth daily   Indications: Deficiency of Vitamin B1, Disp: , Rfl:   • traMADol (ULTRAM) 50 mg tablet, Take 1 PO BID PRN for pain , Disp: 45 tablet, Rfl: 2  •  traMADol (ULTRAM-ER) 200 MG 24 hr tablet, Take 1 PO QD for pain , Disp: 30 tablet, Rfl: 2  •  ursodiol (ACTIGALL) 250 mg tablet, , Disp: , Rfl:     Current Allergies     Allergies as of 06/28/2023   • (No Known Allergies)            The following portions of the patient's history were reviewed and updated as appropriate: allergies, current medications, past family history, past medical history, past social history, past surgical history and problem list      Past Medical History:   Diagnosis Date   • Clotting disorder ([facility] [address] )    • Concussion    • CSF leak    • Hyperbilirubinemia 04/28/2022   • Hypertension    • [facility] )    • Liver transplant recipient [facility])    • Migraines    • Peripheral neuropathy    • Varicella 1968    As a child       Past Surgical History:   Procedure Laterality Date   • ABLATION SOFT TISSUE     • BREAST LUMPECTOMY     • IR PARACENTESIS  04/29/2022   • IR PARACENTESIS  05/09/2022   • IR PARACENTESIS  05/12/2022   • LAPAROSCOPY FOR ECTOPIC PREGNANCY     • MAMMO (HISTORICAL) Bilateral 01/14/2021    [facility] BI-RADS 2 Benign findings  after U/S Scattered areas fibrogladulae density; 25% to 50% glandular breast tissue   • SINUS SURGERY     • TONSILECTOMY AND ADNOIDECTOMY         Family History   Problem Relation Age of Onset   • Breast cancer Mother    • Colon cancer Father    • Breast cancer Sister    • Autoimmune disease Daughter    • No Known Problems Son    • No Known Problems Son    • No Known Problems Paternal Grandmother    • No Known Problems Paternal Grandfather    • Heart disease Half-Brother    • Prostate cancer Half-Brother    • Melanoma Half-Brother          Medications have been verified  Objective   /70   Pulse 85   Temp 98 5 °F (36 9 °C)   Resp 18   SpO2 94%   No LMP recorded  Patient is postmenopausal        Physical Exam     Physical Exam  Vitals and nursing note reviewed  Constitutional:       Appearance: She is well-developed  HENT:      Head: Normocephalic  Nose: Rhinorrhea present  No congestion  Mouth/Throat:      Pharynx: No oropharyngeal exudate or posterior oropharyngeal erythema  Tonsils: No tonsillar exudate  Eyes:      Pupils: Pupils are equal, round, and reactive to light  Neck:      Thyroid: No thyromegaly  Cardiovascular:      Rate and Rhythm: Normal rate and regular rhythm  Heart sounds: Normal heart sounds  Pulmonary:      Effort: Pulmonary effort is normal    Abdominal:      General: There is no distension  Palpations: Abdomen is soft  Tenderness: There is no abdominal tenderness  There is no guarding or rebound  Musculoskeletal:         General: Normal range of motion  Lymphadenopathy:      Cervical: No cervical adenopathy  Skin:     General: Skin is warm and dry  Neurological:      Mental Status: She is alert and oriented to person, place, and time

## 2023-06-30 ENCOUNTER — APPOINTMENT (OUTPATIENT)
Dept: LAB | Facility: HOSPITAL | Age: 63
End: 2023-06-30
Payer: COMMERCIAL

## 2023-06-30 LAB
BACTERIA UR CULT: NORMAL
Lab: NORMAL

## 2023-07-10 ENCOUNTER — TELEPHONE (OUTPATIENT)
Dept: LAB | Facility: HOSPITAL | Age: 63
End: 2023-07-10

## 2023-07-10 ENCOUNTER — APPOINTMENT (OUTPATIENT)
Dept: LAB | Facility: HOSPITAL | Age: 63
End: 2023-07-10
Payer: COMMERCIAL

## 2023-07-10 DIAGNOSIS — R94.5 NONSPECIFIC ABNORMAL RESULTS OF LIVER FUNCTION STUDY: ICD-10-CM

## 2023-07-10 DIAGNOSIS — Z94.4 LIVER REPLACED BY TRANSPLANT (HCC): ICD-10-CM

## 2023-07-10 LAB
ALBUMIN SERPL BCP-MCNC: 3.9 G/DL (ref 3.5–5)
ALP SERPL-CCNC: 53 U/L (ref 34–104)
ALT SERPL W P-5'-P-CCNC: 87 U/L (ref 7–52)
ANION GAP SERPL CALCULATED.3IONS-SCNC: 9 MMOL/L
AST SERPL W P-5'-P-CCNC: 33 U/L (ref 13–39)
BASOPHILS # BLD MANUAL: 0 THOUSAND/UL (ref 0–0.1)
BASOPHILS NFR MAR MANUAL: 0 % (ref 0–1)
BILIRUB DIRECT SERPL-MCNC: 0.07 MG/DL (ref 0–0.2)
BILIRUB SERPL-MCNC: 0.58 MG/DL (ref 0.2–1)
BUN SERPL-MCNC: 21 MG/DL (ref 5–25)
CALCIUM SERPL-MCNC: 8.7 MG/DL (ref 8.4–10.2)
CHLORIDE SERPL-SCNC: 102 MMOL/L (ref 96–108)
CO2 SERPL-SCNC: 26 MMOL/L (ref 21–32)
CREAT SERPL-MCNC: 0.74 MG/DL (ref 0.6–1.3)
EOSINOPHIL # BLD MANUAL: 0.05 THOUSAND/UL (ref 0–0.4)
EOSINOPHIL NFR BLD MANUAL: 2 % (ref 0–6)
ERYTHROCYTE [DISTWIDTH] IN BLOOD BY AUTOMATED COUNT: 13.8 % (ref 11.6–15.1)
GFR SERPL CREATININE-BSD FRML MDRD: 88 ML/MIN/1.73SQ M
GLUCOSE P FAST SERPL-MCNC: 87 MG/DL (ref 65–99)
HCT VFR BLD AUTO: 38.9 % (ref 34.8–46.1)
HGB BLD-MCNC: 12.4 G/DL (ref 11.5–15.4)
LYMPHOCYTES # BLD AUTO: 0.32 THOUSAND/UL (ref 0.6–4.47)
LYMPHOCYTES # BLD AUTO: 12 % (ref 14–44)
MCH RBC QN AUTO: 30.5 PG (ref 26.8–34.3)
MCHC RBC AUTO-ENTMCNC: 31.9 G/DL (ref 31.4–37.4)
MCV RBC AUTO: 96 FL (ref 82–98)
MONOCYTES # BLD AUTO: 0.22 THOUSAND/UL (ref 0–1.22)
MONOCYTES NFR BLD: 8 % (ref 4–12)
NEUTROPHILS # BLD MANUAL: 1.83 THOUSAND/UL (ref 1.85–7.62)
NEUTS BAND NFR BLD MANUAL: 4 % (ref 0–8)
NEUTS SEG NFR BLD AUTO: 64 % (ref 43–75)
NRBC BLD AUTO-RTO: 2 /100 WBC (ref 0–2)
PLATELET # BLD AUTO: 126 THOUSANDS/UL (ref 149–390)
PLATELET BLD QL SMEAR: ABNORMAL
PMV BLD AUTO: 10.2 FL (ref 8.9–12.7)
POTASSIUM SERPL-SCNC: 4.1 MMOL/L (ref 3.5–5.3)
PROT SERPL-MCNC: 6 G/DL (ref 6.4–8.4)
RBC # BLD AUTO: 4.06 MILLION/UL (ref 3.81–5.12)
SODIUM SERPL-SCNC: 137 MMOL/L (ref 135–147)
TACROLIMUS BLD-MCNC: 5.9 NG/ML (ref 3–15)
VARIANT LYMPHS # BLD AUTO: 10 %
WBC # BLD AUTO: 2.69 THOUSAND/UL (ref 4.31–10.16)

## 2023-07-10 PROCEDURE — 80197 ASSAY OF TACROLIMUS: CPT

## 2023-07-10 PROCEDURE — 85027 COMPLETE CBC AUTOMATED: CPT

## 2023-07-10 PROCEDURE — 80048 BASIC METABOLIC PNL TOTAL CA: CPT

## 2023-07-10 PROCEDURE — 85007 BL SMEAR W/DIFF WBC COUNT: CPT

## 2023-07-10 PROCEDURE — 80076 HEPATIC FUNCTION PANEL: CPT

## 2023-07-10 PROCEDURE — 36415 COLL VENOUS BLD VENIPUNCTURE: CPT

## 2023-07-11 NOTE — BRIEF OP NOTE (RAD/CATH)
Addended by: DC LEIGH on: 4/23/2021 03:27 PM     Modules accepted: Orders     Addended by: JOHANN PERDUE on: 4/23/2021 03:34 PM     Modules accepted: Orders     Paracentesis Procedure Note    PATIENT NAME: Heidi Muñoz  : 1960  MRN: [id number]     Pre-op Diagnosis:   1  Anemia    2  Liver cirrhosis ([address] )    3  Left hip pain    4  Pleural effusion on left    5  Hyperbilirubinemia    6  Gastrointestinal hemorrhage with melena    7  Cirrhosis of liver with ascites, unspecified hepatic cirrhosis type ([address] )    8  Left leg weakness      Post-op Diagnosis:   1  Anemia    2  Liver cirrhosis ([facility] [address] )    3  Left hip pain    4  Pleural effusion on left    5  Hyperbilirubinemia    6  Gastrointestinal hemorrhage with melena    7  Cirrhosis of liver with ascites, unspecified hepatic cirrhosis type ([facility] [address] )    8  Left leg weakness        Surgeon:   Iram Elmore DO  Assistants:     No qualified resident was available      Estimated Blood Loss: none  Findings: 1800 mL monique ascites, RLQ abd access    Specimens: ascites    Complications:  none    Anesthesia: local    Iram Elmore DO     Date: 2022  Time: 10:57 AM 4

## 2023-07-12 ENCOUNTER — APPOINTMENT (OUTPATIENT)
Dept: LAB | Facility: HOSPITAL | Age: 63
End: 2023-07-12
Payer: COMMERCIAL

## 2023-07-12 DIAGNOSIS — Z94.4 LIVER REPLACED BY TRANSPLANT (HCC): ICD-10-CM

## 2023-07-12 DIAGNOSIS — R94.5 NONSPECIFIC ABNORMAL RESULTS OF LIVER FUNCTION STUDY: ICD-10-CM

## 2023-07-12 LAB
ALBUMIN SERPL BCP-MCNC: 3.7 G/DL (ref 3.5–5)
ALP SERPL-CCNC: 55 U/L (ref 34–104)
ALT SERPL W P-5'-P-CCNC: 95 U/L (ref 7–52)
ANION GAP SERPL CALCULATED.3IONS-SCNC: 7 MMOL/L
AST SERPL W P-5'-P-CCNC: 32 U/L (ref 13–39)
BILIRUB DIRECT SERPL-MCNC: 0.05 MG/DL (ref 0–0.2)
BILIRUB SERPL-MCNC: 0.64 MG/DL (ref 0.2–1)
BUN SERPL-MCNC: 20 MG/DL (ref 5–25)
CALCIUM SERPL-MCNC: 8.6 MG/DL (ref 8.4–10.2)
CHLORIDE SERPL-SCNC: 100 MMOL/L (ref 96–108)
CO2 SERPL-SCNC: 30 MMOL/L (ref 21–32)
CREAT SERPL-MCNC: 0.78 MG/DL (ref 0.6–1.3)
GFR SERPL CREATININE-BSD FRML MDRD: 82 ML/MIN/1.73SQ M
GLUCOSE P FAST SERPL-MCNC: 80 MG/DL (ref 65–99)
POTASSIUM SERPL-SCNC: 4.6 MMOL/L (ref 3.5–5.3)
PROT SERPL-MCNC: 6.1 G/DL (ref 6.4–8.4)
SODIUM SERPL-SCNC: 137 MMOL/L (ref 135–147)
TACROLIMUS BLD-MCNC: 7.2 NG/ML (ref 3–15)

## 2023-07-12 PROCEDURE — 36415 COLL VENOUS BLD VENIPUNCTURE: CPT

## 2023-07-12 PROCEDURE — 80076 HEPATIC FUNCTION PANEL: CPT

## 2023-07-12 PROCEDURE — 80197 ASSAY OF TACROLIMUS: CPT

## 2023-07-12 PROCEDURE — 80048 BASIC METABOLIC PNL TOTAL CA: CPT

## 2023-07-13 LAB
CMV DNA SERPL NAA+PROBE-ACNC: NORMAL IU/ML
CMV DNA SERPL NAA+PROBE-LOG IU: 4.03 LOG10 IU/ML

## 2023-07-17 ENCOUNTER — APPOINTMENT (OUTPATIENT)
Dept: LAB | Facility: HOSPITAL | Age: 63
End: 2023-07-17
Payer: COMMERCIAL

## 2023-07-17 DIAGNOSIS — Z94.4 LIVER REPLACED BY TRANSPLANT (HCC): ICD-10-CM

## 2023-07-17 LAB
ALBUMIN SERPL BCP-MCNC: 4 G/DL (ref 3.5–5)
ALP SERPL-CCNC: 57 U/L (ref 34–104)
ALT SERPL W P-5'-P-CCNC: 109 U/L (ref 7–52)
ANION GAP SERPL CALCULATED.3IONS-SCNC: 7 MMOL/L
ANISOCYTOSIS BLD QL SMEAR: PRESENT
AST SERPL W P-5'-P-CCNC: 38 U/L (ref 13–39)
BASOPHILS # BLD MANUAL: 0 THOUSAND/UL (ref 0–0.1)
BASOPHILS NFR MAR MANUAL: 0 % (ref 0–1)
BILIRUB DIRECT SERPL-MCNC: 0.12 MG/DL (ref 0–0.2)
BILIRUB SERPL-MCNC: 0.72 MG/DL (ref 0.2–1)
BUN SERPL-MCNC: 21 MG/DL (ref 5–25)
CALCIUM SERPL-MCNC: 8.8 MG/DL (ref 8.4–10.2)
CHLORIDE SERPL-SCNC: 100 MMOL/L (ref 96–108)
CO2 SERPL-SCNC: 29 MMOL/L (ref 21–32)
CREAT SERPL-MCNC: 0.76 MG/DL (ref 0.6–1.3)
EOSINOPHIL # BLD MANUAL: 0.07 THOUSAND/UL (ref 0–0.4)
EOSINOPHIL NFR BLD MANUAL: 2 % (ref 0–6)
ERYTHROCYTE [DISTWIDTH] IN BLOOD BY AUTOMATED COUNT: 14.6 % (ref 11.6–15.1)
GFR SERPL CREATININE-BSD FRML MDRD: 85 ML/MIN/1.73SQ M
GLUCOSE P FAST SERPL-MCNC: 88 MG/DL (ref 65–99)
HCT VFR BLD AUTO: 36.4 % (ref 34.8–46.1)
HGB BLD-MCNC: 11.5 G/DL (ref 11.5–15.4)
LYMPHOCYTES # BLD AUTO: 0.2 THOUSAND/UL (ref 0.6–4.47)
LYMPHOCYTES # BLD AUTO: 6 % (ref 14–44)
MAGNESIUM SERPL-MCNC: 1.8 MG/DL (ref 1.9–2.7)
MCH RBC QN AUTO: 30.3 PG (ref 26.8–34.3)
MCHC RBC AUTO-ENTMCNC: 31.6 G/DL (ref 31.4–37.4)
MCV RBC AUTO: 96 FL (ref 82–98)
METAMYELOCYTES NFR BLD MANUAL: 2 % (ref 0–1)
MONOCYTES # BLD AUTO: 0.07 THOUSAND/UL (ref 0–1.22)
MONOCYTES NFR BLD: 2 % (ref 4–12)
NEUTROPHILS # BLD MANUAL: 2.76 THOUSAND/UL (ref 1.85–7.62)
NEUTS BAND NFR BLD MANUAL: 11 % (ref 0–8)
NEUTS SEG NFR BLD AUTO: 74 % (ref 43–75)
PLATELET # BLD AUTO: 139 THOUSANDS/UL (ref 149–390)
PLATELET BLD QL SMEAR: ABNORMAL
PMV BLD AUTO: 9.9 FL (ref 8.9–12.7)
POLYCHROMASIA BLD QL SMEAR: PRESENT
POTASSIUM SERPL-SCNC: 4.1 MMOL/L (ref 3.5–5.3)
PROT SERPL-MCNC: 6.3 G/DL (ref 6.4–8.4)
RBC # BLD AUTO: 3.79 MILLION/UL (ref 3.81–5.12)
SODIUM SERPL-SCNC: 136 MMOL/L (ref 135–147)
TACROLIMUS BLD-MCNC: 8.2 NG/ML (ref 3–15)
VARIANT LYMPHS # BLD AUTO: 3 %
WBC # BLD AUTO: 3.25 THOUSAND/UL (ref 4.31–10.16)

## 2023-07-17 PROCEDURE — 87910 NFCT AGT GNTYP ALYS CMV: CPT

## 2023-07-17 PROCEDURE — 80048 BASIC METABOLIC PNL TOTAL CA: CPT

## 2023-07-17 PROCEDURE — 80076 HEPATIC FUNCTION PANEL: CPT

## 2023-07-17 PROCEDURE — 80197 ASSAY OF TACROLIMUS: CPT

## 2023-07-17 PROCEDURE — 36415 COLL VENOUS BLD VENIPUNCTURE: CPT

## 2023-07-17 PROCEDURE — 85027 COMPLETE CBC AUTOMATED: CPT

## 2023-07-17 PROCEDURE — 83735 ASSAY OF MAGNESIUM: CPT

## 2023-07-17 PROCEDURE — 85007 BL SMEAR W/DIFF WBC COUNT: CPT

## 2023-07-21 DIAGNOSIS — G61.89 OTHER INFLAMMATORY POLYNEUROPATHIES (HCC): ICD-10-CM

## 2023-07-21 DIAGNOSIS — G89.4 CHRONIC PAIN SYNDROME: ICD-10-CM

## 2023-07-21 DIAGNOSIS — G89.29 CHRONIC BILATERAL LOW BACK PAIN WITHOUT SCIATICA: ICD-10-CM

## 2023-07-21 DIAGNOSIS — M47.816 LUMBAR SPONDYLOSIS: ICD-10-CM

## 2023-07-21 DIAGNOSIS — M51.36 DDD (DEGENERATIVE DISC DISEASE), LUMBAR: ICD-10-CM

## 2023-07-21 DIAGNOSIS — M54.16 LUMBAR RADICULOPATHY: ICD-10-CM

## 2023-07-21 DIAGNOSIS — M54.50 CHRONIC BILATERAL LOW BACK PAIN WITHOUT SCIATICA: ICD-10-CM

## 2023-07-21 RX ORDER — TRAMADOL HYDROCHLORIDE 200 MG/1
TABLET, EXTENDED RELEASE ORAL
Qty: 30 TABLET | OUTPATIENT
Start: 2023-07-21

## 2023-07-24 ENCOUNTER — APPOINTMENT (OUTPATIENT)
Dept: LAB | Facility: HOSPITAL | Age: 63
End: 2023-07-24
Payer: COMMERCIAL

## 2023-07-24 DIAGNOSIS — Z94.4 LIVER REPLACED BY TRANSPLANT (HCC): ICD-10-CM

## 2023-07-24 LAB
ALBUMIN SERPL BCP-MCNC: 4 G/DL (ref 3.5–5)
ALP SERPL-CCNC: 46 U/L (ref 34–104)
ALT SERPL W P-5'-P-CCNC: 78 U/L (ref 7–52)
ANION GAP SERPL CALCULATED.3IONS-SCNC: 6 MMOL/L
AST SERPL W P-5'-P-CCNC: 22 U/L (ref 13–39)
BASOPHILS # BLD MANUAL: 0 THOUSAND/UL (ref 0–0.1)
BASOPHILS NFR MAR MANUAL: 0 % (ref 0–1)
BILIRUB DIRECT SERPL-MCNC: 0.12 MG/DL (ref 0–0.2)
BILIRUB SERPL-MCNC: 0.63 MG/DL (ref 0.2–1)
BUN SERPL-MCNC: 19 MG/DL (ref 5–25)
CALCIUM SERPL-MCNC: 8.9 MG/DL (ref 8.4–10.2)
CHLORIDE SERPL-SCNC: 101 MMOL/L (ref 96–108)
CO2 SERPL-SCNC: 31 MMOL/L (ref 21–32)
CREAT SERPL-MCNC: 0.73 MG/DL (ref 0.6–1.3)
EOSINOPHIL # BLD MANUAL: 0.22 THOUSAND/UL (ref 0–0.4)
EOSINOPHIL NFR BLD MANUAL: 6 % (ref 0–6)
ERYTHROCYTE [DISTWIDTH] IN BLOOD BY AUTOMATED COUNT: 15.7 % (ref 11.6–15.1)
EST. AVERAGE GLUCOSE BLD GHB EST-MCNC: 111 MG/DL
GFR SERPL CREATININE-BSD FRML MDRD: 89 ML/MIN/1.73SQ M
GLUCOSE P FAST SERPL-MCNC: 83 MG/DL (ref 65–99)
HBA1C MFR BLD: 5.5 %
HCT VFR BLD AUTO: 36.7 % (ref 34.8–46.1)
HGB BLD-MCNC: 11.7 G/DL (ref 11.5–15.4)
LYMPHOCYTES # BLD AUTO: 0.25 THOUSAND/UL (ref 0.6–4.47)
LYMPHOCYTES # BLD AUTO: 7 % (ref 14–44)
MAGNESIUM SERPL-MCNC: 1.8 MG/DL (ref 1.9–2.7)
MCH RBC QN AUTO: 30.8 PG (ref 26.8–34.3)
MCHC RBC AUTO-ENTMCNC: 31.9 G/DL (ref 31.4–37.4)
MCV RBC AUTO: 97 FL (ref 82–98)
METAMYELOCYTES NFR BLD MANUAL: 1 % (ref 0–1)
MONOCYTES # BLD AUTO: 0.22 THOUSAND/UL (ref 0–1.22)
MONOCYTES NFR BLD: 6 % (ref 4–12)
NEUTROPHILS # BLD MANUAL: 2.87 THOUSAND/UL (ref 1.85–7.62)
NEUTS BAND NFR BLD MANUAL: 5 % (ref 0–8)
NEUTS SEG NFR BLD AUTO: 75 % (ref 43–75)
PLATELET # BLD AUTO: 133 THOUSANDS/UL (ref 149–390)
PLATELET BLD QL SMEAR: ABNORMAL
PMV BLD AUTO: 10.1 FL (ref 8.9–12.7)
POTASSIUM SERPL-SCNC: 3.8 MMOL/L (ref 3.5–5.3)
PROT SERPL-MCNC: 6.4 G/DL (ref 6.4–8.4)
RBC # BLD AUTO: 3.8 MILLION/UL (ref 3.81–5.12)
RBC MORPH BLD: NORMAL
SODIUM SERPL-SCNC: 138 MMOL/L (ref 135–147)
TACROLIMUS BLD-MCNC: 8.9 NG/ML (ref 3–15)
WBC # BLD AUTO: 3.59 THOUSAND/UL (ref 4.31–10.16)

## 2023-07-24 PROCEDURE — 83735 ASSAY OF MAGNESIUM: CPT

## 2023-07-24 PROCEDURE — 80197 ASSAY OF TACROLIMUS: CPT

## 2023-07-24 PROCEDURE — 85007 BL SMEAR W/DIFF WBC COUNT: CPT

## 2023-07-24 PROCEDURE — 80076 HEPATIC FUNCTION PANEL: CPT

## 2023-07-24 PROCEDURE — 36415 COLL VENOUS BLD VENIPUNCTURE: CPT

## 2023-07-24 PROCEDURE — 85027 COMPLETE CBC AUTOMATED: CPT

## 2023-07-24 PROCEDURE — 80048 BASIC METABOLIC PNL TOTAL CA: CPT

## 2023-07-24 PROCEDURE — 83036 HEMOGLOBIN GLYCOSYLATED A1C: CPT

## 2023-07-25 LAB
CMV DNA SERPL NAA+PROBE-ACNC: 6890 IU/ML
CMV DNA SERPL NAA+PROBE-LOG IU: 3.84 LOG10 IU/ML

## 2023-07-26 DIAGNOSIS — G89.29 CHRONIC BILATERAL LOW BACK PAIN WITHOUT SCIATICA: ICD-10-CM

## 2023-07-26 DIAGNOSIS — M47.816 LUMBAR SPONDYLOSIS: ICD-10-CM

## 2023-07-26 DIAGNOSIS — M54.16 LUMBAR RADICULOPATHY: ICD-10-CM

## 2023-07-26 DIAGNOSIS — M54.50 CHRONIC BILATERAL LOW BACK PAIN WITHOUT SCIATICA: ICD-10-CM

## 2023-07-26 DIAGNOSIS — M51.36 DDD (DEGENERATIVE DISC DISEASE), LUMBAR: ICD-10-CM

## 2023-07-26 DIAGNOSIS — G89.4 CHRONIC PAIN SYNDROME: ICD-10-CM

## 2023-07-26 DIAGNOSIS — G61.89 OTHER INFLAMMATORY POLYNEUROPATHIES (HCC): ICD-10-CM

## 2023-07-26 NOTE — TELEPHONE ENCOUNTER
Pt contacted Call Center requested refill of their medication. Medication Name: traMADol (ULTRAM-ER) 200 MG 24 hr tablet          Frequency of Med: Take 1 PO QD for pain      Remaining Medication: 2       Pharmacy and Location: [id number] [address] [address]   Phone:  [phone]  Fax:  [phone]         Pt. Preferred Callback Phone Number: [phone]      Thank you.

## 2023-07-26 NOTE — TELEPHONE ENCOUNTER
S/w pt, stated that she did see MG at her ov on 6/6 and discussed using the balance of her Tramadol er 100 mg however, her pain was much worse and the pt resumed tramadol er 200 mg qd as discussed in her 6/16/23 tc. Per pt, she has + relief, no se's and has been using very little of the tramadol 50 mg pills. Pt stated that she has ~ 2 days of the tramadol er 200 mg pills on hand. Confirmed 8/29/23 ov w/ MG. Advised pt, anticipate rx will be sent to the pharmacy for pu later today / tomorrow. The writer will cb if there is any question or issue with the plan as discussed. Pt verbalized understanding and appreciation.

## 2023-07-27 LAB
Lab: NORMAL
MISCELLANEOUS LAB TEST RESULT: NORMAL

## 2023-07-27 RX ORDER — TRAMADOL HYDROCHLORIDE 200 MG/1
TABLET, EXTENDED RELEASE ORAL
Qty: 30 TABLET | Refills: 1 | Status: SHIPPED | OUTPATIENT
Start: 2023-07-27

## 2023-07-31 ENCOUNTER — APPOINTMENT (OUTPATIENT)
Dept: LAB | Facility: HOSPITAL | Age: 63
End: 2023-07-31
Payer: COMMERCIAL

## 2023-07-31 DIAGNOSIS — E09.9 DRUG-INDUCED DIABETES MELLITUS (HCC): ICD-10-CM

## 2023-07-31 DIAGNOSIS — Z94.4 LIVER REPLACED BY TRANSPLANT (HCC): ICD-10-CM

## 2023-07-31 DIAGNOSIS — I10 ESSENTIAL HYPERTENSION, MALIGNANT: ICD-10-CM

## 2023-07-31 DIAGNOSIS — E04.2 NONTOXIC MULTINODULAR GOITER: ICD-10-CM

## 2023-07-31 DIAGNOSIS — E55.9 AVITAMINOSIS D: ICD-10-CM

## 2023-07-31 LAB
25(OH)D3 SERPL-MCNC: 45.3 NG/ML (ref 30–100)
ALBUMIN SERPL BCP-MCNC: 4.2 G/DL (ref 3.5–5)
ALP SERPL-CCNC: 43 U/L (ref 34–104)
ALT SERPL W P-5'-P-CCNC: 39 U/L (ref 7–52)
ANION GAP SERPL CALCULATED.3IONS-SCNC: 6 MMOL/L
AST SERPL W P-5'-P-CCNC: 18 U/L (ref 13–39)
BASOPHILS # BLD MANUAL: 0 THOUSAND/UL (ref 0–0.1)
BASOPHILS NFR MAR MANUAL: 0 % (ref 0–1)
BILIRUB DIRECT SERPL-MCNC: 0.11 MG/DL (ref 0–0.2)
BILIRUB SERPL-MCNC: 0.82 MG/DL (ref 0.2–1)
BUN SERPL-MCNC: 19 MG/DL (ref 5–25)
CALCIUM SERPL-MCNC: 9.2 MG/DL (ref 8.4–10.2)
CHLORIDE SERPL-SCNC: 100 MMOL/L (ref 96–108)
CO2 SERPL-SCNC: 31 MMOL/L (ref 21–32)
CREAT SERPL-MCNC: 0.78 MG/DL (ref 0.6–1.3)
EOSINOPHIL # BLD MANUAL: 0.06 THOUSAND/UL (ref 0–0.4)
EOSINOPHIL NFR BLD MANUAL: 2 % (ref 0–6)
ERYTHROCYTE [DISTWIDTH] IN BLOOD BY AUTOMATED COUNT: 16.1 % (ref 11.6–15.1)
EST. AVERAGE GLUCOSE BLD GHB EST-MCNC: 114 MG/DL
GFR SERPL CREATININE-BSD FRML MDRD: 82 ML/MIN/1.73SQ M
GLUCOSE P FAST SERPL-MCNC: 89 MG/DL (ref 65–99)
HBA1C MFR BLD: 5.6 %
HCT VFR BLD AUTO: 39.8 % (ref 34.8–46.1)
HGB BLD-MCNC: 12.8 G/DL (ref 11.5–15.4)
LYMPHOCYTES # BLD AUTO: 0.36 THOUSAND/UL (ref 0.6–4.47)
LYMPHOCYTES # BLD AUTO: 13 % (ref 14–44)
MAGNESIUM SERPL-MCNC: 1.7 MG/DL (ref 1.9–2.7)
MCH RBC QN AUTO: 30.9 PG (ref 26.8–34.3)
MCHC RBC AUTO-ENTMCNC: 32.2 G/DL (ref 31.4–37.4)
MCV RBC AUTO: 96 FL (ref 82–98)
MONOCYTES # BLD AUTO: 0.2 THOUSAND/UL (ref 0–1.22)
MONOCYTES NFR BLD: 7 % (ref 4–12)
MYELOCYTES NFR BLD MANUAL: 2 % (ref 0–1)
NEUTROPHILS # BLD MANUAL: 2.12 THOUSAND/UL (ref 1.85–7.62)
NEUTS BAND NFR BLD MANUAL: 1 % (ref 0–8)
NEUTS SEG NFR BLD AUTO: 75 % (ref 43–75)
PLATELET # BLD AUTO: 187 THOUSANDS/UL (ref 149–390)
PLATELET BLD QL SMEAR: ADEQUATE
PMV BLD AUTO: 9.9 FL (ref 8.9–12.7)
POTASSIUM SERPL-SCNC: 4 MMOL/L (ref 3.5–5.3)
PROT SERPL-MCNC: 6.9 G/DL (ref 6.4–8.4)
PTH-INTACT SERPL-MCNC: 107 PG/ML (ref 12–88)
RBC # BLD AUTO: 4.14 MILLION/UL (ref 3.81–5.12)
RBC MORPH BLD: NORMAL
SODIUM SERPL-SCNC: 137 MMOL/L (ref 135–147)
TSH SERPL DL<=0.05 MIU/L-ACNC: 4.15 UIU/ML (ref 0.45–4.5)
WBC # BLD AUTO: 2.79 THOUSAND/UL (ref 4.31–10.16)

## 2023-07-31 PROCEDURE — 85007 BL SMEAR W/DIFF WBC COUNT: CPT

## 2023-07-31 PROCEDURE — 83036 HEMOGLOBIN GLYCOSYLATED A1C: CPT

## 2023-07-31 PROCEDURE — 82306 VITAMIN D 25 HYDROXY: CPT

## 2023-07-31 PROCEDURE — 83735 ASSAY OF MAGNESIUM: CPT

## 2023-07-31 PROCEDURE — 83970 ASSAY OF PARATHORMONE: CPT

## 2023-07-31 PROCEDURE — 82248 BILIRUBIN DIRECT: CPT

## 2023-07-31 PROCEDURE — 85027 COMPLETE CBC AUTOMATED: CPT

## 2023-07-31 PROCEDURE — 80053 COMPREHEN METABOLIC PANEL: CPT

## 2023-07-31 PROCEDURE — 84443 ASSAY THYROID STIM HORMONE: CPT

## 2023-07-31 PROCEDURE — 36415 COLL VENOUS BLD VENIPUNCTURE: CPT

## 2023-08-02 LAB
CMV DNA SERPL NAA+PROBE-ACNC: 478 IU/ML
CMV DNA SERPL NAA+PROBE-LOG IU: 2.68 LOG10 IU/ML

## 2023-08-07 ENCOUNTER — APPOINTMENT (OUTPATIENT)
Dept: LAB | Facility: HOSPITAL | Age: 63
End: 2023-08-07
Payer: COMMERCIAL

## 2023-08-07 DIAGNOSIS — Z94.4 LIVER REPLACED BY TRANSPLANT (HCC): ICD-10-CM

## 2023-08-07 PROBLEM — M31.6 GCA (GIANT CELL ARTERITIS) (HCC): Status: ACTIVE | Noted: 2023-08-07

## 2023-08-07 LAB
ALBUMIN SERPL BCP-MCNC: 4 G/DL (ref 3.5–5)
ALP SERPL-CCNC: 42 U/L (ref 34–104)
ALT SERPL W P-5'-P-CCNC: 66 U/L (ref 7–52)
AST SERPL W P-5'-P-CCNC: 16 U/L (ref 13–39)
BASOPHILS # BLD MANUAL: 0 THOUSAND/UL (ref 0–0.1)
BASOPHILS NFR MAR MANUAL: 0 % (ref 0–1)
BILIRUB DIRECT SERPL-MCNC: 0.07 MG/DL (ref 0–0.2)
BILIRUB SERPL-MCNC: 0.81 MG/DL (ref 0.2–1)
EOSINOPHIL # BLD MANUAL: 0 THOUSAND/UL (ref 0–0.4)
EOSINOPHIL NFR BLD MANUAL: 0 % (ref 0–6)
ERYTHROCYTE [DISTWIDTH] IN BLOOD BY AUTOMATED COUNT: 16.2 % (ref 11.6–15.1)
EST. AVERAGE GLUCOSE BLD GHB EST-MCNC: 123 MG/DL
HBA1C MFR BLD: 5.9 %
HCT VFR BLD AUTO: 38.8 % (ref 34.8–46.1)
HGB BLD-MCNC: 12.5 G/DL (ref 11.5–15.4)
LYMPHOCYTES # BLD AUTO: 0.34 THOUSAND/UL (ref 0.6–4.47)
LYMPHOCYTES # BLD AUTO: 12 % (ref 14–44)
MAGNESIUM SERPL-MCNC: 1.7 MG/DL (ref 1.9–2.7)
MCH RBC QN AUTO: 31.3 PG (ref 26.8–34.3)
MCHC RBC AUTO-ENTMCNC: 32.2 G/DL (ref 31.4–37.4)
MCV RBC AUTO: 97 FL (ref 82–98)
MONOCYTES # BLD AUTO: 0.08 THOUSAND/UL (ref 0–1.22)
MONOCYTES NFR BLD: 3 % (ref 4–12)
NEUTROPHILS # BLD MANUAL: 2.39 THOUSAND/UL (ref 1.85–7.62)
NEUTS SEG NFR BLD AUTO: 85 % (ref 43–75)
PLATELET # BLD AUTO: 188 THOUSANDS/UL (ref 149–390)
PLATELET BLD QL SMEAR: ADEQUATE
PMV BLD AUTO: 9.9 FL (ref 8.9–12.7)
PROT SERPL-MCNC: 6.4 G/DL (ref 6.4–8.4)
RBC # BLD AUTO: 4 MILLION/UL (ref 3.81–5.12)
RBC MORPH BLD: NORMAL
WBC # BLD AUTO: 2.81 THOUSAND/UL (ref 4.31–10.16)

## 2023-08-07 PROCEDURE — 83735 ASSAY OF MAGNESIUM: CPT

## 2023-08-07 PROCEDURE — 83036 HEMOGLOBIN GLYCOSYLATED A1C: CPT

## 2023-08-07 PROCEDURE — 85027 COMPLETE CBC AUTOMATED: CPT

## 2023-08-07 PROCEDURE — 80076 HEPATIC FUNCTION PANEL: CPT

## 2023-08-07 PROCEDURE — 85007 BL SMEAR W/DIFF WBC COUNT: CPT

## 2023-08-07 PROCEDURE — 36415 COLL VENOUS BLD VENIPUNCTURE: CPT

## 2023-08-08 LAB
CMV DNA SERPL NAA+PROBE-ACNC: NORMAL IU/ML
CMV DNA SERPL NAA+PROBE-LOG IU: NORMAL LOG10 IU/ML

## 2023-08-14 ENCOUNTER — APPOINTMENT (OUTPATIENT)
Dept: LAB | Facility: HOSPITAL | Age: 63
End: 2023-08-14
Payer: COMMERCIAL

## 2023-08-14 DIAGNOSIS — Z94.4 LIVER REPLACED BY TRANSPLANT (HCC): ICD-10-CM

## 2023-08-14 LAB
ALBUMIN SERPL BCP-MCNC: 4.1 G/DL (ref 3.5–5)
ALP SERPL-CCNC: 38 U/L (ref 34–104)
ALT SERPL W P-5'-P-CCNC: 27 U/L (ref 7–52)
AST SERPL W P-5'-P-CCNC: 14 U/L (ref 13–39)
BASOPHILS # BLD MANUAL: 0 THOUSAND/UL (ref 0–0.1)
BASOPHILS NFR MAR MANUAL: 0 % (ref 0–1)
BILIRUB DIRECT SERPL-MCNC: 0.09 MG/DL (ref 0–0.2)
BILIRUB SERPL-MCNC: 0.78 MG/DL (ref 0.2–1)
EOSINOPHIL # BLD MANUAL: 0.03 THOUSAND/UL (ref 0–0.4)
EOSINOPHIL NFR BLD MANUAL: 1 % (ref 0–6)
ERYTHROCYTE [DISTWIDTH] IN BLOOD BY AUTOMATED COUNT: 16.7 % (ref 11.6–15.1)
EST. AVERAGE GLUCOSE BLD GHB EST-MCNC: 123 MG/DL
HBA1C MFR BLD: 5.9 %
HCT VFR BLD AUTO: 38.8 % (ref 34.8–46.1)
HGB BLD-MCNC: 12.4 G/DL (ref 11.5–15.4)
LYMPHOCYTES # BLD AUTO: 0.55 THOUSAND/UL (ref 0.6–4.47)
LYMPHOCYTES # BLD AUTO: 20 % (ref 14–44)
MAGNESIUM SERPL-MCNC: 1.8 MG/DL (ref 1.9–2.7)
MCH RBC QN AUTO: 30.5 PG (ref 26.8–34.3)
MCHC RBC AUTO-ENTMCNC: 32 G/DL (ref 31.4–37.4)
MCV RBC AUTO: 96 FL (ref 82–98)
METAMYELOCYTES NFR BLD MANUAL: 2 % (ref 0–1)
MONOCYTES # BLD AUTO: 0.19 THOUSAND/UL (ref 0–1.22)
MONOCYTES NFR BLD: 7 % (ref 4–12)
MYELOCYTES NFR BLD MANUAL: 1 % (ref 0–1)
NEUTROPHILS # BLD MANUAL: 1.9 THOUSAND/UL (ref 1.85–7.62)
NEUTS BAND NFR BLD MANUAL: 1 % (ref 0–8)
NEUTS SEG NFR BLD AUTO: 68 % (ref 43–75)
PLATELET # BLD AUTO: 164 THOUSANDS/UL (ref 149–390)
PLATELET BLD QL SMEAR: ADEQUATE
PMV BLD AUTO: 10.2 FL (ref 8.9–12.7)
PROT SERPL-MCNC: 6.1 G/DL (ref 6.4–8.4)
RBC # BLD AUTO: 4.06 MILLION/UL (ref 3.81–5.12)
RBC MORPH BLD: NORMAL
WBC # BLD AUTO: 2.75 THOUSAND/UL (ref 4.31–10.16)

## 2023-08-14 PROCEDURE — 80076 HEPATIC FUNCTION PANEL: CPT

## 2023-08-14 PROCEDURE — 36415 COLL VENOUS BLD VENIPUNCTURE: CPT

## 2023-08-14 PROCEDURE — 83735 ASSAY OF MAGNESIUM: CPT

## 2023-08-14 PROCEDURE — 83036 HEMOGLOBIN GLYCOSYLATED A1C: CPT

## 2023-08-14 PROCEDURE — 85007 BL SMEAR W/DIFF WBC COUNT: CPT

## 2023-08-14 PROCEDURE — 85027 COMPLETE CBC AUTOMATED: CPT

## 2023-08-15 PROBLEM — S09.90XA CLOSED HEAD INJURY: Status: ACTIVE | Noted: 2023-08-15

## 2023-08-15 PROBLEM — R42 DIZZINESS: Status: ACTIVE | Noted: 2023-08-15

## 2023-08-15 PROBLEM — H83.8X2 SUPERIOR SEMICIRCULAR CANAL DEHISCENCE OF LEFT EAR: Status: ACTIVE | Noted: 2023-08-15

## 2023-08-16 LAB
CMV DNA SERPL NAA+PROBE-ACNC: NORMAL IU/ML
CMV DNA SERPL NAA+PROBE-LOG IU: NORMAL LOG10 IU/ML

## 2023-08-21 ENCOUNTER — APPOINTMENT (OUTPATIENT)
Dept: LAB | Facility: HOSPITAL | Age: 63
End: 2023-08-21
Payer: COMMERCIAL

## 2023-08-21 DIAGNOSIS — Z94.4 LIVER REPLACED BY TRANSPLANT (HCC): ICD-10-CM

## 2023-08-21 LAB
ALBUMIN SERPL BCP-MCNC: 4.3 G/DL (ref 3.5–5)
ALP SERPL-CCNC: 36 U/L (ref 34–104)
ALT SERPL W P-5'-P-CCNC: 23 U/L (ref 7–52)
AST SERPL W P-5'-P-CCNC: 13 U/L (ref 13–39)
BASOPHILS # BLD MANUAL: 0.06 THOUSAND/UL (ref 0–0.1)
BASOPHILS NFR MAR MANUAL: 2 % (ref 0–1)
BILIRUB DIRECT SERPL-MCNC: 0.11 MG/DL (ref 0–0.2)
BILIRUB SERPL-MCNC: 0.84 MG/DL (ref 0.2–1)
EOSINOPHIL # BLD MANUAL: 0.09 THOUSAND/UL (ref 0–0.4)
EOSINOPHIL NFR BLD MANUAL: 3 % (ref 0–6)
ERYTHROCYTE [DISTWIDTH] IN BLOOD BY AUTOMATED COUNT: 16.8 % (ref 11.6–15.1)
EST. AVERAGE GLUCOSE BLD GHB EST-MCNC: 126 MG/DL
HBA1C MFR BLD: 6 %
HCT VFR BLD AUTO: 41.1 % (ref 34.8–46.1)
HGB BLD-MCNC: 13.3 G/DL (ref 11.5–15.4)
LYMPHOCYTES # BLD AUTO: 0.68 THOUSAND/UL (ref 0.6–4.47)
LYMPHOCYTES # BLD AUTO: 22 % (ref 14–44)
MAGNESIUM SERPL-MCNC: 1.6 MG/DL (ref 1.9–2.7)
MCH RBC QN AUTO: 31.2 PG (ref 26.8–34.3)
MCHC RBC AUTO-ENTMCNC: 32.4 G/DL (ref 31.4–37.4)
MCV RBC AUTO: 97 FL (ref 82–98)
METAMYELOCYTES NFR BLD MANUAL: 3 % (ref 0–1)
MONOCYTES # BLD AUTO: 0.17 THOUSAND/UL (ref 0–1.22)
MONOCYTES NFR BLD: 6 % (ref 4–12)
MYELOCYTES NFR BLD MANUAL: 2 % (ref 0–1)
NEUTROPHILS # BLD MANUAL: 1.7 THOUSAND/UL (ref 1.85–7.62)
NEUTS SEG NFR BLD AUTO: 60 % (ref 43–75)
OVALOCYTES BLD QL SMEAR: PRESENT
PLATELET # BLD AUTO: 160 THOUSANDS/UL (ref 149–390)
PLATELET BLD QL SMEAR: ABNORMAL
PMV BLD AUTO: 10.7 FL (ref 8.9–12.7)
PROT SERPL-MCNC: 6.6 G/DL (ref 6.4–8.4)
RBC # BLD AUTO: 4.26 MILLION/UL (ref 3.81–5.12)
RBC MORPH BLD: PRESENT
VARIANT LYMPHS # BLD AUTO: 2 %
WBC # BLD AUTO: 2.84 THOUSAND/UL (ref 4.31–10.16)

## 2023-08-21 PROCEDURE — 83735 ASSAY OF MAGNESIUM: CPT

## 2023-08-21 PROCEDURE — 85027 COMPLETE CBC AUTOMATED: CPT

## 2023-08-21 PROCEDURE — 80076 HEPATIC FUNCTION PANEL: CPT

## 2023-08-21 PROCEDURE — 85007 BL SMEAR W/DIFF WBC COUNT: CPT

## 2023-08-21 PROCEDURE — 36415 COLL VENOUS BLD VENIPUNCTURE: CPT

## 2023-08-21 PROCEDURE — 83036 HEMOGLOBIN GLYCOSYLATED A1C: CPT

## 2023-08-23 LAB
CMV DNA SERPL NAA+PROBE-ACNC: NORMAL IU/ML
CMV DNA SERPL NAA+PROBE-LOG IU: NORMAL LOG10 IU/ML

## 2023-08-27 PROBLEM — N30.01 ACUTE CYSTITIS WITH HEMATURIA: Status: RESOLVED | Noted: 2021-05-10 | Resolved: 2023-08-27

## 2023-08-28 ENCOUNTER — APPOINTMENT (OUTPATIENT)
Dept: LAB | Facility: HOSPITAL | Age: 63
End: 2023-08-28
Payer: COMMERCIAL

## 2023-08-28 ENCOUNTER — OFFICE VISIT (OUTPATIENT)
Dept: AUDIOLOGY | Age: 63
End: 2023-08-28
Payer: COMMERCIAL

## 2023-08-28 DIAGNOSIS — Z94.4 LIVER REPLACED BY TRANSPLANT (HCC): ICD-10-CM

## 2023-08-28 DIAGNOSIS — R42 DIZZINESS: ICD-10-CM

## 2023-08-28 DIAGNOSIS — S09.90XS CLOSED HEAD INJURY, SEQUELA: ICD-10-CM

## 2023-08-28 LAB
ALBUMIN SERPL BCP-MCNC: 4.1 G/DL (ref 3.5–5)
ALP SERPL-CCNC: 41 U/L (ref 34–104)
ALT SERPL W P-5'-P-CCNC: 50 U/L (ref 7–52)
ANISOCYTOSIS BLD QL SMEAR: PRESENT
AST SERPL W P-5'-P-CCNC: 13 U/L (ref 13–39)
BASOPHILS # BLD MANUAL: 0.1 THOUSAND/UL (ref 0–0.1)
BASOPHILS NFR MAR MANUAL: 4 % (ref 0–1)
BILIRUB DIRECT SERPL-MCNC: 0.07 MG/DL (ref 0–0.2)
BILIRUB SERPL-MCNC: 0.75 MG/DL (ref 0.2–1)
EOSINOPHIL # BLD MANUAL: 0.05 THOUSAND/UL (ref 0–0.4)
EOSINOPHIL NFR BLD MANUAL: 2 % (ref 0–6)
ERYTHROCYTE [DISTWIDTH] IN BLOOD BY AUTOMATED COUNT: 16.7 % (ref 11.6–15.1)
EST. AVERAGE GLUCOSE BLD GHB EST-MCNC: 128 MG/DL
HBA1C MFR BLD: 6.1 %
HCT VFR BLD AUTO: 39.9 % (ref 34.8–46.1)
HGB BLD-MCNC: 12.8 G/DL (ref 11.5–15.4)
LYMPHOCYTES # BLD AUTO: 0.75 THOUSAND/UL (ref 0.6–4.47)
LYMPHOCYTES # BLD AUTO: 29 % (ref 14–44)
MAGNESIUM SERPL-MCNC: 1.6 MG/DL (ref 1.9–2.7)
MCH RBC QN AUTO: 31.1 PG (ref 26.8–34.3)
MCHC RBC AUTO-ENTMCNC: 32.1 G/DL (ref 31.4–37.4)
MCV RBC AUTO: 97 FL (ref 82–98)
METAMYELOCYTES NFR BLD MANUAL: 2 % (ref 0–1)
MONOCYTES # BLD AUTO: 0.21 THOUSAND/UL (ref 0–1.22)
MONOCYTES NFR BLD: 8 % (ref 4–12)
NEUTROPHILS # BLD MANUAL: 1.42 THOUSAND/UL (ref 1.85–7.62)
NEUTS BAND NFR BLD MANUAL: 1 % (ref 0–8)
NEUTS SEG NFR BLD AUTO: 54 % (ref 43–75)
PLATELET # BLD AUTO: 162 THOUSANDS/UL (ref 149–390)
PLATELET BLD QL SMEAR: ADEQUATE
PMV BLD AUTO: 10.3 FL (ref 8.9–12.7)
PROT SERPL-MCNC: 6.5 G/DL (ref 6.4–8.4)
RBC # BLD AUTO: 4.12 MILLION/UL (ref 3.81–5.12)
RBC MORPH BLD: PRESENT
WBC # BLD AUTO: 2.59 THOUSAND/UL (ref 4.31–10.16)

## 2023-08-28 PROCEDURE — 92537 CALORIC VSTBLR TEST W/REC: CPT | Performed by: AUDIOLOGIST

## 2023-08-28 PROCEDURE — 83735 ASSAY OF MAGNESIUM: CPT

## 2023-08-28 PROCEDURE — 92540 BASIC VESTIBULAR EVALUATION: CPT | Performed by: AUDIOLOGIST

## 2023-08-28 PROCEDURE — 85007 BL SMEAR W/DIFF WBC COUNT: CPT

## 2023-08-28 PROCEDURE — 85027 COMPLETE CBC AUTOMATED: CPT

## 2023-08-28 PROCEDURE — 92567 TYMPANOMETRY: CPT | Performed by: AUDIOLOGIST

## 2023-08-28 PROCEDURE — 80076 HEPATIC FUNCTION PANEL: CPT

## 2023-08-28 PROCEDURE — 83036 HEMOGLOBIN GLYCOSYLATED A1C: CPT

## 2023-08-28 PROCEDURE — 36415 COLL VENOUS BLD VENIPUNCTURE: CPT

## 2023-08-28 NOTE — PROGRESS NOTES
Daily Note     Today's date: 2019  Patient name: Bill Chu  : 1960  MRN: [id number]  Referring provider: Natalya Chavez PA-C  Dx:   Encounter Diagnosis     ICD-10-CM    1  Nondisplaced fracture of distal end of left radius S52 502A                   Subjective: I have pain on the back side of my wrist       Objective: See treatment diary below      Assessment: Tolerated treatment well  Patient has moderate soreness ulnar side of wrist   Wrist ROM is improving  Pt  Has relief with KT taping  Plan: Continue per plan of care  Precautions: Fx 19      Manual  10/23 10/28 11/1 11/11 11/13        Graston L wrist 4m 4m 4m 4m 4m        retrograde 2m 2m 2m 2m 2m        Extrinsic stretch 2m 2m 2m 2m 2m        Intrinsic stretch 2m 2m 2m 2m 2m        Jt   Mob,  A/P glides  2m 2m 2m 2m            Exercise Diary  10/23 10/28 11/1 11/11 11/13        Wrist PROM 1x10 1x10 1x10 1x10 1x10        P/S PROM 1x10 1x10 1x10 1x10 1x10        Wrist curls  #1 3x10 #1 3x10 #2 3x10 #2 3x10        gripping  RPW 2x30 GPW 2x30 GPW 2x30 GPW 2x30        P/s therabar  Red 3x10 Red 3x10  Red   3 x10        Clips w/ rotation  Red 3 sets Green 3sets  Green 3 sets                                                                                                                                                                                 HEP- wrist ROM, TGEs reviewed                Modalities  10/23 10/28 11/1 11/11 11/13        MHP L wrist 10m 10m           Fluido   10m 10m 10m no

## 2023-08-28 NOTE — PROGRESS NOTES
Videonystagmography (VNG) Evaluation    Name:  Harleen May  :  1960  Age:  63 y.o. Date of Evaluation: 23     History: Dizziness  Reason for visit: Harleen May is seen today at the request of Dr. Sonny Colvin for an evaluation of balance. Patient complains of dizziness since a fall where she hit her head in . Dizziness is exacerbated by noises as well as visual stimuli. Tympanometry:   Right: Type A - normal middle ear pressure and compliance   Left: Type A - normal middle ear pressure and compliance    Oculomotor battery:    Gaze:          Right: Normal        Left: Normal         Up: Normal        Down: Normal      Tracking: Normal        Note: Patient reported stimulus became very blurry at the end, and stated the task became difficult at the end (higher frequency sinusoidal stimulus)  Saccades: Abnormal Velocity     Optokinetic: Borderline abnormal gain at 40 dps with stimulus in the left direction only. Patient voiced significant difficulty with testing in left direction.     Positioning/Positionals:     Ranell Roland:    Right: Negative      Left: Negative        Positionals:     Sitting: Normal    Supine: 2 degrees Up Beating Nystagmus    Head Right: Normal    Head Left: Normal    Body Right: Normal    Body Left: Normal     30 degrees: Normal      Calorics:     Bithermal Caloric Irrigation: Normal - 20% left weakness    Results:  Central findings include abnormal oculomotors (abnormal saccadic velocity and abnormal OPK gain) and upbeating nystagmus present in supine position only        No peripheral findings    Recommendations: cVEMP scheduled for 23        Chucho Almaguer, CCC-A  Clinical Audiologist

## 2023-08-29 ENCOUNTER — OFFICE VISIT (OUTPATIENT)
Dept: PAIN MEDICINE | Facility: CLINIC | Age: 63
End: 2023-08-29
Payer: COMMERCIAL

## 2023-08-29 VITALS
WEIGHT: 172 LBS | SYSTOLIC BLOOD PRESSURE: 124 MMHG | HEART RATE: 74 BPM | HEIGHT: 64 IN | TEMPERATURE: 97.9 F | BODY MASS INDEX: 29.37 KG/M2 | DIASTOLIC BLOOD PRESSURE: 74 MMHG

## 2023-08-29 DIAGNOSIS — M54.16 LUMBAR RADICULOPATHY: ICD-10-CM

## 2023-08-29 DIAGNOSIS — G89.29 CHRONIC BILATERAL LOW BACK PAIN WITHOUT SCIATICA: ICD-10-CM

## 2023-08-29 DIAGNOSIS — M54.50 CHRONIC BILATERAL LOW BACK PAIN WITHOUT SCIATICA: ICD-10-CM

## 2023-08-29 DIAGNOSIS — M47.816 LUMBAR SPONDYLOSIS: ICD-10-CM

## 2023-08-29 DIAGNOSIS — R29.898 WEAKNESS OF LEFT LOWER EXTREMITY: ICD-10-CM

## 2023-08-29 DIAGNOSIS — G61.89 OTHER INFLAMMATORY POLYNEUROPATHIES (HCC): ICD-10-CM

## 2023-08-29 DIAGNOSIS — G89.4 CHRONIC PAIN SYNDROME: Primary | ICD-10-CM

## 2023-08-29 DIAGNOSIS — M51.36 DDD (DEGENERATIVE DISC DISEASE), LUMBAR: ICD-10-CM

## 2023-08-29 LAB
CMV DNA SERPL NAA+PROBE-ACNC: NORMAL IU/ML
CMV DNA SERPL NAA+PROBE-LOG IU: NORMAL LOG10 IU/ML

## 2023-08-29 PROCEDURE — 99214 OFFICE O/P EST MOD 30 MIN: CPT | Performed by: PHYSICIAN ASSISTANT

## 2023-08-29 RX ORDER — VALGANCICLOVIR 450 MG/1
TABLET, FILM COATED ORAL
COMMUNITY
Start: 2023-08-04

## 2023-08-29 RX ORDER — TRAMADOL HYDROCHLORIDE 50 MG/1
TABLET ORAL
Qty: 45 TABLET | Refills: 2 | Status: SHIPPED | OUTPATIENT
Start: 2023-08-29

## 2023-08-29 RX ORDER — DENOSUMAB 60 MG/ML
INJECTION SUBCUTANEOUS
COMMUNITY
Start: 2023-08-20

## 2023-08-29 RX ORDER — TRAMADOL HYDROCHLORIDE 200 MG/1
TABLET, EXTENDED RELEASE ORAL
Qty: 30 TABLET | Refills: 2 | Status: SHIPPED | OUTPATIENT
Start: 2023-08-29

## 2023-08-29 NOTE — PROGRESS NOTES
Assessment:  1. Chronic pain syndrome    2. Chronic bilateral low back pain without sciatica    3. Lumbar spondylosis    4. DDD (degenerative disc disease), lumbar    5. Lumbar radiculopathy    6. Other inflammatory polyneuropathies ([address])    7. Weakness of left lower extremity        Plan:  While the patient was in the office today, I did have a thorough conversation regarding their chronic pain syndrome, medication management, and treatment plan options. Patient remains clinically stable controlled on the current medication regimen with tramadol  mg daily and as needed use of tramadol 50 mg 1 to 2 tablets daily. She has been having more generalized pain and left lower extremity weakness of late secondary to a recent viral infection. She will continue to follow-up with her specialists in regards to her transplant. I have electronically sent her medication to her pharmacy with 2 additional refills. [facility] [address] Prescription Drug Monitoring Program report was reviewed and was appropriate     There are risks associated with opioid medications, including dependence, addiction and tolerance. The patient understands and agrees to use these medications only as prescribed. Potential side effects of the medications include, but are not limited to, constipation, drowsiness, addiction, impaired judgment and risk of fatal overdose if not taken as prescribed. The patient was warned against driving while taking sedation medications. Sharing medications is a felony. At this point in time, the patient is showing no signs of addiction, abuse, diversion or suicidal ideation. The patient will follow-up in 12 weeks for medication prescription refill and reevaluation. The patient was advised to contact the office should their symptoms worsen in the interim. The patient was agreeable and verbalized an understanding. History of Present Illness:     The patient is a 63 y.o. female last seen on 6/6/2023 who presents for a follow up office visit in regards to chronic multisite pain secondary to lumbar spondylosis, lumbar degenerative disc disease, polyneuropathy. The patient currently reports chronic pain with increasing pain radiating into the left lower extremity. She rates her pain score a 7-8 out of 10 on the scale and describes it as a constant burning, dull, aching, cramping, shooting type of pain with numbness and paresthesias. She has been unable to participate in physical therapy due to a viral infection and subsequent complications she hopes are not related to more affect her transplant. She has been tapering down on the high-dose steroids and feeling the adverse effects from those. She is looking forward to restarting physical therapy once she feels better. Current pain medications includes: Tramadol  mg daily and tramadol 50 mg daily to twice daily as needed pain. .  The patient reports that this regimen is providing 50% pain relief. The patient is reporting no side effects from this pain medication regimen. Pain Contract Signed: 11/30/2022  Last Urine Drug Screen: 6/6/2023    I have personally reviewed and/or updated the patient's past medical history, past surgical history, family history, social history, current medications, allergies, and vital signs today. Review of Systems:    Review of Systems   Respiratory: Negative for shortness of breath. Cardiovascular: Negative for chest pain. Gastrointestinal: Negative for constipation, diarrhea, nausea and vomiting. Musculoskeletal: Positive for gait problem and joint swelling (Joint stiffness). Negative for arthralgias and myalgias. Skin: Negative for rash. Neurological: Positive for dizziness and weakness. Negative for seizures. All other systems reviewed and are negative.         Past Medical History:   Diagnosis Date   • Asthma    • Clotting disorder [facility])    • Concussion    • CSF leak    • Hyperbilirubinemia 04/28/2022 • Hypertension    • Liver failure [facility])    • Liver transplant recipient [facility])    • Migraines    • Peripheral neuropathy    • Varicella 1968    As a child       Past Surgical History:   Procedure Laterality Date   • ABLATION SOFT TISSUE     • BREAST LUMPECTOMY     • IR PARACENTESIS  2022   • IR PARACENTESIS  2022   • IR PARACENTESIS  2022   • LAPAROSCOPY FOR ECTOPIC PREGNANCY     • MAMMO (HISTORICAL) Bilateral 2021    [facility] BI-RADS 2 Benign findings. after U/S Scattered areas fibrogladulae density; 25% to 50% glandular breast tissue   • SINUS SURGERY     • TONSILECTOMY AND ADNOIDECTOMY         Family History   Problem Relation Age of Onset   • Cancer Mother    • Breast cancer Mother    • Cancer Father    • Colon cancer Father    • Arthritis Father    • Cancer Sister    • Breast cancer Sister    • Arthritis Brother    • No Known Problems Paternal Grandmother    • No Known Problems Paternal Grandfather    • Autoimmune disease Daughter    • No Known Problems Son    • No Known Problems Son    • Heart disease Half-Brother    • Prostate cancer Half-Brother    • Melanoma Half-Brother        Social History     Occupational History   • Not on file   Tobacco Use   • Smoking status: Former     Packs/day: 1.00     Types: Cigarettes     Quit date: 2019     Years since quittin.6   • Smokeless tobacco: Never   Vaping Use   • Vaping Use: Never used   Substance and Sexual Activity   • Alcohol use: Not Currently     Comment: socially, had glass of wine today   • Drug use: Not Currently     Comment: CBD   • Sexual activity: Not Currently     Partners: Male     Birth control/protection: Post-menopausal     Comment: no new partner in past year         Current Outpatient Medications:   •  diphenhydrAMINE (BENADRYL) 25 mg capsule, Take 25 mg by mouth every 8 (eight) hours as needed for itching.  Indications: Itching, Disp: , Rfl:   •  ergocalciferol (ERGOCALCIFEROL) 1.25 MG (07994 UT) capsule, Take 50,000 Units by mouth once a week. Every Monday AM  Indications: Vitamin D Deficiency, Disp: , Rfl:   •  gabapentin (NEURONTIN) 300 mg capsule, Take 600 mg by mouth 3 (three) times a day. Indications: Neuropathic Pain, Disp: , Rfl:   •  lisinopril (ZESTRIL) 10 mg tablet, Take 10 mg by mouth daily. Indications: High Blood Pressure Disorder, Disp: , Rfl:   •  Magnesium Oxide 400 MG CAPS, Take by mouth, Disp: , Rfl:   •  melatonin 3 mg, Take 3 mg by mouth daily at bedtime as needed (sleep). Indications: Trouble Sleeping, Disp: , Rfl:   •  mycophenolate (MYFORTIC) 360 MG TBEC, Take 360 mg by mouth 2 (two) times a day. Indications: Liver Transplant Recipient, Disp: , Rfl:   •  NIFEdipine ER (ADALAT CC) 30 MG 24 hr tablet, Take 30 mg by mouth 2 (two) times a day. Indications: High Blood Pressure Disorder, Disp: , Rfl:   •  pantoprazole (PROTONIX) 40 mg tablet, Take 40 mg by mouth 2 (two) times a day. Indications: Gastroesophageal Reflux Disease, Disp: , Rfl:   •  predniSONE 5 mg tablet, Take 15 mg by mouth daily 4 tablets in AM, Disp: , Rfl:   •  Premarin vaginal cream, , Disp: , Rfl:   •  Prolia 60 MG/ML, USE 1 ML SUBCUTANOEUS EVERY 6 MONTHS AS DIRECTED. PLEASE DELIVER . ..  (REFER TO PRESCRIPTION NOTES). , Disp: , Rfl:   •  tacrolimus (PROGRAF) 1 mg capsule, Take 6 mg by mouth every 12 (twelve) hours 7 capsules AM 7 capsules PM, Disp: , Rfl:   •  thiamine 100 MG tablet, Take 100 mg by mouth daily.  Indications: Deficiency of Vitamin B1, Disp: , Rfl:   •  traMADol (ULTRAM) 50 mg tablet, Take 1 PO BID PRN for pain for ongoing therapy, Disp: 45 tablet, Rfl: 2  •  traMADol (ULTRAM-ER) 200 MG 24 hr tablet, take 1 tablet by mouth once daily for pain for ongoing therapy, Disp: 30 tablet, Rfl: 2  •  ursodiol (ACTIGALL) 250 mg tablet, , Disp: , Rfl:   •  valGANciclovir (VALCYTE) 450 mg tablet, , Disp: , Rfl:   •  Accu-Chek Guide test strip, , Disp: , Rfl:   •  Microlet Lancets MISC, , Disp: , Rfl:   •  Sennosides-Docusate Sodium (SB DOCUSATE SODIUM-SENNA PO), Take 50 mg by mouth daily as needed (constipation). Indications: constipation (Patient not taking: Reported on 8/29/2023), Disp: , Rfl:     No Known Allergies    Physical Exam:    /74 (BP Location: Left arm, Patient Position: Sitting, Cuff Size: Standard)   Pulse 74   Temp 97.9 °F (36.6 °C)   Ht 5' 4" (1.626 m)   Wt 78 kg (172 lb)   BMI 29.52 kg/m²     Constitutional:normal, well developed, well nourished, alert, in no distress and non-toxic and no overt pain behavior. Eyes:anicteric  HEENT:grossly intact  Neck:supple, symmetric, trachea midline and no masses   Pulmonary:even and unlabored  Cardiovascular:No edema or pitting edema present  Skin:Normal without rashes or lesions and well hydrated  Psychiatric:Mood and affect appropriate  Neurologic:Cranial Nerves II-XII grossly intact  Musculoskeletal:normal      Imaging  No orders to display         No orders of the defined types were placed in this encounter.

## 2023-09-01 ENCOUNTER — OFFICE VISIT (OUTPATIENT)
Dept: AUDIOLOGY | Age: 63
End: 2023-09-01
Payer: COMMERCIAL

## 2023-09-01 DIAGNOSIS — R42 DIZZINESS AND GIDDINESS: Primary | ICD-10-CM

## 2023-09-01 PROCEDURE — 92700 UNLISTED ORL SERVICE/PX: CPT | Performed by: AUDIOLOGIST

## 2023-09-01 NOTE — PROGRESS NOTES
Vestibular Evoked Myogenic Potentials (VEMP)    Name:  Amara Hurley  :  1960  Age:  63 y.o. Date of Evaluation: 23        History: Dizziness  Reason for visit: Belle Brasher is being seen today at the request of Dr. Maggie Marquis for a cervical vestibular evoked myogenic potential to rule out saccule/utricule and/or inferior/superior vestibular nerve dysfunction         Cervical Vestibular Evoked Myogenic Potential (cVEMP)  The cVEMP was measured using a 500 Hz tone burst at 95 dB nHL via insert phones at a stimulus rate of 5.1 clicks/second. Stimulation of the right saccule and/or inferior portion of the vestibular nerve revealed normal latency and normal amplitude for the right ear. Recorded responses were absent at 70 dB nHL. Stimulation of the left saccule and/or inferior portion of the vestibular nerve revealed normal latency and normal amplitude for the left ear. Recorded responses were present at 70 dB nHL, consistent with a semicircular canal dehiscence. Amplitude ratio between ears was 48.5% which is considered abnormal.        Interpretation:  Abnormal left cVEMP findings, consistent with a left sided semicircular canal dehiscence. RECOMMENDATIONS:  Consult ENT     PATIENT EDUCATION:   Discussed results and recommendations with the patient at length. Questions were addressed and the patient was encouraged to contact our department should concerns arise.     Renu Moscoso, EDIE-A  Clinical Audiologist

## 2023-09-05 ENCOUNTER — APPOINTMENT (OUTPATIENT)
Dept: LAB | Facility: HOSPITAL | Age: 63
End: 2023-09-05
Payer: COMMERCIAL

## 2023-09-05 DIAGNOSIS — Z94.4 LIVER REPLACED BY TRANSPLANT (HCC): ICD-10-CM

## 2023-09-05 LAB
ALBUMIN SERPL BCP-MCNC: 4.1 G/DL (ref 3.5–5)
ALP SERPL-CCNC: 34 U/L (ref 34–104)
ALT SERPL W P-5'-P-CCNC: 27 U/L (ref 7–52)
ANISOCYTOSIS BLD QL SMEAR: PRESENT
AST SERPL W P-5'-P-CCNC: 13 U/L (ref 13–39)
BASOPHILS # BLD MANUAL: 0.08 THOUSAND/UL (ref 0–0.1)
BASOPHILS NFR MAR MANUAL: 3 % (ref 0–1)
BILIRUB DIRECT SERPL-MCNC: 0.11 MG/DL (ref 0–0.2)
BILIRUB SERPL-MCNC: 0.61 MG/DL (ref 0.2–1)
EOSINOPHIL # BLD MANUAL: 0 THOUSAND/UL (ref 0–0.4)
EOSINOPHIL NFR BLD MANUAL: 0 % (ref 0–6)
ERYTHROCYTE [DISTWIDTH] IN BLOOD BY AUTOMATED COUNT: 16.7 % (ref 11.6–15.1)
HCT VFR BLD AUTO: 39.7 % (ref 34.8–46.1)
HGB BLD-MCNC: 12.9 G/DL (ref 11.5–15.4)
LYMPHOCYTES # BLD AUTO: 0.62 THOUSAND/UL (ref 0.6–4.47)
LYMPHOCYTES # BLD AUTO: 23 % (ref 14–44)
MAGNESIUM SERPL-MCNC: 1.8 MG/DL (ref 1.9–2.7)
MCH RBC QN AUTO: 31.8 PG (ref 26.8–34.3)
MCHC RBC AUTO-ENTMCNC: 32.5 G/DL (ref 31.4–37.4)
MCV RBC AUTO: 98 FL (ref 82–98)
MONOCYTES # BLD AUTO: 0.27 THOUSAND/UL (ref 0–1.22)
MONOCYTES NFR BLD: 10 % (ref 4–12)
NEUTROPHILS # BLD MANUAL: 1.73 THOUSAND/UL (ref 1.85–7.62)
NEUTS BAND NFR BLD MANUAL: 11 % (ref 0–8)
NEUTS SEG NFR BLD AUTO: 53 % (ref 43–75)
PLATELET # BLD AUTO: 165 THOUSANDS/UL (ref 149–390)
PLATELET BLD QL SMEAR: ADEQUATE
PMV BLD AUTO: 10.2 FL (ref 8.9–12.7)
POLYCHROMASIA BLD QL SMEAR: PRESENT
PROT SERPL-MCNC: 6.2 G/DL (ref 6.4–8.4)
RBC # BLD AUTO: 4.06 MILLION/UL (ref 3.81–5.12)
RBC MORPH BLD: PRESENT
WBC # BLD AUTO: 2.7 THOUSAND/UL (ref 4.31–10.16)

## 2023-09-05 PROCEDURE — 36415 COLL VENOUS BLD VENIPUNCTURE: CPT

## 2023-09-05 PROCEDURE — 83735 ASSAY OF MAGNESIUM: CPT

## 2023-09-05 PROCEDURE — 85027 COMPLETE CBC AUTOMATED: CPT

## 2023-09-05 PROCEDURE — 85007 BL SMEAR W/DIFF WBC COUNT: CPT

## 2023-09-05 PROCEDURE — 80076 HEPATIC FUNCTION PANEL: CPT

## 2023-09-05 PROCEDURE — 83036 HEMOGLOBIN GLYCOSYLATED A1C: CPT

## 2023-09-06 LAB
CMV DNA SERPL NAA+PROBE-ACNC: NORMAL IU/ML
CMV DNA SERPL NAA+PROBE-LOG IU: NORMAL LOG10 IU/ML
EST. AVERAGE GLUCOSE BLD GHB EST-MCNC: 128 MG/DL
HBA1C MFR BLD: 6.1 %

## 2023-09-11 ENCOUNTER — APPOINTMENT (OUTPATIENT)
Dept: LAB | Facility: HOSPITAL | Age: 63
End: 2023-09-11
Payer: COMMERCIAL

## 2023-09-11 DIAGNOSIS — Z94.4 LIVER REPLACED BY TRANSPLANT (HCC): ICD-10-CM

## 2023-09-11 LAB
ALBUMIN SERPL BCP-MCNC: 4.1 G/DL (ref 3.5–5)
ALP SERPL-CCNC: 39 U/L (ref 34–104)
ALT SERPL W P-5'-P-CCNC: 43 U/L (ref 7–52)
ANISOCYTOSIS BLD QL SMEAR: PRESENT
AST SERPL W P-5'-P-CCNC: 15 U/L (ref 13–39)
BASOPHILS # BLD MANUAL: 0.09 THOUSAND/UL (ref 0–0.1)
BASOPHILS NFR MAR MANUAL: 3 % (ref 0–1)
BILIRUB DIRECT SERPL-MCNC: 0.09 MG/DL (ref 0–0.2)
BILIRUB SERPL-MCNC: 0.84 MG/DL (ref 0.2–1)
EOSINOPHIL # BLD MANUAL: 0.06 THOUSAND/UL (ref 0–0.4)
EOSINOPHIL NFR BLD MANUAL: 2 % (ref 0–6)
ERYTHROCYTE [DISTWIDTH] IN BLOOD BY AUTOMATED COUNT: 16.8 % (ref 11.6–15.1)
EST. AVERAGE GLUCOSE BLD GHB EST-MCNC: 131 MG/DL
HBA1C MFR BLD: 6.2 %
HCT VFR BLD AUTO: 38.7 % (ref 34.8–46.1)
HGB BLD-MCNC: 12.4 G/DL (ref 11.5–15.4)
LYMPHOCYTES # BLD AUTO: 0.83 THOUSAND/UL (ref 0.6–4.47)
LYMPHOCYTES # BLD AUTO: 25 % (ref 14–44)
MAGNESIUM SERPL-MCNC: 1.7 MG/DL (ref 1.9–2.7)
MCH RBC QN AUTO: 31.7 PG (ref 26.8–34.3)
MCHC RBC AUTO-ENTMCNC: 32 G/DL (ref 31.4–37.4)
MCV RBC AUTO: 99 FL (ref 82–98)
MONOCYTES # BLD AUTO: 0.15 THOUSAND/UL (ref 0–1.22)
MONOCYTES NFR BLD: 5 % (ref 4–12)
MYELOCYTES NFR BLD MANUAL: 1 % (ref 0–1)
NEUTROPHILS # BLD MANUAL: 1.82 THOUSAND/UL (ref 1.85–7.62)
NEUTS BAND NFR BLD MANUAL: 10 % (ref 0–8)
NEUTS SEG NFR BLD AUTO: 51 % (ref 43–75)
PLATELET # BLD AUTO: 177 THOUSANDS/UL (ref 149–390)
PLATELET BLD QL SMEAR: ADEQUATE
PMV BLD AUTO: 10.5 FL (ref 8.9–12.7)
POLYCHROMASIA BLD QL SMEAR: PRESENT
PROT SERPL-MCNC: 6.3 G/DL (ref 6.4–8.4)
RBC # BLD AUTO: 3.91 MILLION/UL (ref 3.81–5.12)
RBC MORPH BLD: PRESENT
VARIANT LYMPHS # BLD AUTO: 3 %
WBC # BLD AUTO: 2.98 THOUSAND/UL (ref 4.31–10.16)

## 2023-09-11 PROCEDURE — 36415 COLL VENOUS BLD VENIPUNCTURE: CPT

## 2023-09-11 PROCEDURE — 85027 COMPLETE CBC AUTOMATED: CPT

## 2023-09-11 PROCEDURE — 83036 HEMOGLOBIN GLYCOSYLATED A1C: CPT

## 2023-09-11 PROCEDURE — 85007 BL SMEAR W/DIFF WBC COUNT: CPT

## 2023-09-11 PROCEDURE — 83735 ASSAY OF MAGNESIUM: CPT

## 2023-09-11 PROCEDURE — 80076 HEPATIC FUNCTION PANEL: CPT

## 2023-09-12 LAB
CMV DNA SERPL NAA+PROBE-ACNC: NEGATIVE IU/ML
CMV DNA SERPL NAA+PROBE-LOG IU: NORMAL LOG10 IU/ML

## 2023-09-13 ENCOUNTER — OFFICE VISIT (OUTPATIENT)
Dept: OBGYN CLINIC | Facility: CLINIC | Age: 63
End: 2023-09-13
Payer: COMMERCIAL

## 2023-09-13 VITALS
DIASTOLIC BLOOD PRESSURE: 78 MMHG | SYSTOLIC BLOOD PRESSURE: 124 MMHG | BODY MASS INDEX: 29.02 KG/M2 | HEIGHT: 64 IN | WEIGHT: 170 LBS

## 2023-09-13 DIAGNOSIS — M62.562 ATROPHY OF MUSCLE OF LEFT LOWER LEG: Primary | ICD-10-CM

## 2023-09-13 PROCEDURE — 99214 OFFICE O/P EST MOD 30 MIN: CPT | Performed by: ORTHOPAEDIC SURGERY

## 2023-09-13 NOTE — PROGRESS NOTES
Assessment:     1. Atrophy of muscle of left lower leg        Plan:     Problem List Items Addressed This Visit        Musculoskeletal and Integument    Atrophy of muscle of left lower leg - Primary     Findings consistent with left leg atrophy secondary to severe femoral nerve damage. Imaging and prognosis reviewed with patient. At this time patient will be referred back to physical therapy for continued rehab left leg, strengthening, gait training, motion. Also recommended to continue walking and using stationary bike every other day if able to up to 45 minutes. Also recommend aquatic therapy is able to get access to pool. See patient back on as needed basis. All patient's questions were answered to her satisfaction. This note is created using dictation transcription. It may contain typographical errors, grammatical errors, improperly dictated words, background noise and other errors. Relevant Orders    Ambulatory Referral to Physical Therapy       Subjective:     Patient ID: Harley Perea is a 63 y.o. female. Chief Complaint:  63 yr old female in for evaluation of left leg pain. She has history chronic multisite pain secondary to lumbar spondylosis, lumbar degenerative disc disease, polyneuropathy. The patient currently reports chronic pain with increasing pain in left leg. She treats currently with [facility] for above issues. She has done physical therapy for generalized left leg weakness, also has neuropathy. She states history liver transplant May 2022. She states she had significant left leg atrophy prior to starting left leg atrophy in June 2022 which was related to femoral nerve damage. Using SPC to ambulate. 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 She is having more pain in anterior hip/groin into anterior thigh.  She states she finished another round of physical therapy in June 2023. She has been walking up to 3-4 miles a day. She states she did have EMG at [facility] 2022 showing severe femoral nerve damage on left. Her goal today is to find out what else she can do to help strengthen left leg. Tramadol 200mg and 50 mg tramadol  for pain control. Allergy:  No Known Allergies  Medications:  all current active meds have been reviewed  Past Medical History:  Past Medical History:   Diagnosis Date   • Asthma    • Clotting disorder ([address])    • Concussion    • CSF leak    • Hyperbilirubinemia 04/28/2022   • Hypertension    • Liver failure [facility])    • Liver transplant recipient [facility])    • Migraines    • Peripheral neuropathy    • Varicella 1968    As a child     Past Surgical History:  Past Surgical History:   Procedure Laterality Date   • ABLATION SOFT TISSUE     • BREAST LUMPECTOMY     • IR PARACENTESIS  04/29/2022   • IR PARACENTESIS  05/09/2022   • IR PARACENTESIS  05/12/2022   • LAPAROSCOPY FOR ECTOPIC PREGNANCY     • MAMMO (HISTORICAL) Bilateral 01/14/2021    [facility] BI-RADS 2 Benign findings.  after U/S Scattered areas fibrogladulae density; 25% to 50% glandular breast tissue   • SINUS SURGERY     • TONSILECTOMY AND ADNOIDECTOMY       Family History:  Family History   Problem Relation Age of Onset   • Cancer Mother    • Breast cancer Mother    • Cancer Father    • Colon cancer Father    • Arthritis Father    • Cancer Sister    • Breast cancer Sister    • Arthritis Brother    • No Known Problems Paternal Grandmother    • No Known Problems Paternal Grandfather    • Autoimmune disease Daughter    • No Known Problems Son    • No Known Problems Son    • Heart disease Half-Brother    • Prostate cancer Half-Brother    • Melanoma Half-Brother      Social History:  Social History     Substance and Sexual Activity   Alcohol Use Not Currently    Comment: socially, had glass of wine today     Social History     Substance and Sexual Activity   Drug Use Not Currently    Comment: CBD     Social History     Tobacco Use   Smoking Status Former   • Packs/day: 1.00   • Types: Cigarettes   • Quit date:    • Years since quittin.7   Smokeless Tobacco Never     Review of Systems   Constitutional: Negative for chills and fever. HENT: Negative for ear pain and sore throat. Eyes: Negative for pain and visual disturbance. Respiratory: Negative for cough and shortness of breath. Cardiovascular: Negative for chest pain and palpitations. Gastrointestinal: Negative for abdominal pain and vomiting. Genitourinary: Negative for dysuria and hematuria. Musculoskeletal: Positive for arthralgias (left leg). Negative for back pain. Skin: Negative for color change and rash. Neurological: Negative for seizures and syncope. All other systems reviewed and are negative. Objective:  BP Readings from Last 1 Encounters:   23 124/78      Wt Readings from Last 1 Encounters:   23 77.1 kg (170 lb)      BMI:   Estimated body mass index is 29.18 kg/m² as calculated from the following:    Height as of this encounter: 5' 4" (1.626 m). Weight as of this encounter: 77.1 kg (170 lb). BSA:   Estimated body surface area is 1.83 meters squared as calculated from the following:    Height as of this encounter: 5' 4" (1.626 m). Weight as of this encounter: 77.1 kg (170 lb). Physical Exam  Constitutional:       Appearance: She is well-developed. Eyes:      Pupils: Pupils are equal, round, and reactive to light. Pulmonary:      Effort: Pulmonary effort is normal.      Breath sounds: Normal breath sounds. Musculoskeletal:         General: Tenderness (left leg arthralgia ) present. Skin:     General: Skin is warm and dry. Neurological:      Mental Status: She is alert and oriented to person, place, and time. Deep Tendon Reflexes: Reflexes are normal and symmetric. Psychiatric:         Behavior: Behavior normal.         Thought Content:  Thought content normal.         Judgment: Judgment normal.       Left Hip Exam     Tenderness   The patient is experiencing no tenderness. Range of Motion   Flexion: normal   External rotation: normal   Internal rotation: normal     Muscle Strength   Abduction: 3/5   Adduction: 3/5   Flexion: 3/5     Other   Erythema: absent  Scars: absent  Sensation: normal  Pulse: present    Comments:  Gluteal, quad, hamstring atrophy left leg  Able to extend her knee fully              I have personally reviewed pertinent films in PACS and my interpretation is xr left hip mild arthritic changes, EMG 2022 severe femoral nerve axon loss, MRI lumbar spine multilevel spondylosis advanced L5/S1 with wedge compression fracture L2.      Scribe Attestation    I,:  Mendel Solan am acting as a scribe while in the presence of the attending physician.:       I,:  Armin Pan MD personally performed the services described in this documentation    as scribed in my presence.:

## 2023-09-13 NOTE — ASSESSMENT & PLAN NOTE
Findings consistent with left leg atrophy secondary to severe femoral nerve damage. Imaging and prognosis reviewed with patient. At this time patient will be referred back to physical therapy for continued rehab left leg, strengthening, gait training, motion. Also recommended to continue walking and using stationary bike every other day if able to up to 45 minutes. Also recommend aquatic therapy is able to get access to pool. See patient back on as needed basis. All patient's questions were answered to her satisfaction. This note is created using dictation transcription. It may contain typographical errors, grammatical errors, improperly dictated words, background noise and other errors.

## 2023-09-22 ENCOUNTER — EVALUATION (OUTPATIENT)
Facility: CLINIC | Age: 63
End: 2023-09-22
Payer: COMMERCIAL

## 2023-09-22 DIAGNOSIS — G61.89 OTHER INFLAMMATORY POLYNEUROPATHIES (HCC): ICD-10-CM

## 2023-09-22 DIAGNOSIS — M62.562 ATROPHY OF MUSCLE OF LEFT LOWER LEG: Primary | ICD-10-CM

## 2023-09-22 DIAGNOSIS — H83.8X2 SUPERIOR SEMICIRCULAR CANAL DEHISCENCE OF LEFT EAR: ICD-10-CM

## 2023-09-22 DIAGNOSIS — G89.4 CHRONIC PAIN SYNDROME: ICD-10-CM

## 2023-09-22 PROCEDURE — 97163 PT EVAL HIGH COMPLEX 45 MIN: CPT

## 2023-09-22 NOTE — PROGRESS NOTES
PT Evaluation     Today's date: 2023  Patient name: Roxy Ann  : 1960  MRN: [id number]  Referring provider: Mitzi Cloud MD  Dx:   Encounter Diagnosis     ICD-10-CM    1. Atrophy of muscle of left lower leg  M62.562 Ambulatory Referral to Physical Therapy     PT plan of care cert/re-cert      2. Other inflammatory polyneuropathies (HCC)  G61.89 PT plan of care cert/re-cert      3. 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 Chronic pain syndrome  G89.4 PT plan of care cert/re-cert          Start Time: 0115  Stop Time: 215  Total time in clinic (min): 60 minutes    Assessment  Assessment details: Patient is well-known to this PT seen in the past for deconditioning post liver transplant. She recently experienced CMV with substantial decline in her mobility and overall increase in weakness. She has also noted increased vestibular sensitivity with auditory changes and general increase in dizziness. While she has not reported near falls, she is more cautious overall and has limited her mobility in her home relying on a standard cane for support. Patient displays abnormal muscle strength with overall grade of 3-/3/5. Patient sensation is diminished throughout lower extremities. Patient coordination is abnormal per alterate toe tapping and heel to shin test.   Patient balance scores are as follows: 45/56 HOYOS, 9.2 seconds TUG without Assistive Device with overall results noting high risk for falls. Patient also displays decreased confidence in her stability scoring 50% on ABC. Patient endurance scoring for 5 sit to stand reflects decreased ability achieving 14 seconds. 6 min walk test will follow. Patient displays overall reduction in somatosensory/ proprioception awareness secondary to decreased stability on compliant surfaces, limited unilateral stance and instability with narrow base of support.   Patient subjective report notes the following functional limitation with instability with ADL tasks at home and limited tolerance to ambulation with increased fear of falling. Despite her complex medical history, she is a very motivated individual and will benefit from skilled intervention, however, given her history, increased time is expected to achieve goals as noted. Patient will benefit from treatment to address noted impairments and functional limitations they are causing with overall goal to return patient to highest level possible with reduced risk for falls. Please contact me if you have any questions or recommendations. Thank you for the referral and the opportunity to share in Linda's care. Patient verbalized understanding of POC              Impairments: abnormal coordination, abnormal gait, activity intolerance, impaired balance, impaired physical strength, lacks appropriate home exercise program, pain with function and safety issue  Understanding of Dx/Px/POC: good   Prognosis: good    Goals  Goals  STG (5 weeks)    Patient will improve static balance with feet together eyes closed on foam to 20 seconds indicating reduction in fall risk  Patient will complete 6 min walk test with goals to follow  Patient will display 2  second improvement with overall score of  7 seconds  with TUG test or lower with noted improvement being Minimal Detectable Change pre current research standards with fall risk.    Patient will achieve 51/56 HOYOS score with minimal improve by 6 points or more demonstrating Minimal Detectable change per current research standards for this objective test which assess fall risk.    Patient will perform  5 x sit to stand test with overall reduction by 3 seconds to 11 sec score indicating improvement with functional endurance  Patient will be independent in basic strengthening and balance HEP    LTG: 10 weeks)   Patient will score low risk for falls with 3/4 fall risk measures   Patient will achieve 190 feet improvement with overall distance achieved of TBA feet with 6 minute walk test which is Minimal Detectable Change pre current research standards with endurance to demonstrate enhance functional capacity   Patient will be able to perform floor transfer without physical assistance   Patient will be able to carry objects without loss of balance  Patient will be able to ambulate outdoors without any loss of balance  Patient will independent in community based exercise program to promote increase mobility    Cut off score   All date taken from [facility] Neuro Section or Rehab Measures    HOYOS test: 46/56                                              5 x STS Test:  MDC: 6 points                                                  MDC: 2.3 seconds   age norms                                                                 Age Norms   57-79 year old = M: 54, F: 54                        57-79 year old: 11.4 seconds   73-78 year old = M 47,  F: 48                       73-78 year old: 12.6 seconds    80-80 year old = M46,   F: 51                       80-80 year old: 14.8 seconds     TUG test:                                                                     10 Meter Walk Test:  MDC: 4.14 seconds       MDC: .59 ft/sec  Cut off score for Falls                                                  Age Norms  > 13.5 seconds community dwelling adults                20-29; M: 4.56 ft/sec F: 4.62 ft/sec  > 32.2 Frail Elderly                                                     30-39: M 4.76 ft/sec  F: 4.68 ft/sec          40-49: M: 4.79 ft/sec  F: 4.62 ft/sec  6 Minute Walk Test      50-59: M: 4.76 ft/sec  F: 4.56 ft/sec  MDC: 190 feet       60-69: M: 4.56 ft/sec  F: 4.26 ft/sec  Age Norms       70-+    M: 4.36 ft/sec  F: 4.16 ft/sec  60-69:    M: 1876 F: 1765  70-79:    M: 1729 F: 1545  80-89 +: M: 1368 F; 1286     Plan  Patient would benefit from: skilled physical therapy  Planned modality interventions: biofeedback  Planned therapy interventions: manual therapy, motor coordination training, neuromuscular re-education, patient education, postural training, sensory integrative techniques, strengthening, stretching, therapeutic activities, therapeutic exercise, gait training, home exercise program, coordination, balance and ADL retraining  Frequency: 2x week  Duration in weeks: 10  Treatment plan discussed with: patient        Subjective Evaluation    History of Present Illness  Mechanism of injury: Patient was hospitalized with CMV from her donor. She has been sick for 3 months, slowly improving. She has been feeling overall weak. She is frustrated with her instability and loss of strength. Patient states she feels her pain is tremendous in her left leg. Pain  Current pain ratin  At best pain ratin  At worst pain ratin  Location: left leg pain, anterior thigh  Relieving factors: medications and rest          Objective     Neurological Testing     Additional Neurological Details  Impaired left LE    Strength/Myotome Testing     Left Hip   Planes of Motion   Flexion: 3 and 3-  Extension: 3 and 3-  Abduction: 3 and 3-    Right Hip   Planes of Motion   Flexion: 3+ and 4-  Extension: 4- and 3+  Abduction: 3+ and 4-    Left Knee   Flexion: 3 and 3+  Extension: 3+ and 3    Right Knee   Flexion: 4- and 3+  Extension: 3+ and 4-    Left Ankle/Foot   Dorsiflexion: 2+  Plantar flexion: 2+    Right Ankle/Foot   Dorsiflexion: 3-  Plantar flexion: 3-  Neuro Exam:     Sensation   Light touch LE: left impaired  Light touch LE: right WNL    Transfers Sit to stand: independent Sit to supine: independent Supine to sit: independent   Roll: independent    Functional outcomes   Functional outcome gait comment: Slow hardeep with increased base of support.   Hesitation noted with left LE advancement and tendency to advance with circumduction             Precautions:   H/o liver transplant, h/o CMV, h/o dehiscense, h/o polyneuropathy, h/o left muscle atrophy, chronic pain  EPOC;  12/1/2023       TESTING 9/22            ABC 50%            5x sit to stand 14sec            HOYOS 45/56            TUG 9.2sec without device            6 min walk test             Neuro Re-Ed             Step taps             Hurdles with CTG             Sidestepping             A/P sway             Step up             Foam beam                          Ther Ex             Sit to stand                                                                                                        Ther Activity                                       Gait Training                                       Modalities

## 2023-09-25 ENCOUNTER — OFFICE VISIT (OUTPATIENT)
Facility: CLINIC | Age: 63
End: 2023-09-25
Payer: COMMERCIAL

## 2023-09-25 ENCOUNTER — APPOINTMENT (OUTPATIENT)
Dept: LAB | Facility: HOSPITAL | Age: 63
End: 2023-09-25
Payer: COMMERCIAL

## 2023-09-25 DIAGNOSIS — D84.9 DEFICIENCY SYNDROME, IMMUNOLOGIC (HCC): ICD-10-CM

## 2023-09-25 DIAGNOSIS — B25.9: ICD-10-CM

## 2023-09-25 DIAGNOSIS — Z94.4 LIVER REPLACED BY TRANSPLANT (HCC): ICD-10-CM

## 2023-09-25 DIAGNOSIS — H83.8X2 SUPERIOR SEMICIRCULAR CANAL DEHISCENCE OF LEFT EAR: ICD-10-CM

## 2023-09-25 DIAGNOSIS — G61.89 OTHER INFLAMMATORY POLYNEUROPATHIES (HCC): ICD-10-CM

## 2023-09-25 DIAGNOSIS — M62.562 ATROPHY OF MUSCLE OF LEFT LOWER LEG: Primary | ICD-10-CM

## 2023-09-25 DIAGNOSIS — G89.4 CHRONIC PAIN SYNDROME: ICD-10-CM

## 2023-09-25 LAB
ALBUMIN SERPL BCP-MCNC: 3.9 G/DL (ref 3.5–5)
ALP SERPL-CCNC: 60 U/L (ref 34–104)
ALT SERPL W P-5'-P-CCNC: 128 U/L (ref 7–52)
ANION GAP SERPL CALCULATED.3IONS-SCNC: 6 MMOL/L
ANISOCYTOSIS BLD QL SMEAR: PRESENT
AST SERPL W P-5'-P-CCNC: 168 U/L (ref 13–39)
BASOPHILS # BLD MANUAL: 0.04 THOUSAND/UL (ref 0–0.1)
BASOPHILS NFR MAR MANUAL: 2 % (ref 0–1)
BILIRUB DIRECT SERPL-MCNC: 0.26 MG/DL (ref 0–0.2)
BILIRUB SERPL-MCNC: 1.04 MG/DL (ref 0.2–1)
BUN SERPL-MCNC: 15 MG/DL (ref 5–25)
CALCIUM SERPL-MCNC: 8.8 MG/DL (ref 8.4–10.2)
CHLORIDE SERPL-SCNC: 101 MMOL/L (ref 96–108)
CO2 SERPL-SCNC: 31 MMOL/L (ref 21–32)
CREAT SERPL-MCNC: 0.85 MG/DL (ref 0.6–1.3)
EOSINOPHIL # BLD MANUAL: 0.04 THOUSAND/UL (ref 0–0.4)
EOSINOPHIL NFR BLD MANUAL: 2 % (ref 0–6)
ERYTHROCYTE [DISTWIDTH] IN BLOOD BY AUTOMATED COUNT: 16.4 % (ref 11.6–15.1)
GFR SERPL CREATININE-BSD FRML MDRD: 74 ML/MIN/1.73SQ M
GLUCOSE P FAST SERPL-MCNC: 92 MG/DL (ref 65–99)
HCT VFR BLD AUTO: 40.9 % (ref 34.8–46.1)
HGB BLD-MCNC: 13 G/DL (ref 11.5–15.4)
LYMPHOCYTES # BLD AUTO: 0.64 THOUSAND/UL (ref 0.6–4.47)
LYMPHOCYTES # BLD AUTO: 30 % (ref 14–44)
MAGNESIUM SERPL-MCNC: 1.8 MG/DL (ref 1.9–2.7)
MCH RBC QN AUTO: 31.6 PG (ref 26.8–34.3)
MCHC RBC AUTO-ENTMCNC: 31.8 G/DL (ref 31.4–37.4)
MCV RBC AUTO: 99 FL (ref 82–98)
METAMYELOCYTES NFR BLD MANUAL: 2 % (ref 0–1)
MONOCYTES # BLD AUTO: 0.16 THOUSAND/UL (ref 0–1.22)
MONOCYTES NFR BLD: 8 % (ref 4–12)
NEUTROPHILS # BLD MANUAL: 1.09 THOUSAND/UL (ref 1.85–7.62)
NEUTS BAND NFR BLD MANUAL: 4 % (ref 0–8)
NEUTS SEG NFR BLD AUTO: 50 % (ref 43–75)
OVALOCYTES BLD QL SMEAR: PRESENT
PLATELET # BLD AUTO: 177 THOUSANDS/UL (ref 149–390)
PLATELET BLD QL SMEAR: ABNORMAL
PMV BLD AUTO: 10.2 FL (ref 8.9–12.7)
POLYCHROMASIA BLD QL SMEAR: PRESENT
POTASSIUM SERPL-SCNC: 3.8 MMOL/L (ref 3.5–5.3)
PROT SERPL-MCNC: 6.3 G/DL (ref 6.4–8.4)
RBC # BLD AUTO: 4.12 MILLION/UL (ref 3.81–5.12)
RBC MORPH BLD: PRESENT
SODIUM SERPL-SCNC: 138 MMOL/L (ref 135–147)
TACROLIMUS BLD-MCNC: 9.8 NG/ML (ref 3–15)
VARIANT LYMPHS # BLD AUTO: 2 %
WBC # BLD AUTO: 2.01 THOUSAND/UL (ref 4.31–10.16)

## 2023-09-25 PROCEDURE — 97112 NEUROMUSCULAR REEDUCATION: CPT

## 2023-09-25 PROCEDURE — 80197 ASSAY OF TACROLIMUS: CPT

## 2023-09-25 PROCEDURE — 80076 HEPATIC FUNCTION PANEL: CPT

## 2023-09-25 PROCEDURE — 36415 COLL VENOUS BLD VENIPUNCTURE: CPT

## 2023-09-25 PROCEDURE — 85007 BL SMEAR W/DIFF WBC COUNT: CPT

## 2023-09-25 PROCEDURE — 83735 ASSAY OF MAGNESIUM: CPT

## 2023-09-25 PROCEDURE — 80048 BASIC METABOLIC PNL TOTAL CA: CPT

## 2023-09-25 PROCEDURE — 85027 COMPLETE CBC AUTOMATED: CPT

## 2023-09-25 NOTE — PROGRESS NOTES
Daily Note     Today's date: 2023  Patient name: Nathan Givens  : 1960  MRN: [id number]  Referring provider: Mariam Sarmiento MD  Dx:   Encounter Diagnosis     ICD-10-CM    1. Atrophy of muscle of left lower leg  M62.562       2. Other inflammatory polyneuropathies ([address])  G61.89       3. 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 Chronic pain syndrome  G89.4           Start Time: 014  Stop Time: 0  Total time in clinic (min): 45 minutes    Subjective:  She got blood work back and is awaiting update from her PCP. Increased knee pain today, 7/10, she just took pain meds. Objective: See treatment diary below      Assessment:  Unilateral swing with occasional haptic touch needed for balance  Partial tandem with good stability EO HT. Sidestepping with good control on firm, occasional haptic touch on foam beam.  Cone taps with good stability, improving with repetition. Braiding with CTG to close supervision due to occasional instability. Step taps tolerated well, step up with left with poor knee control, reduced height and concentrated on controlling for knee extension with step up. HEP encouraged with pacing to avoid fatigue. Patient voiced understanding to all instructions. Plan: No increase in pain noted, per patient "I feel better". Assess tolerance to standing challenges and progress balance as tolerated.      Precautions:   H/o liver transplant, h/o CMV, h/o dehiscense, h/o polyneuropathy, h/o left muscle atrophy, chronic pain  EPOC;  2023       TESTING            ABC 50%            5x sit to stand 14sec            HOYOS 45/56            TUG 9.2sec without device            6 min walk test             Neuro Re-Ed             Step taps  8" x10ea           Hurdles with CTG             Sidestepping  Firm 4 laps; foam 4 laps           Unilateral swing firm  FWD/LAT 4x2 ea           Step up  4" with left lead x4, 8" right lead x10           Foam beam w/ cone taps  8cones x4 laps           Braiding  4 laps           Ther Ex             Sit to stand  Sit taps with cushion 5x2                                                                                                      Ther Activity                                       Gait Training                                       Modalities

## 2023-09-26 LAB
CMV DNA SERPL NAA+PROBE-ACNC: NORMAL IU/ML
CMV DNA SERPL NAA+PROBE-LOG IU: NORMAL LOG10 IU/ML

## 2023-09-28 ENCOUNTER — OFFICE VISIT (OUTPATIENT)
Facility: CLINIC | Age: 63
End: 2023-09-28
Payer: COMMERCIAL

## 2023-09-28 DIAGNOSIS — H83.8X2 SUPERIOR SEMICIRCULAR CANAL DEHISCENCE OF LEFT EAR: ICD-10-CM

## 2023-09-28 DIAGNOSIS — M62.562 ATROPHY OF MUSCLE OF LEFT LOWER LEG: Primary | ICD-10-CM

## 2023-09-28 DIAGNOSIS — G61.89 OTHER INFLAMMATORY POLYNEUROPATHIES (HCC): ICD-10-CM

## 2023-09-28 DIAGNOSIS — G89.4 CHRONIC PAIN SYNDROME: ICD-10-CM

## 2023-09-28 PROCEDURE — 97112 NEUROMUSCULAR REEDUCATION: CPT

## 2023-09-28 PROCEDURE — 97110 THERAPEUTIC EXERCISES: CPT

## 2023-09-28 NOTE — PROGRESS NOTES
Daily Note     Today's date: 2023  Patient name: Warden Ramos  : 1960  MRN: [id number]  Referring provider: Mervat Tovar MD  Dx:   Encounter Diagnosis     ICD-10-CM    1. Atrophy of muscle of left lower leg  M62.562       2. 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 Other inflammatory polyneuropathies ([address])  G61.89       4. Chronic pain syndrome  G89.4             Start Time: 0230  Stop Time: 310  Total time in clinic (min): 40 minutes    Subjective:  Patient states she continues to struggle with general malaise. Objective: See treatment diary below      Assessment:   Due to increased fatigue today, concentration on supine/sidelying exercises. Difficulty with sidelying abduction circles, pacing for initiation needed. Step taps with intermittent LOB noted, patient able to self correct. Encouraged patient to return to stretching including thoracic rotation in sidelying and hamstring stretches to allow for improved motion. Bridging with cuing to df to allow for improved glut firing. Plan:  No complaints end of session. Limited treatment time today due to fatigue, patient encouraged to contact her PCP should her symptoms worsen.      Precautions:   H/o liver transplant, h/o CMV, h/o dehiscense, h/o polyneuropathy, h/o left muscle atrophy, chronic pain  EPOC;  2023       TESTING           ABC 50%            5x sit to stand 14sec            HOYOS 45/56            TUG 9.2sec without device            6 min walk test             Neuro Re-Ed             Step taps  8" x10ea 8" x10ea FWD, 10 ea LAT          Hurdles with CTG             Sidestepping  Firm 4 laps; foam 4 laps           Unilateral swing firm  FWD/LAT 4x2 ea           Step up  4" with left lead x4, 8" right lead x10           Foam beam w/ cone taps  8cones x4 laps           Braiding  4 laps           Ther Ex             Sit to stand  Sit taps with cushion 5x2           Hip abduction sidelying   5x2 ea          Clamshell   5x2ea          Hip flex/ext in abd sidelying   5x2ea          Hip circles CW/CCW   5x2ea          Bridging with DF   5x2                                    Ther Activity                                       Gait Training                                       Modalities

## 2023-10-03 ENCOUNTER — TELEPHONE (OUTPATIENT)
Facility: CLINIC | Age: 63
End: 2023-10-03

## 2023-10-03 NOTE — TELEPHONE ENCOUNTER
Patient contacted due to recent hospitalization. Per patient, she is being discharged today and anticipates attending PT for her scheduled appointment Thursday, details to follow.

## 2023-10-04 ENCOUNTER — APPOINTMENT (OUTPATIENT)
Facility: CLINIC | Age: 63
End: 2023-10-04
Payer: COMMERCIAL

## 2023-10-05 ENCOUNTER — APPOINTMENT (OUTPATIENT)
Facility: CLINIC | Age: 63
End: 2023-10-05
Payer: COMMERCIAL

## 2023-10-07 NOTE — PROGRESS NOTES
[address]        NAME: Thiago Davis is a 64 y o  female  : 1960    MRN: [id number]  DATE: Karli 15, 2021  TIME: 4:14 PM    Assessment and Plan   Right lower quadrant abdominal pain [R10 31]  1  Right lower quadrant abdominal pain  Ambulatory Referral to Emergency Medicine   2  Dysuria  Urine culture         Patient Instructions       Follow up with PCP in 3-5 days  Proceed to  ER if symptoms worsen  Chief Complaint     Chief Complaint   Patient presents with    Possible UTI     Pt states she has urinary frequency, urgency, low abdominal pressure, pain with urination, bilateral low back pain, nausea  History of Present Illness        79-year-old female with 1 week history of abdominal pressure, distension and increased urinary frequency  She also reports having pain on both sides of her lower back and also reports feelings of restlessness  Denies any fevers or chills  Denies any nausea or vomitin      Review of Systems   Review of Systems   Constitutional: Negative  HENT: Negative  Eyes: Negative  Respiratory: Negative  Cardiovascular: Negative  Gastrointestinal: Positive for abdominal distention, abdominal pain and nausea  Negative for vomiting  Genitourinary: Negative  Musculoskeletal: Positive for arthralgias and myalgias  Skin: Negative  Allergic/Immunologic: Negative  Neurological: Negative  Hematological: Negative  Psychiatric/Behavioral: Negative            Current Medications       Current Outpatient Medications:     candesartan (ATACAND) 16 mg tablet, Take 16 mg by mouth daily, Disp: , Rfl:     amLODIPine (NORVASC) 10 mg tablet, Take 10 mg by mouth daily , Disp: , Rfl:     B-D 3CC LUER-CLARA SYR 25GX1" 25G X 1" 3 ML MISC, use as directed WITH MEDICINE, Disp: , Rfl:     clindamycin-benzoyl peroxide (BENZACLIN) gel, APPLY TWICE DAILY TO AFFECTED AREA(S) AS DIRECTED, Disp: , Rfl:     clonazePAM (KlonoPIN) 0 5 mg tablet, Take 0 5 mg by Pt complains of headache with nausea when she tries to eat something.    mouth daily before breakfast Indications: takes for dizziness  , Disp: , Rfl:     dihydroergotamine (DHE) 1 mg/mL, 1 mg IM prn severe headache  May repeat in 2 hours  Max 2 per day, Max 2 days per week, Disp: , Rfl:     Filter Needles 18G X 1-1/2" MISC, USE 1 SYRINGE FILTER PRF FOR DRAWING UP DHE INJECTION THEN SWITCH TO 25 GAUGE SYRINGE, Disp: , Rfl:     ketorolac (TORADOL) 30 mg/mL injection, 30 mg IM prn severe headache  Max 1 day per week, Disp: , Rfl:     mexiletine (MEXITIL) 150 mg capsule, Take 150 mg by mouth 3 (three) times a day, Disp: , Rfl:     naproxen sodium (ANAPROX) 550 mg tablet, take 1 tablet by mouth twice a day if needed for headache up to three times a week, Disp: , Rfl:     ondansetron (ZOFRAN) 8 mg tablet, Take 8 mg by mouth every 8 (eight) hours as needed for nausea or vomiting, Disp: , Rfl:     ondansetron (ZOFRAN-ODT) 4 mg disintegrating tablet, Take 1 tablet by mouth every 8 (eight) hours as needed for nausea or vomiting , Disp: 6 tablet, Rfl: 0    promethazine (PHENERGAN) 25 mg tablet, take 1 tablet by mouth twice a day if needed for headache or nausea, Disp: , Rfl:     RA VITAMIN D-3 25 MCG (1000 UT) tablet, Take 1,000 Units by mouth 2 (two) times a day, Disp: , Rfl:     Saline (AYR SALINE NASAL DROPS) 0 65 % (Soln) SOLN, 1 drop into each nostril, Disp: , Rfl:     spironolactone (ALDACTONE) 25 mg tablet, , Disp: , Rfl:     traMADol (ULTRAM) 50 mg tablet, Take 1 PO HS   (Patient not taking: Reported on 6/15/2021), Disp: 30 tablet, Rfl: 0    Current Allergies     Allergies as of 06/15/2021 - Reviewed 06/15/2021   Allergen Reaction Noted    Codeine Other (See Comments) 07/08/2016            The following portions of the patient's history were reviewed and updated as appropriate: allergies, current medications, past family history, past medical history, past social history, past surgical history and problem list      Past Medical History:   Diagnosis Date    Concussion     CSF leak        Past Surgical History:   Procedure Laterality Date    BREAST LUMPECTOMY      LAPAROSCOPY FOR ECTOPIC PREGNANCY      TONSILECTOMY AND ADNOIDECTOMY         Family History   Problem Relation Age of Onset    Colon cancer Father     Breast cancer Sister          Medications have been verified  Objective   /80   Pulse 78   Temp 100 4 °F (38 °C)   Resp 16   Ht 5' 6" (1 676 m)   Wt 74 8 kg (165 lb)   SpO2 96%   BMI 26 63 kg/m²   No LMP recorded  Patient is postmenopausal        Physical Exam     Physical Exam  Vitals and nursing note reviewed  Constitutional:       General: She is in acute distress  Appearance: She is well-developed  HENT:      Head: Normocephalic  Eyes:      Pupils: Pupils are equal, round, and reactive to light  Pulmonary:      Effort: Pulmonary effort is normal    Abdominal:      General: There is distension  Tenderness: There is abdominal tenderness in the right lower quadrant  There is right CVA tenderness, left CVA tenderness and rebound  Musculoskeletal:         General: Normal range of motion  Skin:     General: Skin is warm and dry  Neurological:      Mental Status: She is alert and oriented to person, place, and time

## 2023-10-10 ENCOUNTER — OFFICE VISIT (OUTPATIENT)
Facility: CLINIC | Age: 63
End: 2023-10-10
Payer: COMMERCIAL

## 2023-10-10 ENCOUNTER — APPOINTMENT (OUTPATIENT)
Dept: LAB | Facility: HOSPITAL | Age: 63
End: 2023-10-10
Payer: COMMERCIAL

## 2023-10-10 DIAGNOSIS — G61.89 OTHER INFLAMMATORY POLYNEUROPATHIES (HCC): ICD-10-CM

## 2023-10-10 DIAGNOSIS — M62.562 ATROPHY OF MUSCLE OF LEFT LOWER LEG: Primary | ICD-10-CM

## 2023-10-10 DIAGNOSIS — G89.4 CHRONIC PAIN SYNDROME: ICD-10-CM

## 2023-10-10 DIAGNOSIS — Z00.00 GENERAL MEDICAL EXAM: ICD-10-CM

## 2023-10-10 DIAGNOSIS — D84.9 DEFICIENCY SYNDROME, IMMUNOLOGIC (HCC): ICD-10-CM

## 2023-10-10 DIAGNOSIS — Z94.4 LIVER REPLACED BY TRANSPLANT (HCC): ICD-10-CM

## 2023-10-10 DIAGNOSIS — H83.8X2 SUPERIOR SEMICIRCULAR CANAL DEHISCENCE OF LEFT EAR: ICD-10-CM

## 2023-10-10 DIAGNOSIS — B25.9: ICD-10-CM

## 2023-10-10 LAB
ALBUMIN SERPL BCP-MCNC: 3.9 G/DL (ref 3.5–5)
ALP SERPL-CCNC: 43 U/L (ref 34–104)
ALT SERPL W P-5'-P-CCNC: 49 U/L (ref 7–52)
ANION GAP SERPL CALCULATED.3IONS-SCNC: 7 MMOL/L
ANISOCYTOSIS BLD QL SMEAR: PRESENT
AST SERPL W P-5'-P-CCNC: 14 U/L (ref 13–39)
BASOPHILS # BLD MANUAL: 0.04 THOUSAND/UL (ref 0–0.1)
BASOPHILS NFR MAR MANUAL: 2 % (ref 0–1)
BILIRUB DIRECT SERPL-MCNC: 0.05 MG/DL (ref 0–0.2)
BILIRUB SERPL-MCNC: 0.69 MG/DL (ref 0.2–1)
BUN SERPL-MCNC: 16 MG/DL (ref 5–25)
CALCIUM SERPL-MCNC: 8.8 MG/DL (ref 8.4–10.2)
CHLORIDE SERPL-SCNC: 105 MMOL/L (ref 96–108)
CO2 SERPL-SCNC: 29 MMOL/L (ref 21–32)
CREAT SERPL-MCNC: 0.74 MG/DL (ref 0.6–1.3)
EOSINOPHIL # BLD MANUAL: 0.06 THOUSAND/UL (ref 0–0.4)
EOSINOPHIL NFR BLD MANUAL: 3 % (ref 0–6)
ERYTHROCYTE [DISTWIDTH] IN BLOOD BY AUTOMATED COUNT: 15.6 % (ref 11.6–15.1)
EST. AVERAGE GLUCOSE BLD GHB EST-MCNC: 123 MG/DL
GFR SERPL CREATININE-BSD FRML MDRD: 88 ML/MIN/1.73SQ M
GLUCOSE P FAST SERPL-MCNC: 102 MG/DL (ref 65–99)
HBA1C MFR BLD: 5.9 %
HCT VFR BLD AUTO: 39 % (ref 34.8–46.1)
HGB BLD-MCNC: 12.6 G/DL (ref 11.5–15.4)
INR PPP: 0.9 (ref 0.84–1.19)
LG PLATELETS BLD QL SMEAR: PRESENT
LYMPHOCYTES # BLD AUTO: 0.67 THOUSAND/UL (ref 0.6–4.47)
LYMPHOCYTES # BLD AUTO: 31 % (ref 14–44)
MAGNESIUM SERPL-MCNC: 1.7 MG/DL (ref 1.9–2.7)
MCH RBC QN AUTO: 31.9 PG (ref 26.8–34.3)
MCHC RBC AUTO-ENTMCNC: 32.3 G/DL (ref 31.4–37.4)
MCV RBC AUTO: 99 FL (ref 82–98)
MONOCYTES # BLD AUTO: 0.04 THOUSAND/UL (ref 0–1.22)
MONOCYTES NFR BLD: 2 % (ref 4–12)
MYELOCYTES NFR BLD MANUAL: 1 % (ref 0–1)
NEUTROPHILS # BLD MANUAL: 1.14 THOUSAND/UL (ref 1.85–7.62)
NEUTS BAND NFR BLD MANUAL: 7 % (ref 0–8)
NEUTS SEG NFR BLD AUTO: 51 % (ref 43–75)
OVALOCYTES BLD QL SMEAR: PRESENT
PLATELET # BLD AUTO: 187 THOUSANDS/UL (ref 149–390)
PLATELET BLD QL SMEAR: ADEQUATE
PMV BLD AUTO: 9.8 FL (ref 8.9–12.7)
POLYCHROMASIA BLD QL SMEAR: PRESENT
POTASSIUM SERPL-SCNC: 4 MMOL/L (ref 3.5–5.3)
PROT SERPL-MCNC: 6.2 G/DL (ref 6.4–8.4)
PROTHROMBIN TIME: 12.6 SECONDS (ref 11.6–14.5)
RBC # BLD AUTO: 3.95 MILLION/UL (ref 3.81–5.12)
RBC MORPH BLD: PRESENT
SODIUM SERPL-SCNC: 141 MMOL/L (ref 135–147)
TACROLIMUS BLD-MCNC: 9.6 NG/ML (ref 3–15)
VARIANT LYMPHS # BLD AUTO: 3 %
WBC # BLD AUTO: 1.96 THOUSAND/UL (ref 4.31–10.16)

## 2023-10-10 PROCEDURE — 36415 COLL VENOUS BLD VENIPUNCTURE: CPT

## 2023-10-10 PROCEDURE — 82248 BILIRUBIN DIRECT: CPT

## 2023-10-10 PROCEDURE — 97112 NEUROMUSCULAR REEDUCATION: CPT

## 2023-10-10 PROCEDURE — 80197 ASSAY OF TACROLIMUS: CPT

## 2023-10-10 PROCEDURE — 97110 THERAPEUTIC EXERCISES: CPT

## 2023-10-10 PROCEDURE — 85007 BL SMEAR W/DIFF WBC COUNT: CPT

## 2023-10-10 PROCEDURE — 85027 COMPLETE CBC AUTOMATED: CPT

## 2023-10-10 PROCEDURE — 85610 PROTHROMBIN TIME: CPT

## 2023-10-10 PROCEDURE — 83036 HEMOGLOBIN GLYCOSYLATED A1C: CPT

## 2023-10-10 PROCEDURE — 80053 COMPREHEN METABOLIC PANEL: CPT

## 2023-10-10 PROCEDURE — 83735 ASSAY OF MAGNESIUM: CPT

## 2023-10-10 NOTE — PROGRESS NOTES
Daily Note     Today's date: 10/10/2023  Patient name: Valarie Kim  : 1960  MRN: [id number]  Referring provider: Saurav aGrcia MD  Dx:   Encounter Diagnosis     ICD-10-CM    1. Atrophy of muscle of left lower leg  M62.562       2. 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 Other inflammatory polyneuropathies ([address])  G61.89       4. Chronic pain syndrome  G89.4               Start Time: 1100  Stop Time: 1145  Total time in clinic (min): 45 minutes    Subjective:  Patient states she was admitted last week due to lab values. Liver biopsy performed. She underwent changes to her meds. Objective: See treatment diary below      Assessment:   Due to fatigue focused on core and LE strengthening she can continue to perform at home. HEP reviewed with written instructions, good demonstration. Sit to stand off raised surface with good tolerance and no increase in knee pain noted. Per patient she is to have x-ray left hip due to continued upper thigh discomfort, details to follow. Encouraged pacing for ambulation to avoid significant fatigue. Plan:  No complaints end of session. Assess tolerance to HEP and progress standing tasks to tolerance.      Precautions:   H/o liver transplant, h/o CMV, h/o dehiscense, h/o polyneuropathy, h/o left muscle atrophy, chronic pain  EPOC;  2023       TESTING 9/22 9/25 9/28 10/10         ABC 50%            5x sit to stand 14sec            HOYOS 45/56            TUG 9.2sec without device            6 min walk test             Neuro Re-Ed             Step taps  8" x10ea 8" x10ea FWD, 10 ea LAT 8" 10x2         Hurdles with CTG             Sidestepping  Firm 4 laps; foam 4 laps           Unilateral swing firm  FWD/LAT 4x2 ea  Sequential cone taps 3x4, 2 sets ea         Step up  4" with left lead x4, 8" right lead x10           Foam beam w/ cone taps  8cones x4 laps           Braiding  4 laps           Ther Ex             Sit to stand  Sit taps with cushion 5x2  Off raised seat 10         Hip abduction sidelying   5x2 ea 5x2 ea         Clamshell   5x2ea 10ea         Hip flex/ext in abd sidelying   5x2ea 5x2 ea         Hip circles CW/CCW   5x2ea          Bridging with DF   5x2 10                                   Ther Activity                                       Gait Training                                       Modalities

## 2023-10-11 LAB
CMV DNA SERPL NAA+PROBE-ACNC: NEGATIVE IU/ML
CMV DNA SERPL NAA+PROBE-LOG IU: NORMAL LOG10 IU/ML

## 2023-10-13 ENCOUNTER — APPOINTMENT (OUTPATIENT)
Dept: RADIOLOGY | Facility: CLINIC | Age: 63
End: 2023-10-13
Payer: COMMERCIAL

## 2023-10-13 ENCOUNTER — OFFICE VISIT (OUTPATIENT)
Facility: CLINIC | Age: 63
End: 2023-10-13
Payer: COMMERCIAL

## 2023-10-13 DIAGNOSIS — G89.4 CHRONIC PAIN SYNDROME: ICD-10-CM

## 2023-10-13 DIAGNOSIS — G61.89 OTHER INFLAMMATORY POLYNEUROPATHIES (HCC): ICD-10-CM

## 2023-10-13 DIAGNOSIS — H83.8X2 SUPERIOR SEMICIRCULAR CANAL DEHISCENCE OF LEFT EAR: ICD-10-CM

## 2023-10-13 DIAGNOSIS — M25.562 LEFT KNEE PAIN, UNSPECIFIED CHRONICITY: ICD-10-CM

## 2023-10-13 DIAGNOSIS — M62.562 ATROPHY OF MUSCLE OF LEFT LOWER LEG: Primary | ICD-10-CM

## 2023-10-13 PROCEDURE — 97110 THERAPEUTIC EXERCISES: CPT

## 2023-10-13 PROCEDURE — 73564 X-RAY EXAM KNEE 4 OR MORE: CPT

## 2023-10-13 PROCEDURE — 97112 NEUROMUSCULAR REEDUCATION: CPT

## 2023-10-13 NOTE — PROGRESS NOTES
Daily Note     Today's date: 10/13/2023  Patient name: Shonda Fletcher  : 1960  MRN: [id number]  Referring provider: Selene Linda MD  Dx:   Encounter Diagnosis     ICD-10-CM    1. Atrophy of muscle of left lower leg  M62.562       2. 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 Other inflammatory polyneuropathies ([address])  G61.89       4. Chronic pain syndrome  G89.4                 Start Time: 930  Stop Time: 1011  Total time in clinic (min): 41 minutes    Subjective:  Patient states she has no current complaints. She needs to cxl her next appointment due to family issues. Objective: See treatment diary below      Assessment:   Foam balance with good tolerance. A/P sway on foam with good tolerance. EC HT with partial tandem on firm with nice gains in stability. Patient encouraged to continue at home in corner for safety. Cone sequential taps with improved left stance. Stairs reviewed with step to pattern. Staggered sit to stand to encourage left leg use. Reviewed HEP with good demonstration. Plan:  No complaints end of session. Assess tolerance to increased balance tasks and progress as needed.       Precautions:   H/o liver transplant, h/o CMV, h/o dehiscense, h/o polyneuropathy, h/o left muscle atrophy, chronic pain  EPOC;  2023       TESTING 9/22 9/25 9/28 10/10 10/13        ABC 50%            5x sit to stand 14sec            HOYOS 45/56            TUG 9.2sec without device            6 min walk test             Neuro Re-Ed             Step taps  8" x10ea 8" x10ea FWD, 10 ea LAT 8" 10x2 Full flight with rail and cane 2 flights        Hurdles with CTG             Foam FT EC HT, firm partial tandem EC HT     5x2 ea        Sidestepping  Firm 4 laps; foam 4 laps           Unilateral swing firm  FWD/LAT 4x2 ea  Sequential cone taps 3x4, 2 sets ea Sequential cone taps 3x4, 2 sets        Step up  4" with left lead x4, 8" right lead x10           Foam beam w/ cone taps  8cones x4 laps           Braiding  4 laps           Ther Ex             Sit to stand  Sit taps with cushion 5x2  Off raised seat 10 Off raised seat 10, repeated in staggered position 5x2        Hip abduction sidelying   5x2 ea 5x2 ea         Clamshell   5x2ea 10ea         Hip flex/ext in abd sidelying   5x2ea 5x2 ea         Hip circles CW/CCW   5x2ea          Bridging with DF   5x2 10                                   Ther Activity                                       Gait Training                                       Modalities

## 2023-10-16 ENCOUNTER — APPOINTMENT (OUTPATIENT)
Facility: CLINIC | Age: 63
End: 2023-10-16
Payer: COMMERCIAL

## 2023-10-17 ENCOUNTER — APPOINTMENT (OUTPATIENT)
Facility: CLINIC | Age: 63
End: 2023-10-17
Payer: COMMERCIAL

## 2023-10-20 ENCOUNTER — APPOINTMENT (OUTPATIENT)
Dept: LAB | Facility: HOSPITAL | Age: 63
End: 2023-10-20
Payer: COMMERCIAL

## 2023-10-20 ENCOUNTER — OFFICE VISIT (OUTPATIENT)
Facility: CLINIC | Age: 63
End: 2023-10-20
Payer: COMMERCIAL

## 2023-10-20 DIAGNOSIS — J90 PLEURAL EFFUSION: ICD-10-CM

## 2023-10-20 DIAGNOSIS — B25.9: ICD-10-CM

## 2023-10-20 DIAGNOSIS — Z94.4 LIVER REPLACED BY TRANSPLANT (HCC): ICD-10-CM

## 2023-10-20 DIAGNOSIS — H83.8X2 SUPERIOR SEMICIRCULAR CANAL DEHISCENCE OF LEFT EAR: ICD-10-CM

## 2023-10-20 DIAGNOSIS — G89.4 CHRONIC PAIN SYNDROME: ICD-10-CM

## 2023-10-20 DIAGNOSIS — M54.16 LUMBAR RADICULOPATHY: ICD-10-CM

## 2023-10-20 DIAGNOSIS — M31.6 GCA (GIANT CELL ARTERITIS) (HCC): ICD-10-CM

## 2023-10-20 DIAGNOSIS — G61.89 OTHER INFLAMMATORY POLYNEUROPATHIES (HCC): ICD-10-CM

## 2023-10-20 DIAGNOSIS — M62.562 ATROPHY OF MUSCLE OF LEFT LOWER LEG: Primary | ICD-10-CM

## 2023-10-20 DIAGNOSIS — R18.8 CIRRHOSIS OF LIVER WITH ASCITES, UNSPECIFIED HEPATIC CIRRHOSIS TYPE: ICD-10-CM

## 2023-10-20 DIAGNOSIS — D84.9 DEFICIENCY SYNDROME, IMMUNOLOGIC (HCC): ICD-10-CM

## 2023-10-20 DIAGNOSIS — K74.60 CIRRHOSIS OF LIVER WITH ASCITES, UNSPECIFIED HEPATIC CIRRHOSIS TYPE: ICD-10-CM

## 2023-10-20 LAB
ALBUMIN SERPL BCP-MCNC: 4.1 G/DL (ref 3.5–5)
ALP SERPL-CCNC: 32 U/L (ref 34–104)
ALT SERPL W P-5'-P-CCNC: 27 U/L (ref 7–52)
ANION GAP SERPL CALCULATED.3IONS-SCNC: 6 MMOL/L
ANISOCYTOSIS BLD QL SMEAR: PRESENT
AST SERPL W P-5'-P-CCNC: 15 U/L (ref 13–39)
BASOPHILS # BLD MANUAL: 0.03 THOUSAND/UL (ref 0–0.1)
BASOPHILS NFR MAR MANUAL: 1 % (ref 0–1)
BILIRUB DIRECT SERPL-MCNC: 0.11 MG/DL (ref 0–0.2)
BILIRUB SERPL-MCNC: 0.77 MG/DL (ref 0.2–1)
BUN SERPL-MCNC: 17 MG/DL (ref 5–25)
CALCIUM SERPL-MCNC: 8.7 MG/DL (ref 8.4–10.2)
CHLORIDE SERPL-SCNC: 102 MMOL/L (ref 96–108)
CO2 SERPL-SCNC: 30 MMOL/L (ref 21–32)
CREAT SERPL-MCNC: 0.83 MG/DL (ref 0.6–1.3)
EOSINOPHIL # BLD MANUAL: 0.06 THOUSAND/UL (ref 0–0.4)
EOSINOPHIL NFR BLD MANUAL: 2 % (ref 0–6)
ERYTHROCYTE [DISTWIDTH] IN BLOOD BY AUTOMATED COUNT: 15.4 % (ref 11.6–15.1)
GFR SERPL CREATININE-BSD FRML MDRD: 76 ML/MIN/1.73SQ M
GLUCOSE P FAST SERPL-MCNC: 95 MG/DL (ref 65–99)
HCT VFR BLD AUTO: 39 % (ref 34.8–46.1)
HGB BLD-MCNC: 12.6 G/DL (ref 11.5–15.4)
LG PLATELETS BLD QL SMEAR: PRESENT
LYMPHOCYTES # BLD AUTO: 0.98 THOUSAND/UL (ref 0.6–4.47)
LYMPHOCYTES # BLD AUTO: 30 % (ref 14–44)
MAGNESIUM SERPL-MCNC: 1.7 MG/DL (ref 1.9–2.7)
MCH RBC QN AUTO: 32 PG (ref 26.8–34.3)
MCHC RBC AUTO-ENTMCNC: 32.3 G/DL (ref 31.4–37.4)
MCV RBC AUTO: 99 FL (ref 82–98)
MONOCYTES # BLD AUTO: 0.14 THOUSAND/UL (ref 0–1.22)
MONOCYTES NFR BLD: 5 % (ref 4–12)
MYELOCYTES NFR BLD MANUAL: 2 % (ref 0–1)
NEUTROPHILS # BLD MANUAL: 1.62 THOUSAND/UL (ref 1.85–7.62)
NEUTS BAND NFR BLD MANUAL: 1 % (ref 0–8)
NEUTS SEG NFR BLD AUTO: 55 % (ref 43–75)
OVALOCYTES BLD QL SMEAR: PRESENT
PLATELET # BLD AUTO: 179 THOUSANDS/UL (ref 149–390)
PLATELET BLD QL SMEAR: ADEQUATE
PMV BLD AUTO: 9.9 FL (ref 8.9–12.7)
POLYCHROMASIA BLD QL SMEAR: PRESENT
POTASSIUM SERPL-SCNC: 4.2 MMOL/L (ref 3.5–5.3)
PROT SERPL-MCNC: 6.2 G/DL (ref 6.4–8.4)
RBC # BLD AUTO: 3.94 MILLION/UL (ref 3.81–5.12)
RBC MORPH BLD: PRESENT
SODIUM SERPL-SCNC: 138 MMOL/L (ref 135–147)
TACROLIMUS BLD-MCNC: 8.7 NG/ML (ref 3–15)
VARIANT LYMPHS # BLD AUTO: 4 %
WBC # BLD AUTO: 2.89 THOUSAND/UL (ref 4.31–10.16)

## 2023-10-20 PROCEDURE — 80048 BASIC METABOLIC PNL TOTAL CA: CPT

## 2023-10-20 PROCEDURE — 80076 HEPATIC FUNCTION PANEL: CPT

## 2023-10-20 PROCEDURE — 85007 BL SMEAR W/DIFF WBC COUNT: CPT

## 2023-10-20 PROCEDURE — 85027 COMPLETE CBC AUTOMATED: CPT

## 2023-10-20 PROCEDURE — 97112 NEUROMUSCULAR REEDUCATION: CPT

## 2023-10-20 PROCEDURE — 83735 ASSAY OF MAGNESIUM: CPT

## 2023-10-20 PROCEDURE — 80197 ASSAY OF TACROLIMUS: CPT

## 2023-10-20 PROCEDURE — 36415 COLL VENOUS BLD VENIPUNCTURE: CPT

## 2023-10-20 NOTE — PROGRESS NOTES
Daily Note     Today's date: 10/20/2023  Patient name: Amara Hurley  : 1960  MRN: [id number]  Referring provider: Rubin Jane MD  Dx:   Encounter Diagnosis     ICD-10-CM    1. Atrophy of muscle of left lower leg  M62.562       2. 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 Other inflammatory polyneuropathies ([address])  G61.89       4. Chronic pain syndrome  G89.4       5. Cirrhosis of liver with ascites, unspecified hepatic cirrhosis type   K74.60     R18.8       6. Pleural effusion  J90       7. GCA (giant cell arteritis) ([address])  M31.6       8. Lumbar radiculopathy  M54.16                   Start Time: 930  Stop Time:   Total time in clinic (min): 42 minutes    Subjective:  Patient states she has no current complaints. Overall she is feeling slightly fatigued. Objective: See treatment diary below      Assessment:   Nice gains in foam beam with progression to alt LE taps with occasional haptic touch. Hula step through initiated to assist with unilateral stance. Sequential cone taps with good tolerance. Step up on left with difficulty lowering step height to 4". Encouraged step rock to allow for improved initiation in quad use with good demonstration. Patient instructed to continue at home with UE support as needed with voiced understanding. Plan:  No complaints end of session. Assess tolerance to increased balance tasks and progress as needed.       Precautions:   H/o liver transplant, h/o CMV, h/o dehiscense, h/o polyneuropathy, h/o left muscle atrophy, chronic pain  EPOC;  2023       TESTING 9/22 9/25 9/28 10/10 10/13 10/20       ABC 50%            5x sit to stand 14sec            HOYOS 45/56            TUG 9.2sec without device            6 min walk test             Neuro Re-Ed             Step taps  8" x10ea 8" x10ea FWD, 10 ea LAT 8" 10x2 Full flight with rail and cane 2 flights Rock step up 5x4       Hurdles with CTG      Over foam beam 6x4 laps along wall       Hula step trhrough      10x2       Foam FT EC HT, firm partial tandem EC HT     5x2 ea        Sidestepping  Firm 4 laps; foam 4 laps    Foam beam 4 laps       Unilateral swing firm  FWD/LAT 4x2 ea  Sequential cone taps 3x4, 2 sets ea Sequential cone taps 3x4, 2 sets Sequential cone taps 3x5ea       Step up  4" with left lead x4, 8" right lead x10    Step taps 10x3/ FWD/LAT, step ups 4" 5x4       Foam beam w/ cone taps  8cones x4 laps    Sidestepping 8 cones 4 laps; repeated alt LE forward 4 laps       Braiding  4 laps           Ther Ex             Sit to stand  Sit taps with cushion 5x2  Off raised seat 10 Off raised seat 10, repeated in staggered position 5x2 Off foam 5x2       Hip abduction sidelying   5x2 ea 5x2 ea         Clamshell   5x2ea 10ea         Hip flex/ext in abd sidelying   5x2ea 5x2 ea         Hip circles CW/CCW   5x2ea          Bridging with DF   5x2 10                                   Ther Activity                                       Gait Training                                       Modalities

## 2023-10-21 LAB
CMV DNA SERPL NAA+PROBE-ACNC: NORMAL IU/ML
CMV DNA SERPL NAA+PROBE-LOG IU: NORMAL LOG10 IU/ML

## 2023-10-23 ENCOUNTER — APPOINTMENT (OUTPATIENT)
Facility: CLINIC | Age: 63
End: 2023-10-23
Payer: COMMERCIAL

## 2023-10-26 ENCOUNTER — OFFICE VISIT (OUTPATIENT)
Dept: GASTROENTEROLOGY | Facility: CLINIC | Age: 63
End: 2023-10-26
Payer: COMMERCIAL

## 2023-10-26 ENCOUNTER — TELEPHONE (OUTPATIENT)
Dept: GASTROENTEROLOGY | Facility: CLINIC | Age: 63
End: 2023-10-26

## 2023-10-26 VITALS
HEART RATE: 88 BPM | HEIGHT: 64 IN | SYSTOLIC BLOOD PRESSURE: 123 MMHG | WEIGHT: 180 LBS | DIASTOLIC BLOOD PRESSURE: 73 MMHG | BODY MASS INDEX: 30.73 KG/M2

## 2023-10-26 DIAGNOSIS — Z94.4 LIVER TRANSPLANT STATUS (HCC): ICD-10-CM

## 2023-10-26 DIAGNOSIS — B25.9 CYTOMEGALOVIRUS INFECTION, UNSPECIFIED CYTOMEGALOVIRAL INFECTION TYPE (HCC): ICD-10-CM

## 2023-10-26 DIAGNOSIS — R19.7 DIARRHEA, UNSPECIFIED TYPE: ICD-10-CM

## 2023-10-26 DIAGNOSIS — D12.6 COLON ADENOMA: Primary | ICD-10-CM

## 2023-10-26 DIAGNOSIS — Z80.0 FAMILY HISTORY OF COLON CANCER: ICD-10-CM

## 2023-10-26 PROBLEM — D69.6 THROMBOCYTOPENIA (HCC): Status: RESOLVED | Noted: 2022-04-28 | Resolved: 2023-10-26

## 2023-10-26 PROCEDURE — 99214 OFFICE O/P EST MOD 30 MIN: CPT | Performed by: PHYSICIAN ASSISTANT

## 2023-10-26 RX ORDER — POLYETHYLENE GLYCOL 3350, SODIUM SULFATE ANHYDROUS, SODIUM BICARBONATE, SODIUM CHLORIDE, POTASSIUM CHLORIDE 236; 22.74; 6.74; 5.86; 2.97 G/4L; G/4L; G/4L; G/4L; G/4L
4000 POWDER, FOR SOLUTION ORAL ONCE
Qty: 4000 ML | Refills: 0 | Status: SHIPPED | OUTPATIENT
Start: 2023-10-26 | End: 2023-11-02 | Stop reason: HOSPADM

## 2023-10-26 NOTE — TELEPHONE ENCOUNTER
Scheduled date of colonoscopy (as of today):11-2-23  Physician performing colonoscopy:Shin  Location of colonoscopy:[facility]  Bowel prep reviewed with patient:Meryl  Reviewed by:  BRANDON  Clearances: no    Sindy Cardoso

## 2023-10-26 NOTE — PROGRESS NOTES
[facility] - Outpatient Follow-up Note  Steven Alvarez 63 y.o. female MRN: [id number]  Encounter: [id number]    ASSESSMENT AND PLAN:    1. Diarrhea, unspecified type  2. Cytomegalovirus infection, unspecified cytomegaloviral infection type ([address] [facility])  3. Colon adenoma  4. Family history of colon cancer  Patient with diarrhea x4 months and CMV infection on valganciclovir 900 twice daily. Stool studies negative while in hospital.  Reports a colonoscopy May 2022 at [facility] prior to transplant but results unavailable and patient unsure. Hepatology requesting colonoscopy now to exclude CMV colitis less likely IBD, microscopic colitis. I reviewed indications, risk, benefits and alternatives of colonoscopy and she is willing to proceed. An antidiarrheal diet was reviewed. Patient also encouraged to follow-up with infectious disease given persistent infection on immunosuppressants. - Colonoscopy; Future  - polyethylene glycol (Golytely) 4000 mL solution; Take 4,000 mL by mouth once for 1 dose Take 4000 mL by mouth once for 1 dose. Use as directed  Dispense: 4000 mL; Refill: 0    5. 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 Currently on tacrolimus, Олег, thiamine, prednisone 15 mg taper daily. Defer to [facility] \[facility]\"" hepatology for management. Pt encouraged to follow with hepatology regularly. A liver healthy lifestyle was reviewed. Dr. Albert Mazariegos was present today and did personally participate in the care of this patient.         Follow up appointment: colon, 2m OV  ______________________________________________________________________    Chief Complaint   Patient presents with   • Schedule Colonoscopy   • CMV for four months   • Diarrhea       HPI:   Patient is a 63 y.o. 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 July 2023 outpatient liver biopsy showed moderate ACR treated with IV steroids, positive for CMV by PCR. she is being treated with Valgancyclovir 900 mg BID. Pt presented to [facility] 10/2023  with diarrhea and  elevated LFTs with concern for rejection and CMV infection. Stool culture, Cryptosporidium C. difficile negative. Diarrhea improved off Myfortic. Father had colon cancer. 7/2023 TSH WNL. 10/2023 CMP normal.  CBC showed leukopenia 2.9. CMP DNA PCR positive. Last colonoscopy [facility] April 2019, TA X1 rec repeat in 5 years. Patient reports she had a colonoscopy May 2022 before transplant but is unsure of results. Reports a flexible sigmoidoscopy April 2023 for rectal bleeding showed hemorrhoids. These were done at [facility] and reports unavailable. Hepatology requesting colonoscopy with biopsies now to rule out CMV colitis. Patient admits to diarrhea up to 9 times a day without melena or hematochezia. He notes generalized abdominal pain prior to and improved with defecation. She is eating well and has gained weight on steroids. Denies she has intermittent nausea but denies vomiting. She denies reflux but has been on PPI daily since her transplant. Patient takes tacrolimus, Олег, thiamine, prednisone 15 mg taper daily along with magnesium twice daily for hypomagnesemia. Denies abdominal distention, confusion, day or night reversal.    Pt denies all other GI and constitutional symptoms. ROS:   Constitutional: denies fatigue, fever. + weakness  HEENT: denies visual disturbance, postnasal drip, sore throat. Respiratory: denies cough, shortness of breath. Cardiovascular: denies chest pain, leg swelling. Gastrointestinal: as noted above in HPI.   : denies difficulty urinating, dysuria. Musculoskeletal:+ arthralgias, back pain. Neurological: denies dizziness, syncope. Psychiatric: denies confusion, anxiety. Historical Information   Past Medical History:   Diagnosis Date   • Asthma    • Clotting disorder [facility])    • Concussion    • CSF leak    • Hyperbilirubinemia 2022   • Hypertension    • Liver failure [facility])    • Liver transplant recipient [facility])    • Migraines    • Peripheral neuropathy    • Varicella 1968    As a child     Past Surgical History:   Procedure Laterality Date   • ABLATION SOFT TISSUE     • BREAST LUMPECTOMY     • IR PARACENTESIS  2022   • IR PARACENTESIS  2022   • IR PARACENTESIS  2022   • LAPAROSCOPY FOR ECTOPIC PREGNANCY     • MAMMO (HISTORICAL) Bilateral 2021    [facility] BI-RADS 2 Benign findings.  after U/S Scattered areas fibrogladulae density; 25% to 50% glandular breast tissue   • SINUS SURGERY     • TONSILECTOMY AND ADNOIDECTOMY       Social History     Substance and Sexual Activity   Alcohol Use Not Currently    Comment: socially, had glass of wine today     Social History     Substance and Sexual Activity   Drug Use Not Currently    Comment: CBD     Social History     Tobacco Use   Smoking Status Former   • Packs/day: 1.00   • Types: Cigarettes   • Quit date:    • Years since quittin.8   Smokeless Tobacco Never     Family History   Problem Relation Age of Onset   • Cancer Mother    • Breast cancer Mother    • Cancer Father    • Colon cancer Father    • Arthritis Father    • Cancer Sister    • Breast cancer Sister    • Arthritis Brother    • No Known Problems Paternal Grandmother    • No Known Problems Paternal Grandfather    • Autoimmune disease Daughter    • No Known Problems Son    • No Known Problems Son    • Heart disease Half-Brother    • Prostate cancer Half-Brother    • Melanoma Half-Brother          Current Outpatient Medications:   •  Accu-Chek Guide test strip  •  diphenhydrAMINE (BENADRYL) 25 mg capsule  •  ergocalciferol (ERGOCALCIFEROL) 1.25 MG (36951 UT) capsule  •  gabapentin (NEURONTIN) 300 mg capsule  •  Magnesium Oxide 400 MG CAPS  •  melatonin 3 mg  •  Microlet Lancets MISC  •  NIFEdipine ER (ADALAT CC) 30 MG 24 hr tablet  •  pantoprazole (PROTONIX) 40 mg tablet  •  polyethylene glycol (Golytely) 4000 mL solution  •  predniSONE 5 mg tablet  •  Premarin vaginal cream  •  Prolia 60 MG/ML  •  tacrolimus (PROGRAF) 1 mg capsule  •  thiamine 100 MG tablet  •  traMADol (ULTRAM) 50 mg tablet  •  traMADol (ULTRAM-ER) 200 MG 24 hr tablet  •  ursodiol (ACTIGALL) 250 mg tablet  •  valGANciclovir (VALCYTE) 450 mg tablet  No Known Allergies  Reviewed medications and allergies and updated as indicated    PHYSICAL EXAM:    Blood pressure 123/73, pulse 88, height 5' 4" (1.626 m), weight 81.6 kg (180 lb), not currently breastfeeding. Body mass index is 30.9 kg/m². Constitutional: Well-developed, no acute distress, chronically ill appearing  HEENT: normocephalic, mucous membranes moist.  Neck: Supple  Skin: warm and dry  Respiratory: Lungs are clear to auscultation B/L. Cardiovascular: Heart is regular rate and rhythm. Gastrointestinal: Soft, mild upper abd tenderness, nondistended with normal active bowel sounds. No masses, guarding, rebound. Rectal Exam: Deferred. Integumentary: Warm and dry  Extremities: No edema. Neurologic: Nonfocal. A & O ×3. Ambulates with cane   Psychiatric: Normal affect.     Lab Results:   Lab Results   Component Value Date    WBC 2.89 (L) 10/20/2023    WBC 1.96 (LL) 10/10/2023    WBC 2.01 (L) 09/25/2023    HGB 12.6 10/20/2023    HGB 12.6 10/10/2023    HGB 13.0 09/25/2023    MCV 99 (H) 10/20/2023     10/20/2023     10/10/2023     09/25/2023     Lab Results   Component Value Date     05/08/2015    K 4.2 10/20/2023     10/20/2023    CO2 30 10/20/2023    ANIONGAP 10 05/08/2015    BUN 17 10/20/2023    CREATININE 0.83 10/20/2023    GLUCOSE 94 05/08/2015 GLUF 95 10/20/2023    CALCIUM 8.7 10/20/2023    CORRECTEDCA 9.3 05/12/2022    AST 15 10/20/2023    AST 14 10/10/2023     (H) 09/25/2023    ALT 27 10/20/2023    ALT 49 10/10/2023     (H) 09/25/2023    ALKPHOS 32 (L) 10/20/2023    ALKPHOS 43 10/10/2023    ALKPHOS 60 09/25/2023    PROT 6.9 05/08/2015    BILITOT 0.2 05/08/2015    EGFR 76 10/20/2023     Lab Results   Component Value Date    IRON 97 04/28/2022    TIBC 146 (L) 04/28/2022    FERRITIN 1,238 (H) 04/28/2022     Lab Results   Component Value Date    LIPASE 76 04/28/2022       Radiology Results:   No results found. Patient expressed understanding and had all questions and concerns addressed. Advised patient to call with any questions, failure to improve, or if symptoms worsen. Nathalia Murrell PA-C  10/26/23  12:24 PM    This chart was completed in part utilizing [employer] [facility] speech voice recognition software. Random word insertions, pronoun errors, and incomplete sentences are an occasional consequence of this system due to software limitations, and ambient noise. Any questions or concerns about the content, text, or information contained within the body of this dictation should be directly addressed to the provider for clarification.

## 2023-10-26 NOTE — PATIENT INSTRUCTIONS
-colonoscopy with biopsies  -cont valganciclovir BID  -see infectious disease  -[facility] hepatology  -antidiarrhea diet  -follow up 8 weeks

## 2023-10-27 ENCOUNTER — OFFICE VISIT (OUTPATIENT)
Facility: CLINIC | Age: 63
End: 2023-10-27
Payer: COMMERCIAL

## 2023-10-27 DIAGNOSIS — K74.60 CIRRHOSIS OF LIVER WITH ASCITES, UNSPECIFIED HEPATIC CIRRHOSIS TYPE: ICD-10-CM

## 2023-10-27 DIAGNOSIS — R18.8 CIRRHOSIS OF LIVER WITH ASCITES, UNSPECIFIED HEPATIC CIRRHOSIS TYPE: ICD-10-CM

## 2023-10-27 DIAGNOSIS — M62.562 ATROPHY OF MUSCLE OF LEFT LOWER LEG: Primary | ICD-10-CM

## 2023-10-27 DIAGNOSIS — M54.16 LUMBAR RADICULOPATHY: ICD-10-CM

## 2023-10-27 DIAGNOSIS — M31.6 GCA (GIANT CELL ARTERITIS) (HCC): ICD-10-CM

## 2023-10-27 DIAGNOSIS — G61.89 OTHER INFLAMMATORY POLYNEUROPATHIES (HCC): ICD-10-CM

## 2023-10-27 DIAGNOSIS — G89.4 CHRONIC PAIN SYNDROME: ICD-10-CM

## 2023-10-27 DIAGNOSIS — H83.8X2 SUPERIOR SEMICIRCULAR CANAL DEHISCENCE OF LEFT EAR: ICD-10-CM

## 2023-10-27 PROCEDURE — 97112 NEUROMUSCULAR REEDUCATION: CPT

## 2023-10-27 NOTE — PROGRESS NOTES
PT PROGRESS NOTE/ DAILY NOTE    Today's date: 10/27/2023  Patient name: Thea Martinez  : 1960  MRN: [id number]  Referring provider: Pablo Subramanian MD  Dx:   Encounter Diagnosis     ICD-10-CM    1. Atrophy of muscle of left lower leg  M62.562       2. Other inflammatory polyneuropathies ([address])  G61.89       3. Chronic pain syndrome  G89.4       4. Lumbar radiculopathy  M54.16       5. GCA (giant cell arteritis) ([address])  M31.6       6. 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 Cirrhosis of liver with ascites, unspecified hepatic cirrhosis type   K74.60     R18.8           Start Time: 930  Stop Time: 1015  Total time in clinic (min): 45 minutes    Assessment  Assessment details: Patient is well-known to this PT seen in the past for deconditioning post liver transplant. She recently experienced CMV with substantial decline in her mobility and overall increase in weakness. She has also noted increased vestibular sensitivity with auditory changes and general increase in dizziness. While she has not reported near falls, she is more cautious overall and has limited her mobility in her home relying on a standard cane for support. Patient displays abnormal muscle strength with overall grade of 3-/3/5. Patient sensation is diminished throughout lower extremities. Patient coordination is abnormal per alterate toe tapping and heel to shin test.   Patient balance scores are as follows: 45/56 HOYOS, 9.2 seconds TUG without Assistive Device with overall results noting high risk for falls. Patient also displays decreased confidence in her stability scoring 50% on ABC. Patient endurance scoring for 5 sit to stand reflects decreased ability achieving 14 seconds. 6 min walk test will follow. Patient displays overall reduction in somatosensory/ proprioception awareness secondary to decreased stability on compliant surfaces, limited unilateral stance and instability with narrow base of support. Patient subjective report notes the following functional limitation with instability with ADL tasks at home and limited tolerance to ambulation with increased fear of falling. Despite her complex medical history, she is a very motivated individual and will benefit from skilled intervention, however, given her history, increased time is expected to achieve goals as noted. Patient will benefit from treatment to address noted impairments and functional limitations they are causing with overall goal to return patient to highest level possible with reduced risk for falls. Please contact me if you have any questions or recommendations. Thank you for the referral and the opportunity to share in Linda's care. Patient verbalized understanding of POC    10/27  Despite recent GI issues, patient ha made nice gains with PT improving her HOYOS scoring 52/56 (from 45/56) and endurance scoring has also improved scoring 9seconds on 5x sit to stand (from 14sec). Patient continues to experience intermittent instability with left LE with ongoing knee discomfort and buckling. KT applied today with instruction on tape care, skin inspection, heat avoidance and wear time. Patient voiced understanding to all instructions. She continues to benefit from skilled intervention to facilitate increased strength and stability. Continue PT per POC. Patient is in agreement with treatment plan.              Impairments: abnormal coordination, abnormal gait, activity intolerance, impaired balance, impaired physical strength, lacks appropriate home exercise program, pain with function and safety issue  Understanding of Dx/Px/POC: good   Prognosis: good    Goals  Goals  STG (5 weeks)    Patient will improve static balance with feet together eyes closed on foam to 20 seconds indicating reduction in fall risk- partially met  Patient will complete 6 min walk test with goals to follow- held  Patient will display 2  second improvement with overall score of  7 seconds  with TUG test or lower with noted improvement being Minimal Detectable Change pre current research standards with fall risk- not met    Patient will achieve 51/56 HOYOS score with minimal improve by 6 points or more demonstrating Minimal Detectable change per current research standards for this objective test which assess fall risk- MET   Patient will perform  5 x sit to stand test with overall reduction by 3 seconds to 11 sec score indicating improvement with functional endurance- MET and exceeded  Patient will be independent in basic strengthening and balance HEP- MET    LTG: 10 weeks)   Patient will score low risk for falls with 3/4 fall risk measures   Patient will achieve 190 feet improvement with overall distance achieved of TBA feet with 6 minute walk test which is Minimal Detectable Change pre current research standards with endurance to demonstrate enhance functional capacity   Patient will be able to perform floor transfer without physical assistance   Patient will be able to carry objects without loss of balance  Patient will be able to ambulate outdoors without any loss of balance  Patient will independent in community based exercise program to promote increase mobility    Cut off score   All date taken from [facility] Neuro Section or Rehab Measures    HOYOS test: 46/56                                              5 x STS Test:  MDC: 6 points                                                  MDC: 2.3 seconds   age norms                                                                 Age Norms   57-79 year old = M: 54, F: 54                        57-79 year old: 11.4 seconds   73-78 year old = M 47,  F: 48                       73-78 year old: 12.6 seconds    80-80 year old = M46,   F: 48                       80-80 year old: 14.8 seconds     TUG test:                                                                     10 Meter Walk Test:  MDC: 4.14 seconds       MDC: .59 ft/sec  Cut off score for Falls                                                  Age Norms  > 13.5 seconds community dwelling adults                20-29; M: 4.56 ft/sec F: 4.62 ft/sec  > 32.2 Frail Elderly                                                     30-39: M 4.76 ft/sec  F: 4.68 ft/sec          40-49: M: 4.79 ft/sec  F: 4.62 ft/sec  6 Minute Walk Test      50-59: M: 4.76 ft/sec  F: 4.56 ft/sec  MDC: 190 feet       60-69: M: 4.56 ft/sec  F: 4.26 ft/sec  Age Norms       70-+    M: 4.36 ft/sec  F: 4.16 ft/sec  60-69:    M: 1876 F: 7815  63-95:    M: 1729 F: 8714  74-55 +: M: 80 F; 1286     Plan  Patient would benefit from: skilled physical therapy  Planned modality interventions: biofeedback  Planned therapy interventions: manual therapy, motor coordination training, neuromuscular re-education, patient education, postural training, sensory integrative techniques, strengthening, stretching, therapeutic activities, therapeutic exercise, gait training, home exercise program, coordination, balance and ADL retraining  Frequency: 2x week  Duration in weeks: 5  Treatment plan discussed with: patient        Subjective Evaluation    History of Present Illness  Mechanism of injury: Patient was hospitalized with CMV from her donor. She has been sick for 3 months, slowly improving. She has been feeling overall weak. She is frustrated with her instability and loss of strength. Patient states she feels her pain is tremendous in her left leg. 10/27  Patient has been struggling with GI issues with diarrhea. Patient is now scheduled for a colonoscopy next week. Pain continues to be noted left leg with peripheral nerve pain.     Pain  Current pain ratin  At best pain ratin  At worst pain ratin  Location: left leg pain, anterior thigh  Relieving factors: medications and rest          Objective     Neurological Testing     Additional Neurological Details  Impaired left LE    Strength/Myotome Testing     Left Hip Planes of Motion   Flexion: 3 and 3-  Extension: 3 and 3-  Abduction: 3 and 3-    Right Hip   Planes of Motion   Flexion: 3+ and 4-  Extension: 4- and 3+  Abduction: 3+ and 4-    Left Knee   Flexion: 3 and 3+  Extension: 3+ and 3    Right Knee   Flexion: 4- and 3+  Extension: 3+ and 4-    Left Ankle/Foot   Dorsiflexion: 2+  Plantar flexion: 2+    Right Ankle/Foot   Dorsiflexion: 3-  Plantar flexion: 3-  Neuro Exam:     Sensation   Light touch LE: left impaired  Light touch LE: right WNL    Transfers   Sit to stand: independent   Sit to supine: independent   Supine to sit: independent   Roll: independent    Functional outcomes   Functional outcome gait comment: Slow hardeep with increased base of support. Hesitation noted with left LE advancement and tendency to advance with circumduction    10/27  Decreasing circumduction noted, hardeep remains slow.              Precautions:   H/o liver transplant, h/o CMV, h/o dehiscense, h/o polyneuropathy, h/o left muscle atrophy, chronic pain  EPOC;  12/1/2023       TESTING 9/22 10/27           ABC 50% 58.75           5x sit to stand 14sec 9sec           HOYOS 45/56 52/56           TUG 9.2sec without device 9 sec           6 min walk test             Neuro Re-Ed             Step taps  10x2           Hurdles with CTG             Sidestepping             A/P sway  10           Step up             Sequential cone taps  2-3 x4ea           Foam beam             Partial tandem EO/EC , EC HT  10" x3, 5x2HT           Ther Ex             Sit to stand                                                                                                        Ther Activity                                       Gait Training                                       Modalities             KT  "Y" paper off tension inhibition left knee

## 2023-10-30 ENCOUNTER — APPOINTMENT (OUTPATIENT)
Dept: LAB | Facility: HOSPITAL | Age: 63
End: 2023-10-30
Payer: COMMERCIAL

## 2023-10-30 DIAGNOSIS — D84.9 DEFICIENCY SYNDROME, IMMUNOLOGIC (HCC): ICD-10-CM

## 2023-10-30 DIAGNOSIS — B25.9: ICD-10-CM

## 2023-10-30 DIAGNOSIS — Z94.4 LIVER REPLACED BY TRANSPLANT (HCC): ICD-10-CM

## 2023-10-30 LAB
ALBUMIN SERPL BCP-MCNC: 4.2 G/DL (ref 3.5–5)
ALP SERPL-CCNC: 29 U/L (ref 34–104)
ALT SERPL W P-5'-P-CCNC: 31 U/L (ref 7–52)
ANION GAP SERPL CALCULATED.3IONS-SCNC: 7 MMOL/L
ANISOCYTOSIS BLD QL SMEAR: PRESENT
AST SERPL W P-5'-P-CCNC: 17 U/L (ref 13–39)
BASOPHILS # BLD MANUAL: 0 THOUSAND/UL (ref 0–0.1)
BASOPHILS NFR MAR MANUAL: 0 % (ref 0–1)
BILIRUB DIRECT SERPL-MCNC: 0.08 MG/DL (ref 0–0.2)
BILIRUB SERPL-MCNC: 0.95 MG/DL (ref 0.2–1)
BUN SERPL-MCNC: 15 MG/DL (ref 5–25)
CALCIUM SERPL-MCNC: 9 MG/DL (ref 8.4–10.2)
CHLORIDE SERPL-SCNC: 101 MMOL/L (ref 96–108)
CO2 SERPL-SCNC: 31 MMOL/L (ref 21–32)
CREAT SERPL-MCNC: 0.86 MG/DL (ref 0.6–1.3)
EOSINOPHIL # BLD MANUAL: 0.1 THOUSAND/UL (ref 0–0.4)
EOSINOPHIL NFR BLD MANUAL: 5 % (ref 0–6)
ERYTHROCYTE [DISTWIDTH] IN BLOOD BY AUTOMATED COUNT: 14.8 % (ref 11.6–15.1)
GFR SERPL CREATININE-BSD FRML MDRD: 73 ML/MIN/1.73SQ M
GLUCOSE P FAST SERPL-MCNC: 76 MG/DL (ref 65–99)
HCT VFR BLD AUTO: 37.7 % (ref 34.8–46.1)
HGB BLD-MCNC: 12.3 G/DL (ref 11.5–15.4)
LYMPHOCYTES # BLD AUTO: 0.34 THOUSAND/UL (ref 0.6–4.47)
LYMPHOCYTES # BLD AUTO: 14 % (ref 14–44)
MCH RBC QN AUTO: 32.6 PG (ref 26.8–34.3)
MCHC RBC AUTO-ENTMCNC: 32.6 G/DL (ref 31.4–37.4)
MCV RBC AUTO: 100 FL (ref 82–98)
MONOCYTES # BLD AUTO: 0.08 THOUSAND/UL (ref 0–1.22)
MONOCYTES NFR BLD: 4 % (ref 4–12)
NEUTROPHILS # BLD MANUAL: 1.49 THOUSAND/UL (ref 1.85–7.62)
NEUTS BAND NFR BLD MANUAL: 1 % (ref 0–8)
NEUTS SEG NFR BLD AUTO: 73 % (ref 43–75)
PLATELET # BLD AUTO: 152 THOUSANDS/UL (ref 149–390)
PLATELET BLD QL SMEAR: ADEQUATE
PMV BLD AUTO: 10.3 FL (ref 8.9–12.7)
POLYCHROMASIA BLD QL SMEAR: PRESENT
POTASSIUM SERPL-SCNC: 3.7 MMOL/L (ref 3.5–5.3)
PROT SERPL-MCNC: 6.3 G/DL (ref 6.4–8.4)
RBC # BLD AUTO: 3.77 MILLION/UL (ref 3.81–5.12)
RBC MORPH BLD: PRESENT
SODIUM SERPL-SCNC: 139 MMOL/L (ref 135–147)
TACROLIMUS BLD-MCNC: 9.7 NG/ML (ref 3–15)
VARIANT LYMPHS # BLD AUTO: 3 %
WBC # BLD AUTO: 2.01 THOUSAND/UL (ref 4.31–10.16)

## 2023-10-30 PROCEDURE — 80048 BASIC METABOLIC PNL TOTAL CA: CPT

## 2023-10-30 PROCEDURE — 85007 BL SMEAR W/DIFF WBC COUNT: CPT

## 2023-10-30 PROCEDURE — 80197 ASSAY OF TACROLIMUS: CPT

## 2023-10-30 PROCEDURE — 85027 COMPLETE CBC AUTOMATED: CPT

## 2023-10-30 PROCEDURE — 36415 COLL VENOUS BLD VENIPUNCTURE: CPT

## 2023-10-30 PROCEDURE — 80076 HEPATIC FUNCTION PANEL: CPT

## 2023-10-31 ENCOUNTER — OFFICE VISIT (OUTPATIENT)
Facility: CLINIC | Age: 63
End: 2023-10-31
Payer: COMMERCIAL

## 2023-10-31 DIAGNOSIS — H83.8X2 SUPERIOR SEMICIRCULAR CANAL DEHISCENCE OF LEFT EAR: ICD-10-CM

## 2023-10-31 DIAGNOSIS — R18.8 CIRRHOSIS OF LIVER WITH ASCITES, UNSPECIFIED HEPATIC CIRRHOSIS TYPE: ICD-10-CM

## 2023-10-31 DIAGNOSIS — J90 PLEURAL EFFUSION: ICD-10-CM

## 2023-10-31 DIAGNOSIS — K74.60 CIRRHOSIS OF LIVER WITH ASCITES, UNSPECIFIED HEPATIC CIRRHOSIS TYPE: ICD-10-CM

## 2023-10-31 DIAGNOSIS — G89.4 CHRONIC PAIN SYNDROME: ICD-10-CM

## 2023-10-31 DIAGNOSIS — G61.89 OTHER INFLAMMATORY POLYNEUROPATHIES (HCC): ICD-10-CM

## 2023-10-31 DIAGNOSIS — M31.6 GCA (GIANT CELL ARTERITIS) (HCC): ICD-10-CM

## 2023-10-31 DIAGNOSIS — M54.16 LUMBAR RADICULOPATHY: ICD-10-CM

## 2023-10-31 DIAGNOSIS — M62.562 ATROPHY OF MUSCLE OF LEFT LOWER LEG: Primary | ICD-10-CM

## 2023-10-31 LAB
CMV DNA SERPL NAA+PROBE-ACNC: NORMAL IU/ML
CMV DNA SERPL NAA+PROBE-LOG IU: NORMAL LOG10 IU/ML

## 2023-10-31 PROCEDURE — 97112 NEUROMUSCULAR REEDUCATION: CPT

## 2023-10-31 NOTE — PROGRESS NOTES
Daily Note     Today's date: 10/31/2023  Patient name: Zac Payne  : 1960  MRN: [id number]  Referring provider: Nicole Villa MD  Dx:   Encounter Diagnosis     ICD-10-CM    1. Atrophy of muscle of left lower leg  M62.562       2. Other inflammatory polyneuropathies ([address])  G61.89       3. Chronic pain syndrome  G89.4       4. Lumbar radiculopathy  M54.16       5. Pleural effusion  J90       6. Cirrhosis of liver with ascites, unspecified hepatic cirrhosis type   K74.60     R18.8       7. 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 GCA (giant cell arteritis) ([address])  M31.6                     Start Time: 1025  Stop Time: 1100  Total time in clinic (min): 35 minutes    Subjective:  Patient has been in a lot of pain lately and has been experiencing continued GI issues. Left leg pain with intermittent buckling, heaviness more than pain presently. Objective: See treatment diary below      Assessment:   KT was effective in reducing leg pain. She did buy KT and reviewed application. Recommended compression shorts to apply additional pressure for support. Slight instability noted with cone taps off foam.  Continue to encourage frequent small exercise sessions to promote mobility. Plan:  No complaints end of session. Assess tolerance to increased balance tasks and progress as needed.       Precautions:   H/o liver transplant, h/o CMV, h/o dehiscense, h/o polyneuropathy, h/o left muscle atrophy, chronic pain  EPOC;  2023       TESTING 9/22 9/25 9/28 10/10 10/13 10/20 10/31      ABC 50%            5x sit to stand 14sec            HOYOS 45/56            TUG 9.2sec without device            6 min walk test             Neuro Re-Ed             Step taps  8" x10ea 8" x10ea FWD, 10 ea LAT 8" 10x2 Full flight with rail and cane 2 flights Rock step up 5x4 Firm sequential taps 2x4 ea      Hurdles with CTG      Over foam beam 6x4 laps along wall Firm foot over foot, 5 hurdles x6 laps Hula step trhrough      10x2       Foam FT EC HT, firm partial tandem EC HT     5x2 ea  5x2 ea      Sidestepping  Firm 4 laps; foam 4 laps    Foam beam 4 laps Firm 4 laps       Unilateral swing firm  FWD/LAT 4x2 ea  Sequential cone taps 3x4, 2 sets ea Sequential cone taps 3x4, 2 sets Sequential cone taps 3x5ea Sequential cone taps 3x5ea      Step up  4" with left lead x4, 8" right lead x10    Step taps 10x3/ FWD/LAT, step ups 4" 5x4 Step taps 10x2      Foam beam w/ cone taps  8cones x4 laps    Sidestepping 8 cones 4 laps; repeated alt LE forward 4 laps       Braiding  4 laps           Ther Ex             Sit to stand  Sit taps with cushion 5x2  Off raised seat 10 Off raised seat 10, repeated in staggered position 5x2 Off foam 5x2       Hip abduction sidelying   5x2 ea 5x2 ea         Clamshell   5x2ea 10ea         Hip flex/ext in abd sidelying   5x2ea 5x2 ea         Hip circles CW/CCW   5x2ea          Bridging with DF   5x2 10         Hamstring seated stretch       30" x4 ea                   Ther Activity                                       Gait Training                                       Modalities             KT       2 KT paper off tnesion left knee

## 2023-11-02 ENCOUNTER — ANESTHESIA EVENT (OUTPATIENT)
Dept: GASTROENTEROLOGY | Facility: HOSPITAL | Age: 63
End: 2023-11-02

## 2023-11-02 ENCOUNTER — ANESTHESIA (OUTPATIENT)
Dept: GASTROENTEROLOGY | Facility: HOSPITAL | Age: 63
End: 2023-11-02

## 2023-11-02 ENCOUNTER — HOSPITAL ENCOUNTER (OUTPATIENT)
Dept: GASTROENTEROLOGY | Facility: HOSPITAL | Age: 63
Setting detail: OUTPATIENT SURGERY
End: 2023-11-02
Payer: COMMERCIAL

## 2023-11-02 VITALS
HEART RATE: 73 BPM | TEMPERATURE: 98 F | DIASTOLIC BLOOD PRESSURE: 70 MMHG | SYSTOLIC BLOOD PRESSURE: 112 MMHG | OXYGEN SATURATION: 97 % | WEIGHT: 177 LBS | RESPIRATION RATE: 18 BRPM | HEIGHT: 64 IN | BODY MASS INDEX: 30.22 KG/M2

## 2023-11-02 DIAGNOSIS — D12.6 COLON ADENOMA: ICD-10-CM

## 2023-11-02 DIAGNOSIS — R19.7 DIARRHEA, UNSPECIFIED TYPE: ICD-10-CM

## 2023-11-02 PROCEDURE — 88305 TISSUE EXAM BY PATHOLOGIST: CPT | Performed by: PATHOLOGY

## 2023-11-02 PROCEDURE — 45380 COLONOSCOPY AND BIOPSY: CPT | Performed by: INTERNAL MEDICINE

## 2023-11-02 RX ORDER — SODIUM CHLORIDE 9 MG/ML
125 INJECTION, SOLUTION INTRAVENOUS CONTINUOUS
Status: ACTIVE | OUTPATIENT
Start: 2023-11-02

## 2023-11-02 RX ORDER — SODIUM CHLORIDE 9 MG/ML
INJECTION, SOLUTION INTRAVENOUS CONTINUOUS PRN
Status: DISCONTINUED | OUTPATIENT
Start: 2023-11-02 | End: 2023-11-02

## 2023-11-02 RX ORDER — PROPOFOL 10 MG/ML
INJECTION, EMULSION INTRAVENOUS AS NEEDED
Status: DISCONTINUED | OUTPATIENT
Start: 2023-11-02 | End: 2023-11-02

## 2023-11-02 RX ADMIN — SODIUM CHLORIDE: 0.9 INJECTION, SOLUTION INTRAVENOUS at 09:41

## 2023-11-02 RX ADMIN — PROPOFOL 130 MG: 10 INJECTION, EMULSION INTRAVENOUS at 09:48

## 2023-11-02 RX ADMIN — PROPOFOL 50 MG: 10 INJECTION, EMULSION INTRAVENOUS at 09:55

## 2023-11-02 RX ADMIN — PROPOFOL 50 MG: 10 INJECTION, EMULSION INTRAVENOUS at 09:51

## 2023-11-02 NOTE — ANESTHESIA POSTPROCEDURE EVALUATION
Post-Op Assessment Note    CV Status:  Stable  Pain Score: 0    Pain management: adequate     Mental Status:  Alert and awake   Hydration Status:  Euvolemic   PONV Controlled:  Controlled   Airway Patency:  Patent      Post Op Vitals Reviewed: Yes      Staff: Anesthesiologist, CRNA         No notable events documented.     BP   101/66   Temp      Pulse  74   Resp   18   SpO2   99

## 2023-11-02 NOTE — ANESTHESIA PREPROCEDURE EVALUATION
Procedure:  COLONOSCOPY    Relevant Problems   CARDIO   (+) GCA (giant cell arteritis) (HCC)      GI/HEPATIC   (+) Decompensated cirrhosis      /RENAL   (+) Acute kidney injury ([address])      HEMATOLOGY   (+) Acute blood loss anemia      MUSCULOSKELETAL   (+) Atrophy of muscle of left lower leg   (+) Chronic bilateral low back pain without sciatica   (+) DDD (degenerative disc disease), lumbar   (+) Lumbar spondylosis      NEURO/PSYCH   (+) Chronic bilateral low back pain without sciatica   (+) Chronic pain syndrome   (+) Weakness of left lower extremity      PULMONARY   (+) Pleural effusion        Physical Exam    Airway  Comment: Small mouth  Mallampati score: III  TM Distance: <3 FB  Neck ROM: full     Dental   No notable dental hx     Cardiovascular      Pulmonary      Other Findings        Anesthesia Plan  ASA Score- 3     Anesthesia Type- IV sedation with anesthesia with ASA Monitors. Additional Monitors:     Airway Plan:            Plan Factors-    Chart reviewed. Patient summary reviewed. Patient is not a current smoker. Induction- intravenous. Postoperative Plan-     Informed Consent- Anesthetic plan and risks discussed with patient. I personally reviewed this patient with the CRNA. Discussed and agreed on the Anesthesia Plan with the CRNA. Anish Ellis

## 2023-11-02 NOTE — H&P
History and Physical - [address] [facility]    Ivonne Razo 63 y.o. female MRN: [id number]      HPI: Ivonne Razo is a 63 y.o. female who presents for diarrhea. S/p OLT . No Known Allergies      REVIEW OF SYSTEMS: Per the HPI, and otherwise unremarkable. Historical Information     Past Medical History:   Diagnosis Date    Asthma     Clotting disorder ([address])     Concussion     CSF leak     Hyperbilirubinemia 2022    Hypertension     Liver failure ([address])     Liver transplant recipient [facility])     Migraines     Peripheral neuropathy     Varicella 1968    As a child     Past Surgical History:   Procedure Laterality Date    ABLATION SOFT TISSUE      BREAST LUMPECTOMY      IR PARACENTESIS  2022    IR PARACENTESIS  2022    IR PARACENTESIS  2022    LAPAROSCOPY FOR ECTOPIC PREGNANCY      MAMMO (HISTORICAL) Bilateral 2021    GV BI-RADS 2 Benign findings.  after U/S Scattered areas fibrogladulae density; 25% to 50% glandular breast tissue    SINUS SURGERY      TONSILECTOMY AND ADNOIDECTOMY       Social History   Social History     Substance and Sexual Activity   Alcohol Use Not Currently    Comment: socially, had glass of wine today     Social History     Substance and Sexual Activity   Drug Use Not Currently    Comment: CBD     Social History     Tobacco Use   Smoking Status Former    Packs/day: 1.00    Types: Cigarettes    Quit date: 2019    Years since quittin.8   Smokeless Tobacco Never     Family History   Problem Relation Age of Onset    Cancer Mother     Breast cancer Mother     Cancer Father     Colon cancer Father     Arthritis Father     Cancer Sister     Breast cancer Sister     Arthritis Brother     No Known Problems Paternal Grandmother     No Known Problems Paternal Grandfather     Autoimmune disease Daughter     No Known Problems Son     No Known Problems Son     Heart disease Half-Brother     Prostate cancer Half-Brother     Melanoma Half-Brother        Meds/Allergies       Current Outpatient Medications:     diphenhydrAMINE (BENADRYL) 25 mg capsule    gabapentin (NEURONTIN) 300 mg capsule    Magnesium Oxide 400 MG CAPS    melatonin 3 mg    NIFEdipine ER (ADALAT CC) 30 MG 24 hr tablet    pantoprazole (PROTONIX) 40 mg tablet    predniSONE 5 mg tablet    tacrolimus (PROGRAF) 1 mg capsule    thiamine 100 MG tablet    traMADol (ULTRAM-ER) 200 MG 24 hr tablet    ursodiol (ACTIGALL) 250 mg tablet    valGANciclovir (VALCYTE) 450 mg tablet    Accu-Chek Guide test strip    ergocalciferol (ERGOCALCIFEROL) 1.25 MG (65274 UT) capsule    Microlet Lancets MISC    polyethylene glycol (Golytely) 4000 mL solution    Premarin vaginal cream    Prolia 60 MG/ML    traMADol (ULTRAM) 50 mg tablet        Objective     /70   Temp 98 °F (36.7 °C)   Resp 18   Ht 5' 4" (1.626 m)   Wt 80.3 kg (177 lb)   BMI 30.38 kg/m²       PHYSICAL EXAM    Gen: NAD AAOx3  Head: Normocephalic, Atraumatic  CV: S1S2 RRR no m/r/g  CHEST: Clear b/l no c/r/w  ABD: soft, +BS NT/ND no masses  EXT: no edema      ASSESSMENT/PLAN:  This is a 63y.o. year old female here for colonoscopy with biopsies, and she is stable and optimized for her procedure.

## 2023-11-03 ENCOUNTER — OFFICE VISIT (OUTPATIENT)
Facility: CLINIC | Age: 63
End: 2023-11-03
Payer: COMMERCIAL

## 2023-11-03 DIAGNOSIS — J90 PLEURAL EFFUSION: ICD-10-CM

## 2023-11-03 DIAGNOSIS — M54.16 LUMBAR RADICULOPATHY: ICD-10-CM

## 2023-11-03 DIAGNOSIS — H83.8X2 SUPERIOR SEMICIRCULAR CANAL DEHISCENCE OF LEFT EAR: ICD-10-CM

## 2023-11-03 DIAGNOSIS — R18.8 CIRRHOSIS OF LIVER WITH ASCITES, UNSPECIFIED HEPATIC CIRRHOSIS TYPE: ICD-10-CM

## 2023-11-03 DIAGNOSIS — M62.562 ATROPHY OF MUSCLE OF LEFT LOWER LEG: Primary | ICD-10-CM

## 2023-11-03 DIAGNOSIS — G61.89 OTHER INFLAMMATORY POLYNEUROPATHIES (HCC): ICD-10-CM

## 2023-11-03 DIAGNOSIS — M31.6 GCA (GIANT CELL ARTERITIS) (HCC): ICD-10-CM

## 2023-11-03 DIAGNOSIS — G89.4 CHRONIC PAIN SYNDROME: ICD-10-CM

## 2023-11-03 DIAGNOSIS — K74.60 CIRRHOSIS OF LIVER WITH ASCITES, UNSPECIFIED HEPATIC CIRRHOSIS TYPE: ICD-10-CM

## 2023-11-03 PROCEDURE — 97112 NEUROMUSCULAR REEDUCATION: CPT

## 2023-11-03 NOTE — PROGRESS NOTES
Daily Note     Today's date: 11/3/2023  Patient name: Harvey Masterson  : 1960  MRN: [id number]  Referring provider: Mohan Seals MD  Dx:   Encounter Diagnosis     ICD-10-CM    1. Atrophy of muscle of left lower leg  M62.562       2. Other inflammatory polyneuropathies ([address])  G61.89       3. Chronic pain syndrome  G89.4       4. Lumbar radiculopathy  M54.16       5. GCA (giant cell arteritis) ([address])  M31.6       6. 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 Cirrhosis of liver with ascites, unspecified hepatic cirrhosis type   K74.60     R18.8       8. Pleural effusion  J90                       Start Time: 0940  Stop Time: 1020  Total time in clinic (min): 40 minutes    Subjective:  Patient just got results from samples with diagnosis of infection and is awaiting new meds. Objective: See treatment diary below      Assessment:   Patient has obtained a new knee brace for support on left but has not yet applied, details to follow. KT continues to be assistive, held reapplication today to allow for skin recovery, no redness noted. Balance tasks tolerated well. Increased tightness continues to be noted left hamstrings and calf. Added stance stretches off step with good tolerance, given written instructions for home. Continue to encourage increased ambulation to tolerance, will await tolerance to antibiotics. Plan:  No complaints end of session. Assess tolerance to increased balance tasks and progress as needed.       Precautions:   H/o liver transplant, h/o CMV, h/o dehiscense, h/o polyneuropathy, h/o left muscle atrophy, chronic pain  EPOC;  2023       TESTING 9/22 9/25 9/28 10/10 10/13 10/20 10/31 11/3     ABC 50%            5x sit to stand 14sec            HOYOS 45/56            TUG 9.2sec without device            6 min walk test             Neuro Re-Ed             Step taps  8" x10ea 8" x10ea FWD, 10 ea LAT 8" 10x2 Full flight with rail and cane 2 flights Oswaldo step up 5x4 Firm sequential taps 2x4 ea Firm step taps 10x3     Hurdles with CTG      Over foam beam 6x4 laps along wall Firm foot over foot, 5 hurdles x6 laps Firm foot over foot, 5 hurdles 6 laps     Hula step trhrough      10x2       Foam FT EC HT, firm partial tandem EC HT     5x2 ea  5x2 ea      Sidestepping  Firm 4 laps; foam 4 laps    Foam beam 4 laps Firm 4 laps  Firm 2 laps, repeated on foam beam 4 laps     Unilateral swing firm  FWD/LAT 4x2 ea  Sequential cone taps 3x4, 2 sets ea Sequential cone taps 3x4, 2 sets Sequential cone taps 3x5ea Sequential cone taps 3x5ea      Step up  4" with left lead x4, 8" right lead x10    Step taps 10x3/ FWD/LAT, step ups 4" 5x4 Step taps 10x2 10     Foam beam w/ cone taps  8cones x4 laps    Sidestepping 8 cones 4 laps; repeated alt LE forward 4 laps  8 cones 4 laps     Braiding  4 laps           Ther Ex             Sit to stand  Sit taps with cushion 5x2  Off raised seat 10 Off raised seat 10, repeated in staggered position 5x2 Off foam 5x2  Off foam 5x2     Hip abduction sidelying   5x2 ea 5x2 ea         Clamshell   5x2ea 10ea         Hip flex/ext in abd sidelying   5x2ea 5x2 ea         Hip circles CW/CCW   5x2ea          Bridging with DF   5x2 10         Hamstring seated stretch       30" x4 ea 30" x4ea                  Ther Activity                                       Gait Training                                       Modalities             KT       2 KT paper off tnesion left knee

## 2023-11-06 PROCEDURE — 88305 TISSUE EXAM BY PATHOLOGIST: CPT | Performed by: PATHOLOGY

## 2023-11-07 ENCOUNTER — OFFICE VISIT (OUTPATIENT)
Facility: CLINIC | Age: 63
End: 2023-11-07
Payer: COMMERCIAL

## 2023-11-07 ENCOUNTER — APPOINTMENT (OUTPATIENT)
Dept: LAB | Facility: HOSPITAL | Age: 63
End: 2023-11-07
Payer: COMMERCIAL

## 2023-11-07 DIAGNOSIS — M31.6 GCA (GIANT CELL ARTERITIS) (HCC): ICD-10-CM

## 2023-11-07 DIAGNOSIS — K74.60 CIRRHOSIS OF LIVER WITH ASCITES, UNSPECIFIED HEPATIC CIRRHOSIS TYPE: ICD-10-CM

## 2023-11-07 DIAGNOSIS — G61.89 OTHER INFLAMMATORY POLYNEUROPATHIES (HCC): ICD-10-CM

## 2023-11-07 DIAGNOSIS — M54.16 LUMBAR RADICULOPATHY: ICD-10-CM

## 2023-11-07 DIAGNOSIS — Z94.4 LIVER REPLACED BY TRANSPLANT (HCC): ICD-10-CM

## 2023-11-07 DIAGNOSIS — H83.8X2 SUPERIOR SEMICIRCULAR CANAL DEHISCENCE OF LEFT EAR: ICD-10-CM

## 2023-11-07 DIAGNOSIS — B25.9: ICD-10-CM

## 2023-11-07 DIAGNOSIS — M62.562 ATROPHY OF MUSCLE OF LEFT LOWER LEG: Primary | ICD-10-CM

## 2023-11-07 DIAGNOSIS — D84.9 DEFICIENCY SYNDROME, IMMUNOLOGIC (HCC): ICD-10-CM

## 2023-11-07 DIAGNOSIS — G89.4 CHRONIC PAIN SYNDROME: ICD-10-CM

## 2023-11-07 DIAGNOSIS — R18.8 CIRRHOSIS OF LIVER WITH ASCITES, UNSPECIFIED HEPATIC CIRRHOSIS TYPE: ICD-10-CM

## 2023-11-07 DIAGNOSIS — J90 PLEURAL EFFUSION: ICD-10-CM

## 2023-11-07 LAB
ALBUMIN SERPL BCP-MCNC: 4.2 G/DL (ref 3.5–5)
ALP SERPL-CCNC: 29 U/L (ref 34–104)
ALT SERPL W P-5'-P-CCNC: 28 U/L (ref 7–52)
ANION GAP SERPL CALCULATED.3IONS-SCNC: 6 MMOL/L
ANISOCYTOSIS BLD QL SMEAR: PRESENT
AST SERPL W P-5'-P-CCNC: 15 U/L (ref 13–39)
BASOPHILS # BLD MANUAL: 0.04 THOUSAND/UL (ref 0–0.1)
BASOPHILS NFR MAR MANUAL: 2 % (ref 0–1)
BILIRUB DIRECT SERPL-MCNC: 0.09 MG/DL (ref 0–0.2)
BILIRUB SERPL-MCNC: 0.78 MG/DL (ref 0.2–1)
BUN SERPL-MCNC: 12 MG/DL (ref 5–25)
CALCIUM SERPL-MCNC: 8.8 MG/DL (ref 8.4–10.2)
CHLORIDE SERPL-SCNC: 104 MMOL/L (ref 96–108)
CO2 SERPL-SCNC: 30 MMOL/L (ref 21–32)
CREAT SERPL-MCNC: 0.7 MG/DL (ref 0.6–1.3)
EOSINOPHIL # BLD MANUAL: 0.1 THOUSAND/UL (ref 0–0.4)
EOSINOPHIL NFR BLD MANUAL: 5 % (ref 0–6)
ERYTHROCYTE [DISTWIDTH] IN BLOOD BY AUTOMATED COUNT: 14.7 % (ref 11.6–15.1)
GFR SERPL CREATININE-BSD FRML MDRD: 94 ML/MIN/1.73SQ M
GLUCOSE P FAST SERPL-MCNC: 84 MG/DL (ref 65–99)
HCT VFR BLD AUTO: 38.3 % (ref 34.8–46.1)
HGB BLD-MCNC: 12.4 G/DL (ref 11.5–15.4)
LYMPHOCYTES # BLD AUTO: 0.88 THOUSAND/UL (ref 0.6–4.47)
LYMPHOCYTES # BLD AUTO: 45 % (ref 14–44)
MAGNESIUM SERPL-MCNC: 1.8 MG/DL (ref 1.9–2.7)
MCH RBC QN AUTO: 32.4 PG (ref 26.8–34.3)
MCHC RBC AUTO-ENTMCNC: 32.4 G/DL (ref 31.4–37.4)
MCV RBC AUTO: 100 FL (ref 82–98)
METAMYELOCYTES NFR BLD MANUAL: 1 % (ref 0–1)
MONOCYTES # BLD AUTO: 0.16 THOUSAND/UL (ref 0–1.22)
MONOCYTES NFR BLD: 8 % (ref 4–12)
NEUTROPHILS # BLD MANUAL: 0.76 THOUSAND/UL (ref 1.85–7.62)
NEUTS SEG NFR BLD AUTO: 39 % (ref 43–75)
PLATELET # BLD AUTO: 169 THOUSANDS/UL (ref 149–390)
PLATELET BLD QL SMEAR: ADEQUATE
PMV BLD AUTO: 9.7 FL (ref 8.9–12.7)
POTASSIUM SERPL-SCNC: 4.2 MMOL/L (ref 3.5–5.3)
PROT SERPL-MCNC: 6.5 G/DL (ref 6.4–8.4)
RBC # BLD AUTO: 3.83 MILLION/UL (ref 3.81–5.12)
RBC MORPH BLD: PRESENT
SODIUM SERPL-SCNC: 140 MMOL/L (ref 135–147)
WBC # BLD AUTO: 1.96 THOUSAND/UL (ref 4.31–10.16)

## 2023-11-07 PROCEDURE — 97112 NEUROMUSCULAR REEDUCATION: CPT

## 2023-11-07 PROCEDURE — 85007 BL SMEAR W/DIFF WBC COUNT: CPT

## 2023-11-07 PROCEDURE — 80197 ASSAY OF TACROLIMUS: CPT

## 2023-11-07 PROCEDURE — 85027 COMPLETE CBC AUTOMATED: CPT

## 2023-11-07 PROCEDURE — 36415 COLL VENOUS BLD VENIPUNCTURE: CPT

## 2023-11-07 PROCEDURE — 80048 BASIC METABOLIC PNL TOTAL CA: CPT

## 2023-11-07 PROCEDURE — 83735 ASSAY OF MAGNESIUM: CPT

## 2023-11-07 PROCEDURE — 80076 HEPATIC FUNCTION PANEL: CPT

## 2023-11-07 NOTE — PROGRESS NOTES
Daily Note     Today's date: 2023  Patient name: Roxy Ann  : 1960  MRN: [id number]  Referring provider: Mitzi Cloud MD  Dx:   Encounter Diagnosis     ICD-10-CM    1. Atrophy of muscle of left lower leg  M62.562       2. GCA (giant cell arteritis) ([address])  M31.6       3. Other inflammatory polyneuropathies ([address])  G61.89       4. 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 Lumbar radiculopathy  M54.16       6. Pleural effusion  J90       7. Chronic pain syndrome  G89.4       8. Cirrhosis of liver with ascites, unspecified hepatic cirrhosis type   K74.60     R18.8                      Subjective: Comes to session with KT donjose on LLE. Notes that her R knee has been a little bothersome. Reports that she has been feeling run down lately. Continues to have some buckling with ambulation, has been scary a couple of times "not as bad as it has been though". Has GI discomfort during today, notes that she got recent bloodwork and her liver numbers are good. Feels off today with her balance. Notes that she has been doing some sideways kicks at home and bicep curls with weight. Objective: See treatment diary below      Assessment: Tolerated treatment well. Pt subjectively reported that she felt off during session - feeling her decreased balance could be contributed due to her ongoing GI discomfort. Some swelling evident in RLE. Trialed some resisted side stepping with orange theraband as patient requested more exercises at home - verbalized and demonstrated good form - issued orange theraband for home use. Overall, for patient having increased stress levels and feeling off during session patient did well, mild balance checks throughout with ability  to correct independently or with UE support on wall. Patient demonstrated fatigue post treatment, exhibited good technique with therapeutic exercises, and would benefit from continued PT      Plan: Continue per plan of care. Progress treatment as tolerated.        Precautions:   H/o liver transplant, h/o CMV, h/o dehiscense, h/o polyneuropathy, h/o left muscle atrophy, chronic pain  EPOC;  12/1/2023       TESTING 9/22 9/25 9/28 10/10 10/13 10/20 10/31 11/3 11/7    ABC 50%            5x sit to stand 14sec            HOYOS 45/56            TUG 9.2sec without device            6 min walk test             Neuro Re-Ed             Step taps  8" x10ea 8" x10ea FWD, 10 ea LAT 8" 10x2 Full flight with rail and cane 2 flights Rock step up 5x4 Firm sequential taps 2x4 ea Firm step taps 10x3 Firm step taps 10x3    Hurdles with CTG      Over foam beam 6x4 laps along wall Firm foot over foot, 5 hurdles x6 laps Firm foot over foot, 5 hurdles 6 laps Firm foot over foot 2x laps     Hula step trhrough      10x2       Foam FT EC HT, firm partial tandem EC HT     5x2 ea  5x2 ea  Semi tandem 3x30" ea EO firm    Sidestepping  Firm 4 laps; foam 4 laps    Foam beam 4 laps Firm 4 laps  Firm 2 laps, repeated on foam beam 4 laps Foam 2x laps,     With cone taps 2x1 laps    OTB x1 lap ankle    Unilateral swing firm  FWD/LAT 4x2 ea  Sequential cone taps 3x4, 2 sets ea Sequential cone taps 3x4, 2 sets Sequential cone taps 3x5ea Sequential cone taps 3x5ea      Step up  4" with left lead x4, 8" right lead x10    Step taps 10x3/ FWD/LAT, step ups 4" 5x4 Step taps 10x2 10 10 step stool    Foam beam w/ cone taps  8cones x4 laps    Sidestepping 8 cones 4 laps; repeated alt LE forward 4 laps  8 cones 4 laps     Braiding  4 laps           Ther Ex             Sit to stand  Sit taps with cushion 5x2  Off raised seat 10 Off raised seat 10, repeated in staggered position 5x2 Off foam 5x2  Off foam 5x2 5x2 no UE     Hip abduction sidelying   5x2 ea 5x2 ea         Clamshell   5x2ea 10ea         Hip flex/ext in abd sidelying   5x2ea 5x2 ea         Hip circles CW/CCW   5x2ea          Bridging with DF   5x2 10         Hamstring seated stretch       30" x4 ea 30" x4ea 30" x4 ea Ther Activity                                       Gait Training                                       Modalities             KT       2 KT paper off tnesion left knee

## 2023-11-08 ENCOUNTER — TELEPHONE (OUTPATIENT)
Dept: GASTROENTEROLOGY | Facility: CLINIC | Age: 63
End: 2023-11-08

## 2023-11-08 ENCOUNTER — TELEPHONE (OUTPATIENT)
Age: 63
End: 2023-11-08

## 2023-11-08 LAB
CMV DNA SERPL NAA+PROBE-ACNC: NORMAL IU/ML
CMV DNA SERPL NAA+PROBE-LOG IU: NORMAL LOG10 IU/ML
TACROLIMUS BLD-MCNC: 8.6 NG/ML (ref 3–15)

## 2023-11-08 NOTE — TELEPHONE ENCOUNTER
Pt's doctor I believe he said his name was Dr. Aleida Metcalf (from [facility] ph #) is calling to speak w/ Dr. Tru Marlow. He is stating the pt is having symptoms and cannot get a hold of the doctor. Dr. Aleida Metcalf seemed frustrated and insisted on speaking w/ Dr. Tru Marlow. [facility] and transferred the doctor to [facility].

## 2023-11-08 NOTE — RESULT ENCOUNTER NOTE
RECALL COLON 5 years. Please forward colonoscopy and path report to Dr. Jeremy Paredes from [facility]. Thank you.

## 2023-11-08 NOTE — TELEPHONE ENCOUNTER
Called Dr. Jessi Talavera - [place]. Left VM that he can call back on my cell phone. Of note, the colonoscopy was last week, path just resulted. Mild acute colitis without evidence of CMV. Plan to treat diarrhea symptomatically.

## 2023-11-08 NOTE — TELEPHONE ENCOUNTER
Call center put through Dr. Eva Gillespie from [facility] [place] who is very upset that he has not received results and plan of care from you. He would like a call back. His number is [phone].

## 2023-11-09 ENCOUNTER — TELEPHONE (OUTPATIENT)
Age: 63
End: 2023-11-09

## 2023-11-09 NOTE — TELEPHONE ENCOUNTER
Patients GI provider:  Dr. Gaurav Lawrence     Number to return call: [phone]    Reason for call: Pt calling requesting to speak with Dr. Gaurav Lawrence regarding plan of treatment.      Scheduled procedure/appointment date if applicable: Apt/procedure

## 2023-11-10 ENCOUNTER — APPOINTMENT (OUTPATIENT)
Facility: CLINIC | Age: 63
End: 2023-11-10
Payer: COMMERCIAL

## 2023-11-10 ENCOUNTER — OFFICE VISIT (OUTPATIENT)
Facility: CLINIC | Age: 63
End: 2023-11-10
Payer: COMMERCIAL

## 2023-11-10 DIAGNOSIS — G61.89 OTHER INFLAMMATORY POLYNEUROPATHIES (HCC): ICD-10-CM

## 2023-11-10 DIAGNOSIS — M54.16 LUMBAR RADICULOPATHY: ICD-10-CM

## 2023-11-10 DIAGNOSIS — R18.8 CIRRHOSIS OF LIVER WITH ASCITES, UNSPECIFIED HEPATIC CIRRHOSIS TYPE: ICD-10-CM

## 2023-11-10 DIAGNOSIS — M62.562 ATROPHY OF MUSCLE OF LEFT LOWER LEG: Primary | ICD-10-CM

## 2023-11-10 DIAGNOSIS — M31.6 GCA (GIANT CELL ARTERITIS) (HCC): ICD-10-CM

## 2023-11-10 DIAGNOSIS — K74.60 CIRRHOSIS OF LIVER WITH ASCITES, UNSPECIFIED HEPATIC CIRRHOSIS TYPE: ICD-10-CM

## 2023-11-10 DIAGNOSIS — H83.8X2 SUPERIOR SEMICIRCULAR CANAL DEHISCENCE OF LEFT EAR: ICD-10-CM

## 2023-11-10 DIAGNOSIS — G89.4 CHRONIC PAIN SYNDROME: ICD-10-CM

## 2023-11-10 DIAGNOSIS — J90 PLEURAL EFFUSION: ICD-10-CM

## 2023-11-10 PROCEDURE — 97110 THERAPEUTIC EXERCISES: CPT

## 2023-11-10 NOTE — TELEPHONE ENCOUNTER
Case reviewed with Dr. Edelmira Priest. Biopsies show acute colitis likely secondary to her medications. Advised to start Imodium 2 mg daily can use up to 16 mg a day for ongoing diarrhea. Patient states fecal calprotectin elevated at [facility] but this is likely reactant. She wants to know what is causing the diarrhea. Again I reinforced it is like one of medications.  Please call patient to schedule an office visit with Dr. Edelmira Priest per her request.    Thank you,  Blanche Miller PA-C

## 2023-11-10 NOTE — PROGRESS NOTES
Daily Note     Today's date: 11/10/2023  Patient name: Nya Mace  : 1960  MRN: [id number]  Referring provider: Kim Lamar MD  Dx:   Encounter Diagnosis     ICD-10-CM    1. Atrophy of muscle of left lower leg  M62.562       2. Lumbar radiculopathy  M54.16       3. GCA (giant cell arteritis) ([address])  M31.6       4. Pleural effusion  J90       5. Other inflammatory polyneuropathies ([address])  G61.89       6. Chronic pain syndrome  G89.4       7. 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 Cirrhosis of liver with ascites, unspecified hepatic cirrhosis type   K74.60     R18.8           Start Time: 933  Stop Time:   Total time in clinic (min): 38 minutes    Subjective: Feeling like she is having a really rough day yesterday. Is feeling better today but still not great. Is nervous that she will continue to feel worse. Although she wants to make sure she does want to perform exercises today to make sure that she does something. Felt good after last session, muscle soreness. Hopes that she will be finished with her steroids soon. Objective: See treatment diary below      Assessment: Tolerated treatment well. Supine strengthening program re-initiated with patient feeling more off balance during today's session and felt uncomfortable with doing balance activities because of unease with regards to falling. Did well with activities supine for cuing - had good muscle activation. Fatigued quickly during session, rest breaks required for proper form. Patient educated on possibility of DOMS - patient verbalized good understanding of muscle soreness and ability to differentiate between possible pain. Patient asked about potential brace usage - encouraged use for brace activity per primary therapist, wearing schedule of starting with short periods of time with progressions as brace feels good with frequent checking for skin breakdown. Patient verbalized understanding.  Patient demonstrated fatigue post treatment, exhibited good technique with therapeutic exercises, and would benefit from continued PT      Plan: Continue per plan of care. Progress treatment as tolerated.        Precautions:   H/o liver transplant, h/o CMV, h/o dehiscense, h/o polyneuropathy, h/o left muscle atrophy, chronic pain  EPOC;  12/1/2023       TESTING 9/22 9/25 9/28 10/10 10/13 10/20 10/31 11/3 11/7 11/10   ABC 50%            5x sit to stand 14sec            HOYOS 45/56            TUG 9.2sec without device            6 min walk test             Neuro Re-Ed             Step taps  8" x10ea 8" x10ea FWD, 10 ea LAT 8" 10x2 Full flight with rail and cane 2 flights Rock step up 5x4 Firm sequential taps 2x4 ea Firm step taps 10x3 Firm step taps 10x3    Hurdles with CTG      Over foam beam 6x4 laps along wall Firm foot over foot, 5 hurdles x6 laps Firm foot over foot, 5 hurdles 6 laps Firm foot over foot 2x laps     Hula step trhrough      10x2       Foam FT EC HT, firm partial tandem EC HT     5x2 ea  5x2 ea  Semi tandem 3x30" ea EO firm    Sidestepping  Firm 4 laps; foam 4 laps    Foam beam 4 laps Firm 4 laps  Firm 2 laps, repeated on foam beam 4 laps Foam 2x laps,     With cone taps 2x1 laps    OTB x1 lap ankle    Unilateral swing firm  FWD/LAT 4x2 ea  Sequential cone taps 3x4, 2 sets ea Sequential cone taps 3x4, 2 sets Sequential cone taps 3x5ea Sequential cone taps 3x5ea      Step up  4" with left lead x4, 8" right lead x10    Step taps 10x3/ FWD/LAT, step ups 4" 5x4 Step taps 10x2 10 10 step stool    Foam beam w/ cone taps  8cones x4 laps    Sidestepping 8 cones 4 laps; repeated alt LE forward 4 laps  8 cones 4 laps     Braiding  4 laps           Ther Ex             Sit to stand  Sit taps with cushion 5x2  Off raised seat 10 Off raised seat 10, repeated in staggered position 5x2 Off foam 5x2  Off foam 5x2 5x2 no UE  Hi-Lo mat no UE 5x3   Hip abduction sidelying   5x2 ea 5x2 ea         Clamshell   5x2ea 10ea      2x10 orange TB    Hip flex/ext in abd sidelying   5x2ea 5x2 ea      2x10 ea   Hip circles CW/CCW   5x2ea          Bridging with DF   5x2 10      OrangeTB 2x10   Hamstring seated stretch       30" x4 ea 30" x4ea 30" x4 ea    Supine March (band at foot)           OTB 2x10   Supine hip ADD          Ball 2x10x5"                Ther Activity                                       Gait Training                                       Modalities             KT       2 KT paper off tnesion left knee

## 2023-11-13 ENCOUNTER — OFFICE VISIT (OUTPATIENT)
Dept: PODIATRY | Facility: CLINIC | Age: 63
End: 2023-11-13
Payer: COMMERCIAL

## 2023-11-13 ENCOUNTER — OFFICE VISIT (OUTPATIENT)
Facility: CLINIC | Age: 63
End: 2023-11-13
Payer: COMMERCIAL

## 2023-11-13 VITALS
HEART RATE: 80 BPM | HEIGHT: 64 IN | DIASTOLIC BLOOD PRESSURE: 74 MMHG | WEIGHT: 177 LBS | SYSTOLIC BLOOD PRESSURE: 109 MMHG | BODY MASS INDEX: 30.22 KG/M2

## 2023-11-13 DIAGNOSIS — M31.6 GCA (GIANT CELL ARTERITIS) (HCC): ICD-10-CM

## 2023-11-13 DIAGNOSIS — R18.8 CIRRHOSIS OF LIVER WITH ASCITES, UNSPECIFIED HEPATIC CIRRHOSIS TYPE: ICD-10-CM

## 2023-11-13 DIAGNOSIS — B35.1 ONYCHOMYCOSIS: ICD-10-CM

## 2023-11-13 DIAGNOSIS — K74.60 CIRRHOSIS OF LIVER WITH ASCITES, UNSPECIFIED HEPATIC CIRRHOSIS TYPE: ICD-10-CM

## 2023-11-13 DIAGNOSIS — J90 PLEURAL EFFUSION: ICD-10-CM

## 2023-11-13 DIAGNOSIS — M24.574 CONTRACTURE OF JOINT OF RIGHT FOOT: ICD-10-CM

## 2023-11-13 DIAGNOSIS — M62.562 ATROPHY OF MUSCLE OF LEFT LOWER LEG: Primary | ICD-10-CM

## 2023-11-13 DIAGNOSIS — M20.41 HAMMER TOE OF RIGHT FOOT: ICD-10-CM

## 2023-11-13 DIAGNOSIS — G61.89 OTHER INFLAMMATORY POLYNEUROPATHIES (HCC): ICD-10-CM

## 2023-11-13 DIAGNOSIS — S92.514S CLOSED NONDISPLACED FRACTURE OF PROXIMAL PHALANX OF LESSER TOE OF RIGHT FOOT, SEQUELA: Primary | ICD-10-CM

## 2023-11-13 DIAGNOSIS — H83.8X2 SUPERIOR SEMICIRCULAR CANAL DEHISCENCE OF LEFT EAR: ICD-10-CM

## 2023-11-13 DIAGNOSIS — M54.16 LUMBAR RADICULOPATHY: ICD-10-CM

## 2023-11-13 DIAGNOSIS — G89.4 CHRONIC PAIN SYNDROME: ICD-10-CM

## 2023-11-13 PROCEDURE — 99213 OFFICE O/P EST LOW 20 MIN: CPT | Performed by: PODIATRIST

## 2023-11-13 PROCEDURE — 97112 NEUROMUSCULAR REEDUCATION: CPT

## 2023-11-13 NOTE — PROGRESS NOTES
PATIENT:  Harleen May  1960       ASSESSMENT:     1. Closed nondisplaced fracture of proximal phalanx of lesser toe of right foot, sequela        2. Hammer toe of right foot        3. Contracture of joint of right foot        4. Onychomycosis              PLAN:  1. Reviewed the medical records. Reviewed previous foot X-ray. Patient was counseled and educated on the condition and the diagnosis. 2. The diagnosis, treatment options and prognosis were discussed with the patient. 3. She continues to have symptom in right 2nd toe with hammertoe and dorsal contracture at MPJ.  4. Discussed options including surgical treatment. She would need right 2nd toe PIPJ arthrodesis and capsulotomy of right 2nd MPJ. Explained surgical details and post-op course. 5. She may try Pedinol for onychomycosis. She is not a candidate for oral antifungal due to history of liver transplant. 6. Pt to call the office if she wishes to proceed with surgical treatment. Imaging: I have personally reviewed pertinent films in PACS  Labs, pathology, and Other Studies: I have personally reviewed pertinent reports. Subjective:     HPI  The patient presents for pain in right 2nd toe. She continues to have pain with deformity. Right 2nd toe does not purchase the floor. She also has nail fungus for a while. The following portions of the patient's history were reviewed and updated as appropriate: allergies, current medications, past family history, past medical history, past social history, past surgical history and problem list.  All pertinent labs and images were reviewed.       Past Medical History  Past Medical History:   Diagnosis Date   • Asthma    • Clotting disorder [facility])    • Concussion    • CSF leak    • Hyperbilirubinemia 04/28/2022   • Hypertension    • Liver failure [facility])    • Liver transplant recipient [facility])    • Migraines    • Peripheral neuropathy    • Varicella 1968    As a child Past Surgical History  Past Surgical History:   Procedure Laterality Date   • ABLATION SOFT TISSUE     • BREAST LUMPECTOMY     • IR PARACENTESIS  04/29/2022   • IR PARACENTESIS  05/09/2022   • IR PARACENTESIS  05/12/2022   • LAPAROSCOPY FOR ECTOPIC PREGNANCY     • MAMMO (HISTORICAL) Bilateral 01/14/2021    [facility] BI-RADS 2 Benign findings. after U/S Scattered areas fibrogladulae density; 25% to 50% glandular breast tissue   • SINUS SURGERY     • TONSILECTOMY AND ADNOIDECTOMY          Allergies:  Patient has no known allergies. Medications:  Current Outpatient Medications   Medication Sig Dispense Refill   • Accu-Chek Guide test strip      • diphenhydrAMINE (BENADRYL) 25 mg capsule Take 25 mg by mouth every 8 (eight) hours as needed for itching. Indications: Itching     • ergocalciferol (ERGOCALCIFEROL) 1.25 MG (72206 UT) capsule Take 50,000 Units by mouth once a week. Every Monday AM  Indications: Vitamin D Deficiency     • gabapentin (NEURONTIN) 300 mg capsule Take 600 mg by mouth 3 (three) times a day. Indications: Neuropathic Pain     • Magnesium Oxide 400 MG CAPS Take by mouth     • melatonin 3 mg Take 3 mg by mouth daily at bedtime as needed (sleep). Indications: Trouble Sleeping     • Microlet Lancets MISC      • NIFEdipine ER (ADALAT CC) 30 MG 24 hr tablet Take 30 mg by mouth 2 (two) times a day. Indications: High Blood Pressure Disorder     • pantoprazole (PROTONIX) 40 mg tablet Take 40 mg by mouth daily     • predniSONE 5 mg tablet Take 15 mg by mouth daily 4 tablets in AM     • Premarin vaginal cream      • Prolia 60 MG/ML USE 1 ML SUBCUTANOEUS EVERY 6 MONTHS AS DIRECTED. PLEASE DELIVER . ..  (REFER TO PRESCRIPTION NOTES). • tacrolimus (PROGRAF) 1 mg capsule Take 6 mg by mouth every 12 (twelve) hours 7 capsules AM  7 capsules PM     • thiamine 100 MG tablet Take 100 mg by mouth daily.  Indications: Deficiency of Vitamin B1     • traMADol (ULTRAM) 50 mg tablet Take 1 PO BID PRN for pain for ongoing therapy 45 tablet 2   • traMADol (ULTRAM-ER) 200 MG 24 hr tablet take 1 tablet by mouth once daily for pain for ongoing therapy 30 tablet 2   • ursodiol (ACTIGALL) 250 mg tablet      • valGANciclovir (VALCYTE) 450 mg tablet Take 900 mg by mouth 2 (two) times a day with meals       No current facility-administered medications for this visit. Social History:  Social History     Socioeconomic History   • Marital status: Single     Spouse name: Not on file   • Number of children: Not on file   • Years of education: Not on file   • Highest education level: Not on file   Occupational History   • Not on file   Tobacco Use   • Smoking status: Former     Packs/day: 1.00     Types: Cigarettes     Quit date: 2019     Years since quittin.8   • Smokeless tobacco: Never   Vaping Use   • Vaping Use: Never used   Substance and Sexual Activity   • Alcohol use: Not Currently     Comment: socially, had glass of wine today   • Drug use: Not Currently     Comment: CBD   • Sexual activity: Not Currently     Partners: Male     Birth control/protection: Post-menopausal     Comment: no new partner in past year   Other Topics Concern   • Not on file   Social History Narrative   • Not on file     Social Determinants of Health     Financial Resource Strain: Not on file   Food Insecurity: No Food Insecurity (2022)    Hunger Vital Sign    • Worried About Running Out of Food in the Last Year: Never true    • Ran Out of Food in the Last Year: Never true   Transportation Needs: No Transportation Needs (2022)    PRAPARE - Transportation    • Lack of Transportation (Medical): No    • Lack of Transportation (Non-Medical):  No   Physical Activity: Not on file   Stress: Not on file   Social Connections: Not on file   Intimate Partner Violence: Not on file   Housing Stability: Low Risk  (2022)    Housing Stability Vital Sign    • Unable to Pay for Housing in the Last Year: No    • Number of Places Lived in the Last Year: 1    • Unstable Housing in the Last Year: No          Review of Systems   Constitutional:  Negative for chills and fever. Respiratory:  Negative for cough and shortness of breath. Cardiovascular:  Negative for chest pain. Gastrointestinal:  Negative for nausea and vomiting. Musculoskeletal:  Positive for arthralgias. Neurological:  Negative for weakness and numbness. Hematological: Negative. Psychiatric/Behavioral:  Negative for confusion. Objective:      /74   Pulse 80   Ht 5' 4" (1.626 m)   Wt 80.3 kg (177 lb)   BMI 30.38 kg/m²          Physical Exam  Vitals reviewed. Constitutional:       General: She is not in acute distress. Appearance: She is not toxic-appearing or diaphoretic. Cardiovascular:      Rate and Rhythm: Normal rate and regular rhythm. Pulses: Normal pulses. Dorsalis pedis pulses are 2+ on the right side and 2+ on the left side. Posterior tibial pulses are 2+ on the right side and 2+ on the left side. Pulmonary:      Effort: Pulmonary effort is normal. No respiratory distress. Musculoskeletal:         General: Tenderness present. Right lower leg: No edema. Left lower leg: No edema. Right foot: No foot drop. Left foot: No foot drop. Comments: Hypertrophy and pain in PIPJ right 2nd toe. Hammertoe noted, especially right 2nd toe. Dorsal contracture at right 2nd MPJ. Tenderness right 2nd MPJ plantarly. Skin:     General: Skin is warm and dry. Capillary Refill: Capillary refill takes less than 2 seconds. Coloration: Skin is not cyanotic or mottled. Findings: No abscess, ecchymosis, erythema or wound. Nails: There is no clubbing. Comments: Thick, mycotic toenails noted. Neurological:      General: No focal deficit present. Mental Status: She is alert and oriented to person, place, and time. Cranial Nerves: No cranial nerve deficit. Sensory: No sensory deficit.       Motor: No weakness. Coordination: Coordination normal.   Psychiatric:         Mood and Affect: Mood normal.         Behavior: Behavior normal.         Thought Content:  Thought content normal.         Judgment: Judgment normal.

## 2023-11-13 NOTE — PROGRESS NOTES
Daily Note     Today's date: 2023  Patient name: Zac Payne  : 1960  MRN: [id number]  Referring provider: Nicole Villa MD  Dx:   Encounter Diagnosis     ICD-10-CM    1. Atrophy of muscle of left lower leg  M62.562       2. Lumbar radiculopathy  M54.16       3. GCA (giant cell arteritis) ([address])  M31.6       4. Pleural effusion  J90       5. Other inflammatory polyneuropathies ([address])  G61.89       6. Chronic pain syndrome  G89.4       7. 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 Cirrhosis of liver with ascites, unspecified hepatic cirrhosis type   K74.60     R18.8             Start Time: 315  Stop Time: 0400  Total time in clinic (min): 45 minutes    Subjective:   Patient expressed having difficulty with physician management. She continue to experience GI issues. Objective: See treatment diary below      Assessment:   Overall improved stepping with dillan and step height clearance. Braiding with increased left weight bearing with increased difficulty to right. Continue to reinforce frequent ambulation and pacing to improve overall activity tolerance. Plan: Continue per plan of care. Progress treatment as tolerated. No complaints end of session, consider increased compliant surfaces and increased unilateral stance challenges.      Precautions:   H/o liver transplant, h/o CMV, h/o dehiscense, h/o polyneuropathy, h/o left muscle atrophy, chronic pain  EPOC;  2023       TESTING 9/22 9/28 10/10 10/13 10/20 10/31 11/3 11/7 11/10 11/13   ABC 50%            5x sit to stand 14sec            HOYOS 45/56            TUG 9.2sec without device            6 min walk test             Neuro Re-Ed             Step taps  8" x10ea FWD, 10 ea LAT 8" 10x2 Full flight with rail and cane 2 flights Rock step up 5x4 Firm sequential taps 2x4 ea Firm step taps 10x3 Firm step taps 10x3  Firm step taps 10x2   Hurdles with CTG     Over foam beam 6x4 laps along wall Firm foot over foot, 5 hurdles x6 laps Firm foot over foot, 5 hurdles 6 laps Firm foot over foot 2x laps   Firm foot over foot laps x4, sidestepping 4 laps   Hula step trhrough     10x2        Foam FT EC HT, firm partial tandem EC HT    5x2 ea  5x2 ea  Semi tandem 3x30" ea EO firm  Foam consecutive cone taps 2x10ea   Sidestepping     Foam beam 4 laps Firm 4 laps  Firm 2 laps, repeated on foam beam 4 laps Foam 2x laps,     With cone taps 2x1 laps    OTB x1 lap ankle     Unilateral swing firm   Sequential cone taps 3x4, 2 sets ea Sequential cone taps 3x4, 2 sets Sequential cone taps 3x5ea Sequential cone taps 3x5ea       Step up     Step taps 10x3/ FWD/LAT, step ups 4" 5x4 Step taps 10x2 10 10 step stool  5x2 no UE   Foam beam w/ cone taps     Sidestepping 8 cones 4 laps; repeated alt LE forward 4 laps  8 cones 4 laps      Braiding          4 laps harder right   Ther Ex             Sit to stand   Off raised seat 10 Off raised seat 10, repeated in staggered position 5x2 Off foam 5x2  Off foam 5x2 5x2 no UE  Hi-Lo mat no UE 5x3 5x2   Hip abduction sidelying  5x2 ea 5x2 ea          Clamshell  5x2ea 10ea      2x10 orange TB     Hip flex/ext in abd sidelying  5x2ea 5x2 ea      2x10 ea    Hip circles CW/CCW  5x2ea           Bridging with DF  5x2 10      OrangeTB 2x10    Hamstring seated stretch      30" x4 ea 30" x4ea 30" x4 ea     Supine March (band at foot)          OTB 2x10    Supine hip ADD         Ball 2x10x5"                 Ther Activity                                       Gait Training                                       Modalities             KT      2 KT paper off tnesion left knee

## 2023-11-14 ENCOUNTER — APPOINTMENT (OUTPATIENT)
Dept: LAB | Facility: HOSPITAL | Age: 63
End: 2023-11-14
Payer: COMMERCIAL

## 2023-11-14 ENCOUNTER — APPOINTMENT (OUTPATIENT)
Facility: CLINIC | Age: 63
End: 2023-11-14
Payer: COMMERCIAL

## 2023-11-14 DIAGNOSIS — B25.9: ICD-10-CM

## 2023-11-14 DIAGNOSIS — Z94.4 LIVER REPLACED BY TRANSPLANT (HCC): ICD-10-CM

## 2023-11-14 DIAGNOSIS — D84.9 DEFICIENCY SYNDROME, IMMUNOLOGIC (HCC): ICD-10-CM

## 2023-11-14 LAB
ALBUMIN SERPL BCP-MCNC: 4.1 G/DL (ref 3.5–5)
ALP SERPL-CCNC: 28 U/L (ref 34–104)
ALT SERPL W P-5'-P-CCNC: 25 U/L (ref 7–52)
ANION GAP SERPL CALCULATED.3IONS-SCNC: 6 MMOL/L
ANISOCYTOSIS BLD QL SMEAR: PRESENT
AST SERPL W P-5'-P-CCNC: 15 U/L (ref 13–39)
BASOPHILS # BLD MANUAL: 0.03 THOUSAND/UL (ref 0–0.1)
BASOPHILS NFR MAR MANUAL: 2 % (ref 0–1)
BILIRUB DIRECT SERPL-MCNC: 0.08 MG/DL (ref 0–0.2)
BILIRUB SERPL-MCNC: 0.81 MG/DL (ref 0.2–1)
BUN SERPL-MCNC: 13 MG/DL (ref 5–25)
CALCIUM SERPL-MCNC: 8.9 MG/DL (ref 8.4–10.2)
CHLORIDE SERPL-SCNC: 105 MMOL/L (ref 96–108)
CO2 SERPL-SCNC: 29 MMOL/L (ref 21–32)
CREAT SERPL-MCNC: 0.78 MG/DL (ref 0.6–1.3)
EOSINOPHIL # BLD MANUAL: 0.05 THOUSAND/UL (ref 0–0.4)
EOSINOPHIL NFR BLD MANUAL: 3 % (ref 0–6)
ERYTHROCYTE [DISTWIDTH] IN BLOOD BY AUTOMATED COUNT: 14.2 % (ref 11.6–15.1)
GFR SERPL CREATININE-BSD FRML MDRD: 82 ML/MIN/1.73SQ M
GLUCOSE P FAST SERPL-MCNC: 87 MG/DL (ref 65–99)
HCT VFR BLD AUTO: 37.6 % (ref 34.8–46.1)
HGB BLD-MCNC: 12.3 G/DL (ref 11.5–15.4)
LYMPHOCYTES # BLD AUTO: 0.81 THOUSAND/UL (ref 0.6–4.47)
LYMPHOCYTES # BLD AUTO: 54 % (ref 14–44)
MAGNESIUM SERPL-MCNC: 1.5 MG/DL (ref 1.9–2.7)
MCH RBC QN AUTO: 32.5 PG (ref 26.8–34.3)
MCHC RBC AUTO-ENTMCNC: 32.7 G/DL (ref 31.4–37.4)
MCV RBC AUTO: 100 FL (ref 82–98)
MONOCYTES # BLD AUTO: 0.14 THOUSAND/UL (ref 0–1.22)
MONOCYTES NFR BLD: 9 % (ref 4–12)
NEUTROPHILS # BLD MANUAL: 0.48 THOUSAND/UL (ref 1.85–7.62)
NEUTS BAND NFR BLD MANUAL: 1 % (ref 0–8)
NEUTS SEG NFR BLD AUTO: 31 % (ref 43–75)
PLATELET # BLD AUTO: 184 THOUSANDS/UL (ref 149–390)
PLATELET BLD QL SMEAR: ADEQUATE
PMV BLD AUTO: 9.7 FL (ref 8.9–12.7)
POTASSIUM SERPL-SCNC: 3.8 MMOL/L (ref 3.5–5.3)
PROT SERPL-MCNC: 6.2 G/DL (ref 6.4–8.4)
RBC # BLD AUTO: 3.78 MILLION/UL (ref 3.81–5.12)
RBC MORPH BLD: PRESENT
SODIUM SERPL-SCNC: 140 MMOL/L (ref 135–147)
TACROLIMUS BLD-MCNC: 8.8 NG/ML (ref 3–15)
WBC # BLD AUTO: 1.5 THOUSAND/UL (ref 4.31–10.16)

## 2023-11-14 PROCEDURE — 80197 ASSAY OF TACROLIMUS: CPT

## 2023-11-14 PROCEDURE — 36415 COLL VENOUS BLD VENIPUNCTURE: CPT

## 2023-11-14 PROCEDURE — 85007 BL SMEAR W/DIFF WBC COUNT: CPT

## 2023-11-14 PROCEDURE — 80048 BASIC METABOLIC PNL TOTAL CA: CPT

## 2023-11-14 PROCEDURE — 85027 COMPLETE CBC AUTOMATED: CPT

## 2023-11-14 PROCEDURE — 83735 ASSAY OF MAGNESIUM: CPT

## 2023-11-14 PROCEDURE — 80076 HEPATIC FUNCTION PANEL: CPT

## 2023-11-16 LAB
CMV DNA SERPL NAA+PROBE-ACNC: NORMAL IU/ML
CMV DNA SERPL NAA+PROBE-LOG IU: NORMAL LOG10 IU/ML

## 2023-11-17 ENCOUNTER — APPOINTMENT (OUTPATIENT)
Facility: CLINIC | Age: 63
End: 2023-11-17
Payer: COMMERCIAL

## 2023-11-20 ENCOUNTER — APPOINTMENT (OUTPATIENT)
Dept: LAB | Facility: HOSPITAL | Age: 63
End: 2023-11-20
Payer: COMMERCIAL

## 2023-11-20 DIAGNOSIS — Z94.4 LIVER REPLACED BY TRANSPLANT (HCC): ICD-10-CM

## 2023-11-20 DIAGNOSIS — B25.9: ICD-10-CM

## 2023-11-20 DIAGNOSIS — D84.9 DEFICIENCY SYNDROME, IMMUNOLOGIC (HCC): ICD-10-CM

## 2023-11-20 LAB
ALBUMIN SERPL BCP-MCNC: 4.2 G/DL (ref 3.5–5)
ALP SERPL-CCNC: 49 U/L (ref 34–104)
ALT SERPL W P-5'-P-CCNC: 124 U/L (ref 7–52)
ANION GAP SERPL CALCULATED.3IONS-SCNC: 6 MMOL/L
ANISOCYTOSIS BLD QL SMEAR: PRESENT
AST SERPL W P-5'-P-CCNC: 35 U/L (ref 13–39)
BASOPHILS # BLD MANUAL: 0.02 THOUSAND/UL (ref 0–0.1)
BASOPHILS NFR MAR MANUAL: 1 % (ref 0–1)
BILIRUB DIRECT SERPL-MCNC: 0.08 MG/DL (ref 0–0.2)
BILIRUB SERPL-MCNC: 0.74 MG/DL (ref 0.2–1)
BUN SERPL-MCNC: 14 MG/DL (ref 5–25)
CALCIUM SERPL-MCNC: 8.9 MG/DL (ref 8.4–10.2)
CHLORIDE SERPL-SCNC: 102 MMOL/L (ref 96–108)
CO2 SERPL-SCNC: 30 MMOL/L (ref 21–32)
CREAT SERPL-MCNC: 0.81 MG/DL (ref 0.6–1.3)
EOSINOPHIL # BLD MANUAL: 0.04 THOUSAND/UL (ref 0–0.4)
EOSINOPHIL NFR BLD MANUAL: 2 % (ref 0–6)
ERYTHROCYTE [DISTWIDTH] IN BLOOD BY AUTOMATED COUNT: 14.2 % (ref 11.6–15.1)
EST. AVERAGE GLUCOSE BLD GHB EST-MCNC: 100 MG/DL
GFR SERPL CREATININE-BSD FRML MDRD: 79 ML/MIN/1.73SQ M
GLUCOSE P FAST SERPL-MCNC: 101 MG/DL (ref 65–99)
HBA1C MFR BLD: 5.1 %
HCT VFR BLD AUTO: 38.6 % (ref 34.8–46.1)
HGB BLD-MCNC: 12.4 G/DL (ref 11.5–15.4)
LYMPHOCYTES # BLD AUTO: 0.61 THOUSAND/UL (ref 0.6–4.47)
LYMPHOCYTES # BLD AUTO: 34 % (ref 14–44)
MAGNESIUM SERPL-MCNC: 1.7 MG/DL (ref 1.9–2.7)
MCH RBC QN AUTO: 32 PG (ref 26.8–34.3)
MCHC RBC AUTO-ENTMCNC: 32.1 G/DL (ref 31.4–37.4)
MCV RBC AUTO: 100 FL (ref 82–98)
MONOCYTES # BLD AUTO: 0.11 THOUSAND/UL (ref 0–1.22)
MONOCYTES NFR BLD: 6 % (ref 4–12)
NEUTROPHILS # BLD MANUAL: 0.98 THOUSAND/UL (ref 1.85–7.62)
NEUTS BAND NFR BLD MANUAL: 4 % (ref 0–8)
NEUTS SEG NFR BLD AUTO: 52 % (ref 43–75)
PLATELET # BLD AUTO: 196 THOUSANDS/UL (ref 149–390)
PLATELET BLD QL SMEAR: ADEQUATE
PMV BLD AUTO: 10.2 FL (ref 8.9–12.7)
POTASSIUM SERPL-SCNC: 4 MMOL/L (ref 3.5–5.3)
PROT SERPL-MCNC: 6.4 G/DL (ref 6.4–8.4)
RBC # BLD AUTO: 3.88 MILLION/UL (ref 3.81–5.12)
RBC MORPH BLD: PRESENT
SODIUM SERPL-SCNC: 138 MMOL/L (ref 135–147)
TACROLIMUS BLD-MCNC: 11.4 NG/ML (ref 3–15)
VARIANT LYMPHS # BLD AUTO: 1 %
WBC # BLD AUTO: 1.75 THOUSAND/UL (ref 4.31–10.16)

## 2023-11-20 PROCEDURE — 85007 BL SMEAR W/DIFF WBC COUNT: CPT

## 2023-11-20 PROCEDURE — 83735 ASSAY OF MAGNESIUM: CPT

## 2023-11-20 PROCEDURE — 80197 ASSAY OF TACROLIMUS: CPT

## 2023-11-20 PROCEDURE — 80048 BASIC METABOLIC PNL TOTAL CA: CPT

## 2023-11-20 PROCEDURE — 83036 HEMOGLOBIN GLYCOSYLATED A1C: CPT

## 2023-11-20 PROCEDURE — 80076 HEPATIC FUNCTION PANEL: CPT

## 2023-11-20 PROCEDURE — 85027 COMPLETE CBC AUTOMATED: CPT

## 2023-11-20 PROCEDURE — 36415 COLL VENOUS BLD VENIPUNCTURE: CPT

## 2023-11-21 ENCOUNTER — APPOINTMENT (OUTPATIENT)
Dept: LAB | Facility: HOSPITAL | Age: 63
End: 2023-11-21
Payer: COMMERCIAL

## 2023-11-21 ENCOUNTER — OFFICE VISIT (OUTPATIENT)
Dept: PAIN MEDICINE | Facility: CLINIC | Age: 63
End: 2023-11-21
Payer: COMMERCIAL

## 2023-11-21 ENCOUNTER — APPOINTMENT (OUTPATIENT)
Facility: CLINIC | Age: 63
End: 2023-11-21
Payer: COMMERCIAL

## 2023-11-21 VITALS
HEART RATE: 81 BPM | WEIGHT: 178 LBS | DIASTOLIC BLOOD PRESSURE: 78 MMHG | SYSTOLIC BLOOD PRESSURE: 114 MMHG | TEMPERATURE: 97.2 F | HEIGHT: 64 IN | BODY MASS INDEX: 30.39 KG/M2

## 2023-11-21 DIAGNOSIS — M54.16 LUMBAR RADICULOPATHY: ICD-10-CM

## 2023-11-21 DIAGNOSIS — Z94.4 STATUS POST LIVER TRANSPLANTATION (HCC): ICD-10-CM

## 2023-11-21 DIAGNOSIS — G61.89 OTHER INFLAMMATORY POLYNEUROPATHIES (HCC): Primary | ICD-10-CM

## 2023-11-21 DIAGNOSIS — R29.898 WEAKNESS OF LEFT LOWER EXTREMITY: ICD-10-CM

## 2023-11-21 DIAGNOSIS — D84.9 IMMUNODEFICIENCY SYNDROME (HCC): ICD-10-CM

## 2023-11-21 DIAGNOSIS — G89.4 CHRONIC PAIN SYNDROME: ICD-10-CM

## 2023-11-21 DIAGNOSIS — M47.816 LUMBAR SPONDYLOSIS: ICD-10-CM

## 2023-11-21 DIAGNOSIS — Z94.4 LIVER REPLACED BY TRANSPLANT (HCC): ICD-10-CM

## 2023-11-21 DIAGNOSIS — D84.9 DEFICIENCY SYNDROME, IMMUNOLOGIC (HCC): ICD-10-CM

## 2023-11-21 DIAGNOSIS — B25.9: ICD-10-CM

## 2023-11-21 DIAGNOSIS — M51.36 DDD (DEGENERATIVE DISC DISEASE), LUMBAR: ICD-10-CM

## 2023-11-21 LAB
ALBUMIN SERPL BCP-MCNC: 4.3 G/DL (ref 3.5–5)
ALP SERPL-CCNC: 48 U/L (ref 34–104)
ALT SERPL W P-5'-P-CCNC: 102 U/L (ref 7–52)
ANION GAP SERPL CALCULATED.3IONS-SCNC: 6 MMOL/L
ANISOCYTOSIS BLD QL SMEAR: PRESENT
AST SERPL W P-5'-P-CCNC: 28 U/L (ref 13–39)
BASOPHILS # BLD MANUAL: 0.02 THOUSAND/UL (ref 0–0.1)
BASOPHILS NFR MAR MANUAL: 1 % (ref 0–1)
BILIRUB DIRECT SERPL-MCNC: 0.12 MG/DL (ref 0–0.2)
BILIRUB SERPL-MCNC: 0.88 MG/DL (ref 0.2–1)
BUN SERPL-MCNC: 14 MG/DL (ref 5–25)
CALCIUM SERPL-MCNC: 9.3 MG/DL (ref 8.4–10.2)
CHLORIDE SERPL-SCNC: 101 MMOL/L (ref 96–108)
CMV DNA SERPL NAA+PROBE-ACNC: NEGATIVE IU/ML
CMV DNA SERPL NAA+PROBE-LOG IU: NORMAL LOG10 IU/ML
CO2 SERPL-SCNC: 31 MMOL/L (ref 21–32)
CREAT SERPL-MCNC: 0.82 MG/DL (ref 0.6–1.3)
EOSINOPHIL # BLD MANUAL: 0.12 THOUSAND/UL (ref 0–0.4)
EOSINOPHIL NFR BLD MANUAL: 6 % (ref 0–6)
ERYTHROCYTE [DISTWIDTH] IN BLOOD BY AUTOMATED COUNT: 14.2 % (ref 11.6–15.1)
GFR SERPL CREATININE-BSD FRML MDRD: 78 ML/MIN/1.73SQ M
GLUCOSE P FAST SERPL-MCNC: 92 MG/DL (ref 65–99)
HCT VFR BLD AUTO: 39.3 % (ref 34.8–46.1)
HGB BLD-MCNC: 12.7 G/DL (ref 11.5–15.4)
INR PPP: 0.94 (ref 0.84–1.19)
LYMPHOCYTES # BLD AUTO: 0.56 THOUSAND/UL (ref 0.6–4.47)
LYMPHOCYTES # BLD AUTO: 29 % (ref 14–44)
MAGNESIUM SERPL-MCNC: 1.6 MG/DL (ref 1.9–2.7)
MCH RBC QN AUTO: 31.8 PG (ref 26.8–34.3)
MCHC RBC AUTO-ENTMCNC: 32.3 G/DL (ref 31.4–37.4)
MCV RBC AUTO: 99 FL (ref 82–98)
METAMYELOCYTES NFR BLD MANUAL: 1 % (ref 0–1)
MONOCYTES # BLD AUTO: 0.15 THOUSAND/UL (ref 0–1.22)
MONOCYTES NFR BLD: 8 % (ref 4–12)
MYELOCYTES NFR BLD MANUAL: 1 % (ref 0–1)
NEUTROPHILS # BLD MANUAL: 1.04 THOUSAND/UL (ref 1.85–7.62)
NEUTS SEG NFR BLD AUTO: 54 % (ref 43–75)
PLATELET # BLD AUTO: 195 THOUSANDS/UL (ref 149–390)
PLATELET BLD QL SMEAR: ADEQUATE
PMV BLD AUTO: 9.8 FL (ref 8.9–12.7)
POTASSIUM SERPL-SCNC: 3.8 MMOL/L (ref 3.5–5.3)
PROT SERPL-MCNC: 6.7 G/DL (ref 6.4–8.4)
PROTHROMBIN TIME: 13 SECONDS (ref 11.6–14.5)
RBC # BLD AUTO: 3.99 MILLION/UL (ref 3.81–5.12)
RBC MORPH BLD: PRESENT
SODIUM SERPL-SCNC: 138 MMOL/L (ref 135–147)
TACROLIMUS BLD-MCNC: 9.5 NG/ML (ref 3–15)
WBC # BLD AUTO: 1.92 THOUSAND/UL (ref 4.31–10.16)

## 2023-11-21 PROCEDURE — 85027 COMPLETE CBC AUTOMATED: CPT

## 2023-11-21 PROCEDURE — 36415 COLL VENOUS BLD VENIPUNCTURE: CPT

## 2023-11-21 PROCEDURE — 80197 ASSAY OF TACROLIMUS: CPT

## 2023-11-21 PROCEDURE — 99214 OFFICE O/P EST MOD 30 MIN: CPT | Performed by: PHYSICIAN ASSISTANT

## 2023-11-21 PROCEDURE — 85610 PROTHROMBIN TIME: CPT

## 2023-11-21 PROCEDURE — 80076 HEPATIC FUNCTION PANEL: CPT

## 2023-11-21 PROCEDURE — 80048 BASIC METABOLIC PNL TOTAL CA: CPT

## 2023-11-21 PROCEDURE — 83735 ASSAY OF MAGNESIUM: CPT

## 2023-11-21 PROCEDURE — 85007 BL SMEAR W/DIFF WBC COUNT: CPT

## 2023-11-21 RX ORDER — TRAMADOL HYDROCHLORIDE 200 MG/1
TABLET, EXTENDED RELEASE ORAL
Qty: 30 TABLET | Refills: 2 | Status: SHIPPED | OUTPATIENT
Start: 2023-11-21

## 2023-11-21 RX ORDER — MAGNESIUM CHLORIDE 71.5 G/G
2 TABLET ORAL DAILY
COMMUNITY
Start: 2023-11-06

## 2023-11-21 NOTE — PROGRESS NOTES
Assessment:  1. Other inflammatory polyneuropathies ([address])    2. Lumbar radiculopathy    3. Lumbar spondylosis    4. Chronic pain syndrome    5. DDD (degenerative disc disease), lumbar    6. Weakness of left lower extremity        Plan:  While the patient was in the office today, I did have a thorough conversation regarding their chronic pain syndrome, medication management, and treatment plan options. The patient remains clinically stable and moderately controlled on the current medication regimen without side effects or issues. On today's visit I have electronically sent a refill for the tramadol  mg daily to her pharmacy with 2 additional refills with a do not fill date of 11/26/2023. The patient takes the tramadol IR 50 mg twice daily on an as-needed basis and does not require refill on today's visit. Continue physical therapy. She will continue to follow-up with her specialists regarding her complicated medical state. [place] Prescription Drug Monitoring Program report was reviewed and was appropriate     There are risks associated with opioid medications, including dependence, addiction and tolerance. The patient understands and agrees to use these medications only as prescribed. Potential side effects of the medications include, but are not limited to, constipation, drowsiness, addiction, impaired judgment and risk of fatal overdose if not taken as prescribed. The patient was warned against driving while taking sedation medications. Sharing medications is a felony. At this point in time, the patient is showing no signs of addiction, abuse, diversion or suicidal ideation. The patient will follow-up in 12 weeks for medication prescription refill and reevaluation. The patient was advised to contact the office should their symptoms worsen in the interim. The patient was agreeable and verbalized an understanding. History of Present Illness:     The patient is a 63 y.o. female last seen on 8/29/2023 who presents for a follow up office visit in regards to chronic pain. The patient currently reports chronic left lower extremity pain that she presently rates a 7 out of 10 and describes it as a constant burning, dull, aching, sharp, cramping, shooting type of pain with associated numbness and paresthesias. She states that she has had increased pain lately and has been utilizing the tramadol IR more regularly than she had been. The patient has been participating in physical therapy with difficulty at times. She states that she tries to watch how much medication she takes due to her liver issues. She does make sure she keeps up with her specialist appointments and lab work on a regular basis. Current pain medications includes: Tramadol ER and tramadol IR. The patient reports that this regimen is providing 50% pain relief. The patient is reporting no side effects from this pain medication regimen. Pain Contract Signed: 11/21/2023  Last Urine Drug Screen: 6/6/2023    I have personally reviewed and/or updated the patient's past medical history, past surgical history, family history, social history, current medications, allergies, and vital signs today. Review of Systems:    Review of Systems   Respiratory:  Negative for shortness of breath. Cardiovascular:  Negative for chest pain. Gastrointestinal:  Positive for diarrhea and nausea. Negative for constipation and vomiting. Musculoskeletal:  Positive for gait problem and joint swelling (Joint stiffness). Negative for arthralgias and myalgias. Skin:  Negative for rash. Neurological:  Positive for dizziness and weakness. Negative for seizures. All other systems reviewed and are negative.         Past Medical History:   Diagnosis Date   • Asthma    • Clotting disorder [facility])    • Concussion    • CSF leak    • Hyperbilirubinemia 04/28/2022   • Hypertension    • Liver failure [facility])    • Liver transplant recipient [facility])    • Migraines    • Peripheral neuropathy    • Varicella 1968    As a child       Past Surgical History:   Procedure Laterality Date   • ABLATION SOFT TISSUE     • BREAST LUMPECTOMY     • IR PARACENTESIS  2022   • IR PARACENTESIS  2022   • IR PARACENTESIS  2022   • LAPAROSCOPY FOR ECTOPIC PREGNANCY     • MAMMO (HISTORICAL) Bilateral 2021    [facility] BI-RADS 2 Benign findings. after U/S Scattered areas fibrogladulae density; 25% to 50% glandular breast tissue   • SINUS SURGERY     • TONSILECTOMY AND ADNOIDECTOMY         Family History   Problem Relation Age of Onset   • Cancer Mother    • Breast cancer Mother    • Cancer Father    • Colon cancer Father    • Arthritis Father    • Cancer Sister    • Breast cancer Sister    • Arthritis Brother    • No Known Problems Paternal Grandmother    • No Known Problems Paternal Grandfather    • Autoimmune disease Daughter    • No Known Problems Son    • No Known Problems Son    • Heart disease Half-Brother    • Prostate cancer Half-Brother    • Melanoma Half-Brother        Social History     Occupational History   • Not on file   Tobacco Use   • Smoking status: Former     Packs/day: 1.00     Types: Cigarettes     Quit date: 2019     Years since quittin.8   • Smokeless tobacco: Never   Vaping Use   • Vaping Use: Never used   Substance and Sexual Activity   • Alcohol use: Not Currently     Comment: socially, had glass of wine today   • Drug use: Not Currently     Comment: CBD   • Sexual activity: Not Currently     Partners: Male     Birth control/protection: Post-menopausal     Comment: no new partner in past year         Current Outpatient Medications:   •  diphenhydrAMINE (BENADRYL) 25 mg capsule, Take 25 mg by mouth every 8 (eight) hours as needed for itching. Indications: Itching, Disp: , Rfl:   •  ergocalciferol (ERGOCALCIFEROL) 1.25 MG (89068 UT) capsule, Take 50,000 Units by mouth once a week.  Every Monday AM  Indications: Vitamin D Deficiency, Disp: , Rfl: •  FOLIC ACID PO, Take by mouth, Disp: , Rfl:   •  gabapentin (NEURONTIN) 300 mg capsule, Take 600 mg by mouth 3 (three) times a day. Indications: Neuropathic Pain, Disp: , Rfl:   •  melatonin 3 mg, Take 3 mg by mouth daily at bedtime as needed (sleep). Indications: Trouble Sleeping, Disp: , Rfl:   •  NIFEdipine ER (ADALAT CC) 30 MG 24 hr tablet, Take 30 mg by mouth 2 (two) times a day. Indications: High Blood Pressure Disorder, Disp: , Rfl:   •  pantoprazole (PROTONIX) 40 mg tablet, Take 40 mg by mouth daily, Disp: , Rfl:   •  predniSONE 5 mg tablet, Take 15 mg by mouth daily 4 tablets in AM, Disp: , Rfl:   •  Premarin vaginal cream, , Disp: , Rfl:   •  Prolia 60 MG/ML, USE 1 ML SUBCUTANOEUS EVERY 6 MONTHS AS DIRECTED. PLEASE DELIVER . ..  (REFER TO PRESCRIPTION NOTES). , Disp: , Rfl:   •  Slow-Mag 71.5-119 MG, Take 2 tablets by mouth daily, Disp: , Rfl:   •  tacrolimus (PROGRAF) 1 mg capsule, Take 6 mg by mouth every 12 (twelve) hours 7 capsules AM 7 capsules PM, Disp: , Rfl:   •  thiamine 100 MG tablet, Take 100 mg by mouth daily.  Indications: Deficiency of Vitamin B1, Disp: , Rfl:   •  traMADol (ULTRAM) 50 mg tablet, Take 1 PO BID PRN for pain for ongoing therapy, Disp: 45 tablet, Rfl: 2  •  traMADol (ULTRAM-ER) 200 MG 24 hr tablet, take 1 tablet by mouth once daily for pain for ongoing therapy DO NOT FILL BEFORE:11/26/23, Disp: 30 tablet, Rfl: 2  •  ursodiol (ACTIGALL) 250 mg tablet, , Disp: , Rfl:   •  valGANciclovir (VALCYTE) 450 mg tablet, Take 900 mg by mouth 2 (two) times a day with meals, Disp: , Rfl:   •  Accu-Chek Guide test strip, , Disp: , Rfl:   •  Magnesium Oxide 400 MG CAPS, Take by mouth, Disp: , Rfl:   •  Microlet Lancets MISC, , Disp: , Rfl:     No Known Allergies    Physical Exam:    /78 (BP Location: Left arm, Patient Position: Sitting, Cuff Size: Standard)   Pulse 81   Temp (!) 97.2 °F (36.2 °C)   Ht 5' 4" (1.626 m)   Wt 80.7 kg (178 lb)   BMI 30.55 kg/m²     Constitutional:normal, well developed, well nourished, alert, in no distress and non-toxic and no overt pain behavior. Eyes:anicteric  HEENT:grossly intact  Neck:supple, symmetric, trachea midline and no masses   Pulmonary:even and unlabored  Cardiovascular:No edema or pitting edema present  Skin:Normal without rashes or lesions and well hydrated  Psychiatric:Mood and affect appropriate  Neurologic:Cranial Nerves II-XII grossly intact  Musculoskeletal: gait is antalgic      Imaging  No orders to display         No orders of the defined types were placed in this encounter.

## 2023-11-27 ENCOUNTER — OFFICE VISIT (OUTPATIENT)
Dept: GASTROENTEROLOGY | Facility: CLINIC | Age: 63
End: 2023-11-27
Payer: COMMERCIAL

## 2023-11-27 VITALS
DIASTOLIC BLOOD PRESSURE: 84 MMHG | WEIGHT: 178 LBS | HEIGHT: 64 IN | BODY MASS INDEX: 30.39 KG/M2 | SYSTOLIC BLOOD PRESSURE: 138 MMHG

## 2023-11-27 DIAGNOSIS — B25.8 OTHER CYTOMEGALOVIRAL DISEASES (HCC): ICD-10-CM

## 2023-11-27 DIAGNOSIS — Z86.010 PERSONAL HISTORY OF COLONIC POLYPS: ICD-10-CM

## 2023-11-27 DIAGNOSIS — K59.1 FUNCTIONAL DIARRHEA: Primary | ICD-10-CM

## 2023-11-27 DIAGNOSIS — Z94.4 LIVER TRANSPLANT STATUS (HCC): ICD-10-CM

## 2023-11-27 PROCEDURE — 99214 OFFICE O/P EST MOD 30 MIN: CPT | Performed by: INTERNAL MEDICINE

## 2023-11-27 NOTE — PROGRESS NOTES
[facility] - Outpatient Follow-up Note  Derrick Dickson 63 y.o. female MRN: [id number]  Encounter: [id number]    ASSESSMENT AND PLAN:      1. 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 Colon with bx w/o evidence of CMV, likely 2/2 med side effects.     - On Magnesium per Hepatology - ok to continue for now  - Cont imodium prn    2. Other cytomegaloviral diseases ([address])  On Valcyte    3. Liver transplant status ([address])  F/U Hepatology    4. Personal history of colonic polyps  UTD, recall 11/2028      Followup Appointment: 6 months  ______________________________________________________________________    Chief Complaint   Patient presents with   • Follow-up     Patient still having diarrhea, a little better patient changed imodium to bid   • Diarrhea     HPI:  62F here today for f/u. Still w some loose stools but improved on imodium. No gib. Feels frustrated because she isn't 100% better from OLTx. Wants to be off prednisone because she is gaining weight.  She has f/u w transplant team.     Historical Information   Past Medical History:   Diagnosis Date   • Asthma    • Clotting disorder ([address])    • Concussion    • CSF leak    • GERD (gastroesophageal reflux disease)    • Hernia 2022   • Hyperbilirubinemia 04/28/2022   • Hypertension    • Liver failure ([address])    • Liver transplant recipient [facility])    • Migraines    • Peripheral neuropathy    • Varicella 1968    As a child     Past Surgical History:   Procedure Laterality Date   • ABLATION SOFT TISSUE     • BREAST LUMPECTOMY     • COLONOSCOPY  11/2/2023   • IR PARACENTESIS  04/29/2022   • IR PARACENTESIS  05/09/2022   • IR PARACENTESIS  05/12/2022   • LAPAROSCOPY FOR ECTOPIC PREGNANCY     • LIVER BIOPSY  7/3/2023    Plus 3 previous in hospital   • LIVER TRANSPLANTATION  5/17/22   • MAMMO (HISTORICAL) Bilateral 2021    [facility] BI-RADS 2 Benign findings.  after U/S Scattered areas fibrogladulae density; 25% to 50% glandular breast tissue   • SINUS SURGERY     • TONSILECTOMY AND ADNOIDECTOMY       Social History     Substance and Sexual Activity   Alcohol Use Not Currently    Comment: socially, had glass of wine today     Social History     Substance and Sexual Activity   Drug Use Not Currently    Comment: CBD     Social History     Tobacco Use   Smoking Status Former   • Packs/day: 1.00   • Years: 25.00   • Total pack years: 25.00   • Types: Cigarettes   • Quit date:    • Years since quittin.9   Smokeless Tobacco Never     Family History   Problem Relation Age of Onset   • Cancer Mother    • Breast cancer Mother    • Cancer Father    • Colon cancer Father    • Arthritis Father    • Cancer Sister    • Breast cancer Sister    • Arthritis Brother    • No Known Problems Paternal Grandmother    • No Known Problems Paternal Grandfather    • Autoimmune disease Daughter    • No Known Problems Son    • No Known Problems Son    • Heart disease Half-Brother    • Prostate cancer Half-Brother    • Melanoma Half-Brother          Current Outpatient Medications:   •  Accu-Chek Guide test strip  •  diphenhydrAMINE (BENADRYL) 25 mg capsule  •  ergocalciferol (ERGOCALCIFEROL) 1.25 MG (63107 UT) capsule  •  FOLIC ACID PO  •  gabapentin (NEURONTIN) 300 mg capsule  •  melatonin 3 mg  •  Microlet Lancets MISC  •  NIFEdipine ER (ADALAT CC) 30 MG 24 hr tablet  •  pantoprazole (PROTONIX) 40 mg tablet  •  predniSONE 5 mg tablet  •  Premarin vaginal cream  •  Prolia 60 MG/ML  •  Slow-Mag 71.5-119 MG  •  tacrolimus (PROGRAF) 1 mg capsule  •  thiamine 100 MG tablet  •  traMADol (ULTRAM) 50 mg tablet  •  traMADol (ULTRAM-ER) 200 MG 24 hr tablet  •  ursodiol (ACTIGALL) 250 mg tablet  •  valGANciclovir (VALCYTE) 450 mg tablet  No Known Allergies  Reviewed medications and allergies and updated as indicated    PHYSICAL EXAM:    Blood pressure 138/84, height 5' 4" (1.626 m), weight 80.7 kg (178 lb), not currently breastfeeding. Body mass index is 30.55 kg/m². General Appearance: NAD, cooperative, alert  Eyes: Anicteric, PERRLA, EOMI  ENT:  Normocephalic, atraumatic, normal mucosa. Neck:  Supple, symmetrical, trachea midline  Resp:  Clear to auscultation bilaterally; no rales, rhonchi or wheezing; respirations unlabored   CV:  S1 S2, Regular rate and rhythm; no murmur, rub, or gallop. GI:  Soft, non-tender, non-distended; normal bowel sounds; no masses, no organomegaly   Rectal: Deferred  Musculoskeletal: No cyanosis, clubbing or edema. Normal ROM. Skin:  No jaundice, rashes, or lesions   Heme/Lymph: No palpable cervical lymphadenopathy  Psych: Normal affect, good eye contact  Neuro: No gross deficits, AAOx3    Lab Results:   Lab Results   Component Value Date    WBC 1.92 (LL) 11/21/2023    HGB 12.7 11/21/2023    HCT 39.3 11/21/2023    MCV 99 (H) 11/21/2023     11/21/2023     Lab Results   Component Value Date     05/08/2015    K 3.8 11/21/2023     11/21/2023    CO2 31 11/21/2023    ANIONGAP 10 05/08/2015    BUN 14 11/21/2023    CREATININE 0.82 11/21/2023    GLUCOSE 94 05/08/2015    GLUF 92 11/21/2023    CALCIUM 9.3 11/21/2023    CORRECTEDCA 9.3 05/12/2022    AST 28 11/21/2023     (H) 11/21/2023    ALKPHOS 48 11/21/2023    PROT 6.9 05/08/2015    BILITOT 0.2 05/08/2015    EGFR 78 11/21/2023     Lab Results   Component Value Date    IRON 97 04/28/2022    TIBC 146 (L) 04/28/2022    FERRITIN 1,238 (H) 04/28/2022     Lab Results   Component Value Date    LIPASE 76 04/28/2022       Radiology Results:   Colonoscopy    Result Date: 11/2/2023  Narrative: Table formatting from the original result was not included.  [facility] [address] [facility],Third Floor [id number] [phone] DATE OF SERVICE: 11/02/23 PHYSICIAN(S): Attending: Марина Seo MD INDICATION: Diarrhea, unspecified type POST-OP DIAGNOSIS: See the impression below. HISTORY: Prior colonoscopy: 5 years ago. BOWEL PREPARATION: Golytely/Colyte/Trilyte PREPROCEDURE: Informed consent was obtained for the procedure, including sedation. Risks including but not limited to bleeding, infection, perforation, adverse drug reaction and aspiration were explained in detail. Also explained about less than 100% sensitivity with the exam and other alternatives. The patient was placed in the left lateral decubitus position. Procedure: Colonoscopy DETAILS OF PROCEDURE: Patient was taken to the procedure room where a time out was performed to confirm correct patient and correct procedure. The patient underwent monitored anesthesia care, which was administered by an anesthesia professional. The patient's blood pressure, heart rate, level of consciousness, oxygen, respirations and ECG were monitored throughout the procedure. A digital rectal exam was performed. The scope was introduced through the anus and advanced to the cecum. Retroflexion was performed in the rectum. The quality of bowel preparation was evaluated using the [facility] Bowel Preparation Scale with scores of: right colon = 2, transverse colon = 2, left colon = 2. The total BBPS score was 6. Bowel prep was adequate. The patient experienced no blood loss. The procedure was not difficult. The patient tolerated the procedure well. There were no apparent adverse events.  ANESTHESIA INFORMATION: ASA: III Anesthesia Type: IV Sedation with Anesthesia MEDICATIONS: No administrations occurring from 0939 to 1003 on 11/02/23 FINDINGS: Internal small hemorrhoids Few large, scattered diverticula of moderate severity in the sigmoid colon Moderate, generalized pancolonic edematous, erythematous and granular mucosa with loss of vascular pattern; performed cold forceps biopsy EVENTS: Procedure Events Event Event Time ENDO CECUM REACHED 11/2/2023  9:53 AM ENDO SCOPE OUT TIME 11/2/2023 10:01 AM SPECIMENS: ID Type Source Tests Collected by Time Destination 1 : random colon biopsy to R/O colitis Tissue Colon TISSUE EXAM Chrissie Cordero MD 11/2/2023  9:55 AM  EQUIPMENT: Colonoscope -[id number]     Impression: Small hemorrhoids Scattered diverticulosis of moderate severity in the sigmoid colon Moderate pancolonic edematous, erythematous and granular mucosa with loss of vascular pattern; performed cold forceps biopsy RECOMMENDATION:  Repeat colonoscopy in 5 years  Personal history of colon polyps  Resume home meds. Resume previous diet. Follow up with your primary care provider as previously scheduled. Follow up with GI and Hepatology as previously scheduled. The biopsy results will be available in [place] in 1-2 weeks.   Chrissie Cordero MD   Answers submitted by the patient for this visit:  Abdominal Pain Questionnaire (Submitted on 11/24/2023)  Chief Complaint: Abdominal pain  Chronicity: chronic  Onset: more than 1 month ago  Onset quality: gradual  Frequency: daily  Episode duration: 1 Hours  Progression since onset: gradually improving  Pain location: RLQ, suprapubic region  Pain - numeric: 4/10  Pain quality: aching, cramping, a sensation of fullness  Radiates to: suprapubic region  anorexia: No  arthralgias: Yes  belching: No  constipation: No  diarrhea: Yes  dysuria: No  fever: Yes  flatus: No  frequency: No  headaches: Yes  hematochezia: No  hematuria: No  melena: No  myalgias: Yes  nausea: Yes  weight loss: No  vomiting: No  Aggravated by: bowel movement  Relieved by: activity, bowel movements, movement, standing  Diagnostic workup: lower endoscopy, ultrasound

## 2023-11-27 NOTE — LETTER
November 27, 2023     [place], [address] [address]    Patient: Steven Alvarez   YOB: 1960   Date of Visit: 11/27/2023       Dear Dr. Katerina Wiggins: Thank you for referring Sue Poe to me for evaluation. Below are my notes for this consultation. If you have questions, please do not hesitate to call me. I look forward to following your patient along with you. Sincerely,        Jaky Womack MD        CC: MD Luana Vegas MD Waynard Longs, MD  11/27/2023  9:45 AM  Incomplete  [facility] - Outpatient Follow-up Note  Steven Alvarez 63 y.o. female MRN: [id number]  Encounter: [id number]    ASSESSMENT AND PLAN:      1. 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 Colon with bx w/o evidence of CMV, likely 2/2 med side effects.     - On Magnesium per Hepatology - ok to continue for now  - Cont imodium prn    2. Other cytomegaloviral diseases ([address] [facility])  On Valcyte    3. Liver transplant status ([address])  F/U Hepatology    4. Personal history of colonic polyps  UTD, recall 11/2028      Followup Appointment: 6 months  ______________________________________________________________________    Chief Complaint   Patient presents with   • Follow-up     Patient still having diarrhea, a little better patient changed imodium to bid   • Diarrhea     HPI:  62F here today for f/u. Still w some loose stools but improved on imodium. No gib. Feels frustrated because she isn't 100% better from OLTx. Wants to be off prednisone because she is gaining weight.  She has f/u w transplant team.     Historical Information  Past Medical History:   Diagnosis Date   • Asthma    • Clotting disorder [facility])    • Concussion    • CSF leak    • GERD (gastroesophageal reflux disease)    • Hernia 2022   • Hyperbilirubinemia 04/28/2022   • Hypertension    • Liver failure [facility])    • Liver transplant recipient [facility])    • Migraines    • Peripheral neuropathy    • Varicella 1968    As a child     Past Surgical History:   Procedure Laterality Date   • ABLATION SOFT TISSUE     • BREAST LUMPECTOMY     • COLONOSCOPY  2023   • IR PARACENTESIS  2022   • IR PARACENTESIS  2022   • IR PARACENTESIS  2022   • LAPAROSCOPY FOR ECTOPIC PREGNANCY     • LIVER BIOPSY  7/3/2023    Plus 3 previous in hospital   • LIVER TRANSPLANTATION  22   • MAMMO (HISTORICAL) Bilateral 2021    [facility] BI-RADS 2 Benign findings.  after U/S Scattered areas fibrogladulae density; 25% to 50% glandular breast tissue   • SINUS SURGERY     • TONSILECTOMY AND ADNOIDECTOMY       Social History     Substance and Sexual Activity   Alcohol Use Not Currently    Comment: socially, had glass of wine today     Social History     Substance and Sexual Activity   Drug Use Not Currently    Comment: CBD     Social History     Tobacco Use   Smoking Status Former   • Packs/day: 1.00   • Years: 25.00   • Total pack years: 25.00   • Types: Cigarettes   • Quit date:    • Years since quittin.9   Smokeless Tobacco Never     Family History   Problem Relation Age of Onset   • Cancer Mother    • Breast cancer Mother    • Cancer Father    • Colon cancer Father    • Arthritis Father    • Cancer Sister    • Breast cancer Sister    • Arthritis Brother    • No Known Problems Paternal Grandmother    • No Known Problems Paternal Grandfather    • Autoimmune disease Daughter    • No Known Problems Son    • No Known Problems Son    • Heart disease Half-Brother    • Prostate cancer Half-Brother    • Melanoma Half-Brother          Current Outpatient Medications:   •  Accu-Chek Guide test strip  •  diphenhydrAMINE (BENADRYL) 25 mg capsule  •  ergocalciferol (ERGOCALCIFEROL) 1.25 MG (70400 UT) capsule  •  FOLIC ACID PO  •  gabapentin (NEURONTIN) 300 mg capsule  •  melatonin 3 mg  •  Microlet Lancets MISC  •  NIFEdipine ER (ADALAT CC) 30 MG 24 hr tablet  •  pantoprazole (PROTONIX) 40 mg tablet  •  predniSONE 5 mg tablet  •  Premarin vaginal cream  •  Prolia 60 MG/ML  •  Slow-Mag 71.5-119 MG  •  tacrolimus (PROGRAF) 1 mg capsule  •  thiamine 100 MG tablet  •  traMADol (ULTRAM) 50 mg tablet  •  traMADol (ULTRAM-ER) 200 MG 24 hr tablet  •  ursodiol (ACTIGALL) 250 mg tablet  •  valGANciclovir (VALCYTE) 450 mg tablet  No Known Allergies  Reviewed medications and allergies and updated as indicated    PHYSICAL EXAM:    Blood pressure 138/84, height 5' 4" (1.626 m), weight 80.7 kg (178 lb), not currently breastfeeding. Body mass index is 30.55 kg/m². General Appearance: NAD, cooperative, alert  Eyes: Anicteric, PERRLA, EOMI  ENT:  Normocephalic, atraumatic, normal mucosa. Neck:  Supple, symmetrical, trachea midline  Resp:  Clear to auscultation bilaterally; no rales, rhonchi or wheezing; respirations unlabored   CV:  S1 S2, Regular rate and rhythm; no murmur, rub, or gallop. GI:  Soft, non-tender, non-distended; normal bowel sounds; no masses, no organomegaly   Rectal: Deferred  Musculoskeletal: No cyanosis, clubbing or edema. Normal ROM.   Skin:  No jaundice, rashes, or lesions   Heme/Lymph: No palpable cervical lymphadenopathy  Psych: Normal affect, good eye contact  Neuro: No gross deficits, AAOx3    Lab Results:   Lab Results   Component Value Date    WBC 1.92 (LL) 11/21/2023    HGB 12.7 11/21/2023    HCT 39.3 11/21/2023    MCV 99 (H) 11/21/2023     11/21/2023     Lab Results   Component Value Date     05/08/2015    K 3.8 11/21/2023     11/21/2023    CO2 31 11/21/2023    ANIONGAP 10 05/08/2015    BUN 14 11/21/2023    CREATININE 0.82 11/21/2023    GLUCOSE 94 05/08/2015    GLUF 92 11/21/2023    CALCIUM 9.3 11/21/2023    CORRECTEDCA 9.3 05/12/2022    AST 28 11/21/2023     (H) 11/21/2023    ALKPHOS 48 11/21/2023    PROT 6.9 05/08/2015    BILITOT 0.2 05/08/2015    EGFR 78 11/21/2023 Lab Results   Component Value Date    IRON 97 04/28/2022    TIBC 146 (L) 04/28/2022    FERRITIN 1,238 (H) 04/28/2022     Lab Results   Component Value Date    LIPASE 76 04/28/2022       Radiology Results:   Colonoscopy    Result Date: 11/2/2023  Narrative: Table formatting from the original result was not included. [address] [facility] [address] [facility]Third [address] [address] [phone] DATE OF SERVICE: 11/02/23 PHYSICIAN(S): Attending: Vicki Penn MD INDICATION: Diarrhea, unspecified type POST-OP DIAGNOSIS: See the impression below. HISTORY: Prior colonoscopy: 5 years ago. BOWEL PREPARATION: Golytely/Colyte/Trilyte PREPROCEDURE: Informed consent was obtained for the procedure, including sedation. Risks including but not limited to bleeding, infection, perforation, adverse drug reaction and aspiration were explained in detail. Also explained about less than 100% sensitivity with the exam and other alternatives. The patient was placed in the left lateral decubitus position. Procedure: Colonoscopy DETAILS OF PROCEDURE: Patient was taken to the procedure room where a time out was performed to confirm correct patient and correct procedure. The patient underwent monitored anesthesia care, which was administered by an anesthesia professional. The patient's blood pressure, heart rate, level of consciousness, oxygen, respirations and ECG were monitored throughout the procedure. A digital rectal exam was performed. The scope was introduced through the anus and advanced to the cecum. Retroflexion was performed in the rectum. The quality of bowel preparation was evaluated using the [facility] Bowel Preparation Scale with scores of: right colon = 2, transverse colon = 2, left colon = 2. The total BBPS score was 6. Bowel prep was adequate. The patient experienced no blood loss. The procedure was not difficult. The patient tolerated the procedure well. There were no apparent adverse events.  ANESTHESIA INFORMATION: ASA: III Anesthesia Type: IV Sedation with Anesthesia MEDICATIONS: No administrations occurring from 0939 to 1003 on 11/02/23 FINDINGS: Internal small hemorrhoids Few large, scattered diverticula of moderate severity in the sigmoid colon Moderate, generalized pancolonic edematous, erythematous and granular mucosa with loss of vascular pattern; performed cold forceps biopsy EVENTS: Procedure Events Event Event Time ENDO CECUM REACHED 11/2/2023  9:53 AM ENDO SCOPE OUT TIME 11/2/2023 10:01 AM SPECIMENS: ID Type Source Tests Collected by Time Destination 1 : random colon biopsy to R/O colitis Tissue Colon TISSUE EXAM Alex Garcia MD 11/2/2023  9:55 AM  EQUIPMENT: Colonoscope -[id number]     Impression: Small hemorrhoids Scattered diverticulosis of moderate severity in the sigmoid colon Moderate pancolonic edematous, erythematous and granular mucosa with loss of vascular pattern; performed cold forceps biopsy RECOMMENDATION:  Repeat colonoscopy in 5 years  Personal history of colon polyps  Resume home meds. Resume previous diet. Follow up with your primary care provider as previously scheduled. Follow up with GI and Hepatology as previously scheduled. [facility].   Alex Garcia MD   Answers submitted by the patient for this visit:  Abdominal Pain Questionnaire (Submitted on 11/24/2023)  Chief Complaint: Abdominal pain  Chronicity: chronic  Onset: more than 1 month ago  Onset quality: gradual  Frequency: daily  Episode duration: 1 Hours  Progression since onset: gradually improving  Pain location: RLQ, suprapubic region  Pain - numeric: 4/10  Pain quality: aching, cramping, a sensation of fullness  Radiates to: suprapubic region  anorexia: No  arthralgias: Yes  belching: No  constipation: No  diarrhea: Yes  dysuria: No  fever: Yes  flatus: No  frequency: No  headaches: Yes  hematochezia: No  hematuria: No  melena: No  myalgias: Yes  nausea: Yes  weight loss: No  vomiting: No  Aggravated by: bowel movement  Relieved by: activity, bowel movements, movement, standing  Diagnostic workup: lower endoscopy, ultrasound

## 2023-11-28 ENCOUNTER — APPOINTMENT (OUTPATIENT)
Dept: LAB | Facility: HOSPITAL | Age: 63
End: 2023-11-28
Payer: COMMERCIAL

## 2023-11-28 ENCOUNTER — APPOINTMENT (OUTPATIENT)
Facility: CLINIC | Age: 63
End: 2023-11-28
Payer: COMMERCIAL

## 2023-11-28 DIAGNOSIS — D84.9 DEFICIENCY SYNDROME, IMMUNOLOGIC (HCC): ICD-10-CM

## 2023-11-28 DIAGNOSIS — Z94.4 LIVER REPLACED BY TRANSPLANT (HCC): ICD-10-CM

## 2023-11-28 DIAGNOSIS — B25.9: ICD-10-CM

## 2023-11-28 LAB
EST. AVERAGE GLUCOSE BLD GHB EST-MCNC: 97 MG/DL
HBA1C MFR BLD: 5 %
MAGNESIUM SERPL-MCNC: 1.7 MG/DL (ref 1.9–2.7)
TACROLIMUS BLD-MCNC: 11 NG/ML (ref 3–15)

## 2023-11-28 PROCEDURE — 80197 ASSAY OF TACROLIMUS: CPT

## 2023-11-28 PROCEDURE — 83036 HEMOGLOBIN GLYCOSYLATED A1C: CPT

## 2023-11-28 PROCEDURE — 83735 ASSAY OF MAGNESIUM: CPT

## 2023-11-29 LAB
CMV DNA SERPL NAA+PROBE-ACNC: NORMAL IU/ML
CMV DNA SERPL NAA+PROBE-LOG IU: NORMAL LOG10 IU/ML

## 2023-11-30 ENCOUNTER — APPOINTMENT (OUTPATIENT)
Facility: CLINIC | Age: 63
End: 2023-11-30
Payer: COMMERCIAL

## 2023-12-10 ENCOUNTER — OFFICE VISIT (OUTPATIENT)
Dept: URGENT CARE | Facility: CLINIC | Age: 63
End: 2023-12-10
Payer: COMMERCIAL

## 2023-12-10 VITALS
TEMPERATURE: 97.9 F | HEART RATE: 84 BPM | DIASTOLIC BLOOD PRESSURE: 73 MMHG | OXYGEN SATURATION: 97 % | RESPIRATION RATE: 18 BRPM | SYSTOLIC BLOOD PRESSURE: 120 MMHG

## 2023-12-10 DIAGNOSIS — J06.9 UPPER RESPIRATORY INFECTION, VIRAL: ICD-10-CM

## 2023-12-10 DIAGNOSIS — J02.9 SORE THROAT: Primary | ICD-10-CM

## 2023-12-10 DIAGNOSIS — R05.1 ACUTE COUGH: ICD-10-CM

## 2023-12-10 LAB
S PYO AG THROAT QL: NEGATIVE
SARS-COV-2 AG UPPER RESP QL IA: NEGATIVE
VALID CONTROL: NORMAL

## 2023-12-10 PROCEDURE — 87880 STREP A ASSAY W/OPTIC: CPT | Performed by: PHYSICIAN ASSISTANT

## 2023-12-10 PROCEDURE — 99213 OFFICE O/P EST LOW 20 MIN: CPT | Performed by: PHYSICIAN ASSISTANT

## 2023-12-10 PROCEDURE — 87811 SARS-COV-2 COVID19 W/OPTIC: CPT | Performed by: PHYSICIAN ASSISTANT

## 2023-12-10 RX ORDER — PREDNISONE 20 MG/1
TABLET ORAL
COMMUNITY
Start: 2023-10-19

## 2023-12-10 NOTE — PATIENT INSTRUCTIONS
COVID: negative  STREP:  negative   Your symptoms are consistent with a viral illness. CONFIRM ALL MEDICATIONS WITH TRANSPLANT TEAM     Use Tylenol sparingly for fever     For nasal/sinus congestion you can try steam, warm compresses, saline nasal spray, Neti pot, nasal steroid (Flonase, Nasocort) to be used at bedtime after the saline nasal spray, or nasal decongestant (Afrin - for 3 days only). You can try a decongestant (Sudafed) if > 10years of age and no history of high blood pressure. For cough you can take an over-the-counter expectorant such as plain Robitussion or Mucinex. A spoonful of honey at bedtime may also be helpful. For cold symptoms with high blood pressure take Coricidin cough/cold. For sore throat you can use Cepacol lozenges, do warm salt water gargles, drink warm water with lemon or herbal teas, or use an over-the-counter throat spray (Chloraseptic). You can take ibuprofen/Motrin and acetaminophen/Tylenol as needed for pain, fever, body aches. Do not take ibuprofen/Motrin/Advil if you have a history of heart disease, bleeding ulcers, or if you take blood thinners. Drink plenty of fluids to stay hydrated. Follow up with your PCP in 5-7 days for persistent symptoms. Go to the ER if symptoms worsen. Follow up with PCP in 3-5 days. Proceed to  ER if symptoms worsen.

## 2023-12-10 NOTE — PROGRESS NOTES
[facility]        NAME: Lawrence Marcos is a 63 y.o. female  : 1960    MRN: [id number]  DATE: December 10, 2023  TIME: 5:24 PM    Assessment and Plan   Sore throat [J02.9]  1. Sore throat  POCT rapid strepA    Poct Covid 19 Rapid Antigen Test      2. Acute cough  POCT rapid strepA    Poct Covid 19 Rapid Antigen Test      3. Upper respiratory infection, viral              Patient Instructions   COVID: negative  STREP:  negative   Your symptoms are consistent with a viral illness. For nasal/sinus congestion you can try steam, warm compresses, saline nasal spray, Neti pot, nasal steroid (Flonase, Nasocort) to be used at bedtime after the saline nasal spray, or nasal decongestant (Afrin - for 3 days only). You can try a decongestant (Sudafed) if > 10years of age and no history of high blood pressure. For cough you can take an over-the-counter expectorant such as plain Robitussion or Mucinex. A spoonful of honey at bedtime may also be helpful. For cold symptoms with high blood pressure take Coricidin cough/cold. For sore throat you can use Cepacol lozenges, do warm salt water gargles, drink warm water with lemon or herbal teas, or use an over-the-counter throat spray (Chloraseptic). You can take ibuprofen/Motrin and acetaminophen/Tylenol as needed for pain, fever, body aches. Do not take ibuprofen/Motrin/Advil if you have a history of heart disease, bleeding ulcers, or if you take blood thinners. Drink plenty of fluids to stay hydrated. Follow up with your PCP in 5-7 days for persistent symptoms. Go to the ER if symptoms worsen. Follow up with PCP in 3-5 days. Proceed to  ER if symptoms worsen. Chief Complaint     Chief Complaint   Patient presents with    Fever     Pt reports she developed fever, sore throat, body aches, cough, and chest congestion since Wednesday. Pt also reports that cough causes some shortness of breath, which started a day ago.      Sore Throat Chest Congestion    Nasal Congestion         History of Present Illness       URI   This is a new problem. Episode onset: Wednesday. The problem has been gradually worsening. The maximum temperature recorded prior to her arrival was 100.4 - 100.9 F. Associated symptoms include congestion, coughing and a sore throat. Pertinent negatives include no abdominal pain, chest pain, dysuria, ear pain, headaches, rash, rhinorrhea, sinus pain or vomiting. She has tried acetaminophen for the symptoms. Improvement on treatment: good relief of fever, using sparingly d/t liver transplant. C/o mild SOB with exertion  Denies h/o COPD, + H/O childhood asthma  H/o Liver transplant     Review of Systems   Review of Systems   Constitutional:  Positive for fever. Negative for chills. HENT:  Positive for congestion and sore throat. Negative for ear pain, rhinorrhea, sinus pain and trouble swallowing. Eyes:  Negative for pain and visual disturbance. Respiratory:  Positive for cough and shortness of breath. Cardiovascular:  Negative for chest pain and palpitations. Gastrointestinal:  Negative for abdominal pain and vomiting. Genitourinary:  Negative for dysuria and hematuria. Musculoskeletal:  Negative for arthralgias and back pain. Skin:  Negative for color change and rash. Neurological:  Negative for seizures, syncope and headaches. All other systems reviewed and are negative. Current Medications       Current Outpatient Medications:     diphenhydrAMINE (BENADRYL) 25 mg capsule, Take 25 mg by mouth every 8 (eight) hours as needed for itching. Indications: Itching, Disp: , Rfl:     ergocalciferol (ERGOCALCIFEROL) 1.25 MG (01018 UT) capsule, Take 50,000 Units by mouth once a week. Every Monday AM  Indications: Vitamin D Deficiency, Disp: , Rfl:     FOLIC ACID PO, Take by mouth, Disp: , Rfl:     gabapentin (NEURONTIN) 300 mg capsule, Take 600 mg by mouth 3 (three) times a day.  Indications: Neuropathic Pain, Disp: , Rfl:     melatonin 3 mg, Take 3 mg by mouth daily at bedtime as needed (sleep). Indications: Trouble Sleeping, Disp: , Rfl:     NIFEdipine ER (ADALAT CC) 30 MG 24 hr tablet, Take 30 mg by mouth 2 (two) times a day. Indications: High Blood Pressure Disorder, Disp: , Rfl:     pantoprazole (PROTONIX) 40 mg tablet, Take 40 mg by mouth daily, Disp: , Rfl:     predniSONE 20 mg tablet, , Disp: , Rfl:     predniSONE 5 mg tablet, Take 15 mg by mouth daily 4 tablets in AM, Disp: , Rfl:     Prolia 60 MG/ML, USE 1 ML SUBCUTANOEUS EVERY 6 MONTHS AS DIRECTED. PLEASE DELIVER . ..  (REFER TO PRESCRIPTION NOTES). , Disp: , Rfl:     Slow-Mag 71.5-119 MG, Take 2 tablets by mouth daily, Disp: , Rfl:     tacrolimus (PROGRAF) 1 mg capsule, Take 6 mg by mouth every 12 (twelve) hours 7 capsules AM 7 capsules PM, Disp: , Rfl:     thiamine 100 MG tablet, Take 100 mg by mouth daily.  Indications: Deficiency of Vitamin B1, Disp: , Rfl:     traMADol (ULTRAM) 50 mg tablet, Take 1 PO BID PRN for pain for ongoing therapy, Disp: 45 tablet, Rfl: 2    traMADol (ULTRAM-ER) 200 MG 24 hr tablet, take 1 tablet by mouth once daily for pain for ongoing therapy DO NOT FILL BEFORE:11/26/23, Disp: 30 tablet, Rfl: 2    ursodiol (ACTIGALL) 250 mg tablet, , Disp: , Rfl:     Accu-Chek Guide test strip, , Disp: , Rfl:     Microlet Lancets MISC, , Disp: , Rfl:     Premarin vaginal cream, , Disp: , Rfl:     valGANciclovir (VALCYTE) 450 mg tablet, Take 900 mg by mouth 2 (two) times a day with meals, Disp: , Rfl:     Current Allergies     Allergies as of 12/10/2023 - Reviewed 12/10/2023   Allergen Reaction Noted    Other Other (See Comments) 12/10/2023            The following portions of the patient's history were reviewed and updated as appropriate: allergies, current medications, past family history, past medical history, past social history, past surgical history and problem list.     Past Medical History:   Diagnosis Date    Asthma     Clotting disorder [facility])     Concussion     CSF leak     GERD (gastroesophageal reflux disease)     Hernia 2022    Hyperbilirubinemia 04/28/2022    Hypertension     Liver failure ([address])     Liver transplant recipient [facility])     Migraines     Peripheral neuropathy     Varicella 1968    As a child       Past Surgical History:   Procedure Laterality Date    ABLATION SOFT TISSUE      BREAST LUMPECTOMY      COLONOSCOPY  11/2/2023    IR PARACENTESIS  04/29/2022    IR PARACENTESIS  05/09/2022    IR PARACENTESIS  05/12/2022    LAPAROSCOPY FOR ECTOPIC PREGNANCY      LIVER BIOPSY  7/3/2023    Plus 3 previous in hospital    LIVER TRANSPLANTATION  5/17/22    MAMMO (HISTORICAL) Bilateral 01/14/2021    [facility] BI-RADS 2 Benign findings. after U/S Scattered areas fibrogladulae density; 25% to 50% glandular breast tissue    SINUS SURGERY      TONSILECTOMY AND ADNOIDECTOMY         Family History   Problem Relation Age of Onset    Cancer Mother     Breast cancer Mother     Cancer Father     Colon cancer Father     Arthritis Father     Cancer Sister     Breast cancer Sister     Arthritis Brother     No Known Problems Paternal Grandmother     No Known Problems Paternal Grandfather     Autoimmune disease Daughter     No Known Problems Son     No Known Problems Son     Heart disease Half-Brother     Prostate cancer Half-Brother     Melanoma Half-Brother          Medications have been verified. Objective   /73   Pulse 84   Temp 97.9 °F (36.6 °C) (Tympanic)   Resp 18   SpO2 97%   No LMP recorded. Patient is postmenopausal.       Physical Exam     Physical Exam  Vitals and nursing note reviewed. Constitutional:       General: She is not in acute distress. Appearance: Normal appearance. HENT:      Head: Normocephalic and atraumatic. Right Ear: Tympanic membrane and ear canal normal.      Left Ear: Tympanic membrane and ear canal normal.      Nose: Congestion present.       Comments: Moderate erythema of turbinates with mild edema Mouth/Throat:      Pharynx: Uvula midline. Posterior oropharyngeal erythema present. No oropharyngeal exudate. Tonsils: No tonsillar exudate. 0 on the right. 0 on the left. Eyes:      Conjunctiva/sclera: Conjunctivae normal.   Cardiovascular:      Rate and Rhythm: Normal rate and regular rhythm. Pulses: Normal pulses. Heart sounds: Normal heart sounds. Pulmonary:      Effort: Pulmonary effort is normal.      Breath sounds: Normal breath sounds. No wheezing, rhonchi or rales. Lymphadenopathy:      Cervical: Cervical adenopathy present. Skin:     General: Skin is warm and dry. Neurological:      Mental Status: She is alert and oriented to person, place, and time.    Psychiatric:         Mood and Affect: Mood normal.         Behavior: Behavior normal.       Covid: neg  Strep: neg

## 2023-12-12 ENCOUNTER — TELEPHONE (OUTPATIENT)
Facility: CLINIC | Age: 63
End: 2023-12-12

## 2023-12-12 NOTE — TELEPHONE ENCOUNTER
Patient contacted due to cancelled follow-up appointments. She has been struggling with respiratory infection and decided to discontinue PT at this time. She agreed to keep PT informed if she has any questions or concerns and anticipates potentially returning to PT if she notices difficulty after her recovery.

## 2023-12-21 ENCOUNTER — HOSPITAL ENCOUNTER (OUTPATIENT)
Dept: ULTRASOUND IMAGING | Facility: HOSPITAL | Age: 63
End: 2023-12-21
Payer: COMMERCIAL

## 2023-12-21 DIAGNOSIS — Z94.4 LIVER TRANSPLANT STATUS (HCC): ICD-10-CM

## 2023-12-21 PROCEDURE — 76705 ECHO EXAM OF ABDOMEN: CPT

## 2024-01-15 ENCOUNTER — EVALUATION (OUTPATIENT)
Facility: CLINIC | Age: 64
End: 2024-01-15
Payer: COMMERCIAL

## 2024-01-15 DIAGNOSIS — M62.562 ATROPHY OF MUSCLE OF LEFT LOWER LEG: ICD-10-CM

## 2024-01-15 DIAGNOSIS — G61.89 OTHER INFLAMMATORY POLYNEUROPATHIES (HCC): ICD-10-CM

## 2024-01-15 DIAGNOSIS — R42 DIZZINESS: Primary | ICD-10-CM

## 2024-01-15 DIAGNOSIS — G89.4 CHRONIC PAIN SYNDROME: ICD-10-CM

## 2024-01-15 DIAGNOSIS — M54.50 CHRONIC BILATERAL LOW BACK PAIN WITHOUT SCIATICA: ICD-10-CM

## 2024-01-15 DIAGNOSIS — G89.29 CHRONIC BILATERAL LOW BACK PAIN WITHOUT SCIATICA: ICD-10-CM

## 2024-01-15 PROCEDURE — 97162 PT EVAL MOD COMPLEX 30 MIN: CPT

## 2024-01-15 NOTE — PROGRESS NOTES
PT Evaluation     Today's date: 1/15/2024  Patient name: Linda Becerra  : 1960  MRN: [id number]  Referring provider: Henna Delgado MD  Dx:   Encounter Diagnosis     ICD-10-CM    1. Dizziness  R42       2. Atrophy of muscle of left lower leg  M62.562       3. Other inflammatory polyneuropathies (HCC)  G61.89       4. Chronic pain syndrome  G89.4       5. Chronic bilateral low back pain without sciatica  M54.50     G89.29           Start Time: 0240  Stop Time: 320  Total time in clinic (min): 40 minutes    Assessment  Assessment details: Patient is well known to this PT with history of left LE weakness after hematoma, general instability post liver transplant due to deconditioning and intermittent dizziness associated with head injury.  Patient states she has noted a decline in her stability and strength of late and is fearful of losing function.   Patient reports near falls occurring several times a Week Patient displays Abnormal muscle strength with overall grade of 4- to 3+/5 proximal left LE. Patient sensation is Normal throughout lower extremities. Patient coordination is Normal per alterate toe tapping and heel to shin test.   Patient balance scores are as follows: 54/56 HOYOS, TUG is TBA with minimal changes in static balance noted.  Patient endurance scores are as follows; TBA feet with  6 minute walk test without  Device, 9.9 seconds with 5 x sit to stand test with overall results noting decrease functional endurance.   Patient subjective report notes the following functional limitation with instability in gait on outside surfaces and instability on compliant surfaces. Patient will benefit from skilled PT to address noted impairments and functional limitations they are causing with overall goal to return patient to highest level possible with reduced risk for falls.       Please contact me if you have any questions or recommendations. Thank you for the referral and the opportunity to share in  Linda's care.    Patient verbalized understanding of POC              Impairments: abnormal coordination, abnormal gait, activity intolerance, impaired balance, impaired physical strength, lacks appropriate home exercise program and safety issue  Understanding of Dx/Px/POC: good   Prognosis: good    Goals  Goals  STG 30 days    Patient will improve static balance with feet together Eyes Closed Firm surface  to 60 seconds indicating reduction in fall risk  Patient will achieve 190 feet improvement with overall distance achieved of TBA feet with 6 minute walk test which is Minimal Detectable Change pre current research standards with endurance to demonstrate enhance functional capacity   Patient will display 2.3  second improvement with overall score of TBA seconds  with TUG test or lower with noted improvement being Minimal Detectable Change pre current research standards with fall risk.    Patient will achieve 56/56 on HOYOS demonstrating increased overall stability  Patient will perform  5 x sit to stand test with overall reduction by 3 seconds to 6.9 score indicating improvement with functional endurance  Patient will be independent in basic strengthening and balance HEP    LT days   Patient will score low risk for falls with 3/4 fall risk measures   Patient will be able to ambulate TBA feet without AD during 6 minute walk test   Patient will be able to perform floor transfer without physical assistance   Patient will be able to carry objects without loss of balance  Patient will be able to ambulate outdoors without any loss of balance  Patient will independent in community based walking program to maximize mobility and endurance    Cut off score   All date taken from [facility] Neuro Section or Rehab Measures    HOYOS test: 46/56                                              5 x STS Test:  MDC: 6 points                                                  MDC: 2.3 seconds   age norms                                                                  Age Norms   60-69 year old = M: 55, F: 55                        60-69 year old: 11.4 seconds   70-79 year old = M 54,  F: 53                       70-79 year old: 12.6 seconds    80-89 year old = M53,   F: 50                       80-89 year old: 14.8 seconds     TUG test:                                                                     10 Meter Walk Test:  MDC: 4.14 seconds       MDC: .59 ft/sec  Cut off score for Falls                                                  Age Norms  > 13.5 seconds community dwelling adults                20-29; M: 4.56 ft/sec F: 4.62 ft/sec  > 32.2 Frail Elderly                                                     30-39: M 4.76 ft/sec  F: 4.68 ft/sec          40-49: M: 4.79 ft/sec  F: 4.62 ft/sec  6 Minute Walk Test      50-59: M: 4.76 ft/sec  F: 4.56 ft/sec  MDC: 190 feet       60-69: M: 4.56 ft/sec  F: 4.26 ft/sec  Age Norms       70-+    M: 4.36 ft/sec  F: 4.16 ft/sec  60-69:    M: 1876 F: 1765  70-79:    M: 1729 F: 1545  80-89 +: M: 1368 F; 1286     Plan  Patient would benefit from: skilled physical therapy  Planned modality interventions: cryotherapy and hydrotherapy  Planned therapy interventions: manual therapy, motor coordination training, neuromuscular re-education, patient education, postural training, sensory integrative techniques, strengthening, stretching, therapeutic activities, therapeutic exercise, gait training, home exercise program, coordination and balance  Frequency: 2x week  Duration in weeks: 8  Treatment plan discussed with: patient        Subjective Evaluation    Patient Goals  Patient goals for therapy: decreased pain, improved balance and increased strength    Pain  Current pain ratin  At best pain ratin  Location: Left leg pain    Social Support  Steps to enter house: yes  4  Lives in: multiple-level home  Lives with: significant other    Employment status: not working  Treatments  Previous treatment: physical  therapy        Objective     Neurological Testing     Additional Neurological Details  Slightly diminished left LE    Strength/Myotome Testing     Left Hip   Planes of Motion   Flexion: 4-  Extension: 4- and 3+  Abduction: 3+ and 4-  External rotation: 4    Right Hip   Planes of Motion   Flexion: 4+  Extension: 4+  Abduction: 4+  External rotation: 4+    Left Knee   Flexion: 4    Right Knee   Flexion: 4+  Neuro Exam:     Sensation   Light touch LE: left WNL and right WNL    Functional outcomes   Functional outcome gait comment: Wide base of support with decreased hardeep, reliance on std cane for outside surfaces, increased lateral list and decreased weight shift left             Precautions: h/o liver transplant, dizziness, peripheral neuropathy, chronic pain syndrome, LBP, h/o left LE pain w/ atrophy  EPOC:  3/11/2024      Testing 1/15            ABC 58.75            HOYOS 54/56            5x sit to stand 9.9sec            6 min walk test TBA            TUG TBA            Neuro Re-Ed             Step taps             Hurdles             Foam beam             4 square             Hidden obstacles             A/P sway                          Ther Ex             Sit to stand             Hip flex/abd/ext                                                                                           Ther Activity                                       Gait Training                                       Modalities

## 2024-01-15 NOTE — LETTER
January 15, 2024    Henna Delgado MD  [address] [facility] [address]    Patient: Linda Becerra   YOB: 1960   Date of Visit: 1/15/2024     Encounter Diagnosis     ICD-10-CM    1. Dizziness  R42       2. Atrophy of muscle of left lower leg  M62.562       3. Other inflammatory polyneuropathies (HCC)  G61.89       4. Chronic pain syndrome  G89.4       5. Chronic bilateral low back pain without sciatica  M54.50     G89.29           Dear Dr. Delgado:    Thank you for your recent referral of Linda Becerra. Please review the attached evaluation summary from Linda's recent visit.     Please verify that you agree with the plan of care by signing the attached order.     If you have any questions or concerns, please do not hesitate to call.     I sincerely appreciate the opportunity to share in the care of one of your patients and hope to have another opportunity to work with you in the near future.       Sincerely,    Teresita Vail, PT      Referring Provider:      I certify that I have read the below Plan of Care and certify the need for these services furnished under this plan of treatment while under my care.                    Henna Delgado MD  [address] [facility] [address]  Via Fax: [phone]          PT Evaluation     Today's date: 1/15/2024  Patient name: Linda Becerra  : 1960  MRN: [id number]  Referring provider: Henna Delgado MD  Dx:   Encounter Diagnosis     ICD-10-CM    1. Dizziness  R42       2. Atrophy of muscle of left lower leg  M62.562       3. Other inflammatory polyneuropathies (HCC)  G61.89       4. Chronic pain syndrome  G89.4       5. Chronic bilateral low back pain without sciatica  M54.50     G89.29           Start Time: 0240  Stop Time: 320  Total time in clinic (min): 40 minutes    Assessment  Assessment details: Patient is well known to this PT with history of left LE weakness after hematoma, general instability post liver transplant  due to deconditioning and intermittent dizziness associated with head injury.  Patient states she has noted a decline in her stability and strength of late and is fearful of losing function.   Patient reports near falls occurring several times a Week Patient displays Abnormal muscle strength with overall grade of 4- to 3+/5 proximal left LE. Patient sensation is Normal throughout lower extremities. Patient coordination is Normal per alterate toe tapping and heel to shin test.   Patient balance scores are as follows: 54/56 HOYOS, TUG is TBA with minimal changes in static balance noted.  Patient endurance scores are as follows; TBA feet with  6 minute walk test without  Device, 9.9 seconds with 5 x sit to stand test with overall results noting decrease functional endurance.   Patient subjective report notes the following functional limitation with instability in gait on outside surfaces and instability on compliant surfaces. Patient will benefit from skilled PT to address noted impairments and functional limitations they are causing with overall goal to return patient to highest level possible with reduced risk for falls.       Please contact me if you have any questions or recommendations. Thank you for the referral and the opportunity to share in Linda's care.    Patient verbalized understanding of POC              Impairments: abnormal coordination, abnormal gait, activity intolerance, impaired balance, impaired physical strength, lacks appropriate home exercise program and safety issue  Understanding of Dx/Px/POC: good   Prognosis: good    Goals  Goals  STG 30 days    Patient will improve static balance with feet together Eyes Closed Firm surface  to 60 seconds indicating reduction in fall risk  Patient will achieve 190 feet improvement with overall distance achieved of TBA feet with 6 minute walk test which is Minimal Detectable Change pre current research standards with endurance to demonstrate enhance  functional capacity   Patient will display 2.3  second improvement with overall score of TBA seconds  with TUG test or lower with noted improvement being Minimal Detectable Change pre current research standards with fall risk.    Patient will achieve 56/56 on HOYOS demonstrating increased overall stability  Patient will perform  5 x sit to stand test with overall reduction by 3 seconds to 6.9 score indicating improvement with functional endurance  Patient will be independent in basic strengthening and balance HEP    LT days   Patient will score low risk for falls with 3/4 fall risk measures   Patient will be able to ambulate TBA feet without AD during 6 minute walk test   Patient will be able to perform floor transfer without physical assistance   Patient will be able to carry objects without loss of balance  Patient will be able to ambulate outdoors without any loss of balance  Patient will independent in community based walking program to maximize mobility and endurance    Cut off score   All date taken from [facility] Neuro Section or Rehab Measures    HOYOS test: 46/56                                              5 x STS Test:  MDC: 6 points                                                  MDC: 2.3 seconds   age norms                                                                 Age Norms   60-69 year old = M: 55, F: 55                        60-69 year old: 11.4 seconds   70-79 year old = M 54,  F: 53                       70-79 year old: 12.6 seconds    80-89 year old = M53,   F: 50                       80-89 year old: 14.8 seconds     TUG test:                                                                     10 Meter Walk Test:  MDC: 4.14 seconds       MDC: .59 ft/sec  Cut off score for Falls                                                  Age Norms  > 13.5 seconds community dwelling adults                20-29; M: 4.56 ft/sec F: 4.62 ft/sec  > 32.2 Frail Elderly                                                      30-39: M 4.76 ft/sec  F: 4.68 ft/sec          40-49: M: 4.79 ft/sec  F: 4.62 ft/sec  6 Minute Walk Test      50-59: M: 4.76 ft/sec  F: 4.56 ft/sec  MDC: 190 feet       60-69: M: 4.56 ft/sec  F: 4.26 ft/sec  Age Norms       70-+    M: 4.36 ft/sec  F: 4.16 ft/sec  60-69:    M: 1876 F: 1765  70-79:    M: 1729 F: 1545  80-89 +: M: 1368 F; 1286     Plan  Patient would benefit from: skilled physical therapy  Planned modality interventions: cryotherapy and hydrotherapy  Planned therapy interventions: manual therapy, motor coordination training, neuromuscular re-education, patient education, postural training, sensory integrative techniques, strengthening, stretching, therapeutic activities, therapeutic exercise, gait training, home exercise program, coordination and balance  Frequency: 2x week  Duration in weeks: 8  Treatment plan discussed with: patient        Subjective Evaluation    Patient Goals  Patient goals for therapy: decreased pain, improved balance and increased strength    Pain  Current pain ratin  At best pain ratin  Location: Left leg pain    Social Support  Steps to enter house: yes  4  Lives in: multiple-level home  Lives with: significant other    Employment status: not working  Treatments  Previous treatment: physical therapy        Objective     Neurological Testing     Additional Neurological Details  Slightly diminished left LE    Strength/Myotome Testing     Left Hip   Planes of Motion   Flexion: 4-  Extension: 4- and 3+  Abduction: 3+ and 4-  External rotation: 4    Right Hip   Planes of Motion   Flexion: 4+  Extension: 4+  Abduction: 4+  External rotation: 4+    Left Knee   Flexion: 4    Right Knee   Flexion: 4+  Neuro Exam:     Sensation   Light touch LE: left WNL and right WNL    Functional outcomes   Functional outcome gait comment: Wide base of support with decreased hardeep, reliance on std cane for outside surfaces, increased lateral list and decreased weight shift  left             Precautions: h/o liver transplant, dizziness, peripheral neuropathy, chronic pain syndrome, LBP, h/o left LE pain w/ atrophy  EPOC:  3/11/2024      Testing 1/15            ABC 58.75            HOYOS 54/56            5x sit to stand 9.9sec            6 min walk test TBA            TUG TBA            Neuro Re-Ed             Step taps             Hurdles             Foam beam             4 square             Hidden obstacles             A/P sway                          Ther Ex             Sit to stand             Hip flex/abd/ext                                                                                           Ther Activity                                       Gait Training                                       Modalities

## 2024-01-18 ENCOUNTER — OFFICE VISIT (OUTPATIENT)
Facility: CLINIC | Age: 64
End: 2024-01-18
Payer: COMMERCIAL

## 2024-01-18 DIAGNOSIS — G89.4 CHRONIC PAIN SYNDROME: ICD-10-CM

## 2024-01-18 DIAGNOSIS — R42 DIZZINESS: Primary | ICD-10-CM

## 2024-01-18 DIAGNOSIS — G61.89 OTHER INFLAMMATORY POLYNEUROPATHIES (HCC): ICD-10-CM

## 2024-01-18 DIAGNOSIS — M54.50 CHRONIC BILATERAL LOW BACK PAIN WITHOUT SCIATICA: ICD-10-CM

## 2024-01-18 DIAGNOSIS — M62.562 ATROPHY OF MUSCLE OF LEFT LOWER LEG: ICD-10-CM

## 2024-01-18 DIAGNOSIS — G89.29 CHRONIC BILATERAL LOW BACK PAIN WITHOUT SCIATICA: ICD-10-CM

## 2024-01-18 PROCEDURE — 97112 NEUROMUSCULAR REEDUCATION: CPT

## 2024-01-18 PROCEDURE — 97140 MANUAL THERAPY 1/> REGIONS: CPT

## 2024-01-18 NOTE — PROGRESS NOTES
"Daily Note     Today's date: 2024  Patient name: Linda Becerra  : 1960  MRN: [id number]  Referring provider: Henna Delgado MD  Dx:   Encounter Diagnosis     ICD-10-CM    1. Dizziness  R42       2. Atrophy of muscle of left lower leg  M62.562       3. Other inflammatory polyneuropathies (HCC)  G61.89       4. Chronic pain syndrome  G89.4       5. Chronic bilateral low back pain without sciatica  M54.50     G89.29           Start Time: 1020  Stop Time: 1105  Total time in clinic (min): 45 minutes    Subjective:   Patient states she is feeling better than Monday, still very fatigued.  HA noted today back and top of head.      Objective: See treatment diary below      Assessment:   Initiated balance exercises with good tolerance, increased sway evident on compliant surfaces.  EC HT with occasional haptic touch on foam.  Increased overall cervical tightness.  Reviewed postural position to avoid cervical irritation.  STM with resolution of HA.  Encouraged patient to continue ambulation as weather allows to promote increased motion.      Plan: Continue per plan of care.   No complaints end of session.  Assess tolerance to foam static balance challenges and progress dynamic balance tasks.     Precautions: h/o liver transplant, dizziness, peripheral neuropathy, chronic pain syndrome, LBP, h/o left LE pain w/ atrophy  EPOC:  3/11/2024      Testing 1/15 1/18           ABC 58.75            HOYOS 54/56            5x sit to stand 9.9sec            6 min walk test TBA            TUG TBA 7.2sec           Neuro Re-Ed             Step taps  10x3 off firm, repeated off foam 10x3           Hurdles             Foam beam             4 square             Hidden obstacles             A/P sway  Foam static balance 30\" FT           Foam EC HT  5x2 FC           Foam EC/EO  10\" x4 2sets            Ther Ex             Sit to stand             Hip flex/abd/ext             Hamstring stretch  30\" x4ea                                 "                                            Ther Activity                                       Gait Training                                       Manuals             STM TPR UT, Lev, SCM  VM           Suboccipital release  VM

## 2024-01-22 ENCOUNTER — OFFICE VISIT (OUTPATIENT)
Facility: CLINIC | Age: 64
End: 2024-01-22
Payer: COMMERCIAL

## 2024-01-22 DIAGNOSIS — G89.4 CHRONIC PAIN SYNDROME: ICD-10-CM

## 2024-01-22 DIAGNOSIS — M54.50 CHRONIC BILATERAL LOW BACK PAIN WITHOUT SCIATICA: ICD-10-CM

## 2024-01-22 DIAGNOSIS — G61.89 OTHER INFLAMMATORY POLYNEUROPATHIES (HCC): ICD-10-CM

## 2024-01-22 DIAGNOSIS — R42 DIZZINESS: Primary | ICD-10-CM

## 2024-01-22 DIAGNOSIS — M62.562 ATROPHY OF MUSCLE OF LEFT LOWER LEG: ICD-10-CM

## 2024-01-22 DIAGNOSIS — G89.29 CHRONIC BILATERAL LOW BACK PAIN WITHOUT SCIATICA: ICD-10-CM

## 2024-01-22 PROCEDURE — 97112 NEUROMUSCULAR REEDUCATION: CPT

## 2024-01-22 PROCEDURE — 97140 MANUAL THERAPY 1/> REGIONS: CPT

## 2024-01-22 PROCEDURE — 97110 THERAPEUTIC EXERCISES: CPT

## 2024-01-22 NOTE — PROGRESS NOTES
"Daily Note     Today's date: 2024  Patient name: Linda Becerra  : 1960  MRN: [id number]  Referring provider: Henna Delgado MD  Dx:   Encounter Diagnosis     ICD-10-CM    1. Dizziness  R42       2. Atrophy of muscle of left lower leg  M62.562       3. Other inflammatory polyneuropathies (HCC)  G61.89       4. Chronic pain syndrome  G89.4       5. Chronic bilateral low back pain without sciatica  M54.50     G89.29             Start Time: 225  Stop Time: 305  Total time in clinic (min): 40 minutes    Subjective:   Patient states she feels bloated.  Left leg soreness noted.      Objective: See treatment diary below      Assessment:  Nice gains in static balance with improved weight shift.  A/P sway with improved transition without haptic touch.  Added Nustep to assist with strengthening while avoiding LE irritation.  Continue to reinforce pacing to limit effects of fatigue.  STM continues to be effective in reducing neck pain and restriction.      Plan: Continue per plan of care.   No complaints end of session.  Assess tolerance to foam static balance challenges and progress dynamic balance tasks.     Precautions: h/o liver transplant, dizziness, peripheral neuropathy, chronic pain syndrome, LBP, h/o left LE pain w/ atrophy  EPOC:  3/11/2024      Testing 1/15 1/18 1/22          ABC 58.75            HOYOS 54/56            5x sit to stand 9.9sec            6 min walk test TBA            TUG TBA 7.2sec           Neuro Re-Ed             Step taps  10x3 off firm, repeated off foam 10x3           Hurdles             Foam beam             4 square             Hidden obstacles             A/P sway  Foam static balance 30\" FT Foam FT 10x2          Foam EC HT  5x2 FC 5x2 FT          Foam EC/EO  10\" x4 2sets  10\" x4 2 sets          Ther Ex             Sit to stand   5          Hip flex/abd/ext             Hamstring stretch  30\" x4ea 30\" x4ea          Nustep L1   5 min                                               "                Ther Activity                                       Gait Training                                       Manuals             STM TPR UT, Lev, SCM  VM VM          Suboccipital release  VM

## 2024-01-25 ENCOUNTER — OFFICE VISIT (OUTPATIENT)
Facility: CLINIC | Age: 64
End: 2024-01-25
Payer: COMMERCIAL

## 2024-01-25 DIAGNOSIS — G61.89 OTHER INFLAMMATORY POLYNEUROPATHIES (HCC): ICD-10-CM

## 2024-01-25 DIAGNOSIS — G89.29 CHRONIC BILATERAL LOW BACK PAIN WITHOUT SCIATICA: ICD-10-CM

## 2024-01-25 DIAGNOSIS — M54.50 CHRONIC BILATERAL LOW BACK PAIN WITHOUT SCIATICA: ICD-10-CM

## 2024-01-25 DIAGNOSIS — R42 DIZZINESS: Primary | ICD-10-CM

## 2024-01-25 DIAGNOSIS — M62.562 ATROPHY OF MUSCLE OF LEFT LOWER LEG: ICD-10-CM

## 2024-01-25 DIAGNOSIS — G89.4 CHRONIC PAIN SYNDROME: ICD-10-CM

## 2024-01-25 PROCEDURE — 97112 NEUROMUSCULAR REEDUCATION: CPT

## 2024-01-25 PROCEDURE — 97140 MANUAL THERAPY 1/> REGIONS: CPT

## 2024-01-25 PROCEDURE — 97110 THERAPEUTIC EXERCISES: CPT

## 2024-01-25 NOTE — PROGRESS NOTES
"Daily Note     Today's date: 2024  Patient name: Linda Becerra  : 1960  MRN: [id number]  Referring provider: Henna Delgado MD  Dx:   Encounter Diagnosis     ICD-10-CM    1. Dizziness  R42       2. Atrophy of muscle of left lower leg  M62.562       3. Other inflammatory polyneuropathies (HCC)  G61.89       4. Chronic pain syndrome  G89.4       5. Chronic bilateral low back pain without sciatica  M54.50     G89.29               Start Time: 1015  Stop Time: 1100  Total time in clinic (min): 45 minutes    Subjective:   Patient states left leg pain 910 she has taken pain meds this morning.  Presently 6/10.        Objective: See treatment diary below      Assessment:  Increased radicular right sided hip pain resolving with increased extension.  Instructed in modifications to hamstring stretch to avoid prolonged flexion.  Good demonstration noted.  Improved stability with EC HT and sequential cone taps.  Encouraged continued ambulation within tolerance.  STM continues to be assistive in reducing cervical tightness.  Applied KT to left knee \"Y\" paper off tension, inhibition with review of tape wear, care and skin inspection- voiced understanding to all.      Plan: Continue per plan of care.   No complaints end of session.  Assess tolerance to foam static balance challenges and progress dynamic balance tasks.     Precautions: h/o liver transplant, dizziness, peripheral neuropathy, chronic pain syndrome, LBP, h/o left LE pain w/ atrophy  EPOC:  3/11/2024      Testing 1/15 1/18 1/22 1/25         ABC 58.75            HOYOS 54/56            5x sit to stand 9.9sec            6 min walk test TBA            TUG TBA 7.2sec           Neuro Re-Ed             Step taps  10x3 off firm, repeated off foam 10x3  Sequential cone taps in corner 3x5 with improving weight shift         Hurdles             Foam beam             4 square             Hidden obstacles             A/P sway  Foam static balance 30\" FT Foam FT 10x2 " "Foam FT 10x2         Foam EC HT  5x2 FC 5x2 FT EC 5x2 FT         Foam EC/EO  10\" x4 2sets  10\" x4 2 sets 10\" x4 EC         Ther Ex             Sit to stand   5 5         Hip flex/abd/ext             Hamstring stretch  30\" x4ea 30\" x4ea Reviewed to limit forward flexion due to increased right radicular pain today 30\" x2         Nustep L1   5 min                                                              Ther Activity                                       Gait Training                                       Manuals             STM TPR UT, Lev, SCM  VM VM VM         Suboccipital release  VM  VM              "

## 2024-01-29 ENCOUNTER — APPOINTMENT (OUTPATIENT)
Facility: CLINIC | Age: 64
End: 2024-01-29
Payer: COMMERCIAL

## 2024-01-30 ENCOUNTER — OFFICE VISIT (OUTPATIENT)
Facility: CLINIC | Age: 64
End: 2024-01-30
Payer: COMMERCIAL

## 2024-01-30 DIAGNOSIS — M62.562 ATROPHY OF MUSCLE OF LEFT LOWER LEG: ICD-10-CM

## 2024-01-30 DIAGNOSIS — G89.29 CHRONIC BILATERAL LOW BACK PAIN WITHOUT SCIATICA: ICD-10-CM

## 2024-01-30 DIAGNOSIS — R42 DIZZINESS: Primary | ICD-10-CM

## 2024-01-30 DIAGNOSIS — M54.50 CHRONIC BILATERAL LOW BACK PAIN WITHOUT SCIATICA: ICD-10-CM

## 2024-01-30 DIAGNOSIS — G89.4 CHRONIC PAIN SYNDROME: ICD-10-CM

## 2024-01-30 DIAGNOSIS — G61.89 OTHER INFLAMMATORY POLYNEUROPATHIES (HCC): ICD-10-CM

## 2024-01-30 PROCEDURE — 97112 NEUROMUSCULAR REEDUCATION: CPT

## 2024-01-30 PROCEDURE — 97110 THERAPEUTIC EXERCISES: CPT

## 2024-01-30 PROCEDURE — 97140 MANUAL THERAPY 1/> REGIONS: CPT

## 2024-01-30 NOTE — PROGRESS NOTES
"Daily Note     Today's date: 2024  Patient name: Linda Becerra  : 1960  MRN: [id number]  Referring provider: Henna Delgado MD  Dx:   Encounter Diagnosis     ICD-10-CM    1. Dizziness  R42       2. Atrophy of muscle of left lower leg  M62.562       3. Other inflammatory polyneuropathies (HCC)  G61.89       4. Chronic pain syndrome  G89.4       5. Chronic bilateral low back pain without sciatica  M54.50     G89.29                 Start Time: 1015  Stop Time: 1100  Total time in clinic (min): 45 minutes    Subjective:   Patient states she is scheduled for injections later this week for her cervical tightness.        Objective: See treatment diary below      Assessment:    KT was helpful for left knee support.  Irritation noted with tape removal, no skin irritation.  Improved static balance noted with decreased haptic touch.   Nustep reinitiated with good tolerance.  STM continues to be assistive in reducing tightness.  Reviewed HEP with good demonstration.        Plan: Continue per plan of care.   No complaints end of session.  Return to further strengthening to tolerance.     Precautions: h/o liver transplant, dizziness, peripheral neuropathy, chronic pain syndrome, LBP, h/o left LE pain w/ atrophy  EPOC:  3/11/2024      Testing 1/15 1/18 1/22 1/25 1/30        ABC 58.75            HOYOS 54/56            5x sit to stand 9.9sec            6 min walk test TBA            TUG TBA 7.2sec           Neuro Re-Ed             Step taps  10x3 off firm, repeated off foam 10x3  Sequential cone taps in corner 3x5 with improving weight shift         Hurdles             Foam beam             4 square             Hidden obstacles             A/P sway  Foam static balance 30\" FT Foam FT 10x2 Foam FT 10x2 FT 10x2        Foam EC HT  5x2 FC 5x2 FT EC 5x2 FT EC Partial Tandem 5x2        Foam EC/EO  10\" x4 2sets  10\" x4 2 sets 10\" x4 EC 10\" EC Partial tandem x5        Ther Ex             Sit to stand   5 5 5        Hip " "flex/abd/ext             Hamstring stretch  30\" x4ea 30\" x4ea Reviewed to limit forward flexion due to increased right radicular pain today 30\" x2         Nustep L1   5 min  5 min                                                            Ther Activity                                       Gait Training                                       Manuals             STM TPR UT, Lev, SCM  VM VM VM VM        Suboccipital release  VM  VM VM             "

## 2024-02-01 ENCOUNTER — OFFICE VISIT (OUTPATIENT)
Facility: CLINIC | Age: 64
End: 2024-02-01
Payer: COMMERCIAL

## 2024-02-01 DIAGNOSIS — G89.29 CHRONIC BILATERAL LOW BACK PAIN WITHOUT SCIATICA: ICD-10-CM

## 2024-02-01 DIAGNOSIS — R42 DIZZINESS: Primary | ICD-10-CM

## 2024-02-01 DIAGNOSIS — M62.562 ATROPHY OF MUSCLE OF LEFT LOWER LEG: ICD-10-CM

## 2024-02-01 DIAGNOSIS — G61.89 OTHER INFLAMMATORY POLYNEUROPATHIES (HCC): ICD-10-CM

## 2024-02-01 DIAGNOSIS — M54.50 CHRONIC BILATERAL LOW BACK PAIN WITHOUT SCIATICA: ICD-10-CM

## 2024-02-01 DIAGNOSIS — G89.4 CHRONIC PAIN SYNDROME: ICD-10-CM

## 2024-02-01 PROCEDURE — 97112 NEUROMUSCULAR REEDUCATION: CPT

## 2024-02-01 PROCEDURE — 97110 THERAPEUTIC EXERCISES: CPT

## 2024-02-01 PROCEDURE — 97140 MANUAL THERAPY 1/> REGIONS: CPT

## 2024-02-01 NOTE — PROGRESS NOTES
"Daily Note     Today's date: 2024  Patient name: Linda Becerra  : 1960  MRN: [id number]  Referring provider: Henna Delgado MD  Dx:   Encounter Diagnosis     ICD-10-CM    1. Dizziness  R42       2. Atrophy of muscle of left lower leg  M62.562       3. Other inflammatory polyneuropathies (HCC)  G61.89       4. Chronic pain syndrome  G89.4       5. Chronic bilateral low back pain without sciatica  M54.50     G89.29                   Start Time: 1025  Stop Time: 1105  Total time in clinic (min): 40 minutes    Subjective:   Patient received shots for her neck, she states she felt so much relief the first time, however, she has not noted as much relief today.  Tinnitus is improved.  She continues to feel \"foggy\".  No leg buckling noted.      Objective: See treatment diary below      Assessment:    Nice gain noted with Nustep today with decreased hesitation.  Education in positioning to improve midline position and even weight shift, patient continues to shift right to avoid left weight bearing.  Mini squats with improved left LE control.  Introduced 4 square with good clearance.  Continue to encourage ambulation with pacing to increase activity within tolerance.  STM continues to be assistive in reducing cervical tightness, avoided suboccipital area due to recent injections, SCM and Trap focus with good relief.        Plan: Continue per plan of care.   No complaints end of session.  Return to further strengthening to tolerance.     Precautions: h/o liver transplant, dizziness, peripheral neuropathy, chronic pain syndrome, LBP, h/o left LE pain w/ atrophy  EPOC:  3/11/2024      Testing 1/15 1/18 1/22 1/25 1/30 2/1       ABC 58.75            HOYOS 54/56            5x sit to stand 9.9sec            6 min walk test TBA            TUG TBA 7.2sec           Neuro Re-Ed             Step taps  10x3 off firm, repeated off foam 10x3  Sequential cone taps in corner 3x5 with improving weight shift         Hurdles         " "    Foam beam             4 square      CW/CCW 4 ea       Hidden obstacles             A/P sway  Foam static balance 30\" FT Foam FT 10x2 Foam FT 10x2 FT 10x2 FT 10x2       Foam EC HT  5x2 FC 5x2 FT EC 5x2 FT EC Partial Tandem 5x2 EC partial tandem 5x2       Foam EC/EO  10\" x4 2sets  10\" x4 2 sets 10\" x4 EC 10\" EC Partial tandem x5 10\" EC partial tandem 5x2       Ther Ex             Sit to stand   5 5 5 5       Hip flex/abd/ext             Hamstring stretch  30\" x4ea 30\" x4ea Reviewed to limit forward flexion due to increased right radicular pain today 30\" x2         Nustep L1   5 min  5 min 5 min       Squats off foam       Mini 5x2                                              Ther Activity                                       Gait Training                                       Manuals             STM TPR UT, Lev, SCM  VM VM VM VM VM       Suboccipital release  VM  VM VM             "

## 2024-02-06 ENCOUNTER — APPOINTMENT (OUTPATIENT)
Facility: CLINIC | Age: 64
End: 2024-02-06
Payer: COMMERCIAL

## 2024-02-08 ENCOUNTER — OFFICE VISIT (OUTPATIENT)
Facility: CLINIC | Age: 64
End: 2024-02-08
Payer: COMMERCIAL

## 2024-02-08 DIAGNOSIS — G89.29 CHRONIC BILATERAL LOW BACK PAIN WITHOUT SCIATICA: ICD-10-CM

## 2024-02-08 DIAGNOSIS — R42 DIZZINESS: Primary | ICD-10-CM

## 2024-02-08 DIAGNOSIS — M54.50 CHRONIC BILATERAL LOW BACK PAIN WITHOUT SCIATICA: ICD-10-CM

## 2024-02-08 DIAGNOSIS — G89.4 CHRONIC PAIN SYNDROME: ICD-10-CM

## 2024-02-08 DIAGNOSIS — M62.562 ATROPHY OF MUSCLE OF LEFT LOWER LEG: ICD-10-CM

## 2024-02-08 DIAGNOSIS — G61.89 OTHER INFLAMMATORY POLYNEUROPATHIES (HCC): ICD-10-CM

## 2024-02-08 PROCEDURE — 97110 THERAPEUTIC EXERCISES: CPT

## 2024-02-08 PROCEDURE — 97112 NEUROMUSCULAR REEDUCATION: CPT

## 2024-02-08 NOTE — PROGRESS NOTES
"Daily Note     Today's date: 2024  Patient name: Linad Becerra  : 1960  MRN: [id number]  Referring provider: Henna Delgado MD  Dx:   Encounter Diagnosis     ICD-10-CM    1. Dizziness  R42       2. Atrophy of muscle of left lower leg  M62.562       3. Other inflammatory polyneuropathies (HCC)  G61.89       4. Chronic pain syndrome  G89.4       5. Chronic bilateral low back pain without sciatica  M54.50     G89.29                     Start Time: 0155  Stop Time: 0235  Total time in clinic (min): 40 minutes    Subjective:   Patient states she had onset of increased GI issues and had to cancel her last appointment.        Objective: See treatment diary below      Assessment:   Introduced hip extension exercises to assist in increased glut strength.  Cuing for sit to stand to maintain even position due to tendency to weight shift to right.  Sit to stand off foam with ball squeeze.  Reviewed HEP with good demonstration.  Nice gains overall with stability in 4 square, stepping backward with improved control.          Plan: Continue per plan of care.   No complaints end of session.  Progress balance and strengthening to tolerance.     Precautions: h/o liver transplant, dizziness, peripheral neuropathy, chronic pain syndrome, LBP, h/o left LE pain w/ atrophy  EPOC:  3/11/2024      Testing 1/15 1/18 1/22 1/25 1/30 2/1 2      ABC 58.75            HOYOS 54/56            5x sit to stand 9.9sec            6 min walk test TBA            TUG TBA 7.2sec           Neuro Re-Ed             Step taps  10x3 off firm, repeated off foam 10x3  Sequential cone taps in corner 3x5 with improving weight shift   10x3 no UE;      Hurdles             Foam beam             4 square      CW/CCW 4 ea CW/CCW x6 ea      Hidden obstacles             A/P sway  Foam static balance 30\" FT Foam FT 10x2 Foam FT 10x2 FT 10x2 FT 10x2 FT 10x2      Foam EC HT  5x2 FC 5x2 FT EC 5x2 FT EC Partial Tandem 5x2 EC partial tandem 5x2 EC FT 5x2      Foam " "EC/EO  10\" x4 2sets  10\" x4 2 sets 10\" x4 EC 10\" EC Partial tandem x5 10\" EC partial tandem 5x2 10\" x4 FT      Ther Ex             Sit to stand   5 5 5 5 5x3 off foam with hip add      Hip flex/abd/ext       10ea;  partial squat 10 with even weight shift      Hamstring stretch  30\" x4ea 30\" x4ea Reviewed to limit forward flexion due to increased right radicular pain today 30\" x2         Nustep L1   5 min  5 min 5 min       Squats off foam       Mini 5x2                                              Ther Activity                                       Gait Training                                       Manuals             STM TPR UT, Lev, SCM  VM VM VM VM VM       Suboccipital release  VM  VM VM             "

## 2024-02-12 ENCOUNTER — TELEPHONE (OUTPATIENT)
Dept: PAIN MEDICINE | Facility: CLINIC | Age: 64
End: 2024-02-12

## 2024-02-12 NOTE — TELEPHONE ENCOUNTER
S/w Pt to reschedule 12w f/u with Debra - 2/20    Pt picked 2/20 due to other conflicting appts    Pt states that she will be fine with medication since she just picked up one refill. Then patient asked if I was referring to the 200 or 50mg? Pt said forget it, I will call if I need it

## 2024-02-13 ENCOUNTER — APPOINTMENT (OUTPATIENT)
Facility: CLINIC | Age: 64
End: 2024-02-13
Payer: COMMERCIAL

## 2024-02-15 ENCOUNTER — APPOINTMENT (OUTPATIENT)
Facility: CLINIC | Age: 64
End: 2024-02-15
Payer: COMMERCIAL

## 2024-02-16 ENCOUNTER — OFFICE VISIT (OUTPATIENT)
Facility: CLINIC | Age: 64
End: 2024-02-16
Payer: COMMERCIAL

## 2024-02-16 ENCOUNTER — APPOINTMENT (OUTPATIENT)
Dept: RADIOLOGY | Facility: CLINIC | Age: 64
End: 2024-02-16
Payer: COMMERCIAL

## 2024-02-16 DIAGNOSIS — M62.562 ATROPHY OF MUSCLE OF LEFT LOWER LEG: ICD-10-CM

## 2024-02-16 DIAGNOSIS — R42 DIZZINESS: Primary | ICD-10-CM

## 2024-02-16 DIAGNOSIS — G61.89 OTHER INFLAMMATORY POLYNEUROPATHIES (HCC): ICD-10-CM

## 2024-02-16 DIAGNOSIS — G89.29 CHRONIC BILATERAL LOW BACK PAIN WITHOUT SCIATICA: ICD-10-CM

## 2024-02-16 DIAGNOSIS — M81.0 POSTMENOPAUSAL OSTEOPOROSIS: ICD-10-CM

## 2024-02-16 DIAGNOSIS — M54.50 CHRONIC BILATERAL LOW BACK PAIN WITHOUT SCIATICA: ICD-10-CM

## 2024-02-16 DIAGNOSIS — G89.4 CHRONIC PAIN SYNDROME: ICD-10-CM

## 2024-02-16 PROCEDURE — 97112 NEUROMUSCULAR REEDUCATION: CPT

## 2024-02-16 PROCEDURE — 73130 X-RAY EXAM OF HAND: CPT

## 2024-02-16 PROCEDURE — 97110 THERAPEUTIC EXERCISES: CPT

## 2024-02-16 NOTE — PROGRESS NOTES
PT PROGRESS NOTE/ DAILY NOTE    Today's date: 2024  Patient name: Linda Becerra  : 1960  MRN: [id number]  Referring provider: Henna Delgado MD  Dx:   Encounter Diagnosis     ICD-10-CM    1. Dizziness  R42       2. Atrophy of muscle of left lower leg  M62.562       3. Other inflammatory polyneuropathies (HCC)  G61.89       4. Chronic pain syndrome  G89.4       5. Chronic bilateral low back pain without sciatica  M54.50     G89.29           Start Time: 1015  Stop Time: 1055  Total time in clinic (min): 40 minutes    Assessment  Assessment details: Patient is well known to this PT with history of left LE weakness after hematoma, general instability post liver transplant due to deconditioning and intermittent dizziness associated with head injury.  Patient states she has noted a decline in her stability and strength of late and is fearful of losing function.   Patient reports near falls occurring several times a Week Patient displays Abnormal muscle strength with overall grade of 4- to 3+/5 proximal left LE. Patient sensation is Normal throughout lower extremities. Patient coordination is Normal per alterate toe tapping and heel to shin test.   Patient balance scores are as follows: 54/56 HOYOS, TUG is TBA with minimal changes in static balance noted.  Patient endurance scores are as follows; TBA feet with  6 minute walk test without  Device, 9.9 seconds with 5 x sit to stand test with overall results noting decrease functional endurance.   Patient subjective report notes the following functional limitation with instability in gait on outside surfaces and instability on compliant surfaces. Patient will benefit from skilled PT to address noted impairments and functional limitations they are causing with overall goal to return patient to highest level possible with reduced risk for falls.       Please contact me if you have any questions or recommendations. Thank you for the referral and the opportunity to  share in [facility]'s care.    Patient verbalized understanding of POC        Patient has made nice gains with PT despite limited visits due to respiratory complications and weather.  HOYOS scoring improved scoring 56/56 and endurance scoring improved by 2 seconds on 5x sit to stand scoring 7.9 sec.  Patient continues to benefit from skilled intervention to facilitate increased strength, stability and safety.  Recommend continuation of PT per POC.  Patient in agreement with treatment plan.            Impairments: abnormal coordination, abnormal gait, activity intolerance, impaired balance, impaired physical strength, lacks appropriate home exercise program and safety issue  Understanding of Dx/Px/POC: good   Prognosis: good    Goals  Goals  STG 30 days    Patient will improve static balance with feet together Eyes Closed Firm surface  to 60 seconds indicating reduction in fall risk- MET  Patient will achieve 190 feet improvement with overall distance achieved of 1450 feet with 6 minute walk test which is Minimal Detectable Change pre current research standards with endurance to demonstrate enhance functional capacity - testing completed, goals remain   Patient will achieve 56/56 on HOYOS demonstrating increased overall stability- MET  TUG scoring completed with no need for further improvement due to functional speed  Patient will perform  5 x sit to stand test with overall reduction by 3 seconds to 6.9 score indicating improvement with functional endurance- mostly met  Patient will be independent in basic strengthening and balance HEP- MET    LT days   Patient will score low risk for falls with 3/4 fall risk measures   Patient will be able to ambulate TBA feet without AD during 6 minute walk test   Patient will be able to perform floor transfer without physical assistance   Patient will be able to carry objects without loss of balance  Patient will be able to ambulate outdoors without any loss of balance  Patient  will independent in community based walking program to maximize mobility and endurance    Cut off score   All date taken from [facility] Neuro Section or Rehab Measures    HOYOS test: 46/56                                              5 x STS Test:  MDC: 6 points                                                  MDC: 2.3 seconds   age norms                                                                 Age Norms   60-69 year old = M: 55, F: 55                        60-69 year old: 11.4 seconds   70-79 year old = M 54,  F: 53                       70-79 year old: 12.6 seconds    80-89 year old = M53,   F: 50                       80-89 year old: 14.8 seconds     TUG test:                                                                     10 Meter Walk Test:  MDC: 4.14 seconds       MDC: .59 ft/sec  Cut off score for Falls                                                  Age Norms  > 13.5 seconds community dwelling adults                20-29; M: 4.56 ft/sec F: 4.62 ft/sec  > 32.2 Frail Elderly                                                     30-39: M 4.76 ft/sec  F: 4.68 ft/sec          40-49: M: 4.79 ft/sec  F: 4.62 ft/sec  6 Minute Walk Test      50-59: M: 4.76 ft/sec  F: 4.56 ft/sec  MDC: 190 feet       60-69: M: 4.56 ft/sec  F: 4.26 ft/sec  Age Norms       70-+    M: 4.36 ft/sec  F: 4.16 ft/sec  60-69:    M: 1876 F: 1765  70-79:    M: 1729 F: 1545  80-89 +: M: 1368 F; 1286     Plan  Patient would benefit from: skilled physical therapy  Planned modality interventions: cryotherapy and hydrotherapy  Planned therapy interventions: manual therapy, motor coordination training, neuromuscular re-education, patient education, postural training, sensory integrative techniques, strengthening, stretching, therapeutic activities, therapeutic exercise, gait training, home exercise program, coordination and balance  Frequency: 2x week  Duration in weeks: 4  Treatment plan discussed with: patient        Subjective  Evaluation    History of Present Illness  Mechanism of injury:  Patient states she has not been feeling well with lots of visits for follow-up.  Patient saw neurology and endocrinology with MRI pending for lower spine.  Patient continues to use her std cane for stability in walking.  Patient Goals  Patient goals for therapy: decreased pain, improved balance and increased strength  Patient goal: to be able to walk better  Pain  Current pain ratin  At best pain ratin  Location: Left leg pain    Social Support  Steps to enter house: yes  4  Lives in: multiple-level home  Lives with: significant other    Employment status: not working  Treatments  Previous treatment: physical therapy        Objective     Neurological Testing     Additional Neurological Details  Slightly diminished left LE    Strength/Myotome Testing     Left Hip   Planes of Motion   Flexion: 4-  Extension: 4- and 3+  Abduction: 3+ and 4-  External rotation: 4    Right Hip   Planes of Motion   Flexion: 4+  Extension: 4+  Abduction: 4+  External rotation: 4+    Left Knee   Flexion: 4    Right Knee   Flexion: 4+  Neuro Exam:     Sensation   Light touch LE: left WNL and right WNL    Functional outcomes   Functional outcome gait comment: Wide base of support with decreased hardeep, reliance on std cane for outside surfaces, increased lateral list and decreased weight shift left             Precautions: h/o liver transplant, dizziness, peripheral neuropathy, chronic pain syndrome, LBP, h/o left LE pain w/ atrophy  EPOC:  3/11/2024      Testing 1/15 2/16           ABC 58.75 58.75           HOYOS 54/56 56/56           5x sit to stand 9.9sec 7.9sec           6 min walk test TBA 1260'           TUG TBA 7.9sec           Neuro Re-Ed             Step taps  10x2           Hurdles             Foam beam             4 square             Hidden obstacles  Ambulation around obstacles rapid pace 100'x2           A/P sway  10                        Ther Ex              Sit to stand  5x2           Hip flex/abd/ext             squats  5x2                                                                            Ther Activity                                       Gait Training                                       Modalities

## 2024-02-20 ENCOUNTER — OFFICE VISIT (OUTPATIENT)
Facility: CLINIC | Age: 64
End: 2024-02-20
Payer: COMMERCIAL

## 2024-02-20 ENCOUNTER — OFFICE VISIT (OUTPATIENT)
Dept: PAIN MEDICINE | Facility: CLINIC | Age: 64
End: 2024-02-20
Payer: COMMERCIAL

## 2024-02-20 VITALS
BODY MASS INDEX: 30.16 KG/M2 | SYSTOLIC BLOOD PRESSURE: 118 MMHG | HEART RATE: 83 BPM | TEMPERATURE: 98.1 F | DIASTOLIC BLOOD PRESSURE: 82 MMHG | WEIGHT: 181 LBS | HEIGHT: 65 IN

## 2024-02-20 DIAGNOSIS — Z79.891 LONG-TERM CURRENT USE OF OPIATE ANALGESIC: ICD-10-CM

## 2024-02-20 DIAGNOSIS — M47.816 LUMBAR SPONDYLOSIS: ICD-10-CM

## 2024-02-20 DIAGNOSIS — G89.29 CHRONIC BILATERAL LOW BACK PAIN WITHOUT SCIATICA: ICD-10-CM

## 2024-02-20 DIAGNOSIS — G61.89 OTHER INFLAMMATORY POLYNEUROPATHIES (HCC): ICD-10-CM

## 2024-02-20 DIAGNOSIS — M54.50 CHRONIC BILATERAL LOW BACK PAIN WITHOUT SCIATICA: ICD-10-CM

## 2024-02-20 DIAGNOSIS — M54.16 LUMBAR RADICULOPATHY: ICD-10-CM

## 2024-02-20 DIAGNOSIS — M51.36 DDD (DEGENERATIVE DISC DISEASE), LUMBAR: ICD-10-CM

## 2024-02-20 DIAGNOSIS — F11.20 UNCOMPLICATED OPIOID DEPENDENCE (HCC): ICD-10-CM

## 2024-02-20 DIAGNOSIS — M79.641 PAIN OF RIGHT HAND: ICD-10-CM

## 2024-02-20 DIAGNOSIS — G89.4 CHRONIC PAIN SYNDROME: ICD-10-CM

## 2024-02-20 DIAGNOSIS — G89.4 CHRONIC PAIN SYNDROME: Primary | ICD-10-CM

## 2024-02-20 DIAGNOSIS — R42 DIZZINESS: Primary | ICD-10-CM

## 2024-02-20 DIAGNOSIS — M62.562 ATROPHY OF MUSCLE OF LEFT LOWER LEG: ICD-10-CM

## 2024-02-20 DIAGNOSIS — M19.041 PRIMARY OSTEOARTHRITIS OF RIGHT HAND: ICD-10-CM

## 2024-02-20 PROCEDURE — 97112 NEUROMUSCULAR REEDUCATION: CPT

## 2024-02-20 PROCEDURE — 97110 THERAPEUTIC EXERCISES: CPT

## 2024-02-20 PROCEDURE — 99214 OFFICE O/P EST MOD 30 MIN: CPT | Performed by: PHYSICIAN ASSISTANT

## 2024-02-20 RX ORDER — LETERMOVIR 480 MG/1
TABLET, FILM COATED ORAL
COMMUNITY
Start: 2024-01-30

## 2024-02-20 RX ORDER — TRAMADOL HYDROCHLORIDE 50 MG/1
TABLET ORAL
Qty: 45 TABLET | Refills: 2 | Status: SHIPPED | OUTPATIENT
Start: 2024-02-20

## 2024-02-20 RX ORDER — LATANOPROST 50 UG/ML
SOLUTION/ DROPS OPHTHALMIC
COMMUNITY
Start: 2024-02-15

## 2024-02-20 RX ORDER — MECLIZINE HCL 12.5 MG/1
12.5 TABLET ORAL 3 TIMES DAILY PRN
COMMUNITY
Start: 2024-02-15

## 2024-02-20 RX ORDER — TRAMADOL HYDROCHLORIDE 200 MG/1
TABLET, EXTENDED RELEASE ORAL
Qty: 30 TABLET | Refills: 2 | Status: SHIPPED | OUTPATIENT
Start: 2024-02-20

## 2024-02-20 NOTE — PROGRESS NOTES
"Daily Note     Today's date: 2024  Patient name: Linda Becerra  : 1960  MRN: [id number]  Referring provider: Henna Delgado MD  Dx:   Encounter Diagnosis     ICD-10-CM    1. Dizziness  R42       2. Atrophy of muscle of left lower leg  M62.562       3. Other inflammatory polyneuropathies (HCC)  G61.89       4. Chronic pain syndrome  G89.4       5. Chronic bilateral low back pain without sciatica  M54.50     G89.29                       Start Time: 1155  Stop Time: 1235  Total time in clinic (min): 40 minutes    Subjective:   Patient states she has not slept well with limited sleep last night.         Objective: See treatment diary below      Assessment:   Step up with continued poor left LE control, initiated step ups with lower height left leading to gain further control.  Added semi squat with staggered stance to allow for increased left weight bearing.  Low frequency frequent activity reinforced to allow for improved tolerance.  Nustep with good tolerance and improved exertion.         Plan: Continue per plan of care.   No complaints end of session.  Progress balance and strengthening to tolerance.     Precautions: h/o liver transplant, dizziness, peripheral neuropathy, chronic pain syndrome, LBP, h/o left LE pain w/ atrophy  EPOC:  3/11/2024      Testing 1/15 1/18 1/22 1/25 1/30 2/1 2/8 2/20     ABC 58.75            HOYOS 54/56            5x sit to stand 9.9sec            6 min walk test TBA            TUG TBA 7.2sec           Neuro Re-Ed             Step taps  10x3 off firm, repeated off foam 10x3  Sequential cone taps in corner 3x5 with improving weight shift   10x3 no UE; 10x3 no UE off 3 raised step     Step up        Left leading one raised step 5x2     Hurdles             Foam beam             4 square      CW/CCW 4 ea CW/CCW x6 ea      Semi squat staggered stance with increased left WB        5x2     Hidden obstacles             A/P sway  Foam static balance 30\" FT Foam FT 10x2 Foam FT 10x2 " "FT 10x2 FT 10x2 FT 10x2 FT 10x2     Foam EC HT  5x2 FC 5x2 FT EC 5x2 FT EC Partial Tandem 5x2 EC partial tandem 5x2 EC FT 5x2      Foam EC/EO  10\" x4 2sets  10\" x4 2 sets 10\" x4 EC 10\" EC Partial tandem x5 10\" EC partial tandem 5x2 10\" x4 FT      Ther Ex             Sit to stand   5 5 5 5 5x3 off foam with hip add 5x2     Hip flex/abd/ext       10ea;  partial squat 10 with even weight shift      Hamstring stretch  30\" x4ea 30\" x4ea Reviewed to limit forward flexion due to increased right radicular pain today 30\" x2         Nustep L1   5 min  5 min 5 min  5 min     Squats off foam       Mini 5x2                                              Ther Activity                                       Gait Training                                       Manuals             STM TPR UT, Lev, SCM  VM VM VM VM VM       Suboccipital release  VM  VM VM             "

## 2024-02-20 NOTE — PROGRESS NOTES
Assessment:  1. Pain of right hand    2. Primary osteoarthritis of right hand    3. Lumbar spondylosis    4. DDD (degenerative disc disease), lumbar    5. Lumbar radiculopathy    6. Other inflammatory polyneuropathies (HCC)    7. Chronic pain syndrome    8. Uncomplicated opioid dependence (HCC)    9. Long-term current use of opiate analgesic        Plan:  While the patient was in the office today, I did have a thorough conversation regarding their chronic pain syndrome, medication management, and treatment plan options.    I will provide the patient a referral to see our Ortho hand specialist regarding the persistent right-sided pain and x-ray report of osteoarthritis.  She is inquiring about the potential for a corticosteroid injection    She remains clinically stable moderately controlled on the current medication regimen of tramadol ER and tramadol IR without any side effects or issues.  On today's visit I have electronically sent refills for the next 3 months of both medications.    I have suggested that she slightly increase the gabapentin to 600 mg 4 times daily if she is able to tolerate it without side effects    [place] Prescription Drug Monitoring Program report was reviewed and was appropriate     A urine drug screen was collected at today's office visit as part of our medication management protocol. The point of care testing results were appropriate for what was being prescribed. The specimen will be sent for confirmatory testing. The drug screen is medically necessary because the patient is either dependent on opioid medication or is being considered for opioid medication therapy and the results could impact ongoing or future treatment. The drug screen is to evaluate for the presences or absence of prescribed, non-prescribed, and/or illicit drugs/substances.    There are risks associated with opioid medications, including dependence, addiction and tolerance. The patient understands and agrees to  use these medications only as prescribed. Potential side effects of the medications include, but are not limited to, constipation, drowsiness, addiction, impaired judgment and risk of fatal overdose if not taken as prescribed. The patient was warned against driving while taking sedation medications.  Sharing medications is a felony. At this point in time, the patient is showing no signs of addiction, abuse, diversion or suicidal ideation.    The patient will follow-up in 12 weeks for medication prescription refill and reevaluation. The patient was advised to contact the office should their symptoms worsen in the interim. The patient was agreeable and verbalized an understanding.        History of Present Illness:    The patient is a 63 y.o. female last seen on 11-21-23 who presents for a follow up office visit in regards to chronic pain secondary to lumbar spondylosis, degenerative disc disease with radiculopathy and peripheral neuropathy.  The patient currently reports chronic low back pain with radiation into the left lower extremity that she rates a 7 out of 10 on today's visit and describes it as a constant burning, sharp, throbbing, cramping, pressure-like pain with intermittent numbness and paresthesias in bilateral lower extremities.  Patient is reporting persistent right-sided hand pain that she attributes to the hand that she  her cane with.  An x-ray revealed osteoarthritis.  Her pain is quite significant and she is inquiring about corticosteroid injection.  Patient continues to follow-up with her specialist for her other health conditions.  Our office maintains the patient on tramadol ER and IR and she is able to report moderate relief without side effects.    Pain Contract Signed: 11-21-23  Last Urine Drug Screen: 2-20-24    I have personally reviewed and/or updated the patient's past medical history, past surgical history, family history, social history, current medications, allergies, and vital  signs today.       Review of Systems:    Review of Systems   Constitutional:  Negative for fever and unexpected weight change.   HENT:  Negative for trouble swallowing.    Eyes:  Negative for visual disturbance.   Respiratory:  Negative for shortness of breath and wheezing.    Cardiovascular:  Negative for chest pain and palpitations.   Gastrointestinal:  Positive for diarrhea and nausea. Negative for constipation and vomiting.   Endocrine: Negative for cold intolerance, heat intolerance and polydipsia.   Genitourinary:  Negative for difficulty urinating and frequency.   Musculoskeletal:  Positive for gait problem and joint swelling (Joint stiffness). Negative for arthralgias and myalgias.   Skin:  Negative for rash.   Neurological:  Positive for dizziness. Negative for seizures, syncope, weakness and headaches.   Hematological:  Does not bruise/bleed easily.   Psychiatric/Behavioral:  Negative for dysphoric mood.    All other systems reviewed and are negative.        Past Medical History:   Diagnosis Date   • Asthma    • Clotting disorder (HCC)    • Concussion    • Constipation    • CSF leak    • GERD (gastroesophageal reflux disease)    • Hernia 2022   • High blood pressure    • Hyperbilirubinemia 04/28/2022   • Hypertension    • Liver failure (HCC)    • Liver transplant recipient (HCC)    • Migraines    • Peripheral neuropathy    • Urinary frequency    • Varicella 1968    As a child       Past Surgical History:   Procedure Laterality Date   • ABLATION SOFT TISSUE     • BREAST LUMPECTOMY     • COLONOSCOPY  11/2/2023   • IR PARACENTESIS  04/29/2022   • IR PARACENTESIS  05/09/2022   • IR PARACENTESIS  05/12/2022   • LAPAROSCOPY FOR ECTOPIC PREGNANCY     • LIVER BIOPSY  7/3/2023    Plus 3 previous in hospital   • LIVER TRANSPLANTATION  5/17/22   • MAMMO (HISTORICAL) Bilateral 01/14/2021    [facility] BI-RADS 2 Benign findings. after U/S Scattered areas fibrogladulae density; 25% to 50% glandular breast tissue   • SINUS  SURGERY     • TONSILECTOMY AND ADNOIDECTOMY         Family History   Problem Relation Age of Onset   • Cancer Mother    • Breast cancer Mother    • Cancer Father    • Colon cancer Father    • Arthritis Father    • Cancer Sister    • Breast cancer Sister    • Arthritis Brother    • No Known Problems Paternal Grandmother    • No Known Problems Paternal Grandfather    • Autoimmune disease Daughter    • No Known Problems Son    • No Known Problems Son    • Heart disease Half-Brother    • Prostate cancer Half-Brother    • Melanoma Half-Brother        Social History     Occupational History   • Not on file   Tobacco Use   • Smoking status: Former     Current packs/day: 0.00     Average packs/day: 1 pack/day for 25.0 years (25.0 ttl pk-yrs)     Types: Cigarettes     Start date:      Quit date:      Years since quittin.1   • Smokeless tobacco: Never   Vaping Use   • Vaping status: Never Used   Substance and Sexual Activity   • Alcohol use: Not Currently     Comment: socially, had glass of wine today   • Drug use: Not Currently     Comment: CBD   • Sexual activity: Not Currently     Partners: Male     Birth control/protection: Post-menopausal     Comment: no new partner in past year         Current Outpatient Medications:   •  diphenhydrAMINE (BENADRYL) 25 mg capsule, Take 25 mg by mouth every 8 (eight) hours as needed for itching. Indications: Itching, Disp: , Rfl:   •  ergocalciferol (ERGOCALCIFEROL) 1.25 MG (67182 UT) capsule, Take 50,000 Units by mouth once a week. Every Monday AM  Indications: Vitamin D Deficiency, Disp: , Rfl:   •  FOLIC ACID PO, Take by mouth, Disp: , Rfl:   •  gabapentin (NEURONTIN) 300 mg capsule, Take 600 mg by mouth 3 (three) times a day. Indications: Neuropathic Pain, Disp: , Rfl:   •  latanoprost (XALATAN) 0.005 % ophthalmic solution, 1 DROP INTO BOTH EYES BEFORE BEDTIME, Disp: , Rfl:   •  meclizine (ANTIVERT) 12.5 MG tablet, Take 12.5 mg by mouth 3 (three) times a day as needed for  "dizziness, Disp: , Rfl:   •  melatonin 3 mg, Take 3 mg by mouth daily at bedtime as needed (sleep). Indications: Trouble Sleeping, Disp: , Rfl:   •  NIFEdipine ER (ADALAT CC) 30 MG 24 hr tablet, Take 30 mg by mouth 2 (two) times a day. Indications: High Blood Pressure Disorder, Disp: , Rfl:   •  pantoprazole (PROTONIX) 40 mg tablet, Take 40 mg by mouth daily, Disp: , Rfl:   •  predniSONE 20 mg tablet, 15 mg, Disp: , Rfl:   •  predniSONE 5 mg tablet, Take 15 mg by mouth daily 4 tablets in AM, Disp: , Rfl:   •  Premarin vaginal cream, , Disp: , Rfl:   •  Prevymis 480 MG TABS, , Disp: , Rfl:   •  Prolia 60 MG/ML, USE 1 ML SUBCUTANOEUS EVERY 6 MONTHS AS DIRECTED. PLEASE DELIVER ...  (REFER TO PRESCRIPTION NOTES)., Disp: , Rfl:   •  tacrolimus (PROGRAF) 1 mg capsule, Take 6 mg by mouth every 12 (twelve) hours 7 capsules AM 7 capsules PM, Disp: , Rfl:   •  thiamine 100 MG tablet, Take 100 mg by mouth daily. Indications: Deficiency of Vitamin B1, Disp: , Rfl:   •  traMADol (ULTRAM) 50 mg tablet, Take 1 PO BID PRN for pain for ongoing therapy, Disp: 45 tablet, Rfl: 2  •  traMADol (ULTRAM-ER) 200 MG 24 hr tablet, take 1 tablet by mouth once daily for pain for ongoing therapy, Disp: 30 tablet, Rfl: 2  •  Accu-Chek Guide test strip, , Disp: , Rfl:   •  Microlet Lancets MISC, , Disp: , Rfl:   •  Slow-Mag 71.5-119 MG, Take 2 tablets by mouth daily (Patient not taking: Reported on 2/20/2024), Disp: , Rfl:   •  ursodiol (ACTIGALL) 250 mg tablet, , Disp: , Rfl:   •  valGANciclovir (VALCYTE) 450 mg tablet, Take 900 mg by mouth 2 (two) times a day with meals (Patient not taking: Reported on 2/20/2024), Disp: , Rfl:     Allergies   Allergen Reactions   • Other Other (See Comments)     Seasonal allergies         Physical Exam:    /82 (BP Location: Left arm, Patient Position: Sitting, Cuff Size: Standard)   Pulse 83   Temp 98.1 °F (36.7 °C)   Ht 5' 5\" (1.651 m)   Wt 82.1 kg (181 lb)   BMI 30.12 kg/m² "     Constitutional:normal, well developed, well nourished, alert, in no distress and non-toxic and no overt pain behavior.  Eyes:anicteric  HEENT:grossly intact  Neck:supple, symmetric, trachea midline and no masses   Pulmonary:even and unlabored  Cardiovascular:No edema or pitting edema present  Skin:Normal without rashes or lesions and well hydrated  Psychiatric:Mood and affect appropriate  Neurologic:Cranial Nerves II-XII grossly intact  Musculoskeletal:normal and ambulates with cane      Imaging  No orders to display         Orders Placed This Encounter   Procedures   • Ambulatory referral to Orthopedic Surgery

## 2024-02-21 ENCOUNTER — OFFICE VISIT (OUTPATIENT)
Dept: OBGYN CLINIC | Facility: CLINIC | Age: 64
End: 2024-02-21
Payer: COMMERCIAL

## 2024-02-21 VITALS
DIASTOLIC BLOOD PRESSURE: 70 MMHG | WEIGHT: 181 LBS | BODY MASS INDEX: 30.16 KG/M2 | SYSTOLIC BLOOD PRESSURE: 110 MMHG | HEIGHT: 65 IN

## 2024-02-21 DIAGNOSIS — M25.532 PAIN IN LEFT WRIST: ICD-10-CM

## 2024-02-21 DIAGNOSIS — M19.041 PRIMARY OSTEOARTHRITIS OF RIGHT HAND: ICD-10-CM

## 2024-02-21 DIAGNOSIS — M79.641 PAIN OF RIGHT HAND: Primary | ICD-10-CM

## 2024-02-21 PROCEDURE — 99203 OFFICE O/P NEW LOW 30 MIN: CPT | Performed by: ORTHOPAEDIC SURGERY

## 2024-02-21 NOTE — PROGRESS NOTES
Assessment/Plan:  1. Pain of right hand  Ambulatory referral to Orthopedic Surgery    Ambulatory Referral to PT/OT Hand Therapy    CANCELED: Ambulatory Referral to PT/OT Hand Therapy      2. Primary osteoarthritis of right hand  Ambulatory referral to Orthopedic Surgery      3. Pain in left wrist  Ambulatory Referral to PT/OT Hand Therapy          Subjective history, physical examination performed, diagnostic imaging reviewed at today's visit  X-rays were reviewed in the office today which demonstrate mild degenerative changes.  Discussed with the patient at the PIP joint there is a knuckle pad however, I do not believe this is a cyst.  The patient was provided with a silicone sleeve she can wear as needed.   Treatment options were discussed in the form of formal therapy.     The patient was given an opportunity to ask questions.  Questions were answered to the patient's satisfaction.    Through shared decision making, the patient decided to move forward with formal therapy and a referral was provided for this for both her right hand and left wrist.  I discussed she may have an early trigger finger without locking.  The burning pain can be related to a nerve.  She will follow up in 6 weeks for repeat evaluation. If she is doing well, she can cancel.          cc: right small finger     Subjective:   Linda Becerra is a 63 y.o. female who presents to the office today for evaluation of right small finger pain. The patient states she began with a bump over the PIP joint appx 6 weeks ago. She notes pain to the ulnar aspect of the finger that radiates up. She describes the pain as burning. The patient thought this was originally related to using her cane.      Review of Systems   Constitutional:  Negative for chills and fever.   HENT:  Negative for drooling and sneezing.    Eyes:  Negative for redness.   Respiratory:  Negative for cough and wheezing.    Gastrointestinal:  Negative for nausea and vomiting.    Musculoskeletal:  Positive for arthralgias. Negative for joint swelling and myalgias.   Neurological:  Negative for weakness and numbness.   Psychiatric/Behavioral:  Negative for behavioral problems. The patient is not nervous/anxious.          Past Medical History:   Diagnosis Date    Asthma     Clotting disorder (HCC)     Concussion     Constipation     CSF leak     Fractures     GERD (gastroesophageal reflux disease)     Head injury     Hernia 2022    High blood pressure     Hyperbilirubinemia 04/28/2022    Hypertension     Liver failure (HCC)     Liver transplant recipient (HCC)     Migraines     Peripheral neuropathy     Urinary frequency     Varicella 1968    As a child       Past Surgical History:   Procedure Laterality Date    ABLATION SOFT TISSUE      BREAST LUMPECTOMY      COLONOSCOPY  11/2/2023    IR PARACENTESIS  04/29/2022    IR PARACENTESIS  05/09/2022    IR PARACENTESIS  05/12/2022    LAPAROSCOPY FOR ECTOPIC PREGNANCY      LIVER BIOPSY  7/3/2023    Plus 3 previous in hospital    LIVER TRANSPLANTATION  5/17/22    MAMMO (HISTORICAL) Bilateral 01/14/2021    [facility] BI-RADS 2 Benign findings. after U/S Scattered areas fibrogladulae density; 25% to 50% glandular breast tissue    SINUS SURGERY      TONSILECTOMY AND ADNOIDECTOMY         Family History   Problem Relation Age of Onset    Cancer Mother     Breast cancer Mother     Cancer Father     Colon cancer Father     Arthritis Father     Cancer Sister     Breast cancer Sister     Arthritis Brother     No Known Problems Paternal Grandmother     No Known Problems Paternal Grandfather     Autoimmune disease Daughter     No Known Problems Son     No Known Problems Son     Heart disease Half-Brother     Prostate cancer Half-Brother     Melanoma Half-Brother        Social History     Occupational History    Not on file   Tobacco Use    Smoking status: Former     Current packs/day: 0.00     Average packs/day: 1 pack/day for 25.0 years (25.0 ttl pk-yrs)     Types:  Cigarettes     Start date:      Quit date: 2019     Years since quittin.1    Smokeless tobacco: Never   Vaping Use    Vaping status: Never Used   Substance and Sexual Activity    Alcohol use: Not Currently     Comment: socially, had glass of wine today    Drug use: Not Currently     Comment: CBD    Sexual activity: Not Currently     Partners: Male     Birth control/protection: Post-menopausal     Comment: no new partner in past year         Current Outpatient Medications:     diphenhydrAMINE (BENADRYL) 25 mg capsule, Take 25 mg by mouth every 8 (eight) hours as needed for itching. Indications: Itching, Disp: , Rfl:     ergocalciferol (ERGOCALCIFEROL) 1.25 MG (08432 UT) capsule, Take 50,000 Units by mouth once a week. Every Monday AM  Indications: Vitamin D Deficiency, Disp: , Rfl:     FOLIC ACID PO, Take by mouth, Disp: , Rfl:     gabapentin (NEURONTIN) 300 mg capsule, Take 600 mg by mouth 4 (four) times a day, Disp: , Rfl:     latanoprost (XALATAN) 0.005 % ophthalmic solution, 1 DROP INTO BOTH EYES BEFORE BEDTIME, Disp: , Rfl:     meclizine (ANTIVERT) 12.5 MG tablet, Take 12.5 mg by mouth 3 (three) times a day as needed for dizziness, Disp: , Rfl:     melatonin 3 mg, Take 3 mg by mouth daily at bedtime as needed (sleep). Indications: Trouble Sleeping, Disp: , Rfl:     NIFEdipine ER (ADALAT CC) 30 MG 24 hr tablet, Take 30 mg by mouth 2 (two) times a day. Indications: High Blood Pressure Disorder, Disp: , Rfl:     pantoprazole (PROTONIX) 40 mg tablet, Take 40 mg by mouth daily, Disp: , Rfl:     predniSONE 20 mg tablet, 15 mg, Disp: , Rfl:     predniSONE 5 mg tablet, Take 15 mg by mouth daily 4 tablets in AM, Disp: , Rfl:     Premarin vaginal cream, , Disp: , Rfl:     Prevymis 480 MG TABS, , Disp: , Rfl:     Prolia 60 MG/ML, USE 1 ML SUBCUTANOEUS EVERY 6 MONTHS AS DIRECTED. PLEASE DELIVER ...  (REFER TO PRESCRIPTION NOTES)., Disp: , Rfl:     tacrolimus (PROGRAF) 1 mg capsule, Take 6 mg by mouth every 12  (twelve) hours 7 capsules AM 7 capsules PM, Disp: , Rfl:     thiamine 100 MG tablet, Take 100 mg by mouth daily. Indications: Deficiency of Vitamin B1, Disp: , Rfl:     traMADol (ULTRAM) 50 mg tablet, Take 1 PO BID PRN for pain for ongoing therapy, Disp: 45 tablet, Rfl: 2    traMADol (ULTRAM-ER) 200 MG 24 hr tablet, take 1 tablet by mouth once daily for pain for ongoing therapy, Disp: 30 tablet, Rfl: 2    Accu-Chek Guide test strip, , Disp: , Rfl:     Microlet Lancets MISC, , Disp: , Rfl:     Slow-Mag 71.5-119 MG, Take 2 tablets by mouth daily (Patient not taking: Reported on 2/20/2024), Disp: , Rfl:     ursodiol (ACTIGALL) 250 mg tablet, , Disp: , Rfl:     valGANciclovir (VALCYTE) 450 mg tablet, Take 900 mg by mouth 2 (two) times a day with meals (Patient not taking: Reported on 2/20/2024), Disp: , Rfl:     Allergies   Allergen Reactions    Other Other (See Comments)     Seasonal allergies         Objective:  Vitals:    02/21/24 0954   BP: 110/70       The patient was awake, alert, and oriented to person, place, and time.  No acute distress.  Normocephalic.  EOMI.  Mucous membranes moist.  Normal respiratory effort.    Examination of the affected extremity was compared to the unaffected extremity.  Skin was intact.  No swelling or ecchymosis.  No deformity.  Hand and fingers were warm and well-perfused.  Capillary refill was brisk.  Full active range of motion of the elbows, forearms, wrists, and fingers.  5/5 elbow flexors/extensors, wrist flexor/extensors, and .    Right hand:   - tinel's at the elbow. No obvious locking or triggering. NTTP A1 pulley small finger.      Imaging/Diagnostic Studies:    I reviewed imaging studies dated 2/16/24 which included x-rays 3 view right hand .  These images studies demonstrated mild degenerative changes.         This document was created using speech voice recognition software.   Grammatical errors, random word insertions, pronoun errors, and incomplete sentences are an  occasional consequence of this system due to software limitations, ambient noise, and hardware issues.   Any formal questions or concerns about content, text, or information contained within the body of this dictation should be directly addressed to the provider for clarification.    Scribe Attestation      I,:  Angy Funes MA am acting as a scribe while in the presence of the attending physician.:       I,:  Jordyn Ac MD personally performed the services described in this documentation    as scribed in my presence.:

## 2024-02-23 ENCOUNTER — APPOINTMENT (OUTPATIENT)
Facility: CLINIC | Age: 64
End: 2024-02-23
Payer: COMMERCIAL

## 2024-02-23 LAB
4-HYDROXY XYLAZINE: NEGATIVE NG/ML
6MAM UR QL CFM: NEGATIVE NG/ML
7AMINOCLONAZEPAM UR QL CFM: NEGATIVE NG/ML
A-OH ALPRAZ UR QL CFM: NEGATIVE NG/ML
ACCEPTABLE CREAT UR QL: NORMAL MG/DL
ACCEPTIBLE SP GR UR QL: NORMAL
AMPHET UR QL CFM: NEGATIVE NG/ML
BUPRENORPHINE UR QL CFM: NEGATIVE NG/ML
BUTALBITAL UR QL CFM: NEGATIVE NG/ML
BZE UR QL CFM: NEGATIVE NG/ML
CODEINE UR QL CFM: NEGATIVE NG/ML
EDDP UR QL CFM: NEGATIVE NG/ML
ETHYL GLUCURONIDE UR QL CFM: NEGATIVE NG/ML
ETHYL SULFATE UR QL SCN: NEGATIVE NG/ML
EUTYLONE UR QL: NEGATIVE NG/ML
FENTANYL UR QL CFM: NEGATIVE NG/ML
GLIADIN IGG SER IA-ACNC: NEGATIVE NG/ML
HYDROCODONE UR QL CFM: NEGATIVE NG/ML
HYDROMORPHONE UR QL CFM: NEGATIVE NG/ML
LORAZEPAM UR QL CFM: NEGATIVE NG/ML
ME-PHENIDATE UR QL CFM: NEGATIVE NG/ML
MEPERIDINE UR QL CFM: NEGATIVE NG/ML
METHADONE UR QL CFM: NEGATIVE NG/ML
METHAMPHET UR QL CFM: NEGATIVE NG/ML
MORPHINE UR QL CFM: NEGATIVE NG/ML
NALTREXONE UR QL CFM: NEGATIVE NG/ML
NITRITE UR QL: NORMAL UG/ML
NORBUPRENORPHINE UR QL CFM: NEGATIVE NG/ML
NORDIAZEPAM UR QL CFM: NEGATIVE NG/ML
NORFENTANYL UR QL CFM: NEGATIVE NG/ML
NORHYDROCODONE UR QL CFM: NEGATIVE NG/ML
NORMEPERIDINE UR QL CFM: NEGATIVE NG/ML
NOROXYCODONE UR QL CFM: NEGATIVE NG/ML
OXAZEPAM UR QL CFM: NEGATIVE NG/ML
OXYCODONE UR QL CFM: NEGATIVE NG/ML
OXYMORPHONE UR QL CFM: NEGATIVE NG/ML
PARA-FLUOROFENTANYL QUANTIFICATION: NORMAL NG/ML
PHENOBARB UR QL CFM: NEGATIVE NG/ML
RESULT ALL_PRESCRIBED MEDS AND SPECIAL INSTRUCTIONS: NORMAL
SECOBARBITAL UR QL CFM: NEGATIVE NG/ML
SL AMB 4-ANPP QUANTIFICATION: NORMAL NG/ML
SL AMB 5F-ADB-M7 METABOLITE QUANTIFICATION: NEGATIVE NG/ML
SL AMB 7-OH-MITRAGYNINE (KRATOM ALKALOID) QUANTIFICATION: NEGATIVE NG/ML
SL AMB AB-FUBINACA-M3 METABOLITE QUANTIFICATION: NEGATIVE NG/ML
SL AMB ACETYL FENTANYL QUANTIFICATION: NORMAL NG/ML
SL AMB ACETYL NORFENTANYL QUANTIFICATION: NORMAL NG/ML
SL AMB ACRYL FENTANYL QUANTIFICATION: NORMAL NG/ML
SL AMB CARFENTANIL QUANTIFICATION: NORMAL NG/ML
SL AMB CTHC (MARIJUANA METABOLITE) QUANTIFICATION: NEGATIVE NG/ML
SL AMB DEXTRORPHAN (DEXTROMETHORPHAN METABOLITE) QUANT: NEGATIVE NG/ML
SL AMB GABAPENTIN QUANTIFICATION: NORMAL
SL AMB JWH018 METABOLITE QUANTIFICATION: NEGATIVE NG/ML
SL AMB JWH073 METABOLITE QUANTIFICATION: NEGATIVE NG/ML
SL AMB MDMB-FUBINACA-M1 METABOLITE QUANTIFICATION: NEGATIVE NG/ML
SL AMB METHYLONE QUANTIFICATION: NEGATIVE NG/ML
SL AMB N-DESMETHYL-TRAMADOL QUANTIFICATION: NORMAL NG/ML
SL AMB PHENTERMINE QUANTIFICATION: NEGATIVE NG/ML
SL AMB PREGABALIN QUANTIFICATION: NEGATIVE
SL AMB RCS4 METABOLITE QUANTIFICATION: NEGATIVE NG/ML
SL AMB RITALINIC ACID QUANTIFICATION: NEGATIVE NG/ML
SMOOTH MUSCLE AB TITR SER IF: NEGATIVE NG/ML
SPECIMEN DRAWN SERPL: NEGATIVE NG/ML
SPECIMEN PH ACCEPTABLE UR: NORMAL
TAPENTADOL UR QL CFM: NEGATIVE NG/ML
TEMAZEPAM UR QL CFM: NEGATIVE NG/ML
TRAMADOL UR QL CFM: NORMAL NG/ML
URATE/CREAT 24H UR: NORMAL NG/ML
XYLAZINE QUANTIFICATION: NEGATIVE NG/ML

## 2024-02-26 ENCOUNTER — OFFICE VISIT (OUTPATIENT)
Facility: CLINIC | Age: 64
End: 2024-02-26
Payer: COMMERCIAL

## 2024-02-26 DIAGNOSIS — R42 DIZZINESS: Primary | ICD-10-CM

## 2024-02-26 DIAGNOSIS — M54.50 CHRONIC BILATERAL LOW BACK PAIN WITHOUT SCIATICA: ICD-10-CM

## 2024-02-26 DIAGNOSIS — G61.89 OTHER INFLAMMATORY POLYNEUROPATHIES (HCC): ICD-10-CM

## 2024-02-26 DIAGNOSIS — M62.562 ATROPHY OF MUSCLE OF LEFT LOWER LEG: ICD-10-CM

## 2024-02-26 DIAGNOSIS — G89.4 CHRONIC PAIN SYNDROME: ICD-10-CM

## 2024-02-26 DIAGNOSIS — G89.29 CHRONIC BILATERAL LOW BACK PAIN WITHOUT SCIATICA: ICD-10-CM

## 2024-02-26 PROCEDURE — 97112 NEUROMUSCULAR REEDUCATION: CPT

## 2024-02-26 PROCEDURE — 97110 THERAPEUTIC EXERCISES: CPT

## 2024-02-26 NOTE — PROGRESS NOTES
Daily Note     Today's date: 2024  Patient name: Linda Becerra  : 1960  MRN: [id number]  Referring provider: Henna Delgado MD  Dx:   Encounter Diagnosis     ICD-10-CM    1. Dizziness  R42       2. Atrophy of muscle of left lower leg  M62.562       3. Other inflammatory polyneuropathies (HCC)  G61.89       4. Chronic pain syndrome  G89.4       5. Chronic bilateral low back pain without sciatica  M54.50     G89.29                         Start Time: 105  Stop Time: 145  Total time in clinic (min): 40 minutes    Subjective:   Patient states she experienced onset of extreme itching, a side effect of a new med she started.  She has been experiencing intermittent fevers.  Significant LE edema noted over last several days.      Objective: See treatment diary below      Assessment:   Returned to walking challenges to improve stability, nice clearance over hurdles with decreased hesitation with repetition.  Discussed HEP with education on appropriate exercises for [employer].  Increased lumbar tightness and pain today.  Added paraspinal release with education on postural correction to minimize strain with good demonstration.  STM with overall improved motion and decreased pain.         Plan: Continue per plan of care.   No complaints end of session.  Progress balance and strengthening to tolerance.     Precautions: h/o liver transplant, dizziness, peripheral neuropathy, chronic pain syndrome, LBP, h/o left LE pain w/ atrophy  EPOC:  3/11/2024      Testing 1/15 1/18 1/22 1/25 1/30 2/1 2/8 2/20 2/26    ABC 58.75            HOYOS 54/56            5x sit to stand 9.9sec            6 min walk test TBA            TUG TBA 7.2sec           Neuro Re-Ed             Step taps  10x3 off firm, repeated off foam 10x3  Sequential cone taps in corner 3x5 with improving weight shift   10x3 no UE; 10x3 no UE off 3 raised step 10x3    Step up        Left leading one raised step 5x2     Hurdles         6 high hurdles 4 laps x2 foot  "over foot    Foam beam             4 square      CW/CCW 4 ea CW/CCW x6 ea      Semi squat staggered stance with increased left WB        5x2     Hidden obstacles             A/P sway  Foam static balance 30\" FT Foam FT 10x2 Foam FT 10x2 FT 10x2 FT 10x2 FT 10x2 FT 10x2 Foam FT 10x2    Foam EC HT  5x2 FC 5x2 FT EC 5x2 FT EC Partial Tandem 5x2 EC partial tandem 5x2 EC FT 5x2  Foam partial tandem 5x2    Foam EC/EO  10\" x4 2sets  10\" x4 2 sets 10\" x4 EC 10\" EC Partial tandem x5 10\" EC partial tandem 5x2 10\" x4 FT  Foam partial tandem 10\" x4    Ther Ex             Sit to stand   5 5 5 5 5x3 off foam with hip add 5x2 5    Hip flex/abd/ext       10ea;  partial squat 10 with even weight shift      Hamstring stretch  30\" x4ea 30\" x4ea Reviewed to limit forward flexion due to increased right radicular pain today 30\" x2         Nustep L1   5 min  5 min 5 min  5 min 5'    Squats off foam       Mini 5x2                                              Ther Activity                                       Gait Training                                       Manuals             STM TPR UT, Lev, SCM  VM VM VM VM VM   VM    Suboccipital release  VM  VM VM    VM- paraspinal release         "

## 2024-02-27 ENCOUNTER — APPOINTMENT (OUTPATIENT)
Facility: CLINIC | Age: 64
End: 2024-02-27
Payer: COMMERCIAL

## 2024-02-29 ENCOUNTER — TELEPHONE (OUTPATIENT)
Age: 64
End: 2024-02-29

## 2024-02-29 NOTE — TELEPHONE ENCOUNTER
Received request from Alessandra and Nikki. Faxed to [facility]. See media for request and fax confirmation.

## 2024-03-01 ENCOUNTER — OFFICE VISIT (OUTPATIENT)
Facility: CLINIC | Age: 64
End: 2024-03-01
Payer: COMMERCIAL

## 2024-03-01 DIAGNOSIS — G61.89 OTHER INFLAMMATORY POLYNEUROPATHIES (HCC): ICD-10-CM

## 2024-03-01 DIAGNOSIS — G89.4 CHRONIC PAIN SYNDROME: ICD-10-CM

## 2024-03-01 DIAGNOSIS — M54.50 CHRONIC BILATERAL LOW BACK PAIN WITHOUT SCIATICA: ICD-10-CM

## 2024-03-01 DIAGNOSIS — G89.29 CHRONIC BILATERAL LOW BACK PAIN WITHOUT SCIATICA: ICD-10-CM

## 2024-03-01 DIAGNOSIS — R42 DIZZINESS: Primary | ICD-10-CM

## 2024-03-01 DIAGNOSIS — M62.562 ATROPHY OF MUSCLE OF LEFT LOWER LEG: ICD-10-CM

## 2024-03-01 PROCEDURE — 97140 MANUAL THERAPY 1/> REGIONS: CPT

## 2024-03-01 PROCEDURE — 97112 NEUROMUSCULAR REEDUCATION: CPT

## 2024-03-01 PROCEDURE — 97110 THERAPEUTIC EXERCISES: CPT

## 2024-03-01 NOTE — PROGRESS NOTES
Daily Note     Today's date: 3/1/2024  Patient name: Linda Becerra  : 1960  MRN: [id number]  Referring provider: Henna Delgado MD  Dx:   Encounter Diagnosis     ICD-10-CM    1. Dizziness  R42       2. Atrophy of muscle of left lower leg  M62.562       3. Other inflammatory polyneuropathies (HCC)  G61.89       4. Chronic pain syndrome  G89.4       5. Chronic bilateral low back pain without sciatica  M54.50     G89.29                           Start Time: 1100  Stop Time: 1140  Total time in clinic (min): 40 minutes    Subjective:  Patient states she was put on a diuretic.  CMV is more notable per her report.     Objective: See treatment diary below      Assessment:   Introduced push off BOSU for increased challenge with stability in stance.  Foam cone taps with ability to stand on her left to tap right.  Added thoracic rotation to allow for increased chest expansion due to slight dyspnea.  Increased intercostal tightness noted.  Discussed at length pacing to avoid fatigue and improve tolerance to ADL tasks with voiced understanding.  Patient encouraged to continue thoracic rotation seated or in supine to encourage relaxation and stretch with good demonstration.      Plan: Continue per plan of care.   No complaints end of session.  Progress balance and strengthening to tolerance.  Assess tolerance to thoracic rotation stretch.     Precautions: h/o liver transplant, dizziness, peripheral neuropathy, chronic pain syndrome, LBP, h/o left LE pain w/ atrophy  EPOC:  3/11/2024      Testing 1/15 1/18 1/22 1/25 1/30 2/1 2/8 2/20 2/26 3/1   ABC 58.75            HOYOS 54/56            5x sit to stand 9.9sec            6 min walk test TBA            TUG TBA 7.2sec           Neuro Re-Ed             Step taps  10x3 off firm, repeated off foam 10x3  Sequential cone taps in corner 3x5 with improving weight shift   10x3 no UE; 10x3 no UE off 3 raised step 10x3 Push off BOSU 10x2   Step up        Left leading one raised step  "5x2     Hurdles         6 high hurdles 4 laps x2 foot over foot Sidestepping foam beam 4 laps;  cone taps 10x2 off foam beam   Foam beam             4 square      CW/CCW 4 ea CW/CCW x6 ea      Semi squat staggered stance with increased left WB        5x2     Hidden obstacles             A/P sway  Foam static balance 30\" FT Foam FT 10x2 Foam FT 10x2 FT 10x2 FT 10x2 FT 10x2 FT 10x2 Foam FT 10x2 Foam FT 10x2   Foam EC HT  5x2 FC 5x2 FT EC 5x2 FT EC Partial Tandem 5x2 EC partial tandem 5x2 EC FT 5x2  Foam partial tandem 5x2 Foam partial tandem 5x2   Foam EC/EO  10\" x4 2sets  10\" x4 2 sets 10\" x4 EC 10\" EC Partial tandem x5 10\" EC partial tandem 5x2 10\" x4 FT  Foam partial tandem 10\" x4 Foam partial tandem 10\" x5   Ther Ex             Sit to stand   5 5 5 5 5x3 off foam with hip add 5x2 5 5   Hip flex/abd/ext       10ea;  partial squat 10 with even weight shift      Hamstring stretch  30\" x4ea 30\" x4ea Reviewed to limit forward flexion due to increased right radicular pain today 30\" x2         Nustep L1   5 min  5 min 5 min  5 min 5'    Squats off foam       Mini 5x2       Thoracic rotation seated; supine hooklying rotation          5x2ea                             Ther Activity                                       Gait Training                                       Manuals             STM TPR UT, Lev, SCM  VM VM VM VM VM   VM    Suboccipital release  VM  VM VM    VM- paraspinal release VM- MFR intercostal release        "

## 2024-03-05 ENCOUNTER — OFFICE VISIT (OUTPATIENT)
Facility: CLINIC | Age: 64
End: 2024-03-05
Payer: COMMERCIAL

## 2024-03-05 DIAGNOSIS — R42 DIZZINESS: Primary | ICD-10-CM

## 2024-03-05 DIAGNOSIS — M62.562 ATROPHY OF MUSCLE OF LEFT LOWER LEG: ICD-10-CM

## 2024-03-05 DIAGNOSIS — G89.29 CHRONIC BILATERAL LOW BACK PAIN WITHOUT SCIATICA: ICD-10-CM

## 2024-03-05 DIAGNOSIS — G61.89 OTHER INFLAMMATORY POLYNEUROPATHIES (HCC): ICD-10-CM

## 2024-03-05 DIAGNOSIS — G89.4 CHRONIC PAIN SYNDROME: ICD-10-CM

## 2024-03-05 DIAGNOSIS — M54.50 CHRONIC BILATERAL LOW BACK PAIN WITHOUT SCIATICA: ICD-10-CM

## 2024-03-05 PROCEDURE — 97110 THERAPEUTIC EXERCISES: CPT

## 2024-03-05 PROCEDURE — 97112 NEUROMUSCULAR REEDUCATION: CPT

## 2024-03-05 PROCEDURE — 97140 MANUAL THERAPY 1/> REGIONS: CPT

## 2024-03-05 NOTE — PROGRESS NOTES
Daily Note     Today's date: 3/5/2024  Patient name: Linda Becerra  : 1960  MRN: [id number]  Referring provider: Henna Delgado MD  Dx:   Encounter Diagnosis     ICD-10-CM    1. Dizziness  R42       2. Atrophy of muscle of left lower leg  M62.562       3. Other inflammatory polyneuropathies (HCC)  G61.89       4. Chronic pain syndrome  G89.4       5. Chronic bilateral low back pain without sciatica  M54.50     G89.29                             Start Time: 1020  Stop Time: 1105  Total time in clinic (min): 45 minutes    Subjective:  Patient is struggling today with increased fatigue.      Objective: See treatment diary below      Assessment: Increased instability on compliant surfaces today with increased haptic touch.  Step taps and A/P sway with occ cuing.  Nustep with increased time today with good tolerance.  Intermittent activity continues to be stressed to improve tolerance.  STM with nice gains in chest wall expansion and stated greater ease in breathing.      Plan: Continue per plan of care.   No complaints end of session.  Progress balance and strengthening to tolerance.      Precautions: h/o liver transplant, dizziness, peripheral neuropathy, chronic pain syndrome, LBP, h/o left LE pain w/ atrophy  EPOC:  3/11/2024      Testing 1/15 1/22 1/25 1/30 2/1 2/8 2/20 2/26 3/1 3/5   ABC 58.75            HOYOS 54/56            5x sit to stand 9.9sec            6 min walk test TBA            TUG TBA            Neuro Re-Ed             Step taps   Sequential cone taps in corner 3x5 with improving weight shift   10x3 no UE; 10x3 no UE off 3 raised step 10x3 Push off BOSU 10x2 Off foam 10x3   Step up       Left leading one raised step 5x2      Hurdles        6 high hurdles 4 laps x2 foot over foot Sidestepping foam beam 4 laps;  cone taps 10x2 off foam beam    Foam beam             4 square     CW/CCW 4 ea CW/CCW x6 ea       Semi squat staggered stance with increased left WB       5x2   5x2   Hidden obstacles    "          A/P sway  Foam FT 10x2 Foam FT 10x2 FT 10x2 FT 10x2 FT 10x2 FT 10x2 Foam FT 10x2 Foam FT 10x2 Foam FT 10x2   Foam EC HT  5x2 FT EC 5x2 FT EC Partial Tandem 5x2 EC partial tandem 5x2 EC FT 5x2  Foam partial tandem 5x2 Foam partial tandem 5x2 Foam partial tandem 5x2   Foam EC/EO  10\" x4 2 sets 10\" x4 EC 10\" EC Partial tandem x5 10\" EC partial tandem 5x2 10\" x4 FT  Foam partial tandem 10\" x4 Foam partial tandem 10\" x5 Foam partial tandem 10\" x5   Ther Ex             Sit to stand  5 5 5 5 5x3 off foam with hip add 5x2 5 5 5   Hip flex/abd/ext      10ea;  partial squat 10 with even weight shift       Hamstring stretch  30\" x4ea Reviewed to limit forward flexion due to increased right radicular pain today 30\" x2          Nustep L1  5 min  5 min 5 min  5 min 5'  6'   Squats off foam      Mini 5x2        Thoracic rotation seated; supine hooklying rotation         5x2ea                              Ther Activity                                       Gait Training                                       Manuals             STM TPR UT, Lev, SCM  VM VM VM VM   VM  SCM TPR- VM   Suboccipital release   VM VM    VM- paraspinal release VM- MFR intercostal release VM- MFR thoracic outlet,        "

## 2024-03-08 ENCOUNTER — OFFICE VISIT (OUTPATIENT)
Facility: CLINIC | Age: 64
End: 2024-03-08
Payer: COMMERCIAL

## 2024-03-08 ENCOUNTER — EVALUATION (OUTPATIENT)
Dept: OCCUPATIONAL THERAPY | Facility: CLINIC | Age: 64
End: 2024-03-08
Payer: COMMERCIAL

## 2024-03-08 DIAGNOSIS — M54.50 CHRONIC BILATERAL LOW BACK PAIN WITHOUT SCIATICA: ICD-10-CM

## 2024-03-08 DIAGNOSIS — G61.89 OTHER INFLAMMATORY POLYNEUROPATHIES (HCC): ICD-10-CM

## 2024-03-08 DIAGNOSIS — G89.4 CHRONIC PAIN SYNDROME: ICD-10-CM

## 2024-03-08 DIAGNOSIS — G89.29 CHRONIC BILATERAL LOW BACK PAIN WITHOUT SCIATICA: ICD-10-CM

## 2024-03-08 DIAGNOSIS — R42 DIZZINESS: Primary | ICD-10-CM

## 2024-03-08 DIAGNOSIS — M79.641 PAIN OF RIGHT HAND: ICD-10-CM

## 2024-03-08 DIAGNOSIS — M62.562 ATROPHY OF MUSCLE OF LEFT LOWER LEG: ICD-10-CM

## 2024-03-08 DIAGNOSIS — M25.532 PAIN IN LEFT WRIST: ICD-10-CM

## 2024-03-08 PROCEDURE — 97165 OT EVAL LOW COMPLEX 30 MIN: CPT | Performed by: OCCUPATIONAL THERAPIST

## 2024-03-08 PROCEDURE — 97112 NEUROMUSCULAR REEDUCATION: CPT

## 2024-03-08 PROCEDURE — 97140 MANUAL THERAPY 1/> REGIONS: CPT | Performed by: OCCUPATIONAL THERAPIST

## 2024-03-08 NOTE — LETTER
2024    Henna Delgado MD  [address] [facility] [address]    Patient: Linda Becerra   YOB: 1960   Date of Visit: 3/8/2024     Encounter Diagnosis     ICD-10-CM    1. Dizziness  R42       2. Atrophy of muscle of left lower leg  M62.562       3. Other inflammatory polyneuropathies (HCC)  G61.89       4. Chronic pain syndrome  G89.4       5. Chronic bilateral low back pain without sciatica  M54.50     G89.29           Dear Dr. Delgado:    Thank you for your recent referral of Linda Becerra. Please review the attached evaluation summary from Linda's recent visit.     Please verify that you agree with the plan of care by signing the attached order.     If you have any questions or concerns, please do not hesitate to call.     I sincerely appreciate the opportunity to share in the care of one of your patients and hope to have another opportunity to work with you in the near future.       Sincerely,    Teresita Vail, PT      Referring Provider:      I certify that I have read the below Plan of Care and certify the need for these services furnished under this plan of treatment while under my care.                    Henna Delgado MD  [address] [facility] [address]  Via Fax: [phone]          PT PROGRESS NOTE/ DAILY NOTE    Today's date: 3/8/2024  Patient name: Linda Becerra  : 1960  MRN: [id number]  Referring provider: Henna Delgado MD  Dx:   Encounter Diagnosis     ICD-10-CM    1. Dizziness  R42       2. Atrophy of muscle of left lower leg  M62.562       3. Other inflammatory polyneuropathies (HCC)  G61.89       4. Chronic pain syndrome  G89.4       5. Chronic bilateral low back pain without sciatica  M54.50     G89.29           Start Time: 1015  Stop Time: 1100  Total time in clinic (min): 45 minutes    Assessment  Assessment details: Patient is well known to this PT with history of left LE weakness after hematoma, general instability post liver  transplant due to deconditioning and intermittent dizziness associated with head injury.  Patient states she has noted a decline in her stability and strength of late and is fearful of losing function.   Patient reports near falls occurring several times a Week Patient displays Abnormal muscle strength with overall grade of 4- to 3+/5 proximal left LE. Patient sensation is Normal throughout lower extremities. Patient coordination is Normal per alterate toe tapping and heel to shin test.   Patient balance scores are as follows: 54/56 HOYOS, TUG is TBA with minimal changes in static balance noted.  Patient endurance scores are as follows; TBA feet with  6 minute walk test without  Device, 9.9 seconds with 5 x sit to stand test with overall results noting decrease functional endurance.   Patient subjective report notes the following functional limitation with instability in gait on outside surfaces and instability on compliant surfaces. Patient will benefit from skilled PT to address noted impairments and functional limitations they are causing with overall goal to return patient to highest level possible with reduced risk for falls.       Please contact me if you have any questions or recommendations. Thank you for the referral and the opportunity to share in Linda's care.    Patient verbalized understanding of POC      2/16  Patient has made nice gains with PT despite limited visits due to respiratory complications and weather.  HOYOS scoring improved scoring 56/56 and endurance scoring improved by 2 seconds on 5x sit to stand scoring 7.9 sec.  Patient continues to benefit from skilled intervention to facilitate increased strength, stability and safety.  Recommend continuation of PT per POC.  Patient in agreement with treatment plan.    3/8  Patient has noted increased fatigue and decreased exercise tolerance over last 2 weeks due to other health concerns including tooth pain and fevers.  Despite this, HOYOS scoring  has been maintained at 56/56 and no decline in 5x sit to stand is noted.  Slight gains noted in 6 min walk test with 10' further obtained.  Per patient, she still feels limited on outside surfaces and desires to continue treatment to further address stability.  Request extension of POC to further address stability and endurance to promote increased ADL tolerance.  Patient in agreement with treatment plan.            Impairments: abnormal coordination, abnormal gait, activity intolerance, impaired balance, impaired physical strength, lacks appropriate home exercise program and safety issue  Understanding of Dx/Px/POC: good   Prognosis: good    Goals  Goals  STG 30 days    Patient will improve static balance with feet together Eyes Closed Firm surface  to 60 seconds indicating reduction in fall risk- MET  Patient will achieve 190 feet improvement with overall distance achieved of 1450 feet with 6 minute walk test which is Minimal Detectable Change pre current research standards with endurance to demonstrate enhance functional capacity - NOT MET  Patient will achieve 56/56 on HOYOS demonstrating increased overall stability- MET  TUG scoring completed with no need for further improvement due to functional speed  Patient will perform  5 x sit to stand test with overall reduction by 3 seconds to 6.9 score indicating improvement with functional endurance- mostly met  Patient will be independent in basic strengthening and balance HEP- MET    LT days   Patient will score low risk for falls with 3/4 fall risk measures - partially met  Patient will be able to ambulate 1500 feet without AD during 6 minute walk test   Patient will be able to perform floor transfer without physical assistance   Patient will be able to carry objects without loss of balance  Patient will be able to ambulate outdoors without any loss of balance  Patient will independent in community based walking program to maximize mobility and endurance    Cut  off score   All date taken from [facility]TA Neuro Section or Rehab Measures    HOYOS test: 46/56                                              5 x STS Test:  MDC: 6 points                                                  MDC: 2.3 seconds   age norms                                                                 Age Norms   60-69 year old = M: 55, F: 55                        60-69 year old: 11.4 seconds   70-79 year old = M 54,  F: 53                       70-79 year old: 12.6 seconds    80-89 year old = M53,   F: 50                       80-89 year old: 14.8 seconds     TUG test:                                                                     10 Meter Walk Test:  MDC: 4.14 seconds       MDC: .59 ft/sec  Cut off score for Falls                                                  Age Norms  > 13.5 seconds community dwelling adults                20-29; M: 4.56 ft/sec F: 4.62 ft/sec  > 32.2 Frail Elderly                                                     30-39: M 4.76 ft/sec  F: 4.68 ft/sec          40-49: M: 4.79 ft/sec  F: 4.62 ft/sec  6 Minute Walk Test      50-59: M: 4.76 ft/sec  F: 4.56 ft/sec  MDC: 190 feet       60-69: M: 4.56 ft/sec  F: 4.26 ft/sec  Age Norms       70-+    M: 4.36 ft/sec  F: 4.16 ft/sec  60-69:    M: 1876 F: 1765  70-79:    M: 1729 F: 1545  80-89 +: M: 1368 F; 1286     Plan  Patient would benefit from: skilled physical therapy  Planned modality interventions: cryotherapy and hydrotherapy  Planned therapy interventions: manual therapy, motor coordination training, neuromuscular re-education, patient education, postural training, sensory integrative techniques, strengthening, stretching, therapeutic activities, therapeutic exercise, gait training, home exercise program, coordination and balance  Frequency: 2x week  Duration in weeks: 8  Treatment plan discussed with: patient        Subjective Evaluation    History of Present Illness  Mechanism of injury: 2/16 Patient states she has not been feeling  well with lots of visits for follow-up.  Patient saw neurology and endocrinology with MRI pending for lower spine.  Patient continues to use her std cane for stability in walking.    3/8  Patient indicates she has been feeling poorly lately with intermittent tightness and dyspnea, MD is aware per patient report.  Pain continues to be noted left LE particularly with distance ambulation.  Patient Goals  Patient goals for therapy: decreased pain, improved balance and increased strength  Patient goal: to be able to walk better  Pain  Current pain ratin  At best pain ratin  Location: Left leg pain    Social Support  Steps to enter house: yes  4  Lives in: multiple-level home  Lives with: significant other    Employment status: not working  Treatments  Previous treatment: physical therapy        Objective     Neurological Testing     Additional Neurological Details  Slightly diminished left LE    Strength/Myotome Testing     Left Hip   Planes of Motion   Flexion: 4-  Extension: 4- and 3+  Abduction: 3+ and 4-  External rotation: 4    Right Hip   Planes of Motion   Flexion: 4+  Extension: 4+  Abduction: 4+  External rotation: 4+    Left Knee   Flexion: 4    Right Knee   Flexion: 4+  Neuro Exam:     Sensation   Light touch LE: left WNL and right WNL    Functional outcomes   Functional outcome gait comment: Wide base of support with decreased hardeep, reliance on std cane for outside surfaces, increased lateral list and decreased weight shift left    3/8  Patient is now able to negotiate over curbs and ramps with cane with greater ease and improved clearance.  Increased genu recurvatum evident with fatigue.  Increased hardeep noted.             Precautions: h/o liver transplant, dizziness, peripheral neuropathy, chronic pain syndrome, LBP, h/o left LE pain w/ atrophy  NEW EPOC:  5/3/2024      Testing 1/15 2/16 3/8          ABC 58.75 58.75 68.75          HOYOS 54/56 56/56 56/56          5x sit to stand 9.9sec 7.9sec  7.9sec          6 min walk test TBA 1260' 1270'          TUG TBA 7.9sec 7.1sec          Neuro Re-Ed             Step taps  10x2 10x2          Hurdles             Foam beam             4 square             Hidden obstacles  Ambulation around obstacles rapid pace 100'x2 Ambulation on outside surfaces curbs and ramps          A/P sway  10 10                       Ther Ex             Sit to stand  5x2           Hip flex/abd/ext             squats  5x2                                                                            Ther Activity                                       Gait Training                                       Modalities

## 2024-03-08 NOTE — PROGRESS NOTES
OT Evaluation     Today's date: 3/8/2024  Patient name: Linda Becerra  : 1960  MRN: [id number]  Referring provider: Bree Oneal PA-C  Dx:   Encounter Diagnosis     ICD-10-CM    1. Pain of right hand  M79.641 Ambulatory Referral to PT/OT Hand Therapy      2. Pain in left wrist  M25.532 Ambulatory Referral to PT/OT Hand Therapy                     Assessment  Assessment details: Pt. Presents today for evaluation of the R hand due to pain. She is tender over the hypothenar area. Small finger pain in the PIP joint.  Pt. Has pain with her gripping and daily use.  She has functional weakness with her ADLs.  She relies on a cane for balance on the R side.  Pt. To benefit from hand therapy to reduce pain symptoms and improve functional strength.  Impairments: abnormal or restricted ROM and pain with function  Functional limitations: Pt. has pain with daily activities, gripping steering wheel, holding her cane.Understanding of Dx/Px/POC: good   Prognosis: good    Goals  STG( 3 visits)  1. Compliant with HEP  2. Reduce pain to less than 2/10 with function  LTG( 8 visits or discharge)  1. Pain free R hand use daily.  2. R  strength > 15lbs for function  3.Improve FOTO score to predicted outcome or greater.    Plan  Patient would benefit from: skilled occupational therapy  Planned modality interventions: cryotherapy and thermotherapy: hydrocollator packs  Planned therapy interventions: IASTM, joint mobilization, manual therapy, home exercise program, graded exercise, functional ROM exercises, therapeutic activities, therapeutic exercise, fine motor coordination training and orthotic fitting/training  Frequency: 2x week  Duration in visits: 2  Duration in weeks: 6  Plan of Care beginning date: 3/8/2024  Plan of Care expiration date: 3/29/2024  Treatment plan discussed with: patient        Subjective Evaluation    History of Present Illness  Mechanism of injury: Pt. Has been experiencing with pain in her L hand  small finger and hypothenar discomfort.  Pt. Has history of arthritis. Pt. Referred to hand therapy for evaluation and treatment.  Quality of life: good    Patient Goals  Patient goals for therapy: decreased pain and increased strength    Pain  Current pain ratin  Quality: discomfort and burning  Relieving factors: ice  Progression: no change    Social Support  Lives in: multiple-level home    Employment status: not working  Hand dominance: right      Diagnostic Tests  X-ray: normal  Treatments  Current treatment: occupational therapy        Objective     Active Range of Motion     Left Wrist   Normal active range of motion    Right Wrist   Normal active range of motion    Left Thumb     Opposition: Full opposition    Right Thumb   Opposition: Full opposition    Additional Active Range of Motion Details  Full composite fist.    Strength/Myotome Testing     Left Wrist/Hand      (2nd hand position)     Trial 1: 20    Thumb Strength  Key/Lateral Pinch     Trial 1: 11    Right Wrist/Hand      (2nd hand position)     Trial 1: 30    Thumb Strength   Key/Lateral Pinch     Trial 1: 11    Tests     Right Elbow   Positive Tinel's sign (cubital tunnel).              Precautions: Fall risk      Manuals 3/8             IE 30'            Graston R hand 4m            STM hypothenar 3m            Intrinsic stretch SF 3m            Neuro Re-Ed                          HEP- intrinsic stretch, edema compression reviewed                                                                             Ther Ex             PROM SF/wrist 2m                                                                                                       Ther Activity                                       Gait Training                                       Modalities              R 8m

## 2024-03-08 NOTE — PROGRESS NOTES
PT PROGRESS NOTE/ DAILY NOTE    Today's date: 3/8/2024  Patient name: Linda Becerra  : 1960  MRN: [id number]  Referring provider: Henna Delgado MD  Dx:   Encounter Diagnosis     ICD-10-CM    1. Dizziness  R42       2. Atrophy of muscle of left lower leg  M62.562       3. Other inflammatory polyneuropathies (HCC)  G61.89       4. Chronic pain syndrome  G89.4       5. Chronic bilateral low back pain without sciatica  M54.50     G89.29           Start Time: 1015  Stop Time: 1100  Total time in clinic (min): 45 minutes    Assessment  Assessment details: Patient is well known to this PT with history of left LE weakness after hematoma, general instability post liver transplant due to deconditioning and intermittent dizziness associated with head injury.  Patient states she has noted a decline in her stability and strength of late and is fearful of losing function.   Patient reports near falls occurring several times a Week Patient displays Abnormal muscle strength with overall grade of 4- to 3+/5 proximal left LE. Patient sensation is Normal throughout lower extremities. Patient coordination is Normal per alterate toe tapping and heel to shin test.   Patient balance scores are as follows: 54/56 HOYOS, TUG is TBA with minimal changes in static balance noted.  Patient endurance scores are as follows; TBA feet with  6 minute walk test without  Device, 9.9 seconds with 5 x sit to stand test with overall results noting decrease functional endurance.   Patient subjective report notes the following functional limitation with instability in gait on outside surfaces and instability on compliant surfaces. Patient will benefit from skilled PT to address noted impairments and functional limitations they are causing with overall goal to return patient to highest level possible with reduced risk for falls.       Please contact me if you have any questions or recommendations. Thank you for the referral and the opportunity to  share in [facility]'s care.    Patient verbalized understanding of POC      2/16  Patient has made nice gains with PT despite limited visits due to respiratory complications and weather.  HOYOS scoring improved scoring 56/56 and endurance scoring improved by 2 seconds on 5x sit to stand scoring 7.9 sec.  Patient continues to benefit from skilled intervention to facilitate increased strength, stability and safety.  Recommend continuation of PT per POC.  Patient in agreement with treatment plan.    3/8  Patient has noted increased fatigue and decreased exercise tolerance over last 2 weeks due to other health concerns including tooth pain and fevers.  Despite this, HOYOS scoring has been maintained at 56/56 and no decline in 5x sit to stand is noted.  Slight gains noted in 6 min walk test with 10' further obtained.  Per patient, she still feels limited on outside surfaces and desires to continue treatment to further address stability.  Request extension of POC to further address stability and endurance to promote increased ADL tolerance.  Patient in agreement with treatment plan.            Impairments: abnormal coordination, abnormal gait, activity intolerance, impaired balance, impaired physical strength, lacks appropriate home exercise program and safety issue  Understanding of Dx/Px/POC: good   Prognosis: good    Goals  Goals  STG 30 days    Patient will improve static balance with feet together Eyes Closed Firm surface  to 60 seconds indicating reduction in fall risk- MET  Patient will achieve 190 feet improvement with overall distance achieved of 1450 feet with 6 minute walk test which is Minimal Detectable Change pre current research standards with endurance to demonstrate enhance functional capacity - NOT MET  Patient will achieve 56/56 on HOYOS demonstrating increased overall stability- MET  TUG scoring completed with no need for further improvement due to functional speed  Patient will perform  5 x sit to stand test  with overall reduction by 3 seconds to 6.9 score indicating improvement with functional endurance- mostly met  Patient will be independent in basic strengthening and balance HEP- MET    LT days   Patient will score low risk for falls with 3/4 fall risk measures - partially met  Patient will be able to ambulate 1500 feet without AD during 6 minute walk test   Patient will be able to perform floor transfer without physical assistance   Patient will be able to carry objects without loss of balance  Patient will be able to ambulate outdoors without any loss of balance  Patient will independent in community based walking program to maximize mobility and endurance    Cut off score   All date taken from [facility] Neuro Section or Rehab Measures    HOYOS test:                                               5 x STS Test:  MDC: 6 points                                                  MDC: 2.3 seconds   age norms                                                                 Age Norms   60-69 year old = M: 55, F: 55                        60-69 year old: 11.4 seconds   70-79 year old = M 54,  F: 53                       70-79 year old: 12.6 seconds    80-89 year old = M53,   F: 50                       80-89 year old: 14.8 seconds     TUG test:                                                                     10 Meter Walk Test:  MDC: 4.14 seconds       MDC: .59 ft/sec  Cut off score for Falls                                                  Age Norms  > 13.5 seconds community dwelling adults                20-29; M: 4.56 ft/sec F: 4.62 ft/sec  > 32.2 Frail Elderly                                                     30-39: M 4.76 ft/sec  F: 4.68 ft/sec          40-49: M: 4.79 ft/sec  F: 4.62 ft/sec  6 Minute Walk Test      50-59: M: 4.76 ft/sec  F: 4.56 ft/sec  MDC: 190 feet       60-69: M: 4.56 ft/sec  F: 4.26 ft/sec  Age Norms       70-+    M: 4.36 ft/sec  F: 4.16 ft/sec  60-69:    M: 1876 F: 1765  70-79:    M: 1729  F: 1542  80-89 +: M: 1368 F; 1286     Plan  Patient would benefit from: skilled physical therapy  Planned modality interventions: cryotherapy and hydrotherapy  Planned therapy interventions: manual therapy, motor coordination training, neuromuscular re-education, patient education, postural training, sensory integrative techniques, strengthening, stretching, therapeutic activities, therapeutic exercise, gait training, home exercise program, coordination and balance  Frequency: 2x week  Duration in weeks: 8  Treatment plan discussed with: patient        Subjective Evaluation    History of Present Illness  Mechanism of injury:  Patient states she has not been feeling well with lots of visits for follow-up.  Patient saw neurology and endocrinology with MRI pending for lower spine.  Patient continues to use her std cane for stability in walking.    3/8  Patient indicates she has been feeling poorly lately with intermittent tightness and dyspnea, MD is aware per patient report.  Pain continues to be noted left LE particularly with distance ambulation.  Patient Goals  Patient goals for therapy: decreased pain, improved balance and increased strength  Patient goal: to be able to walk better  Pain  Current pain ratin  At best pain ratin  Location: Left leg pain    Social Support  Steps to enter house: yes  4  Lives in: multiple-level home  Lives with: significant other    Employment status: not working  Treatments  Previous treatment: physical therapy        Objective     Neurological Testing     Additional Neurological Details  Slightly diminished left LE    Strength/Myotome Testing     Left Hip   Planes of Motion   Flexion: 4-  Extension: 4- and 3+  Abduction: 3+ and 4-  External rotation: 4    Right Hip   Planes of Motion   Flexion: 4+  Extension: 4+  Abduction: 4+  External rotation: 4+    Left Knee   Flexion: 4    Right Knee   Flexion: 4+  Neuro Exam:     Sensation   Light touch LE: left WNL and right  WNL    Functional outcomes   Functional outcome gait comment: Wide base of support with decreased hardeep, reliance on std cane for outside surfaces, increased lateral list and decreased weight shift left    3/8  Patient is now able to negotiate over curbs and ramps with cane with greater ease and improved clearance.  Increased genu recurvatum evident with fatigue.  Increased hardeep noted.             Precautions: h/o liver transplant, dizziness, peripheral neuropathy, chronic pain syndrome, LBP, h/o left LE pain w/ atrophy  NEW EPOC:  5/3/2024      Testing 1/15 2/16 3/8          ABC 58.75 58.75 68.75          HOYOS 54/56 56/56 56/56          5x sit to stand 9.9sec 7.9sec 7.9sec          6 min walk test TBA 1260' 1270'          TUG TBA 7.9sec 7.1sec          Neuro Re-Ed             Step taps  10x2 10x2          Hurdles             Foam beam             4 square             Hidden obstacles  Ambulation around obstacles rapid pace 100'x2 Ambulation on outside surfaces curbs and ramps          A/P sway  10 10                       Ther Ex             Sit to stand  5x2           Hip flex/abd/ext             squats  5x2                                                                            Ther Activity                                       Gait Training                                       Modalities

## 2024-03-11 ENCOUNTER — APPOINTMENT (OUTPATIENT)
Facility: CLINIC | Age: 64
End: 2024-03-11
Payer: COMMERCIAL

## 2024-03-11 ENCOUNTER — APPOINTMENT (OUTPATIENT)
Dept: OCCUPATIONAL THERAPY | Facility: CLINIC | Age: 64
End: 2024-03-11
Payer: COMMERCIAL

## 2024-03-15 ENCOUNTER — OFFICE VISIT (OUTPATIENT)
Facility: CLINIC | Age: 64
End: 2024-03-15
Payer: COMMERCIAL

## 2024-03-15 ENCOUNTER — OFFICE VISIT (OUTPATIENT)
Dept: OCCUPATIONAL THERAPY | Facility: CLINIC | Age: 64
End: 2024-03-15
Payer: COMMERCIAL

## 2024-03-15 DIAGNOSIS — G89.4 CHRONIC PAIN SYNDROME: ICD-10-CM

## 2024-03-15 DIAGNOSIS — M25.532 PAIN IN LEFT WRIST: ICD-10-CM

## 2024-03-15 DIAGNOSIS — G61.89 OTHER INFLAMMATORY POLYNEUROPATHIES (HCC): ICD-10-CM

## 2024-03-15 DIAGNOSIS — M54.50 CHRONIC BILATERAL LOW BACK PAIN WITHOUT SCIATICA: ICD-10-CM

## 2024-03-15 DIAGNOSIS — M79.641 PAIN OF RIGHT HAND: Primary | ICD-10-CM

## 2024-03-15 DIAGNOSIS — M62.562 ATROPHY OF MUSCLE OF LEFT LOWER LEG: ICD-10-CM

## 2024-03-15 DIAGNOSIS — G89.29 CHRONIC BILATERAL LOW BACK PAIN WITHOUT SCIATICA: ICD-10-CM

## 2024-03-15 DIAGNOSIS — R42 DIZZINESS: Primary | ICD-10-CM

## 2024-03-15 PROCEDURE — 97140 MANUAL THERAPY 1/> REGIONS: CPT | Performed by: OCCUPATIONAL THERAPIST

## 2024-03-15 PROCEDURE — 97110 THERAPEUTIC EXERCISES: CPT | Performed by: OCCUPATIONAL THERAPIST

## 2024-03-15 PROCEDURE — 97112 NEUROMUSCULAR REEDUCATION: CPT

## 2024-03-15 PROCEDURE — 97530 THERAPEUTIC ACTIVITIES: CPT

## 2024-03-15 NOTE — PROGRESS NOTES
Daily Note     Today's date: 3/15/2024  Patient name: Linda Becerra  : 1960  MRN: [id number]  Referring provider: Bree Oneal PA-C  Dx:   Encounter Diagnosis     ICD-10-CM    1. Pain of right hand  M79.641       2. Pain in left wrist  M25.532                      Subjective: My hand is sore.      Objective: See treatment diary below      Assessment: Tolerated treatment well. Patient  has relief with manual tx and intrinsic stretching.  Compliant with HEP.      Plan: Continue per plan of care.      Precautions: h/o liver transplant, dizziness, peripheral neuropathy, chronic pain syndrome, LBP, h/o left LE pain w/ atrophy  NEW EPOC:  5/3/2024    Manuals 3/8 3/15            IE 30'            Graston R hand 4m 4m           STM hypothenar 3m 3m           Intrinsic stretch SF 3m 3m           Neuro Re-Ed                          HEP- intrinsic stretch, edema compression reviewed                                                                             Ther Ex             PROM SF/wrist 2m 2m           Ext stretch therabar  2x10           gripping  PPW  2x30                                                                            Ther Activity                                       Gait Training                                       Modalities             MH R 8m 8m

## 2024-03-15 NOTE — PROGRESS NOTES
"Daily Note     Today's date: 3/15/2024  Patient name: Linda Becerra  : 1960  MRN: [id number]  Referring provider: Henna Delgado MD  Dx:   Encounter Diagnosis     ICD-10-CM    1. Dizziness  R42       2. Atrophy of muscle of left lower leg  M62.562       3. Other inflammatory polyneuropathies (HCC)  G61.89       4. Chronic pain syndrome  G89.4       5. Chronic bilateral low back pain without sciatica  M54.50     G89.29                      Subjective: Continues w/ low grade fever and increased fatigue. Is scheduled for tooth extraction on Monday. Is unsure whether she will be able to complete entire session 2/2 pain and high fatigue levels but repeatedly states that she did not want to cancel.       Objective: See treatment diary below      Assessment: Tolerated treatment fair. Session abbreviated to pt tolerance, terminated at approx. 30min of intervention. Held STM to cervical region today as pt has just received injections to the area. Patient demonstrated fatigue post treatment and would benefit from continued PT. Monday appointment canceled 2/2 pt schedule conflict for dental work, will return Thursday of next week.       Plan: Continue per plan of care.  Progress treatment as tolerated.       Precautions: h/o liver transplant, dizziness, peripheral neuropathy, chronic pain syndrome, LBP, h/o left LE pain w/ atrophy  NEW EPO:  5/3/2024    Testing 1/15 3/8 3/15 1/30 2/1 2/8 2/20 2/26 3/1 3/5   ABC 58.75 VM           HOYOS 54/56 VM           5x sit to stand 9.9sec VM           6 min walk test TBA            TUG TBA            Neuro Re-Ed             Step taps   On firm x30    10x3 no UE; 10x3 no UE off 3 raised step 10x3 Push off BOSU 10x2 Off foam 10x3   Step up   Fwd L leg leading 4\" step x10     Lateral step-up-and-over  x10     Left leading one raised step 5x2      Hurdles        6 high hurdles 4 laps x2 foot over foot Sidestepping foam beam 4 laps;  cone taps 10x2 off foam beam    Foam beam          " "   4 square     CW/CCW 4 ea CW/CCW x6 ea       Semi squat staggered stance with increased left WB       5x2   5x2   Hidden obstacles             A/P sway    FT 10x2 FT 10x2 FT 10x2 FT 10x2 Foam FT 10x2 Foam FT 10x2 Foam FT 10x2   Foam EC HT    EC Partial Tandem 5x2 EC partial tandem 5x2 EC FT 5x2  Foam partial tandem 5x2 Foam partial tandem 5x2 Foam partial tandem 5x2   Foam EC/EO    10\" EC Partial tandem x5 10\" EC partial tandem 5x2 10\" x4 FT  Foam partial tandem 10\" x4 Foam partial tandem 10\" x5 Foam partial tandem 10\" x5   Ther Ex             Sit to stand   5 5 5 5x3 off foam with hip add 5x2 5 5 5   Hip flex/abd/ext      10ea;  partial squat 10 with even weight shift       Hamstring stretch             Nustep L1   4' 5 min 5 min  5 min 5'  6'   Squats off foam      Mini 5x2        Thoracic rotation seated; supine hooklying rotation         5x2ea                              Ther Activity                                       Gait Training                                       Manuals             STM TPR UT, Lev, SCM    VM VM   VM  SCM TPR- VM   Suboccipital release    VM    VM- paraspinal release VM- MFR intercostal release VM- MFR thoracic outlet,      "

## 2024-03-18 ENCOUNTER — APPOINTMENT (OUTPATIENT)
Facility: CLINIC | Age: 64
End: 2024-03-18
Payer: COMMERCIAL

## 2024-03-18 ENCOUNTER — APPOINTMENT (OUTPATIENT)
Dept: OCCUPATIONAL THERAPY | Facility: CLINIC | Age: 64
End: 2024-03-18
Payer: COMMERCIAL

## 2024-03-20 ENCOUNTER — APPOINTMENT (OUTPATIENT)
Facility: CLINIC | Age: 64
End: 2024-03-20
Payer: COMMERCIAL

## 2024-03-20 ENCOUNTER — OFFICE VISIT (OUTPATIENT)
Dept: OCCUPATIONAL THERAPY | Facility: CLINIC | Age: 64
End: 2024-03-20
Payer: COMMERCIAL

## 2024-03-20 DIAGNOSIS — M79.641 PAIN OF RIGHT HAND: Primary | ICD-10-CM

## 2024-03-20 DIAGNOSIS — M25.532 PAIN IN LEFT WRIST: ICD-10-CM

## 2024-03-20 PROCEDURE — 97110 THERAPEUTIC EXERCISES: CPT | Performed by: OCCUPATIONAL THERAPIST

## 2024-03-20 NOTE — PROGRESS NOTES
Daily Note     Today's date: 3/20/2024  Patient name: Linda Becerra  : 1960  MRN: [id number]  Referring provider: Bree Oneal PA-C  Dx:   Encounter Diagnosis     ICD-10-CM    1. Pain of right hand  M79.641       2. Pain in left wrist  M25.532                      Subjective: The bruning pain is less.      Objective: See treatment diary below      Assessment: Tolerated treatment well. Patient having less pain in the hand.  Her PIP joint of SF remains swollen and painful.      Plan: Continue per plan of care.      Precautions: h/o liver transplant, dizziness, peripheral neuropathy, chronic pain syndrome, LBP, h/o left LE pain w/ atrophy  NEW EPOC:  5/3/2024    Manuals 3/8 3/15 3/20           IE 30'            Graston R hand 4m 4m 4m          STM hypothenar 3m 3m 3m          Intrinsic stretch SF 3m 3m 3m          Neuro Re-Ed                          HEP- intrinsic stretch, edema compression reviewed                                                                             Ther Ex             PROM SF/wrist 2m 2m 2m          Ext stretch therabar  2x10 2x10          gripping  PPW  2x30 PPW 2x30          Therabar twists   Y 3x10                                                              Ther Activity                                       Gait Training                                       Modalities             MH R 8m 8m 8m

## 2024-03-21 ENCOUNTER — OFFICE VISIT (OUTPATIENT)
Facility: CLINIC | Age: 64
End: 2024-03-21
Payer: COMMERCIAL

## 2024-03-21 DIAGNOSIS — G61.89 OTHER INFLAMMATORY POLYNEUROPATHIES (HCC): ICD-10-CM

## 2024-03-21 DIAGNOSIS — G89.29 CHRONIC BILATERAL LOW BACK PAIN WITHOUT SCIATICA: ICD-10-CM

## 2024-03-21 DIAGNOSIS — M54.50 CHRONIC BILATERAL LOW BACK PAIN WITHOUT SCIATICA: ICD-10-CM

## 2024-03-21 DIAGNOSIS — R42 DIZZINESS: Primary | ICD-10-CM

## 2024-03-21 DIAGNOSIS — M62.562 ATROPHY OF MUSCLE OF LEFT LOWER LEG: ICD-10-CM

## 2024-03-21 DIAGNOSIS — G89.4 CHRONIC PAIN SYNDROME: ICD-10-CM

## 2024-03-21 PROCEDURE — 97112 NEUROMUSCULAR REEDUCATION: CPT

## 2024-03-21 NOTE — PROGRESS NOTES
Daily Note     Today's date: 3/21/2024  Patient name: Linda Becerra  : 1960  MRN: [id number]  Referring provider: Henna Delgado MD  Dx:   Encounter Diagnosis     ICD-10-CM    1. Dizziness  R42       2. Atrophy of muscle of left lower leg  M62.562       3. Other inflammatory polyneuropathies (HCC)  G61.89       4. Chronic pain syndrome  G89.4       5. Chronic bilateral low back pain without sciatica  M54.50     G89.29             Start Time: 1205  Stop Time: 1230  Total time in clinic (min): 25 minutes    Subjective:   Patient continues to experience oral pain due to tooth extraction.  Generally she continues to feel increased edema and generalized malaise.      Objective: See treatment diary below      Assessment:   Patient exhibits continued difficulty with standing balance and weight shift due to increased left knee discomfort, and overall restriction from increased edema.  She presently has been wearing her slippers due to inability to fit her shoes on.  Held compliant surfaces for today and decreased overall exercise to allow for improved tolerance with no increase in her symptoms.        Plan: Continue per plan of care.  Progress treatment as tolerated.    Continue to modify exercise to promote increased stability and strength.     Precautions: h/o liver transplant, dizziness, peripheral neuropathy, chronic pain syndrome, LBP, h/o left LE pain w/ atrophy  NEW EPOC:  5/3/2024    Testing 1/15 3/8 1/30 2/1 2/8 2/20 2/26 3/1 3/5 3/21   ABC 58.75 VM           HOYOS 54/56 VM           5x sit to stand 9.9sec VM           6 min walk test TBA            TUG TBA            Neuro Re-Ed             Step taps     10x3 no UE; 10x3 no UE off 3 raised step 10x3 Push off BOSU 10x2 Off foam 10x3 Off firm 10x3   Step up      Left leading one raised step 5x2       Hurdles       6 high hurdles 4 laps x2 foot over foot Sidestepping foam beam 4 laps;  cone taps 10x2 off foam beam     Foam beam             4 square    CW/CCW  "4 ea CW/CCW x6 ea        Semi squat staggered stance with increased left WB      5x2   5x2 5x2   Hidden obstacles             A/P sway   FT 10x2 FT 10x2 FT 10x2 FT 10x2 Foam FT 10x2 Foam FT 10x2 Foam FT 10x2 Firm FT 10x2   Foam EC HT   EC Partial Tandem 5x2 EC partial tandem 5x2 EC FT 5x2  Foam partial tandem 5x2 Foam partial tandem 5x2 Foam partial tandem 5x2 Firm FT 5x2   Foam EC/EO   10\" EC Partial tandem x5 10\" EC partial tandem 5x2 10\" x4 FT  Foam partial tandem 10\" x4 Foam partial tandem 10\" x5 Foam partial tandem 10\" x5 Firm FT 10\" x4   Ther Ex             Sit to stand   5 5 5x3 off foam with hip add 5x2 5 5 5    Hip flex/abd/ext     10ea;  partial squat 10 with even weight shift        Hamstring stretch             Nustep L1   5 min 5 min  5 min 5'  6' 3'   Squats off foam     Mini 5x2         Thoracic rotation seated; supine hooklying rotation        5x2ea                               Ther Activity                                       Gait Training                                       Manuals             STM TPR UT, Lev, SCM   VM VM   VM  SCM TPR- VM    Suboccipital release   VM    VM- paraspinal release VM- MFR intercostal release VM- MFR thoracic outlet,       "

## 2024-03-25 ENCOUNTER — APPOINTMENT (OUTPATIENT)
Dept: OCCUPATIONAL THERAPY | Facility: CLINIC | Age: 64
End: 2024-03-25
Payer: COMMERCIAL

## 2024-03-25 ENCOUNTER — APPOINTMENT (OUTPATIENT)
Facility: CLINIC | Age: 64
End: 2024-03-25
Payer: COMMERCIAL

## 2024-03-27 ENCOUNTER — APPOINTMENT (OUTPATIENT)
Facility: CLINIC | Age: 64
End: 2024-03-27
Payer: COMMERCIAL

## 2024-03-27 ENCOUNTER — APPOINTMENT (OUTPATIENT)
Dept: OCCUPATIONAL THERAPY | Facility: CLINIC | Age: 64
End: 2024-03-27
Payer: COMMERCIAL

## 2024-03-28 ENCOUNTER — APPOINTMENT (OUTPATIENT)
Facility: CLINIC | Age: 64
End: 2024-03-28
Payer: COMMERCIAL

## 2024-04-02 ENCOUNTER — EVALUATION (OUTPATIENT)
Facility: CLINIC | Age: 64
End: 2024-04-02
Payer: COMMERCIAL

## 2024-04-02 DIAGNOSIS — M62.562 ATROPHY OF MUSCLE OF LEFT LOWER LEG: ICD-10-CM

## 2024-04-02 DIAGNOSIS — G89.4 CHRONIC PAIN SYNDROME: ICD-10-CM

## 2024-04-02 DIAGNOSIS — R42 DIZZINESS: Primary | ICD-10-CM

## 2024-04-02 DIAGNOSIS — G89.29 CHRONIC BILATERAL LOW BACK PAIN WITHOUT SCIATICA: ICD-10-CM

## 2024-04-02 DIAGNOSIS — M54.50 CHRONIC BILATERAL LOW BACK PAIN WITHOUT SCIATICA: ICD-10-CM

## 2024-04-02 DIAGNOSIS — G61.89 OTHER INFLAMMATORY POLYNEUROPATHIES (HCC): ICD-10-CM

## 2024-04-02 PROCEDURE — 97112 NEUROMUSCULAR REEDUCATION: CPT

## 2024-04-02 PROCEDURE — 97140 MANUAL THERAPY 1/> REGIONS: CPT

## 2024-04-02 PROCEDURE — 97110 THERAPEUTIC EXERCISES: CPT

## 2024-04-02 NOTE — PROGRESS NOTES
Daily Note     Today's date: 2024  Patient name: Linda Becerra  : 1960  MRN: [id number]  Referring provider: Henna Delgado MD  Dx:   Encounter Diagnosis     ICD-10-CM    1. Dizziness  R42       2. Atrophy of muscle of left lower leg  M62.562       3. Chronic pain syndrome  G89.4       4. Other inflammatory polyneuropathies (HCC)  G61.89       5. Chronic bilateral low back pain without sciatica  M54.50     G89.29               Start Time: 1025  Stop Time: 1105  Total time in clinic (min): 40 minutes    Subjective:   Patient has not been feeling well.  Increased edema noted B/L LE.  Increased fatigue noted.        Objective: See treatment diary below      Assessment:   Due to continued edema and limited tolerance to stance, held standing exercise.  Reviewed ankle pumps and elevation to alleviate symptoms with good demonstration.  Retrograde massage with improved edema and decreased discomfort.       Plan: Continue per plan of care.  Progress treatment as tolerated.    Continue to modify exercise to promote increased stability and strength.     Precautions: h/o liver transplant, dizziness, peripheral neuropathy, chronic pain syndrome, LBP, h/o left LE pain w/ atrophy  NEW EPOC:  5/3/2024    Testing 1/15 3/8 2/1 2/8 2/20 2/26 3/1 3/5 3/21 4/2   ABC 58.75 VM           HOYOS 54/56 VM           5x sit to stand 9.9sec VM           6 min walk test TBA            TUG TBA            Neuro Re-Ed             Step taps    10x3 no UE; 10x3 no UE off 3 raised step 10x3 Push off BOSU 10x2 Off foam 10x3 Off firm 10x3    Step up     Left leading one raised step 5x2        Hurdles      6 high hurdles 4 laps x2 foot over foot Sidestepping foam beam 4 laps;  cone taps 10x2 off foam beam      Foam beam             4 square   CW/CCW 4 ea CW/CCW x6 ea         Semi squat staggered stance with increased left WB     5x2   5x2 5x2    Hidden obstacles             A/P sway   FT 10x2 FT 10x2 FT 10x2 Foam FT 10x2 Foam FT 10x2 Foam FT  "10x2 Firm FT 10x2 Firm FT 10x2   Foam EC HT   EC partial tandem 5x2 EC FT 5x2  Foam partial tandem 5x2 Foam partial tandem 5x2 Foam partial tandem 5x2 Firm FT 5x2 Reviewed standing balance for HEP   Foam EC/EO   10\" EC partial tandem 5x2 10\" x4 FT  Foam partial tandem 10\" x4 Foam partial tandem 10\" x5 Foam partial tandem 10\" x5 Firm FT 10\" x4    Ther Ex             Sit to stand   5 5x3 off foam with hip add 5x2 5 5 5     Hip flex/abd/ext    10ea;  partial squat 10 with even weight shift         Hamstring stretch             Nustep L1   5 min  5 min 5'  6' 3'    Squats off foam    Mini 5x2          Thoracic rotation seated; supine hooklying rotation       5x2ea      Ankle pumps          10x2   Positioning ed          VM   Ther Activity                                       Gait Training                                       Manuals             STM TPR UT, Lev, SCM   VM   VM  SCM TPR- VM     Suboccipital release      VM- paraspinal release VM- MFR intercostal release VM- MFR thoracic outlet,  Retrograde massage      "

## 2024-04-05 ENCOUNTER — APPOINTMENT (OUTPATIENT)
Facility: CLINIC | Age: 64
End: 2024-04-05
Payer: COMMERCIAL

## 2024-04-09 ENCOUNTER — OFFICE VISIT (OUTPATIENT)
Facility: CLINIC | Age: 64
End: 2024-04-09
Payer: COMMERCIAL

## 2024-04-09 ENCOUNTER — TELEPHONE (OUTPATIENT)
Dept: PLASTIC SURGERY | Facility: CLINIC | Age: 64
End: 2024-04-09

## 2024-04-09 DIAGNOSIS — G89.4 CHRONIC PAIN SYNDROME: ICD-10-CM

## 2024-04-09 DIAGNOSIS — G61.89 OTHER INFLAMMATORY POLYNEUROPATHIES (HCC): ICD-10-CM

## 2024-04-09 DIAGNOSIS — G89.29 CHRONIC BILATERAL LOW BACK PAIN WITHOUT SCIATICA: ICD-10-CM

## 2024-04-09 DIAGNOSIS — M54.50 CHRONIC BILATERAL LOW BACK PAIN WITHOUT SCIATICA: ICD-10-CM

## 2024-04-09 DIAGNOSIS — M62.562 ATROPHY OF MUSCLE OF LEFT LOWER LEG: ICD-10-CM

## 2024-04-09 DIAGNOSIS — R42 DIZZINESS: Primary | ICD-10-CM

## 2024-04-09 PROCEDURE — 97112 NEUROMUSCULAR REEDUCATION: CPT

## 2024-04-09 PROCEDURE — 97110 THERAPEUTIC EXERCISES: CPT

## 2024-04-09 PROCEDURE — 97140 MANUAL THERAPY 1/> REGIONS: CPT

## 2024-04-09 NOTE — PROGRESS NOTES
PT PROGRESS NOTE/ DAILY NOTE    Today's date: 2024  Patient name: Linda Becerra  : 1960  MRN: [id number]  Referring provider: Henna Delgado MD  Dx:   Encounter Diagnosis     ICD-10-CM    1. Dizziness  R42       2. Chronic pain syndrome  G89.4       3. Atrophy of muscle of left lower leg  M62.562       4. Other inflammatory polyneuropathies (HCC)  G61.89       5. Chronic bilateral low back pain without sciatica  M54.50     G89.29             Start Time: 1017  Stop Time: 1057  Total time in clinic (min): 40 minutes    Assessment  Assessment details: Patient is well known to this PT with history of left LE weakness after hematoma, general instability post liver transplant due to deconditioning and intermittent dizziness associated with head injury.  Patient states she has noted a decline in her stability and strength of late and is fearful of losing function.   Patient reports near falls occurring several times a Week Patient displays Abnormal muscle strength with overall grade of 4- to 3+/5 proximal left LE. Patient sensation is Normal throughout lower extremities. Patient coordination is Normal per alterate toe tapping and heel to shin test.   Patient balance scores are as follows: 54/56 HOYOS, TUG is TBA with minimal changes in static balance noted.  Patient endurance scores are as follows; TBA feet with  6 minute walk test without  Device, 9.9 seconds with 5 x sit to stand test with overall results noting decrease functional endurance.   Patient subjective report notes the following functional limitation with instability in gait on outside surfaces and instability on compliant surfaces. Patient will benefit from skilled PT to address noted impairments and functional limitations they are causing with overall goal to return patient to highest level possible with reduced risk for falls.       Please contact me if you have any questions or recommendations. Thank you for the referral and the opportunity to  share in [facility]'s care.    Patient verbalized understanding of POC      2/16  Patient has made nice gains with PT despite limited visits due to respiratory complications and weather.  HOYOS scoring improved scoring 56/56 and endurance scoring improved by 2 seconds on 5x sit to stand scoring 7.9 sec.  Patient continues to benefit from skilled intervention to facilitate increased strength, stability and safety.  Recommend continuation of PT per POC.  Patient in agreement with treatment plan.    3/8  Patient has noted increased fatigue and decreased exercise tolerance over last 2 weeks due to other health concerns including tooth pain and fevers.  Despite this, HOYOS scoring has been maintained at 56/56 and no decline in 5x sit to stand is noted.  Slight gains noted in 6 min walk test with 10' further obtained.  Per patient, she still feels limited on outside surfaces and desires to continue treatment to further address stability.  Request extension of POC to further address stability and endurance to promote increased ADL tolerance.  Patient in agreement with treatment plan.    4/9  Despite recent increase in edema B/L LE and increased left LE pain, patient has not declined in her mobility.  ABC scoring slightly decreased to 68.13, 5x sit to stand and 6 min walk test slightly decreased from previous, however, TUG improved.  Balance scoring remains unchanged.  Recommend continuation of PT per POC to further endurance and pacing to facilitate increased tolerance to ADL tasks.  Patient in agreement with treatment plan.            Impairments: abnormal coordination, abnormal gait, activity intolerance, impaired balance, impaired physical strength, lacks appropriate home exercise program and safety issue  Understanding of Dx/Px/POC: good   Prognosis: good    Goals  Goals  STG 30 days    Patient will improve static balance with feet together Eyes Closed Firm surface  to 60 seconds indicating reduction in fall risk-  MET  Patient will achieve 190 feet improvement with overall distance achieved of 1450 feet with 6 minute walk test which is Minimal Detectable Change pre current research standards with endurance to demonstrate enhance functional capacity - NOT MET  Patient will achieve 56/56 on HOYOS demonstrating increased overall stability- MET  TUG scoring completed with no need for further improvement due to functional speed  Patient will perform  5 x sit to stand test with overall reduction by 3 seconds to 6.9 score indicating improvement with functional endurance- mostly met  Patient will be independent in basic strengthening and balance HEP- MET    LT days   Patient will score low risk for falls with 3/4 fall risk measures - partially met  Patient will be able to ambulate 1500 feet without AD during 6 minute walk test - NOT MET  Patient will be able to perform floor transfer without physical assistance - MET with chair assist  Patient will be able to carry objects without loss of balance- partially met for light objects  Patient will be able to ambulate outdoors without any loss of balance- mostly met  Patient will independent in community based walking program to maximize mobility and endurance- not met    Cut off score   All date taken from [facility] Neuro Section or Rehab Measures    HOYOS test: 46/56                                              5 x STS Test:  MDC: 6 points                                                  MDC: 2.3 seconds   age norms                                                                 Age Norms   60-69 year old = M: 55, F: 55                        60-69 year old: 11.4 seconds   70-79 year old = M 54,  F: 53                       70-79 year old: 12.6 seconds    80-89 year old = M53,   F: 50                       80-89 year old: 14.8 seconds     TUG test:                                                                     10 Meter Walk Test:  MDC: 4.14 seconds       MDC: .59 ft/sec  Cut off score  for Falls                                                  Age Norms  > 13.5 seconds community dwelling adults                20-29; M: 4.56 ft/sec F: 4.62 ft/sec  > 32.2 Frail Elderly                                                     30-39: M 4.76 ft/sec  F: 4.68 ft/sec          40-49: M: 4.79 ft/sec  F: 4.62 ft/sec  6 Minute Walk Test      50-59: M: 4.76 ft/sec  F: 4.56 ft/sec  MDC: 190 feet       60-69: M: 4.56 ft/sec  F: 4.26 ft/sec  Age Norms       70-+    M: 4.36 ft/sec  F: 4.16 ft/sec  60-69:    M: 1876 F: 1765  70-79:    M: 1729 F: 1545  80-89 +: M: 1368 F; 1286     Plan  Patient would benefit from: skilled physical therapy  Planned modality interventions: cryotherapy and hydrotherapy  Planned therapy interventions: manual therapy, motor coordination training, neuromuscular re-education, patient education, postural training, sensory integrative techniques, strengthening, stretching, therapeutic activities, therapeutic exercise, gait training, home exercise program, coordination and balance  Frequency: 2x week  Duration in weeks: 4  Treatment plan discussed with: patient        Subjective Evaluation    History of Present Illness  Mechanism of injury:  Patient states she has not been feeling well with lots of visits for follow-up.  Patient saw neurology and endocrinology with MRI pending for lower spine.  Patient continues to use her std cane for stability in walking.    3/8  Patient indicates she has been feeling poorly lately with intermittent tightness and dyspnea, MD is aware per patient report.  Pain continues to be noted left LE particularly with distance ambulation.      Patient continues to struggle with edema in her feet, generally she is feeling more fatigued with difficulty tolerating increased ambulatory distances.    Patient Goals  Patient goals for therapy: decreased pain, improved balance and increased strength  Patient goal: to be able to walk better  Pain  Current pain ratin  At  best pain ratin  At worst pain ratin  Location: Left leg pain    Social Support  Steps to enter house: yes  4  Lives in: multiple-level home  Lives with: significant other    Employment status: not working  Treatments  Previous treatment: physical therapy        Objective     Neurological Testing     Additional Neurological Details  Slightly diminished left LE    Strength/Myotome Testing     Left Hip   Planes of Motion   Flexion: 4-  Extension: 4- and 3+  Abduction: 3+ and 4-  External rotation: 4    Right Hip   Planes of Motion   Flexion: 4+  Extension: 4+  Abduction: 4+  External rotation: 4+    Left Knee   Flexion: 4    Right Knee   Flexion: 4+  Neuro Exam:     Sensation   Light touch LE: left WNL and right WNL    Functional outcomes   Functional outcome gait comment: Wide base of support with decreased hardeep, reliance on std cane for outside surfaces, increased lateral list and decreased weight shift left    3/8  Patient is now able to negotiate over curbs and ramps with cane with greater ease and improved clearance.  Increased genu recurvatum evident with fatigue.  Increased hardeep noted.      Slight hesitation with uneven surfaces and curbs, continued genu recurvatum with weight acceptance on left.             Precautions: h/o liver transplant, dizziness, peripheral neuropathy, chronic pain syndrome, LBP, h/o left LE pain w/ atrophy  NEW EPOC:  5/3/2024      Testing 1/15 2/16 3/8 4/9         ABC 58.75 58.75 68.75 68.13         HOYOS 54/56 56/56 56/56 56/56         5x sit to stand 9.9sec 7.9sec 7.9sec 8.1         6 min walk test TBA 1260' 1270' 1230'         TUG TBA 7.9sec 7.1sec 6.3sec         Neuro Re-Ed             Step taps  10x2 10x2          Hurdles             Foam beam             4 square             Hidden obstacles  Ambulation around obstacles rapid pace 100'x2 Ambulation on outside surfaces curbs and ramps Distance ambulation around obstacles and over curbs         A/P sway  10 10 10                       Ther Ex             Sit to stand  5x2  5x2         Hip flex/abd/ext             squats  5x2  5x2                                                                          Ther Activity                                       Gait Training                                       Manuals             Thoracic MFR for relaxation and chest expansion    VM

## 2024-04-09 NOTE — TELEPHONE ENCOUNTER
Left message regarding patients appointment on 4/19/24. It was originally scheduled in [place], but the provider is actually seeing patients in [place] that day. Let the patient know that she can keep the 4/19 appointment if she wants to come to [place], or she can reschedule to another day that they will be in [place].

## 2024-04-11 ENCOUNTER — HOSPITAL ENCOUNTER (OUTPATIENT)
Dept: MRI IMAGING | Facility: HOSPITAL | Age: 64
End: 2024-04-11
Payer: COMMERCIAL

## 2024-04-11 DIAGNOSIS — M54.16 LUMBAR RADICULOPATHY: ICD-10-CM

## 2024-04-11 PROCEDURE — 72148 MRI LUMBAR SPINE W/O DYE: CPT

## 2024-04-12 ENCOUNTER — APPOINTMENT (OUTPATIENT)
Facility: CLINIC | Age: 64
End: 2024-04-12
Payer: COMMERCIAL

## 2024-04-15 ENCOUNTER — APPOINTMENT (OUTPATIENT)
Facility: CLINIC | Age: 64
End: 2024-04-15
Payer: COMMERCIAL

## 2024-04-18 ENCOUNTER — TELEPHONE (OUTPATIENT)
Facility: CLINIC | Age: 64
End: 2024-04-18

## 2024-04-18 NOTE — TELEPHONE ENCOUNTER
Patient contacted at her request.  She has been struggling with not feeling well and does not feel she is able to continue with PT at this time.  Encouraged patient to reach out if we can be of assistance and to return for treatment whenever she feels able.

## 2024-04-19 ENCOUNTER — APPOINTMENT (OUTPATIENT)
Facility: CLINIC | Age: 64
End: 2024-04-19
Payer: COMMERCIAL

## 2024-04-23 ENCOUNTER — APPOINTMENT (OUTPATIENT)
Facility: CLINIC | Age: 64
End: 2024-04-23
Payer: COMMERCIAL

## 2024-04-26 ENCOUNTER — APPOINTMENT (OUTPATIENT)
Facility: CLINIC | Age: 64
End: 2024-04-26
Payer: COMMERCIAL

## 2024-04-29 ENCOUNTER — TELEPHONE (OUTPATIENT)
Age: 64
End: 2024-04-29

## 2024-04-30 ENCOUNTER — APPOINTMENT (OUTPATIENT)
Facility: CLINIC | Age: 64
End: 2024-04-30
Payer: COMMERCIAL

## 2024-05-10 ENCOUNTER — OFFICE VISIT (OUTPATIENT)
Dept: PLASTIC SURGERY | Facility: HOSPITAL | Age: 64
End: 2024-05-10
Payer: COMMERCIAL

## 2024-05-10 DIAGNOSIS — R22.0 SUBCUTANEOUS MASS OF HEAD: Primary | ICD-10-CM

## 2024-05-10 PROCEDURE — 99203 OFFICE O/P NEW LOW 30 MIN: CPT | Performed by: PHYSICIAN ASSISTANT

## 2024-05-10 NOTE — PROGRESS NOTES
"Assessment/Plan:     Diagnoses and all orders for this visit:    Subcutaneous mass of head  Pt presents to discuss excision of multiple facial lesions, right cheek, root of nose & left lower eyelid/ cheek. We discuss excision with complex closure vs local flap advancement. We discussed risks including bleeding, scarring & infection. She would feel more comfortable have this done under general anesthesia. Consent was obtained.         Subjective:      Patient ID: Linda Becerra is a 63 y.o. female.    HPI    Pt presents to discuss multiple facial lesions. She states they have been there for >1 year. They are itchy & tender. She has seen dermatology & had 1 previously removed. They told her it was a \"cyst\". She has a PMH of cirrhosis, s/p liver transplant 2 years ago. She is on prograf. No blood thinners.     Patient Active Problem List   Diagnosis    Nondisplaced fracture of distal end of left radius    Closed compression fracture of L2 vertebra (HCC)    Left wrist tendonitis    Lumbar radiculopathy    DDD (degenerative disc disease), lumbar    Chronic bilateral low back pain without sciatica    Lumbar spondylosis    Contusion of left foot    Chronic pain syndrome    Right lower quadrant abdominal pain    Abnormal LFTs    Personal history of colonic polyps    Decompensated cirrhosis    Weakness of left lower extremity    Acute blood loss anemia    Hyponatremia    Hyperbilirubinemia    Pleural effusion    Acute kidney injury (HCC)    Peripheral neuropathy    Acute pain of right foot    GCA (giant cell arteritis) (HCC)    Closed head injury    Dizziness    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 Indications: Itching    ergocalciferol (ERGOCALCIFEROL) 1.25 MG (26671 UT) capsule Take 50,000 Units by mouth once a week. Every Monday AM  Indications: Vitamin D Deficiency    FOLIC ACID PO Take by mouth    gabapentin (NEURONTIN) 300 mg capsule Take 600 mg by mouth 4 (four) times a day    latanoprost (XALATAN) 0.005 % ophthalmic solution 1 DROP INTO BOTH EYES BEFORE BEDTIME    meclizine (ANTIVERT) 12.5 MG tablet Take 12.5 mg by mouth 3 (three) times a day as needed for dizziness    melatonin 3 mg Take 3 mg by mouth daily at bedtime as needed (sleep). Indications: Trouble Sleeping    Microlet Lancets MISC  (Patient not taking: Reported on 2/20/2024)    NIFEdipine ER (ADALAT CC) 30 MG 24 hr tablet Take 30 mg by mouth 2 (two) times a day. Indications: High Blood Pressure Disorder    pantoprazole (PROTONIX) 40 mg tablet Take 40 mg by mouth daily    predniSONE 20 mg tablet 15 mg    predniSONE 5 mg tablet Take 15 mg by mouth daily 4 tablets in AM    Premarin vaginal cream     Prevymis 480 MG TABS     Prolia 60 MG/ML USE 1 ML SUBCUTANOEUS EVERY 6 MONTHS AS DIRECTED. PLEASE DELIVER ...  (REFER TO PRESCRIPTION NOTES).    Slow-Mag 71.5-119 MG Take 2 tablets by mouth daily (Patient not taking: Reported on 2/20/2024)    tacrolimus (PROGRAF) 1 mg capsule Take 6 mg by mouth every 12 (twelve) hours 7 capsules AM  7 capsules PM    thiamine 100 MG tablet Take 100 mg by mouth daily. Indications: Deficiency of Vitamin B1    traMADol (ULTRAM) 50 mg tablet Take 1 PO BID PRN for pain for ongoing therapy    traMADol (ULTRAM-ER) 200 MG 24 hr tablet take 1 tablet by mouth once daily for pain for ongoing therapy    ursodiol (ACTIGALL) 250 mg tablet  (Patient not taking: Reported on 2/20/2024)    valGANciclovir (VALCYTE) 450 mg tablet Take 900 mg by mouth 2 (two) times a  day with meals (Patient not taking: Reported on 2024)    [DISCONTINUED] candesartan (ATACAND) 16 mg tablet Take 16 mg by mouth daily    [DISCONTINUED] naproxen sodium (ANAPROX) 550 mg tablet take 1 tablet by mouth twice a day if needed for headache up to three times a week     No current facility-administered medications on file prior to visit.     Family History   Problem Relation Age of Onset    Cancer Mother     Breast cancer Mother     Cancer Father     Colon cancer Father     Arthritis Father     Cancer Sister     Breast cancer Sister     Arthritis Brother     No Known Problems Paternal Grandmother     No Known Problems Paternal Grandfather     Autoimmune disease Daughter     No Known Problems Son     No Known Problems Son     Heart disease Half-Brother     Prostate cancer Half-Brother     Melanoma Half-Brother      Past Medical History:   Diagnosis Date    Asthma     Clotting disorder (HCC)     Concussion     Constipation     CSF leak     Fractures     GERD (gastroesophageal reflux disease)     Head injury     Hernia     High blood pressure     Hyperbilirubinemia 2022    Hypertension     Liver failure (HCC)     Liver transplant recipient (HCC)     Migraines     Peripheral neuropathy     Urinary frequency     Varicella 1968    As a child     Social History     Socioeconomic History    Marital status: Single     Spouse name: Not on file    Number of children: Not on file    Years of education: Not on file    Highest education level: Not on file   Occupational History    Not on file   Tobacco Use    Smoking status: Former     Current packs/day: 0.00     Average packs/day: 1 pack/day for 25.0 years (25.0 ttl pk-yrs)     Types: Cigarettes     Start date:      Quit date: 2019     Years since quittin.3    Smokeless tobacco: Never   Vaping Use    Vaping status: Never Used   Substance and Sexual Activity    Alcohol use: Not Currently     Comment: socially, had glass of wine today    Drug use: Not  Currently     Comment: CBD    Sexual activity: Not Currently     Partners: Male     Birth control/protection: Post-menopausal     Comment: no new partner in past year   Other Topics Concern    Not on file   Social History Narrative    Not on file     Social Determinants of Health     Financial Resource Strain: Not on file   Food Insecurity: No Food Insecurity (5/4/2022)    Hunger Vital Sign     Worried About Running Out of Food in the Last Year: Never true     Ran Out of Food in the Last Year: Never true   Transportation Needs: No Transportation Needs (5/4/2022)    PRAPARE - Transportation     Lack of Transportation (Medical): No     Lack of Transportation (Non-Medical): No   Physical Activity: Not on file   Stress: Not on file   Social Connections: Not on file   Intimate Partner Violence: Not on file   Housing Stability: Low Risk  (5/4/2022)    Housing Stability Vital Sign     Unable to Pay for Housing in the Last Year: No     Number of Places Lived in the Last Year: 1     Unstable Housing in the Last Year: No     Past Surgical History:   Procedure Laterality Date    ABLATION SOFT TISSUE      BREAST LUMPECTOMY      COLONOSCOPY  11/2/2023    IR PARACENTESIS  04/29/2022    IR PARACENTESIS  05/09/2022    IR PARACENTESIS  05/12/2022    LAPAROSCOPY FOR ECTOPIC PREGNANCY      LIVER BIOPSY  7/3/2023    Plus 3 previous in hospital    LIVER TRANSPLANTATION  5/17/22    MAMMO (HISTORICAL) Bilateral 01/14/2021    [facility] BI-RADS 2 Benign findings. after U/[place] Scattered areas fibrogladulae density; 25% to 50% glandular breast tissue    SINUS SURGERY      TONSILECTOMY AND ADNOIDECTOMY           Review of Systems   All other systems reviewed and are negative.        Objective:      There were no vitals taken for this visit.         Physical Exam  Constitutional:       Appearance: Normal appearance. She is well-developed.   HENT:      Head: Normocephalic and atraumatic.      Comments: She has 3 subcutaneous facial lesions - right cheek,  left lower eyelid/ cheek & root of nose, see pictures in media. .  Eyes:      Conjunctiva/sclera: Conjunctivae normal.   Cardiovascular:      Rate and Rhythm: Normal rate and regular rhythm.   Pulmonary:      Effort: Pulmonary effort is normal.      Breath sounds: Normal breath sounds.   Musculoskeletal:      Cervical back: Normal range of motion.      Right lower leg: Edema present.      Left lower leg: Edema present.      Comments: Walks with a cane   Skin:     General: Skin is warm and dry.   Neurological:      Mental Status: She is alert and oriented to person, place, and time.   Psychiatric:         Mood and Affect: Mood normal.         Behavior: Behavior normal.

## 2024-05-14 ENCOUNTER — OFFICE VISIT (OUTPATIENT)
Dept: PAIN MEDICINE | Facility: CLINIC | Age: 64
End: 2024-05-14
Payer: COMMERCIAL

## 2024-05-14 VITALS
BODY MASS INDEX: 30.49 KG/M2 | TEMPERATURE: 97.2 F | HEART RATE: 78 BPM | DIASTOLIC BLOOD PRESSURE: 82 MMHG | HEIGHT: 65 IN | WEIGHT: 183 LBS | SYSTOLIC BLOOD PRESSURE: 122 MMHG

## 2024-05-14 DIAGNOSIS — M51.36 DDD (DEGENERATIVE DISC DISEASE), LUMBAR: ICD-10-CM

## 2024-05-14 DIAGNOSIS — G61.89 OTHER INFLAMMATORY POLYNEUROPATHIES (HCC): ICD-10-CM

## 2024-05-14 DIAGNOSIS — M47.816 LUMBAR SPONDYLOSIS: Primary | ICD-10-CM

## 2024-05-14 DIAGNOSIS — F11.20 UNCOMPLICATED OPIOID DEPENDENCE (HCC): ICD-10-CM

## 2024-05-14 DIAGNOSIS — G89.4 CHRONIC PAIN SYNDROME: ICD-10-CM

## 2024-05-14 DIAGNOSIS — Z79.891 LONG-TERM CURRENT USE OF OPIATE ANALGESIC: ICD-10-CM

## 2024-05-14 DIAGNOSIS — M54.16 LUMBAR RADICULOPATHY: ICD-10-CM

## 2024-05-14 PROCEDURE — G2211 COMPLEX E/M VISIT ADD ON: HCPCS | Performed by: PHYSICIAN ASSISTANT

## 2024-05-14 PROCEDURE — 99214 OFFICE O/P EST MOD 30 MIN: CPT | Performed by: PHYSICIAN ASSISTANT

## 2024-05-14 RX ORDER — METOPROLOL SUCCINATE 25 MG/1
TABLET, EXTENDED RELEASE ORAL
COMMUNITY
Start: 2024-05-07

## 2024-05-14 RX ORDER — FUROSEMIDE 20 MG/1
20 TABLET ORAL DAILY
COMMUNITY
Start: 2024-03-28

## 2024-05-14 RX ORDER — TRAMADOL HYDROCHLORIDE 50 MG/1
TABLET ORAL
Qty: 45 TABLET | Refills: 2 | Status: SHIPPED | OUTPATIENT
Start: 2024-05-14

## 2024-05-14 RX ORDER — AZATHIOPRINE 50 MG/1
TABLET ORAL
COMMUNITY
Start: 2024-05-07

## 2024-05-14 RX ORDER — TRAMADOL HYDROCHLORIDE 200 MG/1
TABLET, EXTENDED RELEASE ORAL
Qty: 30 TABLET | Refills: 2 | Status: SHIPPED | OUTPATIENT
Start: 2024-05-14

## 2024-05-14 NOTE — PROGRESS NOTES
Assessment:  1. Lumbar spondylosis    2. DDD (degenerative disc disease), lumbar    3. Lumbar radiculopathy    4. Other inflammatory polyneuropathies (HCC)    5. Chronic pain syndrome    6. Uncomplicated opioid dependence (HCC)    7. Long-term current use of opiate analgesic        Plan:  While the patient was in the office today, I did have a thorough conversation regarding their chronic pain syndrome, medication management, and treatment plan options.    The patient's low back pain persists despite time, relative rest, activity modification and therapy. Based on the patient's symptoms examination, I suspect that the pain is being generated by the lumbar facet joints. The facet joints are only one of many possible low-back pain generators. Unfortunately, studies have demonstrated that history and examination alone are unreliable. We will schedule the patient for diagnostic lumbar medial branch blockade bilateral L3-5 using the double block paradigm. If the patient demonstrates appropriate response to medial branch blockade we will schedule for radiofrequency ablation to provide long-term relief. Complete risks and benefits including bleeding, infection, tissue reaction, nerve injury and allergic reaction were discussed. The approach was demonstrated using models and literature was provided. Verbal and written consent was obtained.    The patient has been experiencing moderate to severe axial spine pain that is causing functional deficit.  The pain has been present for at least 3 months and is not improving with conservative care.  Currently the patient is not experiencing any radicular features nor neurogenic claudication.  Non-facet pathology has been ruled out on clinical evaluation.    Patient remains clinically stable moderately controlled on the combination of tramadol  mg daily and as needed use of tramadol 50 mg.  On today's visit I have electronically sent 3 months of medications to her pharmacy.       [place] Prescription Drug Monitoring Program report was reviewed and was appropriate     There are risks associated with opioid medications, including dependence, addiction and tolerance. The patient understands and agrees to use these medications only as prescribed. Potential side effects of the medications include, but are not limited to, constipation, drowsiness, addiction, impaired judgment and risk of fatal overdose if not taken as prescribed. The patient was warned against driving while taking sedation medications.  Sharing medications is a felony. At this point in time, the patient is showing no signs of addiction, abuse, diversion or suicidal ideation.      The patient will follow-up in 12 weeks for medication prescription refill and reevaluation. The patient was advised to contact the office should their symptoms worsen in the interim. The patient was agreeable and verbalized an understanding.        History of Present Illness:    The patient is a 63 y.o. female last seen on 2/20/2024 who presents for a follow up office visit in regards to chronic low back pain secondary to lumbar spondylosis and bilateral lower extremity pain secondary to peripheral neuropathy.  The patient currently reports a pain level of 10 out of 10 and describes as an intermittent burning, dull, aching, sharp, throbbing and cramping type of pain.  It is made worse with prolonged standing, bending and walking at times.  She feels that her pain has worsened since her last visit with us.  She had a good response to lumbar RFA procedure done in 2020 for the same pain she is currently experiencing.    Current pain medications includes:  ?.  The patient reports that this regimen is providing 30-40% pain relief.  The patient is reporting no side effects from this pain medication regimen.    Pain Contract Signed: 11/21/2023  Last Urine Drug Screen: 2/20/2024    I have personally reviewed and/or updated the patient's past medical  history, past surgical history, family history, social history, current medications, allergies, and vital signs today.       Review of Systems:    Review of Systems   Respiratory:  Negative for shortness of breath.    Cardiovascular:  Negative for chest pain.   Gastrointestinal:  Positive for diarrhea and nausea. Negative for constipation and vomiting.   Musculoskeletal:  Positive for gait problem and joint swelling. Negative for arthralgias and myalgias.   Skin:  Negative for rash.   Neurological:  Positive for dizziness and weakness. Negative for seizures.   All other systems reviewed and are negative.        Past Medical History:   Diagnosis Date   • Asthma    • Clotting disorder (HCC)    • Concussion    • Constipation    • CSF leak    • Fractures    • GERD (gastroesophageal reflux disease)    • Head injury    • Hernia 2022   • High blood pressure    • Hyperbilirubinemia 04/28/2022   • Hypertension    • Liver failure (HCC)    • Liver transplant recipient (HCC)    • Migraines    • Peripheral neuropathy    • Urinary frequency    • Varicella 1968    As a child       Past Surgical History:   Procedure Laterality Date   • ABLATION SOFT TISSUE     • BREAST LUMPECTOMY     • COLONOSCOPY  11/2/2023   • IR PARACENTESIS  04/29/2022   • IR PARACENTESIS  05/09/2022   • IR PARACENTESIS  05/12/2022   • LAPAROSCOPY FOR ECTOPIC PREGNANCY     • LIVER BIOPSY  7/3/2023    Plus 3 previous in hospital   • LIVER TRANSPLANTATION  5/17/22   • MAMMO (HISTORICAL) Bilateral 01/14/2021    [facility] BI-RADS 2 Benign findings. after U/S Scattered areas fibrogladulae density; 25% to 50% glandular breast tissue   • SINUS SURGERY     • TONSILECTOMY AND ADNOIDECTOMY         Family History   Problem Relation Age of Onset   • Cancer Mother    • Breast cancer Mother    • Cancer Father    • Colon cancer Father    • Arthritis Father    • Cancer Sister    • Breast cancer Sister    • Arthritis Brother    • No Known Problems Paternal Grandmother    • No Known  Problems Paternal Grandfather    • Autoimmune disease Daughter    • No Known Problems Son    • No Known Problems Son    • Heart disease Half-Brother    • Prostate cancer Half-Brother    • Melanoma Half-Brother        Social History     Occupational History   • Not on file   Tobacco Use   • Smoking status: Former     Current packs/day: 0.00     Average packs/day: 1 pack/day for 25.0 years (25.0 ttl pk-yrs)     Types: Cigarettes     Start date:      Quit date: 2019     Years since quittin.3   • Smokeless tobacco: Never   Vaping Use   • Vaping status: Never Used   Substance and Sexual Activity   • Alcohol use: Not Currently     Comment: socially, had glass of wine today   • Drug use: Not Currently     Comment: CBD   • Sexual activity: Not Currently     Partners: Male     Birth control/protection: Post-menopausal     Comment: no new partner in past year         Current Outpatient Medications:   •  azaTHIOprine (IMURAN) 50 mg tablet, , Disp: , Rfl:   •  diphenhydrAMINE (BENADRYL) 25 mg capsule, Take 25 mg by mouth every 8 (eight) hours as needed for itching. Indications: Itching, Disp: , Rfl:   •  ergocalciferol (ERGOCALCIFEROL) 1.25 MG (68892 UT) capsule, Take 50,000 Units by mouth once a week. Every Monday AM  Indications: Vitamin D Deficiency, Disp: , Rfl:   •  FOLIC ACID PO, Take by mouth, Disp: , Rfl:   •  furosemide (LASIX) 20 mg tablet, Take 20 mg by mouth daily, Disp: , Rfl:   •  gabapentin (NEURONTIN) 300 mg capsule, Take 600 mg by mouth 3 (three) times a day, Disp: , Rfl:   •  latanoprost (XALATAN) 0.005 % ophthalmic solution, 1 DROP INTO BOTH EYES BEFORE BEDTIME, Disp: , Rfl:   •  melatonin 3 mg, Take 3 mg by mouth daily at bedtime as needed (sleep). Indications: Trouble Sleeping, Disp: , Rfl:   •  metoprolol succinate (TOPROL-XL) 25 mg 24 hr tablet, , Disp: , Rfl:   •  pantoprazole (PROTONIX) 40 mg tablet, Take 40 mg by mouth daily, Disp: , Rfl:   •  predniSONE 5 mg tablet, Take 15 mg by mouth daily 4  "tablets in AM, Disp: , Rfl:   •  Premarin vaginal cream, , Disp: , Rfl:   •  Prevymis 480 MG TABS, , Disp: , Rfl:   •  Prolia 60 MG/ML, USE 1 ML SUBCUTANOEUS EVERY 6 MONTHS AS DIRECTED. PLEASE DELIVER ...  (REFER TO PRESCRIPTION NOTES)., Disp: , Rfl:   •  tacrolimus (PROGRAF) 1 mg capsule, Take 4 mg by mouth every 12 (twelve) hours 7 capsules AM 7 capsules PM, Disp: , Rfl:   •  thiamine 100 MG tablet, Take 100 mg by mouth daily. Indications: Deficiency of Vitamin B1, Disp: , Rfl:   •  traMADol (ULTRAM) 50 mg tablet, Take 1 PO BID PRN for pain for ongoing therapy, Disp: 45 tablet, Rfl: 2  •  traMADol (ULTRAM-ER) 200 MG 24 hr tablet, take 1 tablet by mouth once daily for pain for ongoing therapy DO NOT FILL BEFORE: 05/26/24, Disp: 30 tablet, Rfl: 2  •  meclizine (ANTIVERT) 12.5 MG tablet, Take 12.5 mg by mouth 3 (three) times a day as needed for dizziness (Patient not taking: Reported on 5/14/2024), Disp: , Rfl:   •  Microlet Lancets MISC, , Disp: , Rfl:   •  predniSONE 20 mg tablet, 15 mg (Patient not taking: Reported on 5/14/2024), Disp: , Rfl:     Allergies   Allergen Reactions   • Other Other (See Comments)     Seasonal allergies         Physical Exam:    /82 (BP Location: Left arm, Patient Position: Sitting, Cuff Size: Standard)   Pulse 78   Temp (!) 97.2 °F (36.2 °C)   Ht 5' 5\" (1.651 m)   Wt 83 kg (183 lb)   BMI 30.45 kg/m²     Constitutional:normal, well developed, well nourished, alert, in no distress and non-toxic and no overt pain behavior.  Eyes:anicteric  HEENT:grossly intact  Neck:supple, symmetric, trachea midline and no masses   Pulmonary:even and unlabored  Cardiovascular:No edema or pitting edema present  Skin:Normal without rashes or lesions and well hydrated  Psychiatric:Mood and affect appropriate  Neurologic:Cranial Nerves II-XII grossly intact  Musculoskeletal: Tender bilateral lumbar facet joints with positive facet loading test.  Gait is slow but stable, slightly stooped " posture      Imaging  FL spine and pain procedure    (Results Pending)         Orders Placed This Encounter   Procedures   • FL spine and pain procedure

## 2024-05-20 ENCOUNTER — ANNUAL EXAM (OUTPATIENT)
Dept: OBGYN CLINIC | Facility: CLINIC | Age: 64
End: 2024-05-20
Payer: COMMERCIAL

## 2024-05-20 VITALS
WEIGHT: 183.8 LBS | SYSTOLIC BLOOD PRESSURE: 134 MMHG | DIASTOLIC BLOOD PRESSURE: 86 MMHG | HEIGHT: 64 IN | BODY MASS INDEX: 31.38 KG/M2

## 2024-05-20 DIAGNOSIS — Z12.31 ENCOUNTER FOR SCREENING MAMMOGRAM FOR MALIGNANT NEOPLASM OF BREAST: ICD-10-CM

## 2024-05-20 DIAGNOSIS — N95.2 ATROPHIC VAGINITIS: ICD-10-CM

## 2024-05-20 DIAGNOSIS — Z01.419 ROUTINE GYNECOLOGICAL EXAMINATION: Primary | ICD-10-CM

## 2024-05-20 PROCEDURE — G0101 CA SCREEN;PELVIC/BREAST EXAM: HCPCS | Performed by: OBSTETRICS & GYNECOLOGY

## 2024-05-20 RX ORDER — CONJUGATED ESTROGENS 0.62 MG/G
1 CREAM VAGINAL 2 TIMES WEEKLY
Qty: 30 G | Refills: 3 | Status: SHIPPED | OUTPATIENT
Start: 2024-05-20

## 2024-05-20 NOTE — PROGRESS NOTES
[facility] [place]  [address] [id number]    ASSESSMENT/PLAN: Linda Becerra is a 63 y.o.  who presents for annual gynecologic exam.    Encounter for routine gynecologic examination  - Routine well woman exam completed today.  - Cervical Cancer Screening: Current ASCCP Guidelines reviewed. Last Pap: 2023 . Next Pap Due:   - HPV Vaccination status: Not immunized  - Breast Cancer Screening: Last Mammogram 05/15/2023, ordered  - Colorectal cancer screening was not ordered.  - The following were reviewed in today's visit: breast self exam and mammography screening ordered    Additional problems addressed during this visit:  1. Routine gynecological examination  2. Encounter for screening mammogram for malignant neoplasm of breast  -     Mammo screening bilateral w 3d & cad; Future  3. Atrophic vaginitis  -     Premarin vaginal cream; Insert 1 g into the vagina 2 (two) times a week      CC:  Annual Gynecologic Examination    HPI: Linda Becerra is a 63 y.o.  who presents for annual gynecologic examination.  HPI    The following portions of the patient's history were reviewed and updated as appropriate: She  has a past medical history of Asthma, Clotting disorder (HCC), Concussion, Constipation, CSF leak, Fractures, GERD (gastroesophageal reflux disease), Head injury, Hernia (), High blood pressure, Hyperbilirubinemia (2022), Hypertension, Liver failure (HCC), Liver transplant recipient (HCC), Migraines, Peripheral neuropathy, Urinary frequency, and Varicella ().  She  has a past surgical history that includes TONSILECTOMY AND ADNOIDECTOMY; Laparoscopy for ectopic pregnancy; Breast lumpectomy; Sinus surgery; ABLATION SOFT TISSUE; IR paracentesis (2022); IR paracentesis (2022); IR paracentesis (2022); Mammo (historical) (Bilateral, 2021); Liver transplantation (22); Colonoscopy (2023); and Liver biopsy (7/3/2023).  Her family  history includes Arthritis in her brother and father; Autoimmune disease in her daughter; Breast cancer in her mother and sister; Cancer in her father, mother, and sister; Colon cancer in her father; Heart disease in her half-brother; Melanoma in her half-brother; No Known Problems in her paternal grandfather, paternal grandmother, son, and son; Prostate cancer in her half-brother.  She  reports that she quit smoking about 5 years ago. Her smoking use included cigarettes. She started smoking about 30 years ago. She has a 25 pack-year smoking history. She has never used smokeless tobacco. She reports that she does not currently use alcohol. She reports that she does not currently use drugs.  Current Outpatient Medications   Medication Sig Dispense Refill    azaTHIOprine (IMURAN) 50 mg tablet       diphenhydrAMINE (BENADRYL) 25 mg capsule Take 25 mg by mouth every 8 (eight) hours as needed for itching. Indications: Itching      ergocalciferol (ERGOCALCIFEROL) 1.25 MG (59344 UT) capsule Take 50,000 Units by mouth once a week. Every Monday AM  Indications: Vitamin D Deficiency      FOLIC ACID PO Take by mouth      furosemide (LASIX) 20 mg tablet Take 20 mg by mouth daily      gabapentin (NEURONTIN) 300 mg capsule Take 600 mg by mouth 3 (three) times a day      latanoprost (XALATAN) 0.005 % ophthalmic solution 1 DROP INTO BOTH EYES BEFORE BEDTIME      meclizine (ANTIVERT) 12.5 MG tablet Take 12.5 mg by mouth 3 (three) times a day as needed for dizziness      melatonin 3 mg Take 3 mg by mouth daily at bedtime as needed (sleep). Indications: Trouble Sleeping      metoprolol succinate (TOPROL-XL) 25 mg 24 hr tablet       pantoprazole (PROTONIX) 40 mg tablet Take 40 mg by mouth daily      predniSONE 5 mg tablet Take 15 mg by mouth daily 4 tablets in AM      Premarin vaginal cream Insert 1 g into the vagina 2 (two) times a week 30 g 3    Prevymis 480 MG TABS       Prolia 60 MG/ML USE 1 ML SUBCUTANOEUS EVERY 6 MONTHS AS  "DIRECTED. PLEASE DELIVER ...  (REFER TO PRESCRIPTION NOTES).      tacrolimus (PROGRAF) 1 mg capsule Take 4 mg by mouth every 12 (twelve) hours 7 capsules AM  7 capsules PM      thiamine 100 MG tablet Take 100 mg by mouth daily. Indications: Deficiency of Vitamin B1      traMADol (ULTRAM) 50 mg tablet Take 1 PO BID PRN for pain for ongoing therapy 45 tablet 2    traMADol (ULTRAM-ER) 200 MG 24 hr tablet take 1 tablet by mouth once daily for pain for ongoing therapy DO NOT FILL BEFORE: 05/26/24 30 tablet 2    Microlet Lancets MISC  (Patient not taking: Reported on 2/20/2024)       No current facility-administered medications for this visit.     She is allergic to other..    Review of Systems      Objective:  /86 (BP Location: Left arm, Patient Position: Sitting, Cuff Size: Standard)   Ht 5' 4\" (1.626 m)   Wt 83.4 kg (183 lb 12.8 oz)   BMI 31.55 kg/m²    Physical Exam      PE:  General Appearance: alert and oriented, in no acute distress.   HEENT: PERRL, thyroid without masses or tenderness  Breast: No masses, tenderness, skin changes, nipple D/C or axillary or supraclavicular adenopathy  Abdomen: Soft, non-tender, non-distended, no masses, no rebound or guarding.  Pelvic:       External genitalia: Normal appearance, no abnormal pigmentation, no lesions or masses. Normal Bartholin's and Parks's. Several small scattered sebaceous cysts      Urinary system: Urethral meatus normal, bladder non-tender.      Vaginal: normal mucosa without prolapse or lesions. Normal-appearing physiologic discharge      Cervix: Normal-appearing, well-epithelialized, no gross lesions or masses No cervical motion tenderness.      Adnexa: No adnexal masses or tenderness noted.      Uterus: Normal-sized, regular contour, midline, mobile, no uterine tenderness.  Extremities: Normal range of motion.   Skin: normal, no rash or abnormalities  Neurologic: alert, oriented x3  Psychiatric: Appropriate affect, mood stable, cooperative with " exam.

## 2024-05-31 ENCOUNTER — TELEPHONE (OUTPATIENT)
Dept: PLASTIC SURGERY | Facility: CLINIC | Age: 64
End: 2024-05-31

## 2024-06-10 ENCOUNTER — HOSPITAL ENCOUNTER (OUTPATIENT)
Dept: RADIOLOGY | Facility: CLINIC | Age: 64
Discharge: HOME | End: 2024-06-10
Payer: COMMERCIAL

## 2024-06-10 ENCOUNTER — TELEPHONE (OUTPATIENT)
Age: 64
End: 2024-06-10

## 2024-06-10 VITALS
TEMPERATURE: 97.3 F | DIASTOLIC BLOOD PRESSURE: 75 MMHG | HEART RATE: 73 BPM | OXYGEN SATURATION: 94 % | SYSTOLIC BLOOD PRESSURE: 150 MMHG | RESPIRATION RATE: 16 BRPM

## 2024-06-10 DIAGNOSIS — M47.816 LUMBAR SPONDYLOSIS: ICD-10-CM

## 2024-06-10 PROCEDURE — 64494 INJ PARAVERT F JNT L/S 2 LEV: CPT | Performed by: ANESTHESIOLOGY

## 2024-06-10 PROCEDURE — 64493 INJ PARAVERT F JNT L/S 1 LEV: CPT | Performed by: ANESTHESIOLOGY

## 2024-06-10 RX ORDER — BUPIVACAINE HCL/PF 2.5 MG/ML
6 VIAL (ML) INJECTION ONCE
Status: COMPLETED | OUTPATIENT
Start: 2024-06-10 | End: 2024-06-10

## 2024-06-10 RX ADMIN — BUPIVACAINE HYDROCHLORIDE 6 ML: 2.5 INJECTION, SOLUTION EPIDURAL; INFILTRATION; INTRACAUDAL at 13:49

## 2024-06-10 NOTE — H&P
History of Present Illness: The patient is a 63 y.o. female who presents with complaints of low back pain.    Past Medical History:   Diagnosis Date    Asthma     Clotting disorder (HCC)     Concussion     Constipation     CSF leak     Fractures     GERD (gastroesophageal reflux disease)     Head injury     Hernia 2022    High blood pressure     Hyperbilirubinemia 04/28/2022    Hypertension     Liver failure (HCC)     Liver transplant recipient (HCC)     Migraines     Peripheral neuropathy     Urinary frequency     Varicella 1968    As a child       Past Surgical History:   Procedure Laterality Date    ABLATION SOFT TISSUE      BREAST LUMPECTOMY      COLONOSCOPY  11/2/2023    IR PARACENTESIS  04/29/2022    IR PARACENTESIS  05/09/2022    IR PARACENTESIS  05/12/2022    LAPAROSCOPY FOR ECTOPIC PREGNANCY      LIVER BIOPSY  7/3/2023    Plus 3 previous in hospital    LIVER TRANSPLANTATION  5/17/22    MAMMO (HISTORICAL) Bilateral 01/14/2021    [facility] BI-RADS 2 Benign findings. after U/S Scattered areas fibrogladulae density; 25% to 50% glandular breast tissue    SINUS SURGERY      TONSILECTOMY AND ADNOIDECTOMY           Current Outpatient Medications:     azaTHIOprine (IMURAN) 50 mg tablet, , Disp: , Rfl:     diphenhydrAMINE (BENADRYL) 25 mg capsule, Take 25 mg by mouth every 8 (eight) hours as needed for itching. Indications: Itching, Disp: , Rfl:     ergocalciferol (ERGOCALCIFEROL) 1.25 MG (75468 UT) capsule, Take 50,000 Units by mouth once a week. Every Monday AM  Indications: Vitamin D Deficiency, Disp: , Rfl:     FOLIC ACID PO, Take by mouth, Disp: , Rfl:     furosemide (LASIX) 20 mg tablet, Take 20 mg by mouth daily, Disp: , Rfl:     gabapentin (NEURONTIN) 300 mg capsule, Take 600 mg by mouth 3 (three) times a day, Disp: , Rfl:     latanoprost (XALATAN) 0.005 % ophthalmic solution, 1 DROP INTO BOTH EYES BEFORE BEDTIME, Disp: , Rfl:     meclizine (ANTIVERT) 12.5 MG tablet, Take 12.5 mg by mouth 3 (three) times a day as  needed for dizziness, Disp: , Rfl:     melatonin 3 mg, Take 3 mg by mouth daily at bedtime as needed (sleep). Indications: Trouble Sleeping, Disp: , Rfl:     metoprolol succinate (TOPROL-XL) 25 mg 24 hr tablet, , Disp: , Rfl:     Microlet Lancets MISC, , Disp: , Rfl:     pantoprazole (PROTONIX) 40 mg tablet, Take 40 mg by mouth daily, Disp: , Rfl:     predniSONE 5 mg tablet, Take 15 mg by mouth daily 4 tablets in AM, Disp: , Rfl:     Premarin vaginal cream, Insert 1 g into the vagina 2 (two) times a week, Disp: 30 g, Rfl: 3    Prevymis 480 MG TABS, , Disp: , Rfl:     Prolia 60 MG/ML, USE 1 ML SUBCUTANOEUS EVERY 6 MONTHS AS DIRECTED. PLEASE DELIVER ...  (REFER TO PRESCRIPTION NOTES)., Disp: , Rfl:     tacrolimus (PROGRAF) 1 mg capsule, Take 4 mg by mouth every 12 (twelve) hours 7 capsules AM 7 capsules PM, Disp: , Rfl:     thiamine 100 MG tablet, Take 100 mg by mouth daily. Indications: Deficiency of Vitamin B1, Disp: , Rfl:     traMADol (ULTRAM) 50 mg tablet, Take 1 PO BID PRN for pain for ongoing therapy, Disp: 45 tablet, Rfl: 2    traMADol (ULTRAM-ER) 200 MG 24 hr tablet, take 1 tablet by mouth once daily for pain for ongoing therapy DO NOT FILL BEFORE: 05/26/24, Disp: 30 tablet, Rfl: 2    Current Facility-Administered Medications:     bupivacaine (PF) (MARCAINE) 0.25 % injection 6 mL, 6 mL, Perineural, Once, Eliseo Jimenez, DO    Allergies   Allergen Reactions    Other Other (See Comments)     Seasonal allergies         Physical Exam:   General: Awake, Alert, Oriented x 3, Mood and affect appropriate  Respiratory: Respirations even and unlabored  Cardiovascular: Peripheral pulses intact; no edema  Musculoskeletal Exam: Pain with lumbar extension    ASA Score: III         Assessment:   1. Lumbar spondylosis        Plan: B/L L3-5 MBB #1

## 2024-06-10 NOTE — TELEPHONE ENCOUNTER
Caller: tapan Mukherjee    Doctor: Tony    Reason for call: pt stated she misplaced the pain diary paperwork that needs to be filled out if possible, please email and pt will  print it out and complete it     [email or web address]    Call back#: [phone]

## 2024-06-10 NOTE — DISCHARGE INSTRUCTIONS

## 2024-06-13 ENCOUNTER — TELEPHONE (OUTPATIENT)
Dept: PAIN MEDICINE | Facility: CLINIC | Age: 64
End: 2024-06-13

## 2024-06-13 DIAGNOSIS — M47.816 LUMBAR SPONDYLOSIS: Primary | ICD-10-CM

## 2024-06-21 ENCOUNTER — PREP FOR PROCEDURE (OUTPATIENT)
Dept: PLASTIC SURGERY | Facility: CLINIC | Age: 64
End: 2024-06-21

## 2024-06-21 ENCOUNTER — OFFICE VISIT (OUTPATIENT)
Dept: GASTROENTEROLOGY | Facility: CLINIC | Age: 64
End: 2024-06-21
Payer: COMMERCIAL

## 2024-06-21 VITALS
HEIGHT: 64 IN | SYSTOLIC BLOOD PRESSURE: 130 MMHG | BODY MASS INDEX: 31.07 KG/M2 | WEIGHT: 182 LBS | DIASTOLIC BLOOD PRESSURE: 60 MMHG

## 2024-06-21 DIAGNOSIS — R22.9 SUBCUTANEOUS MASS: Primary | ICD-10-CM

## 2024-06-21 DIAGNOSIS — Z94.4 LIVER TRANSPLANT STATUS (HCC): ICD-10-CM

## 2024-06-21 DIAGNOSIS — Z86.010 PERSONAL HISTORY OF COLONIC POLYPS: ICD-10-CM

## 2024-06-21 DIAGNOSIS — K21.9 GASTROESOPHAGEAL REFLUX DISEASE WITHOUT ESOPHAGITIS: ICD-10-CM

## 2024-06-21 DIAGNOSIS — K59.1 FUNCTIONAL DIARRHEA: Primary | ICD-10-CM

## 2024-06-21 DIAGNOSIS — B25.9 CYTOMEGALOVIRUS INFECTION, UNSPECIFIED CYTOMEGALOVIRAL INFECTION TYPE (HCC): ICD-10-CM

## 2024-06-21 PROCEDURE — 99214 OFFICE O/P EST MOD 30 MIN: CPT | Performed by: INTERNAL MEDICINE

## 2024-06-21 RX ORDER — LOPERAMIDE HYDROCHLORIDE 2 MG/1
2 TABLET ORAL 4 TIMES DAILY PRN
Start: 2024-06-21

## 2024-06-21 RX ORDER — PANTOPRAZOLE SODIUM 40 MG/1
40 TABLET, DELAYED RELEASE ORAL DAILY
Qty: 90 TABLET | Refills: 3 | Status: SHIPPED | OUTPATIENT
Start: 2024-06-21

## 2024-06-21 NOTE — PROGRESS NOTES
[facility] - Outpatient Follow-up Note  Linda Becerra 63 y.o. female MRN: [id number]  Encounter: [id number]    ASSESSMENT AND PLAN:      1. Functional diarrhea  63F here today for f/u. Diarrhea comes and goes, recently stopped myfortic and valcyte, started on imuran and prevymis after recently hospitalization. Still on prednisone and tacrolimus.     - OK to imodium prn  - Check stool cultures if diarrhea returns    - loperamide (IMODIUM A-D) 2 MG tablet; Take 1 tablet (2 mg total) by mouth 4 (four) times a day as needed for diarrhea  - Clostridium difficile toxin by PCR with EIA; Future  - Stool Enteric Bacterial Panel by PCR; Future  - Ova and parasite examination; Future  - CMV culture; Future    2. Liver transplant status (HCC)  S/p OLTx 5/2022 @ [facility]. Currently followed by Dr Eller @ [facility].     3. Cytomegalovirus infection, unspecified cytomegaloviral infection type (HCC)  Intermittent infections - previously on valcyte and now on prevymis. Transplant team advised ID follow up.     - Ambulatory Referral to Infectious Disease; Future    4. Personal history of colonic polyps  UTD, recall 11/2028    5. Gastroesophageal reflux disease without esophagitis  Well controlled.     - pantoprazole (PROTONIX) 40 mg tablet; Take 1 tablet (40 mg total) by mouth daily  Dispense: 90 tablet; Refill: 3      Followup Appointment: 6 months  ______________________________________________________________________    Chief Complaint   Patient presents with   • Follow up diarrhea and CMV     HPI:  63F here today for f/u. Diarrhea is better but not always gone. Switched off myfortic to imuran. Recently hospitalized for vol overload. Now on lasix and improved. Down 5lbs this week alone. Trying to exercise.     Bowel movements are 3-6 times a day. Sometimes more loose and afraid of incontinence. Did have one episode of incontinence recently. Takes imodium prn. No gib. No change in BMs since  myfortic stopped.     Historical Information   Past Medical History:   Diagnosis Date   • Asthma    • Clotting disorder (HCC)    • Concussion    • Constipation    • CSF leak    • Fractures    • GERD (gastroesophageal reflux disease)    • Head injury    • Hernia    • High blood pressure    • Hyperbilirubinemia 2022   • Hypertension    • Liver failure (HCC)    • Liver transplant recipient (HCC)    • Migraines    • Peripheral neuropathy    • Urinary frequency    • Varicella 1968    As a child     Past Surgical History:   Procedure Laterality Date   • ABLATION SOFT TISSUE     • BREAST LUMPECTOMY     • COLONOSCOPY  2023   • IR PARACENTESIS  2022   • IR PARACENTESIS  2022   • IR PARACENTESIS  2022   • LAPAROSCOPY FOR ECTOPIC PREGNANCY     • LIVER BIOPSY  7/3/2023    Plus 3 previous in hospital   • LIVER TRANSPLANTATION  22   • MAMMO (HISTORICAL) Bilateral 2021    [facility] BI-RADS 2 Benign findings. after U/S Scattered areas fibrogladulae density; 25% to 50% glandular breast tissue   • SINUS SURGERY     • TONSILECTOMY AND ADNOIDECTOMY       Social History     Substance and Sexual Activity   Alcohol Use Not Currently    Comment: socially, had glass of wine today     Social History     Substance and Sexual Activity   Drug Use Not Currently    Comment: CBD     Social History     Tobacco Use   Smoking Status Former   • Current packs/day: 0.00   • Average packs/day: 1 pack/day for 25.0 years (25.0 ttl pk-yrs)   • Types: Cigarettes   • Start date:    • Quit date:    • Years since quittin.4   Smokeless Tobacco Never     Family History   Problem Relation Age of Onset   • Cancer Mother    • Breast cancer Mother    • Cancer Father    • Colon cancer Father    • Arthritis Father    • Cancer Sister    • Breast cancer Sister    • Arthritis Brother    • No Known Problems Paternal Grandmother    • No Known Problems Paternal Grandfather    • Autoimmune disease Daughter    • No Known  "Problems Son    • No Known Problems Son    • Heart disease Half-Brother    • Prostate cancer Half-Brother    • Melanoma Half-Brother          Current Outpatient Medications:   •  azaTHIOprine (IMURAN) 50 mg tablet  •  diphenhydrAMINE (BENADRYL) 25 mg capsule  •  ergocalciferol (ERGOCALCIFEROL) 1.25 MG (28872 UT) capsule  •  FOLIC ACID PO  •  gabapentin (NEURONTIN) 300 mg capsule  •  loperamide (IMODIUM A-D) 2 MG tablet  •  meclizine (ANTIVERT) 12.5 MG tablet  •  melatonin 3 mg  •  metoprolol succinate (TOPROL-XL) 25 mg 24 hr tablet  •  pantoprazole (PROTONIX) 40 mg tablet  •  predniSONE 5 mg tablet  •  Premarin vaginal cream  •  Prevymis 480 MG TABS  •  Prolia 60 MG/ML  •  tacrolimus (PROGRAF) 1 mg capsule  •  thiamine 100 MG tablet  •  traMADol (ULTRAM) 50 mg tablet  •  traMADol (ULTRAM-ER) 200 MG 24 hr tablet  Allergies   Allergen Reactions   • Other Other (See Comments)     Seasonal allergies       Reviewed medications and allergies and updated as indicated    PHYSICAL EXAM:    Blood pressure 130/60, height 5' 4\" (1.626 m), weight 82.6 kg (182 lb), not currently breastfeeding. Body mass index is 31.24 kg/m².  General Appearance: NAD, cooperative, alert  Eyes: Anicteric, conjunctiva pink  ENT:  Normocephalic, atraumatic, normal mucosa.    Neck:  Supple, symmetrical, trachea midline  Resp:  Clear to auscultation bilaterally; no rales, rhonchi or wheezing; respirations unlabored   CV:  S1 S2, Regular rate and rhythm; no murmur, rub, or gallop.  GI:  Soft, non-tender, non-distended; normal bowel sounds; no masses, no organomegaly   Rectal: Deferred  Musculoskeletal: No cyanosis, clubbing or edema. Normal ROM.  Skin:  No jaundice, rashes, or lesions   Heme/Lymph: No palpable cervical lymphadenopathy  Psych: Normal affect, good eye contact  Neuro: No gross deficits, AAOx3    Lab Results:   Lab Results   Component Value Date    WBC 2.18 (L) 11/28/2023    HGB 12.5 11/28/2023    HCT 38.6 11/28/2023    MCV 98 11/28/2023    "  11/28/2023     Lab Results   Component Value Date     05/08/2015    K 4.3 11/28/2023     11/28/2023    CO2 29 11/28/2023    ANIONGAP 10 05/08/2015    BUN 18 11/28/2023    CREATININE 0.98 11/28/2023    GLUCOSE 94 05/08/2015    GLUF 98 11/28/2023    CALCIUM 9.1 11/28/2023    CORRECTEDCA 9.3 05/12/2022    AST 70 (H) 11/28/2023    ALT 53 (H) 11/28/2023    ALKPHOS 47 11/28/2023    PROT 6.9 05/08/2015    BILITOT 0.2 05/08/2015    EGFR 62 11/28/2023       Radiology Results:   FL spine and pain procedure    Result Date: 6/10/2024  Narrative: Place of Service: Procedure Room. Diagnosis: Lumbar spondylosis, low back pain. Procedure: Diagnostic Lumbar Medial Branch Block of bilateral L3, L4, and L5 dorsal ramus under fluoroscopic guidance. Medical Necessity: Intractable, refractory, lumbar pain. Indication for Fluoroscopy: This procedure requires accurate needle placement that can not be performed percutaneously without fluoroscopic guidance. Procedure Note: After fully informed written consent was obtained and procedure risks reviewed with the patient, the patient was brought into the procedure room and placed prone on the fluoroscopy table. After Chloraprep was performed, the area surrounded by sterile drapes. Fluoroscopy unit was positioned over the patient and images of the spine (AP and oblique views) obtained. The entry sites for the above noted levels median branch were determined. At each level a 25 gauge 3 ½ inch spinal needle was placed at the level of the medial branch to the facet under fluoroscopic guidance. A dose of Bupivacaine 0.25% was administered at each level. The needles at each level were withdrawn following administration of the medication. There was no significant bleeding at the site. The needle was then withdrawn, the back cleansed, and band-aids placed. Complications: None. Disposition: Vital signs stable. Motor function was intact. The discharge instruction sheet was given to the  patient. The patient was discharged with instructions to call immediately if there are any complications. Patient was given a pain diary to determine if pain is facet in origin. Once diary is returned we will consider next appropriate course of treatment. Estimated blood loss: Minimal No specimens were taken       Answers submitted by the patient for this visit:  Abdominal Pain Questionnaire (Submitted on 6/20/2024)  Chief Complaint: Abdominal pain  Chronicity: chronic  Onset: more than 1 month ago  Onset quality: gradual  Frequency: daily  Episode duration: 2 Hours  Progression since onset: unchanged  Pain location: RUQ, suprapubic region  Pain - numeric: 6/10  Pain quality: a sensation of fullness, sharp  Radiates to: RUQ, epigastric region  anorexia: Yes  arthralgias: Yes  diarrhea: Yes  flatus: Yes  frequency: Yes  headaches: Yes  myalgias: Yes  nausea: Yes  Aggravated by: certain positions, movement  Relieved by: bowel movements, certain positions, standing  Diagnostic workup: lower endoscopy, ultrasound

## 2024-06-26 ENCOUNTER — TELEPHONE (OUTPATIENT)
Age: 64
End: 2024-06-26

## 2024-07-02 LAB
Lab: NORMAL
O+P STL CONC: NORMAL

## 2024-07-09 ENCOUNTER — TELEPHONE (OUTPATIENT)
Age: 64
End: 2024-07-09

## 2024-07-09 DIAGNOSIS — A04.0 ENTERITIS, ENTEROPATHOGENIC E. COLI: Primary | ICD-10-CM

## 2024-07-09 LAB
ADV 40+41 DNA STL QL NAA+NON-PROBE: NOT DETECTED
ASTRO TYP 1-8 RNA STL QL NAA+NON-PROBE: NOT DETECTED
C CAYETANENSIS DNA STL QL NAA+NON-PROBE: NOT DETECTED
C COLI+JEJ+UPSA DNA STL QL NAA+NON-PROBE: NOT DETECTED
C DIF TOX TCDA+TCDB STL QL NAA+NON-PROBE: NOT DETECTED
C DIFF TOX GENS STL QL NAA+PROBE: NEGATIVE
CMV SPEC QL CULT: NORMAL
CRYPTOSP DNA STL QL NAA+NON-PROBE: NOT DETECTED
E COLI O157 DNA STL QL NAA+NON-PROBE: ABNORMAL
E HISTOLYT DNA STL QL NAA+NON-PROBE: NOT DETECTED
EAEC PAA PLAS AGGR+AATA ST NAA+NON-PRB: NOT DETECTED
EC STX1+STX2 GENES STL QL NAA+NON-PROBE: NOT DETECTED
EPEC EAE GENE STL QL NAA+NON-PROBE: DETECTED
ETEC LTA+ST1A+ST1B TOX ST NAA+NON-PROBE: NOT DETECTED
G LAMBLIA DNA STL QL NAA+NON-PROBE: NOT DETECTED
NOROVIRUS GI+II RNA STL QL NAA+NON-PROBE: NOT DETECTED
P SHIGELLOIDES DNA STL QL NAA+NON-PROBE: NOT DETECTED
RVA RNA STL QL NAA+NON-PROBE: NOT DETECTED
S ENT+BONG DNA STL QL NAA+NON-PROBE: NOT DETECTED
SAPO I+II+IV+V RNA STL QL NAA+NON-PROBE: NOT DETECTED
SHIGELLA SP+EIEC IPAH ST NAA+NON-PROBE: NOT DETECTED
V CHOL+PARA+VUL DNA STL QL NAA+NON-PROBE: NOT DETECTED
V CHOLERAE DNA STL QL NAA+NON-PROBE: NOT DETECTED
Y ENTEROCOL DNA STL QL NAA+NON-PROBE: NOT DETECTED

## 2024-07-10 LAB
BASOPHILS # BLD AUTO: 0.1 X10E3/UL (ref 0–0.2)
BASOPHILS NFR BLD AUTO: 2 %
BUN SERPL-MCNC: 15 MG/DL (ref 8–27)
BUN/CREAT SERPL: 13 (ref 12–28)
CALCIUM SERPL-MCNC: 9.1 MG/DL (ref 8.7–10.3)
CHLORIDE SERPL-SCNC: 104 MMOL/L (ref 96–106)
CO2 SERPL-SCNC: 24 MMOL/L (ref 20–29)
CREAT SERPL-MCNC: 1.19 MG/DL (ref 0.57–1)
EGFR: 52 ML/MIN/1.73
EOSINOPHIL # BLD AUTO: 0.2 X10E3/UL (ref 0–0.4)
EOSINOPHIL NFR BLD AUTO: 7 %
ERYTHROCYTE [DISTWIDTH] IN BLOOD BY AUTOMATED COUNT: 14.6 % (ref 11.7–15.4)
GLUCOSE SERPL-MCNC: 102 MG/DL (ref 70–99)
HCT VFR BLD AUTO: 31.7 % (ref 34–46.6)
HGB BLD-MCNC: 10.5 G/DL (ref 11.1–15.9)
IMM GRANULOCYTES # BLD: 0 X10E3/UL (ref 0–0.1)
IMM GRANULOCYTES NFR BLD: 0 %
LYMPHOCYTES # BLD AUTO: 0.7 X10E3/UL (ref 0.7–3.1)
LYMPHOCYTES NFR BLD AUTO: 20 %
MCH RBC QN AUTO: 31.8 PG (ref 26.6–33)
MCHC RBC AUTO-ENTMCNC: 33.1 G/DL (ref 31.5–35.7)
MCV RBC AUTO: 96 FL (ref 79–97)
MONOCYTES # BLD AUTO: 0.4 X10E3/UL (ref 0.1–0.9)
MONOCYTES NFR BLD AUTO: 13 %
NEUTROPHILS # BLD AUTO: 2 X10E3/UL (ref 1.4–7)
NEUTROPHILS NFR BLD AUTO: 58 %
PLATELET # BLD AUTO: 169 X10E3/UL (ref 150–450)
POTASSIUM SERPL-SCNC: 4.8 MMOL/L (ref 3.5–5.2)
RBC # BLD AUTO: 3.3 X10E6/UL (ref 3.77–5.28)
SODIUM SERPL-SCNC: 143 MMOL/L (ref 134–144)
WBC # BLD AUTO: 3.4 X10E3/UL (ref 3.4–10.8)

## 2024-07-10 RX ORDER — AZITHROMYCIN 500 MG/1
500 TABLET, FILM COATED ORAL DAILY
Qty: 5 TABLET | Refills: 0 | Status: SHIPPED | OUTPATIENT
Start: 2024-07-10 | End: 2024-07-15

## 2024-07-11 NOTE — TELEPHONE ENCOUNTER
Called patient, no answer, left message to call back regarding message from Dr. Waterman.  
I spoke with patient, received results, she is not on any other antibiotic for UTI and she is hoping the azithromycin will also help with that since she states she is experiencing burning. Patient has been off antiviral Prevymis (for CNV) for eight days but it has been refilled and she will be starting that.  
Patient called in to see if the results have been received she is having UTI symptoms she wants to make the office aware of this in case a Rx is called in   
Please let the patient know that the stool cultures came back positive for ecoli infection. Normally we would just recommend plenty of hydration and supportive care, but given she is immunosuppresed, I sent a 5 days worth of azithromycin to her pharmacy.     If she is on antibiotics for her UTI as well, please ask her to let us know so we make sure there are no drug drug interactions. Thank you.  
Pt called to see if antibiotics where called into her [facility]. I told the pt  that azithromycin was called in for her  
Pt calling asking for office to go over lab result from June 27/2024.    Pt reports having low grad feverover weekend with abd pain  
temporal

## 2024-07-15 ENCOUNTER — HOSPITAL ENCOUNTER (OUTPATIENT)
Dept: RADIOLOGY | Facility: CLINIC | Age: 64
Discharge: HOME | End: 2024-07-15
Payer: COMMERCIAL

## 2024-07-15 VITALS
SYSTOLIC BLOOD PRESSURE: 137 MMHG | HEART RATE: 66 BPM | RESPIRATION RATE: 16 BRPM | OXYGEN SATURATION: 93 % | TEMPERATURE: 97.3 F | DIASTOLIC BLOOD PRESSURE: 78 MMHG

## 2024-07-15 DIAGNOSIS — M47.816 LUMBAR SPONDYLOSIS: ICD-10-CM

## 2024-07-15 PROCEDURE — 64493 INJ PARAVERT F JNT L/S 1 LEV: CPT | Performed by: ANESTHESIOLOGY

## 2024-07-15 PROCEDURE — 64494 INJ PARAVERT F JNT L/S 2 LEV: CPT | Performed by: ANESTHESIOLOGY

## 2024-07-15 RX ORDER — BUPIVACAINE HYDROCHLORIDE 7.5 MG/ML
6 INJECTION, SOLUTION EPIDURAL; RETROBULBAR ONCE
Status: COMPLETED | OUTPATIENT
Start: 2024-07-15 | End: 2024-07-15

## 2024-07-15 RX ADMIN — BUPIVACAINE HYDROCHLORIDE 6 ML: 7.5 INJECTION, SOLUTION EPIDURAL; RETROBULBAR at 09:00

## 2024-07-15 NOTE — H&P
History of Present Illness: The patient is a 63 y.o. female who presents with complaints of low back pain.    Past Medical History:   Diagnosis Date    Asthma     Clotting disorder (HCC)     Concussion     Constipation     CSF leak     Fractures     GERD (gastroesophageal reflux disease)     Head injury     Hernia 2022    High blood pressure     Hyperbilirubinemia 04/28/2022    Hypertension     Liver failure (HCC)     Liver transplant recipient (HCC)     Migraines     Peripheral neuropathy     Urinary frequency     Varicella 1968    As a child       Past Surgical History:   Procedure Laterality Date    ABLATION SOFT TISSUE      BREAST LUMPECTOMY      COLONOSCOPY  11/2/2023    IR PARACENTESIS  04/29/2022    IR PARACENTESIS  05/09/2022    IR PARACENTESIS  05/12/2022    LAPAROSCOPY FOR ECTOPIC PREGNANCY      LIVER BIOPSY  7/3/2023    Plus 3 previous in hospital    LIVER TRANSPLANTATION  5/17/22    MAMMO (HISTORICAL) Bilateral 01/14/2021    [facility] BI-RADS 2 Benign findings. after U/S Scattered areas fibrogladulae density; 25% to 50% glandular breast tissue    MRI GUIDED BREAST WIRE LOCALIZATION LEFT Left 2/23/2015    MRI GUIDED BREAST WIRE LOCALIZATION LEFT Left 2/23/2015    SINUS SURGERY      TONSILECTOMY AND ADNOIDECTOMY           Current Outpatient Medications:     azaTHIOprine (IMURAN) 50 mg tablet, 100 mg, Disp: , Rfl:     azithromycin (ZITHROMAX) 500 MG tablet, Take 1 tablet (500 mg total) by mouth daily for 5 days, Disp: 5 tablet, Rfl: 0    diphenhydrAMINE (BENADRYL) 25 mg capsule, Take 25 mg by mouth every 8 (eight) hours as needed for itching. Indications: Itching, Disp: , Rfl:     ergocalciferol (ERGOCALCIFEROL) 1.25 MG (91651 UT) capsule, Take 50,000 Units by mouth once a week. Every Monday AM  Indications: Vitamin D Deficiency, Disp: , Rfl:     FOLIC ACID PO, Take by mouth, Disp: , Rfl:     gabapentin (NEURONTIN) 300 mg capsule, Take 600 mg by mouth 3 (three) times a day, Disp: , Rfl:     loperamide (IMODIUM  A-D) 2 MG tablet, Take 1 tablet (2 mg total) by mouth 4 (four) times a day as needed for diarrhea, Disp: , Rfl:     meclizine (ANTIVERT) 12.5 MG tablet, Take 12.5 mg by mouth 3 (three) times a day as needed for dizziness, Disp: , Rfl:     melatonin 3 mg, Take 3 mg by mouth daily at bedtime as needed (sleep). Indications: Trouble Sleeping, Disp: , Rfl:     metoprolol succinate (TOPROL-XL) 25 mg 24 hr tablet, , Disp: , Rfl:     pantoprazole (PROTONIX) 40 mg tablet, Take 1 tablet (40 mg total) by mouth daily, Disp: 90 tablet, Rfl: 3    predniSONE 5 mg tablet, Take 15 mg by mouth daily 4 tablets in AM, Disp: , Rfl:     Premarin vaginal cream, Insert 1 g into the vagina 2 (two) times a week, Disp: 30 g, Rfl: 3    Prevymis 480 MG TABS, , Disp: , Rfl:     Prolia 60 MG/ML, USE 1 ML SUBCUTANOEUS EVERY 6 MONTHS AS DIRECTED. PLEASE DELIVER ...  (REFER TO PRESCRIPTION NOTES)., Disp: , Rfl:     tacrolimus (PROGRAF) 1 mg capsule, Take 3 mg by mouth every 12 (twelve) hours 7 capsules AM 7 capsules PM, Disp: , Rfl:     thiamine 100 MG tablet, Take 100 mg by mouth daily. Indications: Deficiency of Vitamin B1, Disp: , Rfl:     traMADol (ULTRAM) 50 mg tablet, Take 1 PO BID PRN for pain for ongoing therapy, Disp: 45 tablet, Rfl: 2    traMADol (ULTRAM-ER) 200 MG 24 hr tablet, take 1 tablet by mouth once daily for pain for ongoing therapy DO NOT FILL BEFORE: 05/26/24, Disp: 30 tablet, Rfl: 2    Current Facility-Administered Medications:     bupivacaine (PF) (MARCAINE) 0.75 % injection 6 mL, 6 mL, Other, Once, Eliseo Jimenez, DO    Allergies   Allergen Reactions    Other Other (See Comments)     Seasonal allergies         Physical Exam:   General: Awake, Alert, Oriented x 3, Mood and affect appropriate  Respiratory: Respirations even and unlabored  Cardiovascular: Peripheral pulses intact; no edema  Musculoskeletal Exam: Pain with lumbar extension    ASA Score: II         Assessment:   1. Lumbar spondylosis        Plan: bilateral L3-L5  MBB#2

## 2024-07-15 NOTE — DISCHARGE INSTRUCTIONS

## 2024-07-19 ENCOUNTER — OFFICE VISIT (OUTPATIENT)
Dept: LAB | Facility: HOSPITAL | Age: 64
End: 2024-07-19
Payer: COMMERCIAL

## 2024-07-19 DIAGNOSIS — R22.9 SUBCUTANEOUS MASS: ICD-10-CM

## 2024-07-19 PROCEDURE — 93005 ELECTROCARDIOGRAM TRACING: CPT

## 2024-07-22 ENCOUNTER — ANESTHESIA EVENT (OUTPATIENT)
Dept: PERIOP | Facility: HOSPITAL | Age: 64
End: 2024-07-22
Payer: COMMERCIAL

## 2024-07-22 NOTE — PRE-PROCEDURE INSTRUCTIONS
Pre-Surgery Instructions:   Medication Instructions    diphenhydrAMINE (BENADRYL) 25 mg capsule Uses PRN- OK to take day of surgery    ergocalciferol (ERGOCALCIFEROL) 1.25 MG (96687 UT) capsule Stop taking 7 days prior to surgery.    FOLIC ACID PO Stop taking 7 days prior to surgery.    gabapentin (NEURONTIN) 300 mg capsule Take day of surgery.    loperamide (IMODIUM A-D) 2 MG tablet Uses PRN- OK to take day of surgery    meclizine (ANTIVERT) 12.5 MG tablet Uses PRN- OK to take day of surgery    metoprolol succinate (TOPROL-XL) 25 mg 24 hr tablet Take day of surgery.    pantoprazole (PROTONIX) 40 mg tablet Take day of surgery.    predniSONE 5 mg tablet Take day of surgery.    Premarin vaginal cream Stop taking 7 days prior to surgery.    Prolia 60 MG/ML Take day of surgery.    tacrolimus (PROGRAF) 1 mg capsule Take day of surgery.    thiamine 100 MG tablet Stop taking 7 days prior to surgery.    traMADol (ULTRAM-ER) 200 MG 24 hr tablet Take night before surgery    Medication instructions for day surgery reviewed. Please use only a sip of water to take your instructed medications. Avoid all over the counter vitamins, supplements and NSAIDS for one week prior to surgery per anesthesia guidelines. Tylenol is ok to take as needed.     You will receive a call one business day prior to surgery with an arrival time and hospital directions. If your surgery is scheduled on a Monday, the hospital will be calling you on the Friday prior to your surgery. If you have not heard from anyone by 8pm, please call the hospital supervisor through the hospital  at [phone]. ([place] [phone] or [place] [phone]).    Do not eat or drink anything after midnight the night before your surgery, including candy, mints, lifesavers, or chewing gum. Do not drink alcohol 24hrs before your surgery. Try not to smoke at least 24hrs before your surgery.       Follow the pre surgery showering instructions as listed in the “My  Surgical Experience Booklet” or otherwise provided by your surgeon's office. Do not use a blade to shave the surgical area 1 week before surgery. It is okay to use a clean electric clippers up to 24 hours before surgery. Do not apply any lotions, creams, including makeup, cologne, deodorant, or perfumes after showering on the day of your surgery. Do not use dry shampoo, hair spray, hair gel, or any type of hair products.     No contact lenses, eye make-up, or artificial eyelashes. Remove nail polish, including gel polish, and any artificial, gel, or acrylic nails if possible. Remove all jewelry including rings and body piercing jewelry.     Wear causal clothing that is easy to take on and off. Consider your type of surgery.    Keep any valuables, jewelry, piercings at home. Please bring any specially ordered equipment (sling, braces) if indicated.    Arrange for a responsible person to drive you to and from the hospital on the day of your surgery. Please confirm the visitor policy for the day of your procedure when you receive your phone call with an arrival time.     Call the surgeon's office with any new illnesses, exposures, or additional questions prior to surgery.    Please reference your “My Surgical Experience Booklet” for additional information to prepare for your upcoming surgery.

## 2024-07-26 PROCEDURE — NC001 PR NO CHARGE: Performed by: PHYSICIAN ASSISTANT

## 2024-07-26 NOTE — H&P
"Assessment/Plan:     Diagnoses and all orders for this visit:    Subcutaneous mass of head  Pt presents to discuss excision of multiple facial lesions, right cheek, root of nose & left lower eyelid/ cheek. We discuss excision with complex closure vs local flap advancement. We discussed risks including bleeding, scarring & infection. She would feel more comfortable have this done under general anesthesia. Consent was obtained.         Subjective:      Patient ID: Linda Becerra is a 63 y.o. female.    HPI    Pt presents to discuss multiple facial lesions. She states they have been there for >1 year. They are itchy & tender. She has seen dermatology & had 1 previously removed. They told her it was a \"cyst\". She has a PMH of cirrhosis, s/p liver transplant 2 years ago. She is on prograf. No blood thinners.     Patient Active Problem List   Diagnosis    Nondisplaced fracture of distal end of left radius    Closed compression fracture of L2 vertebra (HCC)    Left wrist tendonitis    Lumbar radiculopathy    DDD (degenerative disc disease), lumbar    Chronic bilateral low back pain without sciatica    Lumbar spondylosis    Contusion of left foot    Chronic pain syndrome    Right lower quadrant abdominal pain    Abnormal LFTs    Personal history of colonic polyps    Decompensated cirrhosis    Weakness of left lower extremity    Acute blood loss anemia    Hyponatremia    Hyperbilirubinemia    Pleural effusion    Acute kidney injury (HCC)    Peripheral neuropathy    Acute pain of right foot    GCA (giant cell arteritis) (HCC)    Closed head injury    Dizziness    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     Current Outpatient Medications on File Prior to Encounter   Medication Sig    diphenhydrAMINE (BENADRYL) 25 mg capsule Take 25 mg by mouth every 8 (eight) hours as needed for itching. Indications: Itching    ergocalciferol (ERGOCALCIFEROL) 1.25 MG (48176 UT) capsule Take 50,000 Units by mouth once a week. Every Monday AM  Indications: Vitamin D Deficiency    FOLIC ACID PO Take by mouth    gabapentin (NEURONTIN) 300 mg capsule Take 600 mg by mouth 3 (three) times a day    loperamide (IMODIUM A-D) 2 MG tablet Take 1 tablet (2 mg total) by mouth 4 (four) times a day as needed for diarrhea    meclizine (ANTIVERT) 12.5 MG tablet Take 12.5 mg by mouth 3 (three) times a day as needed for dizziness    metoprolol succinate (TOPROL-XL) 25 mg 24 hr tablet     pantoprazole (PROTONIX) 40 mg tablet Take 1 tablet (40 mg total) by mouth daily    predniSONE 5 mg tablet Take 2.5 mg by mouth daily 4 tablets in AM    Premarin vaginal cream Insert 1 g into the vagina 2 (two) times a week    Prolia 60 MG/ML USE 1 ML SUBCUTANOEUS EVERY 6 MONTHS AS DIRECTED. PLEASE DELIVER ...  (REFER TO PRESCRIPTION NOTES).    tacrolimus (PROGRAF) 1 mg capsule Take 3 mg by mouth every 12 (twelve) hours 3 capsules AM  3 capsules PM    thiamine 100 MG tablet Take 100 mg by mouth daily. Indications: Deficiency of Vitamin B1    traMADol (ULTRAM-ER) 200 MG 24 hr tablet take 1 tablet by mouth once daily for pain for ongoing therapy DO NOT FILL BEFORE: 05/26/24    azaTHIOprine (IMURAN) 50 mg tablet Take 100 mg by mouth daily at bedtime    melatonin 3 mg Take 3 mg by mouth daily at bedtime as needed (sleep). Indications: Trouble Sleeping    Prevymis 480 MG TABS Take 480 mg by mouth daily    traMADol (ULTRAM) 50 mg tablet Take 1 PO BID PRN for pain for ongoing therapy    [DISCONTINUED] candesartan (ATACAND) 16 mg tablet Take 16 mg by mouth daily    [DISCONTINUED] naproxen sodium (ANAPROX) 550 mg tablet take 1 tablet by mouth twice a day if needed  for headache up to three times a week     Family History   Problem Relation Age of Onset    Cancer Mother     Breast cancer Mother     Cancer Father     Colon cancer Father     Arthritis Father     Cancer Sister     Breast cancer Sister     Arthritis Brother     No Known Problems Paternal Grandmother     No Known Problems Paternal Grandfather     Autoimmune disease Daughter     No Known Problems Son     No Known Problems Son     Heart disease Half-Brother     Prostate cancer Half-Brother     Melanoma Half-Brother      Past Medical History:   Diagnosis Date    Anemia     Anxiety     Asthma     Chronic pain disorder     Clotting disorder (HCC)     Concussion     Constipation     CSF leak     Fractures     GERD (gastroesophageal reflux disease)     Head injury     Hernia     High blood pressure     History of transfusion     Hyperbilirubinemia 2022    Hypertension     Liver failure (HCC)     Liver transplant recipient (HCC)     Migraines     Peripheral neuropathy     Urinary frequency     Varicella 1968    As a child     Social History     Socioeconomic History    Marital status: Single     Spouse name: None    Number of children: None    Years of education: None    Highest education level: None   Occupational History    None   Tobacco Use    Smoking status: Former     Current packs/day: 0.00     Average packs/day: 1 pack/day for 25.0 years (25.0 ttl pk-yrs)     Types: Cigarettes     Start date:      Quit date: 2019     Years since quittin.5    Smokeless tobacco: Never   Vaping Use    Vaping status: Never Used   Substance and Sexual Activity    Alcohol use: Not Currently     Comment: socially, had glass of wine today    Drug use: Not Currently     Types: Marijuana     Comment: CBD  medical marjuana    Sexual activity: Not Currently     Partners: Male     Birth control/protection: Post-menopausal     Comment: no new partner in past year   Other Topics Concern    None   Social History Narrative    None  "    Social Determinants of Health     Financial Resource Strain: Not on file   Food Insecurity: No Food Insecurity (5/4/2022)    Hunger Vital Sign     Worried About Running Out of Food in the Last Year: Never true     Ran Out of Food in the Last Year: Never true   Transportation Needs: No Transportation Needs (5/4/2022)    PRAPARE - Transportation     Lack of Transportation (Medical): No     Lack of Transportation (Non-Medical): No   Physical Activity: Not on file   Stress: Not on file   Social Connections: Not on file   Intimate Partner Violence: Not on file   Housing Stability: Low Risk  (5/4/2022)    Housing Stability Vital Sign     Unable to Pay for Housing in the Last Year: No     Number of Places Lived in the Last Year: 1     Unstable Housing in the Last Year: No     Past Surgical History:   Procedure Laterality Date    ABLATION SOFT TISSUE      BREAST LUMPECTOMY      COLONOSCOPY  11/2/2023    IR PARACENTESIS  04/29/2022    IR PARACENTESIS  05/09/2022    IR PARACENTESIS  05/12/2022    LAPAROSCOPY FOR ECTOPIC PREGNANCY      LIVER BIOPSY  7/3/2023    Plus 3 previous in hospital    LIVER TRANSPLANTATION  5/17/22    MAMMO (HISTORICAL) Bilateral 01/14/2021    [facility] BI-RADS 2 Benign findings. after U/S Scattered areas fibrogladulae density; 25% to 50% glandular breast tissue    MRI GUIDED BREAST WIRE LOCALIZATION LEFT Left 02/23/2015    MRI GUIDED BREAST WIRE LOCALIZATION LEFT Left 02/23/2015    SINUS SURGERY      SINUS SURGERY  2000    removed cartlidge in inside of nose    TONSILECTOMY AND ADNOIDECTOMY           Review of Systems   All other systems reviewed and are negative.        Objective:      Ht 5' 4\" (1.626 m)   Wt 78.5 kg (173 lb)   BMI 29.70 kg/m²          Physical Exam  Constitutional:       Appearance: Normal appearance. She is well-developed.   HENT:      Head: Normocephalic and atraumatic.      Comments: She has 3 subcutaneous facial lesions - right cheek, left lower eyelid/ cheek & root of nose, see " pictures in media. .  Eyes:      Conjunctiva/sclera: Conjunctivae normal.   Cardiovascular:      Rate and Rhythm: Normal rate and regular rhythm.   Pulmonary:      Effort: Pulmonary effort is normal.      Breath sounds: Normal breath sounds.   Musculoskeletal:      Cervical back: Normal range of motion.      Right lower leg: Edema present.      Left lower leg: Edema present.      Comments: Walks with a cane   Skin:     General: Skin is warm and dry.   Neurological:      Mental Status: She is alert and oriented to person, place, and time.   Psychiatric:         Mood and Affect: Mood normal.         Behavior: Behavior normal.

## 2024-07-28 LAB
ATRIAL RATE: 61 BPM
P AXIS: 53 DEGREES
PR INTERVAL: 154 MS
QRS AXIS: 13 DEGREES
QRSD INTERVAL: 88 MS
QT INTERVAL: 442 MS
QTC INTERVAL: 444 MS
T WAVE AXIS: 56 DEGREES
VENTRICULAR RATE: 61 BPM

## 2024-07-28 PROCEDURE — 93010 ELECTROCARDIOGRAM REPORT: CPT | Performed by: INTERNAL MEDICINE

## 2024-07-31 NOTE — ANESTHESIA PREPROCEDURE EVALUATION
Procedure:  EXCISION OF SUBCUTANEOUS MASSES OF FACE X3- RIGHT CHEEK, ROOT OF NOSE, LEFT LOWER EYELID/CHEEK, COMPLEX CLOSURE VS LOCAL FLAP ADVANACEMENT (Face)    Relevant Problems   CARDIO   (+) High blood pressure      GI/HEPATIC   (+) Decompensated cirrhosis      /RENAL   (+) Acute kidney injury (HCC)      HEMATOLOGY   (+) Acute blood loss anemia      MUSCULOSKELETAL   (+) Atrophy of muscle of left lower leg   (+) Chronic bilateral low back pain without sciatica   (+) DDD (degenerative disc disease), lumbar   (+) Lumbar spondylosis      NEURO/PSYCH   (+) Chronic bilateral low back pain without sciatica   (+) Chronic pain syndrome   (+) Weakness of left lower extremity      PULMONARY   (+) Asthma   (+) Pleural effusion      Surgery/Wound/Pain   (+) Liver transplant recipient (HCC)        Physical Exam    Airway  Comment: Small mouth  Mallampati score: III  TM Distance: <3 FB  Neck ROM: full     Dental   No notable dental hx     Cardiovascular      Pulmonary      Other Findings  post-pubertal.      Anesthesia Plan  ASA Score- 3     Anesthesia Type- general with ASA Monitors.         Additional Monitors:     Airway Plan: ETT.           Plan Factors-    Chart reviewed. EKG reviewed.  Existing labs reviewed. Patient summary reviewed.    Patient is not a current smoker.              Induction- intravenous.    Postoperative Plan- Plan for postoperative opioid use.         Informed Consent- Anesthetic plan and risks discussed with patient.  I personally reviewed this patient with the CRNA. Discussed and agreed on the Anesthesia Plan with the CRNA..

## 2024-08-01 ENCOUNTER — ANESTHESIA (OUTPATIENT)
Dept: PERIOP | Facility: HOSPITAL | Age: 64
End: 2024-08-01
Payer: COMMERCIAL

## 2024-08-01 ENCOUNTER — HOSPITAL ENCOUNTER (OUTPATIENT)
Facility: HOSPITAL | Age: 64
Setting detail: OUTPATIENT SURGERY
Discharge: HOME | End: 2024-08-01
Attending: SURGERY | Admitting: SURGERY
Payer: COMMERCIAL

## 2024-08-01 VITALS
DIASTOLIC BLOOD PRESSURE: 61 MMHG | HEIGHT: 65 IN | SYSTOLIC BLOOD PRESSURE: 135 MMHG | RESPIRATION RATE: 12 BRPM | TEMPERATURE: 98 F | OXYGEN SATURATION: 96 % | WEIGHT: 175.6 LBS | HEART RATE: 80 BPM | BODY MASS INDEX: 29.26 KG/M2

## 2024-08-01 DIAGNOSIS — R22.9 SUBCUTANEOUS MASS: ICD-10-CM

## 2024-08-01 PROCEDURE — 12052 INTMD RPR FACE/MM 2.6-5.0 CM: CPT | Performed by: SURGERY

## 2024-08-01 PROCEDURE — 11442 EXC FACE-MM B9+MARG 1.1-2 CM: CPT | Performed by: SURGERY

## 2024-08-01 PROCEDURE — 11441 EXC FACE-MM B9+MARG 0.6-1 CM: CPT | Performed by: SURGERY

## 2024-08-01 PROCEDURE — 88304 TISSUE EXAM BY PATHOLOGIST: CPT | Performed by: PATHOLOGY

## 2024-08-01 RX ORDER — FENTANYL CITRATE 50 UG/ML
INJECTION, SOLUTION INTRAMUSCULAR; INTRAVENOUS AS NEEDED
Status: DISCONTINUED | OUTPATIENT
Start: 2024-08-01 | End: 2024-08-01

## 2024-08-01 RX ORDER — ONDANSETRON 2 MG/ML
INJECTION INTRAMUSCULAR; INTRAVENOUS AS NEEDED
Status: DISCONTINUED | OUTPATIENT
Start: 2024-08-01 | End: 2024-08-01

## 2024-08-01 RX ORDER — MAGNESIUM HYDROXIDE 1200 MG/15ML
LIQUID ORAL AS NEEDED
Status: DISCONTINUED | OUTPATIENT
Start: 2024-08-01 | End: 2024-08-01 | Stop reason: HOSPADM

## 2024-08-01 RX ORDER — LIDOCAINE HYDROCHLORIDE 10 MG/ML
INJECTION, SOLUTION EPIDURAL; INFILTRATION; INTRACAUDAL; PERINEURAL AS NEEDED
Status: DISCONTINUED | OUTPATIENT
Start: 2024-08-01 | End: 2024-08-01

## 2024-08-01 RX ORDER — SODIUM CHLORIDE, SODIUM LACTATE, POTASSIUM CHLORIDE, CALCIUM CHLORIDE 600; 310; 30; 20 MG/100ML; MG/100ML; MG/100ML; MG/100ML
INJECTION, SOLUTION INTRAVENOUS CONTINUOUS PRN
Status: DISCONTINUED | OUTPATIENT
Start: 2024-08-01 | End: 2024-08-01

## 2024-08-01 RX ORDER — PROPOFOL 10 MG/ML
INJECTION, EMULSION INTRAVENOUS AS NEEDED
Status: DISCONTINUED | OUTPATIENT
Start: 2024-08-01 | End: 2024-08-01

## 2024-08-01 RX ORDER — MIDAZOLAM HYDROCHLORIDE 2 MG/2ML
INJECTION, SOLUTION INTRAMUSCULAR; INTRAVENOUS AS NEEDED
Status: DISCONTINUED | OUTPATIENT
Start: 2024-08-01 | End: 2024-08-01

## 2024-08-01 RX ORDER — LATANOPROST 50 UG/ML
SOLUTION/ DROPS OPHTHALMIC
COMMUNITY
Start: 2024-06-26

## 2024-08-01 RX ORDER — ROCURONIUM BROMIDE 10 MG/ML
INJECTION, SOLUTION INTRAVENOUS AS NEEDED
Status: DISCONTINUED | OUTPATIENT
Start: 2024-08-01 | End: 2024-08-01

## 2024-08-01 RX ORDER — CEFAZOLIN SODIUM 2 G/50ML
2000 SOLUTION INTRAVENOUS ONCE
Status: DISCONTINUED | OUTPATIENT
Start: 2024-08-01 | End: 2024-08-01 | Stop reason: HOSPADM

## 2024-08-01 RX ADMIN — SODIUM CHLORIDE, SODIUM LACTATE, POTASSIUM CHLORIDE, AND CALCIUM CHLORIDE: .6; .31; .03; .02 INJECTION, SOLUTION INTRAVENOUS at 10:59

## 2024-08-01 RX ADMIN — ONDANSETRON 4 MG: 2 INJECTION INTRAMUSCULAR; INTRAVENOUS at 10:59

## 2024-08-01 RX ADMIN — LIDOCAINE HYDROCHLORIDE 50 MG: 10 INJECTION, SOLUTION EPIDURAL; INFILTRATION; INTRACAUDAL; PERINEURAL at 11:05

## 2024-08-01 RX ADMIN — FENTANYL CITRATE 50 MCG: 50 INJECTION, SOLUTION INTRAMUSCULAR; INTRAVENOUS at 11:05

## 2024-08-01 RX ADMIN — MIDAZOLAM 2 MG: 1 INJECTION INTRAMUSCULAR; INTRAVENOUS at 10:59

## 2024-08-01 RX ADMIN — SUGAMMADEX 400 MG: 100 INJECTION, SOLUTION INTRAVENOUS at 11:50

## 2024-08-01 RX ADMIN — PROPOFOL 150 MG: 10 INJECTION, EMULSION INTRAVENOUS at 11:05

## 2024-08-01 RX ADMIN — FENTANYL CITRATE 50 MCG: 50 INJECTION, SOLUTION INTRAMUSCULAR; INTRAVENOUS at 11:54

## 2024-08-01 RX ADMIN — ROCURONIUM BROMIDE 50 MG: 10 INJECTION, SOLUTION INTRAVENOUS at 11:05

## 2024-08-01 NOTE — DISCHARGE INSTR - AVS FIRST PAGE
[facility]  Dr. SAGAR Mendez Jr.  [address] [place] [address]  Phone: [phone]     Postoperative Instructions for Outpatient Surgery     These instructions are being provided by your doctor to give you basic guidelines during your post-op recovery. Please let our office know if your contact information has changed.      Please call the office today for an appointment in 5 days for postoperative care     Dressings: Leave steri-strip in place     Activity Restrictions: None     Bathing: You can shower in 36hrs     Medications:    Resume pre-op medications.   You may take tylenol, aleve, or ibuprofen for pain control

## 2024-08-01 NOTE — OP NOTE
OPERATIVE REPORT  PATIENT NAME: Linda Becerra    :  1960  MRN: [id number]  Pt Location: [facility] OR ROOM 03    SURGERY DATE: 2024    Surgeons and Role:     * Fuentes Mendez MD - Primary     * Marcella Babb PA-C - Assisting     * Josse Rutledge DPM - Assisting    Preop Diagnosis:  Subcutaneous mass x 3 [R22.9] nasal radix/root, right cheek, left cheek    Post-Op Diagnosis Codes:     * Subcutaneous mass [R22.9] as per preop diagnosis    Procedure(s):  #1 excision subcutaneous mass/cyst of right cheek 1.  6 cm #2 intermediate, layered closure right cheek 1.6 cm #3 excision subcutaneous mass/cyst of left cheek 1.2 cm #4 intermediate/layered closure right cheek 1.4 cm #5 excision subcutaneous mass nasal radix/root of nose 1 cm, #6 intermediate/layered closure nasal radix/root of nose 1 cm    Specimen(s):  ID Type Source Tests Collected by Time Destination   1 : subcutaneous mass of right cheek Tissue Mass TISSUE EXAM Fuentes Mendez MD 2024 1123    2 : subcutaneous mass of left cheek Tissue Mass TISSUE EXAM Fuentes Mendez MD 2024 1138    3 : subcutaneous mass of root of nose Tissue Mass TISSUE EXAM Fuentes Mendez MD 2024 1124        Estimated Blood Loss:   Minimal    Drains:  * No LDAs found *    Anesthesia Type:   General    Operative Indications:  Subcutaneous mass [R22.9]  X 3 right cheek, left cheek, nasal radix/root    Operative Findings:  As above      Complications:   None    Procedure and Technique:  Lonny  was seen preoperatively in the holding area, the surgical sites were marked with her participation, and I reviewed with her the planned procedure, potential risks, complications and limitations.  She was taken to the operating room and the surgical field was prepped and draped in sterile fashion.  A proper timeout was performed.  2.5 loupe magnification was used to aid in visualization.  At each of the 3 sites local anesthesia was administered.  After allowing adequate  time for the epinephrine to become effective a 15 blade was used to create skin incisions these were placed at the areas of maximum prominence at each of the respective sites.  The skin incisions were then carried down to the dermis, dissection then proceeded into the subcutaneous tissue and circumferentially around the subcutaneous mass/cyst at each site utilizing a combination of dissection with 15 blade and curved iris scissors.  At each site this was taken deep in the plane of subcutaneous fat to extract the cysts.  Once extracted each of the wound was irrigated hemostasis assured with a fine tip Bovie cautery.  Closure was then accomplished utilizing 6-0 Monocryl sutures buried at the level of the deep dermis and this was followed by interrupted 6-0 nylon skin sutures.  Benzoin and Steri-Strip were applied at each site and the patient was transferred to the recovery room.   I was present for the entire procedure.    Patient Disposition:  PACU            SIGNATURE: Fuentes Mendez MD  DATE: August 1, 2024  TIME: 1:53 PM

## 2024-08-01 NOTE — ANESTHESIA POSTPROCEDURE EVALUATION
Post-Op Assessment Note    CV Status:  Stable  Pain Score: 0    Pain management: adequate       Mental Status:  Alert and sleepy   Hydration Status:  Euvolemic   PONV Controlled:  Controlled   Airway Patency:  Patent     Post Op Vitals Reviewed: Yes    No anethesia notable event occurred.    Staff: CRNA               BP   130/58   Temp   97.3   Pulse  78   Resp   18   SpO2   95

## 2024-08-05 ENCOUNTER — TELEPHONE (OUTPATIENT)
Age: 64
End: 2024-08-05

## 2024-08-05 PROCEDURE — 88304 TISSUE EXAM BY PATHOLOGIST: CPT | Performed by: PATHOLOGY

## 2024-08-05 NOTE — TELEPHONE ENCOUNTER
Received call from patient to [facility] her 5 day post op.    Surgery was 8/1.    Patient will only go to [place].    I did not see availability at [place] within the time frame needed.    Please advise

## 2024-08-06 ENCOUNTER — OFFICE VISIT (OUTPATIENT)
Dept: PLASTIC SURGERY | Facility: CLINIC | Age: 64
End: 2024-08-06

## 2024-08-06 DIAGNOSIS — R22.0 SUBCUTANEOUS MASS OF HEAD: Primary | ICD-10-CM

## 2024-08-06 DIAGNOSIS — L72.0 EPIDERMOID CYST OF FACE: ICD-10-CM

## 2024-08-06 PROCEDURE — 99024 POSTOP FOLLOW-UP VISIT: CPT

## 2024-08-06 NOTE — PROGRESS NOTES
[facility]  [address], [address], [place] [address]  [phone]    Patient Identification: Linda Becerra is a 63 y.o. female     History of Present Illness: The patient is a 63 y.o.  year-old female  who presents to the office for suture removal. Patient is 5 days s/p Excision Of Subcutaneous Masses Of Face X3- Right Cheek, Root Of Nose, Left Lower Eyelid/cheek, Complex Closure  on 8/1/2024 by Dr. Mendez.  Patient doing well at this time. Denies fevers, chills, signs of infection or significant pain.  Patient has no complaints at this time.    Past Medical History:   Diagnosis Date    Anemia     Anxiety     Asthma     Chronic pain disorder     Clotting disorder (HCC)     Concussion     Constipation     CSF leak     Fractures     GERD (gastroesophageal reflux disease)     Head injury     Hernia 2022    High blood pressure     History of transfusion     Hyperbilirubinemia 04/28/2022    Hypertension     Liver failure (HCC)     Liver transplant recipient (HCC)     Migraines     Peripheral neuropathy     Urinary frequency     Varicella 1968    As a child          Review of Systems  Constitutional: Denies fevers, chills or pain.  Skin: Denies any warmth, erythema, edema or mucopurulent drainage.     Physical Exam  General: AAOx3, cooperative and pleasant, no distress  Face: Surgical incisions healing well. Sutures removed. No dehiscence or signs of infection.     Labs  Surgical Pathology Report                         Case: [id number]                                   Authorizing Provider:  Fuentes Mendez MD      Collected:           08/01/2024 1123               Ordering Location:     [facility]     Received:            08/01/2024 1244                                      [facility]                                                         Pathologist:           Mckinley Carpenter MD                                                        Specimens:   A) - Mass, subcutaneous mass of right cheek                                                          B) - Mass, subcutaneous mass of left cheek                                                          C) - Mass, subcutaneous mass of root of nose                                              Final Diagnosis   A. Skin, right cheek:   EPIDERMOID CYST        B. Skin, left cheek:   EPIDERMOID CYST        C. Skin, root of nose:   EPIDERMOID CYST       Assessment and Plan:  The patient is an 63 y.o.  year-old female who presents to the office for post-op visit. Patient is 5 days s/p Excision Of Subcutaneous Masses Of Face X3- Right Cheek, Root Of Nose, Left Lower Eyelid/cheek, Complex Closure  on 8/1/2024 by Dr. Mendez      -At today's visit pathology reviewed with patient. All questions answered.   -Sutures removed. Pt tolerated well. She may wash the area gently and apply Aquaphor. Once 3 weeks post-op. May begin using silicone scar creams with scar massage. Wear sunscreen and avoid direct sun exposure. All questions answered  -The patient wishes to return as needed at this time.  -The patient is to call the office with any questions or concerns. All of the patient's questions were answered at this time and they agree with the plan of care.      Any Doan PA-C  [facility]

## 2024-08-17 ENCOUNTER — APPOINTMENT (EMERGENCY)
Dept: CT IMAGING | Facility: HOSPITAL | Age: 64
DRG: 562 | End: 2024-08-17
Payer: COMMERCIAL

## 2024-08-17 ENCOUNTER — APPOINTMENT (EMERGENCY)
Dept: RADIOLOGY | Facility: HOSPITAL | Age: 64
DRG: 562 | End: 2024-08-17
Payer: COMMERCIAL

## 2024-08-17 ENCOUNTER — HOSPITAL ENCOUNTER (INPATIENT)
Facility: HOSPITAL | Age: 64
LOS: 7 days | Discharge: ANOTHER HOSPITAL | DRG: 562 | End: 2024-08-24
Attending: EMERGENCY MEDICINE | Admitting: STUDENT IN AN ORGANIZED HEALTH CARE EDUCATION/TRAINING PROGRAM
Payer: COMMERCIAL

## 2024-08-17 ENCOUNTER — APPOINTMENT (INPATIENT)
Dept: RADIOLOGY | Facility: HOSPITAL | Age: 64
DRG: 562 | End: 2024-08-17
Payer: COMMERCIAL

## 2024-08-17 DIAGNOSIS — N18.9 ACUTE KIDNEY INJURY SUPERIMPOSED ON CHRONIC KIDNEY DISEASE  (HCC): ICD-10-CM

## 2024-08-17 DIAGNOSIS — N17.9 AKI (ACUTE KIDNEY INJURY) (HCC): ICD-10-CM

## 2024-08-17 DIAGNOSIS — D64.9 ANEMIA, UNSPECIFIED TYPE: ICD-10-CM

## 2024-08-17 DIAGNOSIS — S82.892A CLOSED LEFT ANKLE FRACTURE: ICD-10-CM

## 2024-08-17 DIAGNOSIS — Z94.4 LIVER TRANSPLANT STATUS (HCC): ICD-10-CM

## 2024-08-17 DIAGNOSIS — D72.819 LEUKOPENIA, UNSPECIFIED TYPE: ICD-10-CM

## 2024-08-17 DIAGNOSIS — N17.9 ACUTE KIDNEY INJURY SUPERIMPOSED ON CHRONIC KIDNEY DISEASE  (HCC): ICD-10-CM

## 2024-08-17 DIAGNOSIS — S82.842A CLOSED BIMALLEOLAR FRACTURE OF LEFT ANKLE, INITIAL ENCOUNTER: Primary | ICD-10-CM

## 2024-08-17 DIAGNOSIS — K59.03 DRUG-INDUCED CONSTIPATION: ICD-10-CM

## 2024-08-17 PROBLEM — D53.9 MACROCYTIC ANEMIA: Status: ACTIVE | Noted: 2024-08-17

## 2024-08-17 PROBLEM — S92.301A: Status: ACTIVE | Noted: 2024-08-17

## 2024-08-17 LAB
ALBUMIN SERPL BCG-MCNC: 3.8 G/DL (ref 3.5–5)
ALP SERPL-CCNC: 39 U/L (ref 34–104)
ALT SERPL W P-5'-P-CCNC: 8 U/L (ref 7–52)
ANION GAP SERPL CALCULATED.3IONS-SCNC: 8 MMOL/L (ref 4–13)
AST SERPL W P-5'-P-CCNC: 20 U/L (ref 13–39)
ATRIAL RATE: 75 BPM
BASOPHILS # BLD AUTO: 0.03 THOUSANDS/ÂΜL (ref 0–0.1)
BASOPHILS NFR BLD AUTO: 1 % (ref 0–1)
BILIRUB SERPL-MCNC: 0.57 MG/DL (ref 0.2–1)
BUN SERPL-MCNC: 28 MG/DL (ref 5–25)
CALCIUM SERPL-MCNC: 9.7 MG/DL (ref 8.4–10.2)
CHLORIDE SERPL-SCNC: 103 MMOL/L (ref 96–108)
CO2 SERPL-SCNC: 28 MMOL/L (ref 21–32)
CREAT SERPL-MCNC: 2.75 MG/DL (ref 0.6–1.3)
EOSINOPHIL # BLD AUTO: 0.26 THOUSAND/ÂΜL (ref 0–0.61)
EOSINOPHIL NFR BLD AUTO: 9 % (ref 0–6)
ERYTHROCYTE [DISTWIDTH] IN BLOOD BY AUTOMATED COUNT: 13.2 % (ref 11.6–15.1)
GFR SERPL CREATININE-BSD FRML MDRD: 17 ML/MIN/1.73SQ M
GLUCOSE SERPL-MCNC: 104 MG/DL (ref 65–140)
HCT VFR BLD AUTO: 33.4 % (ref 34.8–46.1)
HGB BLD-MCNC: 10.8 G/DL (ref 11.5–15.4)
IMM GRANULOCYTES # BLD AUTO: 0.02 THOUSAND/UL (ref 0–0.2)
IMM GRANULOCYTES NFR BLD AUTO: 1 % (ref 0–2)
LYMPHOCYTES # BLD AUTO: 0.83 THOUSANDS/ÂΜL (ref 0.6–4.47)
LYMPHOCYTES NFR BLD AUTO: 27 % (ref 14–44)
MCH RBC QN AUTO: 32 PG (ref 26.8–34.3)
MCHC RBC AUTO-ENTMCNC: 32.3 G/DL (ref 31.4–37.4)
MCV RBC AUTO: 99 FL (ref 82–98)
MONOCYTES # BLD AUTO: 0.3 THOUSAND/ÂΜL (ref 0.17–1.22)
MONOCYTES NFR BLD AUTO: 10 % (ref 4–12)
NEUTROPHILS # BLD AUTO: 1.62 THOUSANDS/ÂΜL (ref 1.85–7.62)
NEUTS SEG NFR BLD AUTO: 52 % (ref 43–75)
NRBC BLD AUTO-RTO: 0 /100 WBCS
P AXIS: 64 DEGREES
PLATELET # BLD AUTO: 196 THOUSANDS/UL (ref 149–390)
PMV BLD AUTO: 10.7 FL (ref 8.9–12.7)
POTASSIUM SERPL-SCNC: 4.9 MMOL/L (ref 3.5–5.3)
PR INTERVAL: 158 MS
PROT SERPL-MCNC: 6.3 G/DL (ref 6.4–8.4)
QRS AXIS: 53 DEGREES
QRSD INTERVAL: 86 MS
QT INTERVAL: 400 MS
QTC INTERVAL: 446 MS
RBC # BLD AUTO: 3.38 MILLION/UL (ref 3.81–5.12)
SODIUM SERPL-SCNC: 139 MMOL/L (ref 135–147)
T WAVE AXIS: 69 DEGREES
VENTRICULAR RATE: 75 BPM
WBC # BLD AUTO: 3.06 THOUSAND/UL (ref 4.31–10.16)

## 2024-08-17 PROCEDURE — 80197 ASSAY OF TACROLIMUS: CPT | Performed by: STUDENT IN AN ORGANIZED HEALTH CARE EDUCATION/TRAINING PROGRAM

## 2024-08-17 PROCEDURE — 93005 ELECTROCARDIOGRAM TRACING: CPT

## 2024-08-17 PROCEDURE — 99223 1ST HOSP IP/OBS HIGH 75: CPT | Performed by: STUDENT IN AN ORGANIZED HEALTH CARE EDUCATION/TRAINING PROGRAM

## 2024-08-17 PROCEDURE — 36415 COLL VENOUS BLD VENIPUNCTURE: CPT | Performed by: PHYSICIAN ASSISTANT

## 2024-08-17 PROCEDURE — 81001 URINALYSIS AUTO W/SCOPE: CPT | Performed by: STUDENT IN AN ORGANIZED HEALTH CARE EDUCATION/TRAINING PROGRAM

## 2024-08-17 PROCEDURE — 73700 CT LOWER EXTREMITY W/O DYE: CPT

## 2024-08-17 PROCEDURE — 73630 X-RAY EXAM OF FOOT: CPT

## 2024-08-17 PROCEDURE — 73610 X-RAY EXAM OF ANKLE: CPT

## 2024-08-17 PROCEDURE — 99285 EMERGENCY DEPT VISIT HI MDM: CPT

## 2024-08-17 PROCEDURE — 96375 TX/PRO/DX INJ NEW DRUG ADDON: CPT

## 2024-08-17 PROCEDURE — 85025 COMPLETE CBC W/AUTO DIFF WBC: CPT | Performed by: PHYSICIAN ASSISTANT

## 2024-08-17 PROCEDURE — 71045 X-RAY EXAM CHEST 1 VIEW: CPT

## 2024-08-17 PROCEDURE — 99285 EMERGENCY DEPT VISIT HI MDM: CPT | Performed by: PHYSICIAN ASSISTANT

## 2024-08-17 PROCEDURE — 80053 COMPREHEN METABOLIC PANEL: CPT | Performed by: PHYSICIAN ASSISTANT

## 2024-08-17 PROCEDURE — 2W3RX1Z IMMOBILIZATION OF LEFT LOWER LEG USING SPLINT: ICD-10-PCS | Performed by: PHYSICIAN ASSISTANT

## 2024-08-17 PROCEDURE — 96374 THER/PROPH/DIAG INJ IV PUSH: CPT

## 2024-08-17 PROCEDURE — 29515 APPLICATION SHORT LEG SPLINT: CPT | Performed by: PHYSICIAN ASSISTANT

## 2024-08-17 RX ORDER — METHOCARBAMOL 500 MG/1
500 TABLET, FILM COATED ORAL EVERY 6 HOURS PRN
Status: DISCONTINUED | OUTPATIENT
Start: 2024-08-17 | End: 2024-08-24 | Stop reason: HOSPADM

## 2024-08-17 RX ORDER — SODIUM CHLORIDE 9 MG/ML
75 INJECTION, SOLUTION INTRAVENOUS CONTINUOUS
Status: DISPENSED | OUTPATIENT
Start: 2024-08-17 | End: 2024-08-19

## 2024-08-17 RX ORDER — HYDROMORPHONE HYDROCHLORIDE 2 MG/ML
2 INJECTION, SOLUTION INTRAMUSCULAR; INTRAVENOUS; SUBCUTANEOUS ONCE
Status: DISCONTINUED | OUTPATIENT
Start: 2024-08-17 | End: 2024-08-18

## 2024-08-17 RX ORDER — PANTOPRAZOLE SODIUM 40 MG/1
40 TABLET, DELAYED RELEASE ORAL DAILY
Status: DISCONTINUED | OUTPATIENT
Start: 2024-08-17 | End: 2024-08-24 | Stop reason: HOSPADM

## 2024-08-17 RX ORDER — OXYCODONE HYDROCHLORIDE 10 MG/1
10 TABLET ORAL EVERY 4 HOURS PRN
Status: DISCONTINUED | OUTPATIENT
Start: 2024-08-17 | End: 2024-08-24 | Stop reason: HOSPADM

## 2024-08-17 RX ORDER — OXYCODONE HYDROCHLORIDE 5 MG/1
5 TABLET ORAL EVERY 4 HOURS PRN
Status: DISCONTINUED | OUTPATIENT
Start: 2024-08-17 | End: 2024-08-24 | Stop reason: HOSPADM

## 2024-08-17 RX ORDER — HYDROMORPHONE HCL/PF 1 MG/ML
0.2 SYRINGE (ML) INJECTION ONCE
Status: COMPLETED | OUTPATIENT
Start: 2024-08-17 | End: 2024-08-17

## 2024-08-17 RX ORDER — GABAPENTIN 300 MG/1
600 CAPSULE ORAL 3 TIMES DAILY
Status: DISCONTINUED | OUTPATIENT
Start: 2024-08-17 | End: 2024-08-19

## 2024-08-17 RX ORDER — LATANOPROST 50 UG/ML
1 SOLUTION/ DROPS OPHTHALMIC
Status: DISCONTINUED | OUTPATIENT
Start: 2024-08-17 | End: 2024-08-24 | Stop reason: HOSPADM

## 2024-08-17 RX ORDER — ACETAMINOPHEN 325 MG/1
650 TABLET ORAL EVERY 6 HOURS PRN
Status: DISCONTINUED | OUTPATIENT
Start: 2024-08-17 | End: 2024-08-24 | Stop reason: HOSPADM

## 2024-08-17 RX ORDER — LANOLIN ALCOHOL/MO/W.PET/CERES
100 CREAM (GRAM) TOPICAL DAILY
Status: DISCONTINUED | OUTPATIENT
Start: 2024-08-18 | End: 2024-08-24 | Stop reason: HOSPADM

## 2024-08-17 RX ORDER — AZATHIOPRINE 50 MG/1
100 TABLET ORAL
Status: DISCONTINUED | OUTPATIENT
Start: 2024-08-17 | End: 2024-08-24 | Stop reason: HOSPADM

## 2024-08-17 RX ORDER — HYDROMORPHONE HYDROCHLORIDE 2 MG/ML
2 INJECTION, SOLUTION INTRAMUSCULAR; INTRAVENOUS; SUBCUTANEOUS
Status: DISCONTINUED | OUTPATIENT
Start: 2024-08-17 | End: 2024-08-19

## 2024-08-17 RX ORDER — HYDROMORPHONE HCL IN WATER/PF 6 MG/30 ML
0.2 PATIENT CONTROLLED ANALGESIA SYRINGE INTRAVENOUS ONCE
Status: COMPLETED | OUTPATIENT
Start: 2024-08-17 | End: 2024-08-17

## 2024-08-17 RX ORDER — HEPARIN SODIUM 5000 [USP'U]/ML
5000 INJECTION, SOLUTION INTRAVENOUS; SUBCUTANEOUS EVERY 8 HOURS SCHEDULED
Status: DISCONTINUED | OUTPATIENT
Start: 2024-08-17 | End: 2024-08-24 | Stop reason: HOSPADM

## 2024-08-17 RX ORDER — METOPROLOL SUCCINATE 25 MG/1
25 TABLET, EXTENDED RELEASE ORAL DAILY
Status: DISCONTINUED | OUTPATIENT
Start: 2024-08-18 | End: 2024-08-24 | Stop reason: HOSPADM

## 2024-08-17 RX ORDER — OXYCODONE HYDROCHLORIDE 5 MG/1
5 TABLET ORAL EVERY 4 HOURS PRN
Status: DISCONTINUED | OUTPATIENT
Start: 2024-08-17 | End: 2024-08-17

## 2024-08-17 RX ORDER — TACROLIMUS 1 MG/1
3 CAPSULE ORAL EVERY 12 HOURS SCHEDULED
Status: DISCONTINUED | OUTPATIENT
Start: 2024-08-17 | End: 2024-08-18

## 2024-08-17 RX ORDER — OXYCODONE AND ACETAMINOPHEN 5; 325 MG/1; MG/1
1 TABLET ORAL ONCE
Status: COMPLETED | OUTPATIENT
Start: 2024-08-17 | End: 2024-08-17

## 2024-08-17 RX ADMIN — METHOCARBAMOL TABLETS 500 MG: 500 TABLET, COATED ORAL at 21:03

## 2024-08-17 RX ADMIN — HYDROMORPHONE HYDROCHLORIDE 0.2 MG: 0.2 INJECTION, SOLUTION INTRAMUSCULAR; INTRAVENOUS; SUBCUTANEOUS at 14:02

## 2024-08-17 RX ADMIN — TACROLIMUS 3 MG: 1 CAPSULE ORAL at 21:03

## 2024-08-17 RX ADMIN — HYDROMORPHONE HYDROCHLORIDE 2 MG: 2 INJECTION, SOLUTION INTRAMUSCULAR; INTRAVENOUS; SUBCUTANEOUS at 16:41

## 2024-08-17 RX ADMIN — SODIUM CHLORIDE 100 ML/HR: 0.9 INJECTION, SOLUTION INTRAVENOUS at 14:01

## 2024-08-17 RX ADMIN — GABAPENTIN 600 MG: 300 CAPSULE ORAL at 16:28

## 2024-08-17 RX ADMIN — AZATHIOPRINE 100 MG: 50 TABLET ORAL at 21:15

## 2024-08-17 RX ADMIN — OXYCODONE HYDROCHLORIDE 10 MG: 10 TABLET ORAL at 21:02

## 2024-08-17 RX ADMIN — OXYCODONE HYDROCHLORIDE AND ACETAMINOPHEN 1 TABLET: 5; 325 TABLET ORAL at 09:03

## 2024-08-17 RX ADMIN — HYDROMORPHONE HYDROCHLORIDE 2 MG: 2 INJECTION, SOLUTION INTRAMUSCULAR; INTRAVENOUS; SUBCUTANEOUS at 23:37

## 2024-08-17 RX ADMIN — OXYCODONE HYDROCHLORIDE 10 MG: 10 TABLET ORAL at 16:28

## 2024-08-17 RX ADMIN — GABAPENTIN 600 MG: 300 CAPSULE ORAL at 21:02

## 2024-08-17 RX ADMIN — LATANOPROST 1 DROP: 50 SOLUTION OPHTHALMIC at 21:03

## 2024-08-17 RX ADMIN — HYDROMORPHONE HYDROCHLORIDE 0.2 MG: 1 INJECTION, SOLUTION INTRAMUSCULAR; INTRAVENOUS; SUBCUTANEOUS at 12:05

## 2024-08-17 RX ADMIN — MORPHINE SULFATE 2 MG: 2 INJECTION, SOLUTION INTRAMUSCULAR; INTRAVENOUS at 10:38

## 2024-08-17 RX ADMIN — HEPARIN SODIUM 5000 UNITS: 5000 INJECTION INTRAVENOUS; SUBCUTANEOUS at 21:03

## 2024-08-17 RX ADMIN — DOXYLAMINE SUCCINATE 25 MG: 25 TABLET ORAL at 21:03

## 2024-08-17 RX ADMIN — SODIUM CHLORIDE 100 ML/HR: 0.9 INJECTION, SOLUTION INTRAVENOUS at 23:29

## 2024-08-17 NOTE — ASSESSMENT & PLAN NOTE
Initially hypertensive upon arrival, possibly due to underlying pain  BP improved  Continue home medications: Toprol-XL 25mg q24hr

## 2024-08-17 NOTE — ASSESSMENT & PLAN NOTE
Received liver transplant in May 2002 at [facility]   2/2 alcoholic cirrhosis  Follows with hepatology   Continue azathiorpine and tacrolimus  Pending tacrolimus level, continue to monitor  States she was weaned off prednisone recently

## 2024-08-17 NOTE — ASSESSMENT & PLAN NOTE
Presents with fall from home  ECG NSR, HR 70s  XR left foot - nondisplaced bimalleolar fracture  XR right foot pending  CT RLE - Second through fourth metatarsal neck fractures.   ED spoke with ortho Dr. Edson Burk - plan to splint, no immediate intervention at this time  PT/OT evaluation  Continue pain control  Consulting ortho for further recommendation

## 2024-08-17 NOTE — PLAN OF CARE

## 2024-08-17 NOTE — ASSESSMENT & PLAN NOTE
Continue gabapentin  States her legs ''gave out'' and she has had progressing weakness  Of note, appears to have had cellulitis on BL LE for which she was on cephalexin outpatient (started taking on Wednesday)  Left lower extremity in splint and RLE appears to have improved

## 2024-08-17 NOTE — H&P
[facility]  H&P  Name: Linda Becerra 63 y.o. female I MRN: [id number]  Unit/Bed#: [facility] I Date of Admission: 8/17/2024   Date of Service: 8/17/2024 I Hospital Day: 0      Assessment & Plan   * Closed left ankle fracture  Assessment & Plan  Presents with fall from home  ECG NSR, HR 70s  XR left foot - nondisplaced bimalleolar fracture  XR right foot pending  CT RLE - Second through fourth metatarsal neck fractures.   ED spoke with ortho Dr. Edson Burk - plan to splint, no immediate intervention at this time  PT/OT evaluation  Continue pain control  Consulting ortho for further recommendation    Acute kidney injury superimposed on chronic kidney disease  (HCC)  Assessment & Plan  Lab Results   Component Value Date    EGFR 17 08/17/2024    EGFR 52 (L) 07/09/2024    EGFR 62 11/28/2023    CREATININE 2.75 (H) 08/17/2024    CREATININE 1.19 (H) 07/09/2024    CREATININE 0.98 11/28/2023     Cr 0.98 on 11/28/23, presents with Cr of 2.75  UA pending  Continue IVFs  Avoid nephrotoxic agents  Tacrolimus level pending  Consulting nephrology  If renal function does not improve, consider ultrasound    Closed fracture of metatarsal neck, right, initial encounter  Assessment & Plan  CT RLE shows 2nd through 4th metatarsal neck fractures  Continue pain control  Consulting orthopedic surgery  PT/OT    Macrocytic anemia  Assessment & Plan  Hgb 10.8, MCV 99  Continue to monitor    Hypertension  Assessment & Plan  Initially hypertensive upon arrival, possibly due to underlying pain  BP improved  Continue home medications: Toprol-XL 25mg q24hr    Liver transplant recipient (HCC)  Assessment & Plan  Received liver transplant in May 2002 at [facility]   2/2 alcoholic cirrhosis  Follows with hepatology   Continue azathiorpine and tacrolimus  Pending tacrolimus level, continue to monitor  States she was weaned off prednisone recently    Weakness of left lower extremity  Assessment & Plan  Continue  gabapentin  States her legs ''gave out'' and she has had progressing weakness  Of note, appears to have had cellulitis on BL LE for which she was on cephalexin outpatient (started taking on Wednesday)  Left lower extremity in splint and RLE appears to have improved         VTE Pharmacologic Prophylaxis:   Moderate Risk (Score 3-4) - Pharmacological DVT Prophylaxis Contraindicated. Sequential Compression Devices Ordered.  Code Status: Level 1 - Full Code    Discussion with family: Updated  (partner) at bedside.    Anticipated Length of Stay: Patient will be admitted on an observation basis with an anticipated length of stay of less than 2 midnights secondary to fracture.    Total Time Spent on Date of Encounter in care of patient: 50 mins. This time was spent on one or more of the following: performing physical exam; counseling and coordination of care; obtaining or reviewing history; documenting in the medical record; reviewing/ordering tests, medications or procedures; communicating with other healthcare professionals and discussing with patient's family/caregivers.    Chief Complaint: fall, pain in left lower extremity    History of Present Illness:  Linda Becerra is a 63 y.o. female with a PMH of liver transplant 2/2 alcoholic cirrhosis, peripheral neuropathy, HTN who presents with a fall that occurred at home. She states she was at home and walking with her cane when she fell. She denies any dizziness, vertigo, chest pain, nausea, vomiting. States she has experienced worsening weakness in her left lower extremity. Of note, she follows with [facility] hepatology and states she was recently weaned off steroids. She also had a tele-visit with her primary care provider for lower extremity cellulitis for which she started on cephalexin. Her hepatologist has been monitoring her renal function.     She presented with 10/10 pain LLE > RLE  IN the ED, the RLE ct shows 2nd through 4th metatarsal fractures,  left ankle XR shows bimalleolar fractures. Na 139, WCB 3.06, Hgb 10.8, vitals on arrival temp 97.8, RR 20, /81, 95% RA.     ED spoke with orthopedic surgery who recommended splinting along with pain control.     Review of Systems:  Review of Systems   Constitutional:  Positive for activity change. Negative for chills and fatigue.   Respiratory:  Negative for chest tightness and shortness of breath.    Cardiovascular:  Negative for chest pain and leg swelling.   Gastrointestinal:  Negative for abdominal distention, abdominal pain, constipation and diarrhea.   Genitourinary:  Positive for difficulty urinating.   Musculoskeletal:  Positive for back pain and gait problem.   Neurological:  Positive for weakness. Negative for dizziness, syncope, light-headedness and numbness.       Past Medical and Surgical History:   Past Medical History:   Diagnosis Date    Anemia     Anxiety     Asthma     Chronic pain disorder     Clotting disorder (HCC)     Concussion     Constipation     CSF leak     Fractures     GERD (gastroesophageal reflux disease)     Head injury     Hernia 2022    High blood pressure     History of transfusion     Hyperbilirubinemia 04/28/2022    Hypertension     Liver failure (HCC)     Liver transplant recipient (HCC)     Migraines     Peripheral neuropathy     Urinary frequency     Varicella 1968    As a child       Past Surgical History:   Procedure Laterality Date    ABLATION SOFT TISSUE      BREAST LUMPECTOMY      COLONOSCOPY  11/2/2023    IR PARACENTESIS  04/29/2022    IR PARACENTESIS  05/09/2022    IR PARACENTESIS  05/12/2022    LAPAROSCOPY FOR ECTOPIC PREGNANCY      LIVER BIOPSY  7/3/2023    Plus 3 previous in hospital    LIVER TRANSPLANTATION  5/17/22    MAMMO (HISTORICAL) Bilateral 01/14/2021    [facility] BI-RADS 2 Benign findings. after U/S Scattered areas fibrogladulae density; 25% to 50% glandular breast tissue    MRI GUIDED BREAST WIRE LOCALIZATION LEFT Left 02/23/2015    MRI GUIDED BREAST WIRE  LOCALIZATION LEFT Left 02/23/2015    [place] EXC TUMOR SOFT TISS FACE&SCALP SUBFASCIAL <2CM N/A 8/1/2024    Procedure: EXCISION OF SUBCUTANEOUS MASSES OF FACE X3- RIGHT CHEEK, ROOT OF NOSE, LEFT LOWER EYELID/CHEEK, COMPLEX CLOSURE;  Surgeon: Fuentes Mendez MD;  Location: [facility]IN OR;  Service: Plastics    SINUS SURGERY      SINUS SURGERY  2000    removed cartlidge in inside of nose    TONSILECTOMY AND ADNOIDECTOMY         Meds/Allergies:  Prior to Admission medications    Medication Sig Start Date End Date Taking? Authorizing Provider   azaTHIOprine (IMURAN) 50 mg tablet Take 100 mg by mouth daily at bedtime 5/7/24   Historical Provider, MD   diphenhydrAMINE (BENADRYL) 25 mg capsule Take 25 mg by mouth every 8 (eight) hours as needed for itching. Indications: Itching    Donnie De Anda MD   ergocalciferol (ERGOCALCIFEROL) 1.25 MG (36354 UT) capsule Take 50,000 Units by mouth once a week. Every Monday AM  Indications: Vitamin D Deficiency    Donnie De Anda MD   FOLIC ACID PO Take by mouth    Historical Provider, MD   gabapentin (NEURONTIN) 300 mg capsule Take 600 mg by mouth 3 (three) times a day    Donnie De Anda MD   latanoprost (XALATAN) 0.005 % ophthalmic solution 1 DROP INTO BOTH EYES BEFORE BEDTIME 6/26/24   Historical Provider, MD   loperamide (IMODIUM A-D) 2 MG tablet Take 1 tablet (2 mg total) by mouth 4 (four) times a day as needed for diarrhea 6/21/24   Livia Waterman MD   meclizine (ANTIVERT) 12.5 MG tablet Take 12.5 mg by mouth 3 (three) times a day as needed for dizziness 2/15/24   Historical Provider, MD   melatonin 3 mg Take 3 mg by mouth daily at bedtime as needed (sleep). Indications: Trouble Sleeping    Donnie De Anda MD   metoprolol succinate (TOPROL-XL) 25 mg 24 hr tablet  5/7/24   Historical Provider, MD   pantoprazole (PROTONIX) 40 mg tablet Take 1 tablet (40 mg total) by mouth daily 6/21/24   Livia Waterman MD   predniSONE 5 mg tablet Take 2.5 mg by mouth every other day Every other day x 10 days; then  discontinued    Donnie De Anda MD   Premarin vaginal cream Insert 1 g into the vagina 2 (two) times a week 24   Evaristo Diamond MD   Prevymis 480 MG TABS Take 480 mg by mouth daily 24   Historical Provider, MD   Prolia 60 MG/ML USE 1 ML SUBCUTANOEUS EVERY 6 MONTHS AS DIRECTED. PLEASE DELIVER ...  (REFER TO PRESCRIPTION NOTES). 23   Historical Provider, MD   tacrolimus (PROGRAF) 1 mg capsule Take 3 mg by mouth every 12 (twelve) hours 3 capsules AM  3 capsules PM    Donnie De Anda MD   thiamine 100 MG tablet Take 100 mg by mouth daily. Indications: Deficiency of Vitamin B1    Donnie De Anda MD   traMADol (ULTRAM) 50 mg tablet Take 1 PO BID PRN for pain for ongoing therapy 24   Debra Herrera PA-C   traMADol (ULTRAM-ER) 200 MG 24 hr tablet take 1 tablet by mouth once daily for pain for ongoing therapy DO NOT FILL BEFORE: 24   Debra Herrera PA-C   candesartan (ATACAND) 16 mg tablet Take 16 mg by mouth daily  22  Historical Provider, MD   naproxen sodium (ANAPROX) 550 mg tablet take 1 tablet by mouth twice a day if needed for headache up to three times a week 19  Historical Provider, MD     I have reviewed home medications with patient personally.    Allergies:   Allergies   Allergen Reactions    Other Other (See Comments)     Seasonal allergies         Social History:  Marital Status: Single   Occupation:   Patient Pre-hospital Living Situation: Home  Patient Pre-hospital Level of Mobility: walks with cane  Patient Pre-hospital Diet Restrictions: none  Substance Use History:   Social History     Substance and Sexual Activity   Alcohol Use Not Currently    Comment: socially, had glass of wine today     Social History     Tobacco Use   Smoking Status Former    Current packs/day: 0.00    Average packs/day: 1 pack/day for 25.0 years (25.0 ttl pk-yrs)    Types: Cigarettes    Start date:     Quit date: 2019    Years since quittin.6   Smokeless Tobacco Never  "    Social History     Substance and Sexual Activity   Drug Use Not Currently    Types: Marijuana    Comment: CBD  medical marjuana       Family History:  Family History   Problem Relation Age of Onset    Cancer Mother     Breast cancer Mother     Cancer Father     Colon cancer Father     Arthritis Father     Cancer Sister     Breast cancer Sister     Arthritis Brother     No Known Problems Paternal Grandmother     No Known Problems Paternal Grandfather     Autoimmune disease Daughter     No Known Problems Son     No Known Problems Son     Heart disease Half-Brother     Prostate cancer Half-Brother     Melanoma Half-Brother        Physical Exam:     Vitals:   Blood Pressure: 147/83 (08/17/24 1439)  Pulse: 73 (08/17/24 1439)  Temperature: 97.9 °F (36.6 °C) (08/17/24 1439)  Temp Source: Temporal (08/17/24 1439)  Respirations: 20 (08/17/24 1439)  Height: 5' 5\" (165.1 cm) (08/17/24 0846)  Weight - Scale: 77.1 kg (170 lb) (08/17/24 0846)  SpO2: 93 % (08/17/24 1439)    Physical Exam  Vitals and nursing note reviewed.   Constitutional:       General: She is not in acute distress.     Appearance: She is well-developed.   HENT:      Head: Normocephalic and atraumatic.   Eyes:      Conjunctiva/sclera: Conjunctivae normal.   Cardiovascular:      Rate and Rhythm: Normal rate and regular rhythm.      Heart sounds: No murmur heard.  Pulmonary:      Effort: Pulmonary effort is normal. No respiratory distress.      Breath sounds: Normal breath sounds. No wheezing.   Abdominal:      Palpations: Abdomen is soft.      Tenderness: There is no abdominal tenderness.   Musculoskeletal:         General: No swelling.      Cervical back: Neck supple.      Comments: LLE in splint   Skin:     General: Skin is warm and dry.      Capillary Refill: Capillary refill takes less than 2 seconds.   Neurological:      General: No focal deficit present.      Mental Status: She is alert and oriented to person, place, and time.   Psychiatric:         Mood " and Affect: Mood normal.         Behavior: Behavior normal.         Thought Content: Thought content normal.         Additional Data:     Lab Results:  Results from last 7 days   Lab Units 08/17/24  1146   WBC Thousand/uL 3.06*   HEMOGLOBIN g/dL 10.8*   HEMATOCRIT % 33.4*   PLATELETS Thousands/uL 196   SEGS PCT % 52   LYMPHO PCT % 27   MONO PCT % 10   EOS PCT % 9*     Results from last 7 days   Lab Units 08/17/24  1146   SODIUM mmol/L 139   POTASSIUM mmol/L 4.9   CHLORIDE mmol/L 103   CO2 mmol/L 28   BUN mg/dL 28*   CREATININE mg/dL 2.75*   ANION GAP mmol/L 8   CALCIUM mg/dL 9.7   ALBUMIN g/dL 3.8   TOTAL BILIRUBIN mg/dL 0.57   ALK PHOS U/L 39   ALT U/L 8   AST U/L 20   GLUCOSE RANDOM mg/dL 104             Lab Results   Component Value Date    HGBA1C 5.0 11/28/2023    HGBA1C 5.1 11/20/2023    HGBA1C 5.9 (H) 10/10/2023           Lines/Drains:  Invasive Devices       Peripheral Intravenous Line  Duration             Peripheral IV 08/17/24 Right Antecubital <1 day                        Imaging: Reviewed radiology reports from this admission including: below  CT lower extremity wo contrast right   Final Result by Nawaf Medina MD (08/17 4471)      Second through fourth metatarsal neck fractures.      The study was marked in EPIC for immediate notification.         Workstation performed: [id number]         XR ankle 3+ views LEFT   ED Interpretation by Galindo Jaramillo PA-C (08/17 9318)   Nondisplaced bimaleolar fracture of the left ankle.      XR foot 3+ views RIGHT    (Results Pending)   XR chest portable    (Results Pending)       EKG and Other Studies Reviewed on Admission:   EKG: NSR. HR 75bpm.    ** Please Note: This note has been constructed using a voice recognition system. **

## 2024-08-17 NOTE — ED PROVIDER NOTES
"History  Chief Complaint   Patient presents with    Fall     Pt rpts baseline cane use d/t LLE weakness. LLE \"gave out\" while letting dogs out. Denies head trauma/LOC/blood thinners. +increased LLE pain since.      Is a 63-year-old female presenting to the emergency department via EMS for evaluation of left ankle and right foot pain status post ground-level fall that occurred just prior to arrival.  Patient reports that she has neuropathy in her legs and she often walks with a cane but this morning when she got out of bed she misplaced her cane which caused her to lose her balance and fall causing immediate pain of her left ankle and right foot.  Patient reports the pain in her left ankle is worse and rates it severe.  No treatment prior to arrival.  Patient denies head injury, LOC, neck pain, back pain, dizziness, AMS, n/v, anticoagulation use, chest pain, dyspnea, and wounds.  No other complaints at this time.  Patient denies having any symptoms prior to fall. She reports she occasionally has falls like this due to her neuropathy in her legs but often holds onto the walls and cane or walker to prevent falls just today her cane had slid out from under her.      History provided by:  Patient   used: No    Fall  Associated symptoms: no abdominal pain, no back pain, no chest pain, no headaches, no nausea, no neck pain, no seizures and no vomiting        Prior to Admission Medications   Prescriptions Last Dose Informant Patient Reported? Taking?   FOLIC ACID PO 8/17/2024 Self Yes Yes   Sig: Take by mouth   Premarin vaginal cream More than a month Self No No   Sig: Insert 1 g into the vagina 2 (two) times a week   Prevymis 480 MG TABS 8/17/2024 Self Yes Yes   Sig: Take 480 mg by mouth daily   Prolia 60 MG/ML More than a month Self Yes No   Sig: USE 1 ML SUBCUTANOEUS EVERY 6 MONTHS AS DIRECTED. PLEASE DELIVER ...  (REFER TO PRESCRIPTION NOTES).   azaTHIOprine (IMURAN) 50 mg tablet 8/16/2024 Self Yes " Yes   Sig: Take 100 mg by mouth daily at bedtime   diphenhydrAMINE (BENADRYL) 25 mg capsule 2024 Self Yes Yes   Sig: Take 25 mg by mouth every 8 (eight) hours as needed for itching. Indications: Itching   ergocalciferol (ERGOCALCIFEROL) 1.25 MG (47605 UT) capsule Past Week Self Yes Yes   Sig: Take 50,000 Units by mouth once a week. Every Monday AM  Indications: Vitamin D Deficiency   gabapentin (NEURONTIN) 300 mg capsule 2024 Self Yes Yes   Sig: Take 600 mg by mouth 3 (three) times a day   latanoprost (XALATAN) 0.005 % ophthalmic solution 2024  Yes Yes   Si DROP INTO BOTH EYES BEFORE BEDTIME   loperamide (IMODIUM A-D) 2 MG tablet Not Taking  No No   Sig: Take 1 tablet (2 mg total) by mouth 4 (four) times a day as needed for diarrhea   Patient not taking: Reported on 2024   meclizine (ANTIVERT) 12.5 MG tablet Not Taking Self Yes No   Sig: Take 12.5 mg by mouth 3 (three) times a day as needed for dizziness   Patient not taking: Reported on 2024   melatonin 3 mg Not Taking Self Yes No   Sig: Take 3 mg by mouth daily at bedtime as needed (sleep). Indications: Trouble Sleeping   Patient not taking: Reported on 2024   metoprolol succinate (TOPROL-XL) 25 mg 24 hr tablet 2024 Self Yes Yes   pantoprazole (PROTONIX) 40 mg tablet 2024  No Yes   Sig: Take 1 tablet (40 mg total) by mouth daily   predniSONE 5 mg tablet Not Taking Self Yes No   Sig: Take 2.5 mg by mouth every other day Every other day x 10 days; then discontinued   Patient not taking: Reported on 2024   tacrolimus (PROGRAF) 1 mg capsule 2024 Self Yes Yes   Sig: Take 3 mg by mouth every 12 (twelve) hours 3 capsules AM  3 capsules PM   thiamine 100 MG tablet 2024 Self Yes Yes   Sig: Take 100 mg by mouth daily. Indications: Deficiency of Vitamin B1   traMADol (ULTRAM) 50 mg tablet Not Taking Self No No   Sig: Take 1 PO BID PRN for pain for ongoing therapy   Patient not taking: Reported on 2024   traMADol  (ULTRAM-ER) 200 MG 24 hr tablet  Self No No   Sig: take 1 tablet by mouth once daily for pain for ongoing therapy DO NOT FILL BEFORE: 05/26/24   Patient taking differently: Take 200 mg by mouth daily at bedtime take 1 tablet by mouth once daily for pain for ongoing therapy DO NOT FILL BEFORE: 05/26/24      Facility-Administered Medications: None       Past Medical History:   Diagnosis Date    Anemia     Anxiety     Asthma     Chronic pain disorder     Clotting disorder (HCC)     Concussion     Constipation     CSF leak     Fractures     GERD (gastroesophageal reflux disease)     Head injury     Hernia 2022    High blood pressure     History of transfusion     Hyperbilirubinemia 04/28/2022    Hypertension     Liver failure (HCC)     Liver transplant recipient (HCC)     Migraines     Peripheral neuropathy     Urinary frequency     Varicella 1968    As a child       Past Surgical History:   Procedure Laterality Date    ABLATION SOFT TISSUE      BREAST LUMPECTOMY      COLONOSCOPY  11/2/2023    IR PARACENTESIS  04/29/2022    IR PARACENTESIS  05/09/2022    IR PARACENTESIS  05/12/2022    LAPAROSCOPY FOR ECTOPIC PREGNANCY      LIVER BIOPSY  7/3/2023    Plus 3 previous in hospital    LIVER TRANSPLANTATION  5/17/22    MAMMO (HISTORICAL) Bilateral 01/14/2021    [facility] BI-RADS 2 Benign findings. after U/S Scattered areas fibrogladulae density; 25% to 50% glandular breast tissue    MRI GUIDED BREAST WIRE LOCALIZATION LEFT Left 02/23/2015    MRI GUIDED BREAST WIRE LOCALIZATION LEFT Left 02/23/2015    MD EXC TUMOR SOFT TISS FACE&SCALP SUBFASCIAL <2CM N/A 8/1/2024    Procedure: EXCISION OF SUBCUTANEOUS MASSES OF FACE X3- RIGHT CHEEK, ROOT OF NOSE, LEFT LOWER EYELID/CHEEK, COMPLEX CLOSURE;  Surgeon: Fuentes Mendez MD;  Location:  MAIN OR;  Service: Plastics    SINUS SURGERY      SINUS SURGERY  2000    removed cartlidge in inside of nose    TONSILECTOMY AND ADNOIDECTOMY         Family History   Problem Relation Age of Onset     Cancer Mother     Breast cancer Mother     Cancer Father     Colon cancer Father     Arthritis Father     Cancer Sister     Breast cancer Sister     Arthritis Brother     No Known Problems Paternal Grandmother     No Known Problems Paternal Grandfather     Autoimmune disease Daughter     No Known Problems Son     No Known Problems Son     Heart disease Half-Brother     Prostate cancer Half-Brother     Melanoma Half-Brother      I have reviewed and agree with the history as documented.    E-Cigarette/Vaping    E-Cigarette Use Never User      E-Cigarette/Vaping Substances    Nicotine No     THC No     CBD No     Flavoring No     Other No     Unknown No      Social History     Tobacco Use    Smoking status: Former     Current packs/day: 0.00     Average packs/day: 1 pack/day for 25.0 years (25.0 ttl pk-yrs)     Types: Cigarettes     Start date:      Quit date: 2019     Years since quittin.6    Smokeless tobacco: Never   Vaping Use    Vaping status: Never Used   Substance Use Topics    Alcohol use: Not Currently     Comment: socially, had glass of wine today    Drug use: Not Currently     Types: Marijuana     Comment: CBD  medical [facility]       Review of Systems   Constitutional:  Negative for chills and fever.   HENT:  Negative for ear pain and sore throat.    Eyes:  Negative for pain and visual disturbance.   Respiratory:  Negative for cough and shortness of breath.    Cardiovascular:  Negative for chest pain and palpitations.   Gastrointestinal:  Negative for abdominal pain, nausea and vomiting.   Genitourinary:  Negative for dysuria and hematuria.   Musculoskeletal:  Positive for joint swelling (Right ankle). Negative for arthralgias, back pain, neck pain and neck stiffness.   Skin:  Negative for color change, rash and wound.   Neurological:  Negative for dizziness, seizures, syncope, speech difficulty, weakness, light-headedness, numbness and headaches.   Psychiatric/Behavioral:  Negative for confusion.     All other systems reviewed and are negative.      Physical Exam  Physical Exam  Vitals and nursing note reviewed.   Constitutional:       General: She is not in acute distress.     Appearance: She is well-developed.   HENT:      Head: Normocephalic and atraumatic. No raccoon eyes, Cardona's sign, abrasion, contusion or laceration.   Eyes:      Conjunctiva/sclera: Conjunctivae normal.   Cardiovascular:      Rate and Rhythm: Normal rate and regular rhythm.      Pulses:           Dorsalis pedis pulses are 2+ on the right side and 2+ on the left side.   Pulmonary:      Effort: Pulmonary effort is normal. No respiratory distress.      Breath sounds: Normal breath sounds.   Chest:      Chest wall: No tenderness.   Abdominal:      Palpations: Abdomen is soft.      Tenderness: There is no abdominal tenderness.   Musculoskeletal:         General: No swelling.      Cervical back: Full passive range of motion without pain and neck supple. No spinous process tenderness or muscular tenderness.      Thoracic back: No tenderness.      Lumbar back: No tenderness.      Right hip: No tenderness.      Left hip: No tenderness.      Right upper leg: No tenderness.      Left upper leg: No tenderness.      Right knee: No bony tenderness.      Left knee: No bony tenderness.      Right lower leg: No tenderness.      Left lower leg: No tenderness.      Right ankle: No tenderness.      Left ankle: Tenderness present over the lateral malleolus and medial malleolus.      Right foot: Tenderness present.      Left foot: No tenderness.   Skin:     General: Skin is warm and dry.      Capillary Refill: Capillary refill takes less than 2 seconds.   Neurological:      Mental Status: She is alert.   Psychiatric:         Mood and Affect: Mood normal.         Vital Signs  ED Triage Vitals [08/17/24 0846]   Temperature Pulse Respirations Blood Pressure SpO2   97.8 °F (36.6 °C) 70 20 (!) 183/81 95 %      Temp Source Heart Rate Source Patient Position -  Orthostatic VS BP Location FiO2 (%)   Oral Monitor Lying Right arm --      Pain Score       10 - Worst Possible Pain           Vitals:    08/17/24 1021 08/17/24 1215 08/17/24 1335 08/17/24 1439   BP: (!) 178/86 136/66 139/64 147/83   Pulse: 67 61 71 73   Patient Position - Orthostatic VS: Lying  Lying Lying         Visual Acuity  Visual Acuity      Flowsheet Row Most Recent Value   L Pupil Size (mm) 3   R Pupil Size (mm) 3            ED Medications  Medications   heparin (porcine) subcutaneous injection 5,000 Units (has no administration in time range)   sodium chloride 0.9 % infusion (100 mL/hr Intravenous New Bag 8/17/24 1401)   oxyCODONE (ROXICODONE) IR tablet 5 mg (has no administration in time range)   oxyCODONE (ROXICODONE) immediate release tablet 10 mg (has no administration in time range)   methocarbamol (ROBAXIN) tablet 500 mg (has no administration in time range)   acetaminophen (TYLENOL) tablet 650 mg (has no administration in time range)   azaTHIOprine (IMURAN) tablet 100 mg (has no administration in time range)   gabapentin (NEURONTIN) capsule 600 mg (has no administration in time range)   pantoprazole (PROTONIX) EC tablet 40 mg (has no administration in time range)   tacrolimus (PROGRAF) capsule 3 mg (has no administration in time range)   thiamine tablet 100 mg (has no administration in time range)   latanoprost (XALATAN) 0.005 % ophthalmic solution 1 drop (has no administration in time range)   metoprolol succinate (TOPROL-XL) 24 hr tablet 25 mg (has no administration in time range)   oxyCODONE-acetaminophen (PERCOCET) 5-325 mg per tablet 1 tablet (1 tablet Oral Given 8/17/24 0903)   morphine injection 2 mg (2 mg Intravenous Given 8/17/24 1038)   HYDROmorphone (DILAUDID) injection 0.2 mg (0.2 mg Intravenous Given 8/17/24 1205)   HYDROmorphone HCl (DILAUDID) injection 0.2 mg (0.2 mg Intravenous Given 8/17/24 1402)       Diagnostic Studies  Results Reviewed       Procedure Component Value Units  Date/Time    Urinalysis with microscopic [[id number]]     Lab Status: No result Specimen: Urine     Comprehensive metabolic panel [[id number]]  (Abnormal) Collected: 08/17/24 1146    Lab Status: Final result Specimen: Blood from Arm, Right Updated: 08/17/24 1212     Sodium 139 mmol/L      Potassium 4.9 mmol/L      Chloride 103 mmol/L      CO2 28 mmol/L      ANION GAP 8 mmol/L      BUN 28 mg/dL      Creatinine 2.75 mg/dL      Glucose 104 mg/dL      Calcium 9.7 mg/dL      AST 20 U/L      ALT 8 U/L      Alkaline Phosphatase 39 U/L      Total Protein 6.3 g/dL      Albumin 3.8 g/dL      Total Bilirubin 0.57 mg/dL      eGFR 17 ml/min/1.73sq m     Narrative:      National Kidney Disease Foundation guidelines for Chronic Kidney Disease (CKD):     Stage 1 with normal or high GFR (GFR > 90 mL/min/1.73 square meters)    Stage 2 Mild CKD (GFR = 60-89 mL/min/1.73 square meters)    Stage 3A Moderate CKD (GFR = 45-59 mL/min/1.73 square meters)    Stage 3B Moderate CKD (GFR = 30-44 mL/min/1.73 square meters)    Stage 4 Severe CKD (GFR = 15-29 mL/min/1.73 square meters)    Stage 5 End Stage CKD (GFR <15 mL/min/1.73 square meters)  Note: GFR calculation is accurate only with a steady state creatinine    CBC and differential [[id number]]  (Abnormal) Collected: 08/17/24 1146    Lab Status: Final result Specimen: Blood from Arm, Right Updated: 08/17/24 1156     WBC 3.06 Thousand/uL      RBC 3.38 Million/uL      Hemoglobin 10.8 g/dL      Hematocrit 33.4 %      MCV 99 fL      MCH 32.0 pg      MCHC 32.3 g/dL      RDW 13.2 %      MPV 10.7 fL      Platelets 196 Thousands/uL      nRBC 0 /100 WBCs      Segmented % 52 %      Immature Grans % 1 %      Lymphocytes % 27 %      Monocytes % 10 %      Eosinophils Relative 9 %      Basophils Relative 1 %      Absolute Neutrophils 1.62 Thousands/µL      Absolute Immature Grans 0.02 Thousand/uL      Absolute Lymphocytes 0.83 Thousands/µL      Absolute Monocytes 0.30 Thousand/µL      Eosinophils Absolute  0.26 Thousand/µL      Basophils Absolute 0.03 Thousands/µL                    CT lower extremity wo contrast right   Final Result by Nawaf Medina MD (08/17 2710)      Second through fourth metatarsal neck fractures.      The study was marked in EPIC for immediate notification.         Workstation performed: [id number]         XR ankle 3+ views LEFT   ED Interpretation by Galindo Jaramillo PA-C (08/17 4336)   Nondisplaced bimaleolar fracture of the left ankle.      XR foot 3+ views RIGHT    (Results Pending)   XR chest portable    (Results Pending)              Procedures  Splint application    Date/Time: 8/17/2024 12:26 PM    Performed by: Galindo Jaramillo PA-C  Authorized by: Galindo Jaramillo PA-C  [place] Protocol:  Procedure performed by consultant: nurse assisted.  Risks and benefits: risks, benefits and alternatives were discussed  Consent given by: patient  Patient understanding: patient states understanding of the procedure being performed  Patient identity confirmed: verbally with patient    Pre-procedure details:     Sensation:  Normal  Procedure details:     Laterality:  Left    Location:  Ankle    Ankle:  L ankle    Splint type:  Sugar tong and short leg    Supplies:  Cotton padding, elastic bandage and Ortho-Glass  Post-procedure details:     Pain:  Unchanged    Sensation:  Normal    Patient tolerance of procedure:  Tolerated well, no immediate complications           ED Course  ED Course as of 08/17/24 1453   Sat Aug 17, 2024   1228 Reached out to ortho call, Dr. Winter, about nondisplaced bimaleolar fx of left ankle. Recommend posterior short leg and sugar tong splint. He notes not immediate interventions necessary at this time. They can f/u in outpatient setting. Due to patient having baseline trouble ambulating at home and this fracture as well as right foot pain we feel the patient would benefit from admission to the hospital.    1326 Spoke to hospitalist, Dr. Tenorio, who will  admit patient to medicine.                                  SBIRT 22yo+      Flowsheet Row Most Recent Value   Initial Alcohol Screen: US AUDIT-C     1. How often do you have a drink containing alcohol? 0 Filed at: 08/17/2024 0846   2. How many drinks containing alcohol do you have on a typical day you are drinking?  0 Filed at: 08/17/2024 0846   3b. FEMALE Any Age, or MALE 65+: How often do you have 4 or more drinks on one occassion? 0 Filed at: 08/17/2024 0846   Audit-C Score 0 Filed at: 08/17/2024 0846   ADOLPH: How many times in the past year have you...    Used an illegal drug or used a prescription medication for non-medical reasons? Never Filed at: 08/17/2024 0846                      Medical Decision Making  Patient's pain is much more controlled and she appears to be in no acute distress prior to admission.  Her left ankle has been splinted.  I spoke with on-call Ortho who reports that the left ankle fracture does not require any acute interventions at this time can be followed up in the outpatient setting or on the floor.  Admit to medicine due to patient's inability to ambulate on leg as well as ALEJANDRO.  Discussed plan of admission with the patient who reports understanding and is content with current plan. Right foot x-ray had arthritic changes but not obvious findings for fx, however, was flagged by AI reading of right foot so ordered right foot CT scan to r/o fracture. Radiographs reviewed by ED attending, Dr. Donahue, who agrees with plan.     Amount and/or Complexity of Data Reviewed  Labs: ordered.  Radiology: ordered and independent interpretation performed.    Risk  Prescription drug management.  Decision regarding hospitalization.                 Disposition  Final diagnoses:   Closed bimalleolar fracture of left ankle, initial encounter   ALEJANDRO (acute kidney injury) (HCC)   Anemia, unspecified type   Leukopenia, unspecified type     Time reflects when diagnosis was documented in both MDM as applicable  and the Disposition within this note       Time User Action Codes Description Comment    8/17/2024 11:25 AM Galindo Jaramillo [S82.842A] Closed bimalleolar fracture of left ankle, initial encounter     8/17/2024  1:20 PM Galindo Jaramillo [N17.9] ALEJANDRO (acute kidney injury) (HCC)     8/17/2024  1:21 PM Galindo Jaramillo [D64.9] Anemia, unspecified type     8/17/2024  1:21 PM Galindo Jaramillo [D72.819] Leukopenia, unspecified type           ED Disposition       ED Disposition   Admit    Condition   Stable    Date/Time   Sat Aug 17, 2024 1329    Comment   Case was discussed with Dr. Buck Tenorio and the patient's admission status was agreed to be Admission Status: inpatient status to the service of Dr. Buck Tenorio .               Follow-up Information    None         Current Discharge Medication List        CONTINUE these medications which have NOT CHANGED    Details   azaTHIOprine (IMURAN) 50 mg tablet Take 100 mg by mouth daily at bedtime      diphenhydrAMINE (BENADRYL) 25 mg capsule Take 25 mg by mouth every 8 (eight) hours as needed for itching. Indications: Itching      ergocalciferol (ERGOCALCIFEROL) 1.25 MG (01855 UT) capsule Take 50,000 Units by mouth once a week. Every Monday AM  Indications: Vitamin D Deficiency      FOLIC ACID PO Take by mouth      gabapentin (NEURONTIN) 300 mg capsule Take 600 mg by mouth 3 (three) times a day      latanoprost (XALATAN) 0.005 % ophthalmic solution 1 DROP INTO BOTH EYES BEFORE BEDTIME      metoprolol succinate (TOPROL-XL) 25 mg 24 hr tablet       pantoprazole (PROTONIX) 40 mg tablet Take 1 tablet (40 mg total) by mouth daily  Qty: 90 tablet, Refills: 3    Associated Diagnoses: Gastroesophageal reflux disease without esophagitis      Prevymis 480 MG TABS Take 480 mg by mouth daily      tacrolimus (PROGRAF) 1 mg capsule Take 3 mg by mouth every 12 (twelve) hours 3 capsules AM  3 capsules PM      thiamine 100 MG tablet Take 100 mg by mouth daily. Indications:  Deficiency of Vitamin B1      loperamide (IMODIUM A-D) 2 MG tablet Take 1 tablet (2 mg total) by mouth 4 (four) times a day as needed for diarrhea    Associated Diagnoses: Functional diarrhea      meclizine (ANTIVERT) 12.5 MG tablet Take 12.5 mg by mouth 3 (three) times a day as needed for dizziness      melatonin 3 mg Take 3 mg by mouth daily at bedtime as needed (sleep). Indications: Trouble Sleeping      predniSONE 5 mg tablet Take 2.5 mg by mouth every other day Every other day x 10 days; then discontinued      Premarin vaginal cream Insert 1 g into the vagina 2 (two) times a week  Qty: 30 g, Refills: 3    Associated Diagnoses: Atrophic vaginitis      Prolia 60 MG/ML USE 1 ML SUBCUTANOEUS EVERY 6 MONTHS AS DIRECTED. PLEASE DELIVER ...  (REFER TO PRESCRIPTION NOTES).      traMADol (ULTRAM) 50 mg tablet Take 1 PO BID PRN for pain for ongoing therapy  Qty: 45 tablet, Refills: 2    Associated Diagnoses: Chronic pain syndrome; Lumbar radiculopathy; Lumbar spondylosis; DDD (degenerative disc disease), lumbar; Other inflammatory polyneuropathies (HCC)      traMADol (ULTRAM-ER) 200 MG 24 hr tablet take 1 tablet by mouth once daily for pain for ongoing therapy DO NOT FILL BEFORE: 05/26/24  Qty: 30 tablet, Refills: 2    Associated Diagnoses: Chronic pain syndrome; Lumbar radiculopathy; Lumbar spondylosis; DDD (degenerative disc disease), lumbar; Other inflammatory polyneuropathies (HCC)             No discharge procedures on file.    PDMP Review         Value Time User    PDMP Reviewed  Yes 5/14/2024  8:28 AM Debra Herrera PA-C            ED Provider  Electronically Signed by             Galindo Jaramillo PA-C  08/17/24 [id number]

## 2024-08-17 NOTE — ASSESSMENT & PLAN NOTE
CT RLE shows 2nd through 4th metatarsal neck fractures  Continue pain control  Consulting orthopedic surgery  PT/OT

## 2024-08-17 NOTE — ASSESSMENT & PLAN NOTE
Lab Results   Component Value Date    EGFR 17 08/17/2024    EGFR 52 (L) 07/09/2024    EGFR 62 11/28/2023    CREATININE 2.75 (H) 08/17/2024    CREATININE 1.19 (H) 07/09/2024    CREATININE 0.98 11/28/2023     Cr 0.98 on 11/28/23, presents with Cr of 2.75  UA pending  Continue IVFs  Avoid nephrotoxic agents  Tacrolimus level pending  Consulting nephrology  If renal function does not improve, consider ultrasound

## 2024-08-18 ENCOUNTER — APPOINTMENT (INPATIENT)
Dept: ULTRASOUND IMAGING | Facility: HOSPITAL | Age: 64
DRG: 562 | End: 2024-08-18
Payer: COMMERCIAL

## 2024-08-18 ENCOUNTER — APPOINTMENT (INPATIENT)
Dept: RADIOLOGY | Facility: HOSPITAL | Age: 64
DRG: 562 | End: 2024-08-18
Payer: COMMERCIAL

## 2024-08-18 LAB
ANION GAP SERPL CALCULATED.3IONS-SCNC: 7 MMOL/L (ref 4–13)
BACTERIA UR QL AUTO: ABNORMAL /HPF
BILIRUB UR QL STRIP: NEGATIVE
BUDDING YEAST: PRESENT
BUN SERPL-MCNC: 27 MG/DL (ref 5–25)
CALCIUM SERPL-MCNC: 9.6 MG/DL (ref 8.4–10.2)
CHLORIDE SERPL-SCNC: 105 MMOL/L (ref 96–108)
CLARITY UR: CLEAR
CO2 SERPL-SCNC: 26 MMOL/L (ref 21–32)
COLOR UR: YELLOW
CREAT SERPL-MCNC: 2.62 MG/DL (ref 0.6–1.3)
ERYTHROCYTE [DISTWIDTH] IN BLOOD BY AUTOMATED COUNT: 13.4 % (ref 11.6–15.1)
GFR SERPL CREATININE-BSD FRML MDRD: 19 ML/MIN/1.73SQ M
GLUCOSE SERPL-MCNC: 119 MG/DL (ref 65–140)
GLUCOSE UR STRIP-MCNC: NEGATIVE MG/DL
HCT VFR BLD AUTO: 32.7 % (ref 34.8–46.1)
HGB BLD-MCNC: 10.9 G/DL (ref 11.5–15.4)
HGB UR QL STRIP.AUTO: NEGATIVE
KETONES UR STRIP-MCNC: NEGATIVE MG/DL
LEUKOCYTE ESTERASE UR QL STRIP: NEGATIVE
MCH RBC QN AUTO: 32 PG (ref 26.8–34.3)
MCHC RBC AUTO-ENTMCNC: 33.3 G/DL (ref 31.4–37.4)
MCV RBC AUTO: 96 FL (ref 82–98)
MUCOUS THREADS UR QL AUTO: ABNORMAL
NITRITE UR QL STRIP: NEGATIVE
NON-SQ EPI CELLS URNS QL MICRO: ABNORMAL /HPF
PH UR STRIP.AUTO: 6.5 [PH]
PLATELET # BLD AUTO: 175 THOUSANDS/UL (ref 149–390)
PMV BLD AUTO: 9.6 FL (ref 8.9–12.7)
POTASSIUM SERPL-SCNC: 5 MMOL/L (ref 3.5–5.3)
PROT UR STRIP-MCNC: ABNORMAL MG/DL
RBC # BLD AUTO: 3.41 MILLION/UL (ref 3.81–5.12)
RBC #/AREA URNS AUTO: ABNORMAL /HPF
SODIUM SERPL-SCNC: 138 MMOL/L (ref 135–147)
SP GR UR STRIP.AUTO: 1.01 (ref 1–1.03)
TACROLIMUS BLD-MCNC: 21.1 NG/ML (ref 3–15)
UROBILINOGEN UR STRIP-ACNC: <2 MG/DL
WBC # BLD AUTO: 5.24 THOUSAND/UL (ref 4.31–10.16)
WBC #/AREA URNS AUTO: ABNORMAL /HPF

## 2024-08-18 PROCEDURE — 85027 COMPLETE CBC AUTOMATED: CPT | Performed by: STUDENT IN AN ORGANIZED HEALTH CARE EDUCATION/TRAINING PROGRAM

## 2024-08-18 PROCEDURE — 73564 X-RAY EXAM KNEE 4 OR MORE: CPT

## 2024-08-18 PROCEDURE — 80048 BASIC METABOLIC PNL TOTAL CA: CPT | Performed by: STUDENT IN AN ORGANIZED HEALTH CARE EDUCATION/TRAINING PROGRAM

## 2024-08-18 PROCEDURE — 76775 US EXAM ABDO BACK WALL LIM: CPT

## 2024-08-18 PROCEDURE — 99232 SBSQ HOSP IP/OBS MODERATE 35: CPT | Performed by: HOSPITALIST

## 2024-08-18 PROCEDURE — 99223 1ST HOSP IP/OBS HIGH 75: CPT | Performed by: ORTHOPAEDIC SURGERY

## 2024-08-18 PROCEDURE — 99223 1ST HOSP IP/OBS HIGH 75: CPT | Performed by: INTERNAL MEDICINE

## 2024-08-18 RX ORDER — AMLODIPINE BESYLATE 5 MG/1
5 TABLET ORAL DAILY
Status: DISCONTINUED | OUTPATIENT
Start: 2024-08-18 | End: 2024-08-24 | Stop reason: HOSPADM

## 2024-08-18 RX ADMIN — HEPARIN SODIUM 5000 UNITS: 5000 INJECTION INTRAVENOUS; SUBCUTANEOUS at 13:32

## 2024-08-18 RX ADMIN — OXYCODONE HYDROCHLORIDE 10 MG: 10 TABLET ORAL at 23:13

## 2024-08-18 RX ADMIN — HEPARIN SODIUM 5000 UNITS: 5000 INJECTION INTRAVENOUS; SUBCUTANEOUS at 06:39

## 2024-08-18 RX ADMIN — SODIUM CHLORIDE 75 ML/HR: 0.9 INJECTION, SOLUTION INTRAVENOUS at 19:23

## 2024-08-18 RX ADMIN — PANTOPRAZOLE SODIUM 40 MG: 40 TABLET, DELAYED RELEASE ORAL at 07:54

## 2024-08-18 RX ADMIN — TACROLIMUS 3 MG: 1 CAPSULE ORAL at 07:54

## 2024-08-18 RX ADMIN — OXYCODONE HYDROCHLORIDE 5 MG: 5 TABLET ORAL at 19:10

## 2024-08-18 RX ADMIN — ACETAMINOPHEN 650 MG: 325 TABLET, FILM COATED ORAL at 13:33

## 2024-08-18 RX ADMIN — GABAPENTIN 600 MG: 300 CAPSULE ORAL at 21:11

## 2024-08-18 RX ADMIN — METHOCARBAMOL TABLETS 500 MG: 500 TABLET, COATED ORAL at 04:26

## 2024-08-18 RX ADMIN — METHOCARBAMOL TABLETS 500 MG: 500 TABLET, COATED ORAL at 12:23

## 2024-08-18 RX ADMIN — OXYCODONE HYDROCHLORIDE 10 MG: 10 TABLET ORAL at 04:26

## 2024-08-18 RX ADMIN — LATANOPROST 1 DROP: 50 SOLUTION OPHTHALMIC at 21:12

## 2024-08-18 RX ADMIN — OXYCODONE HYDROCHLORIDE 10 MG: 10 TABLET ORAL at 12:23

## 2024-08-18 RX ADMIN — TACROLIMUS 3 MG: 1 CAPSULE ORAL at 21:11

## 2024-08-18 RX ADMIN — METOPROLOL SUCCINATE 25 MG: 25 TABLET, EXTENDED RELEASE ORAL at 07:53

## 2024-08-18 RX ADMIN — AZATHIOPRINE 100 MG: 50 TABLET ORAL at 21:11

## 2024-08-18 RX ADMIN — HYDROMORPHONE HYDROCHLORIDE 2 MG: 2 INJECTION, SOLUTION INTRAMUSCULAR; INTRAVENOUS; SUBCUTANEOUS at 07:53

## 2024-08-18 RX ADMIN — AMLODIPINE BESYLATE 5 MG: 5 TABLET ORAL at 13:32

## 2024-08-18 RX ADMIN — HEPARIN SODIUM 5000 UNITS: 5000 INJECTION INTRAVENOUS; SUBCUTANEOUS at 21:11

## 2024-08-18 RX ADMIN — GABAPENTIN 600 MG: 300 CAPSULE ORAL at 16:09

## 2024-08-18 RX ADMIN — GABAPENTIN 600 MG: 300 CAPSULE ORAL at 07:53

## 2024-08-18 RX ADMIN — Medication 100 MG: at 07:53

## 2024-08-18 NOTE — ASSESSMENT & PLAN NOTE
CT RLE shows 2nd through 4th metatarsal neck fractures  Continue pain control   orthopedic surgery - non surgical management recommended  PT/OT

## 2024-08-18 NOTE — PLAN OF CARE
Problem: Potential for Falls  Goal: Patient will remain free of falls  Description: INTERVENTIONS:  - Educate patient/family on patient safety including physical limitations  - Instruct patient to call for assistance with activity   - Consult OT/PT to assist with strengthening/mobility   - Keep Call bell within reach  - Keep bed low and locked with side rails adjusted as appropriate  - Keep care items and personal belongings within reach  - Initiate and maintain comfort rounds  - Make Fall Risk Sign visible to staff  - Offer Toileting every 2 Hours, in advance of need  - Initiate/Maintain alarm  - Obtain necessary fall risk management equipment:   - Apply yellow socks and bracelet for high fall risk patients  - Consider moving patient to room near nurses station  Outcome: Progressing     Problem: PAIN - ADULT  Goal: Verbalizes/displays adequate comfort level or baseline comfort level  Description: Interventions:  - Encourage patient to monitor pain and request assistance  - Assess pain using appropriate pain scale  - Administer analgesics based on type and severity of pain and evaluate response  - Implement non-pharmacological measures as appropriate and evaluate response  - Consider cultural and social influences on pain and pain management  - Notify physician/advanced practitioner if interventions unsuccessful or patient reports new pain  Outcome: Progressing     Problem: INFECTION - ADULT  Goal: Absence or prevention of progression during hospitalization  Description: INTERVENTIONS:  - Assess and monitor for signs and symptoms of infection  - Monitor lab/diagnostic results  - Monitor all insertion sites, i.e. indwelling lines, tubes, and drains  - Monitor endotracheal if appropriate and nasal secretions for changes in amount and color  - [place] appropriate cooling/warming therapies per order  - Administer medications as ordered  - Instruct and encourage patient and family to use good hand hygiene  technique  - Identify and instruct in appropriate isolation precautions for identified infection/condition  Outcome: Progressing  Goal: Absence of fever/infection during neutropenic period  Description: INTERVENTIONS:  - Monitor WBC    Outcome: Progressing     Problem: SAFETY ADULT  Goal: Patient will remain free of falls  Description: INTERVENTIONS:  - Educate patient/family on patient safety including physical limitations  - Instruct patient to call for assistance with activity   - Consult OT/PT to assist with strengthening/mobility   - Keep Call bell within reach  - Keep bed low and locked with side rails adjusted as appropriate  - Keep care items and personal belongings within reach  - Initiate and maintain comfort rounds  - Make Fall Risk Sign visible to staff  - Offer Toileting every 2 Hours, in advance of need  - Initiate/Maintain alarm  - Obtain necessary fall risk management equipment:   - Apply yellow socks and bracelet for high fall risk patients  - Consider moving patient to room near nurses station  Outcome: Progressing  Goal: Maintain or return to baseline ADL function  Description: INTERVENTIONS:  -  Assess patient's ability to carry out ADLs; assess patient's baseline for ADL function and identify physical deficits which impact ability to perform ADLs (bathing, care of mouth/teeth, toileting, grooming, dressing, etc.)  - Assess/evaluate cause of self-care deficits   - Assess range of motion  - Assess patient's mobility; develop plan if impaired  - Assess patient's need for assistive devices and provide as appropriate  - Encourage maximum independence but intervene and supervise when necessary  - Involve family in performance of ADLs  - Assess for home care needs following discharge   - Consider OT consult to assist with ADL evaluation and planning for discharge  - Provide patient education as appropriate  Outcome: Progressing  Goal: Maintains/Returns to pre admission functional level  Description:  INTERVENTIONS:  - Perform AM-PAC 6 Click Basic Mobility/ Daily Activity assessment daily.  - Set and communicate daily mobility goal to care team and patient/family/caregiver.   - Collaborate with rehabilitation services on mobility goals if consulted  - Perform Range of Motion 6 times a day.  - Reposition patient every 2 hours.  - Dangle patient 0 times a day  - Stand patient 0 times a day  - Ambulate patient 0 times a day  - Out of bed to chair 0 times a day   - Out of bed for meals 0 times a day  - Out of bed for toileting  - Record patient progress and toleration of activity level   Outcome: Progressing     Problem: DISCHARGE PLANNING  Goal: Discharge to home or other facility with appropriate resources  Description: INTERVENTIONS:  - Identify barriers to discharge w/patient and caregiver  - Arrange for needed discharge resources and transportation as appropriate  - Identify discharge learning needs (meds, wound care, etc.)  - Arrange for interpretive services to assist at discharge as needed  - Refer to Case Management Department for coordinating discharge planning if the patient needs post-hospital services based on physician/advanced practitioner order or complex needs related to functional status, cognitive ability, or social support system  Outcome: Progressing     Problem: Knowledge Deficit  Goal: Patient/family/caregiver demonstrates understanding of disease process, treatment plan, medications, and discharge instructions  Description: Complete learning assessment and assess knowledge base.  Interventions:  - Provide teaching at level of understanding  - Provide teaching via preferred learning methods  Outcome: Progressing     Problem: Prexisting or High Potential for Compromised Skin Integrity  Goal: Skin integrity is maintained or improved  Description: INTERVENTIONS:  - Identify patients at risk for skin breakdown  - Assess and monitor skin integrity  - Assess and monitor nutrition and hydration  status  - Monitor labs   - Assess for incontinence   - Turn and reposition patient  - Assist with mobility/ambulation  - Relieve pressure over bony prominences  - Avoid friction and shearing  - Provide appropriate hygiene as needed including keeping skin clean and dry  - Evaluate need for skin moisturizer/barrier cream  - Collaborate with interdisciplinary team   - Patient/family teaching  - Consider wound care consult   Outcome: Progressing

## 2024-08-18 NOTE — CONSULTS
Consultation - Nephrology   Linda Becerra 63 y.o. female MRN: [id number]  Unit/Bed#: -01 Encounter: [id number]    ASSESSMENT/PLAN:  ALEJANDRO(POA) on suspected CKD II: suspect component of pre-renal with decreased oral intake.  -baseline creatinine: 0.7-0.9.  -creatinine is currently: 2.62.  -presented with creatinine of : 2.75.  -receiving IVF, decrease rate to 75 cc/hr. Would discontinue if O2 requirement increases and may give Lasix 20 mg IV x 1.  -renal US: pending.   -UA: trace protein, 4-10 WBC's.  -check bladder scan PVR. Follow retention protocol.   -continue to avoid nephrotoxins, hypotension. IV contrast.   -I/O.     Hypertension: BP overall stable. Slightly elevated, suspect due to pain.   -checking orthostatics.   -current medication: metoprolol 25 mg po daily.   -home medication: metoprolol 25 mg po daily.  -may add amlodipine 5 mg po daily.     History of Liver transplant: due to alcoholic cirrhosis in May 2022 at [facility]. Recently weaned off of prednisone.   -continue imuran 100 mg po daily, tacrolimus 3 mg every 12 hours.   -tac level pending.    Cytomegaolvirus: following with GI, treatment with prevymis (non-formulary). Plan to follow OP with ID.    Anemia:   -continue to monitor and transfuse as needed.     Other: fracture of metatarsal with non surgical management, blosed bimalleolar fracture, LE weakness.      HISTORY OF PRESENT ILLNESS:  Requesting Physician: Martha Singh MD  Reason for Consult: ALEJANDRO (POA), liver transplant recipient.     Linda Becerra is a 63 y.o. year old female who was admitted to [facility] after presenting S/P fall. She reports losing her balance resulting in a fall. She denies LOC. She denies hitting her head. She reports history of  elevation in creatinine a couple months ago with initiation of Lasix. She denies NSAID use. She denies issues with urination. She reports decreased appetite over the past few weeks. A renal consultation is requested today for assistance  in the management of ALEJANDRO (POA).    PAST MEDICAL HISTORY:  Past Medical History:   Diagnosis Date    Anemia     Anxiety     Asthma     Chronic pain disorder     Clotting disorder (HCC)     Concussion     Constipation     CSF leak     Fractures     GERD (gastroesophageal reflux disease)     Head injury     Hernia 2022    High blood pressure     History of transfusion     Hyperbilirubinemia 04/28/2022    Hypertension     Liver failure (HCC)     Liver transplant recipient (HCC)     Migraines     Peripheral neuropathy     Urinary frequency     Varicella 1968    As a child       PAST SURGICAL HISTORY:  Past Surgical History:   Procedure Laterality Date    ABLATION SOFT TISSUE      BREAST LUMPECTOMY      COLONOSCOPY  11/2/2023    IR PARACENTESIS  04/29/2022    IR PARACENTESIS  05/09/2022    IR PARACENTESIS  05/12/2022    LAPAROSCOPY FOR ECTOPIC PREGNANCY      LIVER BIOPSY  7/3/2023    Plus 3 previous in hospital    LIVER TRANSPLANTATION  5/17/22    MAMMO (HISTORICAL) Bilateral 01/14/2021    GV BI-RADS 2 Benign findings. after U/S Scattered areas fibrogladulae density; 25% to 50% glandular breast tissue    MRI GUIDED BREAST WIRE LOCALIZATION LEFT Left 02/23/2015    MRI GUIDED BREAST WIRE LOCALIZATION LEFT Left 02/23/2015    SC EXC TUMOR SOFT TISS FACE&SCALP SUBFASCIAL <2CM N/A 8/1/2024    Procedure: EXCISION OF SUBCUTANEOUS MASSES OF FACE X3- RIGHT CHEEK, ROOT OF NOSE, LEFT LOWER EYELID/CHEEK, COMPLEX CLOSURE;  Surgeon: Fuentes Mendez MD;  Location:  MAIN OR;  Service: Plastics    SINUS SURGERY      SINUS SURGERY  2000    removed cartlidge in inside of nose    TONSILECTOMY AND ADNOIDECTOMY         ALLERGIES:  Allergies   Allergen Reactions    Other Other (See Comments)     Seasonal allergies         SOCIAL HISTORY:  Social History     Substance and Sexual Activity   Alcohol Use Not Currently    Comment: socially, had glass of wine today     Social History     Substance and Sexual Activity   Drug Use Not Currently     Types: Marijuana    Comment: CBD  medical [facility]     Social History     Tobacco Use   Smoking Status Former    Current packs/day: 0.00    Average packs/day: 1 pack/day for 25.0 years (25.0 ttl pk-yrs)    Types: Cigarettes    Start date:     Quit date: 2019    Years since quittin.6   Smokeless Tobacco Never       FAMILY HISTORY:  Family History   Problem Relation Age of Onset    Cancer Mother     Breast cancer Mother     Cancer Father     Colon cancer Father     Arthritis Father     Cancer Sister     Breast cancer Sister     Arthritis Brother     No Known Problems Paternal Grandmother     No Known Problems Paternal Grandfather     Autoimmune disease Daughter     No Known Problems Son     No Known Problems Son     Heart disease Half-Brother     Prostate cancer Half-Brother     Melanoma Half-Brother        MEDICATIONS:    Current Facility-Administered Medications:     acetaminophen (TYLENOL) tablet 650 mg, 650 mg, Oral, Q6H PRN, Cathie Tenorio,     azaTHIOprine (IMURAN) tablet 100 mg, 100 mg, Oral, HS, Cathie Tenorio, DO, 100 mg at 24    doxylamine (UNISOM) tablet 12.5 mg, 12.5 mg, Oral, HS PRN, Cathie Tenorio,     gabapentin (NEURONTIN) capsule 600 mg, 600 mg, Oral, TID, Cathie Tenorio, DO, 600 mg at 24 0753    heparin (porcine) subcutaneous injection 5,000 Units, 5,000 Units, Subcutaneous, Q8H CITLALI, 5,000 Units at 24 0639 **AND** Platelet count, , , Once, Cathie Tenorio,     HYDROmorphone (DILAUDID) injection 2 mg, 2 mg, Intravenous, Q3H PRN, Cathie Tenorio, DO, 2 mg at 24 0753    latanoprost (XALATAN) 0.005 % ophthalmic solution 1 drop, 1 drop, Both Eyes, HS, Cathie Tenorio, DO, 1 drop at 24 2103    methocarbamol (ROBAXIN) tablet 500 mg, 500 mg, Oral, Q6H PRN, Cathie Sagean, DO, 500 mg at 24 0426    metoprolol succinate (TOPROL-XL) 24 hr tablet 25 mg, 25 mg, Oral, Daily, Cathie Tenorio, DO, 25 mg at 24 0753    oxyCODONE (ROXICODONE) immediate release tablet 10 mg, 10 mg,  Oral, Q4H PRN, Cathie Tenorio, DO, 10 mg at 08/18/24 0426    oxyCODONE (ROXICODONE) IR tablet 5 mg, 5 mg, Oral, Q4H PRN, Cathie Tenorio, DO    pantoprazole (PROTONIX) EC tablet 40 mg, 40 mg, Oral, Daily, Cathie Tenorio, DO, 40 mg at 08/18/24 0754    sodium chloride 0.9 % infusion, 100 mL/hr, Intravenous, Continuous, Cathie Tenorio, DO, Last Rate: 100 mL/hr at 08/17/24 2329, 100 mL/hr at 08/17/24 2329    tacrolimus (PROGRAF) capsule 3 mg, 3 mg, Oral, Q12H CITLALI, Cathie Tenorio, DO, 3 mg at 08/18/24 0754    thiamine tablet 100 mg, 100 mg, Oral, Daily, Cathie Tenorio, DO, 100 mg at 08/18/24 0753    REVIEW OF SYSTEMS:  A complete review of systems was performed and found to be negative unless otherwise noted in the history of present illness.  General: No fevers, chills.   Cardiovascular:  No chest pain, + leg edema.  Respiratory: No cough, sputum production,  No shortness of breath.  Gastrointestinal:  No nausea/vomiting,  No diarrhea,  No abdominal pain.  Genitourinary: No hematuria.  No foamy urine.  No dysuria    PHYSICAL EXAM:  Current Weight: Weight - Scale: 82.3 kg (181 lb 7 oz)  First Weight: Weight - Scale: 77.1 kg (170 lb)  Vitals:    08/17/24 1916 08/18/24 0437 08/18/24 0751 08/18/24 0812   BP: 161/85  150/79    BP Location: Left arm  Left arm    Pulse: 74  88    Resp: 16  20    Temp: 97.9 °F (36.6 °C)  98.8 °F (37.1 °C)    TempSrc:   Temporal    SpO2: 96%  90% (!) 82%   Weight:  82.3 kg (181 lb 7 oz)     Height:           Intake/Output Summary (Last 24 hours) at 8/18/2024 1210  Last data filed at 8/18/2024 0801  Gross per 24 hour   Intake 946.67 ml   Output 350 ml   Net 596.67 ml     General: NAD  Skin: warm, dry, intact, no rash  HEENT: Moist mucous membranes, sclera anicteric, normocephalic, atraumatic  Neck: No apparent JVD appreciated  Chest:lung sounds clear B/L, on O2  CVS:Regular rate and rhythm, no murmer   Abdomen: Soft, round, non-tender, +BS  Extremities: No B/L LE edema present, LLE cast  Neuro: alert and  oriented  Psych: appropriate mood and affect     Invasive Devices:      Lab Results:   Results from last 7 days   Lab Units 08/18/24  0435 08/17/24  1146   WBC Thousand/uL 5.24 3.06*   HEMOGLOBIN g/dL 10.9* 10.8*   HEMATOCRIT % 32.7* 33.4*   PLATELETS Thousands/uL 175 196   SODIUM mmol/L 138 139   POTASSIUM mmol/L 5.0 4.9   CHLORIDE mmol/L 105 103   CO2 mmol/L 26 28   BUN mg/dL 27* 28*   CREATININE mg/dL 2.62* 2.75*   CALCIUM mg/dL 9.6 9.7   ALK PHOS U/L  --  39   ALT U/L  --  8   AST U/L  --  20

## 2024-08-18 NOTE — QUICK NOTE
Left knee x-rays were reviewed. No acute fracture. Mild degenerative changes. Follow up with Dr. Mckeon as outpatient in 2 weeks as recommended in consult note.

## 2024-08-18 NOTE — ASSESSMENT & PLAN NOTE
Lab Results   Component Value Date    EGFR 19 08/18/2024    EGFR 17 08/17/2024    EGFR 52 (L) 07/09/2024    CREATININE 2.62 (H) 08/18/2024    CREATININE 2.75 (H) 08/17/2024    CREATININE 1.19 (H) 07/09/2024     Cr 0.98 on 11/28/23, presents with Cr of 2.75  Continue IVFs  Avoid nephrotoxic agents  Tacrolimus level pending  Consulting nephrology  Check renal us.

## 2024-08-18 NOTE — DISCHARGE INSTR - AVS FIRST PAGE
Discharge Instructions - Orthopedics  Linda Becerra 63 y.o. female MRN: [id number]  Unit/Bed#: [facility] ULTRASOUND    Weight Bearing Status:                                           WBAT to RLE with post op shoe, NWB to LLE with splint    DVT prophylaxis:  As per medical team    Pain:  Continue analgesics as directed    Splint Instructions:   Please keep clean, dry and intact until follow up. DO NOT REMOVE.     Appt Instructions:   If you do not have your appointment, please call the clinic at [phone] to schedule follow up with Dr. Mckeon in 2 weeks  Otherwise follow up as scheduled.    Contact the office sooner if you experience any increased numbness/tingling in the extremities.      Miscellaneous:  Ice and elevation to right foot and left ankle

## 2024-08-18 NOTE — PROGRESS NOTES
[facility]  Progress Note  Name: Linda Becerra I  MRN: [id number]  Unit/Bed#: -01 I Date of Admission: 8/17/2024   Date of Service: 8/18/2024 I Hospital Day: 1    Assessment & Plan   Closed fracture of metatarsal neck, right, initial encounter  Assessment & Plan  CT RLE shows 2nd through 4th metatarsal neck fractures  Continue pain control   orthopedic surgery - non surgical management recommended  PT/OT    Macrocytic anemia  Assessment & Plan  Hgb 10.8, MCV 99  Continue to monitor    Hypertension  Assessment & Plan  Initially hypertensive upon arrival, possibly due to underlying pain  BP improved  Continue home medications: Toprol-XL 25mg q24hr    Liver transplant recipient (HCC)  Assessment & Plan  Received liver transplant in May 2002 at [facility]   2/2 alcoholic cirrhosis  Follows with hepatology   Continue azathiorpine and tacrolimus  Pending tacrolimus level, continue to monitor  States she was weaned off prednisone recently    Acute kidney injury superimposed on chronic kidney disease  (HCC)  Assessment & Plan  Lab Results   Component Value Date    EGFR 19 08/18/2024    EGFR 17 08/17/2024    EGFR 52 (L) 07/09/2024    CREATININE 2.62 (H) 08/18/2024    CREATININE 2.75 (H) 08/17/2024    CREATININE 1.19 (H) 07/09/2024     Cr 0.98 on 11/28/23, presents with Cr of 2.75  Continue IVFs  Avoid nephrotoxic agents  Tacrolimus level pending  Consulting nephrology  Check renal us.    Weakness of left lower extremity  Assessment & Plan  Continue gabapentin  States her legs ''gave out'' and she has had progressing weakness  Of note, appears to have had cellulitis on BL LE for which she was on cephalexin outpatient (started taking on Wednesday)  Left lower extremity in splint and RLE appears to have improved    * Closed left ankle fracture  Assessment & Plan  Presents with fall from home  ECG NSR, HR 70s  XR left foot - nondisplaced bimalleolar fracture  XR right foot pending  CT RLE -  Second through fourth metatarsal neck fractures.   ED spoke with ortho Dr. Edson Burk - plan to splint, no immediate intervention at this time  PT/OT evaluation  Continue pain control          VTE  Prophylaxis:   Pharmacologic: in place  Mechanical VTE Prophylaxis in Place: Yes    Patient Centered Rounds: I have performed bedside rounds with nursing staff today.    Discussions with Specialists or Other Care Team Provider: case management    Education and Discussions with Family / Patient: yes    Mobility:   Basic Mobility Inpatient Raw Score: 10  -[facility] Goal: 4: Move to chair/commode  -HLM Achieved: 2: Bed activities/Dependent transfer      Current Length of Stay: 1 day(s)    Current Patient Status: Inpatient        Code Status: Level 1 - Full Code    Discharge Plan: Pt will require continued inpatient hospitalization.    Subjective:   Pt states LE pain is present   Pain meds helping  Reports malaise and poor appetite    Patient is seen and examined at bedside.  All other ROS are negative.    Objective:     Vitals:   Temp (24hrs), Av.1 °F (36.7 °C), Min:97.7 °F (36.5 °C), Max:98.8 °F (37.1 °C)    Temp:  [97.7 °F (36.5 °C)-98.8 °F (37.1 °C)] 98.8 °F (37.1 °C)  HR:  [61-88] 88  Resp:  [16-20] 20  BP: (136-178)/(64-90) 150/79  SpO2:  [82 %-96 %] 82 %  Body mass index is 30.19 kg/m².     Input and Output Summary (last 24 hours):       Intake/Output Summary (Last 24 hours) at 2024 0949  Last data filed at 2024 0801  Gross per 24 hour   Intake 946.67 ml   Output 350 ml   Net 596.67 ml       Physical Exam:       GEN: No acute distress, comfortable  HEEENT: No JVD, PERRLA, no scleral icterus  RESP: Lungs clear to auscultation bilaterally  CV: RRR, +s1/s2   ABD: SOFT NON TENDER, POSITIVE BOWEL SOUNDS, NO DISTENTION  PSYCH: CALM  NEURO: Ment[place] baseline, NO FOCAL DEFICITS  SKIN: NO RASH  EXTREM: NO EDEMA; LLE in splint    Additional Data:     Labs:    Results from last 7 days   Lab Units 24  6294  08/17/24  1146   WBC Thousand/uL 5.24 3.06*   HEMOGLOBIN g/dL 10.9* 10.8*   HEMATOCRIT % 32.7* 33.4*   PLATELETS Thousands/uL 175 196   SEGS PCT %  --  52   LYMPHO PCT %  --  27   MONO PCT %  --  10   EOS PCT %  --  9*     Results from last 7 days   Lab Units 08/18/24  0435 08/17/24  1146   SODIUM mmol/L 138 139   POTASSIUM mmol/L 5.0 4.9   CHLORIDE mmol/L 105 103   CO2 mmol/L 26 28   BUN mg/dL 27* 28*   CREATININE mg/dL 2.62* 2.75*   ANION GAP mmol/L 7 8   CALCIUM mg/dL 9.6 9.7   ALBUMIN g/dL  --  3.8   TOTAL BILIRUBIN mg/dL  --  0.57   ALK PHOS U/L  --  39   ALT U/L  --  8   AST U/L  --  20   GLUCOSE RANDOM mg/dL 119 104                       Lines/Drains:  Invasive Devices       Peripheral Intravenous Line  Duration             Peripheral IV 08/17/24 Right Antecubital 1 day                    Telemetry:        * I Have Reviewed All Lab Data Listed Above.           Imaging:     Results for orders placed during the hospital encounter of 04/28/22    XR chest portable    Narrative  CHEST    INDICATION:   hypoxia, increase o2 requirement.    COMPARISON:  4/28/2022    EXAM PERFORMED/VIEWS:  XR CHEST PORTABLE      FINDINGS:    Cardiomediastinal silhouette appears unremarkable.    There is an increased left pleural effusion with overlying atelectasis.    Osseous structures appear within normal limits for patient age.    Impression  Increasing left pleural effusion with left basilar atelectasis.    The study was marked in EPIC for immediate notification.        Workstation performed: [id number]    No results found for this or any previous visit.      *I have reviewed all imaging reports listed above      Recent Cultures (last 7 days):           Last 24 Hours Medication List:   Current Facility-Administered Medications   Medication Dose Route Frequency Provider Last Rate    acetaminophen  650 mg Oral Q6H PRN Cathie Tenorio, DO      azaTHIOprine  100 mg Oral HS Cathie Tenorio, DO      doxylamine  12.5 mg Oral HS PRN Cathie  Tenorio, DO      gabapentin  600 mg Oral TID [facility], [facility]      heparin (porcine)  5,000 Units Subcutaneous Q8H [facility], [facility]      HYDROmorphone  2 mg Intravenous Q3H PRN [facility], DO      HYDROmorphone  2 mg Intravenous Once David Dewey PA-C      latanoprost  1 drop Both Eyes HS [facility], DO      methocarbamol  500 mg Oral Q6H PRN [facility], DO      metoprolol succinate  25 mg Oral Daily [facility], DO      oxyCODONE  10 mg Oral Q4H PRN [facility], DO      oxyCODONE  5 mg Oral Q4H PRN [facility], DO      pantoprazole  40 mg Oral Daily [facility], DO      sodium chloride  100 mL/hr Intravenous Continuous [facility],  mL/hr (08/17/24 6379)    tacrolimus  3 mg Oral Q12H [facility], DO      thiamine  100 mg Oral Daily [facility], DO          Today, Patient Was Seen By: Martha Singh MD    ** Please Note: Dictation voice to text software may have been used in the creation of this document. **

## 2024-08-18 NOTE — ASSESSMENT & PLAN NOTE
-patient has reported hypothyroidism  -TSH wnl    Plan  - continue home dose synthroid      Presents with fall from home  ECG NSR, HR 70s  XR left foot - nondisplaced bimalleolar fracture  XR right foot pending  CT RLE - Second through fourth metatarsal neck fractures.   ED spoke with ortho Dr. Edson Burk - plan to splint, no immediate intervention at this time  PT/OT evaluation  Continue pain control

## 2024-08-18 NOTE — PLAN OF CARE
Problem: INFECTION - ADULT  Goal: Absence or prevention of progression during hospitalization  Description: INTERVENTIONS:  - Assess and monitor for signs and symptoms of infection  - Monitor lab/diagnostic results  - Monitor all insertion sites, i.e. indwelling lines, tubes, and drains  - Monitor endotracheal if appropriate and nasal secretions for changes in amount and color  - [place] appropriate cooling/warming therapies per order  - Administer medications as ordered  - Instruct and encourage patient and family to use good hand hygiene technique  - Identify and instruct in appropriate isolation precautions for identified infection/condition  Outcome: Progressing  Goal: Absence of fever/infection during neutropenic period  Description: INTERVENTIONS:  - Monitor WBC    Outcome: Progressing

## 2024-08-18 NOTE — CONSULTS
Consultation - Orthopedics   Linda Becerra 63 y.o. female MRN: [id number]  Unit/Bed#: -01 Encounter: [id number]      Assessment & Plan     Assessment:  Left ankle bimalleolar nondisplaced fracture  Right foot second, third, fourth, fifth metatarsal neck nondisplaced fracture    Plan:  Should be able to treat the patient's multiple fractures non-surgically as long as the bimalleolar fracture does not further displace.  Nonweightbearing left leg  Weight-bear as tolerated right leg  Postop shoe right foot, if the postop shoe does not help with her pain and weightbearing can change to a short cam walker.  Continue elevation and cold compress  Check x-ray of the left knee  Pain control  Follow-up as outpatient in 2 weeks.      History of Present Illness   Physician Requesting Consult: Martha Singh MD  Reason for Consult / Principal Problem: Right foot and left ankle pain  HPI: Linda Becerra is a 63 y.o. year old female who presents with right foot and left ankle pain.  Patient was walking at home with her cane and fell.  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  After she fell patient has difficulty weightbearing with severe pain in her right foot and left ankle.  She also had problem around her left knee prior to her fall.  She had burning sensation over her left knee before her fall and the symptom had increased with ripping sensation.    Inpatient consult to Orthopedic Surgery  Consult performed by: Maggy Mckeon MD  Consult ordered by: Cathie Tenorio DO          Review of Systems   Constitutional: Negative.    HENT: Negative.     Eyes: Negative.    Respiratory: Negative.     Cardiovascular: Negative.    Gastrointestinal: Negative.    Endocrine: Negative.    Genitourinary: Negative.    Musculoskeletal:  Positive for arthralgias (Right foot, left ankle, left knee), gait problem (Not able to weight-bear) and joint swelling (Right foot, left ankle in  splint).   Skin: Negative.    Allergic/Immunologic: Negative.    Hematological: Negative.    Psychiatric/Behavioral: Negative.         Historical Information   Past Medical History:   Diagnosis Date    Anemia     Anxiety     Asthma     Chronic pain disorder     Clotting disorder (HCC)     Concussion     Constipation     CSF leak     Fractures     GERD (gastroesophageal reflux disease)     Head injury     Hernia 2022    High blood pressure     History of transfusion     Hyperbilirubinemia 04/28/2022    Hypertension     Liver failure (HCC)     Liver transplant recipient (HCC)     Migraines     Peripheral neuropathy     Urinary frequency     Varicella 1968    As a child     Past Surgical History:   Procedure Laterality Date    ABLATION SOFT TISSUE      BREAST LUMPECTOMY      COLONOSCOPY  11/2/2023    IR PARACENTESIS  04/29/2022    IR PARACENTESIS  05/09/2022    IR PARACENTESIS  05/12/2022    LAPAROSCOPY FOR ECTOPIC PREGNANCY      LIVER BIOPSY  7/3/2023    Plus 3 previous in hospital    LIVER TRANSPLANTATION  5/17/22    MAMMO (HISTORICAL) Bilateral 01/14/2021    [facility] BI-RADS 2 Benign findings. after U/S Scattered areas fibrogladulae density; 25% to 50% glandular breast tissue    MRI GUIDED BREAST WIRE LOCALIZATION LEFT Left 02/23/2015    MRI GUIDED BREAST WIRE LOCALIZATION LEFT Left 02/23/2015    TN EXC TUMOR SOFT TISS FACE&SCALP SUBFASCIAL <2CM N/A 8/1/2024    Procedure: EXCISION OF SUBCUTANEOUS MASSES OF FACE X3- RIGHT CHEEK, ROOT OF NOSE, LEFT LOWER EYELID/CHEEK, COMPLEX CLOSURE;  Surgeon: Fuentes Mendez MD;  Location:  MAIN OR;  Service: Plastics    SINUS SURGERY      SINUS SURGERY  2000    removed cartlidge in inside of nose    TONSILECTOMY AND ADNOIDECTOMY       Social History   Social History     Substance and Sexual Activity   Alcohol Use Not Currently    Comment: socially, had glass of wine today     Social History     Substance and Sexual Activity   Drug Use Not Currently    Types: Marijuana    Comment:  "CBD  medical [facility]     E-Cigarette/Vaping    E-Cigarette Use Never User      E-Cigarette/Vaping Substances    Nicotine No     THC No     CBD No     Flavoring No     Other No     Unknown No      Social History     Tobacco Use   Smoking Status Former    Current packs/day: 0.00    Average packs/day: 1 pack/day for 25.0 years (25.0 ttl pk-yrs)    Types: Cigarettes    Start date:     Quit date: 2019    Years since quittin.6   Smokeless Tobacco Never     Family History: non-contributory    Meds/Allergies   all current active meds have been reviewed  Allergies   Allergen Reactions    Other Other (See Comments)     Seasonal allergies         Objective   Vitals: Blood pressure 150/79, pulse 88, temperature 98.8 °F (37.1 °C), temperature source Temporal, resp. rate 20, height 5' 5\" (1.651 m), weight 82.3 kg (181 lb 7 oz), SpO2 (!) 82%, not currently breastfeeding.,Body mass index is 30.19 kg/m².      Intake/Output Summary (Last 24 hours) at 2024 0845  Last data filed at 2024 0801  Gross per 24 hour   Intake 946.67 ml   Output 350 ml   Net 596.67 ml     I/O last 24 hours:  In: 946.7 [I.V.:946.7]  Out: 350 [Urine:350]    Invasive Devices       Peripheral Intravenous Line  Duration             Peripheral IV 24 Right Antecubital <1 day                    Physical Exam  Constitutional:       Appearance: Normal appearance. She is well-developed.   HENT:      Head: Normocephalic and atraumatic.      Right Ear: External ear normal.      Left Ear: External ear normal.   Eyes:      Extraocular Movements: Extraocular movements intact.      Conjunctiva/sclera: Conjunctivae normal.   Pulmonary:      Effort: Pulmonary effort is normal.   Musculoskeletal:      Cervical back: Neck supple.      Left knee: No effusion.   Skin:     General: Skin is warm.   Neurological:      General: No focal deficit present.      Mental Status: She is alert and oriented to person, place, and time.       Right Ankle Exam " "    Tenderness   Right ankle tenderness location: Diffuse tenderness over forefoot.  Swelling: moderate (Forefoot)    Other   Erythema: absent  Sensation: normal     Comments:  Skin is intact  Pain with toe range of motion      Left Ankle Exam     Comments:  Left ankle in sugar-tong and posterior splint  She has good sensation and capillary refill in her toes  She is able to wiggle her toes      Left Knee Exam     Muscle Strength   The patient has normal left knee strength.    Tenderness   Left knee tenderness location: Diffuse posteriorly.    Range of Motion   The patient has normal left knee ROM.  Left knee flexion: Pain posteriorly.     Tests   Varus: negative Valgus: negative  Patellar apprehension: negative    Other   Erythema: absent  Sensation: normal  Pulse: present  Swelling: none  Effusion: no effusion present            Lab Results: CBC:   Lab Results   Component Value Date    WBC 5.24 08/18/2024    HGB 10.9 (L) 08/18/2024    HCT 32.7 (L) 08/18/2024    MCV 96 08/18/2024     08/18/2024    RBC 3.41 (L) 08/18/2024    MCH 32.0 08/18/2024    MCHC 33.3 08/18/2024    RDW 13.4 08/18/2024    MPV 9.6 08/18/2024    NRBC 0 08/17/2024     CMP:   Lab Results   Component Value Date    SODIUM 138 08/18/2024     08/18/2024    CO2 26 08/18/2024    BUN 27 (H) 08/18/2024    CREATININE 2.62 (H) 08/18/2024    CALCIUM 9.6 08/18/2024    AST 20 08/17/2024    ALT 8 08/17/2024    ALKPHOS 39 08/17/2024    EGFR 19 08/18/2024     PT/INR: No results found for: \"PT\", \"INR\"  Imaging Studies: I have personally reviewed pertinent films in PACS and I reviewed patient's right foot x-ray and CT scan, left ankle x-ray and radiology report.  Patient has nondisplaced fracture of her right second, third, fourth, and fifth metatarsal neck.  Left ankle show nondisplaced bimalleolar fracture syndesmosis and mortise are intact.  VTE Prophylaxis: Heparin    Code Status: Level 1 - Full Code  Advance Directive and Living Will:      Power of " : Yes  POLST:

## 2024-08-18 NOTE — ASSESSMENT & PLAN NOTE
Received liver transplant in May 2002 at [facility]   2/2 alcoholic cirrhosis  Follows with hepatology   Continue azathiorpine and tacrolimus  Pending tacrolimus level, continue to monitor  States she was weaned off prednisone recently

## 2024-08-19 ENCOUNTER — APPOINTMENT (INPATIENT)
Dept: CT IMAGING | Facility: HOSPITAL | Age: 64
DRG: 562 | End: 2024-08-19
Payer: COMMERCIAL

## 2024-08-19 PROBLEM — G93.41 METABOLIC ENCEPHALOPATHY: Status: ACTIVE | Noted: 2024-08-19

## 2024-08-19 PROBLEM — N17.9 AKI (ACUTE KIDNEY INJURY) (HCC): Status: ACTIVE | Noted: 2024-08-19

## 2024-08-19 LAB
ALBUMIN SERPL BCG-MCNC: 3.1 G/DL (ref 3.5–5)
ALP SERPL-CCNC: 108 U/L (ref 34–104)
ALT SERPL W P-5'-P-CCNC: 29 U/L (ref 7–52)
AMMONIA PLAS-SCNC: 36 UMOL/L (ref 18–72)
ANION GAP SERPL CALCULATED.3IONS-SCNC: 8 MMOL/L (ref 4–13)
AST SERPL W P-5'-P-CCNC: 31 U/L (ref 13–39)
ATRIAL RATE: 75 BPM
BASOPHILS # BLD AUTO: 0.03 THOUSANDS/ÂΜL (ref 0–0.1)
BASOPHILS NFR BLD AUTO: 1 % (ref 0–1)
BILIRUB SERPL-MCNC: 0.92 MG/DL (ref 0.2–1)
BUN SERPL-MCNC: 25 MG/DL (ref 5–25)
CALCIUM ALBUM COR SERPL-MCNC: 10.1 MG/DL (ref 8.3–10.1)
CALCIUM SERPL-MCNC: 9.4 MG/DL (ref 8.4–10.2)
CHLORIDE SERPL-SCNC: 106 MMOL/L (ref 96–108)
CO2 SERPL-SCNC: 26 MMOL/L (ref 21–32)
CREAT SERPL-MCNC: 2.41 MG/DL (ref 0.6–1.3)
EOSINOPHIL # BLD AUTO: 0.31 THOUSAND/ÂΜL (ref 0–0.61)
EOSINOPHIL NFR BLD AUTO: 8 % (ref 0–6)
ERYTHROCYTE [DISTWIDTH] IN BLOOD BY AUTOMATED COUNT: 13.5 % (ref 11.6–15.1)
GFR SERPL CREATININE-BSD FRML MDRD: 21 ML/MIN/1.73SQ M
GLUCOSE SERPL-MCNC: 91 MG/DL (ref 65–140)
HCT VFR BLD AUTO: 30.1 % (ref 34.8–46.1)
HGB BLD-MCNC: 9.9 G/DL (ref 11.5–15.4)
IMM GRANULOCYTES # BLD AUTO: 0.01 THOUSAND/UL (ref 0–0.2)
IMM GRANULOCYTES NFR BLD AUTO: 0 % (ref 0–2)
LYMPHOCYTES # BLD AUTO: 0.77 THOUSANDS/ÂΜL (ref 0.6–4.47)
LYMPHOCYTES NFR BLD AUTO: 21 % (ref 14–44)
MCH RBC QN AUTO: 31.8 PG (ref 26.8–34.3)
MCHC RBC AUTO-ENTMCNC: 32.9 G/DL (ref 31.4–37.4)
MCV RBC AUTO: 97 FL (ref 82–98)
MONOCYTES # BLD AUTO: 0.37 THOUSAND/ÂΜL (ref 0.17–1.22)
MONOCYTES NFR BLD AUTO: 10 % (ref 4–12)
NEUTROPHILS # BLD AUTO: 2.25 THOUSANDS/ÂΜL (ref 1.85–7.62)
NEUTS SEG NFR BLD AUTO: 60 % (ref 43–75)
NRBC BLD AUTO-RTO: 0 /100 WBCS
P AXIS: 64 DEGREES
PLATELET # BLD AUTO: 154 THOUSANDS/UL (ref 149–390)
PMV BLD AUTO: 10.7 FL (ref 8.9–12.7)
POTASSIUM SERPL-SCNC: 4.6 MMOL/L (ref 3.5–5.3)
PR INTERVAL: 158 MS
PROT SERPL-MCNC: 6.4 G/DL (ref 6.4–8.4)
QRS AXIS: 53 DEGREES
QRSD INTERVAL: 86 MS
QT INTERVAL: 400 MS
QTC INTERVAL: 446 MS
RBC # BLD AUTO: 3.11 MILLION/UL (ref 3.81–5.12)
SODIUM SERPL-SCNC: 140 MMOL/L (ref 135–147)
T WAVE AXIS: 69 DEGREES
TACROLIMUS BLD-MCNC: 24.5 NG/ML (ref 3–15)
VENTRICULAR RATE: 75 BPM
WBC # BLD AUTO: 3.74 THOUSAND/UL (ref 4.31–10.16)

## 2024-08-19 PROCEDURE — 80053 COMPREHEN METABOLIC PANEL: CPT | Performed by: HOSPITALIST

## 2024-08-19 PROCEDURE — 85025 COMPLETE CBC W/AUTO DIFF WBC: CPT | Performed by: HOSPITALIST

## 2024-08-19 PROCEDURE — 99223 1ST HOSP IP/OBS HIGH 75: CPT | Performed by: INTERNAL MEDICINE

## 2024-08-19 PROCEDURE — 82140 ASSAY OF AMMONIA: CPT | Performed by: PHYSICIAN ASSISTANT

## 2024-08-19 PROCEDURE — 99232 SBSQ HOSP IP/OBS MODERATE 35: CPT | Performed by: PHYSICIAN ASSISTANT

## 2024-08-19 PROCEDURE — 70450 CT HEAD/BRAIN W/O DYE: CPT

## 2024-08-19 PROCEDURE — 80197 ASSAY OF TACROLIMUS: CPT | Performed by: HOSPITALIST

## 2024-08-19 PROCEDURE — 99233 SBSQ HOSP IP/OBS HIGH 50: CPT | Performed by: INTERNAL MEDICINE

## 2024-08-19 PROCEDURE — 93010 ELECTROCARDIOGRAM REPORT: CPT | Performed by: INTERNAL MEDICINE

## 2024-08-19 RX ORDER — DOCUSATE SODIUM 100 MG/1
100 CAPSULE, LIQUID FILLED ORAL 2 TIMES DAILY
Status: DISCONTINUED | OUTPATIENT
Start: 2024-08-19 | End: 2024-08-24 | Stop reason: HOSPADM

## 2024-08-19 RX ORDER — HYDROXYZINE HYDROCHLORIDE 25 MG/1
25 TABLET, FILM COATED ORAL EVERY 6 HOURS PRN
Status: DISCONTINUED | OUTPATIENT
Start: 2024-08-19 | End: 2024-08-21

## 2024-08-19 RX ORDER — HYDROMORPHONE HCL IN WATER/PF 6 MG/30 ML
0.2 PATIENT CONTROLLED ANALGESIA SYRINGE INTRAVENOUS
Status: DISCONTINUED | OUTPATIENT
Start: 2024-08-19 | End: 2024-08-24 | Stop reason: HOSPADM

## 2024-08-19 RX ORDER — POLYETHYLENE GLYCOL 3350 17 G/17G
17 POWDER, FOR SOLUTION ORAL DAILY PRN
Status: DISCONTINUED | OUTPATIENT
Start: 2024-08-19 | End: 2024-08-21

## 2024-08-19 RX ORDER — SENNOSIDES 8.6 MG
2 TABLET ORAL
Status: DISCONTINUED | OUTPATIENT
Start: 2024-08-19 | End: 2024-08-24 | Stop reason: HOSPADM

## 2024-08-19 RX ADMIN — OXYCODONE HYDROCHLORIDE 10 MG: 10 TABLET ORAL at 03:15

## 2024-08-19 RX ADMIN — METOPROLOL SUCCINATE 25 MG: 25 TABLET, EXTENDED RELEASE ORAL at 07:37

## 2024-08-19 RX ADMIN — OXYCODONE HYDROCHLORIDE 10 MG: 10 TABLET ORAL at 22:49

## 2024-08-19 RX ADMIN — HYDROXYZINE HYDROCHLORIDE 25 MG: 25 TABLET ORAL at 14:38

## 2024-08-19 RX ADMIN — GABAPENTIN 600 MG: 300 CAPSULE ORAL at 07:37

## 2024-08-19 RX ADMIN — ACETAMINOPHEN 650 MG: 325 TABLET, FILM COATED ORAL at 09:10

## 2024-08-19 RX ADMIN — DOCUSATE SODIUM 100 MG: 100 CAPSULE, LIQUID FILLED ORAL at 21:06

## 2024-08-19 RX ADMIN — DOCUSATE SODIUM 100 MG: 100 CAPSULE, LIQUID FILLED ORAL at 07:37

## 2024-08-19 RX ADMIN — PANTOPRAZOLE SODIUM 40 MG: 40 TABLET, DELAYED RELEASE ORAL at 07:37

## 2024-08-19 RX ADMIN — OXYCODONE HYDROCHLORIDE 10 MG: 10 TABLET ORAL at 14:38

## 2024-08-19 RX ADMIN — AMLODIPINE BESYLATE 5 MG: 5 TABLET ORAL at 07:37

## 2024-08-19 RX ADMIN — HYDROXYZINE HYDROCHLORIDE 25 MG: 25 TABLET ORAL at 21:07

## 2024-08-19 RX ADMIN — HEPARIN SODIUM 5000 UNITS: 5000 INJECTION INTRAVENOUS; SUBCUTANEOUS at 21:06

## 2024-08-19 RX ADMIN — METHOCARBAMOL TABLETS 500 MG: 500 TABLET, COATED ORAL at 14:38

## 2024-08-19 RX ADMIN — AZATHIOPRINE 100 MG: 50 TABLET ORAL at 21:11

## 2024-08-19 RX ADMIN — METHOCARBAMOL TABLETS 500 MG: 500 TABLET, COATED ORAL at 21:06

## 2024-08-19 RX ADMIN — OXYCODONE HYDROCHLORIDE 10 MG: 10 TABLET ORAL at 18:36

## 2024-08-19 RX ADMIN — OXYCODONE HYDROCHLORIDE 10 MG: 10 TABLET ORAL at 09:10

## 2024-08-19 RX ADMIN — Medication 100 MG: at 07:37

## 2024-08-19 RX ADMIN — HYDROMORPHONE HYDROCHLORIDE 0.2 MG: 0.2 INJECTION, SOLUTION INTRAMUSCULAR; INTRAVENOUS; SUBCUTANEOUS at 10:16

## 2024-08-19 RX ADMIN — SENNOSIDES 17.2 MG: 8.6 TABLET, FILM COATED ORAL at 21:06

## 2024-08-19 RX ADMIN — LATANOPROST 1 DROP: 50 SOLUTION OPHTHALMIC at 21:12

## 2024-08-19 RX ADMIN — METHOCARBAMOL TABLETS 500 MG: 500 TABLET, COATED ORAL at 03:50

## 2024-08-19 NOTE — PLAN OF CARE
Problem: Potential for Falls  Goal: Patient will remain free of falls  Description: INTERVENTIONS:  - Educate patient/family on patient safety including physical limitations  - Instruct patient to call for assistance with activity   - Consult OT/PT to assist with strengthening/mobility   - Keep Call bell within reach  - Keep bed low and locked with side rails adjusted as appropriate  - Keep care items and personal belongings within reach  - Initiate and maintain comfort rounds  - Make Fall Risk Sign visible to staff  - Offer Toileting every 2 Hours, in advance of need  - Initiate/Maintain bed alarm  - Obtain necessary fall risk management equipment  - Apply yellow socks and bracelet for high fall risk patients  - Consider moving patient to room near nurses station  Outcome: Progressing     Problem: PAIN - ADULT  Goal: Verbalizes/displays adequate comfort level or baseline comfort level  Description: Interventions:  - Encourage patient to monitor pain and request assistance  - Assess pain using appropriate pain scale  - Administer analgesics based on type and severity of pain and evaluate response  - Implement non-pharmacological measures as appropriate and evaluate response  - Consider cultural and social influences on pain and pain management  - Notify physician/advanced practitioner if interventions unsuccessful or patient reports new pain  Outcome: Progressing     Problem: INFECTION - ADULT  Goal: Absence or prevention of progression during hospitalization  Description: INTERVENTIONS:  - Assess and monitor for signs and symptoms of infection  - Monitor lab/diagnostic results  - Monitor all insertion sites, i.e. indwelling lines, tubes, and drains  - Monitor endotracheal if appropriate and nasal secretions for changes in amount and color  - [place] appropriate cooling/warming therapies per order  - Administer medications as ordered  - Instruct and encourage patient and family to use good hand hygiene  technique  - Identify and instruct in appropriate isolation precautions for identified infection/condition  Outcome: Progressing  Goal: Absence of fever/infection during neutropenic period  Description: INTERVENTIONS:  - Monitor WBC    Outcome: Progressing     Problem: SAFETY ADULT  Goal: Patient will remain free of falls  Description: INTERVENTIONS:  - Educate patient/family on patient safety including physical limitations  - Instruct patient to call for assistance with activity   - Consult OT/PT to assist with strengthening/mobility   - Keep Call bell within reach  - Keep bed low and locked with side rails adjusted as appropriate  - Keep care items and personal belongings within reach  - Initiate and maintain comfort rounds  - Make Fall Risk Sign visible to staff  - Offer Toileting every 2 Hours, in advance of need  - Initiate/Maintain bed alarm  - Obtain necessary fall risk management equipment  - Apply yellow socks and bracelet for high fall risk patients  - Consider moving patient to room near nurses station  Outcome: Progressing  Goal: Maintain or return to baseline ADL function  Description: INTERVENTIONS:  - Educate patient/family on patient safety including physical limitations  - Instruct patient to call for assistance with activity   - Consult OT/PT to assist with strengthening/mobility   - Keep Call bell within reach  - Keep bed low and locked with side rails adjusted as appropriate  - Keep care items and personal belongings within reach  - Initiate and maintain comfort rounds  - Make Fall Risk Sign visible to staff  - Offer Toileting every 2 Hours, in advance of need  - Initiate/Maintain bed alarm  - Obtain necessary fall risk management equipment  - Apply yellow socks and bracelet for high fall risk patients  - Consider moving patient to room near nurses station  Outcome: Progressing  Goal: Maintains/Returns to pre admission functional level  Description: INTERVENTIONS:  - Perform AM-PAC 6 Click Basic  Mobility/ Daily Activity assessment daily.  - Set and communicate daily mobility goal to care team and patient/family/caregiver.   - Collaborate with rehabilitation services on mobility goals if consulted  - Perform Range of Motion 4 times a day.  - Reposition patient every 2 hours.  - Dangle patient 3 times a day  - Stand patient 3 times a day  - Ambulate patient 3 times a day  - Out of bed to chair 3 times a day   - Out of bed for meals 3 times a day  - Out of bed for toileting  - Record patient progress and toleration of activity level   Outcome: Progressing     Problem: DISCHARGE PLANNING  Goal: Discharge to home or other facility with appropriate resources  Description: INTERVENTIONS:  - Identify barriers to discharge w/patient and caregiver  - Arrange for needed discharge resources and transportation as appropriate  - Identify discharge learning needs (meds, wound care, etc.)  - Arrange for interpretive services to assist at discharge as needed  - Refer to Case Management Department for coordinating discharge planning if the patient needs post-hospital services based on physician/advanced practitioner order or complex needs related to functional status, cognitive ability, or social support system  Outcome: Progressing     Problem: Knowledge Deficit  Goal: Patient/family/caregiver demonstrates understanding of disease process, treatment plan, medications, and discharge instructions  Description: Complete learning assessment and assess knowledge base.  Interventions:  - Provide teaching at level of understanding  - Provide teaching via preferred learning methods  Outcome: Progressing     Problem: Prexisting or High Potential for Compromised Skin Integrity  Goal: Skin integrity is maintained or improved  Description: INTERVENTIONS:  - Identify patients at risk for skin breakdown  - Assess and monitor skin integrity  - Assess and monitor nutrition and hydration status  - Monitor labs   - Assess for incontinence    - Turn and reposition patient  - Assist with mobility/ambulation  - Relieve pressure over bony prominences  - Avoid friction and shearing  - Provide appropriate hygiene as needed including keeping skin clean and dry  - Evaluate need for skin moisturizer/barrier cream  - Collaborate with interdisciplinary team   - Patient/family teaching  - Consider wound care consult   Outcome: Progressing

## 2024-08-19 NOTE — UTILIZATION REVIEW
NOTIFICATION OF INPATIENT ADMISSION   AUTHORIZATION REQUEST   SERVICING FACILITY:   [place] [address]  Tax ID: [id number]  NPI: [id number] ATTENDING PROVIDER:  Attending Name and NPI#: Tasha Oliver Md [[id number]]  Address: [address] [place] [address]  Phone: [phone]   ADMISSION INFORMATION:  Place of Service: Inpatient [facility]  Place of Service Code: 21  Inpatient Admission Date/Time: 8/17/24  1:29 PM  Discharge Date/Time: No discharge date for patient encounter.  Admitting Diagnosis Code/Description:  Multiple injuries [T07.XXXA]  ALEJANDRO (acute kidney injury) (HCC) [N17.9]  Closed bimalleolar fracture of left ankle, initial encounter [S82.842A]  Anemia, unspecified type [D64.9]  Leukopenia, unspecified type [D72.819]     UTILIZATION REVIEW CONTACT:  Teresita Camarena, Utilization   Network Utilization Review Department  Phone: [phone]  Fax: [phone]  Email: [email or web address]  Contact for approvals/pending authorizations, clinical reviews, and discharge.     PHYSICIAN ADVISORY SERVICES:  Medical Necessity Denial & Rrne-wm-Wtyl Review  Phone: [phone]  Fax: [phone]  Email: [email or web address]     DISCHARGE SUPPORT TEAM:  For Patients Discharge Needs & Updates  Phone: [phone] opt. [phone] Fax: [phone]  Email: [email or web address]

## 2024-08-19 NOTE — QUICK NOTE
Tacrolimus level elevated at 21.1. Admitted with ALEJANDRO. Hold tacrolimus at this time. Repeat level with morning labs. Nephrology following.

## 2024-08-19 NOTE — CASE MANAGEMENT
Case Management Assessment & Discharge Planning Note    Patient name Linda Becerra  Location /-01 MRN [id number]  : 1960 Date 2024       Current Admission Date: 2024  Current Admission Diagnosis:Closed left ankle fracture   Patient Active Problem List    Diagnosis Date Noted Date Diagnosed    Closed left ankle fracture 2024     Macrocytic anemia 2024     Closed fracture of metatarsal neck, right, initial encounter 2024     Liver transplant recipient ([facility])      Hypertension      Asthma      Subcutaneous mass 05/10/2024     Functional diarrhea 2023     Liver transplant status ([facility]) 2023     Other cytomegaloviral diseases ([facility]) 2023     Atrophy of muscle of left lower leg 2023     Closed head injury 08/15/2023     Dizziness 08/15/2023     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     GCA (giant cell arteritis) ([facility]) 2023     Acute pain of right foot 2023     Peripheral neuropathy 2022     Acute kidney injury superimposed on chronic kidney disease  (HCC) 2022     Decompensated cirrhosis 2022     Weakness of left lower extremity 2022     Acute blood loss anemia 2022     Hyponatremia 2022     Hyperbilirubinemia 2022     Pleural effusion 2022     Abnormal LFTs 2021     Personal history of colonic polyps 2021     Right lower quadrant abdominal pain 06/15/2021     Chronic pain syndrome 2020     Contusion of left foot 2020     Chronic bilateral low back pain without sciatica 2020     Lumbar spondylosis 2020     DDD (degenerative disc disease), lumbar 2020     Lumbar radiculopathy 2020     Left wrist tendonitis 2019     Nondisplaced fracture of distal end of left radius 2019     Closed compression fracture of L2 vertebra (HCC) 2019       LOS (days): 2  Geometric Mean LOS (GMLOS) (days): 2.8  Days to  GMLOS:1     OBJECTIVE:    Risk of Unplanned Readmission Score: 15.22         Current admission status: Inpatient       Preferred Pharmacy:   [id number] - [place] - [address]  [address]  [place] [address]  Phone: [phone] Fax: [phone]    [id number] - [place] - [address]  [address]  [place] [address]  Phone: [phone] Fax: [phone]    [facility] [place] [place] - [address]  [address]  [address]  [facility] [address]  Phone: [phone] Fax: [phone]    Primary Care Provider: Davide Sorto MD    Primary Insurance: BLUE CROSS [employer] REP  Secondary Insurance:     ASSESSMENT:  Active Health Care Proxies    There are no active Health Care Proxies on file.       Advance Directives  Does patient have a Health Care POA?: No  Was patient offered paperwork?: Yes (declined)  Does patient currently have a Health Care decision maker?: Yes, please see Health Care Proxy section  Does patient have Advance Directives?: No  Was patient offered paperwork?: Yes (declined)  Primary Contact: Cristine Pardo Dtr         Readmission Root Cause  30 Day Readmission: No    Patient Information  Admitted from:: Home  Mental Status: Alert  During Assessment patient was accompanied by: Son  Assessment information provided by:: Patient  Primary Caregiver: Self  Support Systems: Spouse/significant other, Son, Daughter, Self  County of Residence: [place]  What city do you live in?: [place]  Home entry access options. Select all that apply.: Stairs  Number of steps to enter home.: 1  Do the steps have railings?: Yes  Type of Current Residence: [facility]  Living Arrangements: Lives w/ Spouse/significant other    Activities of Daily Living Prior to Admission  Functional Status: Independent  Completes ADLs independently?: Yes  Ambulates independently?: Yes  Does patient use assisted devices?: Yes  Assisted Devices  (DME) used: Straight Cane  Does patient currently own DME?: Yes  What DME does the patient currently own?: Straight Cane  Does patient have a history of Outpatient Therapy (PT/OT)?: Yes ([facility])  Does the patient have a history of Short-Term Rehab?: Yes  Does patient have a history of HHC?: No  Does patient currently have HHC?: No         Patient Information Continued  Income Source: SSI/SSD  Does patient have prescription coverage?: Yes  Does patient receive dialysis treatments?: No  Does patient have a history of substance abuse?: No  Does patient have a history of Mental Health Diagnosis?: No         Means of Transportation  Means of Transport to Appts:: Drives Self      Social Determinants of Health (SDOH)      Flowsheet Row Most Recent Value   Housing Stability    In the last 12 months, was there a time when you were not able to pay the mortgage or rent on time? N   In the past 12 months, how many times have you moved where you were living? 0   At any time in the past 12 months, were you homeless or living in a shelter (including now)? N   Transportation Needs    In the past 12 months, has lack of transportation kept you from medical appointments or from getting medications? no   In the past 12 months, has lack of transportation kept you from meetings, work, or from getting things needed for daily living? No   Food Insecurity    Within the past 12 months, you worried that your food would run out before you got the money to buy more. Never true   Within the past 12 months, the food you bought just didn't last and you didn't have money to get more. Never true   Utilities    In the past 12 months has the electric, gas, oil, or water company threatened to shut off services in your home? No            DISCHARGE DETAILS:    Discharge planning discussed with:: Patient and son  Freedom of Choice: Yes     CM contacted family/caregiver?: Yes  Were Treatment Team discharge recommendations reviewed with  patient/caregiver?: Yes  Did patient/caregiver verbalize understanding of patient care needs?: Yes  Were patient/caregiver advised of the risks associated with not following Treatment Team discharge recommendations?: Yes    Contacts  Patient Contacts: Andrea Becerra - son  Relationship to Patient:: Family  Contact Method: In Person  Reason/Outcome: Emergency Contact, Continuity of Care    Requested Home Health Care         Is the patient interested in HHC at discharge?: No    DME Referral Provided  Referral made for DME?: No    Other Referral/Resources/Interventions Provided:  Interventions: Short Term Rehab         Treatment Team Recommendation: Short Term Rehab  Discharge Destination Plan:: Short Term Rehab                                         Additional Comments: CM met with pt and her son at bedside to discuss role of CM and any needs prior to discharge. [place]/ [place]/ 1STE. Indp PTA. Owns/uses cane. Pt has hx OP PT and STR through [facility]. Pt prefers [facility] for post discharge needs. No hx HH/DA/MH. Pt is on diability and drives.

## 2024-08-19 NOTE — ASSESSMENT & PLAN NOTE
Patient's family at bedside with concern for brain fogginess, intermittent tremors.  Appears these may have been present prior to admission  Exam non-focal.  Adamantly denies hitting her head when falling but at times appears to be a poor historian  CT head for completeness  Check ammonia level  Discontinue gabapentin at this time given ALEJANDRO and concern for confusion/tremors - consider resuming at reduced dose

## 2024-08-19 NOTE — PROGRESS NOTES
[facility]  Progress Note  Name: Linda Becerra I  MRN: [id number]  Unit/Bed#: -01 I Date of Admission: 8/17/2024   Date of Service: 8/19/2024 I Hospital Day: 2    Assessment & Plan   * Closed left ankle fracture  Assessment & Plan  Presents with fall from home  ECG NSR, HR 70s  XR left foot - nondisplaced bimalleolar fracture  XR right foot pending  CT RLE - Second through fourth metatarsal neck fractures.   ED spoke with ortho Dr. Edson Burk - plan to splint, no immediate intervention at this time  PT/OT evaluation  Continue pain control  Noted concern for lethargy/hypoxia yesterday after IV dilaudid 2 mg  Decrease to 0.2 mg IV for breakthrough  Concern for encephalopathy related to ALEJANDRO/gabapentin, holding for now    Acute kidney injury superimposed on chronic kidney disease  (HCC)  Assessment & Plan  Lab Results   Component Value Date    EGFR 21 08/19/2024    EGFR 19 08/18/2024    EGFR 17 08/17/2024    CREATININE 2.41 (H) 08/19/2024    CREATININE 2.62 (H) 08/18/2024    CREATININE 2.75 (H) 08/17/2024     Cr 0.98 on 11/28/23, presents with Cr of 2.75  Creatinine improving to 2.41  US kidney and bladder unremarkable  Continue IVF  Avoid nephrotoxic agents  Tacrolimus level 21.1- currently on hold  Nephrology following    Metabolic encephalopathy  Assessment & Plan  Patient's family at bedside with concern for brain fogginess, intermittent tremors.  Appears these may have been present prior to admission  Exam non-focal.  Adamantly denies hitting her head when falling but at times appears to be a poor historian  CT head for completeness  Check ammonia level  Discontinue gabapentin at this time given ALEJANDRO and concern for confusion/tremors - consider resuming at reduced dose    Closed fracture of metatarsal neck, right, initial encounter  Assessment & Plan  CT RLE shows 2nd through 4th metatarsal neck fractures  Continue pain control  Orthopedic surgery - non surgical management  recommended  PT/OT    Hypertension  Assessment & Plan  Initially hypertensive upon arrival, possibly due to underlying pain  BP improved  Continue home medications: Toprol-XL 25mg q24hr  Amlodipine added 8/18    Liver transplant recipient (HCC)  Assessment & Plan  Received liver transplant in May 2002 at [facility]   2/2 alcoholic cirrhosis  Follows with hepatology   Managed on azathioprine and tacrolimus  Tacrolimus level noted to 21.1 - currently on hold pending repeat level  Consult GI  States she was weaned off prednisone recently         VTE Pharmacologic Prophylaxis: VTE Score: 8 High Risk (Score >/= 5) - Pharmacological DVT Prophylaxis Ordered: heparin. Sequential Compression Devices Ordered.    Mobility:   Basic Mobility Inpatient Raw Score: 10  JH-HLM Goal: 4: Move to chair/commode  JH-HLM Achieved: 2: Bed activities/Dependent transfer  JH-HLM Goal NOT achieved. Continue with multidisciplinary rounding and encourage appropriate mobility to improve upon JH-HLM goals.    Patient Centered Rounds: I performed bedside rounds with nursing staff today.   Discussions with Specialists or Other Care Team Provider: CM    Education and Discussions with Family / Patient: Updated  (son) at bedside.    Total Time Spent on Date of Encounter in care of patient: 45 mins. This time was spent on one or more of the following: performing physical exam; counseling and coordination of care; obtaining or reviewing history; documenting in the medical record; reviewing/ordering tests, medications or procedures; communicating with other healthcare professionals and discussing with patient's family/caregivers.    Current Length of Stay: 2 day(s)  Current Patient Status: Inpatient   Certification Statement: The patient will continue to require additional inpatient hospital stay due to ALEJANDRO, PT/OT eval  Discharge Plan: Anticipate discharge in 48-72 hrs to discharge location to be determined pending rehab evaluations.    Code  Status: Level 1 - Full Code    Subjective:   Patient reports severe uncontrolled pain this morning.  Per nursing staff concern for lethargy and hypoxia yesterday after 2 mg IV Dilaudid.  Patient also reporting constipation.  Denies chest pain/palpitations, shortness of breath, nausea/vomiting, abdominal pain.  Family concerned re: brain fogginess and tremors that appeared to be occurring prehospital.    Objective:     Vitals:   Temp (24hrs), Av.2 °F (36.8 °C), Min:98.1 °F (36.7 °C), Max:98.2 °F (36.8 °C)    Temp:  [98.1 °F (36.7 °C)-98.2 °F (36.8 °C)] 98.1 °F (36.7 °C)  HR:  [80-82] 82  Resp:  [16] 16  BP: (134-141)/(75-80) 141/80  SpO2:  [90 %-97 %] 97 %  Body mass index is 29.83 kg/m².     Input and Output Summary (last 24 hours):     Intake/Output Summary (Last 24 hours) at 2024 1103  Last data filed at 2024 1047  Gross per 24 hour   Intake 920 ml   Output 1775 ml   Net -855 ml       Physical Exam:   Physical Exam  Vitals and nursing note reviewed.   Constitutional:       Appearance: Normal appearance.      Interventions: Nasal cannula in place.   HENT:      Head: Normocephalic.   Eyes:      General: No scleral icterus.     Extraocular Movements: Extraocular movements intact.      Conjunctiva/sclera: Conjunctivae normal.   Cardiovascular:      Rate and Rhythm: Normal rate and regular rhythm.   Pulmonary:      Effort: Pulmonary effort is normal.      Breath sounds: Normal breath sounds. No wheezing, rhonchi or rales.   Abdominal:      General: Bowel sounds are normal.      Palpations: Abdomen is soft.      Tenderness: There is no abdominal tenderness. There is no guarding or rebound.   Musculoskeletal:         General: No swelling, tenderness or deformity.      Cervical back: Normal range of motion.      Comments: LLE in splint.  RLE with mild swelling.   Skin:     General: Skin is warm and dry.   Neurological:      General: No focal deficit present.      Mental Status: She is alert.      Comments:  Poor historian.  No visible tremors initially on exam.  Upon assessing for asterixis, none initially observed; patient was able to extend her arms in front of her with wrists extended.  After approximately 30 sec patient noted hand flapping.   Psychiatric:         Mood and Affect: Mood is anxious.         Cognition and Memory: Cognition is impaired.          Additional Data:     Labs:  Results from last 7 days   Lab Units 08/19/24  0258   WBC Thousand/uL 3.74*   HEMOGLOBIN g/dL 9.9*   HEMATOCRIT % 30.1*   PLATELETS Thousands/uL 154   SEGS PCT % 60   LYMPHO PCT % 21   MONO PCT % 10   EOS PCT % 8*     Results from last 7 days   Lab Units 08/19/24  0258   SODIUM mmol/L 140   POTASSIUM mmol/L 4.6   CHLORIDE mmol/L 106   CO2 mmol/L 26   BUN mg/dL 25   CREATININE mg/dL 2.41*   ANION GAP mmol/L 8   CALCIUM mg/dL 9.4   ALBUMIN g/dL 3.1*   TOTAL BILIRUBIN mg/dL 0.92   ALK PHOS U/L 108*   ALT U/L 29   AST U/L 31   GLUCOSE RANDOM mg/dL 91                       Lines/Drains:  Invasive Devices       Peripheral Intravenous Line  Duration             Peripheral IV 08/18/24 Right;Ventral (anterior) Forearm <1 day              Drain  Duration             External Urinary Catheter <1 day                          Imaging: Reviewed radiology reports from this admission including: xray(s) and ultrasound(s)    Recent Cultures (last 7 days):         Last 24 Hours Medication List:   Current Facility-Administered Medications   Medication Dose Route Frequency Provider Last Rate    acetaminophen  650 mg Oral Q6H PRN Catihe Tenorio, DO      amLODIPine  5 mg Oral Daily TRAV Mena      azaTHIOprine  100 mg Oral HS Cathie Sagean, DO      docusate sodium  100 mg Oral BID Aileen Galvez PA-C      doxylamine  12.5 mg Oral HS PRN Cathie Sagean, DO      heparin (porcine)  5,000 Units Subcutaneous Q8H [facility] Cathie Tenorio, DO      HYDROmorphone  0.2 mg Intravenous Q3H PRN Millicent Garcia PA-C      hydrOXYzine HCL  25 mg Oral Q6H PRN Millicent Garcia,  ARNALDO      latanoprost  1 drop Both Eyes HS Cathie Tenorio, DO      methocarbamol  500 mg Oral Q6H PRN Cathie Tenorio, DO      metoprolol succinate  25 mg Oral Daily Cathie Khan, DO      oxyCODONE  10 mg Oral Q4H PRN Cathie Khan, DO      oxyCODONE  5 mg Oral Q4H PRN Cathie Tenorio, DO      pantoprazole  40 mg Oral Daily Cathie Khan, DO      polyethylene glycol  17 g Oral Daily PRN Millicent Garcia PA-C      senna  2 tablet Oral HS Aileen Galvez PA-C      sodium chloride  75 mL/hr Intravenous Continuous YanethTRAV Koroma 75 mL/hr (08/18/24 1923)    thiamine  100 mg Oral Daily Cathie Tenorio, DO          Today, Patient Was Seen By: Millicent Garcia PA-C    **Please Note: This note may have been constructed using a voice recognition system.**

## 2024-08-19 NOTE — PROGRESS NOTES
NEPHROLOGY HOSPITAL PROGRESS NOTE   Linda Becerra 63 y.o. female MRN: [id number]  Unit/Bed#: -01 Encounter: [id number]  Reason for Consult: Kidney injury:    ASSESSMENT and PLAN:  63-year-old female with history of liver transplant in 2022 due to alcohol cirrhosis, hypertension who presented with mechanical fall.  Nephrology consulted for management of acute kidney injury.    Acute kidney injury (POA):  Creatinine 2.75 on admission, currently down to 2.41 following IV fluids  Maintained on normal saline 75 mL/h since admission  Baseline creatinine 0.7 to 0.9 mg/dL  Renal ultrasound: Unrevealing  Urinalysis: Trace protein, no RBCs, 4-10 WBCs, innumerable epithelial cells.  Negative leuks.  Tacrolimus level 21.1--inaccurately drawn after a.m. dose  Etiology: Likely prerenal.  Improving with volume administration  Received sufficient fluids.  Recommend discontinuing IV fluids and monitoring  Check labs in the a.m.  Monitor intake and output    Blood pressure:  History of essential hypertension  Blood pressure acceptable  On metoprolol, Norvasc 5 mg daily    Liver transplant 2022:  Follows at [facility]  Immunosuppression on tacrolimus 3 mg every 12 hours  Tacrolimus level 21.1 on 8/17 which was the date of admission.  Result inaccurate.  Level taken after a.m. dose was given.  Accurate assessment of tach level needs to be a trough level therefore test needs to be completed right before a.m. dose.  There is another tacrolimus pending from today at 3 AM which will also be inaccurate.  Message sent to primary service regarding    Cytomegalovirus: Following with GI    Mechanical fall:  Sustained left ankle fracture    PLAN SUMMARY:  Likely taking sufficient oral intake can discontinue IV fluids and monitor.  Supportive care.  Check labs in the a.m.  Check Tac level 1 hour prior to a.m. dose for accuracy    SUBJECTIVE / 24H INTERVAL HISTORY:  Very sleepy.  Had pain medication.  No acute events  overnight    OBJECTIVE:  Current Weight: Weight - Scale: 81.3 kg (179 lb 3.7 oz)  Vitals:    08/18/24 1555 08/18/24 1917 08/19/24 0403 08/19/24 0658   BP:  134/75  141/80   BP Location:  Left arm  Left arm   Pulse:  80  82   Resp: 16   16   Temp: 98.2 °F (36.8 °C) 98.2 °F (36.8 °C)  98.1 °F (36.7 °C)   TempSrc: Temporal Temporal  Temporal   SpO2:  97%  97%   Weight:   81.3 kg (179 lb 3.7 oz)    Height:           Intake/Output Summary (Last 24 hours) at 8/19/2024 1315  Last data filed at 8/19/2024 1047  Gross per 24 hour   Intake 680 ml   Output 1725 ml   Net -1045 ml     General: NAD  Skin: no rash  Eyes: anicteric sclera  ENT: moist mucous membrane  Neck: supple  Chest: CTA b/l, no ronchii, no wheeze, no rubs, no rales.  Normal effort  CVS: s1s2, no murmur, no gallop, no rub  Abdomen: soft, nontender, nl sounds  Extremities: Ankle/foot swollen, wrapped.  Very mild edema right lower extremity  : no lowry  Neuro: No acute deficits.  Psych: Quietly lying in bed.  No unusual behaviors.  Slightly anxious  Medications:    Current Facility-Administered Medications:     acetaminophen (TYLENOL) tablet 650 mg, 650 mg, Oral, Q6H PRN, Cathie Tenorio, DO, 650 mg at 08/19/24 0910    amLODIPine (NORVASC) tablet 5 mg, 5 mg, Oral, Daily, TRAV Mena, 5 mg at 08/19/24 0737    azaTHIOprine (IMURAN) tablet 100 mg, 100 mg, Oral, HS, Cathie Tenorio, DO, 100 mg at 08/18/24 2111    docusate sodium (COLACE) capsule 100 mg, 100 mg, Oral, BID, Aileen Galvez PA-C, 100 mg at 08/19/24 0737    doxylamine (UNISOM) tablet 12.5 mg, 12.5 mg, Oral, HS PRN, Cathie Tenorio, DO    heparin (porcine) subcutaneous injection 5,000 Units, 5,000 Units, Subcutaneous, Q8H [facility], 5,000 Units at 08/18/24 2111 **AND** [CANCELED] Platelet count, , , Once, Cathie Tenorio DO    HYDROmorphone HCl (DILAUDID) injection 0.2 mg, 0.2 mg, Intravenous, Q3H PRN, Millicent Garcia PA-C, 0.2 mg at 08/19/24 1016    hydrOXYzine HCL (ATARAX) tablet 25 mg, 25 mg, Oral, Q6H PRN,  Millicent Garcia PA-C    latanoprost (XALATAN) 0.005 % ophthalmic solution 1 drop, 1 drop, Both Eyes, HS, Cathie Tenorio, DO, 1 drop at 08/18/24 2112    methocarbamol (ROBAXIN) tablet 500 mg, 500 mg, Oral, Q6H PRN, Cathie Tenorio, DO, 500 mg at 08/19/24 0350    metoprolol succinate (TOPROL-XL) 24 hr tablet 25 mg, 25 mg, Oral, Daily, Cathie Tenorio, DO, 25 mg at 08/19/24 0737    oxyCODONE (ROXICODONE) immediate release tablet 10 mg, 10 mg, Oral, Q4H PRN, Cathie Khan, DO, 10 mg at 08/19/24 0910    oxyCODONE (ROXICODONE) IR tablet 5 mg, 5 mg, Oral, Q4H PRN, Cathie Tenorio, DO, 5 mg at 08/18/24 1910    pantoprazole (PROTONIX) EC tablet 40 mg, 40 mg, Oral, Daily, Cathie Khan, DO, 40 mg at 08/19/24 0737    polyethylene glycol (MIRALAX) packet 17 g, 17 g, Oral, Daily PRN, Millicent Garcia PA-C    senna (SENOKOT) tablet 17.2 mg, 2 tablet, Oral, HS, Aileen Galvez PA-C    thiamine tablet 100 mg, 100 mg, Oral, Daily, Cathie Tenorio, DO, 100 mg at 08/19/24 0737    Laboratory Results:  Results from last 7 days   Lab Units 08/19/24  0258 08/18/24  0435 08/17/24  1146   WBC Thousand/uL 3.74* 5.24 3.06*   HEMOGLOBIN g/dL 9.9* 10.9* 10.8*   HEMATOCRIT % 30.1* 32.7* 33.4*   PLATELETS Thousands/uL 154 175 196   POTASSIUM mmol/L 4.6 5.0 4.9   CHLORIDE mmol/L 106 105 103   CO2 mmol/L 26 26 28   BUN mg/dL 25 27* 28*   CREATININE mg/dL 2.41* 2.62* 2.75*   CALCIUM mg/dL 9.4 9.6 9.7

## 2024-08-19 NOTE — ASSESSMENT & PLAN NOTE
Presents with fall from home  ECG NSR, HR 70s  XR left foot - nondisplaced bimalleolar fracture  XR right foot pending  CT RLE - Second through fourth metatarsal neck fractures.   ED spoke with ortho Dr. Edson Burk - plan to splint, no immediate intervention at this time  PT/OT evaluation  Continue pain control  Noted concern for lethargy/hypoxia yesterday after IV dilaudid 2 mg  Decrease to 0.2 mg IV for breakthrough  Concern for encephalopathy related to ALEJANDRO/gabapentin, holding for now

## 2024-08-19 NOTE — CONSULTS
Consultation -  [facility]  Linda Becerra 63 y.o. 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    1) Liver transplant status  Patient with history of cirrhosis due to alcohol, status post OLT May 2022 at [facility]. She currently follows with Dr. Eller at [facility]. She takes tacrolimus, Imuran, prednisone, and prevymis for history of CMV infection. Liver tests normal except for slight elevation of alkaline phosphatase likely secondary to ankle fracture. Ammonia level normal and no overt asterixis on exam. Tacrolimus level elevated 8/17, so tacrolimus currently on hold.    -Would defer tacrolimus dosing to nephrology  -Monitor liver blood tests  -Follow-up with transplant hepatology at [facility]    2) ALEJANDRO superimposed on CKD  Nephrology following, etiology likely prerenal since creatinine is improving with volume resuscitation.  Creatinine 2.75 on admission, currently down to 2.41 following IV fluids.    -Monitor renal function  -Appreciate input from nephrology    3) Metabolic encephalopathy  Likely related to ALEJANDRO, pain meds, gabapentin.    -Monitor mental status  -Follow-up CT head for completeness    Patient was seen and examined by Dr. Dunbar. All alvarez medical decisions were made by Dr. Dunbar. Thank you for allowing us to participate in the care of this present patient. We will follow-up with you closely.      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    GI consulted due to history of liver transplant. She follows with transplant hepatology at [facility]. She is on tacrolimus, Imuran, prednisone, and prevymis due to history of CMV infection.    Patient follows with our physician Dr. Waterman in the office. She was seen for diarrhea back in June and had stool cultures which came back positive for E. coli. We treated her with 5 days of azithromycin. Since treatment, her stools have been normal in consistency/slightly loose but significantly improved. She moves her bowels 1-2 times daily. She denies any blood mixed with the stool. She denies abdominal pain. No nausea or vomiting. She has been eating. Per internal medicine, patient's family concerned about brain fogginess and intermittent tremors which have been present prior to admission.    REVIEW OF SYSTEMS:    CONSTITUTIONAL: Denies any fever, chills. Good appetite, and no recent weight loss.  HEENT: No earache or tinnitus. Denies hearing loss or visual disturbances.  CARDIOVASCULAR: No chest pain or palpitations.   RESPIRATORY: Denies any cough, hemoptysis, shortness of breath or dyspnea on exertion.  GASTROINTESTINAL: As noted in the History of Present Illness.   GENITOURINARY: No problems with urination. Denies any hematuria or dysuria.  NEUROLOGIC: No dizziness or vertigo, denies headaches. + Brain fog, tremors  MUSCULOSKELETAL: Denies any muscle or joint pain.   SKIN: Denies skin rashes or itching.   ENDOCRINE: Denies excessive thirst. Denies intolerance to heat or cold.  PSYCHOSOCIAL: Denies depression or anxiety. Denies any recent memory loss.       Historical Information   Past Medical History:   Diagnosis Date    Anemia     Anxiety     Asthma     Chronic pain disorder     Clotting disorder (HCC)     Concussion     Constipation     CSF leak     Fractures     GERD (gastroesophageal reflux disease)     Head injury     Hernia 2022    High blood  pressure     History of transfusion     Hyperbilirubinemia 2022    Hypertension     Liver failure (HCC)     Liver transplant recipient (HCC)     Migraines     Peripheral neuropathy     Urinary frequency     Varicella 1968    As a child     Past Surgical History:   Procedure Laterality Date    ABLATION SOFT TISSUE      BREAST LUMPECTOMY      COLONOSCOPY  2023    IR PARACENTESIS  2022    IR PARACENTESIS  2022    IR PARACENTESIS  2022    LAPAROSCOPY FOR ECTOPIC PREGNANCY      LIVER BIOPSY  7/3/2023    Plus 3 previous in hospital    LIVER TRANSPLANTATION  22    MAMMO (HISTORICAL) Bilateral 2021    [facility] BI-RADS 2 Benign findings. after U/S Scattered areas fibrogladulae density; 25% to 50% glandular breast tissue    MRI GUIDED BREAST WIRE LOCALIZATION LEFT Left 2015    MRI GUIDED BREAST WIRE LOCALIZATION LEFT Left 2015    IL EXC TUMOR SOFT TISS FACE&SCALP SUBFASCIAL <2CM N/A 2024    Procedure: EXCISION OF SUBCUTANEOUS MASSES OF FACE X3- RIGHT CHEEK, ROOT OF NOSE, LEFT LOWER EYELID/CHEEK, COMPLEX CLOSURE;  Surgeon: Fuentes Mendez MD;  Location:  MAIN OR;  Service: Plastics    SINUS SURGERY      SINUS SURGERY      removed cartlidge in inside of nose    TONSILECTOMY AND ADNOIDECTOMY       Social History   Social History     Substance and Sexual Activity   Alcohol Use Not Currently    Comment: socially, had glass of wine today     Social History     Substance and Sexual Activity   Drug Use Not Currently    Types: Marijuana    Comment: CBD  medical marjuana     Social History     Tobacco Use   Smoking Status Former    Current packs/day: 0.00    Average packs/day: 1 pack/day for 25.0 years (25.0 ttl pk-yrs)    Types: Cigarettes    Start date:     Quit date: 2019    Years since quittin.6   Smokeless Tobacco Never     Family History   Problem Relation Age of Onset    Cancer Mother     Breast cancer Mother     Cancer Father     Colon cancer Father      Arthritis Father     Cancer Sister     Breast cancer Sister     Arthritis Brother     No Known Problems Paternal Grandmother     No Known Problems Paternal Grandfather     Autoimmune disease Daughter     No Known Problems Son     No Known Problems Son     Heart disease Half-Brother     Prostate cancer Half-Brother     Melanoma Half-Brother        Meds/Allergies       Medications Prior to Admission:     azaTHIOprine (IMURAN) 50 mg tablet    diphenhydrAMINE (BENADRYL) 25 mg capsule    ergocalciferol (ERGOCALCIFEROL) 1.25 MG (13300 UT) capsule    FOLIC ACID PO    gabapentin (NEURONTIN) 300 mg capsule    latanoprost (XALATAN) 0.005 % ophthalmic solution    metoprolol succinate (TOPROL-XL) 25 mg 24 hr tablet    pantoprazole (PROTONIX) 40 mg tablet    Prevymis 480 MG TABS    tacrolimus (PROGRAF) 1 mg capsule    thiamine 100 MG tablet    loperamide (IMODIUM A-D) 2 MG tablet    meclizine (ANTIVERT) 12.5 MG tablet    melatonin 3 mg    predniSONE 5 mg tablet    Premarin vaginal cream    Prolia 60 MG/ML    traMADol (ULTRAM) 50 mg tablet    traMADol (ULTRAM-ER) 200 MG 24 hr tablet  Current Facility-Administered Medications   Medication Dose Route Frequency    acetaminophen (TYLENOL) tablet 650 mg  650 mg Oral Q6H PRN    amLODIPine (NORVASC) tablet 5 mg  5 mg Oral Daily    azaTHIOprine (IMURAN) tablet 100 mg  100 mg Oral HS    docusate sodium (COLACE) capsule 100 mg  100 mg Oral BID    doxylamine (UNISOM) tablet 12.5 mg  12.5 mg Oral HS PRN    heparin (porcine) subcutaneous injection 5,000 Units  5,000 Units Subcutaneous Q8H CITLALI    HYDROmorphone HCl (DILAUDID) injection 0.2 mg  0.2 mg Intravenous Q3H PRN    hydrOXYzine HCL (ATARAX) tablet 25 mg  25 mg Oral Q6H PRN    latanoprost (XALATAN) 0.005 % ophthalmic solution 1 drop  1 drop Both Eyes HS    methocarbamol (ROBAXIN) tablet 500 mg  500 mg Oral Q6H PRN    metoprolol succinate (TOPROL-XL) 24 hr tablet 25 mg  25 mg Oral Daily    oxyCODONE (ROXICODONE) immediate release tablet 10  "mg  10 mg Oral Q4H PRN    oxyCODONE (ROXICODONE) IR tablet 5 mg  5 mg Oral Q4H PRN    pantoprazole (PROTONIX) EC tablet 40 mg  40 mg Oral Daily    polyethylene glycol (MIRALAX) packet 17 g  17 g Oral Daily PRN    senna (SENOKOT) tablet 17.2 mg  2 tablet Oral HS    thiamine tablet 100 mg  100 mg Oral Daily       Allergies   Allergen Reactions    Other Other (See Comments)     Seasonal allergies             Objective     Blood pressure 141/80, pulse 82, temperature 98.1 °F (36.7 °C), temperature source Temporal, resp. rate 16, height 5' 5\" (1.651 m), weight 81.3 kg (179 lb 3.7 oz), SpO2 97%, not currently breastfeeding.      Intake/Output Summary (Last 24 hours) at 8/19/2024 1321  Last data filed at 8/19/2024 1047  Gross per 24 hour   Intake 680 ml   Output 1725 ml   Net -1045 ml         PHYSICAL EXAM:      General Appearance:   Alert, cooperative, no distress, appears stated age    HEENT:   Normocephalic, atraumatic, anicteric.     Neck:  Supple, symmetrical, trachea midline, no adenopathy   Lungs:   Clear to auscultation bilaterally   Heart::   S1 and S2 normal; regular rate and rhythm   Abdomen:   Soft, non-tender, non-distended; normal bowel sounds   Genitalia:   Deferred    Neuro:   No overt asterixis   Extremities:  No cyanosis, clubbing or edema    Pulses:  2+ and symmetric all extremities    Skin:  Skin color, texture, turgor normal, no rashes or lesions    Lymph nodes:  No palpable cervical lymphadenopathy        Lab Results:   Results from last 7 days   Lab Units 08/19/24  0258   WBC Thousand/uL 3.74*   HEMOGLOBIN g/dL 9.9*   HEMATOCRIT % 30.1*   PLATELETS Thousands/uL 154   SEGS PCT % 60   LYMPHO PCT % 21   MONO PCT % 10   EOS PCT % 8*     Results from last 7 days   Lab Units 08/19/24  0258   POTASSIUM mmol/L 4.6   CHLORIDE mmol/L 106   CO2 mmol/L 26   BUN mg/dL 25   CREATININE mg/dL 2.41*   CALCIUM mg/dL 9.4   ALK PHOS U/L 108*   ALT U/L 29   AST U/L 31               Imaging Studies: I have personally " reviewed pertinent imaging studies.    XR chest portable    Result Date: 8/19/2024  Impression: No acute cardiopulmonary disease. Workstation performed: [id number]     US kidney and bladder    Result Date: 8/18/2024  Impression: Unremarkable sonographic appearance of the kidneys and bladder. Workstation performed: [id number]     XR ankle 3+ views LEFT    Result Date: 8/18/2024  Impression: Acute nondisplaced bimalleolar fractures. Computerized Assisted Algorithm (CAA) may have been used to analyze all applicable images. Workstation performed: [id number]     XR foot 3+ views RIGHT    Result Date: 8/18/2024  Impression: Acute second through fourth metatarsal neck fractures. Findings are new since prior radiograph from 2/22/2023 Computerized Assisted Algorithm (CAA) may have been used to analyze all applicable images. Workstation performed: [id number]     CT lower extremity wo contrast right    Result Date: 8/17/2024  Impression: Second through fourth metatarsal neck fractures. The study was marked in EPIC for immediate notification. Workstation performed: [id number]

## 2024-08-19 NOTE — ASSESSMENT & PLAN NOTE
Received liver transplant in May 2002 at [facility]   2/2 alcoholic cirrhosis  Follows with hepatology   Managed on azathioprine and tacrolimus  Tacrolimus level noted to 21.1 - currently on hold pending repeat level  Consult GI  States she was weaned off prednisone recently

## 2024-08-19 NOTE — UTILIZATION REVIEW
"Initial Clinical Review    Admission: Date/Time/Statement:   Admission Orders (From admission, onward)       Ordered        08/17/24 1329  INPATIENT ADMISSION  Once                          Orders Placed This Encounter   Procedures    INPATIENT ADMISSION     Standing Status:   Standing     Number of Occurrences:   1     Order Specific Question:   Level of Care     Answer:   Med Surg [16]     Order Specific Question:   Estimated length of stay     Answer:   More than 2 Midnights     Order Specific Question:   Certification     Answer:   I certify that inpatient services are medically necessary for this patient for a duration of greater than two midnights. See H&P and MD Progress Notes for additional information about the patient's course of treatment.     ED Arrival Information       Expected   -    Arrival   8/17/2024 08:42    Acuity   Urgent              Means of arrival   Ambulance    Escorted by   Zidisha (Jakin)    Service   Hospitalist    Admission type   Emergency              Arrival complaint   fall             Chief Complaint   Patient presents with    Fall     Pt rpts baseline cane use d/t LLE weakness. LLE \"gave out\" while letting dogs out. Denies head trauma/LOC/blood thinners. +increased LLE pain since.        Initial Presentation: 63 y.o. female  to ER from home via EMS.      PMH of liver transplant 2/2 alcoholic cirrhosis, peripheral neuropathy, HTN .   states she was at home and walking with her cane when she fell - denies dizziness/ any prodromal symptoms.   Reports worsening weakness LLE .  Of note, she  was recently weaned off steroids  2/2  RLE cellulitis   (on cephalexin since 8/14)    Reports  10/10 pain LLE > RLE.  IN ER,  RLE CT shows 2nd through 4th metatarsal fractures, left ankle XR shows bimalleolar fractures. Na 139, WCB 3.06, Hgb 10.8.    Cr 0.98 on 11/28/23, presents with Cr of 2.75     8/17   Admit  IP status:  MS   Level of care  for  management of   ALEJANDRO on CKD,  orthopedic care " "of   Left ankle bimalleolar nondisplaced fracture AND  Right foot second, third, fourth, fifth metatarsal neck nondisplaced fracture  in setting of   LE neuropathy and amb dysfunction, , appears to have had cellulitis on BL LE for which she was on cephalexin outpatient (started 8/14)   PT/OT eval and treat   8/17  ORTHO Consult:  recommended splinting along with pain control.   NWB  LLE;  WBAT  RLE .   PT/OT evaluation - cont home neurontin.    elevation and cold compress LE,  xray left knee.  Serial neurovascular cks to b/l LE.   8/17  nephrology consult:  IVF  NS  @  75 cc/hr.   trend I/O q shift, daily wgt,  daily renal indices.   Monitor/correct electrolytes prn.  FUP  Tacrolimus level.  Avoid hypotension.     Date: 8/18      Day 2:   pt  reports  malaise, poor appetite,  b/l LE   LE pain is present - Pain meds helping \"a little\"   Intermittent low pulse ox reading on room air:   placed on  supplemental oxygen @  2-3 L / NC .   Will consult Acute Pain Service     8/18  ORTHO:   should be able to treat LLE  fractures non-surgically as long as the bimalleolar fracture does not further displace.  Postop shoe right foot, if the postop shoe does not help with her pain and weightbearing can change to a short cam walker.    8/18   nephrology  - ALEJANDRO on top of suspected CKD.  Cont IVF - decrease the rate to 75 cc/hr.  Order RENAL US .  Tacrolimus level elevated at 21.1. -  Hold tacrolimus at this time. Repeat level with morning labs.    Date: 8/19      Day 3: Has surpassed a 2nd midnight with active treatments and services.  Pt reports pain SLOWLY improving.  Cont PT/OT today .   Remains on 2L NC  (suspect lo pulse ox 2/2  pain  - try to wean to room air)          Weight (last 2 days)       Date/Time Weight    08/19/24 0403 81.3 (179.23)    08/18/24 0437 82.3 (181.44)    08/17/24 0846 77.1 (170)            Vital Signs (last 3 days)       Date/Time Temp Pulse Resp BP MAP (mmHg) SpO2 Nasal Cannula O2 Flow Rate (L/min) O2 " Device [place] Coma Scale Score Pain    08/19/24 06:58:55 98.1 °F (36.7 °C) 82 16 141/80 100 97 % 2 L/min Nasal cannula -- --    08/19/24 0315 -- -- -- -- -- -- -- -- -- 8    08/18/24 2313 -- -- -- -- -- -- -- -- -- 10 - Worst Possible Pain    08/18/24 2100 -- -- -- -- -- -- 2 L/min Nasal cannula 15 --    08/18/24 19:17:31 98.2 °F (36.8 °C) 80 -- 134/75 95 97 % 2 L/min Nasal cannula -- --    08/18/24 1555 98.2 °F (36.8 °C) -- 16 -- -- -- 2 L/min Nasal cannula -- --    08/18/24 1516 -- -- -- -- -- 90 % 2 L/min Nasal cannula -- --    08/18/24 1332 -- -- -- -- -- -- -- -- -- 8    08/18/24 1223 -- -- -- -- -- -- -- -- -- 10 - Worst Possible Pain    08/18/24 0817 -- -- -- -- -- -- 2 L/min Nasal cannula -- --    08/18/24 0812 -- -- -- -- -- 82 % -- None (Room air) 15 5    08/18/24 0753 -- -- -- -- -- -- -- -- -- 10 - Worst Possible Pain    08/18/24 07:51:28 98.8 °F (37.1 °C) 88 20 150/79 103 90 % -- None (Room air) -- --    08/18/24 0426 -- -- -- -- -- -- -- -- -- 10 - Worst Possible Pain    08/17/24 2337 -- -- -- -- -- -- -- -- -- 9    08/17/24 2102 -- -- -- -- -- -- -- -- -- 10 - Worst Possible Pain    08/17/24 1949 -- -- -- -- -- -- -- -- -- 7    08/17/24 19:16:56 97.9 °F (36.6 °C) 74 16 161/85 110 96 % 3 L/min Nasal cannula -- --    08/17/24 1700 -- -- -- -- -- -- -- -- 15 --    08/17/24 16:50:56 -- 75 -- 163/90 114 93 % -- -- -- --    08/17/24 1641 -- -- -- -- -- -- -- -- -- 10 - Worst Possible Pain    08/17/24 1628 -- -- -- -- -- -- -- -- -- 10 - Worst Possible Pain    08/17/24 15:10:34 97.7 °F (36.5 °C) 73 -- 156/82 107 91 % -- -- -- --    08/17/24 14:39:38 97.9 °F (36.6 °C) 73 20 147/83 104 93 % -- None (Room air) 15 No Pain    08/17/24 1402 -- -- -- -- -- -- -- -- -- 10 - Worst Possible Pain    08/17/24 1335 -- 71 20 139/64 92 95 % -- None (Room air) -- --    08/17/24 1242 -- -- -- -- -- -- -- -- -- 10 - Worst Possible Pain    08/17/24 1215 -- 61 -- 136/66 95 94 % -- -- -- 10 - Worst Possible Pain    08/17/24  1038 -- -- -- -- -- -- -- -- -- 10 - Worst Possible Pain    08/17/24 1021 -- 67 20 178/86 123 96 % -- None (Room air) -- --    08/17/24 0939 -- -- -- -- -- -- -- -- -- 10 - Worst Possible Pain    08/17/24 0903 -- -- -- -- -- -- -- -- -- 10 - Worst Possible Pain    08/17/24 0900 -- 69 22 164/85 119 95 % -- None (Room air) 15 --    08/17/24 0846 97.8 °F (36.6 °C) 70 20 183/81 117 95 % -- -- -- 10 - Worst Possible Pain              Pertinent Labs/Diagnostic Test Results:   Radiology:  US kidney and bladder   Final Interpretation by Chepe Pan DO (08/18 1643)      Unremarkable sonographic appearance of the kidneys and bladder.            Workstation performed: [id number]         CT lower extremity wo contrast right   Final Interpretation by Nawaf Medina MD (08/17 3098)      Second through fourth metatarsal neck fractures.      The study was marked in EPIC for immediate notification.         Workstation performed: [id number]         XR foot 3+ views RIGHT   Final Interpretation by Sherwin Yeh MD (08/18 1103)      Acute second through fourth metatarsal neck fractures. Findings are new since prior radiograph from 2/22/2023         Computerized Assisted Algorithm (CAA) may have been used to analyze all applicable images.         Workstation performed: [id number]         XR ankle 3+ views LEFT   ED Interpretation by Glaindo Jaramillo PA-C (08/17 1152)   Nondisplaced bimaleolar fracture of the left ankle.      Final Interpretation by Sherwin Yeh MD (08/18 1103)      Acute nondisplaced bimalleolar fractures.         Computerized Assisted Algorithm (CAA) may have been used to analyze all applicable images.               Workstation performed: [id number]         XR chest portable    (Results Pending)   XR knee 4+ vw left injury    (Results Pending)     Cardiology:  ECG 12 lead    by Interface, Ris Results In (08/17 2520)        GI:  No orders to display           Results from last 7 days   Lab Units 08/19/24  1733  08/18/24  0435 08/17/24  1146   WBC Thousand/uL 3.74* 5.24 3.06*   HEMOGLOBIN g/dL 9.9* 10.9* 10.8*   HEMATOCRIT % 30.1* 32.7* 33.4*   PLATELETS Thousands/uL 154 175 196   TOTAL NEUT ABS Thousands/µL 2.25  --  1.62*         Results from last 7 days   Lab Units 08/19/24  0258 08/18/24  0435 08/17/24  1146   SODIUM mmol/L 140 138 139   POTASSIUM mmol/L 4.6 5.0 4.9   CHLORIDE mmol/L 106 105 103   CO2 mmol/L 26 26 28   ANION GAP mmol/L 8 7 8   BUN mg/dL 25 27* 28*   CREATININE mg/dL 2.41* 2.62* 2.75*   EGFR ml/min/1.73sq m 21 19 17   CALCIUM mg/dL 9.4 9.6 9.7     Results from last 7 days   Lab Units 08/19/24  0258 08/17/24  1146   AST U/L 31 20   ALT U/L 29 8   ALK PHOS U/L 108* 39   TOTAL PROTEIN g/dL 6.4 6.3*   ALBUMIN g/dL 3.1* 3.8   TOTAL BILIRUBIN mg/dL 0.92 0.57         Results from last 7 days   Lab Units 08/19/24  0258 08/18/24  0435 08/17/24  1146   GLUCOSE RANDOM mg/dL 91 119 104       Results from last 7 days   Lab Units 08/17/24  2341   CLARITY UA  Clear   COLOR UA  Yellow   SPEC GRAV UA  1.015   PH UA  6.5   GLUCOSE UA mg/dl Negative   KETONES UA mg/dl Negative   BLOOD UA  Negative   PROTEIN UA mg/dl Trace*   NITRITE UA  Negative   BILIRUBIN UA  Negative   UROBILINOGEN UA (BE) mg/dl <2.0   LEUKOCYTES UA  Negative   WBC UA /hpf 4-10*   RBC UA /hpf None Seen   BACTERIA UA /hpf None Seen   EPITHELIAL CELLS WET PREP /hpf Innumerable*   MUCUS THREADS  None Seen       ED Treatment-Medication Administration from 08/17/2024 0842 to 08/17/2024 1353         Date/Time Order Dose Route Action     08/17/2024 0903 oxyCODONE-acetaminophen (PERCOCET) 5-325 mg per tablet 1 tablet 1 tablet Oral Given     08/17/2024 1038 morphine injection 2 mg 2 mg Intravenous Given     08/17/2024 1205 HYDROmorphone (DILAUDID) injection 0.2 mg 0.2 mg Intravenous Given            Past Medical History:   Diagnosis Date    Anemia     Anxiety     Asthma     Chronic pain disorder     Clotting disorder (HCC)     Concussion     Constipation     CSF  leak     Fractures     GERD (gastroesophageal reflux disease)     Head injury     Hernia 2022    High blood pressure     History of transfusion     Hyperbilirubinemia 04/28/2022    Hypertension     Liver failure (HCC)     Liver transplant recipient (HCC)     Migraines     Peripheral neuropathy     Urinary frequency     Varicella 1968    As a child     Present on Admission:   Acute kidney injury superimposed on chronic kidney disease  (HCC)   Hypertension   Weakness of left lower extremity      Admitting Diagnosis: Multiple injuries [T07.XXXA]  ALEJANDRO (acute kidney injury) (HCC) [N17.9]  Closed bimalleolar fracture of left ankle, initial encounter [S82.662A]  Anemia, unspecified type [D64.9]  Leukopenia, unspecified type [D72.819]  Age/Sex: 63 y.o. female    Admission Orders:   see above note.      Scheduled Medications:  amLODIPine, 5 mg, Oral, Daily  azaTHIOprine, 100 mg, Oral, HS  docusate sodium, 100 mg, Oral, BID  gabapentin, 600 mg, Oral, TID  heparin (porcine), 5,000 Units, Subcutaneous, Q8H CITLALI  latanoprost, 1 drop, Both Eyes, HS  metoprolol succinate, 25 mg, Oral, Daily  pantoprazole, 40 mg, Oral, Daily  senna, 2 tablet, Oral, HS  thiamine, 100 mg, Oral, Daily      Continuous IV Infusions:  sodium chloride, 75 mL/hr, Intravenous, Continuous\      1x and prn meds:     8/17  1400  IV dilaudid 0.2 mg   1640  IV dilaudid 2 mg   2100   unisom  2340  IV dilaudid 2 mg     8/18  0755   IV dilaudid   1335  po tylenol     8/19  0910  po tylenol           PRN Meds:  acetaminophen, 650 mg, Oral, Q6H PRN  doxylamine, 12.5 mg, Oral, HS PRN  HYDROmorphone, 2 mg, Intravenous, Q3H PRN  methocarbamol, 500 mg, Oral, Q6H PRN  oxyCODONE, 10 mg, Oral, Q4H PRN  oxyCODONE, 5 mg, Oral, Q4H PRN        IP CONSULT TO ORTHOPEDIC SURGERY  IP CONSULT TO NEPHROLOGY  IP CONSULT TO ACUTE PAIN SERVICE    Network Utilization Review Department  ATTENTION: Please call with any questions or concerns to [phone] and carefully listen to the  prompts so that you are directed to the right person. All voicemails are confidential.   For Discharge needs, contact Care Management DC Support Team at [phone] opt. [phone]  Send all requests for admission clinical reviews, approved or denied determinations and any other requests to dedicated fax number below belonging to the campus where the patient is receiving treatment. List of dedicated fax numbers for the Facilities:  FACILITY NAME UR FAX NUMBER   ADMISSION DENIALS (Administrative/Medical Necessity) [phone]   DISCHARGE SUPPORT TEAM (NETWORK) [phone]   PARENT CHILD HEALTH (Maternity/NICU/Pediatrics) [phone]   [facility] [phone]   [facility] [phone]   [facility] [phone]   [facility] [phone]   [facility] [phone]   [facility] [phone]   [facility] [phone]   [facility] [phone]   [facility] [phone]   [facility] [phone]   [facility] [phone]   [facility] [phone]

## 2024-08-19 NOTE — ASSESSMENT & PLAN NOTE
Initially hypertensive upon arrival, possibly due to underlying pain  BP improved  Continue home medications: Toprol-XL 25mg q24hr  Amlodipine added 8/18

## 2024-08-19 NOTE — ASSESSMENT & PLAN NOTE
Lab Results   Component Value Date    EGFR 21 08/19/2024    EGFR 19 08/18/2024    EGFR 17 08/17/2024    CREATININE 2.41 (H) 08/19/2024    CREATININE 2.62 (H) 08/18/2024    CREATININE 2.75 (H) 08/17/2024     Cr 0.98 on 11/28/23, presents with Cr of 2.75  Creatinine improving to 2.41  US kidney and bladder unremarkable  Continue IVF  Avoid nephrotoxic agents  Tacrolimus level 21.1- currently on hold  Nephrology following

## 2024-08-19 NOTE — PLAN OF CARE
Problem: Potential for Falls  Goal: Patient will remain free of falls  Description: INTERVENTIONS:  - Educate patient/family on patient safety including physical limitations  - Instruct patient to call for assistance with activity   - Consult OT/PT to assist with strengthening/mobility   - Keep Call bell within reach  - Keep bed low and locked with side rails adjusted as appropriate  - Keep care items and personal belongings within reach  - Initiate and maintain comfort rounds  - Make Fall Risk Sign visible to staff  - Offer Toileting every 2 Hours, in advance of need  - Initiate/Maintain bed alarm  - Obtain necessary fall risk management equipment  - Apply yellow socks and bracelet for high fall risk patients  - Consider moving patient to room near nurses station  Outcome: Progressing     Problem: PAIN - ADULT  Goal: Verbalizes/displays adequate comfort level or baseline comfort level  Description: Interventions:  - Encourage patient to monitor pain and request assistance  - Assess pain using appropriate pain scale  - Administer analgesics based on type and severity of pain and evaluate response  - Implement non-pharmacological measures as appropriate and evaluate response  - Consider cultural and social influences on pain and pain management  - Notify physician/advanced practitioner if interventions unsuccessful or patient reports new pain  Outcome: Progressing     Problem: INFECTION - ADULT  Goal: Absence or prevention of progression during hospitalization  Description: INTERVENTIONS:  - Assess and monitor for signs and symptoms of infection  - Monitor lab/diagnostic results  - Monitor all insertion sites, i.e. indwelling lines, tubes, and drains  - Monitor endotracheal if appropriate and nasal secretions for changes in amount and color  - [place] appropriate cooling/warming therapies per order  - Administer medications as ordered  - Instruct and encourage patient and family to use good hand hygiene  technique  - Identify and instruct in appropriate isolation precautions for identified infection/condition  Outcome: Progressing  Goal: Absence of fever/infection during neutropenic period  Description: INTERVENTIONS:  - Monitor WBC    Outcome: Progressing     Problem: SAFETY ADULT  Goal: Patient will remain free of falls  Description: INTERVENTIONS:  - Educate patient/family on patient safety including physical limitations  - Instruct patient to call for assistance with activity   - Consult OT/PT to assist with strengthening/mobility   - Keep Call bell within reach  - Keep bed low and locked with side rails adjusted as appropriate  - Keep care items and personal belongings within reach  - Initiate and maintain comfort rounds  - Make Fall Risk Sign visible to staff  - Offer Toileting every 2 Hours, in advance of need  - Initiate/Maintain bed alarm  - Obtain necessary fall risk management equipment  - Apply yellow socks and bracelet for high fall risk patients  - Consider moving patient to room near nurses station  Outcome: Progressing  Goal: Maintain or return to baseline ADL function  Description: INTERVENTIONS:  - Educate patient/family on patient safety including physical limitations  - Instruct patient to call for assistance with activity   - Consult OT/PT to assist with strengthening/mobility   - Keep Call bell within reach  - Keep bed low and locked with side rails adjusted as appropriate  - Keep care items and personal belongings within reach  - Initiate and maintain comfort rounds  - Make Fall Risk Sign visible to staff  - Offer Toileting every 2 Hours, in advance of need  - Initiate/Maintain bed alarm  - Obtain necessary fall risk management equipment  - Apply yellow socks and bracelet for high fall risk patients  - Consider moving patient to room near nurses station  Outcome: Progressing  Goal: Maintains/Returns to pre admission functional level  Description: INTERVENTIONS:  - Perform AM-PAC 6 Click Basic  Mobility/ Daily Activity assessment daily.  - Set and communicate daily mobility goal to care team and patient/family/caregiver.   - Collaborate with rehabilitation services on mobility goals if consulted  - Perform Range of Motion x times a day.  - Reposition patient every x hours.  - Dangle patient x times a day  - Stand patient x times a day  - Ambulate patient x times a day  - Out of bed to chair x times a day   - Out of bed for meals x times a day  - Out of bed for toileting  - Record patient progress and toleration of activity level   Outcome: Progressing     Problem: DISCHARGE PLANNING  Goal: Discharge to home or other facility with appropriate resources  Description: INTERVENTIONS:  - Identify barriers to discharge w/patient and caregiver  - Arrange for needed discharge resources and transportation as appropriate  - Identify discharge learning needs (meds, wound care, etc.)  - Arrange for interpretive services to assist at discharge as needed  - Refer to Case Management Department for coordinating discharge planning if the patient needs post-hospital services based on physician/advanced practitioner order or complex needs related to functional status, cognitive ability, or social support system  Outcome: Progressing     Problem: Knowledge Deficit  Goal: Patient/family/caregiver demonstrates understanding of disease process, treatment plan, medications, and discharge instructions  Description: Complete learning assessment and assess knowledge base.  Interventions:  - Provide teaching at level of understanding  - Provide teaching via preferred learning methods  Outcome: Progressing     Problem: Prexisting or High Potential for Compromised Skin Integrity  Goal: Skin integrity is maintained or improved  Description: INTERVENTIONS:  - Identify patients at risk for skin breakdown  - Assess and monitor skin integrity  - Assess and monitor nutrition and hydration status  - Monitor labs   - Assess for incontinence    - Turn and reposition patient  - Assist with mobility/ambulation  - Relieve pressure over bony prominences  - Avoid friction and shearing  - Provide appropriate hygiene as needed including keeping skin clean and dry  - Evaluate need for skin moisturizer/barrier cream  - Collaborate with interdisciplinary team   - Patient/family teaching  - Consider wound care consult   Outcome: Progressing

## 2024-08-20 LAB
ALBUMIN SERPL BCG-MCNC: 3.6 G/DL (ref 3.5–5)
ALP SERPL-CCNC: 83 U/L (ref 34–104)
ALT SERPL W P-5'-P-CCNC: 18 U/L (ref 7–52)
ANION GAP SERPL CALCULATED.3IONS-SCNC: 9 MMOL/L (ref 4–13)
AST SERPL W P-5'-P-CCNC: 17 U/L (ref 13–39)
BASOPHILS # BLD AUTO: 0.03 THOUSANDS/ÂΜL (ref 0–0.1)
BASOPHILS NFR BLD AUTO: 1 % (ref 0–1)
BILIRUB SERPL-MCNC: 0.81 MG/DL (ref 0.2–1)
BUN SERPL-MCNC: 19 MG/DL (ref 5–25)
CALCIUM SERPL-MCNC: 9.7 MG/DL (ref 8.4–10.2)
CHLORIDE SERPL-SCNC: 106 MMOL/L (ref 96–108)
CO2 SERPL-SCNC: 26 MMOL/L (ref 21–32)
CREAT SERPL-MCNC: 1.76 MG/DL (ref 0.6–1.3)
EOSINOPHIL # BLD AUTO: 0.37 THOUSAND/ÂΜL (ref 0–0.61)
EOSINOPHIL NFR BLD AUTO: 13 % (ref 0–6)
ERYTHROCYTE [DISTWIDTH] IN BLOOD BY AUTOMATED COUNT: 13.3 % (ref 11.6–15.1)
GFR SERPL CREATININE-BSD FRML MDRD: 30 ML/MIN/1.73SQ M
GLUCOSE SERPL-MCNC: 90 MG/DL (ref 65–140)
HCT VFR BLD AUTO: 30.1 % (ref 34.8–46.1)
HGB BLD-MCNC: 9.9 G/DL (ref 11.5–15.4)
IMM GRANULOCYTES # BLD AUTO: 0 THOUSAND/UL (ref 0–0.2)
IMM GRANULOCYTES NFR BLD AUTO: 0 % (ref 0–2)
LYMPHOCYTES # BLD AUTO: 0.5 THOUSANDS/ÂΜL (ref 0.6–4.47)
LYMPHOCYTES NFR BLD AUTO: 18 % (ref 14–44)
MCH RBC QN AUTO: 31.7 PG (ref 26.8–34.3)
MCHC RBC AUTO-ENTMCNC: 32.9 G/DL (ref 31.4–37.4)
MCV RBC AUTO: 97 FL (ref 82–98)
MONOCYTES # BLD AUTO: 0.24 THOUSAND/ÂΜL (ref 0.17–1.22)
MONOCYTES NFR BLD AUTO: 9 % (ref 4–12)
NEUTROPHILS # BLD AUTO: 1.69 THOUSANDS/ÂΜL (ref 1.85–7.62)
NEUTS SEG NFR BLD AUTO: 59 % (ref 43–75)
NRBC BLD AUTO-RTO: 0 /100 WBCS
PLATELET # BLD AUTO: 150 THOUSANDS/UL (ref 149–390)
PMV BLD AUTO: 10.3 FL (ref 8.9–12.7)
POTASSIUM SERPL-SCNC: 4.6 MMOL/L (ref 3.5–5.3)
PROT SERPL-MCNC: 6.4 G/DL (ref 6.4–8.4)
RBC # BLD AUTO: 3.12 MILLION/UL (ref 3.81–5.12)
SODIUM SERPL-SCNC: 141 MMOL/L (ref 135–147)
TACROLIMUS BLD-MCNC: 7.7 NG/ML (ref 3–15)
WBC # BLD AUTO: 2.83 THOUSAND/UL (ref 4.31–10.16)

## 2024-08-20 PROCEDURE — 99232 SBSQ HOSP IP/OBS MODERATE 35: CPT | Performed by: PHYSICIAN ASSISTANT

## 2024-08-20 PROCEDURE — 97530 THERAPEUTIC ACTIVITIES: CPT

## 2024-08-20 PROCEDURE — 99232 SBSQ HOSP IP/OBS MODERATE 35: CPT | Performed by: INTERNAL MEDICINE

## 2024-08-20 PROCEDURE — 99233 SBSQ HOSP IP/OBS HIGH 50: CPT | Performed by: PHYSICIAN ASSISTANT

## 2024-08-20 PROCEDURE — 97535 SELF CARE MNGMENT TRAINING: CPT

## 2024-08-20 PROCEDURE — 97167 OT EVAL HIGH COMPLEX 60 MIN: CPT

## 2024-08-20 PROCEDURE — 80197 ASSAY OF TACROLIMUS: CPT | Performed by: NURSE PRACTITIONER

## 2024-08-20 PROCEDURE — 97163 PT EVAL HIGH COMPLEX 45 MIN: CPT

## 2024-08-20 PROCEDURE — 80053 COMPREHEN METABOLIC PANEL: CPT | Performed by: PHYSICIAN ASSISTANT

## 2024-08-20 PROCEDURE — 85025 COMPLETE CBC W/AUTO DIFF WBC: CPT | Performed by: PHYSICIAN ASSISTANT

## 2024-08-20 RX ORDER — TACROLIMUS 1 MG/1
3 CAPSULE ORAL EVERY 12 HOURS SCHEDULED
Status: DISCONTINUED | OUTPATIENT
Start: 2024-08-20 | End: 2024-08-24 | Stop reason: HOSPADM

## 2024-08-20 RX ADMIN — TACROLIMUS 3 MG: 1 CAPSULE ORAL at 20:41

## 2024-08-20 RX ADMIN — PANTOPRAZOLE SODIUM 40 MG: 40 TABLET, DELAYED RELEASE ORAL at 09:25

## 2024-08-20 RX ADMIN — METHOCARBAMOL TABLETS 500 MG: 500 TABLET, COATED ORAL at 09:25

## 2024-08-20 RX ADMIN — HYDROXYZINE HYDROCHLORIDE 25 MG: 25 TABLET ORAL at 20:41

## 2024-08-20 RX ADMIN — AMLODIPINE BESYLATE 5 MG: 5 TABLET ORAL at 09:25

## 2024-08-20 RX ADMIN — HEPARIN SODIUM 5000 UNITS: 5000 INJECTION INTRAVENOUS; SUBCUTANEOUS at 05:08

## 2024-08-20 RX ADMIN — DOCUSATE SODIUM 100 MG: 100 CAPSULE, LIQUID FILLED ORAL at 09:25

## 2024-08-20 RX ADMIN — HYDROXYZINE HYDROCHLORIDE 25 MG: 25 TABLET ORAL at 09:25

## 2024-08-20 RX ADMIN — HEPARIN SODIUM 5000 UNITS: 5000 INJECTION INTRAVENOUS; SUBCUTANEOUS at 13:59

## 2024-08-20 RX ADMIN — HYDROXYZINE HYDROCHLORIDE 25 MG: 25 TABLET ORAL at 14:47

## 2024-08-20 RX ADMIN — SENNOSIDES 17.2 MG: 8.6 TABLET, FILM COATED ORAL at 20:41

## 2024-08-20 RX ADMIN — Medication 100 MG: at 09:25

## 2024-08-20 RX ADMIN — OXYCODONE HYDROCHLORIDE 10 MG: 10 TABLET ORAL at 05:08

## 2024-08-20 RX ADMIN — OXYCODONE HYDROCHLORIDE 10 MG: 10 TABLET ORAL at 20:41

## 2024-08-20 RX ADMIN — OXYCODONE HYDROCHLORIDE 10 MG: 10 TABLET ORAL at 13:58

## 2024-08-20 RX ADMIN — HYDROMORPHONE HYDROCHLORIDE 0.2 MG: 0.2 INJECTION, SOLUTION INTRAMUSCULAR; INTRAVENOUS; SUBCUTANEOUS at 14:47

## 2024-08-20 RX ADMIN — DOCUSATE SODIUM 100 MG: 100 CAPSULE, LIQUID FILLED ORAL at 17:39

## 2024-08-20 RX ADMIN — METHOCARBAMOL TABLETS 500 MG: 500 TABLET, COATED ORAL at 14:47

## 2024-08-20 RX ADMIN — AZATHIOPRINE 100 MG: 50 TABLET ORAL at 20:41

## 2024-08-20 RX ADMIN — OXYCODONE HYDROCHLORIDE 10 MG: 10 TABLET ORAL at 09:25

## 2024-08-20 RX ADMIN — METOPROLOL SUCCINATE 25 MG: 25 TABLET, EXTENDED RELEASE ORAL at 09:25

## 2024-08-20 RX ADMIN — POLYETHYLENE GLYCOL 3350 17 G: 17 POWDER, FOR SOLUTION ORAL at 09:25

## 2024-08-20 RX ADMIN — TACROLIMUS 3 MG: 1 CAPSULE ORAL at 11:35

## 2024-08-20 NOTE — PLAN OF CARE
Problem: PHYSICAL THERAPY ADULT  Goal: Performs mobility at highest level of function for planned discharge setting.  See evaluation for individualized goals.  Description: Treatment/Interventions: Functional transfer training, LE strengthening/ROM, Therapeutic exercise, Endurance training, Cognitive reorientation, Patient/family training, Equipment eval/education, Bed mobility          See flowsheet documentation for full assessment, interventions and recommendations.  8/20/2024 1512 by Dominique Schulz, PT  Note:    Problem List: Decreased strength, Decreased endurance, Impaired balance, Decreased mobility, Impaired judgement, Decreased safety awareness, Orthopedic restrictions, Pain  Assessment: Linda Becerra is a 63 y.o. Female who [facility] on [facility]17[facility] from home w/ c/o fall and diagnosis of closed L ankle fracture (nondisplaced bimalleolar fracture) and 2-4 met neck fractures. Orders for PT eval and treat received, w/ activity orders of WBAT R LE and NWB L LE and fall precautions. Pt presents w/ comorbidities of liver transplant recipient, HTN, CPS, peripheral neuropathy, lumbar radiculopathy. At baseline, pt mobilizes modified I w/ SPC, and reports 1 falls in the last 6 months. Upon evaluation, pt presents w/ the following deficits: weakness, impaired balance, decreased endurance, and pain limiting functional mobility. Upon eval, pt requires min A x 1 for bed mobility, min-mod A x 2 for transfers. Based on this PT evaluation today, patient's discharge recommendation is for Level II - Moderate Rehab Resource Intensity. During this admission, pt would benefit from continued skilled inpatient PT in the acute care setting in order to address the abovementioned deficits to maximize function and mobility before DC from acute care.  Barriers to Discharge:  (IFEOMA, requires Ax2 to pivot)     Rehab Resource Intensity Level, PT: II (Moderate Resource Intensity)    See flowsheet documentation for full assessment.

## 2024-08-20 NOTE — PLAN OF CARE
Problem: OCCUPATIONAL THERAPY ADULT  Goal: Performs self-care activities at highest level of function for planned discharge setting.  See evaluation for individualized goals.  Description: Treatment Interventions: ADL retraining, Functional transfer training, UE strengthening/ROM, Endurance training, Patient/family training, Equipment evaluation/education, Compensatory technique education, Continued evaluation, Energy conservation, Activityengagement          See flowsheet documentation for full assessment, interventions and recommendations.   Note: Limitation: Decreased ADL status, Decreased UE strength, Decreased Safe judgement during ADL, Decreased cognition, Decreased self-care trans, Decreased high-level ADLs, Mood limitation  Prognosis: Fair  Assessment: Pt is a 63 y.o. female seen for OT evaluation s/p admission to [facility] on 8/17/2024 due to Fall and diagnosis of closed L ankle fracture (nondisplaced bimalleolar fracture) and 2-4 met neck fractures. Diagnosed with Closed left ankle fracture. Orders for OT eval and treat received, w/ activity orders of WBAT R LE and NWB L LE and fall precautions. Pt presents w/ comorbidities of liver transplant recipient, HTN, CPS, peripheral neuropathy, lumbar radiculopathy.  Personal and env factors supporting pt at time of IE include (I) PLOF, supportive family, and FFSU. Pt was living with family in a 1 STH, 3 IFEOMA at [facility] level for self help skills, SPC for ambulation and  +.  Pt presents today with decreased act shawn, balance deficits, decreased strength, and pain limiting her Indep and safety with all functional tasks.  Upon initial evaluation, pt appears to be performing below baseline functional status. Personal and env factors inhibiting engagement in occupations include difficulty completing ADLs, difficulty completing IADLs, and anxiety .   Due to pt's current functional limitations and medical complications pt is functioning below  baseline. Patient would benefit from OT services within the acute care setting to maximize level of functional independence in the following areas self-care transfers, functional mobility, and ADLs. From OT standpoint, recommendation at time of D/C would be Level II.     Rehab Resource Intensity Level, OT: II (Moderate Resource Intensity)

## 2024-08-20 NOTE — ASSESSMENT & PLAN NOTE
Received liver transplant in May 2002 at [facility]   2/2 alcoholic cirrhosis  Follows with hepatology   Managed on azathioprine and tacrolimus  Also on prevymis - to be brought in by family  Tacrolimus level noted to 21.1 - inaccurate draw  States she was weaned off prednisone recently

## 2024-08-20 NOTE — PROGRESS NOTES
"Progress Note - Linda Becerra 63 y.o. female MRN: [id number]    Unit/Bed#: -01 Encounter: [id number]    Assessment and Plan:    63-year-old female with history of cirrhosis due to alcohol status post OLT May 2022 at [facility] admitted with closed left ankle fracture, ALEJANDRO superimposed on CKD, and metabolic encephalopathy.     1) Liver transplant status  Patient with history of cirrhosis due to alcohol, status post OLT May 2022 at [facility]. She currently follows with Dr. Eller at [facility]. She takes tacrolimus, Imuran, prednisone (recent weaned off), and prevymis for history of CMV infection. Liver tests and ammonia normal, therefore no signs of liver decompensation. She does have hand tremor and wrist drop - difficult to assess for asterixis. Tacrolimus level elevated although after discussion with nephrology, the level is inaccurate based on the time it was drawn.     -Continue tacrolimus, Imuran, Prevymis (not on hospital formulary, so patient's family needs to bring this in from home)  -Follow-up repeat tacrolimus level  -Monitor liver blood tests  -Follow-up with transplant hepatology at [facility]     2) ALEJANDRO superimposed on CKD  Nephrology following, etiology likely prerenal since creatinine is improving with volume resuscitation.  Creatinine 2.75 on admission, currently down to 1.76 following IV hydration.     -Monitor renal function  -Appreciate input from nephrology     3) Metabolic encephalopathy  Likely related to ALEJANDRO, pain meds, gabapentin. CT head negative.     -Monitor mental status    Subjective:     Patient seen and examined at bedside.  She has not had a bowel movement yet.  Last BM was Saturday.  She denies abdominal pain.  She is eating small amounts.    Objective:     Vitals: Blood pressure 139/73, pulse 72, temperature 98.1 °F (36.7 °C), resp. rate 16, height 5' 5\" (1.651 m), weight 79.6 kg (175 lb 7.8 oz), SpO2 99%, not currently breastfeeding.,Body mass index is 29.2 " kg/m².      Intake/Output Summary (Last 24 hours) at 8/20/2024 1129  Last data filed at 8/20/2024 0501  Gross per 24 hour   Intake 240 ml   Output 1550 ml   Net -1310 ml       Physical Exam:     General Appearance: Alert and oriented, appears stated age and cooperative  Lungs: Clear to auscultation bilaterally  Heart: Regular rate and rhythm  Abdomen: Soft, non-tender, non-distended; bowel sounds normal  Extremities: Dressing over L ankle  Neuro: Hand tremor with wrist drop, L > R    Invasive Devices       Peripheral Intravenous Line  Duration             Peripheral IV 08/18/24 Right;Ventral (anterior) Forearm 1 day              Drain  Duration             External Urinary Catheter 1 day                    Lab Results:  Results from last 7 days   Lab Units 08/20/24  0517   WBC Thousand/uL 2.83*   HEMOGLOBIN g/dL 9.9*   HEMATOCRIT % 30.1*   PLATELETS Thousands/uL 150   SEGS PCT % 59   LYMPHO PCT % 18   MONO PCT % 9   EOS PCT % 13*     Results from last 7 days   Lab Units 08/20/24  0517   POTASSIUM mmol/L 4.6   CHLORIDE mmol/L 106   CO2 mmol/L 26   BUN mg/dL 19   CREATININE mg/dL 1.76*   CALCIUM mg/dL 9.7   ALK PHOS U/L 83   ALT U/L 18   AST U/L 17               Imaging Studies: I have personally reviewed pertinent imaging studies.    CT head wo contrast    Result Date: 8/19/2024  Impression: No acute intracranial hemorrhage seen No mass effect or midline shift seen Workstation performed: [id number]     XR knee 4+ vw left injury    Result Date: 8/19/2024  Impression: No acute osseous abnormality. Computerized Assisted Algorithm (CAA) may have been used to analyze all applicable images. Workstation performed: [id number]     XR chest portable    Result Date: 8/19/2024  Impression: No acute cardiopulmonary disease. Workstation performed: [id number]     US kidney and bladder    Result Date: 8/18/2024  Impression: Unremarkable sonographic appearance of the kidneys and bladder. Workstation performed: [id number]     XR  ankle 3+ views LEFT    Result Date: 8/18/2024  Impression: Acute nondisplaced bimalleolar fractures. Computerized Assisted Algorithm (CAA) may have been used to analyze all applicable images. Workstation performed: [id number]     XR foot 3+ views RIGHT    Result Date: 8/18/2024  Impression: Acute second through fourth metatarsal neck fractures. Findings are new since prior radiograph from 2/22/2023 Computerized Assisted Algorithm (CAA) may have been used to analyze all applicable images. Workstation performed: [id number]     CT lower extremity wo contrast right    Result Date: 8/17/2024  Impression: Second through fourth metatarsal neck fractures. The study was marked in EPIC for immediate notification. Workstation performed: [id number]

## 2024-08-20 NOTE — PHYSICAL THERAPY NOTE
PHYSICAL THERAPY EVALUATION NOTE    Patient Name: Linda Becerra  Today's Date: 2024    AGE:   63 y.o.  Mrn:   [id number]  ADMIT DX:  Multiple injuries [T07.XXXA]  ALEJANDRO (acute kidney injury) (HCC) [N17.9]  Closed bimalleolar fracture of left ankle, initial encounter [S82.242A]  Anemia, unspecified type [D64.9]  Leukopenia, unspecified type [D72.819]    Past Medical History:   Diagnosis Date    Anemia     Anxiety     Asthma     Chronic pain disorder     Clotting disorder (HCC)     Concussion     Constipation     CSF leak     Fractures     GERD (gastroesophageal reflux disease)     Head injury     Hernia     High blood pressure     History of transfusion     Hyperbilirubinemia 2022    Hypertension     Liver failure (HCC)     Liver transplant recipient (HCC)     Migraines     Peripheral neuropathy     Urinary frequency     Varicella 1968    As a child     Length Of Stay: 3  PHYSICAL THERAPY EVALUATION :   Patient's identity confirmed via 2 patient identifiers (full name and ) at start of session       24 1413   PT Last Visit   PT Visit Date 24   Note Type   Note type Evaluation   Pain Assessment   Pain Assessment Tool FLACC   Pain Score 10 - Worst Possible Pain   Pain Location/Orientation Orientation: Left  (Ankle)   Patient's Stated Pain Goal No pain   Multiple Pain Sites Yes   Pain Rating: FLACC (Rest) - Face 1   Pain Rating: FLACC (Rest) - Legs 0   Pain Rating: FLACC (Rest) - Activity 0   Pain Rating: FLACC (Rest) - Cry 0   Pain Rating: FLACC (Rest) - Consolability 0   Score: FLACC (Rest) 1   Pain 2   Johnson-Mohamud FACES Pain Rating 2 8   Pain Location/Orientation 2 Orientation: Right;Location: Foot   Restrictions/Precautions   Weight Bearing Precautions Per Order Yes   RLE Weight Bearing Per Order WBAT  (post-op shoe)   LLE Weight Bearing Per Order NWB   Other Precautions Chair Alarm;Bed Alarm;WBS;Fall  "Risk;Pain   Home Living   Type of Home House   Home Layout One level  (1 SH)   Home Equipment Walker;Cane;Wheelchair-manual   Additional Comments Pt reports using SPC for mobility PTA   Prior Function   Level of [place] Independent with ADLs;Independent with functional mobility   Lives With Family  (Significant other, Son and daughter)   Falls in the last 6 months 1 to 4  (1)   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 Appears anxious throughout session, Provided education of early mobility. Benefits from motivation and encouragement to challenge self. At times is cooperative and calm, other times is intermittently irritable with therapist. Attempted emotional support and empathetic listening throughout session.    Subjective   Subjective \"I just was able to  a coffee cup this morning. This is a lot\"   RLE Assessment   RLE Assessment WFL   Strength RLE   RLE Overall Strength 3+/5   LLE Assessment   LLE Assessment WFL   Strength LLE   LLE Overall Strength 3+/5   Bed Mobility   Supine to Sit 4  Minimal assistance   Additional items Assist x 1;Increased time required;Verbal cues   Transfers   Sit to Stand 4  Minimal assistance   Additional items Assist x 2   Stand to Sit 3  Moderate assistance   Additional items Assist x 2;Increased time required;Verbal cues   Stand pivot 3  Moderate assistance   Additional items Assist x 2;Increased time required   Additional Comments Performed SPT w/ RW. Attempted to educate on how to pivot, hand placement but pt fixated on testing stability of commode.Once up, pt requires TC and VC to get to the commode   Balance   Static Sitting Fair -   Static Standing Poor   Ambulatory Poor   Activity " Tolerance   Activity Tolerance Patient limited by fatigue;Patient limited by pain  (anxiety, irritability)   Medical Staff Made Aware LUIS Schneider   Nurse Made Aware RN Aleena   Assessment   Problem List Decreased strength;Decreased endurance;Impaired balance;Decreased mobility;Impaired judgement;Decreased safety awareness;Orthopedic restrictions;Pain   Assessment Linda Becerra is a 63 y.o. Female who presents to [facility] on [facility]/17/2024 from home w/ c/o fall and diagnosis of closed L ankle fracture (nondisplaced bimalleolar fracture) and 2-4 met neck fractures. Orders for PT eval and treat received, w/ activity orders of WBAT R LE and NWB L LE and fall precautions. Pt presents w/ comorbidities of liver transplant recipient, HTN, CPS, peripheral neuropathy, lumbar radiculopathy. At baseline, pt mobilizes modified I w/ SPC, and reports 1 falls in the last 6 months. Upon evaluation, pt presents w/ the following deficits: weakness, impaired balance, decreased endurance, and pain limiting functional mobility. Upon eval, pt requires min A x 1 for bed mobility, min-mod A x 2 for transfers. Based on this PT evaluation today, patient's discharge recommendation is for Level II - Moderate Rehab Resource Intensity. During this admission, pt would benefit from continued skilled inpatient PT in the acute care setting in order to address the abovementioned deficits to maximize function and mobility before DC from acute care.   Barriers to Discharge   (IFEOMA, requires Ax2 to pivot)   Goals   Patient Goals to see if she can use the commode   STG Expiration Date 08/30/24   Short Term Goal #1 Patient will: Perform all bed mobility tasks modified independent to improve pt's independence w/ repositioning for decrease risk of skin breakdown, Perform all transfers w/ supervision consistently from various height surfaces in order to improve I w/ engagement w/ real-world environments/situations, Increase all balance 1/2 grade to decrease risk  "for falls, and Propel in wheelchair for at least 100 ft w/ w/ supervision over tile surface in order to be able to use WC as alternate means of mobility   Plan   Treatment/Interventions Functional transfer training;LE strengthening/ROM;Therapeutic exercise;Endurance training;Cognitive reorientation;Patient/family training;Equipment eval/education;Bed mobility   PT Frequency 3-5x/wk   Discharge Recommendation   Rehab Resource Intensity Level, PT II (Moderate Resource Intensity)   Additional Comments Patient may benefit from trial of slide board for transfers OOB if she continues to be limited due to R foot pain    Per ortho note, \"Postop shoe right foot, if the postop shoe does not help with her pain and weightbearing can change to a short cam walker. \", may see if she could better tolerate this pain-wise for her foot fractures   AM-PAC Basic Mobility Inpatient   Turning in Flat Bed Without Bedrails 3   Lying on Back to Sitting on Edge of Flat Bed Without Bedrails 3   Moving Bed to Chair 2   Standing Up From Chair Using Arms 2   Walk in Room 1   Climb 3-5 Stairs With Railing 1   Basic Mobility Inpatient Raw Score 12   Basic Mobility Standardized Score 32.23   [facility] Highest Level Of Mobility   -[facility] Goal 4: Move to chair/commode   -[facility] Achieved 5: Stand (1 or more minutes)   Additional Treatment Session   Start Time 1426   End Time 1450   Treatment Assessment Pt on commode, reports significant pain in R foot and begins to appear very anxious. Attempted emotional support, but pt gets more anxious and irritable towards therapists. Encouraged patient to get OOB to recliner chair rather than bed (same distance away), after encouragement she is finally agreeable.. She performed STS x 2 from commode w/ min A x 2 , then mod A x 2 for subsequent trial. She  requires VC for proper hand placement to from the commode vs RW. Pt seated OOB in recliner chair. She is OOB for <1 min before she states she is going to report " us for putting her in the recliner chair. Attempted education on importance of OOB and encouraged her to try to sit up in the chair for another 10 minutes to see if the pain subsides, she continues be insistent on getting back to bed. She requires mod A x 2 for the STS and SPT w/ RW to the bed. She then requires min A x 1 to return supine in bed. Educated patient she has to sit up OOB for at least 1 hour tomorrow, she continues to verbalize that she will.   Equipment Use RW   Additional Treatment Day 1   End of Consult   Patient Position at End of Consult Supine;Bed/Chair alarm activated;All needs within reach         The patient's AM-PAC Basic Mobility Inpatient Short Form Raw Score is 12, Standardized Score is 32.23. A standardized score less than 38.32 (raw score of 16) suggests the patient may benefit from discharge to post-acute rehabilitation services which may not coincide with above PT recommendations. However please refer to therapist recommendation for discharge planning given other factors that may influence destination.    Pt would benefit from skilled inpatient PT during this admission in order to facilitate progress towards goals to maximize functional independence    Dominique Schulz PT, DPT

## 2024-08-20 NOTE — CASE MANAGEMENT
Case Management Discharge Planning Note    Patient name Linda Becerra  Location /-01 MRN [id number]  : 1960 Date 2024       Current Admission Date: 2024  Current Admission Diagnosis:Closed left ankle fracture   Patient Active Problem List    Diagnosis Date Noted Date Diagnosed    Metabolic encephalopathy 2024     ALEJANDRO (acute kidney injury) ([facility]) 2024     Closed left ankle fracture 2024     Macrocytic anemia 2024     Closed fracture of metatarsal neck, right, initial encounter 2024     Liver transplant recipient ([facility])      Hypertension      Asthma      Subcutaneous mass 05/10/2024     Functional diarrhea 2023     Liver transplant status ([facility]) 2023     Other cytomegaloviral diseases ([facility]) 2023     Atrophy of muscle of left lower leg 2023     Closed head injury 08/15/2023     Dizziness 08/15/2023     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     GCA (giant cell arteritis) ([facility]) 2023     Acute pain of right foot 2023     Peripheral neuropathy 2022     Acute kidney injury superimposed on chronic kidney disease  ([facility]) 2022     Decompensated cirrhosis 2022     Weakness of left lower extremity 2022     Acute blood loss anemia 2022     Hyponatremia 2022     Hyperbilirubinemia 2022     Pleural effusion 2022     Abnormal LFTs 2021     Personal history of colonic polyps 2021     Right lower quadrant abdominal pain 06/15/2021     Chronic pain syndrome 2020     Contusion of left foot 2020     Chronic bilateral low back pain without sciatica 2020     Lumbar spondylosis 2020     DDD (degenerative disc disease), lumbar 2020     Lumbar radiculopathy 2020     Left wrist tendonitis 2019     Nondisplaced fracture of distal end of left radius 2019     Closed compression fracture of L2 vertebra (HCC) 2019        LOS (days): 3  Geometric Mean LOS (GMLOS) (days): 4.3  Days to GMLOS:1.2     OBJECTIVE:  Risk of Unplanned Readmission Score: 15.81         Current admission status: Inpatient   Preferred Pharmacy:   [id number] [place] - [address]  [address] [facility] [address]  Phone: [phone] Fax: [phone]    [facility]  [place] - [address]  [address]  [facility] [address]  Phone: [phone] Fax: [phone]    [place] - [address]  [address]  [address]  [facility] [address]  Phone: [phone] Fax: [phone]    Primary Care Provider: Davide Sorto MD    Primary Insurance: [employer] RE[employer]  Secondary Insurance:     DISCHARGE DETAILS:        Therapy is recommending STR. Pt is in agreement to CM making referrals. CM made referrals in [place].

## 2024-08-20 NOTE — QUICK NOTE
Full medical update provided to patient's daughter at bedside.  All questions and concerns answered.

## 2024-08-20 NOTE — ASSESSMENT & PLAN NOTE
Patient's family at bedside with concern for brain fogginess, intermittent tremors.  Appears these may have been present prior to admission  Exam non-focal.  Adamantly denies hitting her head when falling but at times appears to be a poor historian  CT head negative  Ammonia level WNL  Discontinue gabapentin at this time given ALEJANDRO and concern for confusion/tremors - consider resuming at reduced dose  Mentation improved today, tremors resolved

## 2024-08-20 NOTE — OCCUPATIONAL THERAPY NOTE
Occupational Therapy Evaluation     Patient Name: Linda Becerra  Today's Date: 8/20/2024  Problem List  Principal Problem:    Closed left ankle fracture  Active Problems:    Weakness of left lower extremity    Acute kidney injury superimposed on chronic kidney disease  (HCC)    Liver transplant recipient (HCC)    Hypertension    Macrocytic anemia    Closed fracture of metatarsal neck, right, initial encounter    Metabolic encephalopathy    ALEJANDRO (acute kidney injury) (HCC)    Past Medical History  Past Medical History:   Diagnosis Date    Anemia     Anxiety     Asthma     Chronic pain disorder     Clotting disorder (HCC)     Concussion     Constipation     CSF leak     Fractures     GERD (gastroesophageal reflux disease)     Head injury     Hernia 2022    High blood pressure     History of transfusion     Hyperbilirubinemia 04/28/2022    Hypertension     Liver failure (HCC)     Liver transplant recipient (HCC)     Migraines     Peripheral neuropathy     Urinary frequency     Varicella 1968    As a child     Past Surgical History  Past Surgical History:   Procedure Laterality Date    ABLATION SOFT TISSUE      BREAST LUMPECTOMY      COLONOSCOPY  11/2/2023    IR PARACENTESIS  04/29/2022    IR PARACENTESIS  05/09/2022    IR PARACENTESIS  05/12/2022    LAPAROSCOPY FOR ECTOPIC PREGNANCY      LIVER BIOPSY  7/3/2023    Plus 3 previous in hospital    LIVER TRANSPLANTATION  5/17/22    MAMMO (HISTORICAL) Bilateral 01/14/2021    [facility] BI-RADS 2 Benign findings. after U/S Scattered areas fibrogladulae density; 25% to 50% glandular breast tissue    MRI GUIDED BREAST WIRE LOCALIZATION LEFT Left 02/23/2015    MRI GUIDED BREAST WIRE LOCALIZATION LEFT Left 02/23/2015    AZ EXC TUMOR SOFT TISS FACE&SCALP SUBFASCIAL <2CM N/A 8/1/2024    Procedure: EXCISION OF SUBCUTANEOUS MASSES OF FACE X3- RIGHT CHEEK, ROOT OF NOSE, LEFT LOWER EYELID/CHEEK, COMPLEX CLOSURE;  Surgeon: Fuentes Mendez MD;  Location:  MAIN OR;  Service: Plastics  "   SINUS SURGERY      SINUS SURGERY  2000    removed cartlidge in inside of nose    TONSILECTOMY AND ADNOIDECTOMY               08/20/24 1412   OT Last Visit   OT Visit Date 08/20/24   Note Type   Note type Evaluation   Pain Assessment   Pain Assessment Tool FLACC   Pain Score 10 - Worst Possible Pain   Pain Location/Orientation Orientation: Left  (ankle)   Patient's Stated Pain Goal No pain   Multiple Pain Sites Yes   Pain Rating: FLACC (Rest) - Face 1   Pain Rating: FLACC (Rest) - Legs 0   Pain Rating: FLACC (Rest) - Activity 0   Pain Rating: FLACC (Rest) - Cry 0   Pain Rating: FLACC (Rest) - Consolability 0   Score: FLACC (Rest) 1   Pain 2   Johnson-Mohamud FACES Pain Rating 2 8   Pain Location/Orientation 2 Orientation: Right;Location: Foot   Restrictions/Precautions   Weight Bearing Precautions Per Order Yes   RLE Weight Bearing Per Order WBAT  (with post-op shoe)   LLE Weight Bearing Per Order NWB   Other Precautions Chair Alarm;Bed Alarm;WBS;Fall Risk;Pain   Home Living   Type of Home House   Home Layout One level  (1 IFEOMA)   Home Equipment Walker;Cane;Wheelchair-manual   Additional Comments Pt reports using SPC for mobility PTA   Prior Function   Level of [place] Independent with ADLs;Independent with functional mobility   Lives With Family  (Daughter and son)   Falls in the last 6 months 1 to 4   Vocational On disability   Comments +    Lifestyle   Autonomy Lives with family in a 1 ST, 1 MALLORY at [facility] level for self help skills, SPC ambulation, +   Reciprocal Relationships supportive family   Service to Others on disability   General   Family/Caregiver Present No   Subjective   Subjective \"Can you come in the morning?\"   ADL   Eating Assistance 7  Independent   UB Dressing Assistance 4  Minimal Assistance   UB Dressing Deficit   (gown management)   LB Dressing Assistance 2  Maximal Assistance   LB Dressing Deficit Supervision/safety;Verbal cueing;Increased time to complete;Don/doff R sock;Don/doff " R shoe   Toileting Assistance  2  Maximal Assistance   Toileting Deficit Setup;Steadying;Verbal cueing;Supervision/safety;Increased time to complete;Bedside commode;Perineal hygiene   Bed Mobility   Supine to Sit 4  Minimal assistance   Additional items Assist x 1;Increased time required;Verbal cues   Transfers   Sit to Stand 4  Minimal assistance   Additional items Assist x 2   Stand to Sit 3  Moderate assistance   Additional items Assist x 2   Stand pivot 3  Moderate assistance   Additional items Assist x 2;Increased time required   Toilet transfer 3  Moderate assistance   Additional items Assist x 2;Verbal cues;Increased time required;Commode   Additional Comments Performed SPT w/ RW. Therapist attempted  to educate on how to pivot,maintain WBS and  hand placement but pt fixated on testing stability of commode.Once up, pt requires TC and VC to get to the commode   Activity Tolerance   Activity Tolerance Patient limited by fatigue;Patient limited by pain  (anxiety, irritability)   Medical Staff Made Aware PT Dominique   Nurse Made Aware DUKE RICK Assessment   RUE Assessment WFL   LUE Assessment   LUE Assessment WFL   Psychosocial   Patient Behaviors/Mood Anxious   Cognition   Attention Difficulty attending to directions   Orientation Level Oriented X4   Following Commands Follows one step commands with increased time or repetition   Comments Pt anxious t/o session affecting her ability to complete tasks at highest level of independence.Pt benefits from encouragement and precise directions/expectations. At times Pt is cooperative and calm, other times is intermittently agitated at therapist.  Attempted emotional support and empathetic listening, but pt appears in significant pain and anxious.   Assessment   Limitation Decreased ADL status;Decreased UE strength;Decreased Safe judgement during ADL;Decreased cognition;Decreased self-care trans;Decreased high-level ADLs;Mood limitation   Prognosis Fair   Assessment  Pt is a 63 y.o. female seen for OT evaluation s/p admission to [facility] on [facility] due to Fall and diagnosis of closed L ankle fracture (nondisplaced bimalleolar fracture) and 2-4 met neck fractures. Diagnosed with Closed left ankle fracture. Orders for OT eval and treat received, w/ activity orders of WBAT R LE and NWB L LE and fall precautions. Pt presents w/ comorbidities of liver transplant recipient, HTN, CPS, peripheral neuropathy, lumbar radiculopathy.  Personal and env factors supporting pt at time of IE include (I) PLOF, supportive family, and FFSU. Pt was living with family in a 1 ST, 3 IFEOMA at Indep level for self help skills, SPC for ambulation and  +.  Pt presents today with decreased act shawn, balance deficits, decreased strength, and pain limiting her Indep and safety with all functional tasks.  Upon initial evaluation, pt appears to be performing below baseline functional status. Personal and env factors inhibiting engagement in occupations include difficulty completing ADLs, difficulty completing IADLs, and anxiety .   Due to pt's current functional limitations and medical complications pt is functioning below baseline. Patient would benefit from OT services within the acute care setting to maximize level of functional independence in the following areas self-care transfers, functional mobility, and ADLs. From OT standpoint, recommendation at time of D/C would be Level II.   Goals   Patient Goals to go to [facility]   LTG Time Frame 10-14   Plan   Treatment Interventions ADL retraining;Functional transfer training;UE strengthening/ROM;Endurance training;Patient/family training;Equipment evaluation/education;Compensatory technique education;Continued evaluation;Energy conservation;Activityengagement   Goal Expiration Date 09/03/24   OT Treatment Day 0   OT Frequency 3-5x/wk   Discharge Recommendation   Rehab Resource Intensity Level, OT II (Moderate Resource Intensity)  "  AM-PAC Daily Activity Inpatient   Lower Body Dressing 2   Bathing 2   Toileting 2   Upper Body Dressing 3   Grooming 3   Eating 4   Daily Activity Raw Score 16   Daily Activity Standardized Score (Calc for Raw Score >=11) 35.96   AM-PAC Applied Cognition Inpatient   Following a Speech/Presentation 3   Understanding Ordinary Conversation 4   Taking Medications 4   Remembering Where Things Are Placed or Put Away 4   Remembering List of 4-5 Errands 3   Taking Care of Complicated Tasks 3   Applied Cognition Raw Score 21   Applied Cognition Standardized Score 44.3   Additional Treatment Session   Start Time 1423   End Time 1442   Treatment Assessment Pt seen today for OT follow up session with focus on self help skills, education and functional txfers. Pt presents with NWB on LLE, WBAT R LE with post-op shoe. She was living in a 1 STH, 3 IFEOMA at [facility] for self help skills prior to admission. Pt presents today with decreased act shawn, reports significant pain in R foot, increased anxiety, generalized weakness, balance deficits and poor carryover of safety/education. Pt was able to complete supine to sit with Caroline X1 and elevated HOB and bed rails. Sit to stand completed with Caroline x2 and stand to sit completed at ModA x2 on BSC. She requires ModAx2 for SPT bed to BS with max cues and increased time. Pt requires max encouragement and cues to complete all tasks at highest level of independence. She was able to complete gown management with Caroline, LE ADLs required MaxA/dep and maxA for [place]-hygiene in supported standing after toileting utilizing BSC.Pt is limited by anxiety and frequent declining of tasks. Pt was transferred into recliner with sitting shawn of less than 5 minutes at which time she insisted on returning back to bed. Pt was provided ed on importance of sitting OOB however Pt continues to decline sitting position, stating she was in \"too much pain\" and adamant on returning back to bed. Pt required ModAx2 for " SPT recliner to bed at end of session. Pt is below baseline functioning and would benefit from cont OT with d/c level II   End of Consult   Education Provided Yes   Patient Position at End of Consult Bedside chair;Bed/Chair alarm activated;All needs within reach   Nurse Communication Nurse aware of consult     This session, pt required and most appropriately benefited from skilled OT/PT co-treatment due to extensive physical assistance of SKILLED therapists, significant regression from functional baseline, and decreased activity tolerance. OT and PT goals were addressed separately as seen in documentation.    GOALS:   Goals established in order to promote pt's established goal of going to [facility]      -Patient will be Mod I with LB dressing with use of AE and AD as needed in order to increase (I) with ADLs    -Patient will be Mod I with LB bathing with use of AE and AD as needed in order to increase (I) with ADLs    -Patient will complete toileting w/ Mod I w/ G hygiene/thoroughness in order to reduce caregiver burden    -Patient will demonstrate Mod I with bed mobility for ability to manage own comfort and initiate OOB tasks.     -Patient will perform functional transfers with Mod I to/from all surfaces using DME as needed in order to increase (I) with functional tasks    -Patient will be Mod I with functional mobility to/from bathroom for increased independence with toileting tasks    -Patient will tolerate therapeutic activities for greater than 30 min, in order to increase tolerance for functional activities.     -Patient will increase OOB/sitting tolerance to 2-4 hours per day to increase participation in self-care and leisure tasks with no s/s of exertion.      -Patient will attend to functional task for 10 min without VC for attention/redirection     -Patient will demonstrate standing for 3 min in order to increase active participation in functional activities    -Patient will demonstrate 100% adherence to  NWB status during all functional activities w/o cues from therapist

## 2024-08-20 NOTE — PLAN OF CARE
Problem: Potential for Falls  Goal: Patient will remain free of falls  Description: INTERVENTIONS:  - Educate patient/family on patient safety including physical limitations  - Instruct patient to call for assistance with activity   - Consult OT/PT to assist with strengthening/mobility   - Keep Call bell within reach  - Keep bed low and locked with side rails adjusted as appropriate  - Keep care items and personal belongings within reach  - Initiate and maintain comfort rounds  - Make Fall Risk Sign visible to staff  - Offer Toileting every 2 Hours, in advance of need  - Initiate/Maintain bed alarm  - Obtain necessary fall risk management equipment  - Apply yellow socks and bracelet for high fall risk patients  - Consider moving patient to room near nurses station  Outcome: Progressing     Problem: PAIN - ADULT  Goal: Verbalizes/displays adequate comfort level or baseline comfort level  Description: Interventions:  - Encourage patient to monitor pain and request assistance  - Assess pain using appropriate pain scale  - Administer analgesics based on type and severity of pain and evaluate response  - Implement non-pharmacological measures as appropriate and evaluate response  - Consider cultural and social influences on pain and pain management  - Notify physician/advanced practitioner if interventions unsuccessful or patient reports new pain  Outcome: Progressing     Problem: INFECTION - ADULT  Goal: Absence or prevention of progression during hospitalization  Description: INTERVENTIONS:  - Assess and monitor for signs and symptoms of infection  - Monitor lab/diagnostic results  - Monitor all insertion sites, i.e. indwelling lines, tubes, and drains  - Monitor endotracheal if appropriate and nasal secretions for changes in amount and color  - [place] appropriate cooling/warming therapies per order  - Administer medications as ordered  - Instruct and encourage patient and family to use good hand hygiene  technique  - Identify and instruct in appropriate isolation precautions for identified infection/condition  Outcome: Progressing  Goal: Absence of fever/infection during neutropenic period  Description: INTERVENTIONS:  - Monitor WBC    Outcome: Progressing     Problem: SAFETY ADULT  Goal: Patient will remain free of falls  Description: INTERVENTIONS:  - Educate patient/family on patient safety including physical limitations  - Instruct patient to call for assistance with activity   - Consult OT/PT to assist with strengthening/mobility   - Keep Call bell within reach  - Keep bed low and locked with side rails adjusted as appropriate  - Keep care items and personal belongings within reach  - Initiate and maintain comfort rounds  - Make Fall Risk Sign visible to staff  - Offer Toileting every 2 Hours, in advance of need  - Initiate/Maintain bed alarm  - Obtain necessary fall risk management equipment  - Apply yellow socks and bracelet for high fall risk patients  - Consider moving patient to room near nurses station  Outcome: Progressing  Goal: Maintain or return to baseline ADL function  Description: INTERVENTIONS:  - Educate patient/family on patient safety including physical limitations  - Instruct patient to call for assistance with activity   - Consult OT/PT to assist with strengthening/mobility   - Keep Call bell within reach  - Keep bed low and locked with side rails adjusted as appropriate  - Keep care items and personal belongings within reach  - Initiate and maintain comfort rounds  - Make Fall Risk Sign visible to staff  - Offer Toileting every 2 Hours, in advance of need  - Initiate/Maintain bed alarm  - Obtain necessary fall risk management equipment  - Apply yellow socks and bracelet for high fall risk patients  - Consider moving patient to room near nurses station  Outcome: Progressing  Goal: Maintains/Returns to pre admission functional level  Description: INTERVENTIONS:  - Perform AM-PAC 6 Click Basic  Mobility/ Daily Activity assessment daily.  - Set and communicate daily mobility goal to care team and patient/family/caregiver.   - Collaborate with rehabilitation services on mobility goals if consulted  - Perform Range of Motion 3 times a day.  - Reposition patient every 2 hours.  - Dangle patient 3 times a day  - Stand patient 3 times a day  - Ambulate patient 3 times a day  - Out of bed to chair 3 times a day   - Out of bed for meals 3 times a day  - Out of bed for toileting  - Record patient progress and toleration of activity level   Outcome: Progressing     Problem: DISCHARGE PLANNING  Goal: Discharge to home or other facility with appropriate resources  Description: INTERVENTIONS:  - Identify barriers to discharge w/patient and caregiver  - Arrange for needed discharge resources and transportation as appropriate  - Identify discharge learning needs (meds, wound care, etc.)  - Arrange for interpretive services to assist at discharge as needed  - Refer to Case Management Department for coordinating discharge planning if the patient needs post-hospital services based on physician/advanced practitioner order or complex needs related to functional status, cognitive ability, or social support system  Outcome: Progressing     Problem: Knowledge Deficit  Goal: Patient/family/caregiver demonstrates understanding of disease process, treatment plan, medications, and discharge instructions  Description: Complete learning assessment and assess knowledge base.  Interventions:  - Provide teaching at level of understanding  - Provide teaching via preferred learning methods  Outcome: Progressing     Problem: Prexisting or High Potential for Compromised Skin Integrity  Goal: Skin integrity is maintained or improved  Description: INTERVENTIONS:  - Identify patients at risk for skin breakdown  - Assess and monitor skin integrity  - Assess and monitor nutrition and hydration status  - Monitor labs   - Assess for incontinence    - Turn and reposition patient  - Assist with mobility/ambulation  - Relieve pressure over bony prominences  - Avoid friction and shearing  - Provide appropriate hygiene as needed including keeping skin clean and dry  - Evaluate need for skin moisturizer/barrier cream  - Collaborate with interdisciplinary team   - Patient/family teaching  - Consider wound care consult   Outcome: Progressing

## 2024-08-20 NOTE — ASSESSMENT & PLAN NOTE
Presents with fall from home  ECG NSR, HR 70s  XR left foot - nondisplaced bimalleolar fracture  XR right foot pending  CT RLE - Second through fourth metatarsal neck fractures.   ED spoke with ortho Dr. Edson Burk - plan to splint, no immediate intervention at this time  PT/OT evaluation  Continue pain control  Noted concern for lethargy/hypoxia earlier in admit after IV dilaudid 2 mg  Decrease to 0.2 mg IV for breakthrough  Concern for encephalopathy related to ALEJANDRO/gabapentin, holding for now  Pain better controlled today, pt agreeable to PT/OT evaluation

## 2024-08-20 NOTE — PROGRESS NOTES
NEPHROLOGY PROGRESS NOTE   Linda Becerra 63 y.o. female MRN: [id number]  Unit/Bed#: -01 Encounter: [id number]  Reason for Consult: Acute kidney injury (POA)    63-year-old female with history of liver transplant due to alcoholic cirrhosis, hypertension, who presents after mechanical fall.  Nephrology is following for management of acute kidney injury.    ASSESSMENT/PLAN:  Acute kidney injury (POA): Suspect prerenal as improving with volume administration.  -Received IV hydration.  -Baseline creatinine around 0.7-0.9.  -Presented with creatinine of 2.75.  -Repeat creatinine 1.76.  -Creatinine is improving with hydration.  -Renal ultrasound negative.  -UA: Trace protein, BCs, 4-10 WBCs.  -Bladder scan insignificant.  -Continue to avoid nephrotoxins, hypotension, IV contrast.    Blood pressure: Remains well-controlled.  -Home medication: Metoprolol 25 mg daily.  -Current medication: Amlodipine 5 mg daily, metoprolol 25 mg daily.  -Continue to avoid nephrotoxins, hypotension, IV contrast.    Liver transplant: Secondary to alcohol cirrhosis in 2022 at [facility].  -Immunosuppression: Imuran 100 mg daily, tacrolimus 3 mg every 12 hours, prednisone, and Prevymis for CMV.  -Recently weaned off of prednisone.  -Most recent Tac level 21.1, not a true trough.  Currently no adjustment in tacrolimus dose.    Cytomegalovirus: GI team is following.    Mechanical fall: Sustaining left ankle fracture.    Plan:  -DC IV fluids.  -BMP in AM.  -Check tac level 30 min-1 hour prior to a.m. dose.    Disposition:  Requiring additional stay due to medical needs.     SUBJECTIVE:  Denies chest discomfort or shortness of breath.    OBJECTIVE:  Current Weight: Weight - Scale: 79.6 kg (175 lb 7.8 oz)  Vitals:    08/19/24 0658 08/19/24 1603 08/20/24 0600 08/20/24 0702   BP: 141/80 132/73  139/73   BP Location: Left arm Right arm     Pulse: 82 74  72   Resp: 16      Temp: 98.1 °F (36.7 °C) 98.2 °F (36.8 °C)  98.1 °F (36.7 °C)   TempSrc:  Temporal Temporal     SpO2: 97% 100%  99%   Weight:   79.6 kg (175 lb 7.8 oz)    Height:           Intake/Output Summary (Last 24 hours) at 8/20/2024 0842  Last data filed at 8/20/2024 0501  Gross per 24 hour   Intake 440 ml   Output 2050 ml   Net -1610 ml     General: NAD  Skin: warm, dry  HEENT: Moist mucous membranes, sclera anicteric, normocephalic, atraumatic  Neck: No apparent JVD appreciated  Chest: lung sounds decreased bilaterally  CVS: Regular rate and rhythm  Abdomen: Soft, round, non-tender, +BS  Extremities: No B/L LE edema present, left lower extremity cast  Neuro: alert and oriented  Psych: appropriate mood and affect     Medications:    Current Facility-Administered Medications:     acetaminophen (TYLENOL) tablet 650 mg, 650 mg, Oral, Q6H PRN, Cathie Tenorio, DO, 650 mg at 08/19/24 0910    amLODIPine (NORVASC) tablet 5 mg, 5 mg, Oral, Daily, TRAV Mena, 5 mg at 08/19/24 0737    azaTHIOprine (IMURAN) tablet 100 mg, 100 mg, Oral, HS, Cathie Sagean, DO, 100 mg at 08/19/24 2111    docusate sodium (COLACE) capsule 100 mg, 100 mg, Oral, BID, Aileen Galvez PA-C, 100 mg at 08/19/24 2106    doxylamine (UNISOM) tablet 12.5 mg, 12.5 mg, Oral, HS PRN, Cathie Sagean, DO    heparin (porcine) subcutaneous injection 5,000 Units, 5,000 Units, Subcutaneous, Q8H CITLALI, 5,000 Units at 08/20/24 0508 **AND** [CANCELED] Platelet count, , , Once, Cathie Sagean, DO    HYDROmorphone HCl (DILAUDID) injection 0.2 mg, 0.2 mg, Intravenous, Q3H PRN, Millicent Garcia PA-C, 0.2 mg at 08/19/24 1016    hydrOXYzine HCL (ATARAX) tablet 25 mg, 25 mg, Oral, Q6H PRN, Millicent Garcia PA-C, 25 mg at 08/19/24 2107    latanoprost (XALATAN) 0.005 % ophthalmic solution 1 drop, 1 drop, Both Eyes, HS, Cathie Tenorio DO, 1 drop at 08/19/24 2112    methocarbamol (ROBAXIN) tablet 500 mg, 500 mg, Oral, Q6H PRN, Cathie Tenorio DO, 500 mg at 08/19/24 2106    metoprolol succinate (TOPROL-XL) 24 hr tablet 25 mg, 25 mg, Oral, Daily, Cathie Tenorio DO, 25  mg at 08/19/24 0737    oxyCODONE (ROXICODONE) immediate release tablet 10 mg, 10 mg, Oral, Q4H PRN, Cathie Tenorio, DO, 10 mg at 08/20/24 0508    oxyCODONE (ROXICODONE) IR tablet 5 mg, 5 mg, Oral, Q4H PRN, Cathie Tenorio, DO, 5 mg at 08/18/24 1910    pantoprazole (PROTONIX) EC tablet 40 mg, 40 mg, Oral, Daily, Cathie Tenorio, DO, 40 mg at 08/19/24 0737    polyethylene glycol (MIRALAX) packet 17 g, 17 g, Oral, Daily PRN, Millicent Garcia PA-C    senna (SENOKOT) tablet 17.2 mg, 2 tablet, Oral, HS, Aileen Galvez PA-C, 17.2 mg at 08/19/24 2106    thiamine tablet 100 mg, 100 mg, Oral, Daily, Cathie Tenorio, DO, 100 mg at 08/19/24 0737    Laboratory Results:  Results from last 7 days   Lab Units 08/20/24  0517 08/19/24  0258 08/18/24  0435 08/17/24  1146   WBC Thousand/uL 2.83* 3.74* 5.24 3.06*   HEMOGLOBIN g/dL 9.9* 9.9* 10.9* 10.8*   HEMATOCRIT % 30.1* 30.1* 32.7* 33.4*   PLATELETS Thousands/uL 150 154 175 196   SODIUM mmol/L 141 140 138 139   POTASSIUM mmol/L 4.6 4.6 5.0 4.9   CHLORIDE mmol/L 106 106 105 103   CO2 mmol/L 26 26 26 28   BUN mg/dL 19 25 27* 28*   CREATININE mg/dL 1.76* 2.41* 2.62* 2.75*   CALCIUM mg/dL 9.7 9.4 9.6 9.7   ALK PHOS U/L 83 108*  --  39   ALT U/L 18 29  --  8   AST U/L 17 31  --  20

## 2024-08-20 NOTE — ASSESSMENT & PLAN NOTE
Lab Results   Component Value Date    EGFR 30 08/20/2024    EGFR 21 08/19/2024    EGFR 19 08/18/2024    CREATININE 1.76 (H) 08/20/2024    CREATININE 2.41 (H) 08/19/2024    CREATININE 2.62 (H) 08/18/2024     Cr 0.98 on 11/28/23, presents with Cr of 2.75  Creatinine improving to 1.76  US kidney and bladder unremarkable  S/p IVF  Avoid nephrotoxic agents  Nephrology following

## 2024-08-20 NOTE — PLAN OF CARE
Problem: Potential for Falls  Goal: Patient will remain free of falls  Description: INTERVENTIONS:  - Educate patient/family on patient safety including physical limitations  - Instruct patient to call for assistance with activity   - Consult OT/PT to assist with strengthening/mobility   - Keep Call bell within reach  - Keep bed low and locked with side rails adjusted as appropriate  - Keep care items and personal belongings within reach  - Initiate and maintain comfort rounds  - Make Fall Risk Sign visible to staff  - Offer Toileting every 2 Hours, in advance of need  - Initiate/Maintain bed alarm  - Obtain necessary fall risk management equipment  - Apply yellow socks and bracelet for high fall risk patients  - Consider moving patient to room near nurses station  Outcome: Progressing     Problem: PAIN - ADULT  Goal: Verbalizes/displays adequate comfort level or baseline comfort level  Description: Interventions:  - Encourage patient to monitor pain and request assistance  - Assess pain using appropriate pain scale  - Administer analgesics based on type and severity of pain and evaluate response  - Implement non-pharmacological measures as appropriate and evaluate response  - Consider cultural and social influences on pain and pain management  - Notify physician/advanced practitioner if interventions unsuccessful or patient reports new pain  Outcome: Progressing     Problem: INFECTION - ADULT  Goal: Absence or prevention of progression during hospitalization  Description: INTERVENTIONS:  - Assess and monitor for signs and symptoms of infection  - Monitor lab/diagnostic results  - Monitor all insertion sites, i.e. indwelling lines, tubes, and drains  - Monitor endotracheal if appropriate and nasal secretions for changes in amount and color  - [place] appropriate cooling/warming therapies per order  - Administer medications as ordered  - Instruct and encourage patient and family to use good hand hygiene  technique  - Identify and instruct in appropriate isolation precautions for identified infection/condition  Outcome: Progressing  Goal: Absence of fever/infection during neutropenic period  Description: INTERVENTIONS:  - Monitor WBC    Outcome: Progressing     Problem: SAFETY ADULT  Goal: Patient will remain free of falls  Description: INTERVENTIONS:  - Educate patient/family on patient safety including physical limitations  - Instruct patient to call for assistance with activity   - Consult OT/PT to assist with strengthening/mobility   - Keep Call bell within reach  - Keep bed low and locked with side rails adjusted as appropriate  - Keep care items and personal belongings within reach  - Initiate and maintain comfort rounds  - Make Fall Risk Sign visible to staff  - Offer Toileting every 2 Hours, in advance of need  - Initiate/Maintain bed alarm  - Obtain necessary fall risk management equipment  - Apply yellow socks and bracelet for high fall risk patients  - Consider moving patient to room near nurses station  Outcome: Progressing  Goal: Maintain or return to baseline ADL function  Description: INTERVENTIONS:  - Educate patient/family on patient safety including physical limitations  - Instruct patient to call for assistance with activity   - Consult OT/PT to assist with strengthening/mobility   - Keep Call bell within reach  - Keep bed low and locked with side rails adjusted as appropriate  - Keep care items and personal belongings within reach  - Initiate and maintain comfort rounds  - Make Fall Risk Sign visible to staff  - Offer Toileting every 2 Hours, in advance of need  - Initiate/Maintain bed alarm  - Obtain necessary fall risk management equipment  - Apply yellow socks and bracelet for high fall risk patients  - Consider moving patient to room near nurses station  Outcome: Progressing  Goal: Maintains/Returns to pre admission functional level  Description: INTERVENTIONS:  - Perform AM-PAC 6 Click Basic  Mobility/ Daily Activity assessment daily.  - Set and communicate daily mobility goal to care team and patient/family/caregiver.   - Collaborate with rehabilitation services on mobility goals if consulted  - Perform Range of Motion x times a day.  - Reposition patient every x hours.  - Dangle patient x times a day  - Stand patient x times a day  - Ambulate patient x times a day  - Out of bed to chair x times a day   - Out of bed for meals x times a day  - Out of bed for toileting  - Record patient progress and toleration of activity level   Outcome: Progressing     Problem: DISCHARGE PLANNING  Goal: Discharge to home or other facility with appropriate resources  Description: INTERVENTIONS:  - Identify barriers to discharge w/patient and caregiver  - Arrange for needed discharge resources and transportation as appropriate  - Identify discharge learning needs (meds, wound care, etc.)  - Arrange for interpretive services to assist at discharge as needed  - Refer to Case Management Department for coordinating discharge planning if the patient needs post-hospital services based on physician/advanced practitioner order or complex needs related to functional status, cognitive ability, or social support system  Outcome: Progressing     Problem: Knowledge Deficit  Goal: Patient/family/caregiver demonstrates understanding of disease process, treatment plan, medications, and discharge instructions  Description: Complete learning assessment and assess knowledge base.  Interventions:  - Provide teaching at level of understanding  - Provide teaching via preferred learning methods  Outcome: Progressing     Problem: Prexisting or High Potential for Compromised Skin Integrity  Goal: Skin integrity is maintained or improved  Description: INTERVENTIONS:  - Identify patients at risk for skin breakdown  - Assess and monitor skin integrity  - Assess and monitor nutrition and hydration status  - Monitor labs   - Assess for incontinence    - Turn and reposition patient  - Assist with mobility/ambulation  - Relieve pressure over bony prominences  - Avoid friction and shearing  - Provide appropriate hygiene as needed including keeping skin clean and dry  - Evaluate need for skin moisturizer/barrier cream  - Collaborate with interdisciplinary team   - Patient/family teaching  - Consider wound care consult   Outcome: Progressing

## 2024-08-20 NOTE — PLAN OF CARE
Problem: Potential for Falls  Goal: Patient will remain free of falls  Description: INTERVENTIONS:  - Educate patient/family on patient safety including physical limitations  - Instruct patient to call for assistance with activity   - Consult OT/PT to assist with strengthening/mobility   - Keep Call bell within reach  - Keep bed low and locked with side rails adjusted as appropriate  - Keep care items and personal belongings within reach  - Initiate and maintain comfort rounds  - Make Fall Risk Sign visible to staff  - Offer Toileting every 2 Hours, in advance of need  - Initiate/Maintain bed alarm  - Obtain necessary fall risk management equipment  - Apply yellow socks and bracelet for high fall risk patients  - Consider moving patient to room near nurses station  Outcome: Progressing     Problem: PAIN - ADULT  Goal: Verbalizes/displays adequate comfort level or baseline comfort level  Description: Interventions:  - Encourage patient to monitor pain and request assistance  - Assess pain using appropriate pain scale  - Administer analgesics based on type and severity of pain and evaluate response  - Implement non-pharmacological measures as appropriate and evaluate response  - Consider cultural and social influences on pain and pain management  - Notify physician/advanced practitioner if interventions unsuccessful or patient reports new pain  Outcome: Progressing     Problem: INFECTION - ADULT  Goal: Absence or prevention of progression during hospitalization  Description: INTERVENTIONS:  - Assess and monitor for signs and symptoms of infection  - Monitor lab/diagnostic results  - Monitor all insertion sites, i.e. indwelling lines, tubes, and drains  - Monitor endotracheal if appropriate and nasal secretions for changes in amount and color  - [place] appropriate cooling/warming therapies per order  - Administer medications as ordered  - Instruct and encourage patient and family to use good hand hygiene  technique  - Identify and instruct in appropriate isolation precautions for identified infection/condition  Outcome: Progressing  Goal: Absence of fever/infection during neutropenic period  Description: INTERVENTIONS:  - Monitor WBC    Outcome: Progressing     Problem: SAFETY ADULT  Goal: Patient will remain free of falls  Description: INTERVENTIONS:  - Educate patient/family on patient safety including physical limitations  - Instruct patient to call for assistance with activity   - Consult OT/PT to assist with strengthening/mobility   - Keep Call bell within reach  - Keep bed low and locked with side rails adjusted as appropriate  - Keep care items and personal belongings within reach  - Initiate and maintain comfort rounds  - Make Fall Risk Sign visible to staff  - Offer Toileting every 2 Hours, in advance of need  - Initiate/Maintain bed alarm  - Obtain necessary fall risk management equipment  - Apply yellow socks and bracelet for high fall risk patients  - Consider moving patient to room near nurses station  Outcome: Progressing  Goal: Maintain or return to baseline ADL function  Description: INTERVENTIONS:  - Educate patient/family on patient safety including physical limitations  - Instruct patient to call for assistance with activity   - Consult OT/PT to assist with strengthening/mobility   - Keep Call bell within reach  - Keep bed low and locked with side rails adjusted as appropriate  - Keep care items and personal belongings within reach  - Initiate and maintain comfort rounds  - Make Fall Risk Sign visible to staff  - Offer Toileting every 2 Hours, in advance of need  - Initiate/Maintain bed alarm  - Obtain necessary fall risk management equipment  - Apply yellow socks and bracelet for high fall risk patients  - Consider moving patient to room near nurses station  Outcome: Progressing  Goal: Maintains/Returns to pre admission functional level  Description: INTERVENTIONS:  - Perform AM-PAC 6 Click Basic  Mobility/ Daily Activity assessment daily.  - Set and communicate daily mobility goal to care team and patient/family/caregiver.   - Collaborate with rehabilitation services on mobility goals if consulted  - Perform Range of Motion 4 times a day.  - Reposition patient every 2 hours.  - Dangle patient 3 times a day  - Stand patient 3 times a day  - Ambulate patient 3 times a day  - Out of bed to chair 3 times a day   - Out of bed for meals 3 times a day  - Out of bed for toileting  - Record patient progress and toleration of activity level   Outcome: Progressing     Problem: DISCHARGE PLANNING  Goal: Discharge to home or other facility with appropriate resources  Description: INTERVENTIONS:  - Identify barriers to discharge w/patient and caregiver  - Arrange for needed discharge resources and transportation as appropriate  - Identify discharge learning needs (meds, wound care, etc.)  - Arrange for interpretive services to assist at discharge as needed  - Refer to Case Management Department for coordinating discharge planning if the patient needs post-hospital services based on physician/advanced practitioner order or complex needs related to functional status, cognitive ability, or social support system  Outcome: Progressing     Problem: Knowledge Deficit  Goal: Patient/family/caregiver demonstrates understanding of disease process, treatment plan, medications, and discharge instructions  Description: Complete learning assessment and assess knowledge base.  Interventions:  - Provide teaching at level of understanding  - Provide teaching via preferred learning methods  Outcome: Progressing     Problem: Prexisting or High Potential for Compromised Skin Integrity  Goal: Skin integrity is maintained or improved  Description: INTERVENTIONS:  - Identify patients at risk for skin breakdown  - Assess and monitor skin integrity  - Assess and monitor nutrition and hydration status  - Monitor labs   - Assess for incontinence    - Turn and reposition patient  - Assist with mobility/ambulation  - Relieve pressure over bony prominences  - Avoid friction and shearing  - Provide appropriate hygiene as needed including keeping skin clean and dry  - Evaluate need for skin moisturizer/barrier cream  - Collaborate with interdisciplinary team   - Patient/family teaching  - Consider wound care consult   Outcome: Progressing

## 2024-08-20 NOTE — PROGRESS NOTES
Former[facility], [facility]  Progress Note  Name: Linda Becerra I  MRN: [id number]  Unit/Bed#: -[facility] I Date of Admission: 8/17/2024   Date of Service: 8/20/2024 I Hospital Day: 3    Assessment & Plan   * Closed left ankle fracture  Assessment & Plan  Presents with fall from home  ECG NSR, HR 70s  XR left foot - nondisplaced bimalleolar fracture  XR right foot pending  CT RLE - Second through fourth metatarsal neck fractures.   ED spoke with ortho Dr. Edson Burk - plan to splint, no immediate intervention at this time  PT/OT evaluation  Continue pain control  Noted concern for lethargy/hypoxia earlier in admit after IV dilaudid 2 mg  Decrease to 0.2 mg IV for breakthrough  Concern for encephalopathy related to ALEJANDRO/gabapentin, holding for now  Pain better controlled today, pt agreeable to PT/OT evaluation    Acute kidney injury superimposed on chronic kidney disease  (HCC)  Assessment & Plan  Lab Results   Component Value Date    EGFR 30 08/20/2024    EGFR 21 08/19/2024    EGFR 19 08/18/2024    CREATININE 1.76 (H) 08/20/2024    CREATININE 2.41 (H) 08/19/2024    CREATININE 2.62 (H) 08/18/2024     Cr 0.98 on 11/28/23, presents with Cr of 2.75  Creatinine improving to 1.76  US kidney and bladder unremarkable  S/p IVF  Avoid nephrotoxic agents  Nephrology following    Metabolic encephalopathy  Assessment & Plan  Patient's family at bedside with concern for brain fogginess, intermittent tremors.  Appears these may have been present prior to admission  Exam non-focal.  Adamantly denies hitting her head when falling but at times appears to be a poor historian  CT head negative  Ammonia level WNL  Discontinue gabapentin at this time given ALEJANDRO and concern for confusion/tremors - consider resuming at reduced dose  Mentation improved today, tremors resolved    Closed fracture of metatarsal neck, right, initial encounter  Assessment & Plan  CT RLE shows 2nd through 4th metatarsal neck fractures  Continue pain  control  Orthopedic surgery - non surgical management recommended  PT/OT    Hypertension  Assessment & Plan  Initially hypertensive upon arrival, possibly due to underlying pain  BP improved  Continue home medications: Toprol-XL 25mg q24hr  Amlodipine added 8/18    Liver transplant recipient (HCC)  Assessment & Plan  Received liver transplant in May 2002 at [facility]   2/2 alcoholic cirrhosis  Follows with hepatology   Managed on azathioprine and tacrolimus  Also on prevymis - to be brought in by family  Tacrolimus level noted to 21.1 - inaccurate draw  States she was weaned off prednisone recently         VTE Pharmacologic Prophylaxis: VTE Score: 8 High Risk (Score >/= 5) - Pharmacological DVT Prophylaxis Ordered: heparin. Sequential Compression Devices Ordered.    Mobility:   Basic Mobility Inpatient Raw Score: 10  JH-HLM Goal: 4: Move to chair/commode  JH-HLM Achieved: 1: Laying in bed  JH-HLM Goal NOT achieved. Continue with multidisciplinary rounding and encourage appropriate mobility to improve upon JH-HLM goals.    Patient Centered Rounds: I performed bedside rounds with nursing staff today.   Discussions with Specialists or Other Care Team Provider: CM    Education and Discussions with Family / Patient: Updated  (significant other) via phone.    Total Time Spent on Date of Encounter in care of patient: 40 mins. This time was spent on one or more of the following: performing physical exam; counseling and coordination of care; obtaining or reviewing history; documenting in the medical record; reviewing/ordering tests, medications or procedures; communicating with other healthcare professionals and discussing with patient's family/caregivers.    Current Length of Stay: 3 day(s)  Current Patient Status: Inpatient   Certification Statement: The patient will continue to require additional inpatient hospital stay due to PT/OT eval, acute kidney injury  Discharge Plan: Anticipate discharge in 24-48 hrs  to discharge location to be determined pending rehab evaluations.    Code Status: Level 1 - Full Code    Subjective:   Patient notes feeling much better today after pain medicine adjustment.  Denies further issues with tremors.  Agreeable to work with therapy today.  Denies chest pain/palpitations, shortness of breath, nausea/vomiting, abdominal pain.    Objective:     Vitals:   Temp (24hrs), Av.2 °F (36.8 °C), Min:98.1 °F (36.7 °C), Max:98.2 °F (36.8 °C)    Temp:  [98.1 °F (36.7 °C)-98.2 °F (36.8 °C)] 98.1 °F (36.7 °C)  HR:  [72-74] 72  BP: (132-139)/(73) 139/73  SpO2:  [99 %-100 %] 99 %  Body mass index is 29.2 kg/m².     Input and Output Summary (last 24 hours):     Intake/Output Summary (Last 24 hours) at 2024 1112  Last data filed at 2024 0501  Gross per 24 hour   Intake 240 ml   Output 1550 ml   Net -1310 ml       Physical Exam:   Physical Exam  Vitals and nursing note reviewed.   Constitutional:       Appearance: Normal appearance.      Comments: No acute distress   HENT:      Head: Normocephalic.   Eyes:      General: No scleral icterus.     Extraocular Movements: Extraocular movements intact.      Conjunctiva/sclera: Conjunctivae normal.   Cardiovascular:      Rate and Rhythm: Normal rate and regular rhythm.      Heart sounds: Normal heart sounds. No murmur heard.  Pulmonary:      Effort: Pulmonary effort is normal. No respiratory distress.      Breath sounds: Normal breath sounds. No wheezing, rhonchi or rales.   Abdominal:      General: Bowel sounds are normal.      Palpations: Abdomen is soft.      Tenderness: There is no abdominal tenderness. There is no guarding or rebound.   Musculoskeletal:      Cervical back: Normal range of motion.      Comments: Able to move upper/lower ext.  LLE in splint   Skin:     General: Skin is warm and dry.   Neurological:      Mental Status: She is alert and oriented to person, place, and time.          Additional Data:     Labs:  Results from last 7 days    Lab Units 08/20/24  0517   WBC Thousand/uL 2.83*   HEMOGLOBIN g/dL 9.9*   HEMATOCRIT % 30.1*   PLATELETS Thousands/uL 150   SEGS PCT % 59   LYMPHO PCT % 18   MONO PCT % 9   EOS PCT % 13*     Results from last 7 days   Lab Units 08/20/24  0517   SODIUM mmol/L 141   POTASSIUM mmol/L 4.6   CHLORIDE mmol/L 106   CO2 mmol/L 26   BUN mg/dL 19   CREATININE mg/dL 1.76*   ANION GAP mmol/L 9   CALCIUM mg/dL 9.7   ALBUMIN g/dL 3.6   TOTAL BILIRUBIN mg/dL 0.81   ALK PHOS U/L 83   ALT U/L 18   AST U/L 17   GLUCOSE RANDOM mg/dL 90                       Lines/Drains:  Invasive Devices       Peripheral Intravenous Line  Duration             Peripheral IV 08/18/24 Right;Ventral (anterior) Forearm 1 day              Drain  Duration             External Urinary Catheter 1 day                          Imaging: Reviewed radiology reports from this admission including: CT head    Recent Cultures (last 7 days):         Last 24 Hours Medication List:   Current Facility-Administered Medications   Medication Dose Route Frequency Provider Last Rate    acetaminophen  650 mg Oral Q6H PRN Cathie Khan, DO      amLODIPine  5 mg Oral Daily TRAV Mena      azaTHIOprine  100 mg Oral HS Farheen Khan, DO      docusate sodium  100 mg Oral BID Alieen Galvez PA-C      doxylamine  12.5 mg Oral HS PRN Cathie Tenorio, DO      heparin (porcine)  5,000 Units Subcutaneous Q8H [facility], [facility]      HYDROmorphone  0.2 mg Intravenous Q3H PRN Millicent Garcia PA-C      hydrOXYzine HCL  25 mg Oral Q6H PRN Millicent Garcia PA-C      latanoprost  1 drop Both Eyes HS Farheen Khan, DO      methocarbamol  500 mg Oral Q6H PRN Cathie Tenorio, DO      metoprolol succinate  25 mg Oral Daily Cathie Tenorio, DO      oxyCODONE  10 mg Oral Q4H PRN Cathie Tenorio, DO      oxyCODONE  5 mg Oral Q4H PRN Cathie Tenorio, DO      pantoprazole  40 mg Oral Daily Cathie Khan, DO      polyethylene glycol  17 g Oral Daily PRN Millicent Garcia PA-C      senna  2 tablet Oral HS  Aileen Galvez PA-C      tacrolimus  3 mg Oral Q12H TRAV Duran      thiamine  100 mg Oral Daily Cathie Tenorio DO          Today, Patient Was Seen By: Millicent Garcia PA-C    **Please Note: This note may have been constructed using a voice recognition system.**

## 2024-08-21 ENCOUNTER — APPOINTMENT (INPATIENT)
Dept: RADIOLOGY | Facility: HOSPITAL | Age: 64
DRG: 562 | End: 2024-08-21
Payer: COMMERCIAL

## 2024-08-21 PROBLEM — K59.00 CONSTIPATION: Status: ACTIVE | Noted: 2024-08-21

## 2024-08-21 PROBLEM — R29.898 WEAKNESS OF LEFT LOWER EXTREMITY: Status: RESOLVED | Noted: 2022-04-28 | Resolved: 2024-08-21

## 2024-08-21 PROBLEM — F41.9 ANXIETY: Status: ACTIVE | Noted: 2024-08-21

## 2024-08-21 LAB
ANION GAP SERPL CALCULATED.3IONS-SCNC: 8 MMOL/L (ref 4–13)
BASOPHILS # BLD AUTO: 0.03 THOUSANDS/ÂΜL (ref 0–0.1)
BASOPHILS NFR BLD AUTO: 1 % (ref 0–1)
BUN SERPL-MCNC: 18 MG/DL (ref 5–25)
CALCIUM SERPL-MCNC: 9.9 MG/DL (ref 8.4–10.2)
CHLORIDE SERPL-SCNC: 102 MMOL/L (ref 96–108)
CO2 SERPL-SCNC: 29 MMOL/L (ref 21–32)
CREAT SERPL-MCNC: 1.7 MG/DL (ref 0.6–1.3)
EOSINOPHIL # BLD AUTO: 0.34 THOUSAND/ÂΜL (ref 0–0.61)
EOSINOPHIL NFR BLD AUTO: 13 % (ref 0–6)
ERYTHROCYTE [DISTWIDTH] IN BLOOD BY AUTOMATED COUNT: 13.3 % (ref 11.6–15.1)
FOLATE SERPL-MCNC: >22.3 NG/ML
GFR SERPL CREATININE-BSD FRML MDRD: 32 ML/MIN/1.73SQ M
GLUCOSE SERPL-MCNC: 98 MG/DL (ref 65–140)
HCT VFR BLD AUTO: 27.9 % (ref 34.8–46.1)
HGB BLD-MCNC: 9.4 G/DL (ref 11.5–15.4)
IMM GRANULOCYTES # BLD AUTO: 0.01 THOUSAND/UL (ref 0–0.2)
IMM GRANULOCYTES NFR BLD AUTO: 0 % (ref 0–2)
LYMPHOCYTES # BLD AUTO: 0.54 THOUSANDS/ÂΜL (ref 0.6–4.47)
LYMPHOCYTES NFR BLD AUTO: 20 % (ref 14–44)
MCH RBC QN AUTO: 31.8 PG (ref 26.8–34.3)
MCHC RBC AUTO-ENTMCNC: 33.7 G/DL (ref 31.4–37.4)
MCV RBC AUTO: 94 FL (ref 82–98)
MONOCYTES # BLD AUTO: 0.27 THOUSAND/ÂΜL (ref 0.17–1.22)
MONOCYTES NFR BLD AUTO: 10 % (ref 4–12)
NEUTROPHILS # BLD AUTO: 1.54 THOUSANDS/ÂΜL (ref 1.85–7.62)
NEUTS SEG NFR BLD AUTO: 56 % (ref 43–75)
NRBC BLD AUTO-RTO: 0 /100 WBCS
PLATELET # BLD AUTO: 154 THOUSANDS/UL (ref 149–390)
PMV BLD AUTO: 10.6 FL (ref 8.9–12.7)
POTASSIUM SERPL-SCNC: 4.4 MMOL/L (ref 3.5–5.3)
RBC # BLD AUTO: 2.96 MILLION/UL (ref 3.81–5.12)
SODIUM SERPL-SCNC: 139 MMOL/L (ref 135–147)
VIT B12 SERPL-MCNC: 351 PG/ML (ref 180–914)
WBC # BLD AUTO: 2.73 THOUSAND/UL (ref 4.31–10.16)

## 2024-08-21 PROCEDURE — 80048 BASIC METABOLIC PNL TOTAL CA: CPT | Performed by: STUDENT IN AN ORGANIZED HEALTH CARE EDUCATION/TRAINING PROGRAM

## 2024-08-21 PROCEDURE — 99232 SBSQ HOSP IP/OBS MODERATE 35: CPT | Performed by: PHYSICIAN ASSISTANT

## 2024-08-21 PROCEDURE — 85025 COMPLETE CBC W/AUTO DIFF WBC: CPT | Performed by: STUDENT IN AN ORGANIZED HEALTH CARE EDUCATION/TRAINING PROGRAM

## 2024-08-21 PROCEDURE — 73610 X-RAY EXAM OF ANKLE: CPT

## 2024-08-21 PROCEDURE — 82607 VITAMIN B-12: CPT | Performed by: PHYSICIAN ASSISTANT

## 2024-08-21 PROCEDURE — 82746 ASSAY OF FOLIC ACID SERUM: CPT | Performed by: PHYSICIAN ASSISTANT

## 2024-08-21 PROCEDURE — 99232 SBSQ HOSP IP/OBS MODERATE 35: CPT | Performed by: INTERNAL MEDICINE

## 2024-08-21 RX ORDER — BISACODYL 10 MG
10 SUPPOSITORY, RECTAL RECTAL DAILY PRN
Status: DISCONTINUED | OUTPATIENT
Start: 2024-08-21 | End: 2024-08-24 | Stop reason: HOSPADM

## 2024-08-21 RX ORDER — LORAZEPAM 0.5 MG/1
0.5 TABLET ORAL ONCE
Status: DISCONTINUED | OUTPATIENT
Start: 2024-08-21 | End: 2024-08-21

## 2024-08-21 RX ORDER — HYDROXYZINE HYDROCHLORIDE 25 MG/1
50 TABLET, FILM COATED ORAL EVERY 6 HOURS PRN
Status: DISCONTINUED | OUTPATIENT
Start: 2024-08-21 | End: 2024-08-24 | Stop reason: HOSPADM

## 2024-08-21 RX ORDER — SODIUM CHLORIDE 9 MG/ML
70 INJECTION, SOLUTION INTRAVENOUS CONTINUOUS
Status: DISPENSED | OUTPATIENT
Start: 2024-08-21 | End: 2024-08-22

## 2024-08-21 RX ORDER — ONDANSETRON 2 MG/ML
4 INJECTION INTRAMUSCULAR; INTRAVENOUS EVERY 6 HOURS PRN
Status: DISCONTINUED | OUTPATIENT
Start: 2024-08-21 | End: 2024-08-24 | Stop reason: HOSPADM

## 2024-08-21 RX ORDER — POLYETHYLENE GLYCOL 3350 17 G/17G
17 POWDER, FOR SOLUTION ORAL 2 TIMES DAILY
Status: DISCONTINUED | OUTPATIENT
Start: 2024-08-21 | End: 2024-08-24 | Stop reason: HOSPADM

## 2024-08-21 RX ADMIN — PANTOPRAZOLE SODIUM 40 MG: 40 TABLET, DELAYED RELEASE ORAL at 09:14

## 2024-08-21 RX ADMIN — BISACODYL 10 MG: 10 SUPPOSITORY RECTAL at 09:13

## 2024-08-21 RX ADMIN — LETERMOVIR 480 MG: 480 TABLET, FILM COATED ORAL at 13:10

## 2024-08-21 RX ADMIN — OXYCODONE HYDROCHLORIDE 10 MG: 10 TABLET ORAL at 16:41

## 2024-08-21 RX ADMIN — SODIUM CHLORIDE 70 ML/HR: 0.9 INJECTION, SOLUTION INTRAVENOUS at 15:31

## 2024-08-21 RX ADMIN — DOCUSATE SODIUM 100 MG: 100 CAPSULE, LIQUID FILLED ORAL at 09:14

## 2024-08-21 RX ADMIN — OXYCODONE HYDROCHLORIDE 10 MG: 10 TABLET ORAL at 09:13

## 2024-08-21 RX ADMIN — SENNOSIDES 17.2 MG: 8.6 TABLET, FILM COATED ORAL at 21:01

## 2024-08-21 RX ADMIN — OXYCODONE HYDROCHLORIDE 10 MG: 10 TABLET ORAL at 13:07

## 2024-08-21 RX ADMIN — HYDROXYZINE HYDROCHLORIDE 50 MG: 25 TABLET ORAL at 21:23

## 2024-08-21 RX ADMIN — HYDROXYZINE HYDROCHLORIDE 50 MG: 25 TABLET ORAL at 09:13

## 2024-08-21 RX ADMIN — METHOCARBAMOL TABLETS 500 MG: 500 TABLET, COATED ORAL at 15:29

## 2024-08-21 RX ADMIN — METOPROLOL SUCCINATE 25 MG: 25 TABLET, EXTENDED RELEASE ORAL at 09:13

## 2024-08-21 RX ADMIN — OXYCODONE HYDROCHLORIDE 10 MG: 10 TABLET ORAL at 21:01

## 2024-08-21 RX ADMIN — HYDROXYZINE HYDROCHLORIDE 25 MG: 25 TABLET ORAL at 03:03

## 2024-08-21 RX ADMIN — AZATHIOPRINE 100 MG: 50 TABLET ORAL at 21:23

## 2024-08-21 RX ADMIN — POLYETHYLENE GLYCOL 3350 17 G: 17 POWDER, FOR SOLUTION ORAL at 20:14

## 2024-08-21 RX ADMIN — HYDROXYZINE HYDROCHLORIDE 50 MG: 25 TABLET ORAL at 15:29

## 2024-08-21 RX ADMIN — OXYCODONE HYDROCHLORIDE 10 MG: 10 TABLET ORAL at 05:10

## 2024-08-21 RX ADMIN — DOCUSATE SODIUM 100 MG: 100 CAPSULE, LIQUID FILLED ORAL at 16:41

## 2024-08-21 RX ADMIN — HEPARIN SODIUM 5000 UNITS: 5000 INJECTION INTRAVENOUS; SUBCUTANEOUS at 21:01

## 2024-08-21 RX ADMIN — HYDROMORPHONE HYDROCHLORIDE 0.2 MG: 0.2 INJECTION, SOLUTION INTRAMUSCULAR; INTRAVENOUS; SUBCUTANEOUS at 11:52

## 2024-08-21 RX ADMIN — POLYETHYLENE GLYCOL 3350 17 G: 17 POWDER, FOR SOLUTION ORAL at 09:13

## 2024-08-21 RX ADMIN — ONDANSETRON 4 MG: 2 INJECTION, SOLUTION INTRAMUSCULAR; INTRAVENOUS at 09:13

## 2024-08-21 RX ADMIN — METHOCARBAMOL TABLETS 500 MG: 500 TABLET, COATED ORAL at 01:24

## 2024-08-21 RX ADMIN — HYDROMORPHONE HYDROCHLORIDE 0.2 MG: 0.2 INJECTION, SOLUTION INTRAMUSCULAR; INTRAVENOUS; SUBCUTANEOUS at 23:16

## 2024-08-21 RX ADMIN — TACROLIMUS 3 MG: 1 CAPSULE ORAL at 09:13

## 2024-08-21 RX ADMIN — METHOCARBAMOL TABLETS 500 MG: 500 TABLET, COATED ORAL at 21:23

## 2024-08-21 RX ADMIN — AMLODIPINE BESYLATE 5 MG: 5 TABLET ORAL at 09:22

## 2024-08-21 RX ADMIN — HEPARIN SODIUM 5000 UNITS: 5000 INJECTION INTRAVENOUS; SUBCUTANEOUS at 13:10

## 2024-08-21 RX ADMIN — LATANOPROST 1 DROP: 50 SOLUTION OPHTHALMIC at 21:02

## 2024-08-21 RX ADMIN — Medication 100 MG: at 09:13

## 2024-08-21 RX ADMIN — TACROLIMUS 3 MG: 1 CAPSULE ORAL at 20:14

## 2024-08-21 RX ADMIN — ACETAMINOPHEN 650 MG: 325 TABLET, FILM COATED ORAL at 01:24

## 2024-08-21 NOTE — PROGRESS NOTES
Patient:    MRN:  [id number]    [place] Request ID:  [id number]    Level of care reserved:  Skilled Nursing Facility    Partner Reserved:  [facility], [place] [id number] [phone]    Clinical needs requested:    Geography searched:  20 miles around [phone]    Start of Service:    Request sent:  3:17pm EDT on 8/20/2024 by Aruna Schwartz    Partner reserved:  3:33pm EDT on 8/21/2024 by Aruna Schwartz    Choice list shared:  10:03am EDT on 8/21/2024 by Aruna Schwartz

## 2024-08-21 NOTE — PROGRESS NOTES
[facility]  Progress Note  Name: Linda Becerra I  MRN: [id number]  Unit/Bed#: -Valeria I Date of Admission: 8/17/2024   Date of Service: 8/21/2024 I Hospital Day: 4    Assessment & Plan   * Closed left ankle fracture  Assessment & Plan  Presents with fall from home  ECG NSR, HR 70s  XR left foot - nondisplaced bimalleolar fracture  XR right foot pending  CT RLE - Second through fourth metatarsal neck fractures.   ED spoke with ortho Dr. Edson Burk - plan to splint, no immediate intervention at this time  PT/OT evaluation  Continue pain control  Noted concern for lethargy/hypoxia earlier in admit after IV dilaudid 2 mg  Decrease to 0.2 mg IV for breakthrough  Concern for encephalopathy related to LAEJANDRO/gabapentin, holding for now - mentation improving    Acute kidney injury superimposed on chronic kidney disease  (HCC)  Assessment & Plan  Lab Results   Component Value Date    EGFR 32 08/21/2024    EGFR 30 08/20/2024    EGFR 21 08/19/2024    CREATININE 1.70 (H) 08/21/2024    CREATININE 1.76 (H) 08/20/2024    CREATININE 2.41 (H) 08/19/2024     Cr 0.98 on 11/28/23, presents with Cr of 2.75  Creatinine appears to have plateaued at 1.70  US kidney and bladder unremarkable  S/p IVF  Avoid nephrotoxic agents  Nephrology following    Constipation  Assessment & Plan  Reporting constipation, abdominal distension  No vomiting.  Abdominal exam unremarkable  Continue bowel regimen    Anxiety  Assessment & Plan  Patient with significant anxiety  Improved on atarax, will increase to 50 mg PRN    Metabolic encephalopathy  Assessment & Plan  Patient's family at bedside with concern for brain fogginess, intermittent tremors.  Appears these may have been present prior to admission  Exam non-focal.  Adamantly denies hitting her head when falling but at times appears to be a poor historian  CT head negative  Ammonia level WNL  Discontinue gabapentin at this time given ALEJANDRO and concern for confusion/tremors -  consider resuming at reduced dose  Mentation improving, tremors resolved    Closed fracture of metatarsal neck, right, initial encounter  Assessment & Plan  CT RLE shows 2nd through 4th metatarsal neck fractures  Continue pain control  Orthopedic surgery - non surgical management recommended  PT/OT    Hypertension  Assessment & Plan  Initially hypertensive upon arrival, possibly due to underlying pain  BP improved  Continue home medications: Toprol-XL 25mg q24hr  Amlodipine added 8/18    Liver transplant recipient (HCC)  Assessment & Plan  Received liver transplant in May 2002 at [facility]   2/2 alcoholic cirrhosis  Follows with hepatology   Managed on azathioprine and tacrolimus  Also on prevymis - to be brought in by family  States she was weaned off prednisone recently         VTE Pharmacologic Prophylaxis: VTE Score: 8 High Risk (Score >/= 5) - Pharmacological DVT Prophylaxis Ordered: heparin. Sequential Compression Devices Ordered.    Mobility:   Basic Mobility Inpatient Raw Score: 12  JH-HLM Goal: 4: Move to chair/commode  JH-HLM Achieved: 2: Bed activities/Dependent transfer  JH-HLM Goal NOT achieved. Continue with multidisciplinary rounding and encourage appropriate mobility to improve upon JH-HLM goals.    Patient Centered Rounds: I performed bedside rounds with nursing staff today.   Discussions with Specialists or Other Care Team Provider: CM, ortho AP    Education and Discussions with Family / Patient: Updated  (daughter) via phone.    Total Time Spent on Date of Encounter in care of patient: 45 mins. This time was spent on one or more of the following: performing physical exam; counseling and coordination of care; obtaining or reviewing history; documenting in the medical record; reviewing/ordering tests, medications or procedures; communicating with other healthcare professionals and discussing with patient's family/caregivers.    Current Length of Stay: 4 day(s)  Current Patient Status:  Inpatient   Certification Statement: The patient will continue to require additional inpatient hospital stay due to disposition planning  Discharge Plan: Anticipate discharge in 24-48 hrs to rehab facility.    Code Status: Level 1 - Full Code    Subjective:   Patient reports feeling generally unwell.  Admits to feeling very overwhelmed, poor sleep overnight.  Concerned about working with therapy as she feels there is something wrong with the cast on her left leg.  Also with constipation, mild nausea but no vomiting.    Objective:     Vitals:   Temp (24hrs), Av.2 °F (36.8 °C), Min:97.9 °F (36.6 °C), Max:98.4 °F (36.9 °C)    Temp:  [97.9 °F (36.6 °C)-98.4 °F (36.9 °C)] 97.9 °F (36.6 °C)  HR:  [75-86] 86  Resp:  [20] 20  BP: (126-146)/(66-71) 133/69  SpO2:  [94 %-99 %] 99 %  Body mass index is 29.17 kg/m².     Input and Output Summary (last 24 hours):     Intake/Output Summary (Last 24 hours) at 2024 1300  Last data filed at 2024 0401  Gross per 24 hour   Intake 280 ml   Output 800 ml   Net -520 ml       Physical Exam:   Physical Exam  Vitals and nursing note reviewed.   Constitutional:       Appearance: Normal appearance.      Comments: No acute distress   HENT:      Head: Normocephalic.   Eyes:      General: No scleral icterus.     Extraocular Movements: Extraocular movements intact.      Conjunctiva/sclera: Conjunctivae normal.   Cardiovascular:      Rate and Rhythm: Normal rate and regular rhythm.   Pulmonary:      Effort: Pulmonary effort is normal.      Breath sounds: Normal breath sounds. No wheezing, rhonchi or rales.   Abdominal:      General: Bowel sounds are normal.      Palpations: Abdomen is soft.      Tenderness: There is no abdominal tenderness. There is no guarding or rebound.   Musculoskeletal:         General: No swelling, tenderness or deformity.      Cervical back: Normal range of motion.      Comments: Able to move upper/lower extremities.  Left lower extremity in cast   Skin:      General: Skin is warm and dry.   Neurological:      Mental Status: She is alert and oriented to person, place, and time.   Psychiatric:         Mood and Affect: Mood is anxious.          Additional Data:     Labs:  Results from last 7 days   Lab Units 08/21/24  0130   WBC Thousand/uL 2.73*   HEMOGLOBIN g/dL 9.4*   HEMATOCRIT % 27.9*   PLATELETS Thousands/uL 154   SEGS PCT % 56   LYMPHO PCT % 20   MONO PCT % 10   EOS PCT % 13*     Results from last 7 days   Lab Units 08/21/24  0130 08/20/24  0517   SODIUM mmol/L 139 141   POTASSIUM mmol/L 4.4 4.6   CHLORIDE mmol/L 102 106   CO2 mmol/L 29 26   BUN mg/dL 18 19   CREATININE mg/dL 1.70* 1.76*   ANION GAP mmol/L 8 9   CALCIUM mg/dL 9.9 9.7   ALBUMIN g/dL  --  3.6   TOTAL BILIRUBIN mg/dL  --  0.81   ALK PHOS U/L  --  83   ALT U/L  --  18   AST U/L  --  17   GLUCOSE RANDOM mg/dL 98 90                       Lines/Drains:  Invasive Devices       Peripheral Intravenous Line  Duration             Peripheral IV 08/18/24 Right;Ventral (anterior) Forearm 2 days              Drain  Duration             External Urinary Catheter 2 days                          Imaging: No pertinent imaging reviewed.    Recent Cultures (last 7 days):         Last 24 Hours Medication List:   Current Facility-Administered Medications   Medication Dose Route Frequency Provider Last Rate    acetaminophen  650 mg Oral Q6H PRN Cathie Sagean, DO      amLODIPine  5 mg Oral Daily TRAV Mena      azaTHIOprine  100 mg Oral HS Cathie Sagean, DO      bisacodyl  10 mg Rectal Daily PRN Millicent Garcia PA-C      docusate sodium  100 mg Oral BID Aileen Galvez PA-C      doxylamine  12.5 mg Oral HS PRN Cathie Tenorio, DO      heparin (porcine)  5,000 Units Subcutaneous Q8H [facility] Cathie Tenorio, DO      HYDROmorphone  0.2 mg Intravenous Q3H PRN Millicent Garcia PA-C      hydrOXYzine HCL  50 mg Oral Q6H PRN Millicent Garcia PA-C      latanoprost  1 drop Both Eyes HS Cathie Sagean, DO      Letermovir  480 mg Oral  Daily Millicent Garcia PA-C      methocarbamol  500 mg Oral Q6H PRN Cathie Tenorio, DO      metoprolol succinate  25 mg Oral Daily Cathie Khan, DO      ondansetron  4 mg Intravenous Q6H PRN Millicent Garcia PA-C      oxyCODONE  10 mg Oral Q4H PRN Cathie Khan, DO      oxyCODONE  5 mg Oral Q4H PRN Cathie Tenorio, DO      pantoprazole  40 mg Oral Daily Cathie Khan, DO      polyethylene glycol  17 g Oral BID Millicent Garcia PA-C      senna  2 tablet Oral HS Aileen Galvez PA-C      tacrolimus  3 mg Oral Q12H CITLALI TRAV Mena      thiamine  100 mg Oral Daily Cathie Tenorio, DO          Today, Patient Was Seen By: Millicent Garcia PA-C    **Please Note: This note may have been constructed using a voice recognition system.**

## 2024-08-21 NOTE — ASSESSMENT & PLAN NOTE
Received liver transplant in May 2002 at [facility]   2/2 alcoholic cirrhosis  Follows with hepatology   Managed on azathioprine and tacrolimus  Also on prevymis - to be brought in by family  States she was weaned off prednisone recently

## 2024-08-21 NOTE — CASE MANAGEMENT
Case Management Discharge Planning Note    Patient name Linda Becerra  Location /-01 MRN [id number]  : 1960 Date 2024       Current Admission Date: 2024  Current Admission Diagnosis:Closed left ankle fracture   Patient Active Problem List    Diagnosis Date Noted Date Diagnosed    Anxiety 2024     Constipation 2024     Metabolic encephalopathy 2024     ALEJANDRO (acute kidney injury) ([facility]) 2024     Closed left ankle fracture 2024     Macrocytic anemia 2024     Closed fracture of metatarsal neck, right, initial encounter 2024     Liver transplant recipient ([facility])      Hypertension      Asthma      Subcutaneous mass 05/10/2024     Functional diarrhea 2023     Liver transplant status ([facility]) 2023     Other cytomegaloviral diseases ([facility]) 2023     Atrophy of muscle of left lower leg 2023     Closed head injury 08/15/2023     Dizziness 08/15/2023     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     GCA (giant cell arteritis) ([facility]) 2023     Acute pain of right foot 2023     Peripheral neuropathy 2022     Acute kidney injury superimposed on chronic kidney disease  ([facility]) 2022     Decompensated cirrhosis 2022     Acute blood loss anemia 2022     Hyponatremia 2022     Hyperbilirubinemia 2022     Pleural effusion 2022     Abnormal LFTs 2021     Personal history of colonic polyps 2021     Right lower quadrant abdominal pain 06/15/2021     Chronic pain syndrome 2020     Contusion of left foot 2020     Chronic bilateral low back pain without sciatica 2020     Lumbar spondylosis 2020     DDD (degenerative disc disease), lumbar 2020     Lumbar radiculopathy 2020     Left wrist tendonitis 2019     Nondisplaced fracture of distal end of left radius 2019     Closed compression fracture of L2 vertebra (HCC) 2019        LOS (days): 4  Geometric Mean LOS (GMLOS) (days): 4.3  Days to GMLOS:0.2     OBJECTIVE:  Risk of Unplanned Readmission Score: 16.39         Current admission status: Inpatient   Preferred Pharmacy:   [id number] - [place] - [address]  [address] [facility] [address]  Phone: [phone] Fax: [phone]    [id number]  [place] - [address]  [address]  [facility] [address]  Phone: [phone] Fax: [phone]    [place], [place] - [address]  [address]  [address]  [facility] [address]  Phone: [phone] Fax: [phone]    Primary Care Provider: Davide Sorto MD    Primary Insurance: [employer] RE[employer]  Secondary Insurance:     DISCHARGE DETAILS:        CM spoke with pt's Cristine perez and reviewed pt choice list of STRs. Cristine selected QTC. CM reserved QTC. CM also informed Cristine that they would need to bring the prevymis to the facility as the nursing home does not supply it. Cristine was in agreement with this and the discharge plan.   IMM reviewed with  Cristine Pardo dtr , Cristine Pardo dtr agrees with discharge determination.                                                                                  IMM Given (Date):: 08/21/24 (331p)  IMM Given to:: Family  Family notified:: Cristine Pardo Dtr

## 2024-08-21 NOTE — OCCUPATIONAL THERAPY NOTE
08/21/24 0833   Note Type   Note type Cancelled Session   Additional Comments Pt currently on OT caseload, spoke with Dominique PT: per [facility]. Pt is presently declining OOB until orthopedics evaluates her LLE splint. Will f/u as appropriate and pt willing.             Occupational Therapy         Patient Name: Linda Becerra  Today's Date: 8/21/2024

## 2024-08-21 NOTE — PROGRESS NOTES
NEPHROLOGY HOSPITAL PROGRESS NOTE   Linda Beecrra 63 y.o. female MRN: [id number]  Unit/Bed#: -01 Encounter: [id number]  Reason for Consult: Kidney injury:    ASSESSMENT and PLAN:  63-year-old female with history of liver transplant in 2022 due to alcohol cirrhosis, hypertension who presented with mechanical fall.  Nephrology consulted for management of acute kidney injury.    Acute kidney injury (POA):  Creatinine 2.75 on admission, currently down to 2.41 following IV fluids  Maintained on normal saline 75 mL/h since admission  Baseline creatinine 0.7 to 0.9 mg/dL  Renal ultrasound: Unrevealing.  Normal ultrasound  Urinalysis: Trace protein, no RBCs, 4-10 WBCs, innumerable epithelial cells.  Negative leuks.  Tacrolimus level 21.1--inaccurately drawn after a.m. dose  Etiology: Likely prerenal, possible ATN.   Current status: Renal function slowly improving, down to 1.7.  Plan:   Supportive care  No indication to expand workup at this time  Blood pressure acceptable.  No change in medications    Blood pressure:  History of essential hypertension  Blood pressure acceptable  On metoprolol, Norvasc 5 mg daily    Liver transplant 2022:  Follows at [facility]  Immunosuppression on tacrolimus 3 mg every 12 hours  Tacrolimus level levels obtained inaccurately on 8/17 and 8/19.  8/20 tacrolimus level appropriately obtained prior to a.m. dose.  Level 7.7  GI following    Cytomegalovirus: Following with GI    Mechanical fall:  Sustained left ankle fracture    PLAN SUMMARY:  No changes at this time.  Supportive care.  Check labs in the a.m.    SUBJECTIVE / 24H INTERVAL HISTORY:  Awake and alert.  States that her appetite is poor but she is drinking sufficiently.  States that she feels like she is urinating a lot.  No difficulty passing urine.    OBJECTIVE:  Current Weight: Weight - Scale: 79.5 kg (175 lb 4.3 oz)  Vitals:    08/21/24 0504 08/21/24 0543 08/21/24 0546 08/21/24 0738   BP:  126/70  133/69   BP Location:    Right  arm   Pulse:  75  86   Resp:    20   Temp:  98.1 °F (36.7 °C)  97.9 °F (36.6 °C)   TempSrc:    Temporal   SpO2:  95%  99%   Weight: 79.2 kg (174 lb 9.7 oz)  79.5 kg (175 lb 4.3 oz)    Height:           Intake/Output Summary (Last 24 hours) at 8/21/2024 0932  Last data filed at 8/21/2024 0401  Gross per 24 hour   Intake 520 ml   Output 800 ml   Net -280 ml     General: No acute distress.  Lying in bed.  Skin: No rash, skin sallow, warm and dry  Eyes: Sclera clear  ENT: Oropharynx moist  Neck: Supple  Chest: Equal breath sounds, normal effort.  No wheezes rhonchi or rales appreciated  CVS: Normal rate regular rhythm  Abdomen: Abdomen soft nondistended  Extremities: Left ankle/lower leg wrapped in Ace bandage.  No significant edema right lower extremity  : no lowry.  Pure wick system.  Urine monqiue  Neuro: No acute deficits.  Psych: Rather anxious.  Medications:    Current Facility-Administered Medications:     acetaminophen (TYLENOL) tablet 650 mg, 650 mg, Oral, Q6H PRN, Cathie Tenorio, DO, 650 mg at 08/21/24 0124    amLODIPine (NORVASC) tablet 5 mg, 5 mg, Oral, Daily, TRAV Mena, 5 mg at 08/21/24 0922    azaTHIOprine (IMURAN) tablet 100 mg, 100 mg, Oral, HS, Cathie Tenorio, DO, 100 mg at 08/20/24 2041    bisacodyl (DULCOLAX) rectal suppository 10 mg, 10 mg, Rectal, Daily PRN, Millicent Garcia PA-C, 10 mg at 08/21/24 0913    docusate sodium (COLACE) capsule 100 mg, 100 mg, Oral, BID, Aileen Galvez PA-C, 100 mg at 08/21/24 0914    doxylamine (UNISOM) tablet 12.5 mg, 12.5 mg, Oral, HS PRN, Cathie Tenorio, DO    heparin (porcine) subcutaneous injection 5,000 Units, 5,000 Units, Subcutaneous, Q8H CITLALI, 5,000 Units at 08/20/24 1359 **AND** [CANCELED] Platelet count, , , Once, Cathie Tenorio DO    HYDROmorphone HCl (DILAUDID) injection 0.2 mg, 0.2 mg, Intravenous, Q3H PRN, Millicent Garcia PA-C, 0.2 mg at 08/20/24 1447    hydrOXYzine HCL (ATARAX) tablet 50 mg, 50 mg, Oral, Q6H PRN, Millicent Garcia PA-C, 50 mg at  08/21/24 0913    latanoprost (XALATAN) 0.005 % ophthalmic solution 1 drop, 1 drop, Both Eyes, HS, Cathie Tenorio, DO, 1 drop at 08/19/24 2112    Letermovir TABS 480 mg, 480 mg, Oral, Daily, Millicent Garcia PA-C    methocarbamol (ROBAXIN) tablet 500 mg, 500 mg, Oral, Q6H PRN, Cathie Sagean, DO, 500 mg at 08/21/24 0124    metoprolol succinate (TOPROL-XL) 24 hr tablet 25 mg, 25 mg, Oral, Daily, Cathierudolph Tenorio, DO, 25 mg at 08/21/24 0913    ondansetron (ZOFRAN) injection 4 mg, 4 mg, Intravenous, Q6H PRN, Millicent Garcia PA-C, 4 mg at 08/21/24 0913    oxyCODONE (ROXICODONE) immediate release tablet 10 mg, 10 mg, Oral, Q4H PRN, Cathie Tenorio, DO, 10 mg at 08/21/24 0913    oxyCODONE (ROXICODONE) IR tablet 5 mg, 5 mg, Oral, Q4H PRN, Cathie Tenorio, DO, 5 mg at 08/18/24 1910    pantoprazole (PROTONIX) EC tablet 40 mg, 40 mg, Oral, Daily, Cathie Tenorio, DO, 40 mg at 08/21/24 0914    polyethylene glycol (MIRALAX) packet 17 g, 17 g, Oral, BID, Millicent Garcia PA-C, 17 g at 08/21/24 0913    senna (SENOKOT) tablet 17.2 mg, 2 tablet, Oral, HS, Aileen Galvez PA-C, 17.2 mg at 08/20/24 2041    tacrolimus (PROGRAF) capsule 3 mg, 3 mg, Oral, Q12H CITLALI, TRAV Mena, 3 mg at 08/21/24 0913    thiamine tablet 100 mg, 100 mg, Oral, Daily, Cathierudolph Tenorio, DO, 100 mg at 08/21/24 0913    Laboratory Results:  Results from last 7 days   Lab Units 08/21/24  0130 08/20/24  0517 08/19/24  0258 08/18/24  0435 08/17/24  1146   WBC Thousand/uL 2.73* 2.83* 3.74* 5.24 3.06*   HEMOGLOBIN g/dL 9.4* 9.9* 9.9* 10.9* 10.8*   HEMATOCRIT % 27.9* 30.1* 30.1* 32.7* 33.4*   PLATELETS Thousands/uL 154 150 154 175 196   POTASSIUM mmol/L 4.4 4.6 4.6 5.0 4.9   CHLORIDE mmol/L 102 106 106 105 103   CO2 mmol/L 29 26 26 26 28   BUN mg/dL 18 19 25 27* 28*   CREATININE mg/dL 1.70* 1.76* 2.41* 2.62* 2.75*   CALCIUM mg/dL 9.9 9.7 9.4 9.6 9.7

## 2024-08-21 NOTE — ASSESSMENT & PLAN NOTE
Presents with fall from home  ECG NSR, HR 70s  XR left foot - nondisplaced bimalleolar fracture  XR right foot pending  CT RLE - Second through fourth metatarsal neck fractures.   ED spoke with ortho Dr. Edson Burk - plan to splint, no immediate intervention at this time  PT/OT evaluation  Continue pain control  Noted concern for lethargy/hypoxia earlier in admit after IV dilaudid 2 mg  Decrease to 0.2 mg IV for breakthrough  Concern for encephalopathy related to ALEJANDRO/gabapentin, holding for now - mentation improving

## 2024-08-21 NOTE — ASSESSMENT & PLAN NOTE
Patient's family at bedside with concern for brain fogginess, intermittent tremors.  Appears these may have been present prior to admission  Exam non-focal.  Adamantly denies hitting her head when falling but at times appears to be a poor historian  CT head negative  Ammonia level WNL  Discontinue gabapentin at this time given ALEJANDRO and concern for confusion/tremors - consider resuming at reduced dose  Mentation improving, tremors resolved

## 2024-08-21 NOTE — PLAN OF CARE
Problem: Potential for Falls  Goal: Patient will remain free of falls  Description: INTERVENTIONS:  - Educate patient/family on patient safety including physical limitations  - Instruct patient to call for assistance with activity   - Consult OT/PT to assist with strengthening/mobility   - Keep Call bell within reach  - Keep bed low and locked with side rails adjusted as appropriate  - Keep care items and personal belongings within reach  - Initiate and maintain comfort rounds  - Make Fall Risk Sign visible to staff  - Offer Toileting every 2 Hours, in advance of need  - Initiate/Maintain bed alarm  - Obtain necessary fall risk management equipment  - Apply yellow socks and bracelet for high fall risk patients  - Consider moving patient to room near nurses station  Outcome: Progressing     Problem: PAIN - ADULT  Goal: Verbalizes/displays adequate comfort level or baseline comfort level  Description: Interventions:  - Encourage patient to monitor pain and request assistance  - Assess pain using appropriate pain scale  - Administer analgesics based on type and severity of pain and evaluate response  - Implement non-pharmacological measures as appropriate and evaluate response  - Consider cultural and social influences on pain and pain management  - Notify physician/advanced practitioner if interventions unsuccessful or patient reports new pain  Outcome: Progressing     Problem: INFECTION - ADULT  Goal: Absence or prevention of progression during hospitalization  Description: INTERVENTIONS:  - Assess and monitor for signs and symptoms of infection  - Monitor lab/diagnostic results  - Monitor all insertion sites, i.e. indwelling lines, tubes, and drains  - Monitor endotracheal if appropriate and nasal secretions for changes in amount and color  - [place] appropriate cooling/warming therapies per order  - Administer medications as ordered  - Instruct and encourage patient and family to use good hand hygiene  technique  - Identify and instruct in appropriate isolation precautions for identified infection/condition  Outcome: Progressing  Goal: Absence of fever/infection during neutropenic period  Description: INTERVENTIONS:  - Monitor WBC    Outcome: Progressing     Problem: SAFETY ADULT  Goal: Patient will remain free of falls  Description: INTERVENTIONS:  - Educate patient/family on patient safety including physical limitations  - Instruct patient to call for assistance with activity   - Consult OT/PT to assist with strengthening/mobility   - Keep Call bell within reach  - Keep bed low and locked with side rails adjusted as appropriate  - Keep care items and personal belongings within reach  - Initiate and maintain comfort rounds  - Make Fall Risk Sign visible to staff  - Offer Toileting every 2 Hours, in advance of need  - Initiate/Maintain bed alarm  - Obtain necessary fall risk management equipment  - Apply yellow socks and bracelet for high fall risk patients  - Consider moving patient to room near nurses station  Outcome: Progressing  Goal: Maintain or return to baseline ADL function  Description: INTERVENTIONS:  - Educate patient/family on patient safety including physical limitations  - Instruct patient to call for assistance with activity   - Consult OT/PT to assist with strengthening/mobility   - Keep Call bell within reach  - Keep bed low and locked with side rails adjusted as appropriate  - Keep care items and personal belongings within reach  - Initiate and maintain comfort rounds  - Make Fall Risk Sign visible to staff  - Offer Toileting every 2 Hours, in advance of need  - Initiate/Maintain bed alarm  - Obtain necessary fall risk management equipment  - Apply yellow socks and bracelet for high fall risk patients  - Consider moving patient to room near nurses station  Outcome: Progressing  Goal: Maintains/Returns to pre admission functional level  Description: INTERVENTIONS:  - Perform AM-PAC 6 Click Basic  Mobility/ Daily Activity assessment daily.  - Set and communicate daily mobility goal to care team and patient/family/caregiver.   - Collaborate with rehabilitation services on mobility goals if consulted  - Perform Range of Motion x times a day.  - Reposition patient every x hours.  - Dangle patient x times a day  - Stand patient x times a day  - Ambulate patient x times a day  - Out of bed to chair x times a day   - Out of bed for meals x times a day  - Out of bed for toileting  - Record patient progress and toleration of activity level   Outcome: Progressing     Problem: DISCHARGE PLANNING  Goal: Discharge to home or other facility with appropriate resources  Description: INTERVENTIONS:  - Identify barriers to discharge w/patient and caregiver  - Arrange for needed discharge resources and transportation as appropriate  - Identify discharge learning needs (meds, wound care, etc.)  - Arrange for interpretive services to assist at discharge as needed  - Refer to Case Management Department for coordinating discharge planning if the patient needs post-hospital services based on physician/advanced practitioner order or complex needs related to functional status, cognitive ability, or social support system  Outcome: Progressing     Problem: Knowledge Deficit  Goal: Patient/family/caregiver demonstrates understanding of disease process, treatment plan, medications, and discharge instructions  Description: Complete learning assessment and assess knowledge base.  Interventions:  - Provide teaching at level of understanding  - Provide teaching via preferred learning methods  Outcome: Progressing     Problem: Prexisting or High Potential for Compromised Skin Integrity  Goal: Skin integrity is maintained or improved  Description: INTERVENTIONS:  - Identify patients at risk for skin breakdown  - Assess and monitor skin integrity  - Assess and monitor nutrition and hydration status  - Monitor labs   - Assess for incontinence    - Turn and reposition patient  - Assist with mobility/ambulation  - Relieve pressure over bony prominences  - Avoid friction and shearing  - Provide appropriate hygiene as needed including keeping skin clean and dry  - Evaluate need for skin moisturizer/barrier cream  - Collaborate with interdisciplinary team   - Patient/family teaching  - Consider wound care consult   Outcome: Progressing

## 2024-08-21 NOTE — PHYSICAL THERAPY NOTE
PHYSICAL THERAPY CANCELLATION NOTE      Patient Name: Linda Becerra  Today's Date: 8/21/2024 08/21/24 0940   PT Last Visit   PT Visit Date 08/21/24   Note Type   Note type Cancelled Session   Additional Comments Pt currently on PT caseload, spoke w/ DUKE Flood. Pt is presently declining OOB until orthopedics evaluates her LLE splint. Will f/u as appropriate and pt willing       Dominique Schulz, PT, DPT

## 2024-08-21 NOTE — ASSESSMENT & PLAN NOTE
Reporting constipation, abdominal distension  No vomiting.  Abdominal exam unremarkable  Continue bowel regimen

## 2024-08-21 NOTE — ASSESSMENT & PLAN NOTE
Lab Results   Component Value Date    EGFR 32 08/21/2024    EGFR 30 08/20/2024    EGFR 21 08/19/2024    CREATININE 1.70 (H) 08/21/2024    CREATININE 1.76 (H) 08/20/2024    CREATININE 2.41 (H) 08/19/2024     Cr 0.98 on 11/28/23, presents with Cr of 2.75  Creatinine appears to have plateaued at 1.70  US kidney and bladder unremarkable  S/p IVF  Avoid nephrotoxic agents  Nephrology following

## 2024-08-22 PROBLEM — N17.9 AKI (ACUTE KIDNEY INJURY) (HCC): Status: RESOLVED | Noted: 2024-08-19 | Resolved: 2024-08-22

## 2024-08-22 LAB
ANION GAP SERPL CALCULATED.3IONS-SCNC: 8 MMOL/L (ref 4–13)
BACTERIA UR QL AUTO: ABNORMAL /HPF
BILIRUB UR QL STRIP: NEGATIVE
BUN SERPL-MCNC: 24 MG/DL (ref 5–25)
CALCIUM SERPL-MCNC: 9.5 MG/DL (ref 8.4–10.2)
CAOX CRY URNS QL MICRO: ABNORMAL /HPF
CHLORIDE SERPL-SCNC: 104 MMOL/L (ref 96–108)
CHLORIDE UR-SCNC: 101 MMOL/L
CLARITY UR: ABNORMAL
CO2 SERPL-SCNC: 28 MMOL/L (ref 21–32)
COLOR UR: YELLOW
CREAT SERPL-MCNC: 1.94 MG/DL (ref 0.6–1.3)
CREAT UR-MCNC: 146 MG/DL
GFR SERPL CREATININE-BSD FRML MDRD: 27 ML/MIN/1.73SQ M
GLUCOSE SERPL-MCNC: 85 MG/DL (ref 65–140)
GLUCOSE UR STRIP-MCNC: NEGATIVE MG/DL
HGB UR QL STRIP.AUTO: NEGATIVE
KETONES UR STRIP-MCNC: ABNORMAL MG/DL
LEUKOCYTE ESTERASE UR QL STRIP: ABNORMAL
NITRITE UR QL STRIP: POSITIVE
NON-SQ EPI CELLS URNS QL MICRO: ABNORMAL /HPF
OTHER STN SPEC: ABNORMAL
PH UR STRIP.AUTO: 5 [PH]
POTASSIUM SERPL-SCNC: 4.4 MMOL/L (ref 3.5–5.3)
PROT UR STRIP-MCNC: ABNORMAL MG/DL
RBC #/AREA URNS AUTO: ABNORMAL /HPF
SODIUM 24H UR-SCNC: 108 MOL/L
SODIUM SERPL-SCNC: 140 MMOL/L (ref 135–147)
SP GR UR STRIP.AUTO: 1.02 (ref 1–1.03)
UROBILINOGEN UR STRIP-ACNC: <2 MG/DL
UUN 24H UR-MCNC: 516 MG/DL
WBC #/AREA URNS AUTO: ABNORMAL /HPF

## 2024-08-22 PROCEDURE — 84300 ASSAY OF URINE SODIUM: CPT | Performed by: INTERNAL MEDICINE

## 2024-08-22 PROCEDURE — 82570 ASSAY OF URINE CREATININE: CPT | Performed by: INTERNAL MEDICINE

## 2024-08-22 PROCEDURE — 97530 THERAPEUTIC ACTIVITIES: CPT

## 2024-08-22 PROCEDURE — 84540 ASSAY OF URINE/UREA-N: CPT | Performed by: INTERNAL MEDICINE

## 2024-08-22 PROCEDURE — 97535 SELF CARE MNGMENT TRAINING: CPT

## 2024-08-22 PROCEDURE — 99233 SBSQ HOSP IP/OBS HIGH 50: CPT | Performed by: INTERNAL MEDICINE

## 2024-08-22 PROCEDURE — 99231 SBSQ HOSP IP/OBS SF/LOW 25: CPT

## 2024-08-22 PROCEDURE — 82436 ASSAY OF URINE CHLORIDE: CPT | Performed by: INTERNAL MEDICINE

## 2024-08-22 PROCEDURE — 99232 SBSQ HOSP IP/OBS MODERATE 35: CPT | Performed by: PHYSICIAN ASSISTANT

## 2024-08-22 PROCEDURE — 80048 BASIC METABOLIC PNL TOTAL CA: CPT | Performed by: STUDENT IN AN ORGANIZED HEALTH CARE EDUCATION/TRAINING PROGRAM

## 2024-08-22 PROCEDURE — 81001 URINALYSIS AUTO W/SCOPE: CPT | Performed by: INTERNAL MEDICINE

## 2024-08-22 RX ORDER — SODIUM CHLORIDE 9 MG/ML
70 INJECTION, SOLUTION INTRAVENOUS CONTINUOUS
Status: DISPENSED | OUTPATIENT
Start: 2024-08-22 | End: 2024-08-23

## 2024-08-22 RX ADMIN — ACETAMINOPHEN 650 MG: 325 TABLET, FILM COATED ORAL at 21:09

## 2024-08-22 RX ADMIN — DOCUSATE SODIUM 100 MG: 100 CAPSULE, LIQUID FILLED ORAL at 08:06

## 2024-08-22 RX ADMIN — HEPARIN SODIUM 5000 UNITS: 5000 INJECTION INTRAVENOUS; SUBCUTANEOUS at 21:12

## 2024-08-22 RX ADMIN — LATANOPROST 1 DROP: 50 SOLUTION OPHTHALMIC at 21:15

## 2024-08-22 RX ADMIN — OXYCODONE HYDROCHLORIDE 10 MG: 10 TABLET ORAL at 11:14

## 2024-08-22 RX ADMIN — SODIUM CHLORIDE 70 ML/HR: 0.9 INJECTION, SOLUTION INTRAVENOUS at 12:59

## 2024-08-22 RX ADMIN — ONDANSETRON 4 MG: 2 INJECTION, SOLUTION INTRAMUSCULAR; INTRAVENOUS at 08:19

## 2024-08-22 RX ADMIN — AZATHIOPRINE 100 MG: 50 TABLET ORAL at 21:09

## 2024-08-22 RX ADMIN — SENNOSIDES 17.2 MG: 8.6 TABLET, FILM COATED ORAL at 21:09

## 2024-08-22 RX ADMIN — TACROLIMUS 3 MG: 1 CAPSULE ORAL at 21:09

## 2024-08-22 RX ADMIN — Medication 100 MG: at 08:06

## 2024-08-22 RX ADMIN — HYDROXYZINE HYDROCHLORIDE 50 MG: 25 TABLET ORAL at 08:06

## 2024-08-22 RX ADMIN — TACROLIMUS 3 MG: 1 CAPSULE ORAL at 08:05

## 2024-08-22 RX ADMIN — OXYCODONE HYDROCHLORIDE 10 MG: 10 TABLET ORAL at 06:18

## 2024-08-22 RX ADMIN — ONDANSETRON 4 MG: 2 INJECTION, SOLUTION INTRAMUSCULAR; INTRAVENOUS at 16:05

## 2024-08-22 RX ADMIN — ONDANSETRON 4 MG: 2 INJECTION, SOLUTION INTRAMUSCULAR; INTRAVENOUS at 22:03

## 2024-08-22 RX ADMIN — LETERMOVIR 480 MG: 480 TABLET, FILM COATED ORAL at 08:16

## 2024-08-22 RX ADMIN — METHOCARBAMOL TABLETS 500 MG: 500 TABLET, COATED ORAL at 06:18

## 2024-08-22 RX ADMIN — HEPARIN SODIUM 5000 UNITS: 5000 INJECTION INTRAVENOUS; SUBCUTANEOUS at 05:02

## 2024-08-22 RX ADMIN — POLYETHYLENE GLYCOL 3350 17 G: 17 POWDER, FOR SOLUTION ORAL at 08:06

## 2024-08-22 RX ADMIN — PANTOPRAZOLE SODIUM 40 MG: 40 TABLET, DELAYED RELEASE ORAL at 08:06

## 2024-08-22 RX ADMIN — METOPROLOL SUCCINATE 25 MG: 25 TABLET, EXTENDED RELEASE ORAL at 08:06

## 2024-08-22 RX ADMIN — OXYCODONE HYDROCHLORIDE 10 MG: 10 TABLET ORAL at 22:03

## 2024-08-22 NOTE — ASSESSMENT & PLAN NOTE
Presents with fall from home  ECG NSR, HR 70s  XR left foot - nondisplaced bimalleolar fracture  XR right foot pending  CT RLE - Second through fourth metatarsal neck fractures.   ED spoke with ortho Dr. Edson Burk - plan to splint, no immediate intervention at this time  PT/OT evaluation  Continue pain control  Noted concern for lethargy/hypoxia earlier in admit after IV dilaudid 2 mg  Decrease to 0.2 mg IV for breakthrough  Concern for encephalopathy related to ALEJANDRO/gabapentin, holding for now - mentation improved

## 2024-08-22 NOTE — PROGRESS NOTES
NEPHROLOGY PROGRESS NOTE   Linda Becerra 63 y.o. female MRN: [id number]  Unit/Bed#: -01 Encounter: [id number]  Reason for Consult: ALEJANDRO (POA)    63-year-old female with history of liver transplant due to alcoholic cirrhosis, hypertension, who presents after mechanical fall. Nephrology is following for management of acute kidney injury.     ASSESSMENT/PLAN:  Acute kidney injury (POA): Suspect prerenal as improving with volume administration.  -Received IV hydration.  -Baseline creatinine around 0.7-0.9.  -Presented with creatinine of 2.75.  -Repeat creatinine 1.9, with secondary rise.  -Will provide additional IVF today.  -Renal ultrasound negative.  -UA: Trace protein, BCs, 4-10 WBCs.  -Repeat bladder scans.   -Continue to avoid nephrotoxins, hypotension, IV contrast.     Blood pressure: Remains well-controlled.  -Home medication: Metoprolol 25 mg daily.  -Current medication: Amlodipine 5 mg daily, metoprolol 25 mg daily.  -Continue to avoid nephrotoxins, hypotension, IV contrast.     Liver transplant: Secondary to alcohol cirrhosis in 2022 at [facility].  -Immunosuppression: Imuran 100 mg daily, tacrolimus 3 mg every 12 hours, prednisone, and Prevymis for CMV.  -Recently weaned off of prednisone.  -Most recent Tac level 7.7.     Cytomegalovirus: GI team is following.     Mechanical fall: Sustaining left ankle fracture.     Plan:  -Continue IVF.  -Check bladder scans.   -BMP in AM.    Disposition:  requiring additional stay due to medical needs.     SUBJECTIVE:  Feels well. Reports poor appetite. She reports having a lot of pain. She is getting poor sleep.    OBJECTIVE:  Current Weight: Weight - Scale: 80.1 kg (176 lb 9.4 oz)  Vitals:    08/21/24 1524 08/21/24 2039 08/22/24 0600 08/22/24 0726   BP: 123/75 (!) 134/109  125/62   BP Location: Right arm   Right arm   Pulse: 82 89  90   Resp:    20   Temp: 98.2 °F (36.8 °C) 98.8 °F (37.1 °C)  98.2 °F (36.8 °C)   TempSrc: Temporal   Temporal   SpO2: 99% (!) 89%  (!)  87%   Weight:   80.1 kg (176 lb 9.4 oz)    Height:           Intake/Output Summary (Last 24 hours) at 8/22/2024 0816  Last data filed at 8/22/2024 0459  Gross per 24 hour   Intake 240 ml   Output 800 ml   Net -560 ml     General: NAD  Skin: warm, dry  HEENT: Moist mucous membranes, sclera anicteric, normocephalic, atraumatic  Neck: No apparent JVD appreciated  Chest: lung sounds clear B/L  CVS: Regular rate and rhythm  Abdomen: Soft, round, non-tender, +BS  Extremities: No B/L LE edema present, LLE wrapped  Neuro: alert and oriented  Psych: appropriate mood and affect     Medications:    Current Facility-Administered Medications:     acetaminophen (TYLENOL) tablet 650 mg, 650 mg, Oral, Q6H PRN, Cathie Tenorio, , 650 mg at 08/21/24 0124    amLODIPine (NORVASC) tablet 5 mg, 5 mg, Oral, Daily, TRAV Mena, 5 mg at 08/21/24 0922    azaTHIOprine (IMURAN) tablet 100 mg, 100 mg, Oral, HS, Cathie Tenorio, , 100 mg at 08/21/24 2123    bisacodyl (DULCOLAX) rectal suppository 10 mg, 10 mg, Rectal, Daily PRN, Millicent Garcia PA-C, 10 mg at 08/21/24 0913    docusate sodium (COLACE) capsule 100 mg, 100 mg, Oral, BID, Aileen Galvez PA-C, 100 mg at 08/22/24 0806    doxylamine (UNISOM) tablet 12.5 mg, 12.5 mg, Oral, HS PRN, Cathie Tenorio DO    heparin (porcine) subcutaneous injection 5,000 Units, 5,000 Units, Subcutaneous, Q8H CITLALI, 5,000 Units at 08/22/24 0502 **AND** [CANCELED] Platelet count, , , Once, Cathie Tenorio DO    HYDROmorphone HCl (DILAUDID) injection 0.2 mg, 0.2 mg, Intravenous, Q3H PRN, Millicent Garcia PA-C, 0.2 mg at 08/21/24 2316    hydrOXYzine HCL (ATARAX) tablet 50 mg, 50 mg, Oral, Q6H PRN, Millicent Garcia PA-C, 50 mg at 08/22/24 0806    latanoprost (XALATAN) 0.005 % ophthalmic solution 1 drop, 1 drop, Both Eyes, HS, Cathierudolph Tenorio, , 1 drop at 08/21/24 2102    Letermovir TABS 480 mg, 480 mg, Oral, Daily, Millicent Garcia PA-C, 480 mg at 08/21/24 1310    methocarbamol (ROBAXIN) tablet 500 mg, 500  mg, Oral, Q6H PRN, Cathie Tenorio, DO, 500 mg at 08/22/24 0618    metoprolol succinate (TOPROL-XL) 24 hr tablet 25 mg, 25 mg, Oral, Daily, Cathie Tenorio, DO, 25 mg at 08/22/24 0806    ondansetron (ZOFRAN) injection 4 mg, 4 mg, Intravenous, Q6H PRN, Millicent Garcia PA-C, 4 mg at 08/21/24 0913    oxyCODONE (ROXICODONE) immediate release tablet 10 mg, 10 mg, Oral, Q4H PRN, Cathie Tenorio, DO, 10 mg at 08/22/24 0618    oxyCODONE (ROXICODONE) IR tablet 5 mg, 5 mg, Oral, Q4H PRN, Cathie Tenorio, DO, 5 mg at 08/18/24 1910    pantoprazole (PROTONIX) EC tablet 40 mg, 40 mg, Oral, Daily, Cathierudolph Tenorio, DO, 40 mg at 08/22/24 0806    polyethylene glycol (MIRALAX) packet 17 g, 17 g, Oral, BID, Millicent Garcia PA-C, 17 g at 08/22/24 0806    senna (SENOKOT) tablet 17.2 mg, 2 tablet, Oral, HS, Aileen Galvez PA-C, 17.2 mg at 08/21/24 2101    tacrolimus (PROGRAF) capsule 3 mg, 3 mg, Oral, Q12H [facility], TRAV Mena, 3 mg at 08/22/24 0805    thiamine tablet 100 mg, 100 mg, Oral, Daily, Cathie Tenorio, DO, 100 mg at 08/22/24 0806    Laboratory Results:  Results from last 7 days   Lab Units 08/22/24  0502 08/21/24  0130 08/20/24  0517 08/19/24  0258 08/18/24  0435 08/17/24  1146   WBC Thousand/uL  --  2.73* 2.83* 3.74*   < > 3.06*   HEMOGLOBIN g/dL  --  9.4* 9.9* 9.9*   < > 10.8*   HEMATOCRIT %  --  27.9* 30.1* 30.1*   < > 33.4*   PLATELETS Thousands/uL  --  154 150 154   < > 196   SODIUM mmol/L 140 139 141 140   < > 139   POTASSIUM mmol/L 4.4 4.4 4.6 4.6   < > 4.9   CHLORIDE mmol/L 104 102 106 106   < > 103   CO2 mmol/L 28 29 26 26   < > 28   BUN mg/dL 24 18 19 25   < > 28*   CREATININE mg/dL 1.94* 1.70* 1.76* 2.41*   < > 2.75*   CALCIUM mg/dL 9.5 9.9 9.7 9.4   < > 9.7   ALK PHOS U/L  --   --  83 108*  --  39   ALT U/L  --   --  18 29  --  8   AST U/L  --   --  17 31  --  20    < > = values in this interval not displayed.

## 2024-08-22 NOTE — ASSESSMENT & PLAN NOTE
Initially hypertensive upon arrival, possibly due to underlying pain  BP improved  Continue home medications: Toprol-XL 25mg q24hr  Amlodipine added 8/18 -transitioning to Digoxin per Cardiology recommendations 8/22

## 2024-08-22 NOTE — CASE MANAGEMENT
[facility] received request for authorization from Care Manager.  Authorization request submitted for: SNF  Facility Name: [facility]  NPI:[id number]  Facility MD: Dr. Barraza    NPI: [id number]  Authorization initiated by contacting insurance: [employer]   Via: Phone # [phone]  Clinicals submitted verbally to Nurse  Leticia       [facility] has received APPROVED authorization.  Insurance:   [employer]   Authorization received for: [facility]  Facility: [facility]  Authorization #:[id number]  Start of Care:08/22  Next Review Date:08/26  Continued Stay Care Coordinator:   YULIA#: [phone]      [facility] received APPROVAL for transport authorization.  Insurance:  [employer]   Type of transport:  [facility]   Date of transport:    08/22  End Location:[facility]  [employer] NPI [id number]   Approved auth #: [id number]    Care Manager notified: Aruna Schwartz,      Please reach out to CM for updates on any clinical information.

## 2024-08-22 NOTE — ASSESSMENT & PLAN NOTE
Received liver transplant in May 2002 at [facility]   Secondary to alcoholic cirrhosis  Follows with hepatology   Managed on azathioprine and tacrolimus  Also on prevymis - brought in by family  States she was weaned off prednisone recently

## 2024-08-22 NOTE — ASSESSMENT & PLAN NOTE
Lab Results   Component Value Date    EGFR 27 08/22/2024    EGFR 32 08/21/2024    EGFR 30 08/20/2024    CREATININE 1.94 (H) 08/22/2024    CREATININE 1.70 (H) 08/21/2024    CREATININE 1.76 (H) 08/20/2024     Baseline 0.7 - 0.9  Per daughter, Donnie team has baseline closer to 1.5  Cr 0.98 on 11/28/23, presents with Cr of 2.75  Creatinine appears to have plateaued at 1.70  US kidney and bladder unremarkable  S/P IVF - resume given rise to 1.94 today  Avoid nephrotoxic agents  Nephrology following

## 2024-08-22 NOTE — PLAN OF CARE
Problem: PHYSICAL THERAPY ADULT  Goal: Performs mobility at highest level of function for planned discharge setting.  See evaluation for individualized goals.  Description: Treatment/Interventions: Functional transfer training, LE strengthening/ROM, Therapeutic exercise, Endurance training, Cognitive reorientation, Patient/family training, Equipment eval/education, Bed mobility          See flowsheet documentation for full assessment, interventions and recommendations.  Note:    Problem List: Decreased strength, Decreased endurance, Impaired balance, Decreased mobility, Impaired judgement, Decreased safety awareness, Orthopedic restrictions, Pain  Assessment: Lonny was seen today for PT intervention. She is more tearful and anxious today than previous session, highly preoccupied with feeling that something is poking under her skin w/ the LLE splint. She continues to be supervision for bed mobility and to achieve sitting EOB. However today she requires more assistance for STS and SPT due to anxiety combined L ankle pain. She required mod a x 2 to perform STS from the EOB w/ RW in front. She is able to stand x 30s before reporting that she needs to sit, unable to pivot to sit in chair. After a short seated break, she is able to perform STS w/ max A x 2 and B HHA w/ knee block, and then max A x 2 for the SPT to the chair. She requires significant time to adjust in the chair and find a tolerable position. She is seated OOB in recliner chair at end of session, DUKE Martinez present in room to provide w/ more pain medication. Discharge recommendation continues to be for Level 2 Resources  Barriers to Discharge:  (IFEOMA, requires Ax2 to pivot)     Rehab Resource Intensity Level, PT: II (Moderate Resource Intensity)    See flowsheet documentation for full assessment.

## 2024-08-22 NOTE — PLAN OF CARE
"  Problem: OCCUPATIONAL THERAPY ADULT  Goal: Performs self-care activities at highest level of function for planned discharge setting.  See evaluation for individualized goals.  Description: Treatment Interventions: ADL retraining, Functional transfer training, UE strengthening/ROM, Endurance training, Cognitive reorientation, Patient/family training, Equipment evaluation/education, Compensatory technique education, Continued evaluation, Energy conservation, Activityengagement          See flowsheet documentation for full assessment, interventions and recommendations.   Outcome: Progressing  Note: Limitation: Decreased ADL status, Decreased UE strength, Decreased Safe judgement during ADL, Decreased cognition, Decreased self-care trans, Decreased high-level ADLs, Mood limitation  Prognosis: Fair  Assessment: Pt is a 63 y.o. female seen for OT follow up treatment session s/p admission to [facility] on 8/17/2024 due to Fall and diagnosis of closed L ankle fracture (nondisplaced bimalleolar fracture) and 2-4 met neck fractures.  Orders for OT eval and treat received, w/ activity orders of WBAT R LE and NWB L LE and fall precautions. Pt presents w/ comorbidities of liver transplant recipient, HTN, CPS, peripheral neuropathy, lumbar radiculopathy. Pt was living with her  in a 1 STH, 3 IFEOMA at [facility] level for self help skills, SPC ambulation and + prior to admission. Pt received today supine in bed with ace wrap removed from LLE splint Pt stating \"I had to take it off\". Pt presents with decreased act shawn, generalized weakness, LLE pain, balance deficits in standing, deconditioning, limited insight into functional deficits and anxiety. She was able to complete supine to sit with Caroline and use elevated HOB, sit<>stand with ModA x2 and SPT with HHA at MaxA x2. Pt exhibits decreased standing shawn of less than 1 minute for self help tasks. Throughout session Pt expressed pain with LLE splint, often " placing L arm down inside splint to rub leg. [employer] notified of Pt's complaints. Pt is able to complete grooming tasks at S/U level, UB gown managent performed at Caroline and Assist requitred for LB ADLs today. Pt remains below baseline level of functioning and cont to benefoit from OT with recommended d/c level II.     Rehab Resource Intensity Level, OT: II (Moderate Resource Intensity)

## 2024-08-22 NOTE — PLAN OF CARE
Problem: Potential for Falls  Goal: Patient will remain free of falls  Description: INTERVENTIONS:  - Educate patient/family on patient safety including physical limitations  - Instruct patient to call for assistance with activity   - Consult OT/PT to assist with strengthening/mobility   - Keep Call bell within reach  - Keep bed low and locked with side rails adjusted as appropriate  - Keep care items and personal belongings within reach  - Initiate and maintain comfort rounds  - Make Fall Risk Sign visible to staff  - Offer Toileting every 2 Hours, in advance of need  - Initiate/Maintain bed alarm  - Obtain necessary fall risk management equipment  - Apply yellow socks and bracelet for high fall risk patients  - Consider moving patient to room near nurses station  Outcome: Progressing     Problem: PAIN - ADULT  Goal: Verbalizes/displays adequate comfort level or baseline comfort level  Description: Interventions:  - Encourage patient to monitor pain and request assistance  - Assess pain using appropriate pain scale  - Administer analgesics based on type and severity of pain and evaluate response  - Implement non-pharmacological measures as appropriate and evaluate response  - Consider cultural and social influences on pain and pain management  - Notify physician/advanced practitioner if interventions unsuccessful or patient reports new pain  Outcome: Progressing     Problem: INFECTION - ADULT  Goal: Absence or prevention of progression during hospitalization  Description: INTERVENTIONS:  - Assess and monitor for signs and symptoms of infection  - Monitor lab/diagnostic results  - Monitor all insertion sites, i.e. indwelling lines, tubes, and drains  - Monitor endotracheal if appropriate and nasal secretions for changes in amount and color  - [place] appropriate cooling/warming therapies per order  - Administer medications as ordered  - Instruct and encourage patient and family to use good hand hygiene  technique  - Identify and instruct in appropriate isolation precautions for identified infection/condition  Outcome: Progressing  Goal: Absence of fever/infection during neutropenic period  Description: INTERVENTIONS:  - Monitor WBC    Outcome: Progressing     Problem: SAFETY ADULT  Goal: Patient will remain free of falls  Description: INTERVENTIONS:  - Educate patient/family on patient safety including physical limitations  - Instruct patient to call for assistance with activity   - Consult OT/PT to assist with strengthening/mobility   - Keep Call bell within reach  - Keep bed low and locked with side rails adjusted as appropriate  - Keep care items and personal belongings within reach  - Initiate and maintain comfort rounds  - Make Fall Risk Sign visible to staff  - Offer Toileting every 2 Hours, in advance of need  - Initiate/Maintain bed alarm  - Obtain necessary fall risk management equipment  - Apply yellow socks and bracelet for high fall risk patients  - Consider moving patient to room near nurses station  Outcome: Progressing  Goal: Maintain or return to baseline ADL function  Description: INTERVENTIONS:  - Educate patient/family on patient safety including physical limitations  - Instruct patient to call for assistance with activity   - Consult OT/PT to assist with strengthening/mobility   - Keep Call bell within reach  - Keep bed low and locked with side rails adjusted as appropriate  - Keep care items and personal belongings within reach  - Initiate and maintain comfort rounds  - Make Fall Risk Sign visible to staff  - Offer Toileting every 2 Hours, in advance of need  - Initiate/Maintain bed alarm  - Obtain necessary fall risk management equipment  - Apply yellow socks and bracelet for high fall risk patients  - Consider moving patient to room near nurses station  Outcome: Progressing  Goal: Maintains/Returns to pre admission functional level  Description: INTERVENTIONS:  - Perform AM-PAC 6 Click Basic  Mobility/ Daily Activity assessment daily.  - Set and communicate daily mobility goal to care team and patient/family/caregiver.   - Collaborate with rehabilitation services on mobility goals if consulted  - Perform Range of Motion 3 times a day.  - Reposition patient every 2 hours.  - Dangle patient 3 times a day  - Stand patient 3 times a day  - Ambulate patient 3 times a day  - Out of bed to chair 3 times a day   - Out of bed for meals 3 times a day  - Out of bed for toileting  - Record patient progress and toleration of activity level   Outcome: Progressing     Problem: DISCHARGE PLANNING  Goal: Discharge to home or other facility with appropriate resources  Description: INTERVENTIONS:  - Identify barriers to discharge w/patient and caregiver  - Arrange for needed discharge resources and transportation as appropriate  - Identify discharge learning needs (meds, wound care, etc.)  - Arrange for interpretive services to assist at discharge as needed  - Refer to Case Management Department for coordinating discharge planning if the patient needs post-hospital services based on physician/advanced practitioner order or complex needs related to functional status, cognitive ability, or social support system  Outcome: Progressing     Problem: Knowledge Deficit  Goal: Patient/family/caregiver demonstrates understanding of disease process, treatment plan, medications, and discharge instructions  Description: Complete learning assessment and assess knowledge base.  Interventions:  - Provide teaching at level of understanding  - Provide teaching via preferred learning methods  Outcome: Progressing     Problem: Prexisting or High Potential for Compromised Skin Integrity  Goal: Skin integrity is maintained or improved  Description: INTERVENTIONS:  - Identify patients at risk for skin breakdown  - Assess and monitor skin integrity  - Assess and monitor nutrition and hydration status  - Monitor labs   - Assess for incontinence    - Turn and reposition patient  - Assist with mobility/ambulation  - Relieve pressure over bony prominences  - Avoid friction and shearing  - Provide appropriate hygiene as needed including keeping skin clean and dry  - Evaluate need for skin moisturizer/barrier cream  - Collaborate with interdisciplinary team   - Patient/family teaching  - Consider wound care consult   Outcome: Progressing

## 2024-08-22 NOTE — ASSESSMENT & PLAN NOTE
Received liver transplant in May 2002 at [facility]   2/2 alcoholic cirrhosis  Follows with hepatology   Managed on azathioprine and tacrolimus  Also on prevymis - to be brought in by family  States she was weaned off prednisone recently

## 2024-08-22 NOTE — PROGRESS NOTES
[facility]  Progress Note  Name: Linda Becerra I  MRN: [id number]  Unit/Bed#: -[facility] I Date of Admission: 8/17/2024   Date of Service: 8/22/2024 I Hospital Day: 5    Assessment & Plan   * Closed left ankle fracture  Assessment & Plan  Presents with fall from home  ECG NSR, HR 70s  XR left foot - nondisplaced bimalleolar fracture  XR right foot pending  CT RLE - Second through fourth metatarsal neck fractures.   ED spoke with ortho Dr. Edson Burk - plan to splint, no immediate intervention at this time  PT/OT evaluation  Continue pain control  Noted concern for lethargy/hypoxia earlier in admit after IV dilaudid 2 mg  Decrease to 0.2 mg IV for breakthrough  Concern for encephalopathy related to ALEJANDRO/gabapentin, holding for now - mentation improving    Acute kidney injury superimposed on chronic kidney disease  (HCC)  Assessment & Plan  Lab Results   Component Value Date    EGFR 27 08/22/2024    EGFR 32 08/21/2024    EGFR 30 08/20/2024    CREATININE 1.94 (H) 08/22/2024    CREATININE 1.70 (H) 08/21/2024    CREATININE 1.76 (H) 08/20/2024     Baseline 0.7 - 0.9  Per daughter, Donnie team has baseline closer to 1.5  Cr 0.98 on 11/28/23, presents with Cr of 2.75  Creatinine appears to have plateaued at 1.70  US kidney and bladder unremarkable  S/P IVF - resume given rise to 1.94 today  Avoid nephrotoxic agents  Nephrology following    Constipation  Assessment & Plan  Reporting constipation, abdominal distension  No vomiting.  Abdominal exam unremarkable  Continue bowel regimen    Anxiety  Assessment & Plan  Patient with significant anxiety  Improved on atarax, will increase to 50 mg PRN    Metabolic encephalopathy  Assessment & Plan  Patient's family at bedside with concern for brain fogginess, intermittent tremors.  Appears these may have been present prior to admission  Exam non-focal.  Adamantly denies hitting her head when falling but at times appears to be a poor historian  CT head  negative  Ammonia level WNL  Discontinue gabapentin at this time given ALEJANDRO and concern for confusion/tremors - consider resuming at reduced dose  Mentation improving, tremors resolved    Closed fracture of metatarsal neck, right, initial encounter  Assessment & Plan  CT RLE shows 2nd through 4th metatarsal neck fractures  Continue pain control  Orthopedic surgery - non surgical management recommended  PT/OT    Macrocytic anemia  Assessment & Plan  Hgb 10.8, MCV 99  Continue to monitor    Hypertension  Assessment & Plan  Initially hypertensive upon arrival, possibly due to underlying pain  BP improved  Continue home medications: Toprol-XL 25mg q24hr  Amlodipine added 8/18    Liver transplant recipient (HCC)  Assessment & Plan  Received liver transplant in May 2002 at [facility]   2/2 alcoholic cirrhosis  Follows with hepatology   Managed on azathioprine and tacrolimus  Also on prevymis - to be brought in by family  States she was weaned off prednisone recently    ALEJANDRO (acute kidney injury) (HCC)-resolved as of 8/22/2024  Assessment & Plan  Baseline Cr 0.7 - 0.9  Nephrology consulted  Creatinine increased to 1.94 today  IV fluids added  Continue to trend daily  Avoid nephrotoxic agents         VTE Pharmacologic Prophylaxis: VTE Score: 8 High Risk (Score >/= 5) - Pharmacological DVT Prophylaxis Ordered: heparin. Sequential Compression Devices Ordered.    Mobility:   Basic Mobility Inpatient Raw Score: 12  JH-HLM Goal: 4: Move to chair/commode  JH-HLM Achieved: 4: Move to chair/commode  JH-HLM Goal NOT achieved. Continue with multidisciplinary rounding and encourage appropriate mobility to improve upon JH-HLM goals.    Patient Centered Rounds: I performed bedside rounds with nursing staff today.   Discussions with Specialists or Other Care Team Provider: Discussed with Nephrology    Education and Discussions with Family / Patient: Updated  (daughter) via phone.    Total Time Spent on Date of Encounter in care  of patient: 35 mins. This time was spent on one or more of the following: performing physical exam; counseling and coordination of care; obtaining or reviewing history; documenting in the medical record; reviewing/ordering tests, medications or procedures; communicating with other healthcare professionals and discussing with patient's family/caregivers.    Current Length of Stay: 5 day(s)  Current Patient Status: Inpatient   Certification Statement: The patient will continue to require additional inpatient hospital stay due to ALEJANDRO  Discharge Plan: Anticipate discharge in 24-48 hrs to rehab facility.    Code Status: Level 1 - Full Code    Subjective:   Patient complaining of severe irritation from splint in LLE.     Objective:     Vitals:   Temp (24hrs), Av.4 °F (36.9 °C), Min:98.2 °F (36.8 °C), Max:98.8 °F (37.1 °C)    Temp:  [98.2 °F (36.8 °C)-98.8 °F (37.1 °C)] 98.2 °F (36.8 °C)  HR:  [82-90] 90  Resp:  [20] 20  BP: (123-134)/() 125/62  SpO2:  [87 %-99 %] 87 %  Body mass index is 29.39 kg/m².     Input and Output Summary (last 24 hours):     Intake/Output Summary (Last 24 hours) at 2024 1128  Last data filed at 2024 0459  Gross per 24 hour   Intake 240 ml   Output 800 ml   Net -560 ml       Physical Exam:   Physical Exam  Vitals and nursing note reviewed.   Constitutional:       General: She is not in acute distress.     Appearance: Normal appearance. She is well-developed.   HENT:      Head: Normocephalic and atraumatic.   Eyes:      General: No scleral icterus.     Conjunctiva/sclera: Conjunctivae normal.   Cardiovascular:      Rate and Rhythm: Normal rate and regular rhythm.      Heart sounds: Murmur heard.   Pulmonary:      Effort: Pulmonary effort is normal.      Breath sounds: No wheezing, rhonchi or rales.   Abdominal:      General: There is no distension.      Palpations: Abdomen is soft.   Musculoskeletal:      Comments: LLE splint in place   Skin:     General: Skin is warm and dry.    Neurological:      Mental Status: She is alert. Mental status is at baseline.   Psychiatric:         Mood and Affect: Mood normal.          Additional Data:     Labs:  Results from last 7 days   Lab Units 08/21/24  0130   WBC Thousand/uL 2.73*   HEMOGLOBIN g/dL 9.4*   HEMATOCRIT % 27.9*   PLATELETS Thousands/uL 154   SEGS PCT % 56   LYMPHO PCT % 20   MONO PCT % 10   EOS PCT % 13*     Results from last 7 days   Lab Units 08/22/24  0502 08/21/24  0130 08/20/24  0517   SODIUM mmol/L 140   < > 141   POTASSIUM mmol/L 4.4   < > 4.6   CHLORIDE mmol/L 104   < > 106   CO2 mmol/L 28   < > 26   BUN mg/dL 24   < > 19   CREATININE mg/dL 1.94*   < > 1.76*   ANION GAP mmol/L 8   < > 9   CALCIUM mg/dL 9.5   < > 9.7   ALBUMIN g/dL  --   --  3.6   TOTAL BILIRUBIN mg/dL  --   --  0.81   ALK PHOS U/L  --   --  83   ALT U/L  --   --  18   AST U/L  --   --  17   GLUCOSE RANDOM mg/dL 85   < > 90    < > = values in this interval not displayed.                       Lines/Drains:  Invasive Devices       Peripheral Intravenous Line  Duration             Peripheral IV 08/18/24 Right;Ventral (anterior) Forearm 3 days              Drain  Duration             External Urinary Catheter 3 days                          Imaging: Reviewed radiology reports from this admission including: xray(s)    Recent Cultures (last 7 days):         Last 24 Hours Medication List:   Current Facility-Administered Medications   Medication Dose Route Frequency Provider Last Rate    acetaminophen  650 mg Oral Q6H PRN Cathie Tenorio, DO      amLODIPine  5 mg Oral Daily TRAV Mena      azaTHIOprine  100 mg Oral HS Cathie Tenorio, DO      bisacodyl  10 mg Rectal Daily PRN Millicent Garcia PA-C      docusate sodium  100 mg Oral BID Aileen Galvez PA-C      doxylamine  12.5 mg Oral HS PRN Cathie Sagean, DO      heparin (porcine)  5,000 Units Subcutaneous Q8H [facility] Cathie Tenorio, DO      HYDROmorphone  0.2 mg Intravenous Q3H PRN Millicent Garcia PA-C      hydrOXYzine HCL   50 mg Oral Q6H PRN Millicent Garcia PA-C      latanoprost  1 drop Both Eyes HS Cathie Tenorio, DO      Letermovir  480 mg Oral Daily Millicent Garcia PA-C      methocarbamol  500 mg Oral Q6H PRN [facility], [facility]      metoprolol succinate  25 mg Oral Daily Cathie Tenorio, DO      ondansetron  4 mg Intravenous Q6H PRN Millicent Garcia PA-C      oxyCODONE  10 mg Oral Q4H PRN Cathie Tenorio, DO      oxyCODONE  5 mg Oral Q4H PRN [facility], DO      pantoprazole  40 mg Oral Daily [facility], [facility]      polyethylene glycol  17 g Oral BID Millicent Garcia PA-C      senna  2 tablet Oral HS Aileen Galvez PA-C      sodium chloride  70 mL/hr Intravenous Continuous TRAV Mena      tacrolimus  3 mg Oral Q12H CITLALI TRAV Mena      thiamine  100 mg Oral Daily [facility], DO          Today, Patient Was Seen By: Eri Andrews PA-C    **Please Note: This note may have been constructed using a voice recognition system.**

## 2024-08-22 NOTE — ASSESSMENT & PLAN NOTE
Lab Results   Component Value Date    EGFR 27 08/22/2024    EGFR 32 08/21/2024    EGFR 30 08/20/2024    CREATININE 1.94 (H) 08/22/2024    CREATININE 1.70 (H) 08/21/2024    CREATININE 1.76 (H) 08/20/2024     Baseline 0.7 - 0.9  Per daughter, Donnie team has baseline closer to 1.5  Cr 0.98 on 11/28/23, presents with Cr of 2.75  Creatinine appears to have plateaued at 1.70  US kidney and bladder unremarkable  S/P IVF - resumed given rise to 1.94  Avoid nephrotoxic agents  Nephrology following

## 2024-08-22 NOTE — OCCUPATIONAL THERAPY NOTE
Occupational Therapy Progress Note     Patient Name: Linda Becerra  Today's Date: 8/22/2024  Problem List  Principal Problem:    Closed left ankle fracture  Active Problems:    Acute kidney injury superimposed on chronic kidney disease  (HCC)    Liver transplant recipient (HCC)    Hypertension    Macrocytic anemia    Closed fracture of metatarsal neck, right, initial encounter    Metabolic encephalopathy    Anxiety    Constipation            08/22/24 1034   OT Last Visit   OT Visit Date 08/22/24   Note Type   Note Type Treatment   Pain Assessment   Pain Assessment Tool 0-10   Pain Score 8   Pain Location/Orientation Orientation: Left   Restrictions/Precautions   Weight Bearing Precautions Per Order Yes   RLE Weight Bearing Per Order WBAT   LLE Weight Bearing Per Order NWB   Braces or Orthoses Splint  (LLE)   Other Precautions Cognitive;Fall Risk;Pain   Lifestyle   Autonomy Lives with family in a 1 ST, 1 MALLORY at [facility] level for self help skills, SPC ambulation, +   Reciprocal Relationships supportive family   Service to Others on disability   ADL   Grooming Assistance 5  Supervision/Setup   Grooming Deficit Wash/dry face;Wash/dry hands   UB Dressing Assistance 4  Minimal Assistance   UB Dressing Deficit Setup;Verbal cueing;Supervision/safety;Increased time to complete  (gown management)   LB Dressing Assistance 2  Maximal Assistance   LB Dressing Deficit Setup;Verbal cueing;Supervision/safety   LB Dressing Comments Pt independently removing ace wrap from LLE splint at start of session   Functional Standing Tolerance   Time Pt reports inability to sit in recliner after 5 minutes   Activity Sitting in recliner   Bed Mobility   Supine to Sit 4  Minimal assistance   Additional items Assist x 1   Transfers   Sit to Stand 3  Moderate assistance   Additional items Assist x 2;Increased time required;Verbal cues   Stand to Sit 3  Moderate assistance   Additional items Assist x 2;Verbal cues   Stand pivot 2  Maximal  "assistance   Additional items Assist x 2  (HHA)   Cognition   Overall Cognitive Status Impaired   Arousal/Participation Alert   Attention Difficulty attending to directions   Orientation Level Oriented X4   Following Commands Follows one step commands with increased time or repetition   Comments Pt agreeable, more cooperative today. But intermittently stating \"i'm not in the state of mind for this today\". More tearful this session continued to provide emotional support. Pt unwrapping splint and attempting to remove splint throughout session. Placing L arm down splint to \"rub\" LLE   Assessment   Assessment Pt is a 63 y.o. female seen for OT follow up treatment session s/p admission to [facility] on 8/17/2024 due to Fall and diagnosis of closed L ankle fracture (nondisplaced bimalleolar fracture) and 2-4 met neck fractures.  Orders for OT eval and treat received, w/ activity orders of WBAT R LE and NWB L LE and fall and weightbearing precautions. Pt presents w/ comorbidities of liver transplant recipient, HTN, CPS, peripheral neuropathy, lumbar radiculopathy. Pt was living with her  in a 1 ST, 3 IFEOMA at [facility] for self help skills, SPC ambulation and + prior to admission. Pt received today supine in bed with ace wrap removed from LLE splint Pt stating \"I had to take it off\". Pt presents with decreased act shawn, generalized weakness, LLE pain, balance deficits in standing, deconditioning, limited insight into functional deficits and anxiety. She was able to complete supine to sit with Caroline and use elevated HOB, sit<>stand with ModA x2 and SPT with HHA at MaxA x2. Pt exhibits decreased standing shawn of less than 1 minute for self help tasks. Throughout session Pt expressed pain with LLE splint, often placing L arm down inside splint to rub leg. Orthopedics notified of Pt's complaints. Pt is able to complete grooming tasks at S/U level, UB gown management performed at Caroline and Assist " required for LB ADLs today. Pt remains below baseline level of functioning and cont to benefit from OT with recommended d/c level II.   Plan   Treatment Interventions ADL retraining;Functional transfer training;UE strengthening/ROM;Endurance training;Cognitive reorientation;Patient/family training;Equipment evaluation/education;Compensatory technique education;Continued evaluation;Energy conservation;Activityengagement   Goal Expiration Date 09/03/24   OT Treatment Day 1   OT Frequency 3-5x/wk   Discharge Recommendation   Rehab Resource Intensity Level, OT II (Moderate Resource Intensity)   AM-PAC Daily Activity Inpatient   Lower Body Dressing 2   Bathing 2   Toileting 2   Upper Body Dressing 3   Grooming 3   Eating 4   Daily Activity Raw Score 16   Daily Activity Standardized Score (Calc for Raw Score >=11) 35.96   AM-PAC Applied Cognition Inpatient   Following a Speech/Presentation 3   Understanding Ordinary Conversation 4   Taking Medications 4   Remembering Where Things Are Placed or Put Away 4   Remembering List of 4-5 Errands 3   Taking Care of Complicated Tasks 3   Applied Cognition Raw Score 21   Applied Cognition Standardized Score 44.3   End of Consult   Education Provided Yes   Patient Position at End of Consult Bedside chair;Bed/Chair alarm activated;All needs within reach   Nurse Communication Nurse aware of consult     This session, pt required and most appropriately benefited from skilled OT/PT co-treatment due to extensive physical assistance of SKILLED therapists, significant regression from functional baseline, and decreased activity tolerance. OT and PT goals were addressed separately as seen in documentation.      GOALS:   Goals established in order to promote pt's established goal of going to [facility]        -Patient will be Mod I with LB dressing with use of AE and AD as needed in order to increase (I) with ADLs   -PROGRESSING     -Patient will be Mod I with LB bathing with use of AE and AD  as needed in order to increase (I) with ADLs   -PROGRESSING     -Patient will complete toileting w/ Mod I w/ G hygiene/thoroughness in order to reduce caregiver burden     -Patient will demonstrate Mod I with bed mobility for ability to manage own comfort and initiate OOB tasks.    -PROGRESSING     -Patient will perform functional transfers with Mod I to/from all surfaces using DME as needed in order to increase (I) with functional tasks   -PROGRESSING     -Patient will be Mod I with functional mobility to/from bathroom for increased independence with toileting tasks     -Patient will tolerate therapeutic activities for greater than 30 min, in order to increase tolerance for functional activities.      -Patient will increase OOB/sitting tolerance to 2-4 hours per day to increase participation in self-care and leisure tasks with no s/s of exertion.   -PROGRESSING     -Patient will attend to functional task for 10 min without VC for attention/redirection      -Patient will demonstrate standing for 3 min in order to increase active participation in functional activities  -PROGRESSING     -Patient will demonstrate 100% adherence to NWB status during all functional activities w/o cues from therapist   -PROGRESSING

## 2024-08-22 NOTE — ASSESSMENT & PLAN NOTE
Patient's family at bedside with concern for brain fogginess, intermittent tremors.  Appears these may have been present prior to admission  Exam non-focal.  Adamantly denies hitting her head when falling but at times appears to be a poor historian  CT head negative  Ammonia level WNL  Discontinue gabapentin at this time given ALEJANDRO and concern for confusion/tremors - consider resuming at reduced dose once renal function can tolerate  Mentation improving, tremors resolved

## 2024-08-22 NOTE — PLAN OF CARE
Problem: Potential for Falls  Goal: Patient will remain free of falls  Description: INTERVENTIONS:  - Educate patient/family on patient safety including physical limitations  - Instruct patient to call for assistance with activity   - Consult OT/PT to assist with strengthening/mobility   - Keep Call bell within reach  - Keep bed low and locked with side rails adjusted as appropriate  - Keep care items and personal belongings within reach  - Initiate and maintain comfort rounds  - Make Fall Risk Sign visible to staff  - Offer Toileting every 2 Hours, in advance of need  - Initiate/Maintain bed alarm  - Obtain necessary fall risk management equipment  - Apply yellow socks and bracelet for high fall risk patients  - Consider moving patient to room near nurses station  Outcome: Progressing     Problem: PAIN - ADULT  Goal: Verbalizes/displays adequate comfort level or baseline comfort level  Description: Interventions:  - Encourage patient to monitor pain and request assistance  - Assess pain using appropriate pain scale  - Administer analgesics based on type and severity of pain and evaluate response  - Implement non-pharmacological measures as appropriate and evaluate response  - Consider cultural and social influences on pain and pain management  - Notify physician/advanced practitioner if interventions unsuccessful or patient reports new pain  Outcome: Progressing     Problem: INFECTION - ADULT  Goal: Absence or prevention of progression during hospitalization  Description: INTERVENTIONS:  - Assess and monitor for signs and symptoms of infection  - Monitor lab/diagnostic results  - Monitor all insertion sites, i.e. indwelling lines, tubes, and drains  - Monitor endotracheal if appropriate and nasal secretions for changes in amount and color  - [place] appropriate cooling/warming therapies per order  - Administer medications as ordered  - Instruct and encourage patient and family to use good hand hygiene  technique  - Identify and instruct in appropriate isolation precautions for identified infection/condition  Outcome: Progressing  Goal: Absence of fever/infection during neutropenic period  Description: INTERVENTIONS:  - Monitor WBC    Outcome: Progressing     Problem: SAFETY ADULT  Goal: Patient will remain free of falls  Description: INTERVENTIONS:  - Educate patient/family on patient safety including physical limitations  - Instruct patient to call for assistance with activity   - Consult OT/PT to assist with strengthening/mobility   - Keep Call bell within reach  - Keep bed low and locked with side rails adjusted as appropriate  - Keep care items and personal belongings within reach  - Initiate and maintain comfort rounds  - Make Fall Risk Sign visible to staff  - Offer Toileting every 2 Hours, in advance of need  - Initiate/Maintain bed alarm  - Obtain necessary fall risk management equipment  - Apply yellow socks and bracelet for high fall risk patients  - Consider moving patient to room near nurses station  Outcome: Progressing  Goal: Maintain or return to baseline ADL function  Description: INTERVENTIONS:  - Educate patient/family on patient safety including physical limitations  - Instruct patient to call for assistance with activity   - Consult OT/PT to assist with strengthening/mobility   - Keep Call bell within reach  - Keep bed low and locked with side rails adjusted as appropriate  - Keep care items and personal belongings within reach  - Initiate and maintain comfort rounds  - Make Fall Risk Sign visible to staff  - Offer Toileting every 2 Hours, in advance of need  - Initiate/Maintain bed alarm  - Obtain necessary fall risk management equipment  - Apply yellow socks and bracelet for high fall risk patients  - Consider moving patient to room near nurses station  Outcome: Progressing  Goal: Maintains/Returns to pre admission functional level  Description: INTERVENTIONS:  - Perform AM-PAC 6 Click Basic  Mobility/ Daily Activity assessment daily.  - Set and communicate daily mobility goal to care team and patient/family/caregiver.   - Collaborate with rehabilitation services on mobility goals if consulted  - Perform Range of Motion 3 times a day.  - Reposition patient every 3 hours.  - Dangle patient 3 times a day  - Stand patient 3 times a day  - Ambulate patient 3 times a day  - Out of bed to chair 3 times a day   - Out of bed for meals 3 times a day  - Out of bed for toileting  - Record patient progress and toleration of activity level   Outcome: Progressing     Problem: DISCHARGE PLANNING  Goal: Discharge to home or other facility with appropriate resources  Description: INTERVENTIONS:  - Identify barriers to discharge w/patient and caregiver  - Arrange for needed discharge resources and transportation as appropriate  - Identify discharge learning needs (meds, wound care, etc.)  - Arrange for interpretive services to assist at discharge as needed  - Refer to Case Management Department for coordinating discharge planning if the patient needs post-hospital services based on physician/advanced practitioner order or complex needs related to functional status, cognitive ability, or social support system  Outcome: Progressing     Problem: Prexisting or High Potential for Compromised Skin Integrity  Goal: Skin integrity is maintained or improved  Description: INTERVENTIONS:  - Identify patients at risk for skin breakdown  - Assess and monitor skin integrity  - Assess and monitor nutrition and hydration status  - Monitor labs   - Assess for incontinence   - Turn and reposition patient  - Assist with mobility/ambulation  - Relieve pressure over bony prominences  - Avoid friction and shearing  - Provide appropriate hygiene as needed including keeping skin clean and dry  - Evaluate need for skin moisturizer/barrier cream  - Collaborate with interdisciplinary team   - Patient/family teaching  - Consider wound care consult    Outcome: Progressing     Problem: Nutrition/Hydration-ADULT  Goal: Nutrient/Hydration intake appropriate for improving, restoring or maintaining nutritional needs  Description: Monitor and assess patient's nutrition/hydration status for malnutrition. Collaborate with interdisciplinary team and initiate plan and interventions as ordered.  Monitor patient's weight and dietary intake as ordered or per policy. Utilize nutrition screening tool and intervene as necessary. Determine patient's food preferences and provide high-protein, high-caloric foods as appropriate.     INTERVENTIONS:  - Monitor oral intake, urinary output, labs, and treatment plans  - Assess nutrition and hydration status and recommend course of action  - Evaluate amount of meals eaten  - Assist patient with eating if necessary   - Allow adequate time for meals  - Recommend/ encourage appropriate diets, oral nutritional supplements, and vitamin/mineral supplements  - Order, calculate, and assess calorie counts as needed  - Recommend, monitor, and adjust tube feedings and TPN/PPN based on assessed needs  - Assess need for intravenous fluids  - Provide specific nutrition/hydration education as appropriate  - Include patient/family/caregiver in decisions related to nutrition  Outcome: Progressing

## 2024-08-22 NOTE — ASSESSMENT & PLAN NOTE
CT RLE shows 2nd through 4th metatarsal neck fractures  Continue pain control  Orthopedic surgery - non surgical management recommended  PT/OT - recommending level 2 resources on discharge

## 2024-08-22 NOTE — CASE MANAGEMENT
Case Management Discharge Planning Note    Patient name Linda Becerra  Location /-01 MRN [id number]  : 1960 Date 2024       Current Admission Date: 2024  Current Admission Diagnosis:Closed left ankle fracture   Patient Active Problem List    Diagnosis Date Noted Date Diagnosed    Anxiety 2024     Constipation 2024     Metabolic encephalopathy 2024     Closed left ankle fracture 2024     Macrocytic anemia 2024     Closed fracture of metatarsal neck, right, initial encounter 2024     Liver transplant recipient ([facility])      Hypertension      Asthma      Subcutaneous mass 05/10/2024     Functional diarrhea 2023     Liver transplant status (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) 2023     Acute pain of right foot 2023     Peripheral neuropathy 2022     Acute kidney injury superimposed on chronic kidney disease  ([facility]) 2022     Decompensated cirrhosis 2022     Acute blood loss anemia 2022     Hyponatremia 2022     Hyperbilirubinemia 2022     Pleural effusion 2022     Abnormal LFTs 2021     Personal history of colonic polyps 2021     Right lower quadrant abdominal pain 06/15/2021     Chronic pain syndrome 2020     Contusion of left foot 2020     Chronic bilateral low back pain without sciatica 2020     Lumbar spondylosis 2020     DDD (degenerative disc disease), lumbar 2020     Lumbar radiculopathy 2020     Left wrist tendonitis 2019     Nondisplaced fracture of distal end of left radius 2019     Closed compression fracture of L2 vertebra (HCC) 2019       LOS (days): 5  Geometric Mean LOS (GMLOS)  (days): 4.3  Days to GMLOS:-0.6     OBJECTIVE:  Risk of Unplanned Readmission Score: 16.98         Current admission status: Inpatient   Preferred Pharmacy:   [id number] - [place] - [address] [facility]  [address] [facility] [address]  Phone: [phone] Fax: [phone]    [facility]  [place] - [address]  [address]  [facility] [address]  Phone: [phone] Fax: [phone]    [place] - [address]  [address] [facility]  [address]  [facility] [address]  Phone: [phone] Fax: [phone]    Primary Care Provider: Davide Sorto MD    Primary Insurance: [employer]  Secondary Insurance:     DISCHARGE DETAILS:        CM updated facility via Aidin that discharge will be tomorrow pending improvement in Creatinine.

## 2024-08-22 NOTE — ASSESSMENT & PLAN NOTE
Baseline Cr 0.7 - 0.9  Nephrology consulted  Creatinine increased to 1.94 today  IV fluids added  Continue to trend daily  Avoid nephrotoxic agents

## 2024-08-22 NOTE — CASE MANAGEMENT
Case Management Discharge Planning Note    Patient name Linda Becerra  Location /-01 MRN [id number]  : 1960 Date 2024       Current Admission Date: 2024  Current Admission Diagnosis:Closed left ankle fracture   Patient Active Problem List    Diagnosis Date Noted Date Diagnosed    Anxiety 2024     Constipation 2024     Metabolic encephalopathy 2024     Closed left ankle fracture 2024     Macrocytic anemia 2024     Closed fracture of metatarsal neck, right, initial encounter 2024     Liver transplant recipient ([facility])      Hypertension      Asthma      Subcutaneous mass 05/10/2024     Functional diarrhea 2023     Liver transplant status ([facility]) 2023     Other cytomegaloviral diseases ([facility]) 2023     Atrophy of muscle of left lower leg 2023     Closed head injury 08/15/2023     Dizziness 08/15/2023     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     GCA (giant cell arteritis) ([facility]) 2023     Acute pain of right foot 2023     Peripheral neuropathy 2022     Acute kidney injury superimposed on chronic kidney disease  ([facility]) 2022     Decompensated cirrhosis 2022     Acute blood loss anemia 2022     Hyponatremia 2022     Hyperbilirubinemia 2022     Pleural effusion 2022     Abnormal LFTs 2021     Personal history of colonic polyps 2021     Right lower quadrant abdominal pain 06/15/2021     Chronic pain syndrome 2020     Contusion of left foot 2020     Chronic bilateral low back pain without sciatica 2020     Lumbar spondylosis 2020     DDD (degenerative disc disease), lumbar 2020     Lumbar radiculopathy 2020     Left wrist tendonitis 2019     Nondisplaced fracture of distal end of left radius 2019     Closed compression fracture of L2 vertebra (HCC) 2019       LOS (days): 5  Geometric Mean LOS (GMLOS)  (days): 4.3  Days to GMLOS:-0.8     OBJECTIVE:  Risk of Unplanned Readmission Score: 16.98         Current admission status: Inpatient   Preferred Pharmacy:   [id number] - [place] - [address] [facility]  [address] [facility] [address]  Phone: [phone] Fax: [phone]    [facility] [address]  [place] - [address]  [address]  [facility] [address]  Phone: [phone] Fax: [phone]    [place] - [address]  [address] [facility]  [address]  [facility] [address]  Phone: [phone] Fax: [phone]    Primary Care Provider: Davide Sorto MD    Primary Insurance: [employer]  Secondary Insurance:     DISCHARGE DETAILS:     CM received auth from [facility] for pt to go to [facility] and notified facility via Aidin.

## 2024-08-22 NOTE — PHYSICAL THERAPY NOTE
"                                                                                  PHYSICAL THERAPY TREATMENT NOTE      Patient Name: Linda Becerra  Today's Date: 8/22/2024 08/22/24 1035   PT Last Visit   PT Visit Date 08/22/24   Note Type   Note Type Treatment   Pain Assessment   Pain Assessment Tool 0-10   Pain Score 8   Pain Location/Orientation Orientation: Left  (Ankle)   Pain Onset/Description Onset: Ongoing   Effect of Pain on Daily Activities difficulty tolerating dependent positioning of LLE   Patient's Stated Pain Goal No pain   Hospital Pain Intervention(s) Repositioned;Ambulation/increased activity;Emotional support   Restrictions/Precautions   Weight Bearing Precautions Per Order Yes   RLE Weight Bearing Per Order WBAT   LLE Weight Bearing Per Order NWB   Braces or Orthoses Splint  (LLE)   Other Precautions Cognitive;Chair Alarm;Bed Alarm;WBS;Fall Risk;Pain;Multiple lines   General   Chart Reviewed Yes   Family/Caregiver Present No   Cognition   Overall Cognitive Status Impaired   Attention Difficulty attending to directions   Orientation Level Oriented X4   Memory Decreased short term memory;Decreased recall of recent events   Following Commands Follows one step commands with increased time or repetition   Comments Pt agreeable, more cooperative today. But intermittently stating \"i'm not in the state of mind for this today\". More tearful this session continued to provide emotional support. Pt unwrapping splint and attempting to remove splint throughout session ,including unwrapping ace wrap and adjusting actual splint pieces. Attempted to redirect, notified orthopedics and SLIM   Subjective   Subjective \"I'm delirious\"   Bed Mobility   Supine to Sit 4  Minimal assistance   Additional items Assist x 1   Transfers   Sit to Stand 3  Moderate assistance   Additional items Assist x 2;Increased time required;Verbal cues   Stand to Sit 3  Moderate assistance   Additional items Assist x 2;Increased time " required;Verbal cues   Stand pivot 2  Maximal assistance   Additional items Assist x 2;Increased time required;Verbal cues   Additional Comments Pt performed STS x 2, initially w/ RW. Stood x 30s then needed to sit back down. Performed HHA for transfer for 2nd stand   Balance   Static Sitting Fair -   Static Standing Poor   Ambulatory Zero   Activity Tolerance   Activity Tolerance Patient limited by pain;Treatment limited secondary to medical complications (Comment)  (anxiety)   Medical Staff Made Aware OT LUIS Queen, Notified Ortho AP Miriam that pt is removing splint, notified SLIM AP Eri   Nurse Made Aware DUKE Martinez   Assessment   Problem List Decreased strength;Decreased endurance;Impaired balance;Decreased mobility;Impaired judgement;Decreased safety awareness;Orthopedic restrictions;Pain   Assessment Lonny was seen today for PT intervention. She is more tearful and anxious today than previous session, highly preoccupied with feeling that something is poking under her skin w/ the LLE splint. She continues to be supervision for bed mobility and to achieve sitting EOB. However today she requires more assistance for STS and SPT due to anxiety combined L ankle pain. She required mod a x 2 to perform STS from the EOB w/ RW in front. She is able to stand x 30s before reporting that she needs to sit, unable to pivot to sit in chair. After a short seated break, she is able to perform STS w/ max A x 2 and B HHA w/ knee block, and then max A x 2 for the SPT to the chair. She requires significant time to adjust in the chair and find a tolerable position. She is seated OOB in recliner chair at end of session, DUKE Martinez present in room to provide w/ more pain medication. Discharge recommendation continues to be for Level 2 Resources   Goals   Patient Goals to take the splint off   [facility] Expiration Date 08/30/24   Short Term Goal #1 Patient will: Perform all bed mobility tasks modified independent to improve pt's  independence w/ repositioning for decrease risk of skin breakdown, Perform all transfers w/ supervision consistently from various height surfaces in order to improve I w/ engagement w/ real-world environments/situations, Increase all balance 1/2 grade to decrease risk for falls, and Propel in wheelchair for at least 100 ft w/ w/ supervision over tile surface in order to be able to use WC as alternate means of mobility   PT Treatment Day 1   Plan   Treatment/Interventions Functional transfer training;LE strengthening/ROM;Therapeutic exercise;Endurance training;Cognitive reorientation;Patient/family training;Equipment eval/education;Bed mobility   Progress Slow progress, cognitive deficits   PT Frequency 3-5x/wk   Discharge Recommendation   Rehab Resource Intensity Level, PT II (Moderate Resource Intensity)   Additional Comments Patient may benefit from trial of slide board for transfers OOB to get to/from commode. Pt reports wanting to use purewick, provided education on importance of getting OOB to commode.   AM-PAC Basic Mobility Inpatient   Turning in Flat Bed Without Bedrails 3   Lying on Back to Sitting on Edge of Flat Bed Without Bedrails 3   Moving Bed to Chair 1   Standing Up From Chair Using Arms 1   Walk in Room 1   Climb 3-5 Stairs With Railing 1   Basic Mobility Inpatient Raw Score 10   Turning Head Towards Sound 3   Follow Simple Instructions 3   Low Function Basic Mobility Raw Score  16   Low Function Basic Mobility Standardized Score  25.72   [facility] Highest Level Of Mobility   -HLM Goal 4: Move to chair/commode   -HLM Achieved 4: Move to chair/commode   Education   Education Provided Assistive device;Mobility training   Patient Reinforcement needed   End of Consult   Patient Position at End of Consult Bedside chair;Bed/Chair alarm activated;All needs within reach     Pt seen as a co-tx due to the patient's co-morbidities, clinically unstable presentation, and present impairments which are a  regression from the patient's baseline.     The patient's AM-PAC Basic Mobility Inpatient Short Form Raw Score is 10. A Raw score of less than or equal to 16 suggests the patient may benefit from discharge to post-acute rehabilitation services. Please also refer to the recommendation of the Physical Therapist for safe discharge planning.    Pt would continue to benefit from skilled PT during this admission in order to progress patient towards goals to decrease risk of falls and maximize independence.     Dominique Schulz, PT, DPT

## 2024-08-22 NOTE — PROGRESS NOTES
Progress Note - Orthopedics   Linda Becerra 63 y.o. female MRN: [id number]  Unit/Bed#: -01      Subjective:    63 y.o.female seen and examined at bedside, appears to be comfortable and states that pain is controlled.  Patient's only complaint at this time is that left splint is slightly discomforting and reports that splint is digging into her skin on proximal shin, at this time Ace was taken down and placed a small piece of additional padding underneath bothersome spot.  Patient states that right lower extremity is not causing any pain and has been up and out of bed walking on her foot. no acute events, no new complaints. Denies fevers, chills, CP, SOB, N/V, numbness or tingling.   Labs:  0   Lab Value Date/Time    HCT 27.9 (L) 08/21/2024 0130    HCT 30.1 (L) 08/20/2024 0517    HCT 30.1 (L) 08/19/2024 0258    HCT 42.0 05/08/2015 1315    HGB 9.4 (L) 08/21/2024 0130    HGB 9.9 (L) 08/20/2024 0517    HGB 9.9 (L) 08/19/2024 0258    HGB 15.3 05/08/2015 1315    INR 0.94 11/21/2023 0921    WBC 2.73 (L) 08/21/2024 0130    WBC 2.83 (L) 08/20/2024 0517    WBC 3.74 (L) 08/19/2024 0258    WBC 4.66 05/08/2015 1315    CRP 48.1 (H) 05/06/2022 0525       Meds:    Current Facility-Administered Medications:     acetaminophen (TYLENOL) tablet 650 mg, 650 mg, Oral, Q6H PRN, Cathie Tenorio, DO, 650 mg at 08/21/24 0124    amLODIPine (NORVASC) tablet 5 mg, 5 mg, Oral, Daily, TRAV Mena, 5 mg at 08/21/24 0922    azaTHIOprine (IMURAN) tablet 100 mg, 100 mg, Oral, HS, Cathie Tenorio, DO, 100 mg at 08/21/24 2123    bisacodyl (DULCOLAX) rectal suppository 10 mg, 10 mg, Rectal, Daily PRN, Millicent Garcia PA-C, 10 mg at 08/21/24 0913    docusate sodium (COLACE) capsule 100 mg, 100 mg, Oral, BID, Aileen Galvez PA-C, 100 mg at 08/21/24 1641    doxylamine (UNISOM) tablet 12.5 mg, 12.5 mg, Oral, HS PRN, Cathie Tenorio DO    heparin (porcine) subcutaneous injection 5,000 Units, 5,000 Units, Subcutaneous, Q8H CITLALI, 5,000 Units at 08/22/24  "0502 **AND** [CANCELED] Platelet count, , , Once, Cathie Tenorio,     HYDROmorphone HCl (DILAUDID) injection 0.2 mg, 0.2 mg, Intravenous, Q3H PRN, Millicent Garcia PA-C, 0.2 mg at 08/21/24 2316    hydrOXYzine HCL (ATARAX) tablet 50 mg, 50 mg, Oral, Q6H PRN, Millicent Garcia PA-C, 50 mg at 08/21/24 2123    latanoprost (XALATAN) 0.005 % ophthalmic solution 1 drop, 1 drop, Both Eyes, HS, Cathie Tenorio, , 1 drop at 08/21/24 2102    Letermovir TABS 480 mg, 480 mg, Oral, Daily, Millicent Garcia PA-C, 480 mg at 08/21/24 1310    methocarbamol (ROBAXIN) tablet 500 mg, 500 mg, Oral, Q6H PRN, Cathie Tenorio, , 500 mg at 08/22/24 0618    metoprolol succinate (TOPROL-XL) 24 hr tablet 25 mg, 25 mg, Oral, Daily, Cathie Tenorio, , 25 mg at 08/21/24 0913    ondansetron (ZOFRAN) injection 4 mg, 4 mg, Intravenous, Q6H PRN, Millicent Garcia PA-C, 4 mg at 08/21/24 0913    oxyCODONE (ROXICODONE) immediate release tablet 10 mg, 10 mg, Oral, Q4H PRN, Cathie Tenorio, , 10 mg at 08/22/24 0618    oxyCODONE (ROXICODONE) IR tablet 5 mg, 5 mg, Oral, Q4H PRN, Cathie Tenorio, , 5 mg at 08/18/24 1910    pantoprazole (PROTONIX) EC tablet 40 mg, 40 mg, Oral, Daily, Cathie Tenorio, , 40 mg at 08/21/24 0914    polyethylene glycol (MIRALAX) packet 17 g, 17 g, Oral, BID, Millicent Garcia PA-C, 17 g at 08/21/24 2014    senna (SENOKOT) tablet 17.2 mg, 2 tablet, Oral, HS, Aileen Galvez PA-C, 17.2 mg at 08/21/24 2101    tacrolimus (PROGRAF) capsule 3 mg, 3 mg, Oral, Q12H Yaneth GODWIN CRNP, 3 mg at 08/21/24 2014    thiamine tablet 100 mg, 100 mg, Oral, Daily, Cathie Tenorio DO, 100 mg at 08/21/24 0913    Blood Culture:   Lab Results   Component Value Date    BLOODCX No Growth After 5 Days. 04/28/2022       Wound Culture:   No results found for: \"WOUNDCULT\"    Ins and Outs:  I/O last 24 hours:  In: 240 [P.O.:240]  Out: 800 [Urine:800]          Physical:  Vitals:    08/21/24 2039   BP: (!) 134/109   Pulse: 89   Resp:    Temp: 98.8 °F (37.1 °C) "   SpO2: (!) 89%     Musculoskeletal: bilateral Lower Extremity    Skin intact, LLE splint in place- small piece of padding was placed underneath ortho glass at proximal splint laterally . No erythema or ecchymosis.  Dressing c/d/i  NTTP bilaterally   Sensation intact to saphenous, sural, tibial, superficial peroneal nerve, and deep peroneal  Motor intact to +FHL/EHL, +ankle dorsi/plantar flexion RLE  2+ DP pulse bilaterally  Digits warm and well perfused  Capillary refill < 2 seconds  No calf swelling or tenderness to palpation  Musculature soft and compressible    Assessment:    63 y.o.female Right non-displaced 2-4th metatarsal neck fractures, Left ankle nondisplaced bimalleolar fractures in splint .     Plan:  WBAT to RLE, NWB LLE   Patient has post-op shoe in room  Pain control  DVT ppx  per SLIM  Repeat left ankle X-ray shows nondisplaced bimalleolar fracture in stable splint without displacement compared to previous x-rays  Medical co-morbidities are being managed per primary team  Dispo: Ok for discharge from ortho perspective  Follow up with Dr. Mckeon in 2 weeks.    Miriam Garcia PA-C

## 2024-08-23 PROBLEM — R82.90 ABNORMAL URINALYSIS: Status: ACTIVE | Noted: 2024-08-23

## 2024-08-23 LAB
ANION GAP SERPL CALCULATED.3IONS-SCNC: 8 MMOL/L (ref 4–13)
BUN SERPL-MCNC: 29 MG/DL (ref 5–25)
CALCIUM SERPL-MCNC: 9.5 MG/DL (ref 8.4–10.2)
CHLORIDE SERPL-SCNC: 104 MMOL/L (ref 96–108)
CO2 SERPL-SCNC: 28 MMOL/L (ref 21–32)
CREAT SERPL-MCNC: 2.44 MG/DL (ref 0.6–1.3)
GFR SERPL CREATININE-BSD FRML MDRD: 20 ML/MIN/1.73SQ M
GLUCOSE SERPL-MCNC: 90 MG/DL (ref 65–140)
POTASSIUM SERPL-SCNC: 4.3 MMOL/L (ref 3.5–5.3)
SODIUM SERPL-SCNC: 140 MMOL/L (ref 135–147)

## 2024-08-23 PROCEDURE — 99239 HOSP IP/OBS DSCHRG MGMT >30: CPT | Performed by: PHYSICIAN ASSISTANT

## 2024-08-23 PROCEDURE — 80197 ASSAY OF TACROLIMUS: CPT | Performed by: PHYSICIAN ASSISTANT

## 2024-08-23 PROCEDURE — 87086 URINE CULTURE/COLONY COUNT: CPT | Performed by: PHYSICIAN ASSISTANT

## 2024-08-23 PROCEDURE — 80048 BASIC METABOLIC PNL TOTAL CA: CPT | Performed by: NURSE PRACTITIONER

## 2024-08-23 PROCEDURE — 99233 SBSQ HOSP IP/OBS HIGH 50: CPT | Performed by: INTERNAL MEDICINE

## 2024-08-23 RX ORDER — CEFTRIAXONE 1 G/50ML
1000 INJECTION, SOLUTION INTRAVENOUS EVERY 24 HOURS
Status: DISCONTINUED | OUTPATIENT
Start: 2024-08-23 | End: 2024-08-24 | Stop reason: HOSPADM

## 2024-08-23 RX ORDER — AMLODIPINE BESYLATE 5 MG/1
5 TABLET ORAL DAILY
Start: 2024-08-24

## 2024-08-23 RX ORDER — METHOCARBAMOL 500 MG/1
500 TABLET, FILM COATED ORAL EVERY 6 HOURS PRN
Qty: 30 TABLET | Refills: 0 | Status: SHIPPED | OUTPATIENT
Start: 2024-08-23

## 2024-08-23 RX ORDER — CEFTRIAXONE 1 G/50ML
1000 INJECTION, SOLUTION INTRAVENOUS EVERY 24 HOURS
Start: 2024-08-23 | End: 2024-08-28

## 2024-08-23 RX ORDER — SENNOSIDES 8.6 MG
17.2 TABLET ORAL
Start: 2024-08-23

## 2024-08-23 RX ORDER — BISACODYL 10 MG
10 SUPPOSITORY, RECTAL RECTAL DAILY PRN
Start: 2024-08-23

## 2024-08-23 RX ORDER — HYDROXYZINE HYDROCHLORIDE 50 MG/1
50 TABLET, FILM COATED ORAL EVERY 6 HOURS PRN
Qty: 30 TABLET | Refills: 0 | Status: SHIPPED | OUTPATIENT
Start: 2024-08-23

## 2024-08-23 RX ADMIN — OXYCODONE HYDROCHLORIDE 10 MG: 10 TABLET ORAL at 08:09

## 2024-08-23 RX ADMIN — ACETAMINOPHEN 650 MG: 325 TABLET, FILM COATED ORAL at 16:20

## 2024-08-23 RX ADMIN — DOCUSATE SODIUM 100 MG: 100 CAPSULE, LIQUID FILLED ORAL at 08:15

## 2024-08-23 RX ADMIN — AMLODIPINE BESYLATE 5 MG: 5 TABLET ORAL at 08:09

## 2024-08-23 RX ADMIN — OXYCODONE HYDROCHLORIDE 10 MG: 10 TABLET ORAL at 13:17

## 2024-08-23 RX ADMIN — PANTOPRAZOLE SODIUM 40 MG: 40 TABLET, DELAYED RELEASE ORAL at 08:09

## 2024-08-23 RX ADMIN — CEFTRIAXONE 1000 MG: 1 INJECTION, SOLUTION INTRAVENOUS at 13:17

## 2024-08-23 RX ADMIN — POLYETHYLENE GLYCOL 3350 17 G: 17 POWDER, FOR SOLUTION ORAL at 08:15

## 2024-08-23 RX ADMIN — LATANOPROST 1 DROP: 50 SOLUTION OPHTHALMIC at 22:22

## 2024-08-23 RX ADMIN — HEPARIN SODIUM 5000 UNITS: 5000 INJECTION INTRAVENOUS; SUBCUTANEOUS at 22:19

## 2024-08-23 RX ADMIN — METOPROLOL SUCCINATE 25 MG: 25 TABLET, EXTENDED RELEASE ORAL at 08:09

## 2024-08-23 RX ADMIN — Medication 100 MG: at 08:09

## 2024-08-23 RX ADMIN — AZATHIOPRINE 100 MG: 50 TABLET ORAL at 22:21

## 2024-08-23 RX ADMIN — ONDANSETRON 4 MG: 2 INJECTION, SOLUTION INTRAMUSCULAR; INTRAVENOUS at 08:09

## 2024-08-23 RX ADMIN — TACROLIMUS 3 MG: 1 CAPSULE ORAL at 08:09

## 2024-08-23 RX ADMIN — HEPARIN SODIUM 5000 UNITS: 5000 INJECTION INTRAVENOUS; SUBCUTANEOUS at 06:13

## 2024-08-23 RX ADMIN — LETERMOVIR 480 MG: 480 TABLET, FILM COATED ORAL at 08:20

## 2024-08-23 RX ADMIN — HYDROMORPHONE HYDROCHLORIDE 0.2 MG: 0.2 INJECTION, SOLUTION INTRAMUSCULAR; INTRAVENOUS; SUBCUTANEOUS at 11:25

## 2024-08-23 RX ADMIN — HYDROMORPHONE HYDROCHLORIDE 0.2 MG: 0.2 INJECTION, SOLUTION INTRAMUSCULAR; INTRAVENOUS; SUBCUTANEOUS at 22:17

## 2024-08-23 RX ADMIN — TACROLIMUS 3 MG: 1 CAPSULE ORAL at 22:20

## 2024-08-23 RX ADMIN — HEPARIN SODIUM 5000 UNITS: 5000 INJECTION INTRAVENOUS; SUBCUTANEOUS at 13:17

## 2024-08-23 NOTE — ASSESSMENT & PLAN NOTE
UA positive for leukocytosis, bacteria, nitrite now  Initiated Rocephin, continue and adjust pending culture data  Urine cultures: pending  Does not meet SIRS criteria  Likely contributing to ALEJANDRO   Nephrology following

## 2024-08-23 NOTE — PLAN OF CARE
Problem: Potential for Falls  Goal: Patient will remain free of falls  Description: INTERVENTIONS:  - Educate patient/family on patient safety including physical limitations  - Instruct patient to call for assistance with activity   - Consult OT/PT to assist with strengthening/mobility   - Keep Call bell within reach  - Keep bed low and locked with side rails adjusted as appropriate  - Keep care items and personal belongings within reach  - Initiate and maintain comfort rounds  - Make Fall Risk Sign visible to staff  - Offer Toileting every 2 Hours, in advance of need  - Initiate/Maintain bed alarm  - Obtain necessary fall risk management equipment  - Apply yellow socks and bracelet for high fall risk patients  - Consider moving patient to room near nurses station  Outcome: Progressing     Problem: PAIN - ADULT  Goal: Verbalizes/displays adequate comfort level or baseline comfort level  Description: Interventions:  - Encourage patient to monitor pain and request assistance  - Assess pain using appropriate pain scale  - Administer analgesics based on type and severity of pain and evaluate response  - Implement non-pharmacological measures as appropriate and evaluate response  - Consider cultural and social influences on pain and pain management  - Notify physician/advanced practitioner if interventions unsuccessful or patient reports new pain  Outcome: Progressing     Problem: SAFETY ADULT  Goal: Patient will remain free of falls  Description: INTERVENTIONS:  - Educate patient/family on patient safety including physical limitations  - Instruct patient to call for assistance with activity   - Consult OT/PT to assist with strengthening/mobility   - Keep Call bell within reach  - Keep bed low and locked with side rails adjusted as appropriate  - Keep care items and personal belongings within reach  - Initiate and maintain comfort rounds  - Make Fall Risk Sign visible to staff  - Offer Toileting every 2 Hours, in  advance of need  - Initiate/Maintain bed alarm  - Obtain necessary fall risk management equipment  - Apply yellow socks and bracelet for high fall risk patients  - Consider moving patient to room near nurses station  Outcome: Progressing

## 2024-08-23 NOTE — PLAN OF CARE
Problem: PAIN - ADULT  Goal: Verbalizes/displays adequate comfort level or baseline comfort level  Description: Interventions:  - Encourage patient to monitor pain and request assistance  - Assess pain using appropriate pain scale  - Administer analgesics based on type and severity of pain and evaluate response  - Implement non-pharmacological measures as appropriate and evaluate response  - Consider cultural and social influences on pain and pain management  - Notify physician/advanced practitioner if interventions unsuccessful or patient reports new pain  Outcome: Progressing     Problem: INFECTION - ADULT  Goal: Absence or prevention of progression during hospitalization  Description: INTERVENTIONS:  - Assess and monitor for signs and symptoms of infection  - Monitor lab/diagnostic results  - Monitor all insertion sites, i.e. indwelling lines, tubes, and drains  - Monitor endotracheal if appropriate and nasal secretions for changes in amount and color  - [place] appropriate cooling/warming therapies per order  - Administer medications as ordered  - Instruct and encourage patient and family to use good hand hygiene technique  - Identify and instruct in appropriate isolation precautions for identified infection/condition  Outcome: Progressing  Goal: Absence of fever/infection during neutropenic period  Description: INTERVENTIONS:  - Monitor WBC    Outcome: Progressing     Problem: SAFETY ADULT  Goal: Patient will remain free of falls  Description: INTERVENTIONS:  - Educate patient/family on patient safety including physical limitations  - Instruct patient to call for assistance with activity   - Consult OT/PT to assist with strengthening/mobility   - Keep Call bell within reach  - Keep bed low and locked with side rails adjusted as appropriate  - Keep care items and personal belongings within reach  - Initiate and maintain comfort rounds  - Make Fall Risk Sign visible to staff  - Offer Toileting every 2 Hours,  in advance of need  - Initiate/Maintain bed alarm  - Obtain necessary fall risk management equipment  - Apply yellow socks and bracelet for high fall risk patients  - Consider moving patient to room near nurses station  Outcome: Progressing  Goal: Maintain or return to baseline ADL function  Description: INTERVENTIONS:  - Educate patient/family on patient safety including physical limitations  - Instruct patient to call for assistance with activity   - Consult OT/PT to assist with strengthening/mobility   - Keep Call bell within reach  - Keep bed low and locked with side rails adjusted as appropriate  - Keep care items and personal belongings within reach  - Initiate and maintain comfort rounds  - Make Fall Risk Sign visible to staff  - Offer Toileting every 2 Hours, in advance of need  - Initiate/Maintain bed alarm  - Obtain necessary fall risk management equipment  - Apply yellow socks and bracelet for high fall risk patients  - Consider moving patient to room near nurses station  Outcome: Progressing  Goal: Maintains/Returns to pre admission functional level  Description: INTERVENTIONS:  - Educate patient/family on patient safety including physical limitations  - Instruct patient to call for assistance with activity   - Consult OT/PT to assist with strengthening/mobility   - Keep Call bell within reach  - Keep bed low and locked with side rails adjusted as appropriate  - Keep care items and personal belongings within reach  - Initiate and maintain comfort rounds  - Make Fall Risk Sign visible to staff  - Offer Toileting every 2 Hours, in advance of need  - Initiate/Maintain bed alarm  - Obtain necessary fall risk management equipment  - Apply yellow socks and bracelet for high fall risk patients  - Consider moving patient to room near nurses station  Outcome: Progressing

## 2024-08-23 NOTE — DISCHARGE SUMMARY
[facility]  Discharge- Linda Becerra 1960, 63 y.o. female MRN: [id number]  Unit/Bed#: [facility] Encounter: [id number]  Primary Care Provider: Davide Sorto MD   Date and time admitted to hospital: 8/17/2024  8:42 AM    * Closed left ankle fracture  Assessment & Plan  Presents with fall from home  ECG NSR, HR 70s  XR left foot - nondisplaced bimalleolar fracture  XR right foot pending  CT RLE - Second through fourth metatarsal neck fractures.   ED spoke with ortho Dr. Edson Burk - plan to splint, no immediate intervention at this time  PT/OT evaluation  Continue pain control  Noted concern for lethargy/hypoxia earlier in admit after IV dilaudid 2 mg  Decrease to 0.2 mg IV for breakthrough  Concern for encephalopathy related to ALEJANDRO/gabapentin, holding for now - mentation improved    Acute kidney injury superimposed on chronic kidney disease  (HCC)  Assessment & Plan  Lab Results   Component Value Date    EGFR 27 08/22/2024    EGFR 32 08/21/2024    EGFR 30 08/20/2024    CREATININE 1.94 (H) 08/22/2024    CREATININE 1.70 (H) 08/21/2024    CREATININE 1.76 (H) 08/20/2024     Baseline 0.7 - 0.9  Per daughter, Donnie team has baseline closer to 1.5  Cr 0.98 on 11/28/23, presents with Cr of 2.75  Creatinine appears to have plateaued at 1.70  US kidney and bladder unremarkable  S/P IVF - resumed given rise to 1.94  Avoid nephrotoxic agents  Nephrology following    Abnormal urinalysis  Assessment & Plan  UA positive for leukocytosis, bacteria, nitrite now  Initiated Rocephin, continue and adjust pending culture data  Urine cultures: pending  Does not meet SIRS criteria  Likely contributing to ALEJANDRO   Nephrology following      Constipation  Assessment & Plan  Reporting constipation, abdominal distension  No vomiting.    Abdominal exam unremarkable  Continue bowel regimen    Anxiety  Assessment & Plan  Patient with significant anxiety  Improved on atarax, increased to 50 mg PRN    Metabolic  encephalopathy  Assessment & Plan  Patient's family at bedside with concern for brain fogginess, intermittent tremors.  Appears these may have been present prior to admission  Exam non-focal.  Adamantly denies hitting her head when falling but at times appears to be a poor historian  CT head negative  Ammonia level WNL  Discontinue gabapentin at this time given ALEJANDRO and concern for confusion/tremors - consider resuming at reduced dose once renal function can tolerate  Mentation improving, tremors resolved    Closed fracture of metatarsal neck, right, initial encounter  Assessment & Plan  CT RLE shows 2nd through 4th metatarsal neck fractures  Continue pain control  Orthopedic surgery - non surgical management recommended  PT/OT - recommending level 2 resources on discharge    Macrocytic anemia  Assessment & Plan  Hgb 10.8, MCV 99  Continue to monitor    Hypertension  Assessment & Plan  Initially hypertensive upon arrival, possibly due to underlying pain  BP improved  Continue home medications: Toprol-XL 25mg q24hr  Amlodipine added 8/18 -transitioning to Digoxin per Cardiology recommendations 8/22    Liver transplant recipient (HCC)  Assessment & Plan  Received liver transplant in May 2002 at [facility]   Secondary to alcoholic cirrhosis  Follows with hepatology   Managed on azathioprine and tacrolimus  Also on prevymis - brought in by family  States she was weaned off prednisone recently      Medical Problems       Resolved Problems  Date Reviewed: 8/23/2024            Resolved    Weakness of left lower extremity 8/21/2024     Resolved by  Millicent Garcia PA-C    ALEJANDRO (acute kidney injury) (HCC) 8/22/2024     Resolved by  Eri Andrews PA-C        Discharging Physician / Practitioner: Eri Andrews PA-C  PCP: Davide Sorto MD  Admission Date:   Admission Orders (From admission, onward)       Ordered        08/17/24 1329  INPATIENT ADMISSION  Once                          Discharge Date: 08/23/24    Consultations During  Hospital Stay:  Nephrology  Orthopedics    Procedures Performed:   Splinting of ankle    Significant Findings / Test Results:   X-ray right foot 8/17: Acute nondisplaced bimalleolar fractures  X-ray left ankle 8/17: Acute second through fourth metatarsal neck fractures. Findings are new since prior radiograph from 2/22/2023   CT lower extremity 8/17: Second through fourth metatarsal neck fractures   CXR 8/17: No acute cardiopulmonary disease  X-ray left knee 8/18: No acute osseous abnormality.   US kidney and bladder 8/18: Unremarkable sonographic appearance of the kidneys and bladder.   CT head 8/19: No acute intracranial hemorrhage seen. No mass effect or midline shift seen  Xray L ankle 8/21: Slightly displaced fracture in the distal fibula unchanged.     Incidental Findings:   None     Test Results Pending at Discharge (will require follow up):   Urine Culture     Outpatient Tests Requested:  None    Complications:  ALEJANDRO unresolved/recurrent    Reason for Admission: Closed left ankle fracture    Hospital Course:   Linda Becerra is a 63 y.o. female patient with a past medical history of liver transplant secondary to alcoholic cirrhosis, peripheral neuropathy, hypertension who originally presented to the hospital on 8/17/2024 due to fall with pain in left lower extremity.   Patient was seen by orthopedics, area was splinted as nonoperative management was determined to be most appropriate.  Renal function had been monitored by hepatology.  Unfortunately she did present with ALEJANDRO, creatinine 2.75 on admission.  Nephrology was consulted here and initially there was some mild improvement with fluids to 1.7 but her creatinine again worsened to 2.44.  Given her history of hepatorenal syndrome and difficulty with liver rejection, decision was made to transfer patient to [facility] to be managed under her transplant hepatologist service.  Dynamically stable at time of discharge and appropriate for outpatient  "follow-up.    Hospital Course: No notes on file    Please see above list of diagnoses and related plan for additional information.     Condition at Discharge: stable    Discharge Day Visit / Exam:   Subjective: Anxious about losing her liver.  Very much wants to be transferred to [facility].  Vitals: Blood Pressure: 130/66 (08/23/24 0722)  Pulse: 83 (08/23/24 0722)  Temperature: 98.1 °F (36.7 °C) (08/23/24 0722)  Temp Source: Temporal (08/23/24 0722)  Respirations: 20 (08/22/24 0726)  Height: 5' 5\" (165.1 cm) (08/17/24 0846)  Weight - Scale: 80.8 kg (178 lb 2.1 oz) (08/23/24 0543)  SpO2: 100 % (08/23/24 0722)  Exam:   Physical Exam  Vitals and nursing note reviewed.   Constitutional:       General: She is not in acute distress.     Appearance: Normal appearance. She is well-developed.   HENT:      Head: Normocephalic and atraumatic.   Eyes:      General: No scleral icterus.     Conjunctiva/sclera: Conjunctivae normal.   Cardiovascular:      Rate and Rhythm: Normal rate and regular rhythm.      Heart sounds: No murmur heard.  Pulmonary:      Effort: Pulmonary effort is normal.      Breath sounds: No wheezing, rhonchi or rales.   Abdominal:      General: There is no distension.      Palpations: Abdomen is soft.   Musculoskeletal:      Comments: LE spline clean, dry, intact   Skin:     General: Skin is warm and dry.   Neurological:      Mental Status: She is alert. Mental status is at baseline.   Psychiatric:         Mood and Affect: Mood normal.        Discussion with Family: Updated  (daughter) at bedside.    Discharge instructions/Information to patient and family:   See after visit summary for information provided to patient and family.      Provisions for Follow-Up Care:  See after visit summary for information related to follow-up care and any pertinent home health orders.      Mobility at time of Discharge:   Basic Mobility Inpatient Raw Score: 10  -HLM Goal: 4: Move to " chair/commode  JH-HLM Achieved: 4: Move to chair/commode  HLM Goal achieved. Continue to encourage appropriate mobility.     Disposition:   Acute Care Hospital Transfer to [facility]    Planned Readmission: [facility]     Discharge Statement:  I spent 65 minutes discharging the patient. This time was spent on the day of discharge. I had direct contact with the patient on the day of discharge. Greater than 50% of the total time was spent examining patient, answering all patient questions, arranging and discussing plan of care with patient as well as directly providing post-discharge instructions.  Additional time then spent on discharge activities.    Discharge Medications:  See after visit summary for reconciled discharge medications provided to patient and/or family.      **Please Note: This note may have been constructed using a voice recognition system**

## 2024-08-23 NOTE — PROGRESS NOTES
NEPHROLOGY HOSPITAL PROGRESS NOTE   Linda Becerra 63 y.o. female MRN: [id number]  Unit/Bed#: -01 Encounter: [id number]  Reason for Consult: Kidney injury:    ASSESSMENT and PLAN:  63-year-old female with history of liver transplant in 2022 due to alcohol cirrhosis, hypertension who presented with mechanical fall.  Nephrology consulted for management of acute kidney injury.    Acute kidney injury (POA):  Creatinine 2.75 on admission.  Following IV fluids creatinine improved and reached a humble of 1.7.  The last 2 days increasing creatinine currently up to 2.44.  Workup suggested prerenal therefore IV fluids were resumed on 8/22  Despite IV fluids creatinine increased today to 2.44  Urine lites: Fractional excretion of sodium 1.03%.  Indeterminate result between 1 to 2% could be either prerenal or ATN.  Appears to be drinking sufficiently  PVR 59 mL no post renal evidence  Baseline creatinine 0.7 to 0.9 mg/dL  Renal ultrasound: Unrevealing.  Normal ultrasound  Urinalysis: Trace protein, no RBCs, 4-10 WBCs, innumerable epithelial cells.  Negative leuks.  Initial tacrolimus level was elevated but was drawn and accurately.  Follow-up level was acceptable  Etiology: Likely prerenal that may have resulted in ATN.  Secondary rise in creatinine likely related to prerenal--again there remains concern for ATN  Current status: Creatinine increasing.  May be a lag for recovery.  Appears to have received sufficient IV fluids.  Drinking a good amount of fluids  Plan:   At this time nursing tells me that there is a plan for transfer to her transplant center for further evaluation and treatment  Recommend gentle IV fluids for maintenance    Blood pressure:  History of essential hypertension  Blood pressure acceptable  On metoprolol, Norvasc 5 mg daily with appropriate hold parameters    Liver transplant 2022:  Follows at [facility]  Immunosuppression on tacrolimus 3 mg every 12 hours  Tacrolimus level levels obtained  inaccurately on 8/17 and 8/19.  8/20 tacrolimus level appropriately obtained prior to a.m. dose.  Level 7.7  GI following    Cytomegalovirus: Following with GI    Mechanical fall:  Sustained left ankle fracture    PLAN SUMMARY:  IV fluids.  Supportive care.  Avoid hypotension, nephrotoxic agents  Plan for patient to be  transferred to transplant center    SUBJECTIVE / 24H INTERVAL HISTORY:  Patient expressing desire to be transferred to her transplant center.    OBJECTIVE:  Current Weight: Weight - Scale: 80.8 kg (178 lb 2.1 oz)  Vitals:    08/22/24 2057 08/22/24 2226 08/23/24 0543 08/23/24 0722   BP: 103/57   130/66   BP Location:    Right arm   Pulse: 91 85  83   Resp:       Temp: 98.2 °F (36.8 °C)   98.1 °F (36.7 °C)   TempSrc:    Temporal   SpO2: 93% 93%  100%   Weight:   80.8 kg (178 lb 2.1 oz)    Height:           Intake/Output Summary (Last 24 hours) at 8/23/2024 1041  Last data filed at 8/23/2024 0600  Gross per 24 hour   Intake 1271.17 ml   Output 300 ml   Net 971.17 ml   General: NAD  Skin: no rash, warm and dry  Eyes: anicteric sclera  ENT: moist mucous membrane  Neck: supple  Chest: Anterior chest clear, equal breath sounds, normal effort  CVS: s1s2, no murmur, no gallop, no rub.  Normal rate regular rhythm  Abdomen: soft, nontender, nl sounds  Extremities: +1 pitting edema LE b/l  : no lowry.  Pure wick system  Neuro: AAOX3  Psych: normal affect     Medications:    Current Facility-Administered Medications:     acetaminophen (TYLENOL) tablet 650 mg, 650 mg, Oral, Q6H PRN, Cathie Tenorio, DO, 650 mg at 08/22/24 2109    amLODIPine (NORVASC) tablet 5 mg, 5 mg, Oral, Daily, TRAV Mena, 5 mg at 08/23/24 0809    Artificial Tears Op Soln 2 drop, 2 drop, Both Eyes, Q4H PRN, Yaneth Duval PA-C    azaTHIOprine (IMURAN) tablet 100 mg, 100 mg, Oral, HS, Cathie Sagean, DO, 100 mg at 08/22/24 2109    bisacodyl (DULCOLAX) rectal suppository 10 mg, 10 mg, Rectal, Daily PRN, Millicent Garcia PA-C, 10 mg  at 08/21/24 0913    docusate sodium (COLACE) capsule 100 mg, 100 mg, Oral, BID, Aileen Galvez PA-C, 100 mg at 08/23/24 0815    doxylamine (UNISOM) tablet 12.5 mg, 12.5 mg, Oral, HS PRN, Cathie Tenorio DO    heparin (porcine) subcutaneous injection 5,000 Units, 5,000 Units, Subcutaneous, Q8H CITLALI, 5,000 Units at 08/23/24 0613 **AND** [CANCELED] Platelet count, , , Once, Cathie Tenorio DO    HYDROmorphone HCl (DILAUDID) injection 0.2 mg, 0.2 mg, Intravenous, Q3H PRN, Millicent Garcia PA-C, 0.2 mg at 08/21/24 2316    hydrOXYzine HCL (ATARAX) tablet 50 mg, 50 mg, Oral, Q6H PRN, Millicent Garcia PA-C, 50 mg at 08/22/24 0806    latanoprost (XALATAN) 0.005 % ophthalmic solution 1 drop, 1 drop, Both Eyes, HS, Cathie Tenorio, , 1 drop at 08/22/24 2115    Letermovir TABS 480 mg, 480 mg, Oral, Daily, Millicent Garcia PA-C, 480 mg at 08/23/24 0820    methocarbamol (ROBAXIN) tablet 500 mg, 500 mg, Oral, Q6H PRN, Cathie Tenorio DO, 500 mg at 08/22/24 0618    metoprolol succinate (TOPROL-XL) 24 hr tablet 25 mg, 25 mg, Oral, Daily, Cathie Tenorio DO, 25 mg at 08/23/24 0809    ondansetron (ZOFRAN) injection 4 mg, 4 mg, Intravenous, Q6H PRN, Millicent Garcia PA-C, 4 mg at 08/23/24 0809    oxyCODONE (ROXICODONE) immediate release tablet 10 mg, 10 mg, Oral, Q4H PRN, Cathie Tenorio DO, 10 mg at 08/23/24 0809    oxyCODONE (ROXICODONE) IR tablet 5 mg, 5 mg, Oral, Q4H PRN, Cathie Tenorio, DO, 5 mg at 08/18/24 1910    pantoprazole (PROTONIX) EC tablet 40 mg, 40 mg, Oral, Daily, Cathie Tenorio, DO, 40 mg at 08/23/24 0809    polyethylene glycol (MIRALAX) packet 17 g, 17 g, Oral, BID, Millicent Garcia PA-C, 17 g at 08/23/24 0815    senna (SENOKOT) tablet 17.2 mg, 2 tablet, Oral, HS, Aileen Galvez PA-C, 17.2 mg at 08/22/24 2109    tacrolimus (PROGRAF) capsule 3 mg, 3 mg, Oral, Q12H CITLALI, TRAV Mena, 3 mg at 08/23/24 0809    thiamine tablet 100 mg, 100 mg, Oral, Daily, Cathie Tenorio DO, 100 mg at 08/23/24 0809    Laboratory Results:  Results  from last 7 days   Lab Units 08/23/24  0543 08/22/24  0502 08/21/24  0130 08/20/24  0517 08/19/24  0258 08/18/24  0435 08/17/24  1146   WBC Thousand/uL  --   --  2.73* 2.83* 3.74* 5.24 3.06*   HEMOGLOBIN g/dL  --   --  9.4* 9.9* 9.9* 10.9* 10.8*   HEMATOCRIT %  --   --  27.9* 30.1* 30.1* 32.7* 33.4*   PLATELETS Thousands/uL  --   --  154 150 154 175 196   POTASSIUM mmol/L 4.3 4.4 4.4 4.6 4.6 5.0 4.9   CHLORIDE mmol/L 104 104 102 106 106 105 103   CO2 mmol/L 28 28 29 26 26 26 28   BUN mg/dL 29* 24 18 19 25 27* 28*   CREATININE mg/dL 2.44* 1.94* 1.70* 1.76* 2.41* 2.62* 2.75*   CALCIUM mg/dL 9.5 9.5 9.9 9.7 9.4 9.6 9.7

## 2024-08-23 NOTE — CASE MANAGEMENT
Case Management Discharge Planning Note    Patient name Linda Becerra  Location /-01 MRN [id number]  : 1960 Date 2024       Current Admission Date: 2024  Current Admission Diagnosis:Closed left ankle fracture   Patient Active Problem List    Diagnosis Date Noted Date Diagnosed    Anxiety 2024     Constipation 2024     Metabolic encephalopathy 2024     Closed left ankle fracture 2024     Macrocytic anemia 2024     Closed fracture of metatarsal neck, right, initial encounter 2024     Liver transplant recipient ([facility])      Hypertension      Asthma      Subcutaneous mass 05/10/2024     Functional diarrhea 2023     Liver transplant status ([facility]) 2023     Other cytomegaloviral diseases ([facility]) 2023     Atrophy of muscle of left lower leg 2023     Closed head injury 08/15/2023     Dizziness 08/15/2023     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     GCA (giant cell arteritis) ([facility]) 2023     Acute pain of right foot 2023     Peripheral neuropathy 2022     Acute kidney injury superimposed on chronic kidney disease  ([facility]) 2022     Decompensated cirrhosis 2022     Acute blood loss anemia 2022     Hyponatremia 2022     Hyperbilirubinemia 2022     Pleural effusion 2022     Abnormal LFTs 2021     Personal history of colonic polyps 2021     Right lower quadrant abdominal pain 06/15/2021     Chronic pain syndrome 2020     Contusion of left foot 2020     Chronic bilateral low back pain without sciatica 2020     Lumbar spondylosis 2020     DDD (degenerative disc disease), lumbar 2020     Lumbar radiculopathy 2020     Left wrist tendonitis 2019     Nondisplaced fracture of distal end of left radius 2019     Closed compression fracture of L2 vertebra (HCC) 2019       LOS (days): 6  Geometric Mean LOS (GMLOS)  (days): 4.3  Days to GMLOS:-1.5     OBJECTIVE:  Risk of Unplanned Readmission Score: 19.32         Current admission status: Inpatient   Preferred Pharmacy:   [id number] [place] - [address] [facility]  [address] [facility] [address]  Phone: [phone] Fax: [phone]    [id number]  [place] - [address]  [address]  [facility] [address]  Phone: [phone] Fax: [phone]    [place] - [address]  [address]  [address]  [facility] [address]  Phone: [phone] Fax: [phone]    Primary Care Provider: Davide Sorto MD    Primary Insurance: [employer]  Secondary Insurance:     DISCHARGE DETAILS:     CM received auth from [employer] and updated provider via secure chat. Pt is not medically stable today due to creatinine worsening. CM updated facility via [employer]. Auth is good through 8/26.

## 2024-08-24 VITALS
BODY MASS INDEX: 29.2 KG/M2 | HEIGHT: 65 IN | WEIGHT: 175.27 LBS | DIASTOLIC BLOOD PRESSURE: 52 MMHG | TEMPERATURE: 97.7 F | SYSTOLIC BLOOD PRESSURE: 106 MMHG | HEART RATE: 85 BPM | RESPIRATION RATE: 18 BRPM | OXYGEN SATURATION: 91 %

## 2024-08-24 LAB
BACTERIA UR CULT: NORMAL
TACROLIMUS BLD-MCNC: 15.4 NG/ML (ref 3–15)

## 2024-08-24 RX ADMIN — OXYCODONE HYDROCHLORIDE 10 MG: 10 TABLET ORAL at 06:58

## 2024-08-24 RX ADMIN — OXYCODONE HYDROCHLORIDE 10 MG: 10 TABLET ORAL at 00:14

## 2024-08-24 RX ADMIN — ONDANSETRON 4 MG: 2 INJECTION, SOLUTION INTRAMUSCULAR; INTRAVENOUS at 00:17

## 2024-08-24 RX ADMIN — HEPARIN SODIUM 5000 UNITS: 5000 INJECTION INTRAVENOUS; SUBCUTANEOUS at 06:23

## 2024-08-24 NOTE — PLAN OF CARE
Problem: Potential for Falls  Goal: Patient will remain free of falls  Description: INTERVENTIONS:  - Educate patient/family on patient safety including physical limitations  - Instruct patient to call for assistance with activity   - Consult OT/PT to assist with strengthening/mobility   - Keep Call bell within reach  - Keep bed low and locked with side rails adjusted as appropriate  - Keep care items and personal belongings within reach  - Initiate and maintain comfort rounds  - Make Fall Risk Sign visible to staff  - Offer Toileting every 2 Hours, in advance of need  - Initiate/Maintain bed alarm  - Obtain necessary fall risk management equipment  - Apply yellow socks and bracelet for high fall risk patients  - Consider moving patient to room near nurses station  Outcome: Progressing     Problem: PAIN - ADULT  Goal: Verbalizes/displays adequate comfort level or baseline comfort level  Description: Interventions:  - Encourage patient to monitor pain and request assistance  - Assess pain using appropriate pain scale  - Administer analgesics based on type and severity of pain and evaluate response  - Implement non-pharmacological measures as appropriate and evaluate response  - Consider cultural and social influences on pain and pain management  - Notify physician/advanced practitioner if interventions unsuccessful or patient reports new pain  Outcome: Progressing     Problem: INFECTION - ADULT  Goal: Absence or prevention of progression during hospitalization  Description: INTERVENTIONS:  - Assess and monitor for signs and symptoms of infection  - Monitor lab/diagnostic results  - Monitor all insertion sites, i.e. indwelling lines, tubes, and drains  - Monitor endotracheal if appropriate and nasal secretions for changes in amount and color  - [place] appropriate cooling/warming therapies per order  - Administer medications as ordered  - Instruct and encourage patient and family to use good hand hygiene  technique  - Identify and instruct in appropriate isolation precautions for identified infection/condition  Outcome: Progressing  Goal: Absence of fever/infection during neutropenic period  Description: INTERVENTIONS:  - Monitor WBC    Outcome: Progressing     Problem: SAFETY ADULT  Goal: Patient will remain free of falls  Description: INTERVENTIONS:  - Educate patient/family on patient safety including physical limitations  - Instruct patient to call for assistance with activity   - Consult OT/PT to assist with strengthening/mobility   - Keep Call bell within reach  - Keep bed low and locked with side rails adjusted as appropriate  - Keep care items and personal belongings within reach  - Initiate and maintain comfort rounds  - Make Fall Risk Sign visible to staff  - Offer Toileting every 2 Hours, in advance of need  - Initiate/Maintain bed alarm  - Obtain necessary fall risk management equipment  - Apply yellow socks and bracelet for high fall risk patients  - Consider moving patient to room near nurses station  Outcome: Progressing  Goal: Maintain or return to baseline ADL function  Description: INTERVENTIONS:  - Educate patient/family on patient safety including physical limitations  - Instruct patient to call for assistance with activity   - Consult OT/PT to assist with strengthening/mobility   - Keep Call bell within reach  - Keep bed low and locked with side rails adjusted as appropriate  - Keep care items and personal belongings within reach  - Initiate and maintain comfort rounds  - Make Fall Risk Sign visible to staff  - Offer Toileting every 2 Hours, in advance of need  - Initiate/Maintain bed alarm  - Obtain necessary fall risk management equipment  - Apply yellow socks and bracelet for high fall risk patients  - Consider moving patient to room near nurses station  Outcome: Progressing  Goal: Maintains/Returns to pre admission functional level  Description: INTERVENTIONS:  - Perform AM-PAC 6 Click Basic  Mobility/ Daily Activity assessment daily.  - Set and communicate daily mobility goal to care team and patient/family/caregiver.   - Collaborate with rehabilitation services on mobility goals if consulted  - Perform Range of Motion 2 times a day.  - Reposition patient every 2 hours.  - Dangle patient 2 times a day  - Stand patient 2 times a day  - Ambulate patient 2 times a day  - Out of bed to chair 2 times a day   - Out of bed for meals 2 times a day  - Out of bed for toileting  - Record patient progress and toleration of activity level   Outcome: Progressing     Problem: DISCHARGE PLANNING  Goal: Discharge to home or other facility with appropriate resources  Description: INTERVENTIONS:  - Identify barriers to discharge w/patient and caregiver  - Arrange for needed discharge resources and transportation as appropriate  - Identify discharge learning needs (meds, wound care, etc.)  - Arrange for interpretive services to assist at discharge as needed  - Refer to Case Management Department for coordinating discharge planning if the patient needs post-hospital services based on physician/advanced practitioner order or complex needs related to functional status, cognitive ability, or social support system  Outcome: Progressing     Problem: Knowledge Deficit  Goal: Patient/family/caregiver demonstrates understanding of disease process, treatment plan, medications, and discharge instructions  Description: Complete learning assessment and assess knowledge base.  Interventions:  - Provide teaching at level of understanding  - Provide teaching via preferred learning methods  Outcome: Progressing     Problem: Prexisting or High Potential for Compromised Skin Integrity  Goal: Skin integrity is maintained or improved  Description: INTERVENTIONS:  - Identify patients at risk for skin breakdown  - Assess and monitor skin integrity  - Assess and monitor nutrition and hydration status  - Monitor labs   - Assess for incontinence    - Turn and reposition patient  - Assist with mobility/ambulation  - Relieve pressure over bony prominences  - Avoid friction and shearing  - Provide appropriate hygiene as needed including keeping skin clean and dry  - Evaluate need for skin moisturizer/barrier cream  - Collaborate with interdisciplinary team   - Patient/family teaching  - Consider wound care consult   Outcome: Progressing     Problem: Nutrition/Hydration-ADULT  Goal: Nutrient/Hydration intake appropriate for improving, restoring or maintaining nutritional needs  Description: Monitor and assess patient's nutrition/hydration status for malnutrition. Collaborate with interdisciplinary team and initiate plan and interventions as ordered.  Monitor patient's weight and dietary intake as ordered or per policy. Utilize nutrition screening tool and intervene as necessary. Determine patient's food preferences and provide high-protein, high-caloric foods as appropriate.     INTERVENTIONS:  - Monitor oral intake, urinary output, labs, and treatment plans  - Assess nutrition and hydration status and recommend course of action  - Evaluate amount of meals eaten  - Assist patient with eating if necessary   - Allow adequate time for meals  - Recommend/ encourage appropriate diets, oral nutritional supplements, and vitamin/mineral supplements  - Order, calculate, and assess calorie counts as needed  - Recommend, monitor, and adjust tube feedings and TPN/PPN based on assessed needs  - Assess need for intravenous fluids  - Provide specific nutrition/hydration education as appropriate  - Include patient/family/caregiver in decisions related to nutrition  Outcome: Progressing

## 2024-09-09 ENCOUNTER — TELEPHONE (OUTPATIENT)
Age: 64
End: 2024-09-09

## 2024-09-09 NOTE — TELEPHONE ENCOUNTER
Caller: tapan Mukherjee    Doctor: Tony    Reason for call: pt calling to r/s her procedure appts    Call back#: [phone]

## 2024-09-12 ENCOUNTER — TELEPHONE (OUTPATIENT)
Dept: LAB | Facility: HOSPITAL | Age: 64
End: 2024-09-12

## 2024-09-12 ENCOUNTER — TRANSCRIBE ORDERS (OUTPATIENT)
Dept: LAB | Facility: HOSPITAL | Age: 64
End: 2024-09-12

## 2024-09-12 DIAGNOSIS — Z94.4 LIVER REPLACED BY TRANSPLANT (HCC): Primary | ICD-10-CM

## 2024-09-12 NOTE — TELEPHONE ENCOUNTER
9/12 - called pt - number that was provided is in the incorrect number for her Dr - I am reaching out to the Dr to see when the bloodwork needs to be drawn and to have a script faxed over - pt provided me with new number to call - will call pt back to schedule when script is faxed over

## 2024-09-14 LAB — HBA1C MFR BLD HPLC: 5.2 %

## 2024-09-19 DIAGNOSIS — M51.36 DDD (DEGENERATIVE DISC DISEASE), LUMBAR: ICD-10-CM

## 2024-09-19 DIAGNOSIS — M54.16 LUMBAR RADICULOPATHY: ICD-10-CM

## 2024-09-19 DIAGNOSIS — G61.89 OTHER INFLAMMATORY POLYNEUROPATHIES (HCC): ICD-10-CM

## 2024-09-19 DIAGNOSIS — G89.4 CHRONIC PAIN SYNDROME: ICD-10-CM

## 2024-09-19 DIAGNOSIS — M47.816 LUMBAR SPONDYLOSIS: ICD-10-CM

## 2024-09-19 NOTE — TELEPHONE ENCOUNTER
S/w pt who was hospitalized on 8/17 at [facility] after a fall d/t kidney failure. Pt fx left ankle and toes on the right foot. Pt tsf to [facility] and tx with oxycodone for pain. Pt tsf to rehab and was treated with Tramadol (not ER) 200 mg at hs and Oxycodone.   Pt d/c from [facility] on 9/6/24 with a blister pack of Tramadol 200 mg #20.  Pt has take 2 tabs since discharge and Oxycodone x 7 days prescribed by ortho after [facility] . Pt has taken 6 and has 2 Oxycodone left.     Pt is still in a lot of pain but would like to get back on schedule and is requesting Tramadol ER script be sent to pharmacy.   Tramadol ER LP at [facility] 5/14/24. Last p/u at pharmacy on 7/30/24.   Please advise

## 2024-09-19 NOTE — TELEPHONE ENCOUNTER
08/28/2024 08/28/2024 traMADol HCL (Tablet) 40.0 10 100 MG 80.0 RAMA LICONA [facility] Private Pay 0 / 0 [place]   07/30/2024 05/14/2024 traMADol HCL (Tablet, Extended Release) 30.0 30 200 MG 40.0 [employer]   06/27/2024 05/14/2024 traMADol HCL (Tablet, Extended Release) 30.0 30 200 MG 40.0 [facility]

## 2024-09-19 NOTE — TELEPHONE ENCOUNTER
Reason for call:   [x] Refill   [] Prior Auth  [] Other:     Office:   [] PCP/Provider -   [x] Specialty/Provider - spine and pain     Medication: traMADol (ULTRAM-ER) 200 MG 24 hr tablet     Dose/Frequency:  take 1 tablet by mouth once daily     Quantity: 30 tab     Pharmacy: [facility] [address]    Does the patient have enough for 3 days?   [] Yes   [x] No - Send as HP to POD

## 2024-09-20 RX ORDER — TRAMADOL HYDROCHLORIDE 200 MG/1
200 TABLET, EXTENDED RELEASE ORAL DAILY
Qty: 30 TABLET | Refills: 0 | Status: SHIPPED | OUTPATIENT
Start: 2024-09-20

## 2024-09-30 ENCOUNTER — APPOINTMENT (OUTPATIENT)
Dept: LAB | Facility: HOSPITAL | Age: 64
End: 2024-09-30
Payer: COMMERCIAL

## 2024-09-30 DIAGNOSIS — N39.0 URINARY TRACT INFECTION WITHOUT HEMATURIA, SITE UNSPECIFIED: ICD-10-CM

## 2024-09-30 DIAGNOSIS — Z94.4 LIVER REPLACED BY TRANSPLANT (HCC): Primary | ICD-10-CM

## 2024-09-30 LAB
ALBUMIN SERPL BCG-MCNC: 4 G/DL (ref 3.5–5)
ALP SERPL-CCNC: 58 U/L (ref 34–104)
ALT SERPL W P-5'-P-CCNC: 8 U/L (ref 7–52)
ANION GAP SERPL CALCULATED.3IONS-SCNC: 12 MMOL/L (ref 4–13)
AST SERPL W P-5'-P-CCNC: 22 U/L (ref 13–39)
BASOPHILS # BLD AUTO: 0.03 THOUSANDS/ÂΜL (ref 0–0.1)
BASOPHILS NFR BLD AUTO: 1 % (ref 0–1)
BILIRUB DIRECT SERPL-MCNC: 0.12 MG/DL (ref 0–0.2)
BILIRUB SERPL-MCNC: 0.49 MG/DL (ref 0.2–1)
BILIRUB UR QL STRIP: NEGATIVE
BUN SERPL-MCNC: 19 MG/DL (ref 5–25)
CALCIUM SERPL-MCNC: 10.2 MG/DL (ref 8.4–10.2)
CHLORIDE SERPL-SCNC: 99 MMOL/L (ref 96–108)
CLARITY UR: CLEAR
CO2 SERPL-SCNC: 28 MMOL/L (ref 21–32)
COLOR UR: NORMAL
CREAT SERPL-MCNC: 2.36 MG/DL (ref 0.6–1.3)
EOSINOPHIL # BLD AUTO: 0.32 THOUSAND/ÂΜL (ref 0–0.61)
EOSINOPHIL NFR BLD AUTO: 9 % (ref 0–6)
ERYTHROCYTE [DISTWIDTH] IN BLOOD BY AUTOMATED COUNT: 14.6 % (ref 11.6–15.1)
GFR SERPL CREATININE-BSD FRML MDRD: 21 ML/MIN/1.73SQ M
GLUCOSE SERPL-MCNC: 90 MG/DL (ref 65–140)
GLUCOSE UR STRIP-MCNC: NEGATIVE MG/DL
HCT VFR BLD AUTO: 28.9 % (ref 34.8–46.1)
HGB BLD-MCNC: 9.5 G/DL (ref 11.5–15.4)
HGB UR QL STRIP.AUTO: NEGATIVE
IMM GRANULOCYTES # BLD AUTO: 0.01 THOUSAND/UL (ref 0–0.2)
IMM GRANULOCYTES NFR BLD AUTO: 0 % (ref 0–2)
KETONES UR STRIP-MCNC: NEGATIVE MG/DL
LEUKOCYTE ESTERASE UR QL STRIP: NEGATIVE
LYMPHOCYTES # BLD AUTO: 0.62 THOUSANDS/ÂΜL (ref 0.6–4.47)
LYMPHOCYTES NFR BLD AUTO: 17 % (ref 14–44)
MAGNESIUM SERPL-MCNC: 1.4 MG/DL (ref 1.9–2.7)
MCH RBC QN AUTO: 31.5 PG (ref 26.8–34.3)
MCHC RBC AUTO-ENTMCNC: 32.9 G/DL (ref 31.4–37.4)
MCV RBC AUTO: 96 FL (ref 82–98)
MONOCYTES # BLD AUTO: 0.34 THOUSAND/ÂΜL (ref 0.17–1.22)
MONOCYTES NFR BLD AUTO: 9 % (ref 4–12)
NEUTROPHILS # BLD AUTO: 2.37 THOUSANDS/ÂΜL (ref 1.85–7.62)
NEUTS SEG NFR BLD AUTO: 64 % (ref 43–75)
NITRITE UR QL STRIP: NEGATIVE
NRBC BLD AUTO-RTO: 0 /100 WBCS
PH UR STRIP.AUTO: 5 [PH]
PLATELET # BLD AUTO: 220 THOUSANDS/UL (ref 149–390)
PMV BLD AUTO: 10.4 FL (ref 8.9–12.7)
POTASSIUM SERPL-SCNC: 4.2 MMOL/L (ref 3.5–5.3)
PROT SERPL-MCNC: 6.7 G/DL (ref 6.4–8.4)
PROT UR STRIP-MCNC: NEGATIVE MG/DL
RBC # BLD AUTO: 3.02 MILLION/UL (ref 3.81–5.12)
SODIUM SERPL-SCNC: 139 MMOL/L (ref 135–147)
SP GR UR STRIP.AUTO: 1.01 (ref 1–1.03)
TACROLIMUS BLD-MCNC: 11.9 NG/ML (ref 3–15)
UROBILINOGEN UR STRIP-ACNC: <2 MG/DL
WBC # BLD AUTO: 3.69 THOUSAND/UL (ref 4.31–10.16)

## 2024-09-30 PROCEDURE — 81003 URINALYSIS AUTO W/O SCOPE: CPT

## 2024-09-30 PROCEDURE — 80053 COMPREHEN METABOLIC PANEL: CPT

## 2024-09-30 PROCEDURE — 83735 ASSAY OF MAGNESIUM: CPT

## 2024-09-30 PROCEDURE — 85025 COMPLETE CBC W/AUTO DIFF WBC: CPT

## 2024-09-30 PROCEDURE — 80197 ASSAY OF TACROLIMUS: CPT

## 2024-09-30 PROCEDURE — 82248 BILIRUBIN DIRECT: CPT

## 2024-09-30 PROCEDURE — 36415 COLL VENOUS BLD VENIPUNCTURE: CPT

## 2024-10-14 ENCOUNTER — APPOINTMENT (OUTPATIENT)
Dept: LAB | Facility: HOSPITAL | Age: 64
End: 2024-10-14
Payer: COMMERCIAL

## 2024-10-14 DIAGNOSIS — Z94.4 LIVER REPLACED BY TRANSPLANT (HCC): Primary | ICD-10-CM

## 2024-10-14 LAB
ALBUMIN SERPL BCG-MCNC: 4 G/DL (ref 3.5–5)
ALP SERPL-CCNC: 50 U/L (ref 34–104)
ALT SERPL W P-5'-P-CCNC: 6 U/L (ref 7–52)
ANION GAP SERPL CALCULATED.3IONS-SCNC: 8 MMOL/L (ref 4–13)
AST SERPL W P-5'-P-CCNC: 17 U/L (ref 13–39)
BASOPHILS # BLD AUTO: 0.06 THOUSANDS/ΜL (ref 0–0.1)
BASOPHILS NFR BLD AUTO: 2 % (ref 0–1)
BILIRUB DIRECT SERPL-MCNC: 0.14 MG/DL (ref 0–0.2)
BILIRUB SERPL-MCNC: 0.54 MG/DL (ref 0.2–1)
BUN SERPL-MCNC: 12 MG/DL (ref 5–25)
CALCIUM SERPL-MCNC: 8.2 MG/DL (ref 8.4–10.2)
CHLORIDE SERPL-SCNC: 108 MMOL/L (ref 96–108)
CO2 SERPL-SCNC: 22 MMOL/L (ref 21–32)
CREAT SERPL-MCNC: 1.06 MG/DL (ref 0.6–1.3)
EOSINOPHIL # BLD AUTO: 0.26 THOUSAND/ΜL (ref 0–0.61)
EOSINOPHIL NFR BLD AUTO: 7 % (ref 0–6)
ERYTHROCYTE [DISTWIDTH] IN BLOOD BY AUTOMATED COUNT: 15.2 % (ref 11.6–15.1)
GFR SERPL CREATININE-BSD FRML MDRD: 56 ML/MIN/1.73SQ M
GLUCOSE P FAST SERPL-MCNC: 96 MG/DL (ref 65–99)
HCT VFR BLD AUTO: 30.3 % (ref 34.8–46.1)
HGB BLD-MCNC: 9.8 G/DL (ref 11.5–15.4)
IMM GRANULOCYTES # BLD AUTO: 0.02 THOUSAND/UL (ref 0–0.2)
IMM GRANULOCYTES NFR BLD AUTO: 1 % (ref 0–2)
LYMPHOCYTES # BLD AUTO: 0.48 THOUSANDS/ΜL (ref 0.6–4.47)
LYMPHOCYTES NFR BLD AUTO: 14 % (ref 14–44)
MAGNESIUM SERPL-MCNC: 2.2 MG/DL (ref 1.9–2.7)
MCH RBC QN AUTO: 31.5 PG (ref 26.8–34.3)
MCHC RBC AUTO-ENTMCNC: 32.3 G/DL (ref 31.4–37.4)
MCV RBC AUTO: 97 FL (ref 82–98)
MONOCYTES # BLD AUTO: 0.2 THOUSAND/ΜL (ref 0.17–1.22)
MONOCYTES NFR BLD AUTO: 6 % (ref 4–12)
NEUTROPHILS # BLD AUTO: 2.52 THOUSANDS/ΜL (ref 1.85–7.62)
NEUTS SEG NFR BLD AUTO: 70 % (ref 43–75)
NRBC BLD AUTO-RTO: 0 /100 WBCS
PLATELET # BLD AUTO: 216 THOUSANDS/UL (ref 149–390)
PMV BLD AUTO: 9.8 FL (ref 8.9–12.7)
POTASSIUM SERPL-SCNC: 5.8 MMOL/L (ref 3.5–5.3)
PROT SERPL-MCNC: 6.5 G/DL (ref 6.4–8.4)
RBC # BLD AUTO: 3.11 MILLION/UL (ref 3.81–5.12)
SODIUM SERPL-SCNC: 138 MMOL/L (ref 135–147)
TACROLIMUS BLD-MCNC: 9.5 NG/ML (ref 3–15)
WBC # BLD AUTO: 3.54 THOUSAND/UL (ref 4.31–10.16)

## 2024-10-14 PROCEDURE — 80197 ASSAY OF TACROLIMUS: CPT

## 2024-10-14 PROCEDURE — 80053 COMPREHEN METABOLIC PANEL: CPT

## 2024-10-14 PROCEDURE — 82248 BILIRUBIN DIRECT: CPT

## 2024-10-14 PROCEDURE — 83735 ASSAY OF MAGNESIUM: CPT

## 2024-10-14 PROCEDURE — 36415 COLL VENOUS BLD VENIPUNCTURE: CPT

## 2024-10-14 PROCEDURE — 85025 COMPLETE CBC W/AUTO DIFF WBC: CPT

## 2024-10-24 ENCOUNTER — TELEPHONE (OUTPATIENT)
Age: 64
End: 2024-10-24

## 2024-10-24 DIAGNOSIS — G61.89 OTHER INFLAMMATORY POLYNEUROPATHIES (HCC): ICD-10-CM

## 2024-10-24 DIAGNOSIS — M54.16 LUMBAR RADICULOPATHY: ICD-10-CM

## 2024-10-24 DIAGNOSIS — G89.4 CHRONIC PAIN SYNDROME: ICD-10-CM

## 2024-10-24 DIAGNOSIS — M47.816 LUMBAR SPONDYLOSIS: ICD-10-CM

## 2024-10-24 DIAGNOSIS — M51.369 DDD (DEGENERATIVE DISC DISEASE), LUMBAR: ICD-10-CM

## 2024-10-24 RX ORDER — TRAMADOL HYDROCHLORIDE 200 MG/1
200 TABLET, EXTENDED RELEASE ORAL DAILY
Qty: 4 TABLET | Refills: 0 | Status: SHIPPED | OUTPATIENT
Start: 2024-10-24
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
## 2024-10-24 NOTE — TELEPHONE ENCOUNTER
Please call when sent to the pharmacy or if any issues.      Reason for call:   [x] Refill   [] Prior Auth  [] Other:     Office:   [] PCP/Provider -   [x] Specialty/Provider -Debra Herrera/[facility]       Medication: Tramadol    Dose/Frequency: 200 mg 24 hr tablet    Quantity: #30    Pharmacy: [facility] [id number] [facility]    Does the patient have enough for 3 days?   [] Yes   [x] No - Send as HP to POD

## 2024-10-24 NOTE — TELEPHONE ENCOUNTER
Patient called the RX Refill Line. Message is being forwarded to the office.     Patient called to see when her next appointment was and does not look like one is scheduled. Would like to schedule an appointment.     Please contact patient at [phone]
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
## 2024-10-28 ENCOUNTER — APPOINTMENT (OUTPATIENT)
Dept: LAB | Facility: HOSPITAL | Age: 64
End: 2024-10-28
Payer: COMMERCIAL

## 2024-10-28 ENCOUNTER — OFFICE VISIT (OUTPATIENT)
Dept: PAIN MEDICINE | Facility: CLINIC | Age: 64
End: 2024-10-28
Payer: COMMERCIAL

## 2024-10-28 VITALS
BODY MASS INDEX: 29.17 KG/M2 | DIASTOLIC BLOOD PRESSURE: 68 MMHG | HEART RATE: 65 BPM | SYSTOLIC BLOOD PRESSURE: 124 MMHG | TEMPERATURE: 98 F | HEIGHT: 65 IN

## 2024-10-28 DIAGNOSIS — M54.16 LUMBAR RADICULOPATHY: Primary | ICD-10-CM

## 2024-10-28 DIAGNOSIS — G89.4 CHRONIC PAIN SYNDROME: ICD-10-CM

## 2024-10-28 DIAGNOSIS — M51.369 DDD (DEGENERATIVE DISC DISEASE), LUMBAR: ICD-10-CM

## 2024-10-28 DIAGNOSIS — G61.89 OTHER INFLAMMATORY POLYNEUROPATHIES (HCC): ICD-10-CM

## 2024-10-28 DIAGNOSIS — F11.20 UNCOMPLICATED OPIOID DEPENDENCE (HCC): ICD-10-CM

## 2024-10-28 DIAGNOSIS — M47.816 LUMBAR SPONDYLOSIS: ICD-10-CM

## 2024-10-28 DIAGNOSIS — Z79.891 LONG-TERM CURRENT USE OF OPIATE ANALGESIC: ICD-10-CM

## 2024-10-28 DIAGNOSIS — Z94.4 LIVER REPLACED BY TRANSPLANT (HCC): Primary | ICD-10-CM

## 2024-10-28 PROBLEM — N18.32 CHRONIC KIDNEY DISEASE (CKD) STAGE G3B/A2, MODERATELY DECREASED GLOMERULAR FILTRATION RATE (GFR) BETWEEN 30-44 ML/MIN/1.73 SQUARE METER AND ALBUMINURIA CREATININE RATIO BETWEEN 30-299 MG/G (HCC): Status: ACTIVE | Noted: 2024-10-28

## 2024-10-28 LAB
ALBUMIN SERPL BCG-MCNC: 4 G/DL (ref 3.5–5)
ALP SERPL-CCNC: 53 U/L (ref 34–104)
ALT SERPL W P-5'-P-CCNC: 7 U/L (ref 7–52)
ANION GAP SERPL CALCULATED.3IONS-SCNC: 8 MMOL/L (ref 4–13)
AST SERPL W P-5'-P-CCNC: 17 U/L (ref 13–39)
BASOPHILS # BLD AUTO: 0.05 THOUSANDS/ΜL (ref 0–0.1)
BASOPHILS NFR BLD AUTO: 2 % (ref 0–1)
BILIRUB DIRECT SERPL-MCNC: 0.04 MG/DL (ref 0–0.2)
BILIRUB SERPL-MCNC: 0.57 MG/DL (ref 0.2–1)
BUN SERPL-MCNC: 18 MG/DL (ref 5–25)
CALCIUM SERPL-MCNC: 8.5 MG/DL (ref 8.4–10.2)
CHLORIDE SERPL-SCNC: 107 MMOL/L (ref 96–108)
CO2 SERPL-SCNC: 23 MMOL/L (ref 21–32)
CREAT SERPL-MCNC: 1.22 MG/DL (ref 0.6–1.3)
EOSINOPHIL # BLD AUTO: 0.37 THOUSAND/ΜL (ref 0–0.61)
EOSINOPHIL NFR BLD AUTO: 14 % (ref 0–6)
ERYTHROCYTE [DISTWIDTH] IN BLOOD BY AUTOMATED COUNT: 15.2 % (ref 11.6–15.1)
GFR SERPL CREATININE-BSD FRML MDRD: 47 ML/MIN/1.73SQ M
GLUCOSE SERPL-MCNC: 97 MG/DL (ref 65–140)
HCT VFR BLD AUTO: 30.7 % (ref 34.8–46.1)
HGB BLD-MCNC: 9.8 G/DL (ref 11.5–15.4)
IMM GRANULOCYTES # BLD AUTO: 0.01 THOUSAND/UL (ref 0–0.2)
IMM GRANULOCYTES NFR BLD AUTO: 0 % (ref 0–2)
LYMPHOCYTES # BLD AUTO: 0.54 THOUSANDS/ΜL (ref 0.6–4.47)
LYMPHOCYTES NFR BLD AUTO: 20 % (ref 14–44)
MAGNESIUM SERPL-MCNC: 2.3 MG/DL (ref 1.9–2.7)
MCH RBC QN AUTO: 31.3 PG (ref 26.8–34.3)
MCHC RBC AUTO-ENTMCNC: 31.9 G/DL (ref 31.4–37.4)
MCV RBC AUTO: 98 FL (ref 82–98)
MONOCYTES # BLD AUTO: 0.24 THOUSAND/ΜL (ref 0.17–1.22)
MONOCYTES NFR BLD AUTO: 9 % (ref 4–12)
NEUTROPHILS # BLD AUTO: 1.54 THOUSANDS/ΜL (ref 1.85–7.62)
NEUTS SEG NFR BLD AUTO: 55 % (ref 43–75)
NRBC BLD AUTO-RTO: 0 /100 WBCS
PLATELET # BLD AUTO: 216 THOUSANDS/UL (ref 149–390)
PMV BLD AUTO: 10.5 FL (ref 8.9–12.7)
POTASSIUM SERPL-SCNC: 5.6 MMOL/L (ref 3.5–5.3)
PROT SERPL-MCNC: 6.5 G/DL (ref 6.4–8.4)
RBC # BLD AUTO: 3.13 MILLION/UL (ref 3.81–5.12)
SODIUM SERPL-SCNC: 138 MMOL/L (ref 135–147)
TACROLIMUS BLD-MCNC: 11.4 NG/ML (ref 3–15)
WBC # BLD AUTO: 2.75 THOUSAND/UL (ref 4.31–10.16)

## 2024-10-28 PROCEDURE — 36415 COLL VENOUS BLD VENIPUNCTURE: CPT

## 2024-10-28 PROCEDURE — 82248 BILIRUBIN DIRECT: CPT

## 2024-10-28 PROCEDURE — 80197 ASSAY OF TACROLIMUS: CPT

## 2024-10-28 PROCEDURE — 99214 OFFICE O/P EST MOD 30 MIN: CPT | Performed by: PHYSICIAN ASSISTANT

## 2024-10-28 PROCEDURE — 83735 ASSAY OF MAGNESIUM: CPT

## 2024-10-28 PROCEDURE — 80053 COMPREHEN METABOLIC PANEL: CPT

## 2024-10-28 PROCEDURE — G2211 COMPLEX E/M VISIT ADD ON: HCPCS | Performed by: PHYSICIAN ASSISTANT

## 2024-10-28 PROCEDURE — 85025 COMPLETE CBC W/AUTO DIFF WBC: CPT

## 2024-10-28 RX ORDER — TRAMADOL HYDROCHLORIDE 50 MG/1
50 TABLET ORAL 2 TIMES DAILY PRN
Qty: 45 TABLET | Refills: 2 | Status: SHIPPED | OUTPATIENT
Start: 2024-10-28

## 2024-10-28 RX ORDER — TRAMADOL HYDROCHLORIDE 200 MG/1
200 TABLET, EXTENDED RELEASE ORAL DAILY
Qty: 30 TABLET | Refills: 3 | Status: SHIPPED | OUTPATIENT
Start: 2024-10-28

## 2024-10-28 NOTE — PROGRESS NOTES
Assessment:  1. Lumbar radiculopathy    2. Lumbar spondylosis    3. DDD (degenerative disc disease), lumbar    4. Other inflammatory polyneuropathies (HCC)    5. Chronic pain syndrome    6. Uncomplicated opioid dependence (HCC)    7. Long-term current use of opiate analgesic        Plan:  While the patient was in the office today, I did have a thorough conversation regarding their chronic pain syndrome, medication management, and treatment plan options.    Patient remains clinically stable and well-controlled on the current medication regimen of tramadol  mg daily and as needed use of tramadol 50 mg.  On today's visit I have electronically sent refills to her pharmacy.    Patient will continue home exercises and she will be starting formal physical therapy soon.    [place] Prescription Drug Monitoring Program report was reviewed and was appropriate     There are risks associated with opioid medications, including dependence, addiction and tolerance. The patient understands and agrees to use these medications only as prescribed. Potential side effects of the medications include, but are not limited to, constipation, drowsiness, addiction, impaired judgment and risk of fatal overdose if not taken as prescribed. The patient was warned against driving while taking sedation medications.  Sharing medications is a felony. At this point in time, the patient is showing no signs of addiction, abuse, diversion or suicidal ideation.    The patient will follow-up in 4 months for medication prescription refill and reevaluation. The patient was advised to contact the office should their symptoms worsen in the interim. The patient was agreeable and verbalized an understanding.        History of Present Illness:    The patient is a 63 y.o. female last seen on 7/15/2024 who presents for a follow up office visit in regards to chronic neuropathic pain of the lower extremities, chronic low back pain secondary to lumbar  spondylosis.  The patient currently reports increased neuropathic pain of the lower extremities that she rates a 7 out of 10.  She describes pain as a constant dull, aching, burning, sharp, throbbing, cramping type of pain.  There is associated numbness and paresthesias.  Unfortunately since we last saw the patient she fell and fractured the bilateral lower extremities.  She is currently wearing a boot on her left lower extremity and is nonweightbearing.  She states that she will be beginning physical therapy soon.  She is trying exercises at home but with difficulty and pain.    Current pain medications includes: Tramadol .  The patient reports that this regimen is providing 50% pain relief.  The patient is reporting no side effects from this pain medication regimen.    Pain Contract Signed: 11/21/2023  Last Urine Drug Screen: 2/20/2024    I have personally reviewed and/or updated the patient's past medical history, past surgical history, family history, social history, current medications, allergies, and vital signs today.       Review of Systems:    Review of Systems   Respiratory:  Negative for shortness of breath.    Cardiovascular:  Negative for chest pain.   Gastrointestinal:  Positive for constipation and nausea. Negative for diarrhea and vomiting.   Musculoskeletal:  Positive for gait problem and joint swelling (Joint stiffness). Negative for arthralgias and myalgias.   Skin:  Negative for rash.   Neurological:  Positive for dizziness and weakness. Negative for seizures.   All other systems reviewed and are negative.        Past Medical History:   Diagnosis Date    Anemia     Anxiety     Asthma     Chronic pain disorder     Clotting disorder (HCC)     Concussion     Constipation     CSF leak     Fractures     GERD (gastroesophageal reflux disease)     Head injury     Hernia 2022    High blood pressure     History of transfusion     Hyperbilirubinemia 04/28/2022    Hypertension     Liver failure (HCC)      Liver transplant recipient (HCC)     Migraines     Peripheral neuropathy     Urinary frequency     Varicella 1968    As a child       Past Surgical History:   Procedure Laterality Date    ABLATION SOFT TISSUE      BREAST LUMPECTOMY      COLONOSCOPY  2023    IR PARACENTESIS  2022    IR PARACENTESIS  2022    IR PARACENTESIS  2022    LAPAROSCOPY FOR ECTOPIC PREGNANCY      LIVER BIOPSY  7/3/2023    Plus 3 previous in hospital    LIVER TRANSPLANTATION  22    MAMMO (HISTORICAL) Bilateral 2021    [facility] BI-RADS 2 Benign findings. after U/S Scattered areas fibrogladulae density; 25% to 50% glandular breast tissue    MRI GUIDED BREAST WIRE LOCALIZATION LEFT Left 2015    MRI GUIDED BREAST WIRE LOCALIZATION LEFT Left 2015    TX EXC TUMOR SOFT TISS FACE&SCALP SUBFASCIAL <2CM N/A 2024    Procedure: EXCISION OF SUBCUTANEOUS MASSES OF FACE X3- RIGHT CHEEK, ROOT OF NOSE, LEFT LOWER EYELID/CHEEK, COMPLEX CLOSURE;  Surgeon: Fuentes Mendez MD;  Location:  MAIN OR;  Service: Plastics    SINUS SURGERY      SINUS SURGERY      removed cartlidge in inside of nose    TONSILECTOMY AND ADNOIDECTOMY         Family History   Problem Relation Age of Onset    Cancer Mother     Breast cancer Mother     Cancer Father     Colon cancer Father     Arthritis Father     Cancer Sister     Breast cancer Sister     Arthritis Brother     No Known Problems Paternal Grandmother     No Known Problems Paternal Grandfather     Autoimmune disease Daughter     No Known Problems Son     No Known Problems Son     Heart disease Half-Brother     Prostate cancer Half-Brother     Melanoma Half-Brother        Social History     Occupational History    Not on file   Tobacco Use    Smoking status: Former     Current packs/day: 0.00     Average packs/day: 1 pack/day for 25.0 years (25.0 ttl pk-yrs)     Types: Cigarettes     Start date:      Quit date: 2019     Years since quittin.8    Smokeless tobacco: Never    Vaping Use    Vaping status: Never Used   Substance and Sexual Activity    Alcohol use: Not Currently     Comment: socially, had glass of wine today    Drug use: Not Currently     Types: Marijuana     Comment: CBD  medical [facility]    Sexual activity: Not Currently     Partners: Male     Birth control/protection: Post-menopausal     Comment: no new partner in past year         Current Outpatient Medications:     amLODIPine (NORVASC) 5 mg tablet, Take 1 tablet (5 mg total) by mouth daily, Disp: , Rfl:     azaTHIOprine (IMURAN) 50 mg tablet, Take 100 mg by mouth daily at bedtime, Disp: , Rfl:     bisacodyl (DULCOLAX) 10 mg suppository, Insert 1 suppository (10 mg total) into the rectum daily as needed for constipation, Disp: , Rfl:     diphenhydrAMINE (BENADRYL) 25 mg capsule, Take 25 mg by mouth every 8 (eight) hours as needed for itching. Indications: Itching, Disp: , Rfl:     ergocalciferol (ERGOCALCIFEROL) 1.25 MG (95669 UT) capsule, Take 50,000 Units by mouth once a week. Every Monday AM  Indications: Vitamin D Deficiency, Disp: , Rfl:     FOLIC ACID PO, Take by mouth, Disp: , Rfl:     hydrOXYzine HCL (ATARAX) 50 mg tablet, Take 1 tablet (50 mg total) by mouth every 6 (six) hours as needed for anxiety, Disp: 30 tablet, Rfl: 0    latanoprost (XALATAN) 0.005 % ophthalmic solution, 1 DROP INTO BOTH EYES BEFORE BEDTIME, Disp: , Rfl:     methocarbamol (ROBAXIN) 500 mg tablet, Take 1 tablet (500 mg total) by mouth every 6 (six) hours as needed for muscle spasms, Disp: 30 tablet, Rfl: 0    pantoprazole (PROTONIX) 40 mg tablet, Take 1 tablet (40 mg total) by mouth daily, Disp: 90 tablet, Rfl: 3    Premarin vaginal cream, Insert 1 g into the vagina 2 (two) times a week, Disp: 30 g, Rfl: 3    Prevymis 480 MG TABS, Take 480 mg by mouth daily, Disp: , Rfl:     Prolia 60 MG/ML, USE 1 ML SUBCUTANOEUS EVERY 6 MONTHS AS DIRECTED. PLEASE DELIVER ...  (REFER TO PRESCRIPTION NOTES)., Disp: , Rfl:     senna (SENOKOT) 8.6 mg, Take  "2 tablets (17.2 mg total) by mouth daily at bedtime, Disp: , Rfl:     tacrolimus (PROGRAF) 1 mg capsule, Take 3 mg by mouth every 12 (twelve) hours 3 capsules AM 3 capsules PM, Disp: , Rfl:     thiamine 100 MG tablet, Take 100 mg by mouth daily. Indications: Deficiency of Vitamin B1, Disp: , Rfl:     traMADol (ULTRAM) 50 mg tablet, Take 1 tablet (50 mg total) by mouth 2 (two) times a day as needed for moderate pain for ongoing therapy, Disp: 45 tablet, Rfl: 2    traMADol (ULTRAM-ER) 200 MG 24 hr tablet, Take 1 tablet (200 mg total) by mouth daily for ongoing therapy, Disp: 30 tablet, Rfl: 3    metoprolol succinate (TOPROL-XL) 25 mg 24 hr tablet, , Disp: , Rfl:     Allergies   Allergen Reactions    Other Other (See Comments)     Seasonal allergies         Physical Exam:    /68 (BP Location: Left arm, Patient Position: Sitting, Cuff Size: Standard)   Pulse 65   Temp 98 °F (36.7 °C)   Ht 5' 5\" (1.651 m)   BMI 29.17 kg/m²     Constitutional:normal, well developed, well nourished, alert, in no distress and non-toxic and no overt pain behavior.  Eyes:anicteric  HEENT:grossly intact  Neck:supple, symmetric, trachea midline and no masses   Pulmonary:even and unlabored  Cardiovascular:No edema or pitting edema present  Skin:Normal without rashes or lesions and well hydrated  Psychiatric:Mood and affect appropriate  Neurologic:Cranial Nerves II-XII grossly intact  Musculoskeletal:in wheelchair      Imaging  No orders to display         No orders of the defined types were placed in this encounter.      "

## 2024-11-11 ENCOUNTER — HOSPITAL ENCOUNTER (OUTPATIENT)
Dept: MAMMOGRAPHY | Facility: IMAGING CENTER | Age: 64
Discharge: HOME | End: 2024-11-11
Payer: COMMERCIAL

## 2024-11-11 VITALS — WEIGHT: 156 LBS | HEIGHT: 65 IN | BODY MASS INDEX: 25.99 KG/M2

## 2024-11-11 DIAGNOSIS — Z12.31 ENCOUNTER FOR SCREENING MAMMOGRAM FOR MALIGNANT NEOPLASM OF BREAST: ICD-10-CM

## 2024-11-11 PROCEDURE — 77067 SCR MAMMO BI INCL CAD: CPT

## 2024-11-11 PROCEDURE — 77063 BREAST TOMOSYNTHESIS BI: CPT

## 2024-11-12 ENCOUNTER — EVALUATION (OUTPATIENT)
Facility: CLINIC | Age: 64
End: 2024-11-12
Payer: COMMERCIAL

## 2024-11-12 DIAGNOSIS — S82.892D CLOSED FRACTURE OF LEFT ANKLE WITH ROUTINE HEALING, SUBSEQUENT ENCOUNTER: Primary | ICD-10-CM

## 2024-11-12 DIAGNOSIS — Z94.4 LIVER TRANSPLANT RECIPIENT (HCC): ICD-10-CM

## 2024-11-12 DIAGNOSIS — S92.301A CLOSED FRACTURE OF METATARSAL NECK, RIGHT, INITIAL ENCOUNTER: ICD-10-CM

## 2024-11-12 DIAGNOSIS — G61.89 OTHER INFLAMMATORY POLYNEUROPATHIES (HCC): ICD-10-CM

## 2024-11-12 DIAGNOSIS — M54.16 LUMBAR RADICULOPATHY: ICD-10-CM

## 2024-11-12 PROCEDURE — 97162 PT EVAL MOD COMPLEX 30 MIN: CPT

## 2024-11-12 NOTE — PROGRESS NOTES
PT Evaluation     Today's date: 2024  Patient name: Linda Becerra  : 1960  MRN: [id number]  Referring provider: Douglas Godoy MD  Dx:   Encounter Diagnosis     ICD-10-CM    1. Closed fracture of left ankle with routine healing, subsequent encounter  S82.892D       2. Closed fracture of metatarsal neck, right, initial encounter  S92.301A       3. Liver transplant recipient (HCC)  Z94.4       4. Other inflammatory polyneuropathies (HCC)  G61.89       5. Lumbar radiculopathy  M54.16           Start Time: 0350  Stop Time: 0430  Total time in clinic (min): 40 minutes    Assessment  Impairments: abnormal coordination, abnormal gait, activity intolerance, impaired balance, impaired physical strength, lacks appropriate home exercise program, pain with function, safety issue and weight-bearing intolerance    Assessment details: Patient is well known to this PT with history of weakness and instability post liver transplant.  She had been walking well until  when she slipped walking around a corner resulting in closed Bimalleolar fracture left and right metatarsal head fx (3-5) on right.  Due to complex medical history including CKD, prolonged hospitalization was necessary transitioning to inpatient rehab.  She now has cast boot removed and is WBAT, however, due to prolonged immobility substantial weakness and instability is noted.  HOYOS scoring demonstrates substantial decline in balance scoring 41/56 and ABC scoring reflects decreased confidence in all ADL tasks scoring 19.3.  Recommend skilled intervention to facilitate return to ambulation, improved overall safety, increased strength and greatest functional mobility while promoting pain control.  Patient is in agreement with treatment plan.  Given complex medical history, progress may be slow, extended POC requested.  Understanding of Dx/Px/POC: good     Prognosis: good    Goals  Goals  STG 30 days    Patient will improve static balance with feet  together Eyes Closed Foam surface  to 20 seconds indicating reduction in fall risk  Patient will initiate ambulation with RW for household distances without increase in pain  Patient will display 2.3  second improvement with overall score of TBA seconds  with TUG test or lower with noted improvement being Minimal Detectable Change pre current research standards with fall risk.    Patient will achieve 47/56 HOYOS score with minimal improve by 6 points or more demonstrating Minimal Detectable change per current research standards for this objective test which assess fall risk.   Patient will achieve .59 ft/sec improvement with score of  TBA ft/sec with 10 Meter Walk test, demonstrating Minimal Detectable change per current research standards for this objective test which assess fall risk.   Patient will perform  5 x sit to stand test with overall reduction by seconds to TBA score indicating improvement with functional endurance  Patient will be able independent in general strengthening and static balance HEP    LTG: 10 weeks  Patient will achieve 190 feet improvement with overall distance achieved of TBA feet with 6 minute walk test which is Minimal Detectable Change pre current research standards with endurance to demonstrate enhance functional capacity   Patient will improve ABC scoring by at least 50% demonstrating increased confidence in overall mobility   Patient will be able to perform floor transfer without physical assistance   Patient will be able to carry objects without loss of balance  Patient will be able to ambulate outdoors without any loss of balance  Patient will be independent in community based exercise program to promote increased mobiltiy    Cut off score   All date taken from [facility] Neuro Section or Rehab Measures    HOYOS test: 46/56                                              5 x STS Test:  MDC: 6 points                                                  MDC: 2.3 seconds   age norms                                                                  Age Norms   60-69 year old = M: 55, F: 55                        60-69 year old: 11.4 seconds   70-79 year old = M 54,  F: 53                       70-79 year old: 12.6 seconds    80-89 year old = M53,   F: 50                       80-89 year old: 14.8 seconds     TUG test:                                                                     10 Meter Walk Test:  MDC: 4.14 seconds       MDC: .59 ft/sec  Cut off score for Falls                                                  Age Norms  > 13.5 seconds community dwelling adults                20-29; M: 4.56 ft/sec F: 4.62 ft/sec  > 32.2 Frail Elderly                                                     30-39: M 4.76 ft/sec  F: 4.68 ft/sec          40-49: M: 4.79 ft/sec  F: 4.62 ft/sec  6 Minute Walk Test      50-59: M: 4.76 ft/sec  F: 4.56 ft/sec  MDC: 190 feet       60-69: M: 4.56 ft/sec  F: 4.26 ft/sec  Age Norms       70-+    M: 4.36 ft/sec  F: 4.16 ft/sec  60-69:    M: 1876 F: 1765  70-79:    M: 1729 F: 1545  80-89 +: M: 1368 F; 1286     Plan  Patient would benefit from: skilled physical therapy  Planned modality interventions: cryotherapy    Planned therapy interventions: manual therapy, motor coordination training, neuromuscular re-education, patient education, postural training, sensory integrative techniques, strengthening, stretching, therapeutic activities, therapeutic exercise, gait training, home exercise program, coordination and balance    Frequency: 2x week  Duration in weeks: 10  Treatment plan discussed with: patient        Subjective Evaluation    History of Present Illness  Mechanism of injury: Patient fell 8/17 after letting the dog out.  She walked around the corner and landed on the floor.  She indicates her left leg gave out, and per her report she feels this is connected with poor kidney function.  She broke her left ankle and her right foot.  She arrived in a wheelchair and states she has not been  walking.  She admits to weight loss with steroid cessation.  She was weight bearing with left cast boot the beginning of this month.  She is now WBAT B/L without cast boot.  Neurological sensitivity continues to be noted from previous injury left knee and thigh.  Patient Goals  Patient goals for therapy: decreased pain  Patient goal: Get back to walking  Pain  Current pain rating: 3  At best pain rating: 3  At worst pain ratin  Location: Left ankle    Social Support  Steps to enter house: no  Stairs in house: no   Lives in: one-story house  Lives with: significant other    Employment status: not working  Treatments  Previous treatment: physical therapy        Objective     Neurological Testing     Additional Neurological Details  Grossly intact to light touch, however, decreased sensation noted along left thigh.    Strength/Myotome Testing     Left Shoulder     Planes of Motion   Flexion: 3+   Abduction: 3+     Right Shoulder     Planes of Motion   Flexion: 3+   Abduction: 3+     Left Elbow   Flexion: 4-  Extension: 4-    Right Elbow   Flexion: 4-  Extension: 4-    Left Wrist/Hand   Wrist extension: 4-  Wrist flexion: 4-    Right Wrist/Hand   Wrist extension: 4-  Wrist flexion: 4-    Left Hip   Planes of Motion   Flexion: 3- and 3  Extension: 3-  Abduction: 3 and 3-    Right Hip   Planes of Motion   Flexion: 3+  Extension: 3+  Abduction: 4-    Left Knee   Flexion: 3+  Extension: 3+    Right Knee   Flexion: 4-  Extension: 4-    Left Ankle/Foot   Dorsiflexion: 3-  Plantar flexion: 3-    Right Ankle/Foot   Dorsiflexion: 3 and 3+  Plantar flexion: 3- and 3  Neuro Exam:     Sensation   Light touch LE: left WNL and right WNL    Coordination   Finger to nose: left WNL and right WNL    Transfers   Sit to stand: independent   Wheelchair to mat: independent   Mat to wheelchair: independent   Sit to supine: independent   Supine to sit: independent   Roll: independent             Precautions: h/o bimalleolar fx left, fx  distal 3rd through 5th MT right, h/o TBI, h/o liver transplant, h/o prolonged steroid, h/o osteoporosis, LBP, Chronic pain, anxiety, CKD, h/o dizziness  EPOC:  1/21/2025    POC Expires Reeval for Medicare to be completed Unit LImit Auth Expiration Date PT/OT/STVisit Limit   1/21/2025 By visit 10       Completed on visit                      DATE 11/12            Auth pending            Visit # 1            Visits remaining             TESTING             HOYOS 41/56            ABC 19.38            5x STS nv            TUG nv            Neuro Re-Ed             Weight shift A/P & Lat             Unilateral stance             Hurdles             Static balance                                                    Ther Ex                                                                                                                     Ther Activity                                       Gait Training                                       Modalities

## 2024-11-12 NOTE — LETTER
2024    Douglas Godoy MD  [address] [facility] [address]    Patient: Linda Becerra   YOB: 1960   Date of Visit: 2024     Encounter Diagnosis     ICD-10-CM    1. Closed fracture of left ankle with routine healing, subsequent encounter  S82.892D       2. Closed fracture of metatarsal neck, right, initial encounter  S92.301A       3. Liver transplant recipient (HCC)  Z94.4       4. Other inflammatory polyneuropathies (HCC)  G61.89       5. Lumbar radiculopathy  M54.16           Dear Dr. Godoy:    Thank you for your recent referral of Linda Becerra. Please review the attached evaluation summary from Linda's recent visit.     Please verify that you agree with the plan of care by signing the attached order.     If you have any questions or concerns, please do not hesitate to call.     I sincerely appreciate the opportunity to share in the care of one of your patients and hope to have another opportunity to work with you in the near future.       Sincerely,    Teresita Vail, PT      Referring Provider:      I certify that I have read the below Plan of Care and certify the need for these services furnished under this plan of treatment while under my care.                    Douglas Godoy MD  [address] [id number]  Via Fax: [phone]          PT Evaluation     Today's date: 2024  Patient name: Linda Becerra  : 1960  MRN: [id number]  Referring provider: Douglas Godoy MD  Dx:   Encounter Diagnosis     ICD-10-CM    1. Closed fracture of left ankle with routine healing, subsequent encounter  S82.892D       2. Closed fracture of metatarsal neck, right, initial encounter  S92.301A       3. Liver transplant recipient (HCC)  Z94.4       4. Other inflammatory polyneuropathies (HCC)  G61.89       5. Lumbar radiculopathy  M54.16           Start Time: 0350  Stop Time: 0430  Total time in clinic (min): 40  minutes    Assessment  Impairments: abnormal coordination, abnormal gait, activity intolerance, impaired balance, impaired physical strength, lacks appropriate home exercise program, pain with function, safety issue and weight-bearing intolerance    Assessment details: Patient is well known to this PT with history of weakness and instability post liver transplant.  She had been walking well until 8/17 when she slipped walking around a corner resulting in closed Bimalleolar fracture left and right metatarsal head fx (3-5) on right.  Due to complex medical history including CKD, prolonged hospitalization was necessary transitioning to inpatient rehab.  She now has cast boot removed and is WBAT, however, due to prolonged immobility substantial weakness and instability is noted.  HOYOS scoring demonstrates substantial decline in balance scoring 41/56 and ABC scoring reflects decreased confidence in all ADL tasks scoring 19.3.  Recommend skilled intervention to facilitate return to ambulation, improved overall safety, increased strength and greatest functional mobility while promoting pain control.  Patient is in agreement with treatment plan.  Given complex medical history, progress may be slow, extended POC requested.  Understanding of Dx/Px/POC: good     Prognosis: good    Goals  Goals  STG 30 days    Patient will improve static balance with feet together Eyes Closed Foam surface  to 20 seconds indicating reduction in fall risk  Patient will initiate ambulation with RW for household distances without increase in pain  Patient will display 2.3  second improvement with overall score of TBA seconds  with TUG test or lower with noted improvement being Minimal Detectable Change pre current research standards with fall risk.    Patient will achieve 47/56 HOYOS score with minimal improve by 6 points or more demonstrating Minimal Detectable change per current research standards for this objective test which assess fall risk.    Patient will achieve .59 ft/sec improvement with score of  TBA ft/sec with 10 Meter Walk test, demonstrating Minimal Detectable change per current research standards for this objective test which assess fall risk.   Patient will perform  5 x sit to stand test with overall reduction by seconds to TBA score indicating improvement with functional endurance  Patient will be able independent in general strengthening and static balance HEP    LTG: 10 weeks  Patient will achieve 190 feet improvement with overall distance achieved of TBA feet with 6 minute walk test which is Minimal Detectable Change pre current research standards with endurance to demonstrate enhance functional capacity   Patient will improve ABC scoring by at least 50% demonstrating increased confidence in overall mobility   Patient will be able to perform floor transfer without physical assistance   Patient will be able to carry objects without loss of balance  Patient will be able to ambulate outdoors without any loss of balance  Patient will be independent in community based exercise program to promote increased mobiltiy    Cut off score   All date taken from [facility] Neuro Section or Rehab Measures    HOYOS test: 46/56                                              5 x STS Test:  MDC: 6 points                                                  MDC: 2.3 seconds   age norms                                                                 Age Norms   60-69 year old = M: 55, F: 55                        60-69 year old: 11.4 seconds   70-79 year old = M 54,  F: 53                       70-79 year old: 12.6 seconds    80-89 year old = M53,   F: 50                       80-89 year old: 14.8 seconds     TUG test:                                                                     10 Meter Walk Test:  MDC: 4.14 seconds       MDC: .59 ft/sec  Cut off score for Falls                                                  Age Norms  > 13.5 seconds community dwelling adults                 20-29; M: 4.56 ft/sec F: 4.62 ft/sec  > 32.2 Frail Elderly                                                     30-39: M 4.76 ft/sec  F: 4.68 ft/sec          40-49: M: 4.79 ft/sec  F: 4.62 ft/sec  6 Minute Walk Test      50-59: M: 4.76 ft/sec  F: 4.56 ft/sec  MDC: 190 feet       60-69: M: 4.56 ft/sec  F: 4.26 ft/sec  Age Norms       70-+    M: 4.36 ft/sec  F: 4.16 ft/sec  60-69:    M: 1876 F: 1765  70-79:    M: 1729 F: 1545  80-89 +: M: 1368 F; 1286     Plan  Patient would benefit from: skilled physical therapy  Planned modality interventions: cryotherapy    Planned therapy interventions: manual therapy, motor coordination training, neuromuscular re-education, patient education, postural training, sensory integrative techniques, strengthening, stretching, therapeutic activities, therapeutic exercise, gait training, home exercise program, coordination and balance    Frequency: 2x week  Duration in weeks: 10  Treatment plan discussed with: patient        Subjective Evaluation    History of Present Illness  Mechanism of injury: Patient fell  after letting the dog out.  She walked around the corner and landed on the floor.  She indicates her left leg gave out, and per her report she feels this is connected with poor kidney function.  She broke her left ankle and her right foot.  She arrived in a wheelchair and states she has not been walking.  She admits to weight loss with steroid cessation.  She was weight bearing with left cast boot the beginning of this month.  She is now WBAT B/L without cast boot.  Neurological sensitivity continues to be noted from previous injury left knee and thigh.  Patient Goals  Patient goals for therapy: decreased pain  Patient goal: Get back to walking  Pain  Current pain rating: 3  At best pain rating: 3  At worst pain ratin  Location: Left ankle    Social Support  Steps to enter house: no  Stairs in house: no   Lives in: one-story house  Lives with: significant  other    Employment status: not working  Treatments  Previous treatment: physical therapy        Objective     Neurological Testing     Additional Neurological Details  Grossly intact to light touch, however, decreased sensation noted along left thigh.    Strength/Myotome Testing     Left Shoulder     Planes of Motion   Flexion: 3+   Abduction: 3+     Right Shoulder     Planes of Motion   Flexion: 3+   Abduction: 3+     Left Elbow   Flexion: 4-  Extension: 4-    Right Elbow   Flexion: 4-  Extension: 4-    Left Wrist/Hand   Wrist extension: 4-  Wrist flexion: 4-    Right Wrist/Hand   Wrist extension: 4-  Wrist flexion: 4-    Left Hip   Planes of Motion   Flexion: 3- and 3  Extension: 3-  Abduction: 3 and 3-    Right Hip   Planes of Motion   Flexion: 3+  Extension: 3+  Abduction: 4-    Left Knee   Flexion: 3+  Extension: 3+    Right Knee   Flexion: 4-  Extension: 4-    Left Ankle/Foot   Dorsiflexion: 3-  Plantar flexion: 3-    Right Ankle/Foot   Dorsiflexion: 3 and 3+  Plantar flexion: 3- and 3  Neuro Exam:     Sensation   Light touch LE: left WNL and right WNL    Coordination   Finger to nose: left WNL and right WNL    Transfers   Sit to stand: independent   Wheelchair to mat: independent   Mat to wheelchair: independent   Sit to supine: independent   Supine to sit: independent   Roll: independent             Precautions: h/o bimalleolar fx left, fx distal 3rd through 5th MT right, h/o TBI, h/o liver transplant, h/o prolonged steroid, h/o osteoporosis, LBP, Chronic pain, anxiety, CKD, h/o dizziness  EPOC:  1/21/2025    POC Expires Reeval for Medicare to be completed Unit LImit Auth Expiration Date PT/OT/STVisit Limit   1/21/2025 By visit 10       Completed on visit                      DATE 11/12            Auth pending            Visit # 1            Visits remaining             TESTING             HOYOS 41/56            ABC 19.38            5x STS nv            TUG nv            Neuro Re-Ed             Weight shift  A/P & Lat             Unilateral stance             Hurdles             Static balance                                                    Ther Ex                                                                                                                     Ther Activity                                       Gait Training                                       Modalities

## 2024-11-18 ENCOUNTER — APPOINTMENT (OUTPATIENT)
Dept: LAB | Facility: HOSPITAL | Age: 64
End: 2024-11-18
Payer: COMMERCIAL

## 2024-11-18 DIAGNOSIS — Z94.4 LIVER REPLACED BY TRANSPLANT (HCC): ICD-10-CM

## 2024-11-18 LAB
ALBUMIN SERPL BCG-MCNC: 4.3 G/DL (ref 3.5–5)
ALP SERPL-CCNC: 43 U/L (ref 34–104)
ALT SERPL W P-5'-P-CCNC: 5 U/L (ref 7–52)
AST SERPL W P-5'-P-CCNC: 14 U/L (ref 13–39)
BASOPHILS # BLD AUTO: 0.05 THOUSANDS/ÂΜL (ref 0–0.1)
BASOPHILS NFR BLD AUTO: 2 % (ref 0–1)
BILIRUB DIRECT SERPL-MCNC: 0.1 MG/DL (ref 0–0.2)
BILIRUB SERPL-MCNC: 0.6 MG/DL (ref 0.2–1)
EOSINOPHIL # BLD AUTO: 0.54 THOUSAND/ÂΜL (ref 0–0.61)
EOSINOPHIL NFR BLD AUTO: 16 % (ref 0–6)
ERYTHROCYTE [DISTWIDTH] IN BLOOD BY AUTOMATED COUNT: 14.7 % (ref 11.6–15.1)
EST. AVERAGE GLUCOSE BLD GHB EST-MCNC: 94 MG/DL
HBA1C MFR BLD: 4.9 %
HCT VFR BLD AUTO: 31.7 % (ref 34.8–46.1)
HGB BLD-MCNC: 10.4 G/DL (ref 11.5–15.4)
IMM GRANULOCYTES # BLD AUTO: 0.01 THOUSAND/UL (ref 0–0.2)
IMM GRANULOCYTES NFR BLD AUTO: 0 % (ref 0–2)
LYMPHOCYTES # BLD AUTO: 0.5 THOUSANDS/ÂΜL (ref 0.6–4.47)
LYMPHOCYTES NFR BLD AUTO: 15 % (ref 14–44)
MAGNESIUM SERPL-MCNC: 2.2 MG/DL (ref 1.9–2.7)
MCH RBC QN AUTO: 32 PG (ref 26.8–34.3)
MCHC RBC AUTO-ENTMCNC: 32.8 G/DL (ref 31.4–37.4)
MCV RBC AUTO: 98 FL (ref 82–98)
MONOCYTES # BLD AUTO: 0.28 THOUSAND/ÂΜL (ref 0.17–1.22)
MONOCYTES NFR BLD AUTO: 8 % (ref 4–12)
NEUTROPHILS # BLD AUTO: 1.97 THOUSANDS/ÂΜL (ref 1.85–7.62)
NEUTS SEG NFR BLD AUTO: 59 % (ref 43–75)
NRBC BLD AUTO-RTO: 0 /100 WBCS
PLATELET # BLD AUTO: 202 THOUSANDS/UL (ref 149–390)
PMV BLD AUTO: 10.3 FL (ref 8.9–12.7)
PROT SERPL-MCNC: 6.5 G/DL (ref 6.4–8.4)
RBC # BLD AUTO: 3.25 MILLION/UL (ref 3.81–5.12)
TACROLIMUS BLD-MCNC: <1 NG/ML (ref 3–15)
WBC # BLD AUTO: 3.35 THOUSAND/UL (ref 4.31–10.16)

## 2024-11-18 PROCEDURE — 85025 COMPLETE CBC W/AUTO DIFF WBC: CPT

## 2024-11-18 PROCEDURE — 36415 COLL VENOUS BLD VENIPUNCTURE: CPT

## 2024-11-18 PROCEDURE — 83036 HEMOGLOBIN GLYCOSYLATED A1C: CPT

## 2024-11-18 PROCEDURE — 80197 ASSAY OF TACROLIMUS: CPT

## 2024-11-18 PROCEDURE — 80076 HEPATIC FUNCTION PANEL: CPT

## 2024-11-18 PROCEDURE — 83735 ASSAY OF MAGNESIUM: CPT

## 2024-11-21 ENCOUNTER — OFFICE VISIT (OUTPATIENT)
Facility: CLINIC | Age: 64
End: 2024-11-21
Payer: COMMERCIAL

## 2024-11-21 DIAGNOSIS — G61.89 OTHER INFLAMMATORY POLYNEUROPATHIES (HCC): ICD-10-CM

## 2024-11-21 DIAGNOSIS — S82.892D CLOSED FRACTURE OF LEFT ANKLE WITH ROUTINE HEALING, SUBSEQUENT ENCOUNTER: Primary | ICD-10-CM

## 2024-11-21 DIAGNOSIS — Z94.4 LIVER TRANSPLANT RECIPIENT (HCC): ICD-10-CM

## 2024-11-21 DIAGNOSIS — M54.16 LUMBAR RADICULOPATHY: ICD-10-CM

## 2024-11-21 DIAGNOSIS — S92.301A CLOSED FRACTURE OF METATARSAL NECK, RIGHT, INITIAL ENCOUNTER: ICD-10-CM

## 2024-11-21 PROCEDURE — 97140 MANUAL THERAPY 1/> REGIONS: CPT

## 2024-11-21 PROCEDURE — 97110 THERAPEUTIC EXERCISES: CPT

## 2024-11-21 PROCEDURE — 97112 NEUROMUSCULAR REEDUCATION: CPT

## 2024-11-21 NOTE — PROGRESS NOTES
Daily Note     Today's date: 2024  Patient name: Linda Becerra  : 1960  MRN: [id number]  Referring provider: Douglas Godoy MD  Dx:   Encounter Diagnosis     ICD-10-CM    1. Closed fracture of left ankle with routine healing, subsequent encounter  S82.892D       2. Closed fracture of metatarsal neck, right, initial encounter  S92.301A       3. Liver transplant recipient (HCC)  Z94.4       4. Other inflammatory polyneuropathies (HCC)  G61.89       5. Lumbar radiculopathy  M54.16           Start Time: 1155  Stop Time: 1235  Total time in clinic (min): 40 minutes    Subjective:  Patient indicates she continues to experience ankle pain with prolonged stance and walking.  She brought her prescription for WBAT on left and lace up brace she can use as needed.  She states she wore this yesterday but experienced increased pain after wearing several hours.      Objective: See treatment diary below      Assessment:   No redness or irritation noted, edema left ankle evident.  Reviewed ankle ROM exercises for home including elevation, ankle pumps and alphabet exercises for open chain exercises for home.  Ambulation with RW with good tolerance and no knee buckling evident.  Due to history of left knee instability and buckling instructed to use RW at home for safety with good demonstration.  Retrograde massage to allow for decreased edema with good tolerance.      Plan: Continue per plan of care.   No complaints end of session.  Assess tolerance to HEP and initiate step training.  Consider compliant surfaces dependent on pain.     Precautions: h/o bimalleolar fx left, fx distal 3rd through 5th MT right, h/o TBI, h/o liver transplant, h/o prolonged steroid, h/o osteoporosis, LBP, Chronic pain, anxiety, CKD, h/o dizziness  EPOC:  2025    POC Expires Reeval for Medicare to be completed Unit LImit Auth Expiration Date PT/OT/STVisit Limit   2025 By visit 10  2025 12    Completed on visit                       DATE 11/12 11/21           Auth approved            Visit # 1 2           Visits remaining             TESTING             HOYOS 41/56            ABC 19.38            5x STS nv 11sec           TUG nv            Neuro Re-Ed             Weight shift A/P & Lat             Unilateral stance             Hurdles             Static balance             Gait around obstacles  100'x2 CTG with RW                                     Ther Ex             Ankle pumps  10x2           Ankle circles  CW/CCW x5ea           Inver/ever  5x2           Alphabet  A to Z with cuing for smooth motion                                                               Ther Activity                                       Gait Training                                       Manuals             Retrograde left ankle massage  VM- 5'

## 2024-11-22 ENCOUNTER — APPOINTMENT (OUTPATIENT)
Facility: CLINIC | Age: 64
End: 2024-11-22
Payer: COMMERCIAL

## 2024-11-25 ENCOUNTER — APPOINTMENT (OUTPATIENT)
Dept: LAB | Facility: HOSPITAL | Age: 64
End: 2024-11-25
Payer: COMMERCIAL

## 2024-11-25 ENCOUNTER — OFFICE VISIT (OUTPATIENT)
Facility: CLINIC | Age: 64
End: 2024-11-25
Payer: COMMERCIAL

## 2024-11-25 DIAGNOSIS — G61.89 OTHER INFLAMMATORY POLYNEUROPATHIES (HCC): ICD-10-CM

## 2024-11-25 DIAGNOSIS — S92.301A CLOSED FRACTURE OF METATARSAL NECK, RIGHT, INITIAL ENCOUNTER: ICD-10-CM

## 2024-11-25 DIAGNOSIS — Z94.4 LIVER TRANSPLANT RECIPIENT (HCC): ICD-10-CM

## 2024-11-25 DIAGNOSIS — Z94.4 LIVER REPLACED BY TRANSPLANT (HCC): Primary | ICD-10-CM

## 2024-11-25 DIAGNOSIS — S82.892D CLOSED FRACTURE OF LEFT ANKLE WITH ROUTINE HEALING, SUBSEQUENT ENCOUNTER: Primary | ICD-10-CM

## 2024-11-25 DIAGNOSIS — M54.16 LUMBAR RADICULOPATHY: ICD-10-CM

## 2024-11-25 LAB
ALBUMIN SERPL BCG-MCNC: 4.3 G/DL (ref 3.5–5)
ALP SERPL-CCNC: 38 U/L (ref 34–104)
ALT SERPL W P-5'-P-CCNC: 8 U/L (ref 7–52)
ANION GAP SERPL CALCULATED.3IONS-SCNC: 10 MMOL/L (ref 4–13)
AST SERPL W P-5'-P-CCNC: 17 U/L (ref 13–39)
BASOPHILS # BLD AUTO: 0.04 THOUSANDS/ΜL (ref 0–0.1)
BASOPHILS NFR BLD AUTO: 1 % (ref 0–1)
BILIRUB DIRECT SERPL-MCNC: 0.09 MG/DL (ref 0–0.2)
BILIRUB SERPL-MCNC: 0.6 MG/DL (ref 0.2–1)
BUN SERPL-MCNC: 19 MG/DL (ref 5–25)
CALCIUM SERPL-MCNC: 8.8 MG/DL (ref 8.4–10.2)
CHLORIDE SERPL-SCNC: 106 MMOL/L (ref 96–108)
CO2 SERPL-SCNC: 23 MMOL/L (ref 21–32)
CREAT SERPL-MCNC: 1.07 MG/DL (ref 0.6–1.3)
EOSINOPHIL # BLD AUTO: 0.53 THOUSAND/ΜL (ref 0–0.61)
EOSINOPHIL NFR BLD AUTO: 19 % (ref 0–6)
ERYTHROCYTE [DISTWIDTH] IN BLOOD BY AUTOMATED COUNT: 14.3 % (ref 11.6–15.1)
GFR SERPL CREATININE-BSD FRML MDRD: 56 ML/MIN/1.73SQ M
GLUCOSE SERPL-MCNC: 89 MG/DL (ref 65–140)
HCT VFR BLD AUTO: 32.2 % (ref 34.8–46.1)
HGB BLD-MCNC: 10.7 G/DL (ref 11.5–15.4)
IMM GRANULOCYTES # BLD AUTO: 0 THOUSAND/UL (ref 0–0.2)
IMM GRANULOCYTES NFR BLD AUTO: 0 % (ref 0–2)
LYMPHOCYTES # BLD AUTO: 0.68 THOUSANDS/ΜL (ref 0.6–4.47)
LYMPHOCYTES NFR BLD AUTO: 24 % (ref 14–44)
MAGNESIUM SERPL-MCNC: 2 MG/DL (ref 1.9–2.7)
MCH RBC QN AUTO: 32.9 PG (ref 26.8–34.3)
MCHC RBC AUTO-ENTMCNC: 33.2 G/DL (ref 31.4–37.4)
MCV RBC AUTO: 99 FL (ref 82–98)
MONOCYTES # BLD AUTO: 0.18 THOUSAND/ΜL (ref 0.17–1.22)
MONOCYTES NFR BLD AUTO: 6 % (ref 4–12)
NEUTROPHILS # BLD AUTO: 1.38 THOUSANDS/ΜL (ref 1.85–7.62)
NEUTS SEG NFR BLD AUTO: 50 % (ref 43–75)
NRBC BLD AUTO-RTO: 0 /100 WBCS
PLATELET # BLD AUTO: 214 THOUSANDS/UL (ref 149–390)
PMV BLD AUTO: 10.5 FL (ref 8.9–12.7)
POTASSIUM SERPL-SCNC: 5.8 MMOL/L (ref 3.5–5.3)
PROT SERPL-MCNC: 6.9 G/DL (ref 6.4–8.4)
RBC # BLD AUTO: 3.25 MILLION/UL (ref 3.81–5.12)
SODIUM SERPL-SCNC: 139 MMOL/L (ref 135–147)
TACROLIMUS BLD-MCNC: 9.2 NG/ML (ref 3–15)
WBC # BLD AUTO: 2.81 THOUSAND/UL (ref 4.31–10.16)

## 2024-11-25 PROCEDURE — 80197 ASSAY OF TACROLIMUS: CPT

## 2024-11-25 PROCEDURE — 85025 COMPLETE CBC W/AUTO DIFF WBC: CPT

## 2024-11-25 PROCEDURE — 36415 COLL VENOUS BLD VENIPUNCTURE: CPT

## 2024-11-25 PROCEDURE — 80053 COMPREHEN METABOLIC PANEL: CPT

## 2024-11-25 PROCEDURE — 82248 BILIRUBIN DIRECT: CPT

## 2024-11-25 PROCEDURE — 83735 ASSAY OF MAGNESIUM: CPT

## 2024-11-25 PROCEDURE — 97112 NEUROMUSCULAR REEDUCATION: CPT

## 2024-11-25 NOTE — PROGRESS NOTES
Daily Note     Today's date: 2024  Patient name: Linda Becerra  : 1960  MRN: [id number]  Referring provider: Douglas Godoy MD  Dx:   Encounter Diagnosis     ICD-10-CM    1. Closed fracture of left ankle with routine healing, subsequent encounter  S82.892D       2. Closed fracture of metatarsal neck, right, initial encounter  S92.301A       3. Liver transplant recipient (HCC)  Z94.4       4. Other inflammatory polyneuropathies (HCC)  G61.89       5. Lumbar radiculopathy  M54.16             Start Time: 1232  Stop Time: 1301  Total time in clinic (min): 29 minutes    Subjective:  Slight leg pain, she took a Tylenol this morning.  Today 4/10.  She arrived in W/C.  She has been feeling more bone pain.  She did not walk much this weekend because of this.      Objective: See treatment diary below      Assessment:   Less edema noted today.  Step taps off 2 rise with good tolerance.  Step ups with right leading with improved stability.  Hurdles with step to pattern in //bars for confidence, no UE support.  Gait without UE support in //bars for comfort with nice gains.  Discussed ambulation at home with RW for short distances with focus on fatigue avoidance.        Plan: Continue per plan of care.   No complaints end of session.  Consider compliant surfaces dependent on pain.  No increase in pain noted.     Precautions: h/o bimalleolar fx left, fx distal 3rd through 5th MT right, h/o TBI, h/o liver transplant, h/o prolonged steroid, h/o osteoporosis, LBP, Chronic pain, anxiety, CKD, h/o dizziness  EPOC:  2025    POC Expires Reeval for Medicare to be completed Unit LImit Auth Expiration Date PT/OT/STVisit Limit   2025 By visit 10  2025 12    Completed on visit                      DATE           Auth approved            Visit # 1 2 3          Visits remaining             TESTING             HOYOS 41/56            ABC 19.38            5x STS nv 11sec           TUG nv           "  Neuro Re-Ed             Weight shift A/P & Lat             Unilateral stance             Hurdles   5 mid height no UE support 4 laps;  Sidestepping hurdles occ instabiity with UE support 4 laps          Static balance   Partial tandem EO 30\"          Gait around obstacles  100'x2 CTG with RW Gait without UE support in //bars 4 laps          Step taps    2 rise 10x2          Step up   2 rise right leading 5          Ther Ex             Ankle pumps  10x2           Ankle circles  CW/CCW x5ea           Inver/ever  5x2           Alphabet  A to Z with cuing for smooth motion                                                               Ther Activity                                       Gait Training                                       Manuals             Retrograde left ankle massage  VM- 5'                             "

## 2024-12-03 ENCOUNTER — OFFICE VISIT (OUTPATIENT)
Facility: CLINIC | Age: 64
End: 2024-12-03
Payer: COMMERCIAL

## 2024-12-03 DIAGNOSIS — M54.16 LUMBAR RADICULOPATHY: ICD-10-CM

## 2024-12-03 DIAGNOSIS — Z94.4 LIVER TRANSPLANT RECIPIENT (HCC): ICD-10-CM

## 2024-12-03 DIAGNOSIS — S82.892D CLOSED FRACTURE OF LEFT ANKLE WITH ROUTINE HEALING, SUBSEQUENT ENCOUNTER: Primary | ICD-10-CM

## 2024-12-03 DIAGNOSIS — G61.89 OTHER INFLAMMATORY POLYNEUROPATHIES (HCC): ICD-10-CM

## 2024-12-03 DIAGNOSIS — S92.301A CLOSED FRACTURE OF METATARSAL NECK, RIGHT, INITIAL ENCOUNTER: ICD-10-CM

## 2024-12-03 PROCEDURE — 97112 NEUROMUSCULAR REEDUCATION: CPT

## 2024-12-03 NOTE — PROGRESS NOTES
Daily Note     Today's date: 12/3/2024  Patient name: Linda Becerra  : 1960  MRN: [id number]  Referring provider: Douglas Godoy MD  Dx:   Encounter Diagnosis     ICD-10-CM    1. Closed fracture of left ankle with routine healing, subsequent encounter  S82.892D       2. Closed fracture of metatarsal neck, right, initial encounter  S92.301A       3. Liver transplant recipient (HCC)  Z94.4       4. Other inflammatory polyneuropathies (HCC)  G61.89       5. Lumbar radiculopathy  M54.16               Start Time: 1148  Stop Time: 1230  Total time in clinic (min): 42 minutes    Subjective:   No current complaints of pain.  She states she has been walking with her cane more at home.      Objective: See treatment diary below      Assessment:   Initiated ambulation and gait challenges outside //bars.  Hurdles with good stability with cane and step to pattern.  Added 4 square with good clearance.  Gait with cane with no veering noted.  Step up with cane.       Plan: Continue per plan of care.   No complaints end of session.  Consider compliant surfaces dependent on pain.  No increase in pain noted.     Precautions: h/o bimalleolar fx left, fx distal 3rd through 5th MT right, h/o TBI, h/o liver transplant, h/o prolonged steroid, h/o osteoporosis, LBP, Chronic pain, anxiety, CKD, h/o dizziness  EPOC:  2025    POC Expires Reeval for Medicare to be completed Unit LImit Auth Expiration Date PT/OT/STVisit Limit   2025 By visit 10  2025 12    Completed on visit                      DATE 11/12 11/21 11/25 12/3         Auth approved            Visit # 1 2 3 4         Visits remaining             TESTING             HOYOS 41/56            ABC 19.38            5x STS nv 11sec           TUG nv            Neuro Re-Ed             Weight shift A/P & Lat             Unilateral stance             Hurdles   5 mid height no UE support 4 laps;  Sidestepping hurdles occ instabiity with UE support 4 laps Outside //bars  "with cane step to mid height 4 laps, nice stability         Static balance   Partial tandem EO 30\"          4 square     CW/CCW 4ea with cane         Gait around obstacles  100'x2 CTG with RW Gait without UE support in //bars 4 laps Gait with std cane 100'x2         Step taps    2 rise 10x2 2 rise 10x2         Step up   2 rise right leading 5 2 rise cane and //bars 6x         Ther Ex             Ankle pumps  10x2  10x2         Ankle circles  CW/CCW x5ea           Inver/ever  5x2           Alphabet  A to Z with cuing for smooth motion                                                               Ther Activity                                       Gait Training                                       Manuals             Retrograde left ankle massage  VM- 5'                             "

## 2024-12-06 ENCOUNTER — APPOINTMENT (OUTPATIENT)
Facility: CLINIC | Age: 64
End: 2024-12-06
Payer: COMMERCIAL

## 2024-12-09 ENCOUNTER — APPOINTMENT (OUTPATIENT)
Dept: LAB | Facility: HOSPITAL | Age: 64
End: 2024-12-09
Payer: COMMERCIAL

## 2024-12-09 DIAGNOSIS — Z94.4 LIVER REPLACED BY TRANSPLANT (HCC): Primary | ICD-10-CM

## 2024-12-09 LAB
ALBUMIN SERPL BCG-MCNC: 4.3 G/DL (ref 3.5–5)
ALP SERPL-CCNC: 49 U/L (ref 34–104)
ALT SERPL W P-5'-P-CCNC: 13 U/L (ref 7–52)
ANION GAP SERPL CALCULATED.3IONS-SCNC: 10 MMOL/L (ref 4–13)
AST SERPL W P-5'-P-CCNC: 18 U/L (ref 13–39)
BASOPHILS # BLD AUTO: 0.04 THOUSANDS/ÂΜL (ref 0–0.1)
BASOPHILS NFR BLD AUTO: 1 % (ref 0–1)
BILIRUB DIRECT SERPL-MCNC: 0.06 MG/DL (ref 0–0.2)
BILIRUB SERPL-MCNC: 0.68 MG/DL (ref 0.2–1)
BUN SERPL-MCNC: 16 MG/DL (ref 5–25)
CALCIUM SERPL-MCNC: 8.6 MG/DL (ref 8.4–10.2)
CHLORIDE SERPL-SCNC: 103 MMOL/L (ref 96–108)
CO2 SERPL-SCNC: 24 MMOL/L (ref 21–32)
CREAT SERPL-MCNC: 1.21 MG/DL (ref 0.6–1.3)
EOSINOPHIL # BLD AUTO: 0.4 THOUSAND/ÂΜL (ref 0–0.61)
EOSINOPHIL NFR BLD AUTO: 13 % (ref 0–6)
ERYTHROCYTE [DISTWIDTH] IN BLOOD BY AUTOMATED COUNT: 13.9 % (ref 11.6–15.1)
GFR SERPL CREATININE-BSD FRML MDRD: 48 ML/MIN/1.73SQ M
GLUCOSE SERPL-MCNC: 83 MG/DL (ref 65–140)
HCT VFR BLD AUTO: 32.6 % (ref 34.8–46.1)
HGB BLD-MCNC: 10.6 G/DL (ref 11.5–15.4)
IMM GRANULOCYTES # BLD AUTO: 0.02 THOUSAND/UL (ref 0–0.2)
IMM GRANULOCYTES NFR BLD AUTO: 1 % (ref 0–2)
LYMPHOCYTES # BLD AUTO: 0.72 THOUSANDS/ÂΜL (ref 0.6–4.47)
LYMPHOCYTES NFR BLD AUTO: 23 % (ref 14–44)
MAGNESIUM SERPL-MCNC: 2.1 MG/DL (ref 1.9–2.7)
MCH RBC QN AUTO: 31.5 PG (ref 26.8–34.3)
MCHC RBC AUTO-ENTMCNC: 32.5 G/DL (ref 31.4–37.4)
MCV RBC AUTO: 97 FL (ref 82–98)
MONOCYTES # BLD AUTO: 0.21 THOUSAND/ÂΜL (ref 0.17–1.22)
MONOCYTES NFR BLD AUTO: 7 % (ref 4–12)
NEUTROPHILS # BLD AUTO: 1.73 THOUSANDS/ÂΜL (ref 1.85–7.62)
NEUTS SEG NFR BLD AUTO: 55 % (ref 43–75)
NRBC BLD AUTO-RTO: 0 /100 WBCS
PLATELET # BLD AUTO: 200 THOUSANDS/UL (ref 149–390)
PMV BLD AUTO: 10.6 FL (ref 8.9–12.7)
POTASSIUM SERPL-SCNC: 5.4 MMOL/L (ref 3.5–5.3)
PROT SERPL-MCNC: 6.6 G/DL (ref 6.4–8.4)
RBC # BLD AUTO: 3.36 MILLION/UL (ref 3.81–5.12)
SODIUM SERPL-SCNC: 137 MMOL/L (ref 135–147)
WBC # BLD AUTO: 3.12 THOUSAND/UL (ref 4.31–10.16)

## 2024-12-09 PROCEDURE — 82248 BILIRUBIN DIRECT: CPT

## 2024-12-09 PROCEDURE — 36415 COLL VENOUS BLD VENIPUNCTURE: CPT

## 2024-12-09 PROCEDURE — 85025 COMPLETE CBC W/AUTO DIFF WBC: CPT

## 2024-12-09 PROCEDURE — 80197 ASSAY OF TACROLIMUS: CPT

## 2024-12-09 PROCEDURE — 80053 COMPREHEN METABOLIC PANEL: CPT

## 2024-12-09 PROCEDURE — 83735 ASSAY OF MAGNESIUM: CPT

## 2024-12-10 ENCOUNTER — APPOINTMENT (OUTPATIENT)
Facility: CLINIC | Age: 64
End: 2024-12-10
Payer: COMMERCIAL

## 2024-12-10 LAB — TACROLIMUS BLD-MCNC: 9.6 NG/ML (ref 3–15)

## 2024-12-12 ENCOUNTER — OFFICE VISIT (OUTPATIENT)
Facility: CLINIC | Age: 64
End: 2024-12-12
Payer: COMMERCIAL

## 2024-12-12 DIAGNOSIS — M54.16 LUMBAR RADICULOPATHY: ICD-10-CM

## 2024-12-12 DIAGNOSIS — S82.892D CLOSED FRACTURE OF LEFT ANKLE WITH ROUTINE HEALING, SUBSEQUENT ENCOUNTER: Primary | ICD-10-CM

## 2024-12-12 DIAGNOSIS — G61.89 OTHER INFLAMMATORY POLYNEUROPATHIES (HCC): ICD-10-CM

## 2024-12-12 DIAGNOSIS — Z94.4 LIVER TRANSPLANT RECIPIENT (HCC): ICD-10-CM

## 2024-12-12 DIAGNOSIS — S92.301A CLOSED FRACTURE OF METATARSAL NECK, RIGHT, INITIAL ENCOUNTER: ICD-10-CM

## 2024-12-12 PROCEDURE — 97112 NEUROMUSCULAR REEDUCATION: CPT

## 2024-12-12 NOTE — PROGRESS NOTES
Daily Note     Today's date: 2024  Patient name: Linda Becerra  : 1960  MRN: [id number]  Referring provider: Douglas Godoy MD  Dx:   Encounter Diagnosis     ICD-10-CM    1. Closed fracture of left ankle with routine healing, subsequent encounter  S82.892D       2. Closed fracture of metatarsal neck, right, initial encounter  S92.301A       3. Liver transplant recipient (HCC)  Z94.4       4. Other inflammatory polyneuropathies (HCC)  G61.89       5. Lumbar radiculopathy  M54.16                 Start Time: 846  Stop Time: 930  Total time in clinic (min): 44 minutes    Subjective:   She missed her last appointment due to transportation issues.  She did not sleep well last night.  She has been walking in and out of the house more than previous.  She is experiencing body pain intermittently.  General weakness noted.        Objective: See treatment diary below      Assessment:   Overall stability in step challenges and increased distance ambulation with improved tolerance.  No knee buckling noted to session.  Encouraged increased ambulation at home with RW for safety outside home distances.  Initiated cane step up outside //bars with nice gains in stability and confidence.        Plan: Continue per plan of care.   No complaints end of session.  Consider compliant surfaces dependent on pain.      Precautions: h/o bimalleolar fx left, fx distal 3rd through 5th MT right, h/o TBI, h/o liver transplant, h/o prolonged steroid, h/o osteoporosis, LBP, Chronic pain, anxiety, CKD, h/o dizziness  EPOC:  2025    POC Expires Reeval for Medicare to be completed Unit LImit Auth Expiration Date PT/OT/STVisit Limit   2025 By visit 10  2025 12    Completed on visit                      DATE 11/12 11/21 11/25 12/3 12/12        Auth approved            Visit # 1 2 3 4 5        Visits remaining             TESTING             HOYOS 41/56            ABC 19.38            5x STS nv 11sec           TUG nv        "     Neuro Re-Ed             Weight shift A/P & Lat             Unilateral stance             Hurdles   5 mid height no UE support 4 laps;  Sidestepping hurdles occ instabiity with UE support 4 laps Outside //bars with cane step to mid height 4 laps, nice stability 5 hurdles foot over foot x4 laps;          Static balance   Partial tandem EO 30\"          4 square     CW/CCW 4ea with cane         Gait around obstacles  100'x2 CTG with RW Gait without UE support in //bars 4 laps Gait with std cane 100'x2 Distance ambulation around obstacles on level with st cane 180'        Step taps    2 rise 10x2 2 rise 10x2 10x2 alt LE off 2 rise with cane alone        Step up   2 rise right leading 5 2 rise cane and //bars 6x 2 rise with cane outside //bars 5x2        Ther Ex             Ankle pumps  10x2  10x2         Ankle circles  CW/CCW x5ea           Inver/ever  5x2           Alphabet  A to Z with cuing for smooth motion                                                               Ther Activity                                       Gait Training                                       Manuals             Retrograde left ankle massage  VM- 5'                             "

## 2024-12-17 ENCOUNTER — OFFICE VISIT (OUTPATIENT)
Facility: CLINIC | Age: 64
End: 2024-12-17
Payer: COMMERCIAL

## 2024-12-17 DIAGNOSIS — Z94.4 LIVER TRANSPLANT RECIPIENT (HCC): ICD-10-CM

## 2024-12-17 DIAGNOSIS — S82.892D CLOSED FRACTURE OF LEFT ANKLE WITH ROUTINE HEALING, SUBSEQUENT ENCOUNTER: Primary | ICD-10-CM

## 2024-12-17 DIAGNOSIS — S92.301A CLOSED FRACTURE OF METATARSAL NECK, RIGHT, INITIAL ENCOUNTER: ICD-10-CM

## 2024-12-17 DIAGNOSIS — G61.89 OTHER INFLAMMATORY POLYNEUROPATHIES (HCC): ICD-10-CM

## 2024-12-17 DIAGNOSIS — M54.16 LUMBAR RADICULOPATHY: ICD-10-CM

## 2024-12-17 PROCEDURE — 97112 NEUROMUSCULAR REEDUCATION: CPT

## 2024-12-17 PROCEDURE — 97110 THERAPEUTIC EXERCISES: CPT

## 2024-12-17 PROCEDURE — 97140 MANUAL THERAPY 1/> REGIONS: CPT

## 2024-12-17 NOTE — PROGRESS NOTES
Daily Note     Today's date: 2024  Patient name: Linda Becerra  : 1960  MRN: [id number]  Referring provider: Douglas Godoy MD  Dx:   Encounter Diagnosis     ICD-10-CM    1. Closed fracture of left ankle with routine healing, subsequent encounter  S82.892D       2. Closed fracture of metatarsal neck, right, initial encounter  S92.301A       3. Liver transplant recipient (HCC)  Z94.4       4. Other inflammatory polyneuropathies (HCC)  G61.89       5. Lumbar radiculopathy  M54.16                 Start Time: 1020  Stop Time: 1059  Total time in clinic (min): 39 minutes    Subjective:   She was able to go shopping with RW with good tolerance, no knee buckling noted.  She has noted increased fatigue, but no worsening of pain.      Objective: See treatment diary below      Assessment:   Increased distance ambulation today with improving tolerance and no veering evident.  Sit to stand with increased instability left LE due to weakness.  Instructed in staggered stance to allow for increased weight bearing through left with good demonstration.  Increased left sided cervical tightness with referral HA pain.  STM with nice gains in relief.  KT applied right LE as noted due to increased knee discomfort with weight shift.  Continue to encourage pacing to maximize strengthening.     Plan: Continue per plan of care.   No complaints end of session.  Consider compliant surfaces dependent on pain.      Precautions: h/o bimalleolar fx left, fx distal 3rd through 5th MT right, h/o TBI, h/o liver transplant, h/o prolonged steroid, h/o osteoporosis, LBP, Chronic pain, anxiety, CKD, h/o dizziness  EPOC:  2025    POC Expires Reeval for Medicare to be completed Unit LImit Auth Expiration Date PT/OT/STVisit Limit   2025 By visit 10  2025 12    Completed on visit                      DATE 11/12 11/21 11/25 12/3 12/12 12/17       Auth approved            Visit # 1 2 3 4 5 6       Visits remaining            "  TESTING             HOYOS 41/56            ABC 19.38            5x STS nv 11sec           TUG nv            Neuro Re-Ed             Weight shift A/P & Lat             Unilateral stance             Hurdles   5 mid height no UE support 4 laps;  Sidestepping hurdles occ instabiity with UE support 4 laps Outside //bars with cane step to mid height 4 laps, nice stability 5 hurdles foot over foot x4 laps;          Static balance   Partial tandem EO 30\"          4 square     CW/CCW 4ea with cane         Gait around obstacles  100'x2 CTG with RW Gait without UE support in //bars 4 laps Gait with std cane 100'x2 Distance ambulation around obstacles on level with st cane 180' Distance walking around clinic 280' with no veering std cane       Step taps    2 rise 10x2 2 rise 10x2 10x2 alt LE off 2 rise with cane alone        Step up   2 rise right leading 5 2 rise cane and //bars 6x 2 rise with cane outside //bars 5x2        Ther Ex             Ankle pumps  10x2  10x2         Ankle circles  CW/CCW x5ea           Inver/ever  5x2           Alphabet  A to Z with cuing for smooth motion           Staggered sit to stand      5x3 from one raised cushion                                              Modalities             KT- paper off tension \"Y\"       Right knee                    Gait Training                                       Manuals             Retrograde left ankle massage  VM- 5'    VM- SCM release left                         "

## 2024-12-19 ENCOUNTER — OFFICE VISIT (OUTPATIENT)
Facility: CLINIC | Age: 64
End: 2024-12-19
Payer: COMMERCIAL

## 2024-12-19 DIAGNOSIS — Z94.4 LIVER TRANSPLANT RECIPIENT (HCC): ICD-10-CM

## 2024-12-19 DIAGNOSIS — S92.301A CLOSED FRACTURE OF METATARSAL NECK, RIGHT, INITIAL ENCOUNTER: ICD-10-CM

## 2024-12-19 DIAGNOSIS — G61.89 OTHER INFLAMMATORY POLYNEUROPATHIES (HCC): ICD-10-CM

## 2024-12-19 DIAGNOSIS — S82.892D CLOSED FRACTURE OF LEFT ANKLE WITH ROUTINE HEALING, SUBSEQUENT ENCOUNTER: Primary | ICD-10-CM

## 2024-12-19 DIAGNOSIS — M54.16 LUMBAR RADICULOPATHY: ICD-10-CM

## 2024-12-19 PROCEDURE — 97112 NEUROMUSCULAR REEDUCATION: CPT

## 2024-12-19 PROCEDURE — 97110 THERAPEUTIC EXERCISES: CPT

## 2024-12-19 NOTE — PROGRESS NOTES
Daily Note     Today's date: 2024  Patient name: Linda Becerra  : 1960  MRN: [id number]  Referring provider: Douglas Godoy MD  Dx:   Encounter Diagnosis     ICD-10-CM    1. Closed fracture of left ankle with routine healing, subsequent encounter  S82.892D       2. Closed fracture of metatarsal neck, right, initial encounter  S92.301A       3. Liver transplant recipient (HCC)  Z94.4       4. Other inflammatory polyneuropathies (HCC)  G61.89       5. Lumbar radiculopathy  M54.16             Start Time: 1015  Stop Time: 1055  Total time in clinic (min): 40 minutes    Subjective:   Patient indicates she has been going shopping more and is able to stand/walk further before feeling fatigued.      Objective: See treatment diary below      Assessment:   Returned to standing balance tasks with improved stepping clearance over hurdles.  Static balance with decreased haptic touch noted.  Added hip extension strengthening with good demonstration and voiced understanding.  Written instructions given.  Continue to encourage pacing to avoid fatigue.    Plan: Continue per plan of care.   No complaints end of session.  Consider compliant surfaces dependent on pain.      Precautions: h/o bimalleolar fx left, fx distal 3rd through 5th MT right, h/o TBI, h/o liver transplant, h/o prolonged steroid, h/o osteoporosis, LBP, Chronic pain, anxiety, CKD, h/o dizziness  EPOC:  2025    POC Expires Reeval for Medicare to be completed Unit LImit Auth Expiration Date PT/OT/STVisit Limit   2025 By visit 10  2025 12    Completed on visit                      DATE 11/12 11/21 11/25 12/3 12/12 12/17 12/19      Auth approved            Visit # 1 2 3 4 5 6 7      Visits remaining       5      TESTING             HOYOS 41/56            ABC 19.38            5x STS nv 11sec           TUG nv            Neuro Re-Ed             Weight shift A/P & Lat             Unilateral stance       For hip extension knee extended  "supported 5x2      Hurdles   5 mid height no UE support 4 laps;  Sidestepping hurdles occ instabiity with UE support 4 laps Outside //bars with cane step to mid height 4 laps, nice stability 5 hurdles foot over foot x4 laps;    6 hurdels step to 2 laps, foot over foot 4 laps with cane      Static balance   Partial tandem EO 30\"    EC HT FA 5x2 on foam      4 square     CW/CCW 4ea with cane   CW/CCW 4 ea      Gait around obstacles  100'x2 CTG with RW Gait without UE support in //bars 4 laps Gait with std cane 100'x2 Distance ambulation around obstacles on level with st cane 180' Distance walking around clinic 280' with no veering std cane       Step taps    2 rise 10x2 2 rise 10x2 10x2 alt LE off 2 rise with cane alone        Step up   2 rise right leading 5 2 rise cane and //bars 6x 2 rise with cane outside //bars 5x2        Ther Ex             Ankle pumps  10x2  10x2         Ankle circles  CW/CCW x5ea           Inver/ever  5x2           Alphabet  A to Z with cuing for smooth motion           Staggered sit to stand      5x3 from one raised cushion 5x2 staggered      Hip extension stance       With knee flex 5x2                                Modalities             KT- paper off tension \"Y\"       Right knee                    Gait Training                                       Manuals             Retrograde left ankle massage  VM- 5'    VM- SCM release left                         "

## 2024-12-23 ENCOUNTER — APPOINTMENT (OUTPATIENT)
Dept: LAB | Facility: HOSPITAL | Age: 64
End: 2024-12-23
Payer: COMMERCIAL

## 2024-12-23 ENCOUNTER — TRANSCRIBE ORDERS (OUTPATIENT)
Dept: LAB | Facility: CLINIC | Age: 64
End: 2024-12-23

## 2024-12-23 DIAGNOSIS — Z94.4 LIVER REPLACED BY TRANSPLANT (HCC): ICD-10-CM

## 2024-12-23 LAB
ALBUMIN SERPL BCG-MCNC: 4.3 G/DL (ref 3.5–5)
ALP SERPL-CCNC: 38 U/L (ref 34–104)
ALT SERPL W P-5'-P-CCNC: 7 U/L (ref 7–52)
ANION GAP SERPL CALCULATED.3IONS-SCNC: 6 MMOL/L (ref 4–13)
ANISOCYTOSIS BLD QL SMEAR: PRESENT
AST SERPL W P-5'-P-CCNC: 14 U/L (ref 13–39)
BASOPHILS # BLD MANUAL: 0.09 THOUSAND/UL (ref 0–0.1)
BASOPHILS NFR MAR MANUAL: 3 % (ref 0–1)
BILIRUB DIRECT SERPL-MCNC: 0.1 MG/DL (ref 0–0.2)
BILIRUB SERPL-MCNC: 0.56 MG/DL (ref 0.2–1)
BUN SERPL-MCNC: 20 MG/DL (ref 5–25)
CALCIUM SERPL-MCNC: 9.2 MG/DL (ref 8.4–10.2)
CHLORIDE SERPL-SCNC: 105 MMOL/L (ref 96–108)
CO2 SERPL-SCNC: 27 MMOL/L (ref 21–32)
CREAT SERPL-MCNC: 1.26 MG/DL (ref 0.6–1.3)
EOSINOPHIL # BLD MANUAL: 0.47 THOUSAND/UL (ref 0–0.4)
EOSINOPHIL NFR BLD MANUAL: 15 % (ref 0–6)
ERYTHROCYTE [DISTWIDTH] IN BLOOD BY AUTOMATED COUNT: 14 % (ref 11.6–15.1)
GFR SERPL CREATININE-BSD FRML MDRD: 45 ML/MIN/1.73SQ M
GLUCOSE P FAST SERPL-MCNC: 88 MG/DL (ref 65–99)
HCT VFR BLD AUTO: 35.8 % (ref 34.8–46.1)
HGB BLD-MCNC: 11.4 G/DL (ref 11.5–15.4)
LYMPHOCYTES # BLD AUTO: 0.84 THOUSAND/UL (ref 0.6–4.47)
LYMPHOCYTES # BLD AUTO: 23 % (ref 14–44)
MAGNESIUM SERPL-MCNC: 2.2 MG/DL (ref 1.9–2.7)
MCH RBC QN AUTO: 31.6 PG (ref 26.8–34.3)
MCHC RBC AUTO-ENTMCNC: 31.8 G/DL (ref 31.4–37.4)
MCV RBC AUTO: 99 FL (ref 82–98)
METAMYELOCYTE ABSOLUTE CT: 0.03 THOUSAND/UL (ref 0–0.1)
METAMYELOCYTES NFR BLD MANUAL: 1 % (ref 0–1)
MONOCYTES # BLD AUTO: 0.16 THOUSAND/UL (ref 0–1.22)
MONOCYTES NFR BLD: 5 % (ref 4–12)
NEUTROPHILS # BLD MANUAL: 1.53 THOUSAND/UL (ref 1.85–7.62)
NEUTS BAND NFR BLD MANUAL: 6 % (ref 0–8)
NEUTS SEG NFR BLD AUTO: 43 % (ref 43–75)
OVALOCYTES BLD QL SMEAR: PRESENT
PLATELET # BLD AUTO: 241 THOUSANDS/UL (ref 149–390)
PLATELET BLD QL SMEAR: ADEQUATE
PMV BLD AUTO: 11.1 FL (ref 8.9–12.7)
POIKILOCYTOSIS BLD QL SMEAR: PRESENT
POTASSIUM SERPL-SCNC: 5.6 MMOL/L (ref 3.5–5.3)
PROT SERPL-MCNC: 6.8 G/DL (ref 6.4–8.4)
RBC # BLD AUTO: 3.61 MILLION/UL (ref 3.81–5.12)
RBC MORPH BLD: PRESENT
SODIUM SERPL-SCNC: 138 MMOL/L (ref 135–147)
TACROLIMUS BLD-MCNC: 10.4 NG/ML (ref 3–15)
VARIANT LYMPHS # BLD AUTO: 4 %
WBC # BLD AUTO: 3.12 THOUSAND/UL (ref 4.31–10.16)

## 2024-12-23 PROCEDURE — 83735 ASSAY OF MAGNESIUM: CPT

## 2024-12-23 PROCEDURE — 85027 COMPLETE CBC AUTOMATED: CPT

## 2024-12-23 PROCEDURE — 85007 BL SMEAR W/DIFF WBC COUNT: CPT

## 2024-12-23 PROCEDURE — 80197 ASSAY OF TACROLIMUS: CPT

## 2024-12-23 PROCEDURE — 82248 BILIRUBIN DIRECT: CPT

## 2024-12-23 PROCEDURE — 36415 COLL VENOUS BLD VENIPUNCTURE: CPT

## 2024-12-23 PROCEDURE — 80053 COMPREHEN METABOLIC PANEL: CPT

## 2024-12-24 ENCOUNTER — RESULTS FOLLOW-UP (OUTPATIENT)
Dept: OBGYN CLINIC | Facility: CLINIC | Age: 64
End: 2024-12-24

## 2024-12-27 ENCOUNTER — OFFICE VISIT (OUTPATIENT)
Facility: CLINIC | Age: 64
End: 2024-12-27
Payer: COMMERCIAL

## 2024-12-27 DIAGNOSIS — Z94.4 LIVER TRANSPLANT RECIPIENT (HCC): ICD-10-CM

## 2024-12-27 DIAGNOSIS — G61.89 OTHER INFLAMMATORY POLYNEUROPATHIES (HCC): ICD-10-CM

## 2024-12-27 DIAGNOSIS — M54.16 LUMBAR RADICULOPATHY: ICD-10-CM

## 2024-12-27 DIAGNOSIS — S92.301A CLOSED FRACTURE OF METATARSAL NECK, RIGHT, INITIAL ENCOUNTER: ICD-10-CM

## 2024-12-27 DIAGNOSIS — S82.892D CLOSED FRACTURE OF LEFT ANKLE WITH ROUTINE HEALING, SUBSEQUENT ENCOUNTER: Primary | ICD-10-CM

## 2024-12-27 PROCEDURE — 97112 NEUROMUSCULAR REEDUCATION: CPT

## 2024-12-27 PROCEDURE — 97110 THERAPEUTIC EXERCISES: CPT

## 2024-12-27 NOTE — PROGRESS NOTES
Daily Note     Today's date: 2024  Patient name: Linda Becerra  : 1960  MRN: [id number]  Referring provider: Douglas Godoy MD  Dx:   Encounter Diagnosis     ICD-10-CM    1. Closed fracture of left ankle with routine healing, subsequent encounter  S82.892D       2. Closed fracture of metatarsal neck, right, initial encounter  S92.301A       3. Liver transplant recipient (HCC)  Z94.4       4. Other inflammatory polyneuropathies (HCC)  G61.89       5. Lumbar radiculopathy  M54.16               Start Time: 942  Stop Time: 1020  Total time in clinic (min): 38 minutes    Subjective:   She has felt more tired with the holiday.  NO knee buckling noted.  The colder temperature is also not helping.      Objective: See treatment diary below      Assessment:   Introduced gait with HT, difficulty noted with horizontal motion with occasional instability evident.  Introduced additional ankle exercises for strengthening with ball with good demonstration, encouraged to continue at home.  Foam for static balance with improved stability.  Continue to encourage community ambulation.      Plan: Continue per plan of care.   No complaints end of session.  Consider compliant surfaces dependent on pain.   Re-assessment next session.     Precautions: h/o bimalleolar fx left, fx distal 3rd through 5th MT right, h/o TBI, h/o liver transplant, h/o prolonged steroid, h/o osteoporosis, LBP, Chronic pain, anxiety, CKD, h/o dizziness  EPOC:  2025    POC Expires Reeval for Medicare to be completed Unit LImit Auth Expiration Date PT/OT/STVisit Limit   2025 By visit 10  2025 12    Completed on visit                      DATE 11/12 11/21 11/25 12/3 12/12 12/17 12/19 12/27     Auth approved            Visit # 1 2 3 4 5 6 7 8     Visits remaining       5 4     TESTING             HOYOS 41/56            ABC 19.38            5x STS nv 11sec           TUG nv            Neuro Re-Ed             Weight shift A/P & Lat          "    Unilateral stance       For hip extension knee extended supported 5x2 Unilateral cone taps 2 sequential x4ea     Hurdles   5 mid height no UE support 4 laps;  Sidestepping hurdles occ instabiity with UE support 4 laps Outside //bars with cane step to mid height 4 laps, nice stability 5 hurdles foot over foot x4 laps;    6 hurdels step to 2 laps, foot over foot 4 laps with cane      Static balance   Partial tandem EO 30\"    EC HT FA 5x2 on foam Foam partial tandem EC 10\" x4, EC HT 5x2     4 square     CW/CCW 4ea with cane   CW/CCW 4 ea      Gait around obstacles  100'x2 CTG with RW Gait without UE support in //bars 4 laps Gait with std cane 100'x2 Distance ambulation around obstacles on level with st cane 180' Distance walking around clinic 280' with no veering std cane  Gait with HT 4 laps ea     Step taps    2 rise 10x2 2 rise 10x2 10x2 alt LE off 2 rise with cane alone        Step up   2 rise right leading 5 2 rise cane and //bars 6x 2 rise with cane outside //bars 5x2        Ther Ex             Ankle pumps  10x2  10x2         Ankle circles  CW/CCW x5ea      5x2     Inver/ever  5x2           Alphabet  A to Z with cuing for smooth motion           Staggered sit to stand      5x3 from one raised cushion 5x2 staggered 5x2     Hip extension stance       With knee flex 5x2      Ball roll ankle strengthening df/pf inver/ever        10x ea B/L                  Modalities             KT- paper off tension \"Y\"       Right knee                    Gait Training                                       Manuals             Retrograde left ankle massage  VM- 5'    VM- SCM release left       TPR- left UT, Lev, SCM        VM          "

## 2024-12-31 ENCOUNTER — OFFICE VISIT (OUTPATIENT)
Facility: CLINIC | Age: 64
End: 2024-12-31
Payer: COMMERCIAL

## 2024-12-31 DIAGNOSIS — S92.301A CLOSED FRACTURE OF METATARSAL NECK, RIGHT, INITIAL ENCOUNTER: ICD-10-CM

## 2024-12-31 DIAGNOSIS — Z94.4 LIVER TRANSPLANT RECIPIENT (HCC): ICD-10-CM

## 2024-12-31 DIAGNOSIS — S82.892D CLOSED FRACTURE OF LEFT ANKLE WITH ROUTINE HEALING, SUBSEQUENT ENCOUNTER: Primary | ICD-10-CM

## 2024-12-31 DIAGNOSIS — G61.89 OTHER INFLAMMATORY POLYNEUROPATHIES (HCC): ICD-10-CM

## 2024-12-31 DIAGNOSIS — M54.16 LUMBAR RADICULOPATHY: ICD-10-CM

## 2024-12-31 PROCEDURE — 97112 NEUROMUSCULAR REEDUCATION: CPT

## 2024-12-31 PROCEDURE — 97112 NEUROMUSCULAR REEDUCATION: CPT | Performed by: PHYSICAL THERAPIST

## 2024-12-31 NOTE — PROGRESS NOTES
PT DISCHARGE/ DAILY NOTE    Today's date: 2024  Patient name: Linda Becerra  : 1960  MRN: [id number]  Referring provider: Douglas Godoy MD  Dx:   Encounter Diagnosis     ICD-10-CM    1. Closed fracture of left ankle with routine healing, subsequent encounter  S82.892D       2. Closed fracture of metatarsal neck, right, initial encounter  S92.301A       3. Liver transplant recipient (HCC)  Z94.4       4. Other inflammatory polyneuropathies (HCC)  G61.89       5. Lumbar radiculopathy  M54.16             Start Time: 1028  Stop Time: 1104  Total time in clinic (min): 36 minutes    Assessment    Assessment details: Patient is well known to this PT with history of weakness and instability post liver transplant.  She had been walking well until  when she slipped walking around a corner resulting in closed Bimalleolar fracture left and right metatarsal head fx (3-5) on right.  Due to complex medical history including CKD, prolonged hospitalization was necessary transitioning to inpatient rehab.  She now has cast boot removed and is WBAT, however, due to prolonged immobility substantial weakness and instability is noted.  HOYOS scoring demonstrates substantial decline in balance scoring 41/56 and ABC scoring reflects decreased confidence in all ADL tasks scoring 19.3.  Recommend skilled intervention to facilitate return to ambulation, improved overall safety, increased strength and greatest functional mobility while promoting pain control.  Patient is in agreement with treatment plan.  Given complex medical history, progress may be slow, extended POC requested.      Patient has made substantial gains with PT and is now ambulating with std cane with ease in community.  HOYOS scoring has improved achieving 55/56 and improved confidence is noted on ABC scoring 67.5 (from 19.3).  She is independent in HEP including static balance and ankle strengthening with good demonstration.  She has achieved maximal  gains from PT at this time and has been instructed to continue her HEP and to contact PT should any questions or problems arise.  Patient is in agreement with discharge plans and voiced understanding to all instructions.  Understanding of Dx/Px/POC: good     Prognosis: good    Goals  Goals  STG 30 days    Patient will improve static balance with feet together Eyes Closed Foam surface  to 20 seconds indicating reduction in fall risk- MET  Patient will initiate ambulation with RW for household distances without increase in pain- MET and exceeded  Patient will display 2.3  second improvement with overall score of TBA seconds  with TUG test or lower with noted improvement being Minimal Detectable Change pre current research standards with fall risk- held    Patient will achieve 47/56 HOYOS score with minimal improve by 6 points or more demonstrating Minimal Detectable change per current research standards for this objective test which assess fall risk- MET and exceeded   Patient will achieve .59 ft/sec improvement with score of  TBA ft/sec with 10 Meter Walk test, demonstrating Minimal Detectable change per current research standards for this objective test which assess fall risk.- held  Patient will perform  5 x sit to stand test with overall reduction by seconds to TBA score indicating improvement with functional endurance- completed in 10 sec, slightly reduced from previous D/C  Patient will be able independent in general strengthening and static balance HEP- MET    LTG: 10 weeks  Patient will achieve 190 feet improvement with overall distance achieved of TBA feet with 6 minute walk test which is Minimal Detectable Change pre current research standards with endurance to demonstrate enhance functional capacity -Held due to community mobility  Patient will improve ABC scoring by at least 50% demonstrating increased confidence in overall mobility - MET and exceeded  Patient will be able to perform floor transfer without  physical assistance - Met with UE assist  Patient will be able to carry objects without loss of balance- Met with light objects  Patient will be able to ambulate outdoors without any loss of balance- Met with std cane  Patient will be independent in community based exercise program to promote increased mobility- Met with anticipation of returning to [facility]    Cut off score   All date taken from [facility] Neuro Section or Rehab Measures    HOYOS test: 46/56                                              5 x STS Test:  MDC: 6 points                                                  MDC: 2.3 seconds   age norms                                                                 Age Norms   60-69 year old = M: 55, F: 55                        60-69 year old: 11.4 seconds   70-79 year old = M 54,  F: 53                       70-79 year old: 12.6 seconds    80-89 year old = M53,   F: 50                       80-89 year old: 14.8 seconds     TUG test:                                                                     10 Meter Walk Test:  MDC: 4.14 seconds       MDC: .59 ft/sec  Cut off score for Falls                                                  Age Norms  > 13.5 seconds community dwelling adults                20-29; M: 4.56 ft/sec F: 4.62 ft/sec  > 32.2 Frail Elderly                                                     30-39: M 4.76 ft/sec  F: 4.68 ft/sec          40-49: M: 4.79 ft/sec  F: 4.62 ft/sec  6 Minute Walk Test      50-59: M: 4.76 ft/sec  F: 4.56 ft/sec  MDC: 190 feet       60-69: M: 4.56 ft/sec  F: 4.26 ft/sec  Age Norms       70-+    M: 4.36 ft/sec  F: 4.16 ft/sec  60-69:    M: 1876 F: 1765  70-79:    M: 1729 F: 1545  80-89 +: M: 1368 F; 1286     Plan    Treatment plan discussed with: patient      Subjective Evaluation    History of Present Illness  Mechanism of injury: Patient fell 8/17 after letting the dog out.  She walked around the corner and landed on the floor.  She indicates her left leg gave out, and per her  report she feels this is connected with poor kidney function.  She broke her left ankle and her right foot.  She arrived in a wheelchair and states she has not been walking.  She admits to weight loss with steroid cessation.  She was weight bearing with left cast boot the beginning of this month.  She is now WBAT B/L without cast boot.  Neurological sensitivity continues to be noted from previous injury left knee and thigh.      She has been walking more with cane.  She also has been going out more and is continuing to monitor fatigue.   Patient Goals  Patient goals for therapy: decreased pain  Patient goal: Get back to walking  Pain  Current pain ratin  At best pain ratin  At worst pain ratin  Location: Left ankle    Social Support  Steps to enter house: no  Stairs in house: no   Lives in: one-story house  Lives with: significant other    Employment status: not working  Treatments  Previous treatment: physical therapy      Objective     Neurological Testing     Additional Neurological Details  Grossly intact to light touch, however, decreased sensation noted along left thigh.    Strength/Myotome Testing     Left Shoulder     Planes of Motion   Flexion: 3+   Abduction: 3+     Right Shoulder     Planes of Motion   Flexion: 3+   Abduction: 3+     Left Elbow   Flexion: 4-  Extension: 4-    Right Elbow   Flexion: 4-  Extension: 4-    Left Wrist/Hand   Wrist extension: 4-  Wrist flexion: 4-    Right Wrist/Hand   Wrist extension: 4-  Wrist flexion: 4-    Left Hip   Planes of Motion   Flexion: 3- and 3  Extension: 3-  Abduction: 3 and 3-    Right Hip   Planes of Motion   Flexion: 3+  Extension: 3+  Abduction: 4-    Left Knee   Flexion: 3+  Extension: 3+    Right Knee   Flexion: 4-  Extension: 4-    Left Ankle/Foot   Dorsiflexion: 3-  Plantar flexion: 3-    Right Ankle/Foot   Dorsiflexion: 3 and 3+  Plantar flexion: 3- and 3  Neuro Exam:     Sensation   Light touch LE: left WNL and right WNL    Coordination  "  Finger to nose: left WNL and right WNL    Transfers   Sit to stand: independent   Wheelchair to mat: independent   Mat to wheelchair: independent   Sit to supine: independent   Supine to sit: independent   Roll: independent             Precautions: h/o bimalleolar fx left, fx distal 3rd through 5th MT right, h/o TBI, h/o liver transplant, h/o prolonged steroid, h/o osteoporosis, LBP, Chronic pain, anxiety, CKD, h/o dizziness  EPOC:  1/21/2025    POC Expires Reeval for Medicare to be completed Unit LImit Auth Expiration Date PT/OT/STVisit Limit   1/21/2025 By visit 10 BOMN 12/31/2024 BOMN    Completed on visit                      DATE 11/12 12/31           Auth approved            Visit # 1 9           Visits remaining  3           TESTING             HOYOS 41/56 55/56           ABC 19.38 67.5           5x STS nv 10.5           TUG nv            Neuro Re-Ed             Weight shift A/P & Lat  10x2           Unilateral stance  5-10 sec x4ea           Hurdles             Static balance  EC 10\" x3, EC HT 5x2                                                  Ther Ex                                                                                                                     Ther Activity                                       Gait Training                                       Modalities                                            "

## 2025-01-03 ENCOUNTER — APPOINTMENT (OUTPATIENT)
Facility: CLINIC | Age: 65
End: 2025-01-03
Payer: COMMERCIAL

## 2025-01-06 ENCOUNTER — APPOINTMENT (OUTPATIENT)
Dept: LAB | Facility: HOSPITAL | Age: 65
End: 2025-01-06
Payer: COMMERCIAL

## 2025-01-06 DIAGNOSIS — Z94.4 LIVER TRANSPLANT STATUS (HCC): ICD-10-CM

## 2025-01-06 DIAGNOSIS — Z94.4 LIVER TRANSPLANT RECIPIENT (HCC): Primary | ICD-10-CM

## 2025-01-06 LAB
BASOPHILS # BLD AUTO: 0.06 THOUSANDS/ΜL (ref 0–0.1)
BASOPHILS NFR BLD AUTO: 2 % (ref 0–1)
EOSINOPHIL # BLD AUTO: 0.27 THOUSAND/ΜL (ref 0–0.61)
EOSINOPHIL NFR BLD AUTO: 7 % (ref 0–6)
ERYTHROCYTE [DISTWIDTH] IN BLOOD BY AUTOMATED COUNT: 13.8 % (ref 11.6–15.1)
HCT VFR BLD AUTO: 33.7 % (ref 34.8–46.1)
HGB BLD-MCNC: 11 G/DL (ref 11.5–15.4)
IMM GRANULOCYTES # BLD AUTO: 0.01 THOUSAND/UL (ref 0–0.2)
IMM GRANULOCYTES NFR BLD AUTO: 0 % (ref 0–2)
LYMPHOCYTES # BLD AUTO: 0.84 THOUSANDS/ΜL (ref 0.6–4.47)
LYMPHOCYTES NFR BLD AUTO: 23 % (ref 14–44)
MCH RBC QN AUTO: 31.6 PG (ref 26.8–34.3)
MCHC RBC AUTO-ENTMCNC: 32.6 G/DL (ref 31.4–37.4)
MCV RBC AUTO: 97 FL (ref 82–98)
MONOCYTES # BLD AUTO: 0.29 THOUSAND/ΜL (ref 0.17–1.22)
MONOCYTES NFR BLD AUTO: 8 % (ref 4–12)
NEUTROPHILS # BLD AUTO: 2.19 THOUSANDS/ΜL (ref 1.85–7.62)
NEUTS SEG NFR BLD AUTO: 60 % (ref 43–75)
NRBC BLD AUTO-RTO: 0 /100 WBCS
PLATELET # BLD AUTO: 215 THOUSANDS/UL (ref 149–390)
PMV BLD AUTO: 10.6 FL (ref 8.9–12.7)
RBC # BLD AUTO: 3.48 MILLION/UL (ref 3.81–5.12)
TACROLIMUS BLD-MCNC: 9.8 NG/ML (ref 3–15)
WBC # BLD AUTO: 3.66 THOUSAND/UL (ref 4.31–10.16)

## 2025-01-06 PROCEDURE — 80197 ASSAY OF TACROLIMUS: CPT

## 2025-01-06 PROCEDURE — 85025 COMPLETE CBC W/AUTO DIFF WBC: CPT

## 2025-01-06 PROCEDURE — 36415 COLL VENOUS BLD VENIPUNCTURE: CPT

## 2025-01-07 ENCOUNTER — APPOINTMENT (OUTPATIENT)
Facility: CLINIC | Age: 65
End: 2025-01-07
Payer: COMMERCIAL

## 2025-01-10 ENCOUNTER — APPOINTMENT (OUTPATIENT)
Facility: CLINIC | Age: 65
End: 2025-01-10
Payer: COMMERCIAL

## 2025-01-13 ENCOUNTER — APPOINTMENT (OUTPATIENT)
Dept: LAB | Facility: HOSPITAL | Age: 65
End: 2025-01-13
Payer: COMMERCIAL

## 2025-01-13 DIAGNOSIS — Z94.4 LIVER REPLACED BY TRANSPLANT (HCC): ICD-10-CM

## 2025-01-13 LAB
ALBUMIN SERPL BCG-MCNC: 4.3 G/DL (ref 3.5–5)
ALBUMIN SERPL BCG-MCNC: 4.3 G/DL (ref 3.5–5)
ALP SERPL-CCNC: 52 U/L (ref 34–104)
ALP SERPL-CCNC: 53 U/L (ref 34–104)
ALT SERPL W P-5'-P-CCNC: 10 U/L (ref 7–52)
ALT SERPL W P-5'-P-CCNC: 10 U/L (ref 7–52)
ANION GAP SERPL CALCULATED.3IONS-SCNC: 7 MMOL/L (ref 4–13)
AST SERPL W P-5'-P-CCNC: 14 U/L (ref 13–39)
AST SERPL W P-5'-P-CCNC: 15 U/L (ref 13–39)
BASOPHILS # BLD AUTO: 0.05 THOUSANDS/ΜL (ref 0–0.1)
BASOPHILS NFR BLD AUTO: 1 % (ref 0–1)
BILIRUB DIRECT SERPL-MCNC: 0.1 MG/DL (ref 0–0.2)
BILIRUB DIRECT SERPL-MCNC: 0.11 MG/DL (ref 0–0.2)
BILIRUB SERPL-MCNC: 0.67 MG/DL (ref 0.2–1)
BILIRUB SERPL-MCNC: 0.67 MG/DL (ref 0.2–1)
BUN SERPL-MCNC: 27 MG/DL (ref 5–25)
CALCIUM SERPL-MCNC: 9.1 MG/DL (ref 8.4–10.2)
CHLORIDE SERPL-SCNC: 103 MMOL/L (ref 96–108)
CO2 SERPL-SCNC: 28 MMOL/L (ref 21–32)
CREAT SERPL-MCNC: 1.11 MG/DL (ref 0.6–1.3)
EOSINOPHIL # BLD AUTO: 0.39 THOUSAND/ΜL (ref 0–0.61)
EOSINOPHIL NFR BLD AUTO: 11 % (ref 0–6)
ERYTHROCYTE [DISTWIDTH] IN BLOOD BY AUTOMATED COUNT: 13.9 % (ref 11.6–15.1)
GFR SERPL CREATININE-BSD FRML MDRD: 53 ML/MIN/1.73SQ M
GLUCOSE SERPL-MCNC: 75 MG/DL (ref 65–140)
HCT VFR BLD AUTO: 33.4 % (ref 34.8–46.1)
HGB BLD-MCNC: 11 G/DL (ref 11.5–15.4)
IMM GRANULOCYTES # BLD AUTO: 0.01 THOUSAND/UL (ref 0–0.2)
IMM GRANULOCYTES NFR BLD AUTO: 0 % (ref 0–2)
LYMPHOCYTES # BLD AUTO: 0.87 THOUSANDS/ΜL (ref 0.6–4.47)
LYMPHOCYTES NFR BLD AUTO: 25 % (ref 14–44)
MAGNESIUM SERPL-MCNC: 2 MG/DL (ref 1.9–2.7)
MCH RBC QN AUTO: 31.6 PG (ref 26.8–34.3)
MCHC RBC AUTO-ENTMCNC: 32.9 G/DL (ref 31.4–37.4)
MCV RBC AUTO: 96 FL (ref 82–98)
MONOCYTES # BLD AUTO: 0.23 THOUSAND/ΜL (ref 0.17–1.22)
MONOCYTES NFR BLD AUTO: 7 % (ref 4–12)
NEUTROPHILS # BLD AUTO: 1.9 THOUSANDS/ΜL (ref 1.85–7.62)
NEUTS SEG NFR BLD AUTO: 56 % (ref 43–75)
NRBC BLD AUTO-RTO: 0 /100 WBCS
PLATELET # BLD AUTO: 205 THOUSANDS/UL (ref 149–390)
PMV BLD AUTO: 10.5 FL (ref 8.9–12.7)
POTASSIUM SERPL-SCNC: 5.2 MMOL/L (ref 3.5–5.3)
PROT SERPL-MCNC: 6.8 G/DL (ref 6.4–8.4)
PROT SERPL-MCNC: 7 G/DL (ref 6.4–8.4)
RBC # BLD AUTO: 3.48 MILLION/UL (ref 3.81–5.12)
SODIUM SERPL-SCNC: 138 MMOL/L (ref 135–147)
TACROLIMUS BLD-MCNC: 9.6 NG/ML (ref 3–15)
WBC # BLD AUTO: 3.45 THOUSAND/UL (ref 4.31–10.16)

## 2025-01-13 PROCEDURE — 85025 COMPLETE CBC W/AUTO DIFF WBC: CPT

## 2025-01-13 PROCEDURE — 82248 BILIRUBIN DIRECT: CPT

## 2025-01-13 PROCEDURE — 83735 ASSAY OF MAGNESIUM: CPT

## 2025-01-13 PROCEDURE — 80197 ASSAY OF TACROLIMUS: CPT

## 2025-01-13 PROCEDURE — 80076 HEPATIC FUNCTION PANEL: CPT

## 2025-01-13 PROCEDURE — 80053 COMPREHEN METABOLIC PANEL: CPT

## 2025-01-13 PROCEDURE — 36415 COLL VENOUS BLD VENIPUNCTURE: CPT

## 2025-01-19 ENCOUNTER — TELEPHONE (OUTPATIENT)
Dept: LAB | Facility: HOSPITAL | Age: 65
End: 2025-01-19

## 2025-01-20 ENCOUNTER — OFFICE VISIT (OUTPATIENT)
Dept: OBGYN CLINIC | Facility: CLINIC | Age: 65
End: 2025-01-20
Payer: COMMERCIAL

## 2025-01-20 ENCOUNTER — APPOINTMENT (OUTPATIENT)
Dept: RADIOLOGY | Facility: CLINIC | Age: 65
End: 2025-01-20
Payer: COMMERCIAL

## 2025-01-20 VITALS — HEIGHT: 65 IN | BODY MASS INDEX: 26.66 KG/M2 | WEIGHT: 160 LBS

## 2025-01-20 DIAGNOSIS — M20.011 MALLET DEFORMITY OF RIGHT INDEX FINGER: ICD-10-CM

## 2025-01-20 DIAGNOSIS — M79.644 FINGER PAIN, RIGHT: Primary | ICD-10-CM

## 2025-01-20 DIAGNOSIS — M79.644 FINGER PAIN, RIGHT: ICD-10-CM

## 2025-01-20 PROCEDURE — 73140 X-RAY EXAM OF FINGER(S): CPT

## 2025-01-20 PROCEDURE — 99213 OFFICE O/P EST LOW 20 MIN: CPT | Performed by: ORTHOPAEDIC SURGERY

## 2025-01-20 RX ORDER — GABAPENTIN 300 MG/1
300 CAPSULE ORAL 3 TIMES DAILY
COMMUNITY
Start: 2024-11-19

## 2025-01-20 NOTE — PATIENT INSTRUCTIONS
Silipos sleeve to be worn as needed:   Chiroplax Toe Tubes Sleeves Protectors Cushions Fabric & Gel Lining Finger Toe Separator Tubing for Bunion, Hammer Toe, Callus, Corn, Blister    Size: medium    Link:   [employer]: Chiroplax Toe Tubes Sleeves Protectors Cushions Fabric & Gel Lining Finger Toe Separator Tubing for Bunion, Hammer Toe, Callus, Corn, Blister (5 Pack, Size Small) : [employer] [facility]

## 2025-01-20 NOTE — PROGRESS NOTES
Assessment/Plan:  1. 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  We discussed use of leaving the chronic mallet finger alone or splinting versus surgical treatment.  The mallet finger is 5 months old and I do not think splinting is going to help to improve the extensor lag at this point in time.  Patient given Silipos sleeve at today's visit. Information about Silipos sleeve including size was provided in patient's AVS.  Referral to PT/OT hand therapy was provided at today's visit if patient would like to try a mallet finger splint. I explained the splint needs to be worn 24 hours a day for 8 weeks. If coming out of the splint the finger needs to be supported at the DIP joint to maintain full extension for maximum benefit. Splint to be replace as soon as possible after removal.  The patient was given the opportunity to ask questions.  Questions were answered to the patient's satisfaction.  The patient decided to move forward with use of a Silipos sleeve for comfort and monitoring symptoms via shared decision making.  Follow up in as needed.     cc: Pain right index finger    Subjective:   Linda Becerra is a right hand dominant 64 y.o. female who presents today for evaluation for right index finger pain. She reports having a fall in August 2024.  At the time she injured her finger, she sustained a fracture to her ankle.  The ankle was more of a priority.   She has pain over the DIP and is unable to actively extend the index finger at the DIP.       Review of Systems   Constitutional:  Negative for chills and fever.   HENT:  Negative for ear pain and sore throat.    Eyes:  Negative for pain and visual disturbance.   Respiratory:  Negative for cough and shortness of breath.     Cardiovascular:  Negative for chest pain and palpitations.   Gastrointestinal:  Negative for abdominal pain and vomiting.   Genitourinary:  Negative for dysuria and hematuria.   Musculoskeletal:  Negative for arthralgias and back pain.   Skin:  Negative for color change and rash.   Neurological:  Negative for seizures and syncope.   All other systems reviewed and are negative.        Past Medical History:   Diagnosis Date    Anemia     Anxiety     Asthma     Chronic pain disorder     Clotting disorder (HCC)     Concussion     Constipation     CSF leak     Fractures     GERD (gastroesophageal reflux disease)     Head injury     Hernia 2022    High blood pressure     History of transfusion     Hyperbilirubinemia 04/28/2022    Hypertension     Liver failure (HCC)     Liver transplant recipient (HCC)     Migraines     Peripheral neuropathy     Urinary frequency     Varicella 1968    As a child       Past Surgical History:   Procedure Laterality Date    ABLATION SOFT TISSUE      COLONOSCOPY  11/2/2023    IR PARACENTESIS  04/29/2022    IR PARACENTESIS  05/09/2022    IR PARACENTESIS  05/12/2022    LAPAROSCOPY FOR ECTOPIC PREGNANCY      LIVER BIOPSY  7/3/2023    Plus 3 previous in hospital    LIVER TRANSPLANTATION  5/17/22    MAMMO (HISTORICAL) Bilateral 01/14/2021    H BI-RADS 2 Benign findings. after U/S Scattered areas fibrogladulae density; 25% to 50% glandular breast tissue    MRI GUIDED BREAST WIRE LOCALIZATION LEFT Left 02/23/2015    MRI GUIDED BREAST WIRE LOCALIZATION LEFT Left 02/23/2015    HI EXC TUMOR SOFT TISS FACE&SCALP SUBFASCIAL <2CM N/A 08/01/2024    Procedure: EXCISION OF SUBCUTANEOUS MASSES OF FACE X3- RIGHT CHEEK, ROOT OF NOSE, LEFT LOWER EYELID/CHEEK, COMPLEX CLOSURE;  Surgeon: Fuentes Mendez MD;  Location:  MAIN OR;  Service: Plastics    SINUS SURGERY      SINUS SURGERY  2000    removed cartlidge in inside of nose    TONSILECTOMY AND ADNOIDECTOMY         Family History   Problem Relation Age  of Onset    Breast cancer Mother         age unknown    Cancer Mother     Colon cancer Father         70's    Arthritis Father     Cancer Father     Cancer Sister     Breast cancer Sister         guessing 50's    Autoimmune disease Daughter     No Known Problems Paternal Grandmother     No Known Problems Paternal Grandfather     No Known Problems Son     No Known Problems Son     Heart disease Half-Brother     Prostate cancer Half-Brother 50    Melanoma Half-Brother        Social History     Occupational History    Not on file   Tobacco Use    Smoking status: Former     Current packs/day: 0.00     Average packs/day: 1 pack/day for 25.0 years (25.0 ttl pk-yrs)     Types: Cigarettes     Start date:      Quit date: 2019     Years since quittin.0    Smokeless tobacco: Never   Vaping Use    Vaping status: Never Used   Substance and Sexual Activity    Alcohol use: Not Currently     Comment: socially, had glass of wine today    Drug use: Not Currently     Types: Marijuana     Comment: CBD  medical [facility]    Sexual activity: Not Currently     Partners: Male     Birth control/protection: Post-menopausal     Comment: no new partner in past year         Current Outpatient Medications:     gabapentin (NEURONTIN) 300 mg capsule, 300 mg 3 (three) times a day, Disp: , Rfl:     amLODIPine (NORVASC) 5 mg tablet, Take 1 tablet (5 mg total) by mouth daily, Disp: , Rfl:     azaTHIOprine (IMURAN) 50 mg tablet, Take 100 mg by mouth daily at bedtime, Disp: , Rfl:     bisacodyl (DULCOLAX) 10 mg suppository, Insert 1 suppository (10 mg total) into the rectum daily as needed for constipation, Disp: , Rfl:     diphenhydrAMINE (BENADRYL) 25 mg capsule, Take 25 mg by mouth every 8 (eight) hours as needed for itching. Indications: Itching, Disp: , Rfl:     ergocalciferol (ERGOCALCIFEROL) 1.25 MG (51576 UT) capsule, Take 50,000 Units by mouth once a week. Every Monday AM  Indications: Vitamin D Deficiency, Disp: , Rfl:     FOLIC ACID  PO, Take by mouth, Disp: , Rfl:     hydrOXYzine HCL (ATARAX) 50 mg tablet, Take 1 tablet (50 mg total) by mouth every 6 (six) hours as needed for anxiety, Disp: 30 tablet, Rfl: 0    latanoprost (XALATAN) 0.005 % ophthalmic solution, 1 DROP INTO BOTH EYES BEFORE BEDTIME, Disp: , Rfl:     methocarbamol (ROBAXIN) 500 mg tablet, Take 1 tablet (500 mg total) by mouth every 6 (six) hours as needed for muscle spasms, Disp: 30 tablet, Rfl: 0    metoprolol succinate (TOPROL-XL) 25 mg 24 hr tablet, , Disp: , Rfl:     pantoprazole (PROTONIX) 40 mg tablet, Take 1 tablet (40 mg total) by mouth daily, Disp: 90 tablet, Rfl: 3    Premarin vaginal cream, Insert 1 g into the vagina 2 (two) times a week, Disp: 30 g, Rfl: 3    Prevymis 480 MG TABS, Take 480 mg by mouth daily, Disp: , Rfl:     Prolia 60 MG/ML, USE 1 ML SUBCUTANOEUS EVERY 6 MONTHS AS DIRECTED. PLEASE DELIVER ...  (REFER TO PRESCRIPTION NOTES)., Disp: , Rfl:     senna (SENOKOT) 8.6 mg, Take 2 tablets (17.2 mg total) by mouth daily at bedtime, Disp: , Rfl:     tacrolimus (PROGRAF) 1 mg capsule, Take 3 mg by mouth every 12 (twelve) hours 3 capsules AM 3 capsules PM, Disp: , Rfl:     thiamine 100 MG tablet, Take 100 mg by mouth daily. Indications: Deficiency of Vitamin B1, Disp: , Rfl:     traMADol (ULTRAM) 50 mg tablet, Take 1 tablet (50 mg total) by mouth 2 (two) times a day as needed for moderate pain for ongoing therapy, Disp: 45 tablet, Rfl: 2    traMADol (ULTRAM-ER) 200 MG 24 hr tablet, Take 1 tablet (200 mg total) by mouth daily for ongoing therapy, Disp: 30 tablet, Rfl: 3    Allergies   Allergen Reactions    Other Other (See Comments)     Seasonal allergies      Pollen Extract Other (See Comments)       Objective:      The patient was awake, alert, and oriented to person, place, and time.  No acute distress.  Normocephalic.  EOMI.  Mucous membranes moist.  Normal respiratory effort.    The right index finger was examined.  Skin was intact.  No swelling or ecchymosis.   Hand and fingers were warm and well-perfused.  Capillary refill was brisk.  Full active range of motion of the elbows, forearms, wrists, and fingers. Mallet deformity for right index finger.  No volar plate laxity at the PIP joint.      Imaging/Diagnostic Studies:    I reviewed imaging studies dated 1/20/2025 which included x-ray of right index finger.  These images studies demonstrated degenerative changes of DIP joint.  DIP joint was congruent.  No obvious signs of a bony mallet injury.        This document was created using speech voice recognition software.   Grammatical errors, random word insertions, pronoun errors, and incomplete sentences are an occasional consequence of this system due to software limitations, ambient noise, and hardware issues.   Any formal questions or concerns about content, text, or information contained within the body of this dictation should be directly addressed to the provider for clarification.     Scribe Attestation      I,:  Rbandon Scotty am acting as a scribe while in the presence of the attending physician.:       I,:  Jordyn Ac MD personally performed the services described in this documentation    as scribed in my presence.:

## 2025-01-27 ENCOUNTER — APPOINTMENT (OUTPATIENT)
Dept: LAB | Facility: HOSPITAL | Age: 65
End: 2025-01-27
Payer: COMMERCIAL

## 2025-01-27 ENCOUNTER — APPOINTMENT (OUTPATIENT)
Dept: OCCUPATIONAL THERAPY | Facility: CLINIC | Age: 65
End: 2025-01-27
Payer: COMMERCIAL

## 2025-01-27 DIAGNOSIS — Z94.4 LIVER REPLACED BY TRANSPLANT (HCC): ICD-10-CM

## 2025-01-27 LAB
ALBUMIN SERPL BCG-MCNC: 4.2 G/DL (ref 3.5–5)
ALP SERPL-CCNC: 60 U/L (ref 34–104)
ALT SERPL W P-5'-P-CCNC: 10 U/L (ref 7–52)
ANION GAP SERPL CALCULATED.3IONS-SCNC: 6 MMOL/L (ref 4–13)
AST SERPL W P-5'-P-CCNC: 17 U/L (ref 13–39)
BASOPHILS # BLD AUTO: 0.06 THOUSANDS/ΜL (ref 0–0.1)
BASOPHILS NFR BLD AUTO: 2 % (ref 0–1)
BILIRUB DIRECT SERPL-MCNC: 0.07 MG/DL (ref 0–0.2)
BILIRUB SERPL-MCNC: 0.51 MG/DL (ref 0.2–1)
BUN SERPL-MCNC: 26 MG/DL (ref 5–25)
CALCIUM SERPL-MCNC: 9.1 MG/DL (ref 8.4–10.2)
CHLORIDE SERPL-SCNC: 102 MMOL/L (ref 96–108)
CO2 SERPL-SCNC: 29 MMOL/L (ref 21–32)
CREAT SERPL-MCNC: 1.12 MG/DL (ref 0.6–1.3)
EOSINOPHIL # BLD AUTO: 0.48 THOUSAND/ΜL (ref 0–0.61)
EOSINOPHIL NFR BLD AUTO: 12 % (ref 0–6)
ERYTHROCYTE [DISTWIDTH] IN BLOOD BY AUTOMATED COUNT: 13.5 % (ref 11.6–15.1)
GFR SERPL CREATININE-BSD FRML MDRD: 52 ML/MIN/1.73SQ M
GLUCOSE P FAST SERPL-MCNC: 69 MG/DL (ref 65–99)
HCT VFR BLD AUTO: 31.7 % (ref 34.8–46.1)
HGB BLD-MCNC: 10.5 G/DL (ref 11.5–15.4)
IMM GRANULOCYTES # BLD AUTO: 0.02 THOUSAND/UL (ref 0–0.2)
IMM GRANULOCYTES NFR BLD AUTO: 1 % (ref 0–2)
LYMPHOCYTES # BLD AUTO: 0.79 THOUSANDS/ΜL (ref 0.6–4.47)
LYMPHOCYTES NFR BLD AUTO: 19 % (ref 14–44)
MAGNESIUM SERPL-MCNC: 2.1 MG/DL (ref 1.9–2.7)
MCH RBC QN AUTO: 31.8 PG (ref 26.8–34.3)
MCHC RBC AUTO-ENTMCNC: 33.1 G/DL (ref 31.4–37.4)
MCV RBC AUTO: 96 FL (ref 82–98)
MONOCYTES # BLD AUTO: 0.36 THOUSAND/ΜL (ref 0.17–1.22)
MONOCYTES NFR BLD AUTO: 9 % (ref 4–12)
NEUTROPHILS # BLD AUTO: 2.42 THOUSANDS/ΜL (ref 1.85–7.62)
NEUTS SEG NFR BLD AUTO: 57 % (ref 43–75)
NRBC BLD AUTO-RTO: 0 /100 WBCS
PLATELET # BLD AUTO: 229 THOUSANDS/UL (ref 149–390)
PMV BLD AUTO: 10.8 FL (ref 8.9–12.7)
POTASSIUM SERPL-SCNC: 5 MMOL/L (ref 3.5–5.3)
PROT SERPL-MCNC: 6.7 G/DL (ref 6.4–8.4)
RBC # BLD AUTO: 3.3 MILLION/UL (ref 3.81–5.12)
SODIUM SERPL-SCNC: 137 MMOL/L (ref 135–147)
TACROLIMUS BLD-MCNC: 8.5 NG/ML (ref 3–15)
WBC # BLD AUTO: 4.13 THOUSAND/UL (ref 4.31–10.16)

## 2025-01-27 PROCEDURE — 80197 ASSAY OF TACROLIMUS: CPT

## 2025-01-27 PROCEDURE — 36415 COLL VENOUS BLD VENIPUNCTURE: CPT

## 2025-01-27 PROCEDURE — 85025 COMPLETE CBC W/AUTO DIFF WBC: CPT

## 2025-01-27 PROCEDURE — 83735 ASSAY OF MAGNESIUM: CPT

## 2025-01-27 PROCEDURE — 80053 COMPREHEN METABOLIC PANEL: CPT

## 2025-01-27 PROCEDURE — 82248 BILIRUBIN DIRECT: CPT

## 2025-01-29 ENCOUNTER — OFFICE VISIT (OUTPATIENT)
Dept: OCCUPATIONAL THERAPY | Facility: CLINIC | Age: 65
End: 2025-01-29
Payer: COMMERCIAL

## 2025-01-29 DIAGNOSIS — M20.011 MALLET DEFORMITY OF RIGHT INDEX FINGER: ICD-10-CM

## 2025-01-29 PROCEDURE — 97760 ORTHOTIC MGMT&TRAING 1ST ENC: CPT | Performed by: OCCUPATIONAL THERAPIST

## 2025-01-29 NOTE — PROGRESS NOTES
Splint    Diagnosis:   1. Mallet deformity of right index finger  Ambulatory Referral to PT/OT Hand Therapy         Indication: Fracture    Location: Right  index finger  Supplies: Orthotics  Thermoplastic material    Splint type: DIP cap splint/cast  Wearing Schedule: Remove for hygiene only in protective position  Describe Position: DIP in hyperextension    Precautions: Fracture    Patient or Caregiver expresses understanding of wearing Schedule and Precautions? Yes  Patient or Caregiver able to don/doff orthotic independently?Yes    Written orders provided to patient? Yes  Orders Obtained: Written  Orders Obtained from: Dr. Ac    Pt. To return weekly for cast refit.

## 2025-02-10 ENCOUNTER — APPOINTMENT (OUTPATIENT)
Dept: LAB | Facility: HOSPITAL | Age: 65
End: 2025-02-10
Payer: COMMERCIAL

## 2025-02-10 DIAGNOSIS — Z94.4 LIVER REPLACED BY TRANSPLANT (HCC): ICD-10-CM

## 2025-02-10 LAB
BASOPHILS # BLD AUTO: 0.03 THOUSANDS/ΜL (ref 0–0.1)
BASOPHILS NFR BLD AUTO: 1 % (ref 0–1)
EOSINOPHIL # BLD AUTO: 0.5 THOUSAND/ΜL (ref 0–0.61)
EOSINOPHIL NFR BLD AUTO: 15 % (ref 0–6)
ERYTHROCYTE [DISTWIDTH] IN BLOOD BY AUTOMATED COUNT: 13.9 % (ref 11.6–15.1)
HCT VFR BLD AUTO: 32.7 % (ref 34.8–46.1)
HGB BLD-MCNC: 10.9 G/DL (ref 11.5–15.4)
IMM GRANULOCYTES # BLD AUTO: 0.01 THOUSAND/UL (ref 0–0.2)
IMM GRANULOCYTES NFR BLD AUTO: 0 % (ref 0–2)
LYMPHOCYTES # BLD AUTO: 0.66 THOUSANDS/ΜL (ref 0.6–4.47)
LYMPHOCYTES NFR BLD AUTO: 19 % (ref 14–44)
MCH RBC QN AUTO: 32.2 PG (ref 26.8–34.3)
MCHC RBC AUTO-ENTMCNC: 33.3 G/DL (ref 31.4–37.4)
MCV RBC AUTO: 97 FL (ref 82–98)
MONOCYTES # BLD AUTO: 0.29 THOUSAND/ΜL (ref 0.17–1.22)
MONOCYTES NFR BLD AUTO: 8 % (ref 4–12)
NEUTROPHILS # BLD AUTO: 1.96 THOUSANDS/ΜL (ref 1.85–7.62)
NEUTS SEG NFR BLD AUTO: 57 % (ref 43–75)
NRBC BLD AUTO-RTO: 0 /100 WBCS
PLATELET # BLD AUTO: 207 THOUSANDS/UL (ref 149–390)
PMV BLD AUTO: 11 FL (ref 8.9–12.7)
RBC # BLD AUTO: 3.39 MILLION/UL (ref 3.81–5.12)
TACROLIMUS BLD-MCNC: 4.4 NG/ML (ref 3–15)
WBC # BLD AUTO: 3.45 THOUSAND/UL (ref 4.31–10.16)

## 2025-02-10 PROCEDURE — 80053 COMPREHEN METABOLIC PANEL: CPT

## 2025-02-10 PROCEDURE — 85025 COMPLETE CBC W/AUTO DIFF WBC: CPT

## 2025-02-10 PROCEDURE — 36415 COLL VENOUS BLD VENIPUNCTURE: CPT

## 2025-02-10 PROCEDURE — 80197 ASSAY OF TACROLIMUS: CPT

## 2025-02-10 PROCEDURE — 82248 BILIRUBIN DIRECT: CPT

## 2025-02-10 PROCEDURE — 83735 ASSAY OF MAGNESIUM: CPT

## 2025-02-11 LAB
ALBUMIN SERPL BCG-MCNC: 4.4 G/DL (ref 3.5–5)
ALP SERPL-CCNC: 48 U/L (ref 34–104)
ALT SERPL W P-5'-P-CCNC: 8 U/L (ref 7–52)
ANION GAP SERPL CALCULATED.3IONS-SCNC: 9 MMOL/L (ref 4–13)
AST SERPL W P-5'-P-CCNC: 17 U/L (ref 13–39)
BILIRUB DIRECT SERPL-MCNC: 0.09 MG/DL (ref 0–0.2)
BILIRUB SERPL-MCNC: 0.53 MG/DL (ref 0.2–1)
BUN SERPL-MCNC: 20 MG/DL (ref 5–25)
CALCIUM SERPL-MCNC: 9.6 MG/DL (ref 8.4–10.2)
CHLORIDE SERPL-SCNC: 102 MMOL/L (ref 96–108)
CO2 SERPL-SCNC: 28 MMOL/L (ref 21–32)
CREAT SERPL-MCNC: 1.19 MG/DL (ref 0.6–1.3)
GFR SERPL CREATININE-BSD FRML MDRD: 49 ML/MIN/1.73SQ M
GLUCOSE SERPL-MCNC: 78 MG/DL (ref 65–140)
MAGNESIUM SERPL-MCNC: 2 MG/DL (ref 1.9–2.7)
POTASSIUM SERPL-SCNC: 5.3 MMOL/L (ref 3.5–5.3)
PROT SERPL-MCNC: 7 G/DL (ref 6.4–8.4)
SODIUM SERPL-SCNC: 139 MMOL/L (ref 135–147)

## 2025-02-17 ENCOUNTER — OFFICE VISIT (OUTPATIENT)
Dept: PAIN MEDICINE | Facility: CLINIC | Age: 65
End: 2025-02-17
Payer: COMMERCIAL

## 2025-02-17 VITALS
OXYGEN SATURATION: 92 % | BODY MASS INDEX: 25.99 KG/M2 | HEART RATE: 67 BPM | HEIGHT: 65 IN | TEMPERATURE: 97.9 F | WEIGHT: 156 LBS

## 2025-02-17 DIAGNOSIS — G89.4 CHRONIC PAIN SYNDROME: Primary | ICD-10-CM

## 2025-02-17 DIAGNOSIS — M51.369 DDD (DEGENERATIVE DISC DISEASE), LUMBAR: ICD-10-CM

## 2025-02-17 DIAGNOSIS — M54.16 LUMBAR RADICULOPATHY: ICD-10-CM

## 2025-02-17 DIAGNOSIS — G61.89 OTHER INFLAMMATORY POLYNEUROPATHIES (HCC): ICD-10-CM

## 2025-02-17 DIAGNOSIS — M47.816 LUMBAR SPONDYLOSIS: ICD-10-CM

## 2025-02-17 DIAGNOSIS — Z79.891 LONG-TERM CURRENT USE OF OPIATE ANALGESIC: ICD-10-CM

## 2025-02-17 DIAGNOSIS — F11.20 UNCOMPLICATED OPIOID DEPENDENCE (HCC): ICD-10-CM

## 2025-02-17 PROCEDURE — 99214 OFFICE O/P EST MOD 30 MIN: CPT | Performed by: PHYSICIAN ASSISTANT

## 2025-02-17 RX ORDER — TRAMADOL HYDROCHLORIDE 200 MG/1
200 TABLET, EXTENDED RELEASE ORAL DAILY
Qty: 30 TABLET | Refills: 3 | Status: SHIPPED | OUTPATIENT
Start: 2025-02-17

## 2025-02-17 NOTE — PROGRESS NOTES
Assessment:  1. Chronic pain syndrome    2. Lumbar spondylosis    3. Lumbar radiculopathy    4. DDD (degenerative disc disease), lumbar    5. Other inflammatory polyneuropathies (HCC)    6. Uncomplicated opioid dependence (HCC)    7. Long-term current use of opiate analgesic        Plan:  While the patient was in the office today, I did have a thorough conversation regarding their chronic pain syndrome, medication management, and treatment plan options.    Patient remains clinically stable on the current medication regimen of tramadol  mg daily.  I feel it is reasonable to continue and have electronically sent refills to her pharmacy for the next 4 months with the appropriate fill dates.  She does not require refill on the tramadol 50 mg.    [place] Prescription Drug Monitoring Program report was reviewed and was appropriate     A urine drug screen was collected at today's office visit as part of our medication management protocol. The point of care testing results were appropriate for what was being prescribed. The specimen will be sent for confirmatory testing. The drug screen is medically necessary because the patient is either dependent on opioid medication or is being considered for opioid medication therapy and the results could impact ongoing or future treatment. The drug screen is to evaluate for the presences or absence of prescribed, non-prescribed, and/or illicit drugs/substances.    There are risks associated with opioid medications, including dependence, addiction and tolerance. The patient understands and agrees to use these medications only as prescribed. Potential side effects of the medications include, but are not limited to, constipation, drowsiness, addiction, impaired judgment and risk of fatal overdose if not taken as prescribed. The patient was warned against driving while taking sedation medications.  Sharing medications is a felony. At this point in time, the patient is showing no  signs of addiction, abuse, diversion or suicidal ideation.    I discussed with the patient that at this point in time since there was at least a 80% reduction in pain symptoms with the diagnostic lumbar medial branch blocks for over 6 hours, I feel that they would be a good candidate to proceed with bilateral L3-5 radio frequency ablation procedure. The procedure, risks, and possible outcomes were thoroughly reviewed with the patient until they verbalized an understanding. I did explain to the patient that there is a possibility that the procedure may not be successful, even though they appear to be a good candidate. I also reviewed that it takes approximately 4-6 weeks to heal from the procedure to know what the exact results are going to be. It is the patient's wish to proceed with the radio frequency ablation.    The patient will follow-up in 4 months for medication prescription refill and reevaluation. The patient was advised to contact the office should their symptoms worsen in the interim. The patient was agreeable and verbalized an understanding.        History of Present Illness:    The patient is a 64 y.o. female last seen on 10/28/2024 who presents for a follow up office visit in regards to chronic pain.  The patient currently reports multisite joint pain including left shoulder pain, bilateral hip pain, low back pain that she rates a 7 out of 10 on today's visit.  She describes the pain as a constant sharp, throbbing type of pain with intermittent numbness and paresthesias throughout.    Current pain medications includes:  .  The patient reports that this regimen is providing 50% pain relief.  The patient is reporting no side effects from this pain medication regimen.    Pain Contract Signed: 2/17/2025  Last Urine Drug Screen: 2/17/2025    I have personally reviewed and/or updated the patient's past medical history, past surgical history, family history, social history, current medications, allergies, and  vital signs today.       Review of Systems:    Review of Systems   Respiratory:  Negative for shortness of breath.    Cardiovascular:  Negative for chest pain.   Gastrointestinal:  Positive for constipation and nausea. Negative for diarrhea and vomiting.   Musculoskeletal:  Positive for gait problem and joint swelling (Joint stiffness). Negative for arthralgias and myalgias.   Skin:  Negative for rash.   Neurological:  Positive for dizziness and weakness. Negative for seizures.   All other systems reviewed and are negative.        Past Medical History:   Diagnosis Date   • Anemia    • Anxiety    • Asthma    • Chronic pain disorder    • Clotting disorder (HCC)    • Concussion    • Constipation    • CSF leak    • Fractures    • GERD (gastroesophageal reflux disease)    • Head injury    • Hernia 2022   • High blood pressure    • History of transfusion    • Hyperbilirubinemia 04/28/2022   • Hypertension    • Liver failure (HCC)    • Liver transplant recipient (HCC)    • Migraines    • Peripheral neuropathy    • Urinary frequency    • Varicella 1968    As a child       Past Surgical History:   Procedure Laterality Date   • ABLATION SOFT TISSUE     • COLONOSCOPY  11/2/2023   • IR PARACENTESIS  04/29/2022   • IR PARACENTESIS  05/09/2022   • IR PARACENTESIS  05/12/2022   • LAPAROSCOPY FOR ECTOPIC PREGNANCY     • LIVER BIOPSY  7/3/2023    Plus 3 previous in hospital   • LIVER TRANSPLANTATION  5/17/22   • MAMMO (HISTORICAL) Bilateral 01/14/2021    [facility] BI-RADS 2 Benign findings. after U/S Scattered areas fibrogladulae density; 25% to 50% glandular breast tissue   • MRI GUIDED BREAST WIRE LOCALIZATION LEFT Left 02/23/2015   • MRI GUIDED BREAST WIRE LOCALIZATION LEFT Left 02/23/2015   • ND EXC TUMOR SOFT TISS FACE&SCALP SUBFASCIAL <2CM N/A 08/01/2024    Procedure: EXCISION OF SUBCUTANEOUS MASSES OF FACE X3- RIGHT CHEEK, ROOT OF NOSE, LEFT LOWER EYELID/CHEEK, COMPLEX CLOSURE;  Surgeon: Fuentes Mendez MD;  Location: [facility] ([facility]  OR;  Service: Plastics   • SINUS SURGERY     • SINUS SURGERY      removed cartlidge in inside of nose   • TONSILECTOMY AND ADNOIDECTOMY         Family History   Problem Relation Age of Onset   • Breast cancer Mother         age unknown   • Cancer Mother    • Colon cancer Father         70's   • Arthritis Father    • Cancer Father    • Cancer Sister    • Breast cancer Sister         guessing 50's   • Autoimmune disease Daughter    • No Known Problems Paternal Grandmother    • No Known Problems Paternal Grandfather    • No Known Problems Son    • No Known Problems Son    • Heart disease Half-Brother    • Prostate cancer Half-Brother 50   • Melanoma Half-Brother        Social History     Occupational History   • Not on file   Tobacco Use   • Smoking status: Former     Current packs/day: 0.00     Average packs/day: 1 pack/day for 25.0 years (25.0 ttl pk-yrs)     Types: Cigarettes     Start date:      Quit date: 2019     Years since quittin.1   • Smokeless tobacco: Never   Vaping Use   • Vaping status: Never Used   Substance and Sexual Activity   • Alcohol use: Not Currently     Comment: socially, had glass of wine today   • Drug use: Not Currently     Types: Marijuana     Comment: CBD  medical [facility]   • Sexual activity: Not Currently     Partners: Male     Birth control/protection: Post-menopausal     Comment: no new partner in past year         Current Outpatient Medications:   •  azaTHIOprine (IMURAN) 50 mg tablet, Take 100 mg by mouth daily at bedtime, Disp: , Rfl:   •  diphenhydrAMINE (BENADRYL) 25 mg capsule, Take 25 mg by mouth every 8 (eight) hours as needed for itching. Indications: Itching, Disp: , Rfl:   •  ergocalciferol (ERGOCALCIFEROL) 1.25 MG (26062 UT) capsule, Take 50,000 Units by mouth once a week. Every Monday AM  Indications: Vitamin D Deficiency, Disp: , Rfl:   •  FOLIC ACID PO, Take by mouth, Disp: , Rfl:   •  gabapentin (NEURONTIN) 300 mg capsule, 300 mg 3 (three) times a day, Disp:  ", Rfl:   •  latanoprost (XALATAN) 0.005 % ophthalmic solution, 1 DROP INTO BOTH EYES BEFORE BEDTIME, Disp: , Rfl:   •  metoprolol succinate (TOPROL-XL) 25 mg 24 hr tablet, , Disp: , Rfl:   •  pantoprazole (PROTONIX) 40 mg tablet, Take 1 tablet (40 mg total) by mouth daily, Disp: 90 tablet, Rfl: 3  •  Premarin vaginal cream, Insert 1 g into the vagina 2 (two) times a week, Disp: 30 g, Rfl: 3  •  Prolia 60 MG/ML, USE 1 ML SUBCUTANOEUS EVERY 6 MONTHS AS DIRECTED. PLEASE DELIVER ...  (REFER TO PRESCRIPTION NOTES)., Disp: , Rfl:   •  senna (SENOKOT) 8.6 mg, Take 2 tablets (17.2 mg total) by mouth daily at bedtime, Disp: , Rfl:   •  tacrolimus (PROGRAF) 1 mg capsule, Take 3 mg by mouth every 12 (twelve) hours 3 capsules AM 3 capsules PM, Disp: , Rfl:   •  thiamine 100 MG tablet, Take 100 mg by mouth daily. Indications: Deficiency of Vitamin B1, Disp: , Rfl:   •  traMADol (ULTRAM) 50 mg tablet, Take 1 tablet (50 mg total) by mouth 2 (two) times a day as needed for moderate pain for ongoing therapy, Disp: 45 tablet, Rfl: 2  •  traMADol (ULTRAM-ER) 200 MG 24 hr tablet, Take 1 tablet (200 mg total) by mouth daily for ongoing therapy DO NOT FILL BEFORE: 02/22/25, Disp: 30 tablet, Rfl: 3  •  Prevymis 480 MG TABS, Take 480 mg by mouth daily (Patient not taking: Reported on 2/17/2025), Disp: , Rfl:     Allergies   Allergen Reactions   • Other Other (See Comments)     Seasonal allergies     • Pollen Extract Other (See Comments)       Physical Exam:    Pulse 67   Temp 97.9 °F (36.6 °C)   Ht 5' 5\" (1.651 m)   Wt 70.8 kg (156 lb)   SpO2 92%   BMI 25.96 kg/m²     Constitutional:normal, well developed, well nourished, alert, in no distress and non-toxic and no overt pain behavior.  Eyes:anicteric  HEENT:grossly intact  Neck:supple, symmetric, trachea midline and no masses   Pulmonary:even and unlabored  Cardiovascular:No edema or pitting edema present  Skin:Normal without rashes or lesions and well hydrated  Psychiatric:Mood and " affect appropriate  Neurologic:Cranial Nerves II-XII grossly intact  Musculoskeletal:tender bilateral lumbar facet joints, limited range of motion, positive facet loading test.        Imaging  FL spine and pain procedure    (Results Pending)         Orders Placed This Encounter   Procedures   • FL spine and pain procedure   • [facility] All Prescribed Meds and Special Instructions   • Amphetamines, Methamphetamines   • Butalbital   • Phenobarbital   • Secobarbital   • Alprazolam   • Clonazepam   • Diazepam   • Lorazepam   • Gabapentin   • Pregabalin   • Cocaine   • Heroin   • Buprenorphine   • Levorphanol   • Meperidine   • Naltrexone   • Fentanyl   • Methadone   • Oxycodone   • Tapentadol   • THC   • Tramadol   • Codeine, Hydrocodone, Hydropmorphone, Morphine   • Bath Salts   • Ethyl Glucuronide/Ethyl Sulfate   • Kratom   • Spice   • Methylphenidate   • Phentermine   • Validity Oxidant   • Validity Creatinine   • Validity pH   • Validity Specific   • Xylazine Definitive Test

## 2025-02-18 ENCOUNTER — TELEPHONE (OUTPATIENT)
Dept: PAIN MEDICINE | Facility: CLINIC | Age: 65
End: 2025-02-18

## 2025-02-18 NOTE — TELEPHONE ENCOUNTER
Caller: Lonny    Doctor: Dr. Herrera    Reason for call: patient calling to schedule procedure,  bilateral L3-5 radio frequency ablation     Call back#: [phone]    Transferred to

## 2025-02-19 LAB
4OH-XYLAZINE UR QL CFM: NEGATIVE NG/ML
6MAM UR QL CFM: NEGATIVE NG/ML
7AMINOCLONAZEPAM UR QL CFM: NEGATIVE NG/ML
A-OH ALPRAZ UR QL CFM: NEGATIVE NG/ML
ACCEPTABLE CREAT UR QL: NORMAL MG/DL
ACCEPTIBLE SP GR UR QL: NORMAL
AMPHET UR QL CFM: NEGATIVE NG/ML
BUPRENORPHINE UR QL CFM: NEGATIVE NG/ML
BUTALBITAL UR QL CFM: NEGATIVE NG/ML
BZE UR QL CFM: NEGATIVE NG/ML
CODEINE UR QL CFM: NEGATIVE NG/ML
EDDP UR QL CFM: NEGATIVE NG/ML
ETHYL GLUCURONIDE UR QL CFM: NEGATIVE NG/ML
ETHYL SULFATE UR QL SCN: NEGATIVE NG/ML
EUTYLONE UR QL: NEGATIVE NG/ML
FENTANYL UR QL CFM: NEGATIVE NG/ML
GLIADIN IGG SER IA-ACNC: NEGATIVE NG/ML
HYDROCODONE UR QL CFM: NEGATIVE NG/ML
HYDROMORPHONE UR QL CFM: NEGATIVE NG/ML
LORAZEPAM UR QL CFM: NEGATIVE NG/ML
ME-PHENIDATE UR QL CFM: NEGATIVE NG/ML
MEPERIDINE UR QL CFM: NEGATIVE NG/ML
METHADONE UR QL CFM: NEGATIVE NG/ML
METHAMPHET UR QL CFM: NEGATIVE NG/ML
MORPHINE UR QL CFM: NEGATIVE NG/ML
NALTREXONE UR QL CFM: NEGATIVE NG/ML
NITRITE UR QL: NORMAL UG/ML
NORBUPRENORPHINE UR QL CFM: NEGATIVE NG/ML
NORDIAZEPAM UR QL CFM: NEGATIVE NG/ML
NORFENTANYL UR QL CFM: NEGATIVE NG/ML
NORHYDROCODONE UR QL CFM: NEGATIVE NG/ML
NORMEPERIDINE UR QL CFM: NEGATIVE NG/ML
NOROXYCODONE UR QL CFM: NEGATIVE NG/ML
OXAZEPAM UR QL CFM: NEGATIVE NG/ML
OXYCODONE UR QL CFM: NEGATIVE NG/ML
OXYMORPHONE UR QL CFM: NEGATIVE NG/ML
PARA-FLUOROFENTANYL QUANTIFICATION: NORMAL NG/ML
PHENOBARB UR QL CFM: NEGATIVE NG/ML
RESULT ALL_PRESCRIBED MEDS AND SPECIAL INSTRUCTIONS: NORMAL
SECOBARBITAL UR QL CFM: NEGATIVE NG/ML
SL AMB 5F-ADB-M7 METABOLITE QUANTIFICATION: NEGATIVE NG/ML
SL AMB 7-OH-MITRAGYNINE (KRATOM ALKALOID) QUANTIFICATION: NEGATIVE NG/ML
SL AMB AB-FUBINACA-M3 METABOLITE QUANTIFICATION: NEGATIVE NG/ML
SL AMB ACETYL FENTANYL QUANTIFICATION: NORMAL NG/ML
SL AMB ACETYL NORFENTANYL QUANTIFICATION: NORMAL NG/ML
SL AMB ACRYL FENTANYL QUANTIFICATION: NORMAL NG/ML
SL AMB CARFENTANIL QUANTIFICATION: NORMAL NG/ML
SL AMB CTHC (MARIJUANA METABOLITE) QUANTIFICATION: NEGATIVE NG/ML
SL AMB DEXTRORPHAN (DEXTROMETHORPHAN METABOLITE) QUANT: NEGATIVE NG/ML
SL AMB GABAPENTIN QUANTIFICATION: NORMAL NG/ML
SL AMB JWH018 METABOLITE QUANTIFICATION: NEGATIVE NG/ML
SL AMB JWH073 METABOLITE QUANTIFICATION: NEGATIVE NG/ML
SL AMB MDMB-FUBINACA-M1 METABOLITE QUANTIFICATION: NEGATIVE NG/ML
SL AMB METHYLONE QUANTIFICATION: NEGATIVE NG/ML
SL AMB N-DESMETHYL-TRAMADOL QUANTIFICATION: NORMAL NG/ML
SL AMB PHENTERMINE QUANTIFICATION: NEGATIVE NG/ML
SL AMB PREGABALIN QUANTIFICATION: NEGATIVE NG/ML
SL AMB RCS4 METABOLITE QUANTIFICATION: NEGATIVE NG/ML
SL AMB RITALINIC ACID QUANTIFICATION: NEGATIVE NG/ML
SMOOTH MUSCLE AB TITR SER IF: NEGATIVE NG/ML
SPECIMEN DRAWN SERPL: NEGATIVE NG/ML
SPECIMEN PH ACCEPTABLE UR: NORMAL
TAPENTADOL UR QL CFM: NEGATIVE NG/ML
TEMAZEPAM UR QL CFM: NEGATIVE NG/ML
TRAMADOL UR QL CFM: NORMAL NG/ML
URATE/CREAT 24H UR: NORMAL NG/ML
XYLAZINE UR QL CFM: NEGATIVE NG/ML

## 2025-02-24 ENCOUNTER — APPOINTMENT (OUTPATIENT)
Dept: LAB | Facility: HOSPITAL | Age: 65
End: 2025-02-24
Payer: COMMERCIAL

## 2025-02-24 ENCOUNTER — TELEPHONE (OUTPATIENT)
Dept: LAB | Facility: HOSPITAL | Age: 65
End: 2025-02-24

## 2025-02-24 DIAGNOSIS — Z94.4 LIVER REPLACED BY TRANSPLANT (HCC): ICD-10-CM

## 2025-02-24 LAB
ALBUMIN SERPL BCG-MCNC: 4.3 G/DL (ref 3.5–5)
ALP SERPL-CCNC: 45 U/L (ref 34–104)
ALT SERPL W P-5'-P-CCNC: 13 U/L (ref 7–52)
ANION GAP SERPL CALCULATED.3IONS-SCNC: 5 MMOL/L (ref 4–13)
AST SERPL W P-5'-P-CCNC: 18 U/L (ref 13–39)
BASOPHILS # BLD AUTO: 0.04 THOUSANDS/ÂΜL (ref 0–0.1)
BASOPHILS NFR BLD AUTO: 1 % (ref 0–1)
BILIRUB DIRECT SERPL-MCNC: 0.07 MG/DL (ref 0–0.2)
BILIRUB SERPL-MCNC: 0.89 MG/DL (ref 0.2–1)
BUN SERPL-MCNC: 20 MG/DL (ref 5–25)
CALCIUM SERPL-MCNC: 9.5 MG/DL (ref 8.4–10.2)
CHLORIDE SERPL-SCNC: 100 MMOL/L (ref 96–108)
CO2 SERPL-SCNC: 32 MMOL/L (ref 21–32)
CREAT SERPL-MCNC: 1.37 MG/DL (ref 0.6–1.3)
EOSINOPHIL # BLD AUTO: 0.63 THOUSAND/ÂΜL (ref 0–0.61)
EOSINOPHIL NFR BLD AUTO: 19 % (ref 0–6)
ERYTHROCYTE [DISTWIDTH] IN BLOOD BY AUTOMATED COUNT: 13.9 % (ref 11.6–15.1)
GFR SERPL CREATININE-BSD FRML MDRD: 41 ML/MIN/1.73SQ M
GLUCOSE SERPL-MCNC: 89 MG/DL (ref 65–140)
HCT VFR BLD AUTO: 32.4 % (ref 34.8–46.1)
HGB BLD-MCNC: 10.9 G/DL (ref 11.5–15.4)
IMM GRANULOCYTES # BLD AUTO: 0.02 THOUSAND/UL (ref 0–0.2)
IMM GRANULOCYTES NFR BLD AUTO: 1 % (ref 0–2)
LYMPHOCYTES # BLD AUTO: 0.53 THOUSANDS/ÂΜL (ref 0.6–4.47)
LYMPHOCYTES NFR BLD AUTO: 16 % (ref 14–44)
MAGNESIUM SERPL-MCNC: 1.8 MG/DL (ref 1.9–2.7)
MCH RBC QN AUTO: 32.1 PG (ref 26.8–34.3)
MCHC RBC AUTO-ENTMCNC: 33.6 G/DL (ref 31.4–37.4)
MCV RBC AUTO: 95 FL (ref 82–98)
MONOCYTES # BLD AUTO: 0.31 THOUSAND/ÂΜL (ref 0.17–1.22)
MONOCYTES NFR BLD AUTO: 9 % (ref 4–12)
NEUTROPHILS # BLD AUTO: 1.87 THOUSANDS/ÂΜL (ref 1.85–7.62)
NEUTS SEG NFR BLD AUTO: 54 % (ref 43–75)
NRBC BLD AUTO-RTO: 0 /100 WBCS
PLATELET # BLD AUTO: 150 THOUSANDS/UL (ref 149–390)
PMV BLD AUTO: 9.8 FL (ref 8.9–12.7)
POTASSIUM SERPL-SCNC: 5.2 MMOL/L (ref 3.5–5.3)
PROT SERPL-MCNC: 6.9 G/DL (ref 6.4–8.4)
RBC # BLD AUTO: 3.4 MILLION/UL (ref 3.81–5.12)
SODIUM SERPL-SCNC: 137 MMOL/L (ref 135–147)
TACROLIMUS BLD-MCNC: 4.5 NG/ML (ref 3–15)
WBC # BLD AUTO: 3.4 THOUSAND/UL (ref 4.31–10.16)

## 2025-02-24 PROCEDURE — 82248 BILIRUBIN DIRECT: CPT

## 2025-02-24 PROCEDURE — 80197 ASSAY OF TACROLIMUS: CPT

## 2025-02-24 PROCEDURE — 83735 ASSAY OF MAGNESIUM: CPT

## 2025-02-24 PROCEDURE — 36415 COLL VENOUS BLD VENIPUNCTURE: CPT

## 2025-02-24 PROCEDURE — 80053 COMPREHEN METABOLIC PANEL: CPT

## 2025-02-24 PROCEDURE — 85025 COMPLETE CBC W/AUTO DIFF WBC: CPT

## 2025-03-10 ENCOUNTER — TELEPHONE (OUTPATIENT)
Dept: PAIN MEDICINE | Facility: CLINIC | Age: 65
End: 2025-03-10

## 2025-03-10 ENCOUNTER — HOSPITAL ENCOUNTER (OUTPATIENT)
Dept: RADIOLOGY | Facility: CLINIC | Age: 65
Discharge: HOME | End: 2025-03-10
Payer: COMMERCIAL

## 2025-03-10 ENCOUNTER — APPOINTMENT (OUTPATIENT)
Dept: LAB | Facility: HOSPITAL | Age: 65
End: 2025-03-10
Payer: COMMERCIAL

## 2025-03-10 VITALS
HEART RATE: 67 BPM | RESPIRATION RATE: 16 BRPM | TEMPERATURE: 97.5 F | OXYGEN SATURATION: 92 % | DIASTOLIC BLOOD PRESSURE: 83 MMHG | SYSTOLIC BLOOD PRESSURE: 138 MMHG

## 2025-03-10 DIAGNOSIS — Z94.4 LIVER REPLACED BY TRANSPLANT (HCC): ICD-10-CM

## 2025-03-10 DIAGNOSIS — M47.816 LUMBAR SPONDYLOSIS: ICD-10-CM

## 2025-03-10 LAB
ALBUMIN SERPL BCG-MCNC: 4.5 G/DL (ref 3.5–5)
ALP SERPL-CCNC: 57 U/L (ref 34–104)
ALT SERPL W P-5'-P-CCNC: 15 U/L (ref 7–52)
ANION GAP SERPL CALCULATED.3IONS-SCNC: 11 MMOL/L (ref 4–13)
AST SERPL W P-5'-P-CCNC: 24 U/L (ref 13–39)
BASOPHILS # BLD AUTO: 0.03 THOUSANDS/ÂΜL (ref 0–0.1)
BASOPHILS NFR BLD AUTO: 1 % (ref 0–1)
BILIRUB DIRECT SERPL-MCNC: 0.12 MG/DL (ref 0–0.2)
BILIRUB SERPL-MCNC: 0.91 MG/DL (ref 0.2–1)
BUN SERPL-MCNC: 25 MG/DL (ref 5–25)
CALCIUM SERPL-MCNC: 9.9 MG/DL (ref 8.4–10.2)
CHLORIDE SERPL-SCNC: 96 MMOL/L (ref 96–108)
CO2 SERPL-SCNC: 31 MMOL/L (ref 21–32)
CREAT SERPL-MCNC: 1.37 MG/DL (ref 0.6–1.3)
EOSINOPHIL # BLD AUTO: 0.63 THOUSAND/ÂΜL (ref 0–0.61)
EOSINOPHIL NFR BLD AUTO: 23 % (ref 0–6)
ERYTHROCYTE [DISTWIDTH] IN BLOOD BY AUTOMATED COUNT: 14.6 % (ref 11.6–15.1)
FERRITIN SERPL-MCNC: 301 NG/ML (ref 11–307)
GFR SERPL CREATININE-BSD FRML MDRD: 41 ML/MIN/1.73SQ M
GLUCOSE SERPL-MCNC: 67 MG/DL (ref 65–140)
HCT VFR BLD AUTO: 32.2 % (ref 34.8–46.1)
HGB BLD-MCNC: 10.7 G/DL (ref 11.5–15.4)
IMM GRANULOCYTES # BLD AUTO: 0.01 THOUSAND/UL (ref 0–0.2)
IMM GRANULOCYTES NFR BLD AUTO: 0 % (ref 0–2)
IRON SATN MFR SERPL: 34 % (ref 15–50)
IRON SERPL-MCNC: 82 UG/DL (ref 50–212)
LYMPHOCYTES # BLD AUTO: 0.42 THOUSANDS/ÂΜL (ref 0.6–4.47)
LYMPHOCYTES NFR BLD AUTO: 15 % (ref 14–44)
MAGNESIUM SERPL-MCNC: 1.7 MG/DL (ref 1.9–2.7)
MCH RBC QN AUTO: 32 PG (ref 26.8–34.3)
MCHC RBC AUTO-ENTMCNC: 33.2 G/DL (ref 31.4–37.4)
MCV RBC AUTO: 96 FL (ref 82–98)
MONOCYTES # BLD AUTO: 0.2 THOUSAND/ÂΜL (ref 0.17–1.22)
MONOCYTES NFR BLD AUTO: 7 % (ref 4–12)
NEUTROPHILS # BLD AUTO: 1.5 THOUSANDS/ÂΜL (ref 1.85–7.62)
NEUTS SEG NFR BLD AUTO: 54 % (ref 43–75)
NRBC BLD AUTO-RTO: 0 /100 WBCS
PLATELET # BLD AUTO: 166 THOUSANDS/UL (ref 149–390)
PMV BLD AUTO: 10.8 FL (ref 8.9–12.7)
POTASSIUM SERPL-SCNC: 4.5 MMOL/L (ref 3.5–5.3)
PROT SERPL-MCNC: 7 G/DL (ref 6.4–8.4)
RBC # BLD AUTO: 3.34 MILLION/UL (ref 3.81–5.12)
SODIUM SERPL-SCNC: 138 MMOL/L (ref 135–147)
T4 FREE SERPL-MCNC: 0.93 NG/DL (ref 0.61–1.12)
TACROLIMUS BLD-MCNC: 4.8 NG/ML (ref 3–15)
TIBC SERPL-MCNC: 240.8 UG/DL (ref 250–450)
TRANSFERRIN SERPL-MCNC: 172 MG/DL (ref 203–362)
TSH SERPL DL<=0.05 MIU/L-ACNC: 7.2 UIU/ML (ref 0.45–4.5)
UIBC SERPL-MCNC: 159 UG/DL (ref 155–355)
VIT B12 SERPL-MCNC: 189 PG/ML (ref 180–914)
WBC # BLD AUTO: 2.79 THOUSAND/UL (ref 4.31–10.16)

## 2025-03-10 PROCEDURE — 83540 ASSAY OF IRON: CPT

## 2025-03-10 PROCEDURE — 82248 BILIRUBIN DIRECT: CPT

## 2025-03-10 PROCEDURE — 64636 DESTROY L/S FACET JNT ADDL: CPT | Performed by: ANESTHESIOLOGY

## 2025-03-10 PROCEDURE — 80053 COMPREHEN METABOLIC PANEL: CPT

## 2025-03-10 PROCEDURE — 83735 ASSAY OF MAGNESIUM: CPT

## 2025-03-10 PROCEDURE — 64635 DESTROY LUMB/SAC FACET JNT: CPT | Performed by: ANESTHESIOLOGY

## 2025-03-10 PROCEDURE — 36415 COLL VENOUS BLD VENIPUNCTURE: CPT

## 2025-03-10 PROCEDURE — 84443 ASSAY THYROID STIM HORMONE: CPT

## 2025-03-10 PROCEDURE — 80197 ASSAY OF TACROLIMUS: CPT

## 2025-03-10 PROCEDURE — 85025 COMPLETE CBC W/AUTO DIFF WBC: CPT

## 2025-03-10 PROCEDURE — 84439 ASSAY OF FREE THYROXINE: CPT

## 2025-03-10 PROCEDURE — 82607 VITAMIN B-12: CPT

## 2025-03-10 PROCEDURE — 82728 ASSAY OF FERRITIN: CPT

## 2025-03-10 PROCEDURE — 83550 IRON BINDING TEST: CPT

## 2025-03-10 RX ADMIN — LIDOCAINE HYDROCHLORIDE 3 ML: 20 INJECTION, SOLUTION EPIDURAL; INFILTRATION; INTRACAUDAL at 11:35

## 2025-03-10 NOTE — TELEPHONE ENCOUNTER
Please call 3/11/25    Pt scheduled for Right L3-5 RFA on 3/24/25 with 1105 arrival    Pt scheduled for RFA f/u appt 4/21/25 with 1045 arrival    Pt scheduled forp 16 week f/u appt 6/9/25 with 815 am arrival

## 2025-03-10 NOTE — DISCHARGE INSTR - LAB

## 2025-03-10 NOTE — H&P
History of Present Illness: The patient is a 64 y.o. female who presents with complaints of low back pain.    Past Medical History:   Diagnosis Date    Anemia     Anxiety     Asthma     Chronic pain disorder     Clotting disorder (HCC)     Concussion     Constipation     CSF leak     Fractures     GERD (gastroesophageal reflux disease)     Head injury     Hernia 2022    High blood pressure     History of transfusion     Hyperbilirubinemia 04/28/2022    Hypertension     Liver failure (HCC)     Liver transplant recipient (HCC)     Migraines     Peripheral neuropathy     Urinary frequency     Varicella 1968    As a child       Past Surgical History:   Procedure Laterality Date    ABLATION SOFT TISSUE      COLONOSCOPY  11/2/2023    IR PARACENTESIS  04/29/2022    IR PARACENTESIS  05/09/2022    IR PARACENTESIS  05/12/2022    LAPAROSCOPY FOR ECTOPIC PREGNANCY      LIVER BIOPSY  7/3/2023    Plus 3 previous in hospital    LIVER TRANSPLANTATION  5/17/22    MAMMO (HISTORICAL) Bilateral 01/14/2021    [facility] BI-RADS 2 Benign findings. after U/S Scattered areas fibrogladulae density; 25% to 50% glandular breast tissue    MRI GUIDED BREAST WIRE LOCALIZATION LEFT Left 02/23/2015    MRI GUIDED BREAST WIRE LOCALIZATION LEFT Left 02/23/2015    OK EXC TUMOR SOFT TISS FACE&SCALP SUBFASCIAL <2CM N/A 08/01/2024    Procedure: EXCISION OF SUBCUTANEOUS MASSES OF FACE X3- RIGHT CHEEK, ROOT OF NOSE, LEFT LOWER EYELID/CHEEK, COMPLEX CLOSURE;  Surgeon: Fuentes Mendez MD;  Location:  MAIN OR;  Service: Plastics    SINUS SURGERY      SINUS SURGERY  2000    removed cartlidge in inside of nose    TONSILECTOMY AND ADNOIDECTOMY           Current Outpatient Medications:     azaTHIOprine (IMURAN) 50 mg tablet, Take 100 mg by mouth daily at bedtime, Disp: , Rfl:     diphenhydrAMINE (BENADRYL) 25 mg capsule, Take 25 mg by mouth every 8 (eight) hours as needed for itching. Indications: Itching, Disp: , Rfl:     ergocalciferol (ERGOCALCIFEROL) 1.25 MG  (83169 UT) capsule, Take 50,000 Units by mouth once a week. Every Monday AM  Indications: Vitamin D Deficiency, Disp: , Rfl:     FOLIC ACID PO, Take by mouth, Disp: , Rfl:     gabapentin (NEURONTIN) 300 mg capsule, 300 mg 3 (three) times a day, Disp: , Rfl:     latanoprost (XALATAN) 0.005 % ophthalmic solution, 1 DROP INTO BOTH EYES BEFORE BEDTIME, Disp: , Rfl:     metoprolol succinate (TOPROL-XL) 25 mg 24 hr tablet, , Disp: , Rfl:     pantoprazole (PROTONIX) 40 mg tablet, Take 1 tablet (40 mg total) by mouth daily, Disp: 90 tablet, Rfl: 3    Premarin vaginal cream, Insert 1 g into the vagina 2 (two) times a week, Disp: 30 g, Rfl: 3    Prevymis 480 MG TABS, Take 480 mg by mouth daily (Patient not taking: Reported on 2/17/2025), Disp: , Rfl:     Prolia 60 MG/ML, USE 1 ML SUBCUTANOEUS EVERY 6 MONTHS AS DIRECTED. PLEASE DELIVER ...  (REFER TO PRESCRIPTION NOTES)., Disp: , Rfl:     senna (SENOKOT) 8.6 mg, Take 2 tablets (17.2 mg total) by mouth daily at bedtime, Disp: , Rfl:     tacrolimus (PROGRAF) 1 mg capsule, Take 3 mg by mouth every 12 (twelve) hours 3 capsules AM 3 capsules PM, Disp: , Rfl:     thiamine 100 MG tablet, Take 100 mg by mouth daily. Indications: Deficiency of Vitamin B1, Disp: , Rfl:     traMADol (ULTRAM) 50 mg tablet, Take 1 tablet (50 mg total) by mouth 2 (two) times a day as needed for moderate pain for ongoing therapy, Disp: 45 tablet, Rfl: 2    traMADol (ULTRAM-ER) 200 MG 24 hr tablet, Take 1 tablet (200 mg total) by mouth daily for ongoing therapy DO NOT FILL BEFORE: 02/22/25, Disp: 30 tablet, Rfl: 3    Current Facility-Administered Medications:     lidocaine (PF) (XYLOCAINE-MPF) 2 % injection 3 mL, 3 mL, Perineural, Once, Eliseo Jimenez, DO    Allergies   Allergen Reactions    Other Other (See Comments)     Seasonal allergies      Pollen Extract Other (See Comments)       Physical Exam:   Vitals:    03/10/25 1121   BP: 133/83   Pulse: 68   Resp: 18   Temp: 97.5 °F (36.4 °C)   SpO2: 94%     General:  Awake, Alert, Oriented x 3, Mood and affect appropriate  Respiratory: Respirations even and unlabored  Cardiovascular: Peripheral pulses intact; no edema  Musculoskeletal Exam: Pain with lumbar extension    ASA Score: 2    Patient/Chart Verification  Patient ID Verified: Verbal  ID Band Applied: No  Consents Confirmed: To be obtained in the Procedural area  H&P( within 30 days) Verified: To be obtained in the Procedural area  Interval H&P(within 24 hr) Complete (required for Outpatients and Surgery Admit only): To be obtained in the Procedural area  Allergies Reviewed: Yes  Anticoag/NSAID held?: NA  Currently on antibiotics?: No    Assessment:   1. Lumbar spondylosis        Plan: Left L3-5 RFA [id number]

## 2025-03-11 NOTE — TELEPHONE ENCOUNTER
"Attempted to call pt.received message \"mailbox is full and cannot receive your message\".    Will also try reaching out my chart message.  "

## 2025-03-11 NOTE — TELEPHONE ENCOUNTER
Caller: tapan Mukherjee    Doctor: Javier    Reason for call: Pt returning nurses call from procedure she had yesterday. Provided the questions for pt  per chart notes:    how you are feeling today since your procedure? Fine   Are you having any soreness/pain? Yes site soreness   Are you taking or using anything for soreness or pain? Pt stated not really she will be taking Tylenol extra strength today, yesterday she took tramadol for leg pain   Can you tell me your 0/10 pain rating? 6 or 7   Do you have any sunburn sensation?   How do the needle sites look today?unsure     It can take up to 4-6 weeks for the full effectiveness from the procedure.       Call back#: [phone]

## 2025-03-12 LAB
CMV DNA SERPL NAA+PROBE-ACNC: 296 IU/ML
CMV DNA SERPL NAA+PROBE-ACNC: DETECTED [IU]/ML

## 2025-03-13 ENCOUNTER — HOSPITAL ENCOUNTER (OUTPATIENT)
Dept: BONE DENSITY | Facility: IMAGING CENTER | Age: 65
Discharge: HOME | End: 2025-03-13
Payer: COMMERCIAL

## 2025-03-13 VITALS — BODY MASS INDEX: 26.22 KG/M2 | HEIGHT: 64 IN | WEIGHT: 153.6 LBS

## 2025-03-13 DIAGNOSIS — M81.0 AGE-RELATED OSTEOPOROSIS WITHOUT CURRENT PATHOLOGICAL FRACTURE: ICD-10-CM

## 2025-03-13 PROCEDURE — 77080 DXA BONE DENSITY AXIAL: CPT

## 2025-03-24 ENCOUNTER — HOSPITAL ENCOUNTER (OUTPATIENT)
Dept: RADIOLOGY | Facility: CLINIC | Age: 65
Discharge: HOME | End: 2025-03-24
Payer: COMMERCIAL

## 2025-03-24 ENCOUNTER — TELEPHONE (OUTPATIENT)
Dept: PAIN MEDICINE | Facility: CLINIC | Age: 65
End: 2025-03-24

## 2025-03-24 VITALS
HEART RATE: 66 BPM | DIASTOLIC BLOOD PRESSURE: 73 MMHG | TEMPERATURE: 97.7 F | RESPIRATION RATE: 20 BRPM | SYSTOLIC BLOOD PRESSURE: 128 MMHG | OXYGEN SATURATION: 93 %

## 2025-03-24 DIAGNOSIS — M47.816 LUMBAR SPONDYLOSIS: ICD-10-CM

## 2025-03-24 PROCEDURE — 64635 DESTROY LUMB/SAC FACET JNT: CPT | Performed by: ANESTHESIOLOGY

## 2025-03-24 PROCEDURE — 64636 DESTROY L/S FACET JNT ADDL: CPT | Performed by: ANESTHESIOLOGY

## 2025-03-24 RX ADMIN — LIDOCAINE HYDROCHLORIDE 3 ML: 20 INJECTION, SOLUTION EPIDURAL; INFILTRATION; INTRACAUDAL at 11:31

## 2025-03-24 NOTE — H&P
History of Present Illness: The patient is a 64 y.o. female who presents with complaints of low back pain.    Past Medical History:   Diagnosis Date    Anemia     Anxiety     Asthma     Chronic pain disorder     Clotting disorder (HCC)     Concussion     Constipation     CSF leak     Fractures     GERD (gastroesophageal reflux disease)     Head injury     Hernia 2022    High blood pressure     History of transfusion     Hyperbilirubinemia 04/28/2022    Hypertension     Liver failure (HCC)     Liver transplant recipient (HCC)     Migraines     Peripheral neuropathy     Urinary frequency     Varicella 1968    As a child       Past Surgical History:   Procedure Laterality Date    ABLATION SOFT TISSUE      COLONOSCOPY  11/2/2023    IR PARACENTESIS  04/29/2022    IR PARACENTESIS  05/09/2022    IR PARACENTESIS  05/12/2022    LAPAROSCOPY FOR ECTOPIC PREGNANCY      LIVER BIOPSY  7/3/2023    Plus 3 previous in hospital    LIVER TRANSPLANTATION  5/17/22    MAMMO (HISTORICAL) Bilateral 01/14/2021    [facility] BI-RADS 2 Benign findings. after U/S Scattered areas fibrogladulae density; 25% to 50% glandular breast tissue    MRI GUIDED BREAST WIRE LOCALIZATION LEFT Left 02/23/2015    MRI GUIDED BREAST WIRE LOCALIZATION LEFT Left 02/23/2015    IA EXC TUMOR SOFT TISS FACE&SCALP SUBFASCIAL <2CM N/A 08/01/2024    Procedure: EXCISION OF SUBCUTANEOUS MASSES OF FACE X3- RIGHT CHEEK, ROOT OF NOSE, LEFT LOWER EYELID/CHEEK, COMPLEX CLOSURE;  Surgeon: Fuentes Mendez MD;  Location:  MAIN OR;  Service: Plastics    SINUS SURGERY      SINUS SURGERY  2000    removed cartlidge in inside of nose    TONSILECTOMY AND ADNOIDECTOMY           Current Outpatient Medications:     azaTHIOprine (IMURAN) 50 mg tablet, Take 100 mg by mouth daily at bedtime, Disp: , Rfl:     diphenhydrAMINE (BENADRYL) 25 mg capsule, Take 25 mg by mouth every 8 (eight) hours as needed for itching. Indications: Itching, Disp: , Rfl:     ergocalciferol (ERGOCALCIFEROL) 1.25 MG  (29548 UT) capsule, Take 50,000 Units by mouth once a week. Every Monday AM  Indications: Vitamin D Deficiency, Disp: , Rfl:     FOLIC ACID PO, Take by mouth, Disp: , Rfl:     gabapentin (NEURONTIN) 300 mg capsule, 300 mg 3 (three) times a day, Disp: , Rfl:     latanoprost (XALATAN) 0.005 % ophthalmic solution, 1 DROP INTO BOTH EYES BEFORE BEDTIME, Disp: , Rfl:     metoprolol succinate (TOPROL-XL) 25 mg 24 hr tablet, , Disp: , Rfl:     pantoprazole (PROTONIX) 40 mg tablet, Take 1 tablet (40 mg total) by mouth daily, Disp: 90 tablet, Rfl: 3    Premarin vaginal cream, Insert 1 g into the vagina 2 (two) times a week, Disp: 30 g, Rfl: 3    Prevymis 480 MG TABS, Take 480 mg by mouth daily (Patient not taking: Reported on 2/17/2025), Disp: , Rfl:     Prolia 60 MG/ML, USE 1 ML SUBCUTANOEUS EVERY 6 MONTHS AS DIRECTED. PLEASE DELIVER ...  (REFER TO PRESCRIPTION NOTES)., Disp: , Rfl:     senna (SENOKOT) 8.6 mg, Take 2 tablets (17.2 mg total) by mouth daily at bedtime, Disp: , Rfl:     tacrolimus (PROGRAF) 1 mg capsule, Take 3 mg by mouth every 12 (twelve) hours 3 capsules AM 3 capsules PM, Disp: , Rfl:     thiamine 100 MG tablet, Take 100 mg by mouth daily. Indications: Deficiency of Vitamin B1, Disp: , Rfl:     traMADol (ULTRAM) 50 mg tablet, Take 1 tablet (50 mg total) by mouth 2 (two) times a day as needed for moderate pain for ongoing therapy, Disp: 45 tablet, Rfl: 2    traMADol (ULTRAM-ER) 200 MG 24 hr tablet, Take 1 tablet (200 mg total) by mouth daily for ongoing therapy DO NOT FILL BEFORE: 02/22/25, Disp: 30 tablet, Rfl: 3    Current Facility-Administered Medications:     lidocaine (PF) (XYLOCAINE-MPF) 2 % injection 3 mL, 3 mL, Perineural, Once, Eliseo Jimenez, DO    Allergies   Allergen Reactions    Other Other (See Comments)     Seasonal allergies      Pollen Extract Other (See Comments)       Physical Exam:   Vitals:    03/24/25 1117   BP: 112/68   Pulse: 61   Resp: 18   Temp: 97.7 °F (36.5 °C)   SpO2: 95%     General:  Awake, Alert, Oriented x 3, Mood and affect appropriate  Respiratory: Respirations even and unlabored  Cardiovascular: Peripheral pulses intact; no edema  Musculoskeletal Exam: Pain with lumbar extension    ASA Score: 2    Patient/Chart Verification  Patient ID Verified: Verbal  ID Band Applied: No  Consents Confirmed: To be obtained in the Procedural area  H&P( within 30 days) Verified: To be obtained in the Procedural area  Interval H&P(within 24 hr) Complete (required for Outpatients and Surgery Admit only): To be obtained in the Procedural area  Allergies Reviewed: Yes  Anticoag/NSAID held?: NA  Currently on antibiotics?: No  Pregnancy denied?: NA  Beta Blocker given : No    Assessment:   1. Lumbar spondylosis        Plan: Right L3-5 RFA [id number]

## 2025-03-24 NOTE — DISCHARGE INSTR - LAB

## 2025-03-25 NOTE — TELEPHONE ENCOUNTER
S/w pt, stated that she has 80 - 85% improvement at this time. Advised pt to allow 2-6 weeks for the full effect. Cb if s/s infection present: redness, drainage, swelling at the site or fever 100+, or if a sunburn like sensation in the area of the procedure presents. Ok to continue w/ rest, ice / heat, medication as prescribed / directed. Cb if questions or issues arise. Pt verbalized understanding and appreciation. Confirmed 4/21/25 fu ov

## 2025-04-21 ENCOUNTER — APPOINTMENT (OUTPATIENT)
Dept: RADIOLOGY | Facility: CLINIC | Age: 65
End: 2025-04-21
Payer: COMMERCIAL

## 2025-04-21 ENCOUNTER — OFFICE VISIT (OUTPATIENT)
Dept: PAIN MEDICINE | Facility: CLINIC | Age: 65
End: 2025-04-21
Payer: COMMERCIAL

## 2025-04-21 VITALS
HEART RATE: 64 BPM | TEMPERATURE: 98 F | HEIGHT: 64 IN | WEIGHT: 155 LBS | OXYGEN SATURATION: 96 % | BODY MASS INDEX: 26.46 KG/M2

## 2025-04-21 DIAGNOSIS — M25.552 BILATERAL HIP PAIN: ICD-10-CM

## 2025-04-21 DIAGNOSIS — G89.4 CHRONIC PAIN SYNDROME: ICD-10-CM

## 2025-04-21 DIAGNOSIS — F11.20 UNCOMPLICATED OPIOID DEPENDENCE (HCC): ICD-10-CM

## 2025-04-21 DIAGNOSIS — G61.89 OTHER INFLAMMATORY POLYNEUROPATHIES (HCC): ICD-10-CM

## 2025-04-21 DIAGNOSIS — M25.512 LEFT SHOULDER PAIN: ICD-10-CM

## 2025-04-21 DIAGNOSIS — Z79.891 LONG-TERM CURRENT USE OF OPIATE ANALGESIC: ICD-10-CM

## 2025-04-21 DIAGNOSIS — M25.551 BILATERAL HIP PAIN: ICD-10-CM

## 2025-04-21 DIAGNOSIS — M47.816 LUMBAR SPONDYLOSIS: Primary | ICD-10-CM

## 2025-04-21 DIAGNOSIS — M51.369 DDD (DEGENERATIVE DISC DISEASE), LUMBAR: ICD-10-CM

## 2025-04-21 PROCEDURE — 73030 X-RAY EXAM OF SHOULDER: CPT

## 2025-04-21 PROCEDURE — 99214 OFFICE O/P EST MOD 30 MIN: CPT | Performed by: PHYSICIAN ASSISTANT

## 2025-04-21 PROCEDURE — 73521 X-RAY EXAM HIPS BI 2 VIEWS: CPT

## 2025-04-21 RX ORDER — VALGANCICLOVIR 450 MG/1
TABLET, FILM COATED ORAL
COMMUNITY
Start: 2025-04-11

## 2025-04-21 RX ORDER — TRAMADOL HYDROCHLORIDE 200 MG/1
200 TABLET, EXTENDED RELEASE ORAL DAILY
Qty: 30 TABLET | Refills: 3 | Status: SHIPPED | OUTPATIENT
Start: 2025-04-21

## 2025-04-21 RX ORDER — PREGABALIN 75 MG/1
75 CAPSULE ORAL 2 TIMES DAILY
Qty: 60 CAPSULE | Refills: 2 | Status: SHIPPED | OUTPATIENT
Start: 2025-04-21

## 2025-04-21 RX ORDER — TRAMADOL HYDROCHLORIDE 50 MG/1
50 TABLET ORAL 2 TIMES DAILY PRN
Qty: 45 TABLET | Refills: 2 | Status: SHIPPED | OUTPATIENT
Start: 2025-04-21

## 2025-04-21 NOTE — PATIENT INSTRUCTIONS
Decrease Gabapentin 300mg twice a day x 3 days, then decrease to once a day, then discontinue.   Then start Lyrica 75mg once a day for 3 days, then increase to twice a day.

## 2025-04-21 NOTE — PROGRESS NOTES
Assessment:  1. Lumbar spondylosis    2. DDD (degenerative disc disease), lumbar    3. Bilateral hip pain    4. Other inflammatory polyneuropathies (HCC)    5. Chronic pain syndrome    6. Uncomplicated opioid dependence (HCC)    7. Long-term current use of opiate analgesic        Plan:  While the patient was in the office today, I did have a thorough conversation regarding their chronic pain syndrome, medication management, and treatment plan options.    The patient is status post bilateral L3-5 radiofrequency ablation she is able to report 50% reduction in low back pain.  Patient is aware procedure can be repeated no sooner than 12 months if indicated in the future.    After discussing options, I have placed orders for x-rays of bilateral hips on today's visit.  Patient has symptoms consistent with greater trochanteric bursitis and may be a candidate for injections.    On today's visit I have electronically sent refills for her medication tramadol ER as well as tramadol 50 mg IR.  We also discussed weaning off of gabapentin and proceeding with Lyrica.  I have provided the patient with weaning instructions on gabapentin and titration instructions on pregabalin.  I have sent a prescription for the 75 mg twice daily of the pregabalin on today's visit.    [place] Prescription Drug Monitoring Program report was reviewed and was appropriate     There are risks associated with opioid medications, including dependence, addiction and tolerance. The patient understands and agrees to use these medications only as prescribed. Potential side effects of the medications include, but are not limited to, constipation, drowsiness, addiction, impaired judgment and risk of fatal overdose if not taken as prescribed. The patient was warned against driving while taking sedation medications.  Sharing medications is a felony. At this point in time, the patient is showing no signs of addiction, abuse, diversion or suicidal  ideation.    The patient will follow-up in 12 weeks for medication prescription refill and reevaluation. The patient was advised to contact the office should their symptoms worsen in the interim. The patient was agreeable and verbalized an understanding.        History of Present Illness:    The patient is a 64 y.o. female last seen on 3/24/2025 who presents for a follow up office visit in regards to chronic low back pain secondary to lumbar spondylosis, polyarthralgias as well as neuropathic pain.  The patient currently reports low back pain that has improved by about 50% following the bilateral L3-5 radiofrequency ablation.  She is complaining of bilateral hip pain, on the lateral aspects, left greater than right.  She has difficulty lying down on either side due to pain.  Her current pain is rated a 3 out of 10 and described as a constant sharp, pressure-like pain.  She does have intermittent numbness and paresthesias in the left lower extremity.  She will be returning back to physical therapy and she does continue with home exercises.  The patient consulted with her liver transplant specialist regarding the possibility of trialing Lyrica.  He did state that this will be okay not to exceed 300 mg however.  Patient is currently taking gabapentin 3 mg 3 times daily without much relief.    I have personally reviewed and/or updated the patient's past medical history, past surgical history, family history, social history, current medications, allergies, and vital signs today.       Review of Systems:    Review of Systems   Respiratory:  Negative for shortness of breath.    Cardiovascular:  Negative for chest pain.   Gastrointestinal:  Negative for constipation, diarrhea, nausea and vomiting.   Musculoskeletal:  Positive for gait problem. Negative for arthralgias, joint swelling and myalgias.   Skin:  Negative for rash.   Neurological:  Positive for dizziness and weakness. Negative for seizures.    Psychiatric/Behavioral:  Negative for dysphoric mood.    All other systems reviewed and are negative.        Past Medical History:   Diagnosis Date   • Anemia    • Anxiety    • Asthma    • Chronic pain disorder    • Clotting disorder (HCC)    • Concussion    • Constipation    • CSF leak    • Fractures    • GERD (gastroesophageal reflux disease)    • Head injury    • Hernia 2022   • High blood pressure    • History of transfusion    • Hyperbilirubinemia 04/28/2022   • Hypertension    • Liver failure (HCC)    • Liver transplant recipient (HCC)    • Migraines    • Peripheral neuropathy    • Urinary frequency    • Varicella 1968    As a child       Past Surgical History:   Procedure Laterality Date   • ABLATION SOFT TISSUE     • COLONOSCOPY  11/2/2023   • IR PARACENTESIS  04/29/2022   • IR PARACENTESIS  05/09/2022   • IR PARACENTESIS  05/12/2022   • LAPAROSCOPY FOR ECTOPIC PREGNANCY     • LIVER BIOPSY  7/3/2023    Plus 3 previous in hospital   • LIVER TRANSPLANTATION  5/17/22   • MAMMO (HISTORICAL) Bilateral 01/14/2021    [facility] BI-RADS 2 Benign findings. after U/S Scattered areas fibrogladulae density; 25% to 50% glandular breast tissue   • MRI GUIDED BREAST WIRE LOCALIZATION LEFT Left 02/23/2015   • MRI GUIDED BREAST WIRE LOCALIZATION LEFT Left 02/23/2015   • CO EXC TUMOR SOFT TISS FACE&SCALP SUBFASCIAL <2CM N/A 08/01/2024    Procedure: EXCISION OF SUBCUTANEOUS MASSES OF FACE X3- RIGHT CHEEK, ROOT OF NOSE, LEFT LOWER EYELID/CHEEK, COMPLEX CLOSURE;  Surgeon: Fuentes Mendez MD;  Location:  MAIN OR;  Service: Plastics   • SINUS SURGERY     • SINUS SURGERY  2000    removed cartlidge in inside of nose   • TONSILECTOMY AND ADNOIDECTOMY         Family History   Problem Relation Age of Onset   • Breast cancer Mother         age unknown   • Cancer Mother    • Colon cancer Father         70's   • Arthritis Father    • Cancer Father    • Cancer Sister    • Breast cancer Sister         guessing 50's   • Autoimmune disease  Daughter    • No Known Problems Paternal Grandmother    • No Known Problems Paternal Grandfather    • No Known Problems Son    • No Known Problems Son    • Heart disease Half-Brother    • Prostate cancer Half-Brother 50   • Melanoma Half-Brother        Social History     Occupational History   • Not on file   Tobacco Use   • Smoking status: Former     Current packs/day: 0.00     Average packs/day: 1 pack/day for 25.0 years (25.0 ttl pk-yrs)     Types: Cigarettes     Start date:      Quit date: 2019     Years since quittin.3   • Smokeless tobacco: Never   Vaping Use   • Vaping status: Never Used   Substance and Sexual Activity   • Alcohol use: Not Currently     Comment: socially, had glass of wine today   • Drug use: Not Currently     Types: Marijuana     Comment: CBD  medical [facility]   • Sexual activity: Not Currently     Partners: Male     Birth control/protection: Post-menopausal     Comment: no new partner in past year         Current Outpatient Medications:   •  azaTHIOprine (IMURAN) 50 mg tablet, Take 100 mg by mouth daily at bedtime, Disp: , Rfl:   •  diphenhydrAMINE (BENADRYL) 25 mg capsule, Take 25 mg by mouth every 8 (eight) hours as needed for itching. Indications: Itching, Disp: , Rfl:   •  ergocalciferol (ERGOCALCIFEROL) 1.25 MG (09374 UT) capsule, Take 50,000 Units by mouth once a week. Every Monday AM  Indications: Vitamin D Deficiency, Disp: , Rfl:   •  FOLIC ACID PO, Take by mouth, Disp: , Rfl:   •  latanoprost (XALATAN) 0.005 % ophthalmic solution, 1 DROP INTO BOTH EYES BEFORE BEDTIME, Disp: , Rfl:   •  metoprolol succinate (TOPROL-XL) 25 mg 24 hr tablet, , Disp: , Rfl:   •  pantoprazole (PROTONIX) 40 mg tablet, Take 1 tablet (40 mg total) by mouth daily, Disp: 90 tablet, Rfl: 3  •  pregabalin (LYRICA) 75 mg capsule, Take 1 capsule (75 mg total) by mouth 2 (two) times a day, Disp: 60 capsule, Rfl: 2  •  Premarin vaginal cream, Insert 1 g into the vagina 2 (two) times a week, Disp: 30 g,  "Rfl: 3  •  Prolia 60 MG/ML, USE 1 ML SUBCUTANOEUS EVERY 6 MONTHS AS DIRECTED. PLEASE DELIVER ...  (REFER TO PRESCRIPTION NOTES)., Disp: , Rfl:   •  senna (SENOKOT) 8.6 mg, Take 2 tablets (17.2 mg total) by mouth daily at bedtime, Disp: , Rfl:   •  tacrolimus (PROGRAF) 1 mg capsule, Take 3 mg by mouth every 12 (twelve) hours 3 capsules AM 3 capsules PM, Disp: , Rfl:   •  thiamine 100 MG tablet, Take 100 mg by mouth daily. Indications: Deficiency of Vitamin B1, Disp: , Rfl:   •  traMADol (ULTRAM) 50 mg tablet, Take 1 tablet (50 mg total) by mouth 2 (two) times a day as needed for moderate pain for ongoing therapy, Disp: 45 tablet, Rfl: 2  •  traMADol (ULTRAM-ER) 200 MG 24 hr tablet, Take 1 tablet (200 mg total) by mouth daily for ongoing therapy, Disp: 30 tablet, Rfl: 3  •  valGANciclovir (VALCYTE) 450 mg tablet, , Disp: , Rfl:   •  Prevymis 480 MG TABS, Take 480 mg by mouth daily (Patient not taking: Reported on 2/17/2025), Disp: , Rfl:     Allergies   Allergen Reactions   • Other Other (See Comments)     Seasonal allergies     • Pollen Extract Other (See Comments)       Physical Exam:    Pulse 64   Temp 98 °F (36.7 °C)   Ht 5' 4.25\" (1.632 m)   Wt 70.3 kg (155 lb)   SpO2 96%   BMI 26.40 kg/m²     Constitutional:normal, well developed, well nourished, alert, in no distress and non-toxic and no overt pain behavior.  Eyes:anicteric  HEENT:grossly intact  Neck:supple, symmetric, trachea midline and no masses   Pulmonary:even and unlabored  Cardiovascular:No edema or pitting edema present  Skin:Normal without rashes or lesions and well hydrated  Psychiatric:Mood and affect appropriate  Neurologic:Cranial Nerves II-XII grossly intact  Musculoskeletal: Gait is slow and antalgic, tenderness to palpation of bilateral lateral aspects of bilateral hips and pain with range of motion.      Imaging  No orders to display         No orders of the defined types were placed in this encounter.      "

## 2025-04-22 ENCOUNTER — RESULTS FOLLOW-UP (OUTPATIENT)
Dept: PAIN MEDICINE | Facility: CLINIC | Age: 65
End: 2025-04-22

## 2025-04-22 DIAGNOSIS — M70.62 TROCHANTERIC BURSITIS OF BOTH HIPS: Primary | ICD-10-CM

## 2025-04-22 DIAGNOSIS — M70.61 TROCHANTERIC BURSITIS OF BOTH HIPS: Primary | ICD-10-CM

## 2025-04-22 NOTE — TELEPHONE ENCOUNTER
Caller: Patient     Doctor/Office: Dr Jimenez    Call regarding :  Returning call to go over imaging      Call was transferred to: Triage Nurse

## 2025-04-22 NOTE — TELEPHONE ENCOUNTER
----- Message from Debra Herrera PA-C sent at 4/21/2025  2:37 PM EDT -----  Please let patient know that her hip x-rays are normal.  We can offer the patient greater trochanteric bursa injections if she is interested.

## 2025-04-22 NOTE — TELEPHONE ENCOUNTER
S/w pt, advised of above. Pt verbalized understanding and agreement. Advised pt, the writer will make MG aware. Anticipate a cb from [facility] surg coord to schedule as discussed. Pt verbalized understanding and appreciation.

## 2025-05-05 ENCOUNTER — HOSPITAL ENCOUNTER (OUTPATIENT)
Dept: RADIOLOGY | Facility: CLINIC | Age: 65
Discharge: HOME | End: 2025-05-05
Attending: PHYSICIAN ASSISTANT
Payer: COMMERCIAL

## 2025-05-05 VITALS
HEART RATE: 63 BPM | TEMPERATURE: 97.5 F | DIASTOLIC BLOOD PRESSURE: 92 MMHG | RESPIRATION RATE: 16 BRPM | SYSTOLIC BLOOD PRESSURE: 146 MMHG | OXYGEN SATURATION: 98 %

## 2025-05-05 DIAGNOSIS — M70.62 TROCHANTERIC BURSITIS OF BOTH HIPS: ICD-10-CM

## 2025-05-05 DIAGNOSIS — M70.61 TROCHANTERIC BURSITIS OF BOTH HIPS: ICD-10-CM

## 2025-05-05 PROCEDURE — 77002 NEEDLE LOCALIZATION BY XRAY: CPT | Performed by: ANESTHESIOLOGY

## 2025-05-05 PROCEDURE — 20610 DRAIN/INJ JOINT/BURSA W/O US: CPT | Performed by: ANESTHESIOLOGY

## 2025-05-05 RX ORDER — ROPIVACAINE HYDROCHLORIDE 2 MG/ML
4 INJECTION, SOLUTION EPIDURAL; INFILTRATION; PERINEURAL ONCE
Status: COMPLETED | OUTPATIENT
Start: 2025-05-05 | End: 2025-05-05

## 2025-05-05 RX ORDER — METHYLPREDNISOLONE ACETATE 80 MG/ML
80 INJECTION, SUSPENSION INTRA-ARTICULAR; INTRALESIONAL; INTRAMUSCULAR; PARENTERAL; SOFT TISSUE ONCE
Status: COMPLETED | OUTPATIENT
Start: 2025-05-05 | End: 2025-05-05

## 2025-05-05 RX ADMIN — ROPIVACAINE HYDROCHLORIDE 4 ML: 2 INJECTION EPIDURAL; INFILTRATION; PERINEURAL at 09:46

## 2025-05-05 RX ADMIN — IOHEXOL 1 ML: 300 INJECTION, SOLUTION INTRAVENOUS at 09:44

## 2025-05-05 RX ADMIN — METHYLPREDNISOLONE ACETATE 80 MG: 80 INJECTION, SUSPENSION INTRA-ARTICULAR; INTRALESIONAL; INTRAMUSCULAR; SOFT TISSUE at 09:46

## 2025-05-05 NOTE — DISCHARGE INSTR - LAB
Do not apply heat to any area that is numb. If you have discomfort or soreness at the injection site, you may apply ice today, 20 minutes on and 20 minutes off. Tomorrow you may use ice or warm, moist heat. Do not apply ice or heat directly to the skin.  If you experience severe shortness of breath, go to the Emergency Room.  You may have numbness for several hours from the local anesthetic. Please use caution and common sense, especially with weight-bearing activities.  You may have an increase or change in the discomfort for 36-48 hours after your treatment. Apply ice and continue with any pain medicine you have been prescribed.  Do not do anything strenuous today. You may shower, but no tub baths or hot tubs today. You may resume your normal activities tomorrow, but do not “overdo it”. Resume normal activities slowly when you are feeling better.  If you experience redness, drainage or swelling at the injection site, or if you develop a fever above 100 degrees, please call [facility] at [phone] or go to the Emergency Room.  Continue to take all routine medicines prescribed by your primary care physician unless otherwise instructed by our staff. Most blood thinners should be started again according to your regularly scheduled dosing. If you have any questions, please give our office a call.    As no general anesthesia was used in today's procedure, you should not experience any side effects related to anesthesia.       If you have a problem specifically related to your procedure, please call our office at [phone].  Problems not related to your procedure should be directed to your primary care physician.

## 2025-05-05 NOTE — H&P
History of Present Illness: The patient is a 64 y.o. female who presents with complaints of bilateral hip pain.    Past Medical History:   Diagnosis Date    Anemia     Anxiety     Asthma     Chronic pain disorder     Clotting disorder (HCC)     Concussion     Constipation     CSF leak     Fractures     GERD (gastroesophageal reflux disease)     Head injury     Hernia 2022    High blood pressure     History of transfusion     Hyperbilirubinemia 04/28/2022    Hypertension     Liver failure (HCC)     Liver transplant recipient (HCC)     Migraines     Peripheral neuropathy     Urinary frequency     Varicella 1968    As a child       Past Surgical History:   Procedure Laterality Date    ABLATION SOFT TISSUE      COLONOSCOPY  11/2/2023    IR PARACENTESIS  04/29/2022    IR PARACENTESIS  05/09/2022    IR PARACENTESIS  05/12/2022    LAPAROSCOPY FOR ECTOPIC PREGNANCY      LIVER BIOPSY  7/3/2023    Plus 3 previous in hospital    LIVER TRANSPLANTATION  5/17/22    MAMMO (HISTORICAL) Bilateral 01/14/2021    [facility] BI-RADS 2 Benign findings. after U/S Scattered areas fibrogladulae density; 25% to 50% glandular breast tissue    MRI GUIDED BREAST WIRE LOCALIZATION LEFT Left 02/23/2015    MRI GUIDED BREAST WIRE LOCALIZATION LEFT Left 02/23/2015    MT EXC TUMOR SOFT TISS FACE&SCALP SUBFASCIAL <2CM N/A 08/01/2024    Procedure: EXCISION OF SUBCUTANEOUS MASSES OF FACE X3- RIGHT CHEEK, ROOT OF NOSE, LEFT LOWER EYELID/CHEEK, COMPLEX CLOSURE;  Surgeon: Fuentes Mendez MD;  Location:  MAIN OR;  Service: Plastics    SINUS SURGERY      SINUS SURGERY  2000    removed cartlidge in inside of nose    TONSILECTOMY AND ADNOIDECTOMY           Current Outpatient Medications:     azaTHIOprine (IMURAN) 50 mg tablet, Take 100 mg by mouth daily at bedtime, Disp: , Rfl:     diphenhydrAMINE (BENADRYL) 25 mg capsule, Take 25 mg by mouth every 8 (eight) hours as needed for itching. Indications: Itching, Disp: , Rfl:     ergocalciferol (ERGOCALCIFEROL) 1.25  MG (62792 UT) capsule, Take 50,000 Units by mouth once a week. Every Monday AM  Indications: Vitamin D Deficiency, Disp: , Rfl:     FOLIC ACID PO, Take by mouth, Disp: , Rfl:     latanoprost (XALATAN) 0.005 % ophthalmic solution, 1 DROP INTO BOTH EYES BEFORE BEDTIME, Disp: , Rfl:     metoprolol succinate (TOPROL-XL) 25 mg 24 hr tablet, , Disp: , Rfl:     pantoprazole (PROTONIX) 40 mg tablet, Take 1 tablet (40 mg total) by mouth daily, Disp: 90 tablet, Rfl: 3    pregabalin (LYRICA) 75 mg capsule, Take 1 capsule (75 mg total) by mouth 2 (two) times a day, Disp: 60 capsule, Rfl: 2    Premarin vaginal cream, Insert 1 g into the vagina 2 (two) times a week, Disp: 30 g, Rfl: 3    Prevymis 480 MG TABS, Take 480 mg by mouth daily (Patient not taking: Reported on 2/17/2025), Disp: , Rfl:     Prolia 60 MG/ML, USE 1 ML SUBCUTANOEUS EVERY 6 MONTHS AS DIRECTED. PLEASE DELIVER ...  (REFER TO PRESCRIPTION NOTES)., Disp: , Rfl:     senna (SENOKOT) 8.6 mg, Take 2 tablets (17.2 mg total) by mouth daily at bedtime, Disp: , Rfl:     tacrolimus (PROGRAF) 1 mg capsule, Take 3 mg by mouth every 12 (twelve) hours 3 capsules AM 3 capsules PM, Disp: , Rfl:     thiamine 100 MG tablet, Take 100 mg by mouth daily. Indications: Deficiency of Vitamin B1, Disp: , Rfl:     traMADol (ULTRAM) 50 mg tablet, Take 1 tablet (50 mg total) by mouth 2 (two) times a day as needed for moderate pain for ongoing therapy, Disp: 45 tablet, Rfl: 2    traMADol (ULTRAM-ER) 200 MG 24 hr tablet, Take 1 tablet (200 mg total) by mouth daily for ongoing therapy, Disp: 30 tablet, Rfl: 3    valGANciclovir (VALCYTE) 450 mg tablet, , Disp: , Rfl:     Current Facility-Administered Medications:     iohexol (OMNIPAQUE) 300 mg/mL injection 1 mL, 1 mL, Intra-articular, Once, Eliseo Jimenez DO    methylPREDNISolone acetate (DEPO-MEDROL) injection 80 mg, 80 mg, Intra-articular, Once, Eliseo Jimenez DO    ropivacaine (NAROPIN) injection 4 mL, 4 mL, Intra-articular, Once, Eliseo Jimenez  DO    Allergies   Allergen Reactions    Other Other (See Comments)     Seasonal allergies      Pollen Extract Other (See Comments)       Physical Exam:   General: Awake, Alert, Oriented x 3, Mood and affect appropriate  Respiratory: Respirations even and unlabored  Cardiovascular: Peripheral pulses intact; no edema  Musculoskeletal Exam: Normal gait    ASA Score: 2    Patient/Chart Verification  Patient ID Verified: Verbal  ID Band Applied: No  Consents Confirmed: To be obtained in the Procedural area  H&P( within 30 days) Verified: To be obtained in the Procedural area  Interval H&P(within 24 hr) Complete (required for Outpatients and Surgery Admit only): To be obtained in the Procedural area  Allergies Reviewed: Yes  Anticoag/NSAID held?: NA  Currently on antibiotics?: No  Pregnancy denied?: NA  Beta Blocker given : N/A    Assessment:   1. Trochanteric bursitis of both hips        Plan: B/L GTB

## 2025-05-13 ENCOUNTER — OFFICE VISIT (OUTPATIENT)
Facility: CLINIC | Age: 65
End: 2025-05-13
Attending: FAMILY MEDICINE
Payer: COMMERCIAL

## 2025-05-13 DIAGNOSIS — R26.81 GAIT INSTABILITY: Primary | ICD-10-CM

## 2025-05-13 DIAGNOSIS — R53.81 PHYSICAL DECONDITIONING: ICD-10-CM

## 2025-05-13 PROCEDURE — 97163 PT EVAL HIGH COMPLEX 45 MIN: CPT | Performed by: PHYSICAL THERAPIST

## 2025-05-13 NOTE — PROGRESS NOTES
PT Evaluation     Today's date: 2025  Patient name: Linda Becerra  : 1960  MRN: [id number]  Referring provider: Davide Sorto MD  Dx:   Encounter Diagnosis     ICD-10-CM    1. Gait instability  R26.81       2. 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  She recently experienced a bout of CMV and has returned to PT due to generalized deconditioning and decreased stability.  Scoring on HOYOS demonstrates slight decrease in stability, 53/56.  ABC demonstrates overall decrease confidence scoring 54%.  Endurance scoring reveals decrease in distance ambulation scoring 510' in 6 min walk test.  Patient will benefit from skilled intervention to facilitate return to functional tasks with improved strength and stability.  She is an excellent rehab candidate despite her complex medical history.  She is in agreement with treatment plan.    Goals  STG  (5 weeks)  Patient will improve HOYOS scoring to 56/56 demonstrating increased overall stability                            Patient will improve confidence on ABC scoring by 10 points to improve tolerance to ADL tasks                           Patient will demonstrate increased 6 min walk test by 100' increasing pacing and endurance                           Patient will be independent in basic balance and strengthening HEP    LTG  (10 weeks)        Patient will demonstrate safe independent ambulation on outside surfaces without LOB                                     Patient will achieve further gains in ABC scoring 70 % or better indicating increased confidence in  her mobility                                     Patient will be independent in community based HEP to promote increased ambulation                                     Patient will complete 6 min walk test scoring 900' for improved community mobility    Plan  Patient would benefit from: skilled physical therapy  Planned modality interventions: cryotherapy and thermotherapy: hydrocollator packs    Planned therapy interventions: ADL retraining, manual therapy, neuromuscular re-education, balance, patient/caregiver education, postural training, strengthening, stretching, therapeutic activities, therapeutic exercise, home exercise program and gait training    Frequency: 2x week  Duration in weeks: 10  Treatment plan discussed with: patient      Subjective Evaluation    History of Present Illness  Mechanism of injury: Bursa shots B/L hips last Monday and spine injections recently for pain.  She does note she had to move slowly and does feel a bit more unsteadiness after being laid up with CMV..  She is walking with a cane.  She is considering surgery for her dehiscence.  She is now on new thyroid medication and also indicates she had been struggling with her current pain medication and has plans to discuss this further with her PCP.    Patient Goals  Patient goals for therapy: improved balance and increased strength  Patient goal: increased confidence in her motion  Pain  Current pain ratin  Location: LBP and hips    Social Support  Steps to enter house: yes  4  Stairs in house: yes   14  Lives in: multiple-level home  Lives with: significant other    Employment status: not working  Treatments  Previous treatment: physical therapy      Objective     Neurological Testing     Additional Neurological Details  Grossly intact to light touch, hypersensitivity noted left thigh    Strength/Myotome Testing     Left Hip   Planes of Motion   Flexion: 3  Extension: 3- and 3  Abduction: 3- and 3    Right Hip   Planes of Motion    Flexion: 4  Extension: 4-  Abduction: 4-    Left Knee   Flexion: 3+  Extension: 3+    Right Knee   Flexion: 4-  Extension: 4-    Left Ankle/Foot   Dorsiflexion: 3-  Plantar flexion: 3-    Right Ankle/Foot   Dorsiflexion: 3+  Plantar flexion: 3 and 3+  Neuro Exam:     Sensation   Light touch LE: left WNL and right WNL    Coordination   Finger to nose: left WNL and right WNL  Rapid alternating movements: UE WNL    Functional outcomes   Functional outcome gait comment: Increased hyperextension noted left, increased RODOLFO and slight hesitation around obstacles and on uneven surfaces.            Daily Treatment Diary     Precautions: h/o liver transplant, CMV, Asthma, lumbar radiculopathy, peripheral neuropathy, TBI, left hematoma groin resulting in increased pain, and left LE weakness, CKD, h/o right bimalleolar fx, MT head fx        POC Expires Reeval for Medicare to be completed  Unit Limit Auth Expiration Date PT/OT/STVisit Limit   7/22/2025 By visit N/A BOMN 12/31/2025 BOMN    Completed on visit                    Date 5/13          Auth Status           Approved          Visits # 1         Visits remaining          TESTING          5x STS 10.16         TUG 9.3         HOYOS 53/56         ABC 54.38         6 min walk test 510'         Neuro Re-Ed          Gait with std cane Cuing for knee extension/placement left         Hurdles          Dynamic balance          Static balance          Gait over hidden obstacles          Foam beam                                                  Ther Ex                                                                                                Ther Activity                                Gait Training                                Manuals

## 2025-05-13 NOTE — LETTER
May 13, 2025    Davide Sorto MD  [address] [facility] [address]    Patient: Linda Becerra   YOB: 1960   Date of Visit: 2025     Encounter Diagnosis     ICD-10-CM    1. Gait instability  R26.81       2. Physical deconditioning  R53.81           Dear Dr. Davide Sorto MD:    Thank you for your recent referral of Linda Becerra. Please review the attached evaluation summary from Linda's recent visit.     Please verify that you agree with the plan of care by signing the attached order.     If you have any questions or concerns, please do not hesitate to call.     I sincerely appreciate the opportunity to share in the care of one of your patients and hope to have another opportunity to work with you in the near future.       Sincerely,    Teresita Vail, PT      Referring Provider:      I certify that I have read the below Plan of Care and certify the need for these services furnished under this plan of treatment while under my care.                    Davide Sorto MD  [address] [facility] [address]  Via Fax: [phone]          PT Evaluation     Today's date: 2025  Patient name: Linda Becerra  : 1960  MRN: [id number]  Referring provider: Davide Sorto MD  Dx:   Encounter Diagnosis     ICD-10-CM    1. Gait instability  R26.81       2. 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  She recently experienced a bout of CMV and has returned to PT due to generalized deconditioning and decreased stability.  Scoring on HOYOS demonstrates slight decrease in stability,  53/56.  ABC demonstrates overall decrease confidence scoring 54%.  Endurance scoring reveals decrease in distance ambulation scoring 510' in 6 min walk test.  Patient will benefit from skilled intervention to facilitate return to functional tasks with improved strength and stability.  She is an excellent rehab candidate despite her complex medical history.  She is in agreement with treatment plan.    Goals  STG  (5 weeks)  Patient will improve HOYOS scoring to 56/56 demonstrating increased overall stability                            Patient will improve confidence on ABC scoring by 10 points to improve tolerance to ADL tasks                           Patient will demonstrate increased 6 min walk test by 100' increasing pacing and endurance                           Patient will be independent in basic balance and strengthening HEP    LTG  (10 weeks)        Patient will demonstrate safe independent ambulation on outside surfaces without LOB                                     Patient will achieve further gains in ABC scoring 70 % or better indicating increased confidence in her mobility                                     Patient will be independent in community based HEP to promote increased ambulation                                     Patient will complete 6 min walk test scoring 900' for improved community mobility    Plan  Patient would benefit from: skilled physical therapy  Planned modality interventions: cryotherapy and thermotherapy: hydrocollator packs    Planned therapy interventions: ADL retraining, manual therapy, neuromuscular re-education, balance, patient/caregiver education, postural training, strengthening, stretching, therapeutic activities, therapeutic exercise, home exercise program and gait training    Frequency: 2x week  Duration in weeks: 10  Treatment plan discussed with: patient      Subjective Evaluation    History of Present Illness  Mechanism of injury: Bursa shots B/L hips last Monday  and spine injections recently for pain.  She does note she had to move slowly and does feel a bit more unsteadiness after being laid up with CMV..  She is walking with a cane.  She is considering surgery for her dehiscence.  She is now on new thyroid medication and also indicates she had been struggling with her current pain medication and has plans to discuss this further with her PCP.    Patient Goals  Patient goals for therapy: improved balance and increased strength  Patient goal: increased confidence in her motion  Pain  Current pain ratin  Location: LBP and hips    Social Support  Steps to enter house: yes  4  Stairs in house: yes   14  Lives in: multiple-level home  Lives with: significant other    Employment status: not working  Treatments  Previous treatment: physical therapy      Objective     Neurological Testing     Additional Neurological Details  Grossly intact to light touch, hypersensitivity noted left thigh    Strength/Myotome Testing     Left Hip   Planes of Motion   Flexion: 3  Extension: 3- and 3  Abduction: 3- and 3    Right Hip   Planes of Motion   Flexion: 4  Extension: 4-  Abduction: 4-    Left Knee   Flexion: 3+  Extension: 3+    Right Knee   Flexion: 4-  Extension: 4-    Left Ankle/Foot   Dorsiflexion: 3-  Plantar flexion: 3-    Right Ankle/Foot   Dorsiflexion: 3+  Plantar flexion: 3 and 3+  Neuro Exam:     Sensation   Light touch LE: left WNL and right WNL    Coordination   Finger to nose: left WNL and right WNL  Rapid alternating movements: UE WNL    Functional outcomes   Functional outcome gait comment: Increased hyperextension noted left, increased RODOLFO and slight hesitation around obstacles and on uneven surfaces.            Daily Treatment Diary     Precautions: h/o liver transplant, CMV, Asthma, lumbar radiculopathy, peripheral neuropathy, TBI, left hematoma groin resulting in increased pain, and left LE weakness, CKD, h/o right bimalleolar fx, MT head fx        POC Expires Reeval  for Medicare to be completed  Unit Limit Auth Expiration Date PT/OT/STVisit Limit   7/22/2025 By visit N/A BOMN 12/31/2025 BOMN    Completed on visit                    Date 5/13          Auth Status           Approved          Visits # 1         Visits remaining          TESTING          5x STS 10.16         TUG 9.3         HOYOS 53/56         ABC 54.38         6 min walk test 510'         Neuro Re-Ed          Gait with std cane Cuing for knee extension/placement left         Hurdles          Dynamic balance          Static balance          Gait over hidden obstacles          Foam beam                                                  Ther Ex                                                                                                Ther Activity                                Gait Training                                Manuals

## 2025-05-19 ENCOUNTER — TELEPHONE (OUTPATIENT)
Dept: PAIN MEDICINE | Facility: CLINIC | Age: 65
End: 2025-05-19

## 2025-05-22 ENCOUNTER — OFFICE VISIT (OUTPATIENT)
Facility: CLINIC | Age: 65
End: 2025-05-22
Attending: FAMILY MEDICINE
Payer: COMMERCIAL

## 2025-05-22 ENCOUNTER — APPOINTMENT (OUTPATIENT)
Facility: CLINIC | Age: 65
End: 2025-05-22
Attending: FAMILY MEDICINE
Payer: COMMERCIAL

## 2025-05-22 DIAGNOSIS — R53.81 PHYSICAL DECONDITIONING: ICD-10-CM

## 2025-05-22 DIAGNOSIS — R26.81 GAIT INSTABILITY: Primary | ICD-10-CM

## 2025-05-22 PROCEDURE — 97140 MANUAL THERAPY 1/> REGIONS: CPT | Performed by: PHYSICAL THERAPIST

## 2025-05-22 PROCEDURE — 97112 NEUROMUSCULAR REEDUCATION: CPT | Performed by: PHYSICAL THERAPIST

## 2025-05-22 PROCEDURE — 97110 THERAPEUTIC EXERCISES: CPT | Performed by: PHYSICAL THERAPIST

## 2025-05-22 NOTE — PROGRESS NOTES
"Daily Note     Today's date: 2025  Patient name: Linda Becerra  : 1960  MRN: [id number]  Referring provider: Davide Sorto MD  Dx:   Encounter Diagnosis     ICD-10-CM    1. Gait instability  R26.81       2. Physical deconditioning  R53.81           Start Time: 932  Stop Time: 1011  Total time in clinic (min): 39 minutes    Subjective:   She is feeling a bit achy in her knees.  She arrived with her std cane.  Neck pain and difficulty turning during driving evident.      Objective: See treatment diary below      Assessment:   Limited cervical rotation today with increased guarding.  End range pain noted.  Reviewed SNAGS to assist and postural correction to ensure proper retraction.  Educated in scap retraction.  Static balance with improved stability.  STM with nice gains in decreased pain and restriction.        Plan: Continue per plan of care.  \"I feel looser\".  Assess tolerance to HEP and continue to progress gait and cervical exercise to tolerance.     Daily Treatment Diary     Precautions: h/o liver transplant, CMV, Asthma, lumbar radiculopathy, peripheral neuropathy, TBI, left hematoma groin resulting in increased pain, and left LE weakness, CKD, h/o right bimalleolar fx, MT head fx        POC Expires Reeval for Medicare to be completed  Unit Limit Auth Expiration Date PT/OT/STVisit Limit   2025 By visit N/A BOMN 2025 BOMN    Completed on visit                    Date          Auth Status           Approved          Visits # 1 2        Visits remaining          TESTING          5x STS 10.16         TUG 9.3         HOYOS 53/56         ABC 54.38         6 min walk test 510'         Neuro Re-Ed          Gait with std cane Cuing for knee extension/placement left         Hurdles          Dynamic balance          Static balance  Corner EC 10\" x4, EC HT 5x2        Gait over hidden obstacles          Foam beam                                                  Ther Ex          Scap " "retraction  5x2        Postural ed  VM         cervical retraction  10\" x4                                                               Ther Activity                                Gait Training                                Manuals          TPR SCM, UT, Lev  VM         Suboccipital release  VM                        "

## 2025-05-29 ENCOUNTER — APPOINTMENT (OUTPATIENT)
Facility: CLINIC | Age: 65
End: 2025-05-29
Attending: FAMILY MEDICINE
Payer: COMMERCIAL

## 2025-06-02 ENCOUNTER — APPOINTMENT (OUTPATIENT)
Facility: CLINIC | Age: 65
End: 2025-06-02
Attending: FAMILY MEDICINE
Payer: COMMERCIAL

## 2025-06-02 ENCOUNTER — OFFICE VISIT (OUTPATIENT)
Facility: CLINIC | Age: 65
End: 2025-06-02
Attending: FAMILY MEDICINE
Payer: COMMERCIAL

## 2025-06-02 DIAGNOSIS — R26.81 GAIT INSTABILITY: ICD-10-CM

## 2025-06-02 DIAGNOSIS — R53.81 PHYSICAL DECONDITIONING: Primary | ICD-10-CM

## 2025-06-02 PROCEDURE — 97140 MANUAL THERAPY 1/> REGIONS: CPT | Performed by: PHYSICAL THERAPIST

## 2025-06-02 PROCEDURE — 97112 NEUROMUSCULAR REEDUCATION: CPT | Performed by: PHYSICAL THERAPIST

## 2025-06-02 NOTE — PROGRESS NOTES
"Daily Note     Today's date: 2025  Patient name: Linda Becerra  : 1960  MRN: [id number]  Referring provider: Davide Sorto MD  Dx:   Encounter Diagnosis     ICD-10-CM    1. Physical deconditioning  R53.81       2. Gait instability  R26.81             Start Time: 0200  Stop Time: 0242  Total time in clinic (min): 42 minutes    Subjective:   She was sick last week.  This is the first day she has left the house in the past week due to CMV.  No current complaints.      Objective: See treatment diary below      Assessment:    Hidden obstacles with good tolerance, 4 square with slight hesitation with backward stepping.  Outside ambulation with occasional step correction with changes in direction.  STM for cervical tightness with good tolerance.  Continue to encourage outside ambulation to promote increased endurance and stability.  She continues to use her cane.    Plan: Continue per plan of care.   Continue to progress compliant surfaces and advanced gait challenges.     Daily Treatment Diary     Precautions: h/o liver transplant, CMV, Asthma, lumbar radiculopathy, peripheral neuropathy, TBI, left hematoma groin resulting in increased pain, and left LE weakness, CKD, h/o right bimalleolar fx, MT head fx        POC Expires Reeval for Medicare to be completed  Unit Limit Auth Expiration Date PT/OT/STVisit Limit   2025 By visit N/A BOMN 2025 BOMN    Completed on visit                    Date  6/2        Auth Status           Approved          Visits # 1 2 3       Visits remaining          TESTING          5x STS 10.16         TUG 9.3         HOYOS 53/56         ABC 54.38         6 min walk test 510'         Neuro Re-Ed          Gait with std cane Cuing for knee extension/placement left         Hurdles   Step to 4 laps, foot over foot 4 laps       Dynamic balance          Static balance  Corner EC 10\" x4, EC HT 5x2        Gait over hidden obstacles   4 laps with cane, 4 laps w/o device     " "  Foam beam   4 laps with 8 cone taps CTG       4 square   CW/CCW 4 ea       Outside ambulation off curbs and around ramps   200'                           Ther Ex          Scap retraction  5x2        Postural ed  VM         cervical retraction  10\" x4                                                               Ther Activity                                Gait Training                                Manuals          TPR SCM, UT, Lev  VM VM        Suboccipital release  VM                        "

## 2025-06-05 ENCOUNTER — APPOINTMENT (OUTPATIENT)
Facility: CLINIC | Age: 65
End: 2025-06-05
Attending: FAMILY MEDICINE
Payer: COMMERCIAL

## 2025-06-05 ENCOUNTER — OFFICE VISIT (OUTPATIENT)
Facility: CLINIC | Age: 65
End: 2025-06-05
Attending: FAMILY MEDICINE
Payer: COMMERCIAL

## 2025-06-05 DIAGNOSIS — R26.81 GAIT INSTABILITY: ICD-10-CM

## 2025-06-05 DIAGNOSIS — R53.81 PHYSICAL DECONDITIONING: Primary | ICD-10-CM

## 2025-06-05 PROCEDURE — 97112 NEUROMUSCULAR REEDUCATION: CPT | Performed by: PHYSICAL THERAPIST

## 2025-06-05 PROCEDURE — 97110 THERAPEUTIC EXERCISES: CPT | Performed by: PHYSICAL THERAPIST

## 2025-06-05 NOTE — PROGRESS NOTES
Daily Note     Today's date: 2025  Patient name: Linda Becerra  : 1960  MRN: [id number]  Referring provider: Davide Sorto MD  Dx:   Encounter Diagnosis     ICD-10-CM    1. Physical deconditioning  R53.81       2. Gait instability  R26.81               Start Time: 932  Stop Time: 1012  Total time in clinic (min): 40 minutes    Subjective:   She has been pacing herself with less fatigue.        Objective: See treatment diary below      Assessment:    Discussed pacing and use of rolling luggage to assist with carrying objects and increased stability on sand.  She is planning on going to the beach tomorrow with family.  Ambulation outside over small stones/gravel with good stability with std cane.  Educated on curb negotiation for improved stability with cane first down step, nice demonstration with reps.  Educated in ITB stretch to decrease left sided hip discomfort, per patient she has received injections in this hip for bursitis.  Varied position options given with good demonstration and written instructions.      Plan: Continue per plan of care.   Continue to progress compliant surfaces and advanced gait challenges.     Daily Treatment Diary     Precautions: h/o liver transplant, CMV, Asthma, lumbar radiculopathy, peripheral neuropathy, TBI, left hematoma groin resulting in increased pain, and left LE weakness, CKD, h/o right bimalleolar fx, MT head fx        POC Expires Reeval for Medicare to be completed  Unit Limit Auth Expiration Date PT/OT/STVisit Limit   2025 By visit N/A BOMN 2025 BOMN    Completed on visit                    Date        Auth Status           Approved          Visits # 1 2 3 4      Visits remaining          TESTING          5x STS 10.16         TUG 9.3         HOYOS 53/56         ABC 54.38         6 min walk test 510'         Neuro Re-Ed          Gait with std cane Cuing for knee extension/placement left         Hurdles   Step to 4 laps, foot over  "foot 4 laps Curb step up and down 5x2 with std cane      Dynamic balance          Static balance  Corner EC 10\" x4, EC HT 5x2        Gait over hidden obstacles   4 laps with cane, 4 laps w/o device Outside ambulation 550' with curbs and ambulation over small gravel      Foam beam   4 laps with 8 cone taps CTG       4 square   CW/CCW 4 ea       Outside ambulation off curbs and around ramps   200'                           Ther Ex          Scap retraction  5x2        Postural ed  VM         cervical retraction  10\" x4         ITB stretch    20\" x4ea       open book stretch    20\" x3                                       Ther Activity                                Gait Training                                Manuals          TPR SCM, UT, Lev  VM VM        Suboccipital release  VM                        "

## 2025-06-09 ENCOUNTER — OFFICE VISIT (OUTPATIENT)
Dept: PAIN MEDICINE | Facility: CLINIC | Age: 65
End: 2025-06-09
Payer: COMMERCIAL

## 2025-06-09 VITALS
HEIGHT: 64 IN | HEART RATE: 78 BPM | TEMPERATURE: 97.3 F | WEIGHT: 156 LBS | BODY MASS INDEX: 26.63 KG/M2 | OXYGEN SATURATION: 93 %

## 2025-06-09 DIAGNOSIS — M47.816 LUMBAR SPONDYLOSIS: ICD-10-CM

## 2025-06-09 DIAGNOSIS — M46.1 SACROILIITIS (HCC): Primary | ICD-10-CM

## 2025-06-09 PROCEDURE — 99214 OFFICE O/P EST MOD 30 MIN: CPT | Performed by: PHYSICIAN ASSISTANT

## 2025-06-09 RX ORDER — GABAPENTIN 300 MG/1
CAPSULE ORAL
COMMUNITY
Start: 2025-06-03

## 2025-06-09 NOTE — ASSESSMENT & PLAN NOTE
Patient is status post lumbar radiofrequency ablation and is able to report approximately 50% reduction in lumbar facet mediated pain.    She remains clinically stable and partially controlled on the current medication regimen of tramadol ER and as needed tramadol IR.  Refills are not needed on today's visit.

## 2025-06-09 NOTE — PROGRESS NOTES
Name: Linda Becerra      : 1960      MRN: [id number]  Encounter Provider: Debra Herrera PA-C  Encounter Date: 2025   [facility]  :  Assessment & Plan  Sacroiliitis (HCC)  While the patient was in the office today, I did have a thorough conversation regarding their chronic pain syndrome, medication management, and treatment plan options.    Based on patient report and physical exam, the patient's symptomatology does seem to be consistent with sacroiliac mediated pain from sacroiliitis. We will schedule the patient for a bilateral SIJ injection to decrease any inflammatory component of the patient's pain symptoms.    Follow-up is planned post injection or sooner as warranted. The patient was advised to contact the office should their symptoms worsen in the interim.    Orders:  •  [place] spine and pain procedure; Future    Lumbar spondylosis  Patient is status post lumbar radiofrequency ablation and is able to report approximately 50% reduction in lumbar facet mediated pain.    She remains clinically stable and partially controlled on the current medication regimen of tramadol ER and as needed tramadol IR.  Refills are not needed on today's visit.         [place] Prescription Drug Monitoring Program report was reviewed and was appropriate      My impressions and treatment recommendations were discussed in detail with the patient who verbalized understanding and had no further questions.  Discharge instructions were provided. I personally saw and examined the patient and I agree with the above discussed plan of care.    History of Present Illness     Linda Becerra is a 64 y.o. female who presents for a follow up office visit in regards to Back Pain and Leg Pain. The patient’s current symptoms include low back pain with radiation to bilateral hips, left greater than right that she rates a 4 out of 10 describes as an intermittent throbbing and  "shooting type of pain.  Patient underwent bilateral greater trochanteric bursa injections on 5/5/2025 with about 60% reduction in pain.  Prior to that she underwent lumbar radiofrequency ablation with 50% relief of the facet mediated pain.  She remains clinically stable and partially controlled on the current medication regimen of tramadol ER and tramadol IR and denies side effects.    Opioid contract date 2/17/2025    Last UDS Date: 2/17/2025    Review of Systems   Respiratory:  Negative for shortness of breath.    Cardiovascular:  Negative for chest pain.   Gastrointestinal:  Positive for constipation and nausea. Negative for diarrhea.   Musculoskeletal:  Positive for arthralgias and gait problem. Negative for joint swelling and myalgias.   Skin:  Negative for rash.   Neurological:  Positive for dizziness and weakness. Negative for seizures.   Psychiatric/Behavioral:  Negative for dysphoric mood.    All other systems reviewed and are negative.      Medical History Reviewed by provider this encounter:  Tobacco  Allergies  Meds  Problems  Med Hx  Surg Hx  Fam Hx     .     Objective   Pulse 78   Temp (!) 97.3 °F (36.3 °C)   Ht 5' 4.25\" (1.632 m)   Wt 70.8 kg (156 lb)   SpO2 93%   BMI 26.57 kg/m²      Pain Score:   4  Physical Exam  Constitutional: normal, well developed, well nourished, alert, in no distress and non-toxic and no overt pain behavior.  Eyes: anicteric  HEENT: grossly intact  Neck: supple, symmetric, trachea midline and no masses   Pulmonary: even and unlabored  Cardiovascular: No edema or pitting edema present  Skin: Normal without rashes or lesions and well hydrated  Psychiatric: Mood and affect appropriate  Neurologic: Cranial Nerves II-XII grossly intact  Musculoskeletal: Gait is antalgic with the use of a cane, tender bilateral SI joints, bilateral + Dalton finger sign + Patricks test +Gaenslen's test+ Posterior pelvic pain provocation      "

## 2025-06-10 ENCOUNTER — OFFICE VISIT (OUTPATIENT)
Facility: CLINIC | Age: 65
End: 2025-06-10
Attending: FAMILY MEDICINE
Payer: COMMERCIAL

## 2025-06-10 ENCOUNTER — APPOINTMENT (OUTPATIENT)
Facility: CLINIC | Age: 65
End: 2025-06-10
Attending: FAMILY MEDICINE
Payer: COMMERCIAL

## 2025-06-10 DIAGNOSIS — R53.81 PHYSICAL DECONDITIONING: Primary | ICD-10-CM

## 2025-06-10 DIAGNOSIS — R26.81 GAIT INSTABILITY: ICD-10-CM

## 2025-06-10 PROCEDURE — 97110 THERAPEUTIC EXERCISES: CPT

## 2025-06-10 PROCEDURE — 97140 MANUAL THERAPY 1/> REGIONS: CPT

## 2025-06-10 NOTE — HOME EXERCISE EDUCATION
Program_ID:[id number]   Access Code: [email or web address]  URL: [email or web address]  Date: 06-  Prepared By: Angelique Stanton    Program Notes      Exercises      - Sidelying Open Book Thoracic Lumbar Rotation and Extension - 2 x daily - 7 x weekly - 1 sets - 3 reps - 20 hold      - Supine ITB Stretch with Strap - 2 x daily - 7 x weekly - 1 sets - 3 reps - 20 hold      - Seated Assisted Cervical Rotation with Towel - 2 x daily - 7 x weekly - 1 sets - 3 reps - 20 hold

## 2025-06-10 NOTE — PROGRESS NOTES
"Daily Note     Today's date: 6/10/2025  Patient name: Linda Becerra  : 1960  MRN: [id number]  Referring provider: Davide Sorto MD  Dx:   Encounter Diagnosis     ICD-10-CM    1. Physical deconditioning  R53.81       2. Gait instability  R26.81           Start Time: 1145  Stop Time: 1230  Total time in clinic (min): 45 minutes    Subjective: Lonny reports not feeling good today. States she almost canceled today. Reports increased neck pain and HA's today.       Objective: See treatment diary below      Assessment: Lonny Tolerated treatment well with appropriate muscle fatigue experienced with ex's. She is making steady gains towards goals  and remains appropriately challenged with their program. Tolerated manuals well with decreased cervical soreness. Issued updated HEP and reviewed.  Required vc's t/o session for proper positioning with ex's.   She  would benefit from continued PT and compliance with HEP.        Plan: Continue per plan of care.      Daily Treatment Diary     Precautions: h/o liver transplant, CMV, Asthma, lumbar radiculopathy, peripheral neuropathy, TBI, left hematoma groin resulting in increased pain, and left LE weakness, CKD, h/o right bimalleolar fx, MT head fx        POC Expires Reeval for Medicare to be completed  Unit Limit Auth Expiration Date PT/OT/STVisit Limit   2025 By visit N/A BOMN 2025 BOMN    Completed on visit                    Date 5/13 5/22 6/2 6/5 6/10      Auth Status           Approved          Visits # 1 2 3 4 5     Visits remaining          TESTING          5x STS 10.16         TUG 9.3         HOYOS 53/56         ABC 54.38         6 min walk test 510'         Neuro Re-Ed          Gait with std cane Cuing for knee extension/placement left         Hurdles   Step to 4 laps, foot over foot 4 laps Curb step up and down 5x2 with std cane      Dynamic balance          Static balance  Corner EC 10\" x4, EC HT 5x2        Gait over hidden obstacles   4 laps with " "cane, 4 laps w/o device Outside ambulation 550' with curbs and ambulation over small gravel      Foam beam   4 laps with 8 cone taps CTG       4 square   CW/CCW 4 ea       Outside ambulation off curbs and around ramps   200'                           Ther Ex          Scap retraction  5x2   20x     Postural ed  VM   AW      cervical retraction  10\" x4   20x      ITB stretch    20\" x4ea 20\"x3 ea       open book stretch    20\" x3 20\"x2                                       Ther Activity                                Gait Training                                Manuals          TPR SCM, UT, Lev  VM VM  AW      Suboccipital release  VM   AW            Access Code: [email or web address]  URL: [email or web address]  Date: 06/10/2025  Prepared by: Angelique Stanton    Exercises  - Sidelying Open Book Thoracic Lumbar Rotation and Extension  - 2 x daily - 7 x weekly - 1 sets - 3 reps - 20 hold  - Supine ITB Stretch with Strap  - 2 x daily - 7 x weekly - 1 sets - 3 reps - 20 hold  - Seated Assisted Cervical Rotation with Towel  - 2 x daily - 7 x weekly - 1 sets - 3 reps - 20 hold           "

## 2025-06-12 ENCOUNTER — APPOINTMENT (OUTPATIENT)
Facility: CLINIC | Age: 65
End: 2025-06-12
Attending: FAMILY MEDICINE
Payer: COMMERCIAL

## 2025-06-12 ENCOUNTER — OFFICE VISIT (OUTPATIENT)
Facility: CLINIC | Age: 65
End: 2025-06-12
Attending: FAMILY MEDICINE
Payer: COMMERCIAL

## 2025-06-12 DIAGNOSIS — R53.81 PHYSICAL DECONDITIONING: ICD-10-CM

## 2025-06-12 DIAGNOSIS — R26.81 GAIT INSTABILITY: Primary | ICD-10-CM

## 2025-06-12 PROCEDURE — 97112 NEUROMUSCULAR REEDUCATION: CPT | Performed by: PHYSICAL THERAPIST

## 2025-06-12 PROCEDURE — 97140 MANUAL THERAPY 1/> REGIONS: CPT | Performed by: PHYSICAL THERAPIST

## 2025-06-12 NOTE — PROGRESS NOTES
"Daily Note     Today's date: 2025  Patient name: Linda Becerra  : 1960  MRN: [id number]  Referring provider: Davide Sorto MD  Dx:   Encounter Diagnosis     ICD-10-CM    1. Gait instability  R26.81       2. Physical deconditioning  R53.81             Start Time: 931  Stop Time: 1014  Total time in clinic (min): 43 minutes    Subjective:   Patient lost water on Monday night which has increased her overall stress.  Fogginess and nausea noted this morning.        Objective: See treatment diary below      Assessment:    Outside ambulation with cuing for cane placement needed on curbs.  Step taps with occasional hesitation.  Hurdles foot over foot with occasional instability.  ITB release with improved tolerance to walking.  Backward stepping with occasional hesitation improving with reps.  Continue to reinforce increased activity at home to tolerance.        Plan: Continue per plan of care.   No complaints end of session.  Consider compliant surfaces.     Daily Treatment Diary     Precautions: h/o liver transplant, CMV, Asthma, lumbar radiculopathy, peripheral neuropathy, TBI, left hematoma groin resulting in increased pain, and left LE weakness, CKD, h/o right bimalleolar fx, MT head fx        POC Expires Reeval for Medicare to be completed  Unit Limit Auth Expiration Date PT/OT/STVisit Limit   2025 By visit N/A BOMN 2025 BOMN    Completed on visit                    Date  6/2 6/5 6/10 6/12     Auth Status           Approved          Visits # 1 2 3 4 5 6    Visits remaining          TESTING          5x STS 10.16         TUG 9.3         HOYOS 53/56         ABC 54.38         6 min walk test 510'         Neuro Re-Ed          Gait with std cane Cuing for knee extension/placement left     Backward stepping 4 laps    Hurdles   Step to 4 laps, foot over foot 4 laps Curb step up and down 5x2 with std cane  5 hurdles no device 6 laps    Dynamic balance          Static balance  Corner EC 10\" " "x4, EC HT 5x2    Step taps 10x3, FWD, LAT    Gait over hidden obstacles   4 laps with cane, 4 laps w/o device Outside ambulation 550' with curbs and ambulation over small gravel  Outside ambulation 400' with curbs and ramps with cuing for cane sequence    Foam beam   4 laps with 8 cone taps CTG       4 square   CW/CCW 4 ea       Outside ambulation off curbs and around ramps   200'                           Ther Ex          Scap retraction  5x2   20x     Postural ed  VM   AW      cervical retraction  10\" x4   20x      ITB stretch    20\" x4ea 20\"x3 ea       open book stretch    20\" x3 20\"x2                                       Ther Activity                                Gait Training                                Manuals          TPR SCM, UT, Lev  VM VM  AW TPR left ITB- VM    MFR thoracic and cervical outlet      VM     Suboccipital release  VM   AW            Access Code: [email or web address]  URL: [email or web address]  Date: 06/10/2025  Prepared by: Angelique Stanton    Exercises  - Sidelying Open Book Thoracic Lumbar Rotation and Extension  - 2 x daily - 7 x weekly - 1 sets - 3 reps - 20 hold  - Supine ITB Stretch with Strap  - 2 x daily - 7 x weekly - 1 sets - 3 reps - 20 hold  - Seated Assisted Cervical Rotation with Towel  - 2 x daily - 7 x weekly - 1 sets - 3 reps - 20 hold           "

## 2025-06-17 ENCOUNTER — APPOINTMENT (OUTPATIENT)
Facility: CLINIC | Age: 65
End: 2025-06-17
Attending: FAMILY MEDICINE
Payer: COMMERCIAL

## 2025-06-19 ENCOUNTER — APPOINTMENT (OUTPATIENT)
Facility: CLINIC | Age: 65
End: 2025-06-19
Attending: FAMILY MEDICINE
Payer: COMMERCIAL

## 2025-06-19 ENCOUNTER — OFFICE VISIT (OUTPATIENT)
Facility: CLINIC | Age: 65
End: 2025-06-19
Attending: FAMILY MEDICINE
Payer: COMMERCIAL

## 2025-06-19 DIAGNOSIS — R26.81 GAIT INSTABILITY: Primary | ICD-10-CM

## 2025-06-19 DIAGNOSIS — R53.81 PHYSICAL DECONDITIONING: ICD-10-CM

## 2025-06-19 PROCEDURE — 97112 NEUROMUSCULAR REEDUCATION: CPT | Performed by: PHYSICAL THERAPIST

## 2025-06-19 PROCEDURE — 97140 MANUAL THERAPY 1/> REGIONS: CPT | Performed by: PHYSICAL THERAPIST

## 2025-06-19 PROCEDURE — 97110 THERAPEUTIC EXERCISES: CPT | Performed by: PHYSICAL THERAPIST

## 2025-06-19 NOTE — PROGRESS NOTES
Daily Note     Today's date: 2025  Patient name: Linda Becerra  : 1960  MRN: [id number]  Referring provider: Davide Sorto MD  Dx:   Encounter Diagnosis     ICD-10-CM    1. Gait instability  R26.81       2. Physical deconditioning  R53.81               Start Time: 922  Stop Time: 1000  Total time in clinic (min): 38 minutes    Subjective:   Yesterday patient was not feeling well with emesis noted.  She states she is feeling better today.  Elevated blood work reported, she has a follow-up Tuesday next week with details to follow.       Objective: See treatment diary below      Assessment:   Continue to reinforce pacing to avoid fatigue.  Static balance with initial instability improving with reps.  Hurdles with slow stepping and increased step correction, educated in need to increase speed to improve stability with improvement noted.  STM to allow for decreased cervical guarding.  Additional piriformis stretch for left given due to increased hip tightness, performed seated for ease.  Patient demonstrates well and voiced understanding.  Written instructions given.       Plan: Continue per plan of care.   No complaints end of session.  Consider compliant surfaces.     Daily Treatment Diary     Precautions: h/o liver transplant, CMV, Asthma, lumbar radiculopathy, peripheral neuropathy, TBI, left hematoma groin resulting in increased pain, and left LE weakness, CKD, h/o right bimalleolar fx, MT head fx        POC Expires Reeval for Medicare to be completed  Unit Limit Auth Expiration Date PT/OT/STVisit Limit   2025 By visit N/A BOMN 2025 BOMN    Completed on visit                    Date 5/13 6/2 6/5 6/10 6/12 6/19    Auth Status          Approved         Visits # 1 3 4 5 6 7   Visits remaining         TESTING         5x STS 10.16        TUG 9.3        HOYOS 53/56        ABC 54.38        6 min walk test 510'        Neuro Re-Ed         Gait with std cane Cuing for knee extension/placement left  "   Backward stepping 4 laps    Hurdles  Step to 4 laps, foot over foot 4 laps Curb step up and down 5x2 with std cane  5 hurdles no device 6 laps 5 hurdles cane initially 4 laps, 2 laps without with increased speed   Dynamic balance         Static balance     Step taps 10x3, FWD, LAT Partial tandem in corner EC 10\"x4 ea   Gait over hidden obstacles  4 laps with cane, 4 laps w/o device Outside ambulation 550' with curbs and ambulation over small gravel  Outside ambulation 400' with curbs and ramps with cuing for cane sequence    Foam beam  4 laps with 8 cone taps CTG       4 square  CW/CCW 4 ea       Outside ambulation off curbs and around ramps  200'                         Ther Ex         Scap retraction    20x  10x   Postural ed    AW  VM    cervical retraction    20x  5x    ITB stretch   20\" x4ea 20\"x3 ea       open book stretch   20\" x3 20\"x2       Seated piriformis stretch      10\" x4ea                       Ther Activity                             Gait Training                             Manuals         TPR SCM, UT, Lev  VM  AW TPR left ITB- VM VM- left piriformis, B/L SCM, Lev   MFR thoracic and cervical outlet     VM     Suboccipital release    AW  VM          Access Code: [email or web address]  URL: [email or web address]  Date: 06/10/2025  Prepared by: Angelique Stanton    Exercises  - Sidelying Open Book Thoracic Lumbar Rotation and Extension  - 2 x daily - 7 x weekly - 1 sets - 3 reps - 20 hold  - Supine ITB Stretch with Strap  - 2 x daily - 7 x weekly - 1 sets - 3 reps - 20 hold  - Seated Assisted Cervical Rotation with Towel  - 2 x daily - 7 x weekly - 1 sets - 3 reps - 20 hold           "

## 2025-06-23 ENCOUNTER — OFFICE VISIT (OUTPATIENT)
Facility: CLINIC | Age: 65
End: 2025-06-23
Attending: FAMILY MEDICINE
Payer: COMMERCIAL

## 2025-06-23 DIAGNOSIS — R53.81 PHYSICAL DECONDITIONING: ICD-10-CM

## 2025-06-23 DIAGNOSIS — R26.81 GAIT INSTABILITY: Primary | ICD-10-CM

## 2025-06-23 PROCEDURE — 97112 NEUROMUSCULAR REEDUCATION: CPT | Performed by: PHYSICAL THERAPIST

## 2025-06-23 PROCEDURE — 97140 MANUAL THERAPY 1/> REGIONS: CPT | Performed by: PHYSICAL THERAPIST

## 2025-06-23 NOTE — PROGRESS NOTES
Daily Note     Today's date: 2025  Patient name: Linda Becerra  : 1960  MRN: [id number]  Referring provider: Davide Sorto MD  Dx:   Encounter Diagnosis     ICD-10-CM    1. Gait instability  R26.81       2. Physical deconditioning  R53.81                 Start Time: 0203  Stop Time: 024  Total time in clinic (min): 44 minutes    Subjective:   She states she has not been feeling great today.        Objective: See treatment diary below      Assessment:   Despite not feeling well at the start of the session, nice gains overall noted with dillan clearance, 4 square and varied gait challenges.  TB added to scap retraction with good tolerance.  Backward stepping with nice stability.  Step taps without LOB.  STM continues to be beneficial in reducing cervical stiffness.  Static foam tolerated well.        Plan: Continue per plan of care.   No complaints end of session.  Continue advanced gait challenges and consider compliant surfaces for dynamic balance.      Daily Treatment Diary     Precautions: h/o liver transplant, CMV, Asthma, lumbar radiculopathy, peripheral neuropathy, TBI, left hematoma groin resulting in increased pain, and left LE weakness, CKD, h/o right bimalleolar fx, MT head fx        POC Expires Reeval for Medicare to be completed  Unit Limit Auth Expiration Date PT/OT/STVisit Limit   2025 By visit N/A BOMN 2025 BOMN    Completed on visit                    Date 5/13 6/5 6/10 6/12 6/19 6/23    Auth Status          Approved         Visits # 1 4 5 6 7 8   Visits remaining         TESTING         5x STS 10.16        TUG 9.3        HOYOS 53/56        ABC 54.38        6 min walk test 510'        Neuro Re-Ed         Gait with std cane Cuing for knee extension/placement left   Backward stepping 4 laps  Backward stepping 4 laps   Hurdles  Curb step up and down 5x2 with std cane  5 hurdles no device 6 laps 5 hurdles cane initially 4 laps, 2 laps without with increased speed 6 hurdles mid  "height foot over foot, 4 laps with std cane   Dynamic balance         Static balance    Step taps 10x3, FWD, LAT Partial tandem in corner EC 10\"x4 ea Foam FC EO HT 4x2, 5x EC   Gait over hidden obstacles  Outside ambulation 550' with curbs and ambulation over small gravel  Outside ambulation 400' with curbs and ramps with cuing for cane sequence     Foam beam         4 square      CW/CCW 5x2   Outside ambulation off curbs and around ramps                           Ther Ex         Scap retraction   20x  10x 10x2 Blue TB   Postural ed   AW  VM     cervical retraction   20x  5x     ITB stretch  20\" x4ea 20\"x3 ea        open book stretch  20\" x3 20\"x2        Seated piriformis stretch     10\" x4ea                        Ther Activity                             Gait Training                             Manuals         TPR SCM, UT, Lev   AW TPR left ITB- VM VM- left piriformis, B/L SCM, Lev VM- B/L Lev, Left SCM, UT and pect left release   MFR thoracic and cervical outlet    VM      Suboccipital release   AW  VM VM          Access Code: [email or web address]  URL: [email or web address]  Date: 06/10/2025  Prepared by: Angelique Stanton    Exercises  - Sidelying Open Book Thoracic Lumbar Rotation and Extension  - 2 x daily - 7 x weekly - 1 sets - 3 reps - 20 hold  - Supine ITB Stretch with Strap  - 2 x daily - 7 x weekly - 1 sets - 3 reps - 20 hold  - Seated Assisted Cervical Rotation with Towel  - 2 x daily - 7 x weekly - 1 sets - 3 reps - 20 hold           "

## 2025-06-24 ENCOUNTER — APPOINTMENT (OUTPATIENT)
Facility: CLINIC | Age: 65
End: 2025-06-24
Attending: FAMILY MEDICINE
Payer: COMMERCIAL

## 2025-06-26 ENCOUNTER — ANNUAL EXAM (OUTPATIENT)
Dept: OBGYN CLINIC | Facility: CLINIC | Age: 65
End: 2025-06-26
Payer: COMMERCIAL

## 2025-06-26 ENCOUNTER — OFFICE VISIT (OUTPATIENT)
Facility: CLINIC | Age: 65
End: 2025-06-26
Attending: FAMILY MEDICINE
Payer: COMMERCIAL

## 2025-06-26 ENCOUNTER — APPOINTMENT (OUTPATIENT)
Facility: CLINIC | Age: 65
End: 2025-06-26
Attending: FAMILY MEDICINE
Payer: COMMERCIAL

## 2025-06-26 VITALS
BODY MASS INDEX: 26.67 KG/M2 | SYSTOLIC BLOOD PRESSURE: 112 MMHG | DIASTOLIC BLOOD PRESSURE: 72 MMHG | WEIGHT: 156.2 LBS | HEIGHT: 64 IN

## 2025-06-26 DIAGNOSIS — R26.81 GAIT INSTABILITY: ICD-10-CM

## 2025-06-26 DIAGNOSIS — Z01.419 ROUTINE GYNECOLOGICAL EXAMINATION: Primary | ICD-10-CM

## 2025-06-26 DIAGNOSIS — Z12.31 ENCOUNTER FOR SCREENING MAMMOGRAM FOR MALIGNANT NEOPLASM OF BREAST: ICD-10-CM

## 2025-06-26 DIAGNOSIS — R53.81 PHYSICAL DECONDITIONING: Primary | ICD-10-CM

## 2025-06-26 DIAGNOSIS — Z12.4 SCREENING FOR MALIGNANT NEOPLASM OF THE CERVIX: ICD-10-CM

## 2025-06-26 PROCEDURE — 97112 NEUROMUSCULAR REEDUCATION: CPT | Performed by: PHYSICAL THERAPIST

## 2025-06-26 PROCEDURE — 97140 MANUAL THERAPY 1/> REGIONS: CPT | Performed by: PHYSICAL THERAPIST

## 2025-06-26 PROCEDURE — G0101 CA SCREEN;PELVIC/BREAST EXAM: HCPCS | Performed by: OBSTETRICS & GYNECOLOGY

## 2025-06-26 NOTE — PROGRESS NOTES
[facility] [place]  [address] [place] [id number]    ASSESSMENT/PLAN: Linda Becerra is a 64 y.o.  who presents for annual gynecologic exam.    Encounter for routine gynecologic examination  - Routine well woman exam completed today.  - Cervical Cancer Screening: Current ASCCP Guidelines reviewed. Last Pap: 2021 . Next Pap Due:   - HPV Vaccination status: Not immunized  - Breast Cancer Screening: Last Mammogram 2024, ordered  - Colorectal cancer screening was not ordered.  - The following were reviewed in today's visit: breast self exam and mammography screening ordered    Additional problems addressed during this visit:  1. Routine gynecological examination  2. Encounter for screening mammogram for malignant neoplasm of breast  -     Mammo screening bilateral w 3d and cad; Future  3. Screening for malignant neoplasm of the cervix  -     IGP, rfx Aptima HPV ASCU      CC:  Annual Gynecologic Examination    HPI: Linda Becerra is a 64 y.o.  who presents for annual gynecologic examination.  HPI    The following portions of the patient's history were reviewed and updated as appropriate: She  has a past medical history of Anemia, Anxiety, Asthma, Chronic pain disorder, Clotting disorder (HCC), Concussion, Constipation, CSF leak, Fractures, GERD (gastroesophageal reflux disease), Head injury, Hernia (), High blood pressure, History of transfusion, Hyperbilirubinemia (2022), Hypertension, Liver failure (HCC), Liver transplant recipient (HCC), Migraines, Peripheral neuropathy, Urinary frequency, and Varicella ().  She  has a past surgical history that includes TONSILECTOMY AND ADNOIDECTOMY; Laparoscopy for ectopic pregnancy; Sinus surgery; ABLATION SOFT TISSUE; IR paracentesis (2022); IR paracentesis (2022); IR paracentesis (2022); Mammo (historical) (Bilateral, 2021); Liver transplantation (22); Colonoscopy (2023); Liver biopsy  (7/3/2023); MRI guided breast wire localization left (Left, 02/23/2015); MRI guided breast wire localization left (Left, 02/23/2015); Sinus surgery (2000); and pr exc tumor soft tiss face&scalp subfascial <2cm (N/A, 08/01/2024).  Her family history includes Arthritis in her father; Autoimmune disease in her daughter; Breast cancer in her mother and sister; Cancer in her father, mother, and sister; Colon cancer in her father; Heart disease in her half-brother; Melanoma in her half-brother; No Known Problems in her paternal grandfather, paternal grandmother, son, and son; Prostate cancer (age of onset: 50) in her half-brother.  She  reports that she quit smoking about 6 years ago. Her smoking use included cigarettes. She started smoking about 31 years ago. She has a 25 pack-year smoking history. She has never used smokeless tobacco. She reports that she does not currently use alcohol. She reports that she does not currently use drugs after having used the following drugs: Marijuana.  Current Outpatient Medications   Medication Sig Dispense Refill    azaTHIOprine (IMURAN) 50 mg tablet Take 100 mg by mouth daily at bedtime      CHELATED MAGNESIUM PO Take 100 mg by mouth in the morning and 100 mg at noon and 100 mg in the evening.      diphenhydrAMINE (BENADRYL) 25 mg capsule Take 25 mg by mouth every 8 (eight) hours as needed for itching      ergocalciferol (ERGOCALCIFEROL) 1.25 MG (50985 UT) capsule Take 50,000 Units by mouth once a week Every Monday AM      FOLIC ACID PO Take by mouth      gabapentin (NEURONTIN) 300 mg capsule       latanoprost (XALATAN) 0.005 % ophthalmic solution       metoprolol succinate (TOPROL-XL) 25 mg 24 hr tablet       pantoprazole (PROTONIX) 40 mg tablet Take 1 tablet (40 mg total) by mouth daily 90 tablet 3    Premarin vaginal cream Insert 1 g into the vagina 2 (two) times a week 30 g 3    Prolia 60 MG/ML       senna (SENOKOT) 8.6 mg Take 2 tablets (17.2 mg total) by mouth daily at bedtime   "    tacrolimus (PROGRAF) 1 mg capsule Take 3 mg by mouth every 12 (twelve) hours 3 capsules AM  3 capsules PM      thiamine 100 MG tablet Take 100 mg by mouth in the morning.      traMADol (ULTRAM) 50 mg tablet Take 1 tablet (50 mg total) by mouth 2 (two) times a day as needed for moderate pain for ongoing therapy 45 tablet 2    traMADol (ULTRAM-ER) 200 MG 24 hr tablet Take 1 tablet (200 mg total) by mouth daily for ongoing therapy 30 tablet 3    valGANciclovir (VALCYTE) 450 mg tablet       Prevymis 480 MG TABS Take 480 mg by mouth daily (Patient not taking: Reported on 2/17/2025)       No current facility-administered medications for this visit.     She is allergic to other and pollen extract..    Review of Systems      Objective:  /72 (BP Location: Left arm, Patient Position: Sitting, Cuff Size: Standard)   Ht 5' 4.25\" (1.632 m)   Wt 70.9 kg (156 lb 3.2 oz)   Breastfeeding No   BMI 26.60 kg/m²    Physical Exam      PE:  General Appearance: alert and oriented, in no acute distress.   HEENT: PERRL, thyroid without masses or tenderness  Breast: No masses, tenderness, skin changes, nipple D/C or axillary or supraclavicular adenopathy  Abdomen: Soft, non-tender, non-distended, no masses, no rebound or guarding.  Pelvic:       External genitalia: Normal appearance, no abnormal pigmentation, no lesions or masses. Normal Bartholin's and [place]'s. Several small scattered sebaceous cysts      Urinary system: Urethral meatus normal, bladder non-tender.      Vaginal: normal mucosa without prolapse or lesions. Normal-appearing physiologic discharge      Cervix: Normal-appearing, well-epithelialized, no gross lesions or masses No cervical motion tenderness.      Adnexa: No adnexal masses or tenderness noted.      Uterus: Normal-sized, regular contour, midline, mobile, no uterine tenderness.  Extremities: Normal range of motion.   Skin: normal, no rash or abnormalities  Neurologic: alert, oriented x3  Psychiatric: " Appropriate affect, mood stable, cooperative with exam.

## 2025-06-26 NOTE — PROGRESS NOTES
"Daily Note     Today's date: 2025  Patient name: Linda Becerra  : 1960  MRN: [id number]  Referring provider: Davide Sorto MD  Dx:   Encounter Diagnosis     ICD-10-CM    1. Physical deconditioning  R53.81       2. Gait instability  R26.81                   Start Time: 929  Stop Time: 101  Total time in clinic (min): 48 minutes    Subjective:   She continues to feel off with heat.         Objective: See treatment diary below      Assessment:  Step challenges with hesitation noted on step ups.  Introduced 3 layered mat step up with increased compliance, occasional assist for stability.  Wedge hold to increase forward weight shift.  Distance ambulation with good tolerance.  Hurdles with foam between.  STM for cervical restriction with good tolerance.       Plan: Continue per plan of care.   No complaints end of session.  Continue advanced gait challenges and compliant surfaces for dynamic balance. Re-assess next session.     Daily Treatment Diary     Precautions: h/o liver transplant, CMV, Asthma, lumbar radiculopathy, peripheral neuropathy, TBI, left hematoma groin resulting in increased pain, and left LE weakness, CKD, h/o right bimalleolar fx, MT head fx        POC Expires Reeval for Medicare to be completed  Unit Limit Auth Expiration Date PT/OT/STVisit Limit   2025 By visit N/A BOMN 2025 BOMN    Completed on visit                    Date 5/13 6/10 6/12 6/19 6/23 6/26    Auth Status          Approved         Visits # 1 5 6 7 8 9   Visits remaining         TESTING         5x STS 10.16        TUG 9.3        HOYOS 53/56        ABC 54.38        6 min walk test 510'        Neuro Re-Ed         Gait with std cane Cuing for knee extension/placement left  Backward stepping 4 laps  Backward stepping 4 laps    Wedge hold      10\" x4 without UE support CTG   Hurdles   5 hurdles no device 6 laps 5 hurdles cane initially 4 laps, 2 laps without with increased speed 6 hurdles mid height foot over " "foot, 4 laps with std cane 5 hurdles mid height 3 laps with std cane, added foam between 4 laps with std cane   Dynamic balance      Step up onto 3 layered mat with CTG 5x2   Static balance   Step taps 10x3, FWD, LAT Partial tandem in corner EC 10\"x4 ea Foam FC EO HT 4x2, 5x EC    Step taps      10x2 3 rise firm   Gait over hidden obstacles   Outside ambulation 400' with curbs and ramps with cuing for cane sequence   Distance ambulation around busy clinic 240' without device   Foam beam         4 square     CW/CCW 5x2    Outside ambulation off curbs and around ramps                           Ther Ex         Scap retraction  20x  10x 10x2 Blue TB 10x2 blue TB- cuing to allow for increased scap motion   Postural ed  AW  VM      cervical retraction  20x  5x      ITB stretch  20\"x3 ea         open book stretch  20\"x2         Seated piriformis stretch    10\" x4ea                         Ther Activity                             Gait Training                             Manuals         TPR SCM, UT, Lev  AW TPR left ITB- VM VM- left piriformis, B/L SCM, Lev VM- B/L Lev, Left SCM, UT and pect left release VM- left piriformis, SCM   MFR thoracic and cervical outlet   VM       Suboccipital release  AW  VM VM           Access Code: [email or web address]  URL: [email or web address]  Date: 06/10/2025  Prepared by: Angelique Stanton    Exercises  - Sidelying Open Book Thoracic Lumbar Rotation and Extension  - 2 x daily - 7 x weekly - 1 sets - 3 reps - 20 hold  - Supine ITB Stretch with Strap  - 2 x daily - 7 x weekly - 1 sets - 3 reps - 20 hold  - Seated Assisted Cervical Rotation with Towel  - 2 x daily - 7 x weekly - 1 sets - 3 reps - 20 hold           "

## 2025-06-30 LAB
CYTOLOGIST CVX/VAG CYTO: NORMAL
DX ICD CODE: NORMAL
OTHER STN SPEC: NORMAL
OTHER STN SPEC: NORMAL
PATH REPORT.FINAL DX SPEC: NORMAL
SL AMB NOTE:: NORMAL
SL AMB SPECIMEN ADEQUACY: NORMAL
SL AMB TEST METHODOLOGY: NORMAL

## 2025-07-01 ENCOUNTER — APPOINTMENT (OUTPATIENT)
Facility: CLINIC | Age: 65
End: 2025-07-01
Attending: FAMILY MEDICINE
Payer: COMMERCIAL

## 2025-07-01 ENCOUNTER — OFFICE VISIT (OUTPATIENT)
Facility: CLINIC | Age: 65
End: 2025-07-01
Attending: FAMILY MEDICINE
Payer: COMMERCIAL

## 2025-07-01 DIAGNOSIS — R53.81 PHYSICAL DECONDITIONING: Primary | ICD-10-CM

## 2025-07-01 DIAGNOSIS — R26.81 GAIT INSTABILITY: ICD-10-CM

## 2025-07-01 PROCEDURE — 97112 NEUROMUSCULAR REEDUCATION: CPT | Performed by: PHYSICAL THERAPIST

## 2025-07-01 NOTE — PROGRESS NOTES
PT PROGRESS NOTE/ DAILY NOTE    Today's date: 2025  Patient name: Linda Becerra  : 1960  MRN: [id number]  Referring provider: Davide Sorto MD  Dx:   Encounter Diagnosis     ICD-10-CM    1. Physical deconditioning  R53.81       2. Gait instability  R26.81             Start Time: 1028  Stop Time: 1108  Total time in clinic (min): 40 minutes    Assessment  Impairments: abnormal coordination, abnormal gait, activity intolerance, impaired balance, impaired physical strength and lacks appropriate home exercise program    Assessment details: Patient is familiar to this PT with history of liver transplant complicated by CMV intermittently resulting in prolonged inactivity.  She recently experienced a bout of CMV and has returned to PT due to generalized deconditioning and decreased stability.  Scoring on HOYOS demonstrates slight decrease in stability, 53/56.  ABC demonstrates overall decrease confidence scoring 54%.  Endurance scoring reveals decrease in distance ambulation scoring 510' in 6 min walk test.  Patient will benefit from skilled intervention to facilitate return to functional tasks with improved strength and stability.  She is an excellent rehab candidate despite her complex medical history.  She is in agreement with treatment plan.      Patient has been experiencing increased symptoms she attributes to CMV and states this has resulted in increased nausea and malaise making mobility challenging.  She has initiated conversation with her specialist and will keep PT informed.  Scoring for endurance in 6 min walk held, 5x STS slightly declined along with TUG.  Despite these changes, improvements have been noted on HOYOS scoring 56/56 (from 53/56 on IE) and improved confidence is evident on ABC.  Recommend continuation of PT per POC.  Patient is in agreement with treatment plan.      Goals  STG  (5 weeks)  Patient will improve HOYOS scoring to 56/56 demonstrating increased overall stability- MET                             Patient will improve confidence on ABC scoring by 10 points to improve tolerance to ADL tasks- MET and exceeded                           Patient will demonstrate increased 6 min walk test by 100' increasing pacing and endurance- held                           Patient will be independent in basic balance and strengthening HEP- MET    LTG  (10 weeks)        Patient will demonstrate safe independent ambulation on outside surfaces without LOB                                     Patient will achieve further gains in ABC scoring 70 % or better indicating increased confidence in her mobility                                     Patient will be independent in community based HEP to promote increased ambulation                                     Patient will complete 6 min walk test scoring 900' for improved community mobility    Plan  Patient would benefit from: skilled physical therapy  Planned modality interventions: cryotherapy and thermotherapy: hydrocollator packs    Planned therapy interventions: ADL retraining, manual therapy, neuromuscular re-education, balance, patient/caregiver education, postural training, strengthening, stretching, therapeutic activities, therapeutic exercise, home exercise program and gait training    Frequency: 2x week  Duration in weeks: 6  Treatment plan discussed with: patient        Subjective Evaluation    History of Present Illness  Mechanism of injury: Bursa shots B/L hips last Monday and spine injections recently for pain.  She does note she had to move slowly and does feel a bit more unsteadiness after being laid up with CMV..  She is walking with a cane.  She is considering surgery for her dehiscence.  She is now on new thyroid medication and also indicates she had been struggling with her current pain medication and has plans to discuss this further with her PCP.      7/1  Overall she is not feeling well, she feels her CMV has been worse.  Increased nausea,  and general malaise.     Patient Goals  Patient goals for therapy: improved balance and increased strength  Patient goal: increased confidence in her motion  Pain  Current pain rating: 3  Location: LBP and hips    Social Support  Steps to enter house: yes  4  Stairs in house: yes   14  Lives in: multiple-level home  Lives with: significant other    Employment status: not working  Treatments  Previous treatment: physical therapy        Objective     Neurological Testing     Additional Neurological Details  Grossly intact to light touch, hypersensitivity noted left thigh    Strength/Myotome Testing     Left Hip   Planes of Motion   Flexion: 3  Extension: 3- and 3  Abduction: 3- and 3    Right Hip   Planes of Motion   Flexion: 4  Extension: 4-  Abduction: 4-    Left Knee   Flexion: 3+  Extension: 3+    Right Knee   Flexion: 4-  Extension: 4-    Left Ankle/Foot   Dorsiflexion: 3-  Plantar flexion: 3-    Right Ankle/Foot   Dorsiflexion: 3+  Plantar flexion: 3 and 3+  Neuro Exam:     Sensation   Light touch LE: left WNL and right WNL    Coordination   Finger to nose: left WNL and right WNL  Rapid alternating movements: UE WNL    Functional outcomes   Functional outcome gait comment: Increased hyperextension noted left, increased RODOLFO and slight hesitation around obstacles and on uneven surfaces.            Daily Treatment Diary     Precautions: h/o liver transplant, CMV, Asthma, lumbar radiculopathy, peripheral neuropathy, TBI, left hematoma groin resulting in increased pain, and left LE weakness, CKD, h/o right bimalleolar fx, MT head fx        POC Expires Reeval for Medicare to be completed  Unit Limit Auth Expiration Date PT/OT/STVisit Limit   7/22/2025 By visit N/A BOMN 12/31/2025 BOMN    Completed on visit                    Date 5/13 7/1         Auth Status           Approved          Visits # 1 10        Visits remaining          TESTING          5x STS 10.16 11.6        TUG 9.3 10sec w/o candante HOYOS 53/56  "56/56        ABC 54.38 65        6 min walk test 510' held        Neuro Re-Ed          Gait with std cane Cuing for knee extension/placement left         Hurdles          Dynamic balance          Static balance  EC FT 10\" x3        Gait over hidden obstacles          Foam beam                                                  Ther Ex                                                                                                Ther Activity                                Gait Training                                Manuals                                                "

## 2025-07-02 ENCOUNTER — TELEPHONE (OUTPATIENT)
Age: 65
End: 2025-07-02

## 2025-07-02 NOTE — TELEPHONE ENCOUNTER
Spoke with patient regarding results.  She is concerned due to wording. Advised once provider reviews we will call her back with results.  She verbalized understanding, no further questions or concerns at this time.

## 2025-07-03 ENCOUNTER — OFFICE VISIT (OUTPATIENT)
Facility: CLINIC | Age: 65
End: 2025-07-03
Attending: FAMILY MEDICINE
Payer: COMMERCIAL

## 2025-07-03 ENCOUNTER — APPOINTMENT (OUTPATIENT)
Facility: CLINIC | Age: 65
End: 2025-07-03
Attending: FAMILY MEDICINE
Payer: COMMERCIAL

## 2025-07-03 DIAGNOSIS — R53.81 PHYSICAL DECONDITIONING: ICD-10-CM

## 2025-07-03 DIAGNOSIS — R26.81 GAIT INSTABILITY: Primary | ICD-10-CM

## 2025-07-03 PROCEDURE — 97112 NEUROMUSCULAR REEDUCATION: CPT | Performed by: PHYSICAL THERAPIST

## 2025-07-03 NOTE — PROGRESS NOTES
"Daily Note     Today's date: 7/3/2025  Patient name: Linda Becerra  : 1960  MRN: [id number]  Referring provider: Davide Sorto MD  Dx:   Encounter Diagnosis     ICD-10-CM    1. Gait instability  R26.81       2. Physical deconditioning  R53.81                     Start Time: 929  Stop Time: 1008  Total time in clinic (min): 39 minutes    Subjective:    She feels overall better.  She states she heard from her MD and they are planning on decreased medication.         Objective: See treatment diary below      Assessment:  Returned to outdoor ambulation with improved tolerance and decreased instability evident.  Nice gains noted in dillan clearance and 4 square with improved backward stepping.  TB increased in resistance for scap retraction.  Continue to encouraged paced ambulation to increase activity tolerance with voiced understanding.        Plan: Continue per plan of care.   No complaints end of session.  Continue advanced gait challenges and compliant surfaces for dynamic balance.       Daily Treatment Diary     Precautions: h/o liver transplant, CMV, Asthma, lumbar radiculopathy, peripheral neuropathy, TBI, left hematoma groin resulting in increased pain, and left LE weakness, CKD, h/o right bimalleolar fx, MT head fx        POC Expires Reeval for Medicare to be completed  Unit Limit Auth Expiration Date PT/OT/STVisit Limit   2025 By visit N/A BOMN 2025 BOMN    Completed on visit                    Date 5/13 6/19 6/23 6/26 7/1 7/3    Auth Status          Approved         Visits # 1 7 8 9 10 11   Visits remaining     PROGRESS NOTE    TESTING         5x STS 10.16        TUG 9.3        HOYOS 53/56        ABC 54.38        6 min walk test 510'        Neuro Re-Ed         Gait with std cane Cuing for knee extension/placement left  Backward stepping 4 laps      Wedge hold    10\" x4 without UE support CTG     Hurdles  5 hurdles cane initially 4 laps, 2 laps without with increased speed 6 hurdles mid " "height foot over foot, 4 laps with std cane 5 hurdles mid height 3 laps with std cane, added foam between 4 laps with std cane  6 hurdles mid height 5 laps w/ std cane;    Dynamic balance    Step up onto 3 layered mat with CTG 5x2  4 square CW/CCW 5 times ea   Static balance  Partial tandem in corner EC 10\"x4 ea Foam FC EO HT 4x2, 5x EC      Step taps    10x2 3 rise firm  10x3 off foam 3 rise   Gait over hidden obstacles    Distance ambulation around busy clinic 240' without device     Foam beam         4 square   CW/CCW 5x2      Outside ambulation off curbs and around ramps      800' with std cane outside curbs and ramps                     Ther Ex         Scap retraction  10x 10x2 Blue TB 10x2 blue TB- cuing to allow for increased scap motion  10 x2 Red TB    Postural ed  VM        cervical retraction  5x        ITB stretch          open book stretch          Seated piriformis stretch  10\" x4ea                           Ther Activity                             Gait Training                             Manuals         TPR SCM, UT, Lev  VM- left piriformis, B/L SCM, Lev VM- B/L Lev, Left SCM, UT and pect left release VM- left piriformis, SCM     MFR thoracic and cervical outlet          Suboccipital release  VM VM             Access Code: [email or web address]  URL: [email or web address]  Date: 06/10/2025  Prepared by: Angelique Stanton    Exercises  - Sidelying Open Book Thoracic Lumbar Rotation and Extension  - 2 x daily - 7 x weekly - 1 sets - 3 reps - 20 hold  - Supine ITB Stretch with Strap  - 2 x daily - 7 x weekly - 1 sets - 3 reps - 20 hold  - Seated Assisted Cervical Rotation with Towel  - 2 x daily - 7 x weekly - 1 sets - 3 reps - 20 hold           "

## 2025-07-08 ENCOUNTER — APPOINTMENT (OUTPATIENT)
Facility: CLINIC | Age: 65
End: 2025-07-08
Attending: FAMILY MEDICINE
Payer: COMMERCIAL

## 2025-07-11 ENCOUNTER — TELEPHONE (OUTPATIENT)
Age: 65
End: 2025-07-11

## 2025-07-18 ENCOUNTER — OFFICE VISIT (OUTPATIENT)
Facility: CLINIC | Age: 65
End: 2025-07-18
Attending: FAMILY MEDICINE
Payer: COMMERCIAL

## 2025-07-18 DIAGNOSIS — R53.81 PHYSICAL DECONDITIONING: Primary | ICD-10-CM

## 2025-07-18 DIAGNOSIS — R26.81 GAIT INSTABILITY: ICD-10-CM

## 2025-07-18 PROCEDURE — 97112 NEUROMUSCULAR REEDUCATION: CPT | Performed by: PHYSICAL THERAPIST

## 2025-07-18 NOTE — PROGRESS NOTES
"Daily Note     Today's date: 2025  Patient name: Linda Becerra  : 1960  MRN: [id number]  Referring provider: Davide Sorto MD  Dx:   Encounter Diagnosis     ICD-10-CM    1. Physical deconditioning  R53.81       2. Gait instability  R26.81                       Start Time: 1247  Stop Time: 1326  Total time in clinic (min): 39 minutes    Subjective:    Neck stiffness noted, [place].  No current complaints of pain.      Objective: See treatment diary below      Assessment:  Outdoor ambulation with good tolerance, cuing continues to intermittently be needed for curb sequencing for proper cane placement.  4 square with nice stability.  STM with nice gains in cervical rotation and decreased guarding.  Continue to encourage scap retraction for posture and decreased cervical strain.       Plan: Continue per plan of care.   No complaints end of session.  Continue advanced gait challenges and compliant surfaces for dynamic balance.       Daily Treatment Diary     Precautions: h/o liver transplant, CMV, Asthma, lumbar radiculopathy, peripheral neuropathy, TBI, left hematoma groin resulting in increased pain, and left LE weakness, CKD, h/o right bimalleolar fx, MT head fx        POC Expires Reeval for Medicare to be completed  Unit Limit Auth Expiration Date PT/OT/STVisit Limit   2025 By visit N/A BOMN 2025 BOMN    Completed on visit                    Date 5/13 6/23 6/26 7/1 7/3 7/18    Auth Status          Approved         Visits # 1 8 9 10 11 12   Visits remaining    PROGRESS NOTE     TESTING         5x STS 10.16        TUG 9.3        HOYOS 53/56        ABC 54.38        6 min walk test 510'        Neuro Re-Ed         Gait with std cane Cuing for knee extension/placement left Backward stepping 4 laps    Backward stepping 3 laps   Wedge hold   10\" x4 without UE support CTG      Hurdles  6 hurdles mid height foot over foot, 4 laps with std cane 5 " hurdles mid height 3 laps with std cane, added foam between 4 laps with std cane  6 hurdles mid height 5 laps w/ std cane;     Dynamic balance   Step up onto 3 layered mat with CTG 5x2  4 square CW/CCW 5 times ea 4 square CW/CCW 6 times ea   Static balance  Foam FC EO HT 4x2, 5x EC       Step taps   10x2 3 rise firm  10x3 off foam 3 rise 10x3 off 3 rise   Gait over hidden obstacles   Distance ambulation around busy clinic 240' without device      Foam beam         4 square  CW/CCW 5x2       Outside ambulation off curbs and around ramps     800' with std cane outside curbs and ramps 800' with std cane, cuing for curb intermittently                     Ther Ex         Scap retraction  10x2 Blue TB 10x2 blue TB- cuing to allow for increased scap motion  10 x2 Red TB  reviewed   Postural ed          cervical retraction          ITB stretch          open book stretch          Seated piriformis stretch                             Ther Activity                             Gait Training                             Manuals         TPR SCM, UT, Lev  VM- B/L Lev, Left SCM, UT and pect left release VM- left piriformis, SCM      MFR thoracic and cervical outlet          Suboccipital release  VM              Access Code: [email or web address]  URL: [email or web address]  Date: 06/10/2025  Prepared by: Angelique Stanton    Exercises  - Sidelying Open Book Thoracic Lumbar Rotation and Extension  - 2 x daily - 7 x weekly - 1 sets - 3 reps - 20 hold  - Supine ITB Stretch with Strap  - 2 x daily - 7 x weekly - 1 sets - 3 reps - 20 hold  - Seated Assisted Cervical Rotation with Towel  - 2 x daily - 7 x weekly - 1 sets - 3 reps - 20 hold

## 2025-07-21 ENCOUNTER — APPOINTMENT (OUTPATIENT)
Facility: CLINIC | Age: 65
End: 2025-07-21
Attending: FAMILY MEDICINE
Payer: COMMERCIAL

## 2025-07-25 ENCOUNTER — APPOINTMENT (OUTPATIENT)
Facility: CLINIC | Age: 65
End: 2025-07-25
Attending: FAMILY MEDICINE
Payer: COMMERCIAL

## 2025-07-28 ENCOUNTER — HOSPITAL ENCOUNTER (OUTPATIENT)
Dept: RADIOLOGY | Facility: CLINIC | Age: 65
Discharge: HOME | End: 2025-07-28
Attending: PHYSICIAN ASSISTANT
Payer: COMMERCIAL

## 2025-07-28 VITALS
RESPIRATION RATE: 18 BRPM | OXYGEN SATURATION: 93 % | TEMPERATURE: 97.2 F | HEART RATE: 64 BPM | SYSTOLIC BLOOD PRESSURE: 131 MMHG | DIASTOLIC BLOOD PRESSURE: 67 MMHG

## 2025-07-28 DIAGNOSIS — M46.1 SACROILIITIS (HCC): ICD-10-CM

## 2025-07-28 PROCEDURE — 27096 INJECT SACROILIAC JOINT: CPT | Performed by: ANESTHESIOLOGY

## 2025-07-28 RX ORDER — ROPIVACAINE HYDROCHLORIDE 2 MG/ML
2 INJECTION, SOLUTION EPIDURAL; INFILTRATION; PERINEURAL ONCE
Status: COMPLETED | OUTPATIENT
Start: 2025-07-28 | End: 2025-07-28

## 2025-07-28 RX ORDER — METHYLPREDNISOLONE ACETATE 80 MG/ML
80 INJECTION, SUSPENSION INTRA-ARTICULAR; INTRALESIONAL; INTRAMUSCULAR; PARENTERAL; SOFT TISSUE ONCE
Status: COMPLETED | OUTPATIENT
Start: 2025-07-28 | End: 2025-07-28

## 2025-07-28 RX ADMIN — IOHEXOL 0.4 ML: 300 INJECTION, SOLUTION INTRAVENOUS at 11:29

## 2025-07-28 RX ADMIN — METHYLPREDNISOLONE ACETATE 80 MG: 80 INJECTION, SUSPENSION INTRA-ARTICULAR; INTRALESIONAL; INTRAMUSCULAR; SOFT TISSUE at 11:29

## 2025-07-28 RX ADMIN — ROPIVACAINE HYDROCHLORIDE 2 ML: 2 INJECTION EPIDURAL; INFILTRATION; PERINEURAL at 11:29

## 2025-08-01 ENCOUNTER — OFFICE VISIT (OUTPATIENT)
Facility: CLINIC | Age: 65
End: 2025-08-01
Attending: FAMILY MEDICINE
Payer: COMMERCIAL

## 2025-08-01 DIAGNOSIS — R26.81 GAIT INSTABILITY: ICD-10-CM

## 2025-08-01 DIAGNOSIS — R53.81 PHYSICAL DECONDITIONING: Primary | ICD-10-CM

## 2025-08-01 PROCEDURE — 97112 NEUROMUSCULAR REEDUCATION: CPT | Performed by: PHYSICAL THERAPIST

## 2025-08-05 ENCOUNTER — OFFICE VISIT (OUTPATIENT)
Dept: PAIN MEDICINE | Facility: CLINIC | Age: 65
End: 2025-08-05
Payer: COMMERCIAL

## 2025-08-05 VITALS — BODY MASS INDEX: 26.29 KG/M2 | TEMPERATURE: 97 F | HEIGHT: 64 IN | WEIGHT: 154 LBS

## 2025-08-05 DIAGNOSIS — M47.816 LUMBAR SPONDYLOSIS: ICD-10-CM

## 2025-08-05 DIAGNOSIS — G61.89 OTHER INFLAMMATORY POLYNEUROPATHIES (HCC): ICD-10-CM

## 2025-08-05 DIAGNOSIS — M51.369 DDD (DEGENERATIVE DISC DISEASE), LUMBAR: ICD-10-CM

## 2025-08-05 DIAGNOSIS — G89.4 CHRONIC PAIN SYNDROME: ICD-10-CM

## 2025-08-05 DIAGNOSIS — M70.62 GREATER TROCHANTERIC BURSITIS OF LEFT HIP: Primary | ICD-10-CM

## 2025-08-05 PROCEDURE — 99214 OFFICE O/P EST MOD 30 MIN: CPT | Performed by: PHYSICIAN ASSISTANT

## 2025-08-05 RX ORDER — TRAMADOL HYDROCHLORIDE 200 MG/1
200 TABLET, EXTENDED RELEASE ORAL DAILY
Qty: 30 TABLET | Refills: 3 | Status: SHIPPED | OUTPATIENT
Start: 2025-08-05

## 2025-08-11 ENCOUNTER — TELEPHONE (OUTPATIENT)
Dept: PAIN MEDICINE | Facility: CLINIC | Age: 65
End: 2025-08-11

## (undated) DEVICE — VESSEL CANNULA

## (undated) DEVICE — GLOVE SRG BIOGEL 7

## (undated) DEVICE — SUT ETHILON 6-0 P-3 18 IN 1698G

## (undated) DEVICE — 3M™ STERI-STRIP™ REINFORCED ADHESIVE SKIN CLOSURES, R1547, 1/2 IN X 4 IN (12 MM X 100 MM), 6 STRIPS/ENVELOPE: Brand: 3M™ STERI-STRIP™

## (undated) DEVICE — TUBING SUCTION 5MM X 12 FT

## (undated) DEVICE — TIBURON SPLIT SHEET: Brand: CONVERTORS

## (undated) DEVICE — PAD GROUNDING DUAL ADULT

## (undated) DEVICE — NEEDLE 27 G X 1 1/4

## (undated) DEVICE — NEPTUNE E-SEP SMOKE EVACUATION PENCIL, COATED, 70MM BLADE, PUSH BUTTON SWITCH: Brand: NEPTUNE E-SEP

## (undated) DEVICE — ELECTRODE NEEDLE MEGAFINE 2IN E-Z CLEAN MEGADYNE -0118

## (undated) DEVICE — SUT MONOCRYL 6-0 P-3 18 IN Y492G

## (undated) DEVICE — SKIN MARKER DUAL TIP WITH RULER CAP, FLEXIBLE RULER AND LABELS: Brand: DEVON

## (undated) DEVICE — DISPOSABLE OR TOWEL: Brand: CARDINAL HEALTH

## (undated) DEVICE — INTENDED FOR TISSUE SEPARATION, AND OTHER PROCEDURES THAT REQUIRE A SHARP SURGICAL BLADE TO PUNCTURE OR CUT.: Brand: BARD-PARKER ® CARBON RIB-BACK BLADES

## (undated) DEVICE — BETHLEHEM UNIVERSAL OUTPATIENT: Brand: CARDINAL HEALTH

## (undated) DEVICE — 3M™ STERI-STRIP™ COMPOUND BENZOIN TINCTURE 40 BAGS/CARTON 4 CARTONS/CASE C1544: Brand: 3M™ STERI-STRIP™